# Patient Record
Sex: FEMALE | Race: WHITE | Employment: OTHER | URBAN - METROPOLITAN AREA
[De-identification: names, ages, dates, MRNs, and addresses within clinical notes are randomized per-mention and may not be internally consistent; named-entity substitution may affect disease eponyms.]

---

## 2017-02-12 ENCOUNTER — ED HISTORICAL/CONVERTED ENCOUNTER (OUTPATIENT)
Dept: OTHER | Age: 52
End: 2017-02-12

## 2017-06-05 ENCOUNTER — ED HISTORICAL/CONVERTED ENCOUNTER (OUTPATIENT)
Dept: OTHER | Age: 52
End: 2017-06-05

## 2017-12-20 ENCOUNTER — ED HISTORICAL/CONVERTED ENCOUNTER (OUTPATIENT)
Dept: OTHER | Age: 52
End: 2017-12-20

## 2018-03-23 ENCOUNTER — ED HISTORICAL/CONVERTED ENCOUNTER (OUTPATIENT)
Dept: OTHER | Age: 53
End: 2018-03-23

## 2018-05-11 ENCOUNTER — IP HISTORICAL/CONVERTED ENCOUNTER (OUTPATIENT)
Dept: OTHER | Age: 53
End: 2018-05-11

## 2018-09-28 ENCOUNTER — ED HISTORICAL/CONVERTED ENCOUNTER (OUTPATIENT)
Dept: OTHER | Age: 53
End: 2018-09-28

## 2019-05-31 ENCOUNTER — OP HISTORICAL/CONVERTED ENCOUNTER (OUTPATIENT)
Dept: OTHER | Age: 54
End: 2019-05-31

## 2019-06-20 ENCOUNTER — OP HISTORICAL/CONVERTED ENCOUNTER (OUTPATIENT)
Dept: OTHER | Age: 54
End: 2019-06-20

## 2019-12-09 ENCOUNTER — ED HISTORICAL/CONVERTED ENCOUNTER (OUTPATIENT)
Dept: OTHER | Age: 54
End: 2019-12-09

## 2020-04-08 ENCOUNTER — ED HISTORICAL/CONVERTED ENCOUNTER (OUTPATIENT)
Dept: OTHER | Age: 55
End: 2020-04-08

## 2020-07-24 ENCOUNTER — ED HISTORICAL/CONVERTED ENCOUNTER (OUTPATIENT)
Dept: OTHER | Age: 55
End: 2020-07-24

## 2020-09-09 ENCOUNTER — ED HISTORICAL/CONVERTED ENCOUNTER (OUTPATIENT)
Dept: OTHER | Age: 55
End: 2020-09-09

## 2020-10-20 ENCOUNTER — HOSPITAL ENCOUNTER (EMERGENCY)
Age: 55
Discharge: HOME | End: 2020-10-20
Attending: EMERGENCY MEDICINE
Payer: MEDICARE

## 2020-10-20 VITALS
OXYGEN SATURATION: 97 % | DIASTOLIC BLOOD PRESSURE: 80 MMHG | HEIGHT: 68 IN | TEMPERATURE: 98.4 F | WEIGHT: 190 LBS | HEART RATE: 103 BPM | BODY MASS INDEX: 28.79 KG/M2 | RESPIRATION RATE: 18 BRPM | SYSTOLIC BLOOD PRESSURE: 120 MMHG

## 2020-10-20 DIAGNOSIS — R10.13 EPIGASTRIC PAIN: Primary | ICD-10-CM

## 2020-10-20 DIAGNOSIS — R19.5 DARK STOOLS: ICD-10-CM

## 2020-10-20 LAB
ABO + RH BLD: NORMAL
ALBUMIN SERPL-MCNC: 3.6 G/DL (ref 3.5–5)
ALBUMIN/GLOB SERPL: 0.9 {RATIO} (ref 1.1–2.2)
ALP SERPL-CCNC: 137 U/L (ref 45–117)
ALT SERPL-CCNC: 33 U/L (ref 12–78)
ANION GAP SERPL CALC-SCNC: 5 MMOL/L (ref 5–15)
APPEARANCE UR: ABNORMAL
AST SERPL W P-5'-P-CCNC: 23 U/L (ref 15–37)
BACTERIA URNS QL MICRO: ABNORMAL /HPF
BASOPHILS # BLD: 0.1 K/UL (ref 0–0.1)
BASOPHILS NFR BLD: 1 % (ref 0–1)
BILIRUB SERPL-MCNC: 0.2 MG/DL (ref 0.2–1)
BILIRUB UR QL: NEGATIVE
BLOOD BANK CMNT PATIENT-IMP: NORMAL
BLOOD GROUP ANTIBODIES SERPL: NEGATIVE
BUN SERPL-MCNC: 11 MG/DL (ref 6–20)
BUN/CREAT SERPL: 12 (ref 12–20)
CA-I BLD-MCNC: 9.3 MG/DL (ref 8.5–10.1)
CHLORIDE SERPL-SCNC: 100 MMOL/L (ref 97–108)
CO2 SERPL-SCNC: 31 MMOL/L (ref 21–32)
COLOR UR: ABNORMAL
CREAT SERPL-MCNC: 0.91 MG/DL (ref 0.55–1.02)
DIFFERENTIAL METHOD BLD: NORMAL
EOSINOPHIL # BLD: 0.1 K/UL (ref 0–0.4)
EOSINOPHIL NFR BLD: 1 % (ref 0–7)
ERYTHROCYTE [DISTWIDTH] IN BLOOD BY AUTOMATED COUNT: 14 % (ref 11.5–14.5)
GLOBULIN SER CALC-MCNC: 4 G/DL (ref 2–4)
GLUCOSE SERPL-MCNC: 98 MG/DL (ref 65–100)
GLUCOSE UR STRIP.AUTO-MCNC: NEGATIVE MG/DL
HCT VFR BLD AUTO: 44.1 % (ref 35–47)
HGB BLD-MCNC: 14.9 G/DL (ref 11.5–16)
HGB UR QL STRIP: NEGATIVE
IMM GRANULOCYTES # BLD AUTO: 0 K/UL (ref 0–0.04)
IMM GRANULOCYTES NFR BLD AUTO: 0 % (ref 0–0.5)
KETONES UR QL STRIP.AUTO: NEGATIVE MG/DL
LEUKOCYTE ESTERASE UR QL STRIP.AUTO: NEGATIVE
LIPASE SERPL-CCNC: 66 U/L (ref 73–393)
LYMPHOCYTES # BLD: 3.2 K/UL (ref 0.8–3.5)
LYMPHOCYTES NFR BLD: 40 % (ref 12–49)
MCH RBC QN AUTO: 30.8 PG (ref 26–34)
MCHC RBC AUTO-ENTMCNC: 33.8 G/DL (ref 30–36.5)
MCV RBC AUTO: 91.3 FL (ref 80–99)
MONOCYTES # BLD: 0.4 K/UL (ref 0–1)
MONOCYTES NFR BLD: 5 % (ref 5–13)
MUCOUS THREADS URNS QL MICRO: ABNORMAL /LPF
NEUTS SEG # BLD: 4.1 K/UL (ref 1.8–8)
NEUTS SEG NFR BLD: 53 % (ref 32–75)
NITRITE UR QL STRIP.AUTO: NEGATIVE
PH UR STRIP: 6 [PH] (ref 5–8)
PLATELET # BLD AUTO: 379 K/UL (ref 150–400)
PMV BLD AUTO: 9.5 FL (ref 8.9–12.9)
POTASSIUM SERPL-SCNC: 4.3 MMOL/L (ref 3.5–5.1)
PROT SERPL-MCNC: 7.6 G/DL (ref 6.4–8.2)
PROT UR STRIP-MCNC: NEGATIVE MG/DL
RBC # BLD AUTO: 4.83 M/UL (ref 3.8–5.2)
RBC #/AREA URNS HPF: ABNORMAL /HPF (ref 0–5)
SODIUM SERPL-SCNC: 136 MMOL/L (ref 136–145)
SP GR UR REFRACTOMETRY: 1.01 (ref 1–1.03)
SPECIMEN EXP DATE BLD: NORMAL
UA: UC IF INDICATED,UAUC: ABNORMAL
UROBILINOGEN UR QL STRIP.AUTO: 0.1 EU/DL (ref 0.1–1)
WBC # BLD AUTO: 7.9 K/UL (ref 3.6–11)
WBC URNS QL MICRO: ABNORMAL /HPF (ref 0–4)

## 2020-10-20 PROCEDURE — 83690 ASSAY OF LIPASE: CPT

## 2020-10-20 PROCEDURE — 99284 EMERGENCY DEPT VISIT MOD MDM: CPT

## 2020-10-20 PROCEDURE — 86900 BLOOD TYPING SEROLOGIC ABO: CPT

## 2020-10-20 PROCEDURE — 74011250636 HC RX REV CODE- 250/636: Performed by: EMERGENCY MEDICINE

## 2020-10-20 PROCEDURE — C9113 INJ PANTOPRAZOLE SODIUM, VIA: HCPCS | Performed by: EMERGENCY MEDICINE

## 2020-10-20 PROCEDURE — 80053 COMPREHEN METABOLIC PANEL: CPT

## 2020-10-20 PROCEDURE — 36415 COLL VENOUS BLD VENIPUNCTURE: CPT

## 2020-10-20 PROCEDURE — 81001 URINALYSIS AUTO W/SCOPE: CPT

## 2020-10-20 PROCEDURE — 85025 COMPLETE CBC W/AUTO DIFF WBC: CPT

## 2020-10-20 PROCEDURE — 74011000250 HC RX REV CODE- 250: Performed by: EMERGENCY MEDICINE

## 2020-10-20 RX ORDER — SUCRALFATE 1 G/10ML
1 SUSPENSION ORAL 4 TIMES DAILY
Qty: 414 ML | Refills: 0 | Status: SHIPPED | OUTPATIENT
Start: 2020-10-20 | End: 2021-08-09

## 2020-10-20 RX ORDER — SUCRALFATE 1 G/10ML
1 SUSPENSION ORAL 4 TIMES DAILY
Qty: 414 ML | Refills: 0 | Status: SHIPPED | OUTPATIENT
Start: 2020-10-20 | End: 2020-10-20 | Stop reason: SDUPTHER

## 2020-10-20 RX ORDER — OMEPRAZOLE 40 MG/1
40 CAPSULE, DELAYED RELEASE ORAL
Qty: 30 CAP | Refills: 0 | Status: SHIPPED | OUTPATIENT
Start: 2020-10-20 | End: 2020-11-19

## 2020-10-20 RX ORDER — OMEPRAZOLE 40 MG/1
40 CAPSULE, DELAYED RELEASE ORAL
Qty: 30 CAP | Refills: 0 | Status: SHIPPED | OUTPATIENT
Start: 2020-10-20 | End: 2020-10-20 | Stop reason: SDUPTHER

## 2020-10-20 RX ORDER — MORPHINE SULFATE 2 MG/ML
2 INJECTION, SOLUTION INTRAMUSCULAR; INTRAVENOUS ONCE
Status: COMPLETED | OUTPATIENT
Start: 2020-10-20 | End: 2020-10-20

## 2020-10-20 RX ADMIN — SODIUM CHLORIDE 1000 ML: 9 INJECTION, SOLUTION INTRAVENOUS at 14:12

## 2020-10-20 RX ADMIN — MORPHINE SULFATE 2 MG: 2 INJECTION, SOLUTION INTRAMUSCULAR; INTRAVENOUS at 14:17

## 2020-10-20 RX ADMIN — SODIUM CHLORIDE 80 MG: 9 INJECTION, SOLUTION INTRAMUSCULAR; INTRAVENOUS; SUBCUTANEOUS at 14:18

## 2020-10-20 NOTE — ED NOTES
IV removed. Pt a&ox4. gcs 15. Pt self ambulated out of ED with discharge paperwork and personal belongings.

## 2020-10-20 NOTE — DISCHARGE INSTRUCTIONS
Continue nexium or you may want to try omeprazole, be sure to take it in the morning before eating anything. You may also try Carafate which provides a soothing coating to your upper GI tract to protect it from acid. See your GI within 3 days for follow up.   Return to ED for worsening symptoms or new concerning symptoms

## 2020-10-21 NOTE — ED PROVIDER NOTES
HPI   Chief Complaint   Patient presents with    Abdominal Pain    Epigastric Pain    Melena   47yoF with hx GERD, appendectomy, cholecystectomy, hysterectomy presents with abdominal pain and dark stools. Pt reports 6 days of dark tarry stools, worsening acid reflux, RUE and epigastric constant sharp pain 9/10. Pt also reports nausea, poor appetite, fatigue, but denies any vomiting, fevers. Pt called her GI today for appointment but was told to come to ED. Pt denies significant NSAID use and denies alcohol, denies hx of GI bleed.      Past Medical History:   Diagnosis Date    Fatigue     GERD (gastroesophageal reflux disease)     Hypothyroidism     Psychiatric disorder        Past Surgical History:   Procedure Laterality Date    HX APPENDECTOMY      HX  SECTION      HX CHOLECYSTECTOMY      HX HYSTERECTOMY           Family History:   Family history unknown: Yes       Social History     Socioeconomic History    Marital status:      Spouse name: Not on file    Number of children: Not on file    Years of education: Not on file    Highest education level: Not on file   Occupational History    Not on file   Social Needs    Financial resource strain: Not on file    Food insecurity     Worry: Not on file     Inability: Not on file    Transportation needs     Medical: Not on file     Non-medical: Not on file   Tobacco Use    Smoking status: Current Every Day Smoker     Packs/day: 0.50    Smokeless tobacco: Never Used   Substance and Sexual Activity    Alcohol use: No     Alcohol/week: 0.0 standard drinks    Drug use: Not on file    Sexual activity: Not on file   Lifestyle    Physical activity     Days per week: Not on file     Minutes per session: Not on file    Stress: Not on file   Relationships    Social connections     Talks on phone: Not on file     Gets together: Not on file     Attends L[employer]-day service: Not on file     Active member of club or organization: Not on file Attends meetings of clubs or organizations: Not on file     Relationship status: Not on file    Intimate partner violence     Fear of current or ex partner: Not on file     Emotionally abused: Not on file     Physically abused: Not on file     Forced sexual activity: Not on file   Other Topics Concern    Not on file   Social History Narrative    Not on file         ALLERGIES: Patient has no known allergies. Review of Systems   Constitutional: Positive for appetite change and fatigue. Gastrointestinal: Positive for abdominal pain and nausea. Melena   All other systems reviewed and are negative. Vitals:    10/20/20 1308   BP: 120/80   Pulse: (!) 103   Resp: 18   Temp: 98.4 °F (36.9 °C)   SpO2: 97%   Weight: 86.2 kg (190 lb)   Height: 5' 8\" (1.727 m)            Physical Exam   Nursing note and vitals reviewed. Constitutional: NAD. Head: Normocephalic and atraumatic. Mouth/Throat: Airway patent. Moist mucous membranes. Eyes: EOMI. No scleral icterus. Neck: Neck supple. Cardiovascular: Mildly tachy rate, regular rhythm. Normal heart sounds. No murmur, rub, or gallop. Good pulses throughout. Pulmonary/Chest: No respiratory distress. Clear to auscultation bilaterally. No wheezes, rales, or rhonchi. Abdominal/GI: BS normal, Soft, mildly tender RUQ and epigastrium, non-distended. No rebound or guarding. Musculoskeletal: No gross injuries or deformities. Neurological: Alert and oriented to person, place, and time. Cranial Nerves 2-12 intact. Moving all extremities. No gross deficits. Psych: Pleasant, cooperative. Skin: Skin is warm and dry. No rash noted. MDM  Number of Diagnoses or Management Options  Dark stools: new and requires workup  Epigastric pain: established and worsening     Ddx = PUD, gastritis, upper GI bleed, acute anemia, pancreatitis, hepatitis, biliary obstruction, gastroenteritis.  Pt told me she has hx of gallbladder removal so that is not in the differential.   No acute anemia, hgb normal.   LFTs not consistent with hepatitis and biliary obstruction. No lipase elevation to suggest pancreatitis. Pt given protonix, morphine and IVF bolus. Pt says she has a GI to f/u with. Given Rx carafate and pantoprazole, instructed to f/u GI. Given return precautions. Labs Reviewed   METABOLIC PANEL, COMPREHENSIVE - Abnormal; Notable for the following components:       Result Value    Alk. phosphatase 137 (*)     A-G Ratio 0.9 (*)     All other components within normal limits   LIPASE - Abnormal; Notable for the following components:    Lipase 66 (*)     All other components within normal limits   URINALYSIS W/ REFLEX CULTURE - Abnormal; Notable for the following components:    Appearance Turbid (*)     Bacteria 2+ (*)     All other components within normal limits   CBC WITH AUTOMATED DIFF   TYPE & SCREEN     No orders to display     Medications   sodium chloride 0.9 % bolus infusion 1,000 mL (1,000 mL IntraVENous New Bag 10/20/20 1412)   pantoprazole (PROTONIX) 80 mg in 0.9% sodium chloride 20 mL injection (80 mg IntraVENous Given 10/20/20 1418)   morphine injection 2 mg (2 mg IntraVENous Given 10/20/20 1417)     I, Chestine Cranker, MD, am  the first and primary ED provider for this patient.           Procedures

## 2020-10-30 ENCOUNTER — APPOINTMENT (OUTPATIENT)
Dept: GENERAL RADIOLOGY | Age: 55
End: 2020-10-30
Attending: PHYSICIAN ASSISTANT
Payer: MEDICARE

## 2020-10-30 ENCOUNTER — HOSPITAL ENCOUNTER (EMERGENCY)
Age: 55
Discharge: HOME | End: 2020-10-30
Payer: MEDICARE

## 2020-10-30 VITALS
HEART RATE: 131 BPM | TEMPERATURE: 98.2 F | DIASTOLIC BLOOD PRESSURE: 86 MMHG | SYSTOLIC BLOOD PRESSURE: 145 MMHG | HEIGHT: 68 IN | OXYGEN SATURATION: 98 % | RESPIRATION RATE: 16 BRPM | WEIGHT: 200 LBS | BODY MASS INDEX: 30.31 KG/M2

## 2020-10-30 DIAGNOSIS — Z72.0 TOBACCO USE: ICD-10-CM

## 2020-10-30 DIAGNOSIS — J20.9 ACUTE BRONCHITIS, UNSPECIFIED ORGANISM: Primary | ICD-10-CM

## 2020-10-30 LAB
ALBUMIN SERPL-MCNC: 3.7 G/DL (ref 3.5–5)
ALBUMIN/GLOB SERPL: 0.9 {RATIO} (ref 1.1–2.2)
ALP SERPL-CCNC: 127 U/L (ref 45–117)
ALT SERPL-CCNC: 33 U/L (ref 12–78)
ANION GAP SERPL CALC-SCNC: 9 MMOL/L (ref 5–15)
AST SERPL W P-5'-P-CCNC: 19 U/L (ref 15–37)
ATRIAL RATE: 132 BPM
BASOPHILS # BLD: 0 K/UL (ref 0–0.1)
BASOPHILS NFR BLD: 1 % (ref 0–1)
BILIRUB SERPL-MCNC: 0.4 MG/DL (ref 0.2–1)
BNP SERPL-MCNC: 62 PG/ML
BUN SERPL-MCNC: 17 MG/DL (ref 6–20)
BUN/CREAT SERPL: 15 (ref 12–20)
CA-I BLD-MCNC: 8.9 MG/DL (ref 8.5–10.1)
CALCULATED P AXIS, ECG09: 40 DEGREES
CALCULATED R AXIS, ECG10: 43 DEGREES
CALCULATED T AXIS, ECG11: 31 DEGREES
CHLORIDE SERPL-SCNC: 106 MMOL/L (ref 97–108)
CO2 SERPL-SCNC: 27 MMOL/L (ref 21–32)
CREAT SERPL-MCNC: 1.11 MG/DL (ref 0.55–1.02)
DIAGNOSIS, 93000: NORMAL
DIFFERENTIAL METHOD BLD: ABNORMAL
EOSINOPHIL # BLD: 0 K/UL (ref 0–0.4)
EOSINOPHIL NFR BLD: 0 % (ref 0–7)
ERYTHROCYTE [DISTWIDTH] IN BLOOD BY AUTOMATED COUNT: 14.9 % (ref 11.5–14.5)
GLOBULIN SER CALC-MCNC: 3.9 G/DL (ref 2–4)
GLUCOSE SERPL-MCNC: 90 MG/DL (ref 65–100)
HCT VFR BLD AUTO: 44.3 % (ref 35–47)
HGB BLD-MCNC: 14.9 G/DL (ref 11.5–16)
IMM GRANULOCYTES # BLD AUTO: 0 K/UL (ref 0–0.04)
IMM GRANULOCYTES NFR BLD AUTO: 0 % (ref 0–0.5)
LYMPHOCYTES # BLD: 2.4 K/UL (ref 0.8–3.5)
LYMPHOCYTES NFR BLD: 37 % (ref 12–49)
MCH RBC QN AUTO: 31.2 PG (ref 26–34)
MCHC RBC AUTO-ENTMCNC: 33.6 G/DL (ref 30–36.5)
MCV RBC AUTO: 92.7 FL (ref 80–99)
MONOCYTES # BLD: 0.6 K/UL (ref 0–1)
MONOCYTES NFR BLD: 9 % (ref 5–13)
NEUTS SEG # BLD: 3.5 K/UL (ref 1.8–8)
NEUTS SEG NFR BLD: 53 % (ref 32–75)
NRBC # BLD: 0 K/UL (ref 0–0.01)
NRBC BLD-RTO: 0 PER 100 WBC
P-R INTERVAL, ECG05: 128 MS
PLATELET # BLD AUTO: 377 K/UL (ref 150–400)
PMV BLD AUTO: 9.7 FL (ref 8.9–12.9)
POTASSIUM SERPL-SCNC: 3.7 MMOL/L (ref 3.5–5.1)
PROT SERPL-MCNC: 7.6 G/DL (ref 6.4–8.2)
Q-T INTERVAL, ECG07: 310 MS
QRS DURATION, ECG06: 76 MS
QTC CALCULATION (BEZET), ECG08: 459 MS
RBC # BLD AUTO: 4.78 M/UL (ref 3.8–5.2)
SODIUM SERPL-SCNC: 142 MMOL/L (ref 136–145)
TROPONIN I SERPL-MCNC: <0.05 NG/ML
TROPONIN I SERPL-MCNC: <0.05 NG/ML
VENTRICULAR RATE, ECG03: 132 BPM
WBC # BLD AUTO: 6.6 K/UL (ref 3.6–11)

## 2020-10-30 PROCEDURE — 74011250637 HC RX REV CODE- 250/637: Performed by: PHYSICIAN ASSISTANT

## 2020-10-30 PROCEDURE — 99282 EMERGENCY DEPT VISIT SF MDM: CPT

## 2020-10-30 PROCEDURE — 74011000250 HC RX REV CODE- 250: Performed by: PHYSICIAN ASSISTANT

## 2020-10-30 PROCEDURE — 85025 COMPLETE CBC W/AUTO DIFF WBC: CPT

## 2020-10-30 PROCEDURE — 93005 ELECTROCARDIOGRAM TRACING: CPT

## 2020-10-30 PROCEDURE — 71046 X-RAY EXAM CHEST 2 VIEWS: CPT

## 2020-10-30 PROCEDURE — 84484 ASSAY OF TROPONIN QUANT: CPT

## 2020-10-30 PROCEDURE — 83880 ASSAY OF NATRIURETIC PEPTIDE: CPT

## 2020-10-30 PROCEDURE — 71045 X-RAY EXAM CHEST 1 VIEW: CPT

## 2020-10-30 PROCEDURE — 80053 COMPREHEN METABOLIC PANEL: CPT

## 2020-10-30 PROCEDURE — 36415 COLL VENOUS BLD VENIPUNCTURE: CPT

## 2020-10-30 RX ORDER — IPRATROPIUM BROMIDE AND ALBUTEROL SULFATE 2.5; .5 MG/3ML; MG/3ML
3 SOLUTION RESPIRATORY (INHALATION)
Status: COMPLETED | OUTPATIENT
Start: 2020-10-30 | End: 2020-10-30

## 2020-10-30 RX ORDER — CLONAZEPAM 1 MG/1
1 TABLET ORAL
Status: COMPLETED | OUTPATIENT
Start: 2020-10-30 | End: 2020-10-30

## 2020-10-30 RX ORDER — AZITHROMYCIN 250 MG/1
TABLET, FILM COATED ORAL
Qty: 6 TAB | Refills: 0 | Status: SHIPPED | OUTPATIENT
Start: 2020-10-30 | End: 2020-11-04

## 2020-10-30 RX ORDER — BENZONATATE 100 MG/1
100 CAPSULE ORAL
Qty: 21 CAP | Refills: 0 | Status: SHIPPED | OUTPATIENT
Start: 2020-10-30 | End: 2020-11-06

## 2020-10-30 RX ORDER — ALBUTEROL SULFATE 90 UG/1
1 AEROSOL, METERED RESPIRATORY (INHALATION)
Qty: 1 INHALER | Refills: 0 | Status: SHIPPED | OUTPATIENT
Start: 2020-10-30 | End: 2021-08-09

## 2020-10-30 RX ADMIN — IPRATROPIUM BROMIDE AND ALBUTEROL SULFATE 3 ML: .5; 3 SOLUTION RESPIRATORY (INHALATION) at 15:54

## 2020-10-30 RX ADMIN — CLONAZEPAM 1 MG: 1 TABLET ORAL at 15:54

## 2020-10-30 NOTE — ED PROVIDER NOTES
EMERGENCY DEPARTMENT HISTORY AND PHYSICAL EXAM      Date: 10/30/2020  Patient Name: Kya Lopez    History of Presenting Illness     Chief Complaint   Patient presents with    Shortness of Breath    Chest Pain       History Provided By: Patient    HPI: Kya Lopez, 54 y.o. female with a past medical history significant for COPD, GERD, hypothyroidism, anxiety presents to the ED with cc of gradual onset/worsening, intermittent, nonradiating diffuse chest tightness and pain and shortness of breath which started at 7 PM last night. Patient reports her symptoms began a few weeks ago with nasal congestion and generalized body aches and progressed to current symptoms. No particular alleviating or exacerbating factors. Has not taken medications for symptoms. Was tested for Covid in August and was negative. Denies sick contact. Smokes 1 pack of cigarettes a day. Patient denies fever, chills, leg swelling, nausea, vomiting, diarrhea, hemoptysis. There are no other complaints, changes, or physical findings at this time. PCP: None    No current facility-administered medications on file prior to encounter. Current Outpatient Medications on File Prior to Encounter   Medication Sig Dispense Refill    omeprazole (PRILOSEC) 40 mg capsule Take 1 Cap by mouth Daily (before breakfast) for 30 days. 30 Cap 0    sucralfate (Carafate) 100 mg/mL suspension Take 5 mL by mouth four (4) times daily. 414 mL 0    clonazePAM (KLONOPIN) 1 mg tablet Take  by mouth three (3) times daily.  gabapentin (NEURONTIN) 600 mg tablet Take  by mouth four (4) times daily.  QUEtiapine (SEROQUEL) 50 mg tablet Take 50 mg by mouth daily.  dextroamphetamine-amphetamine (ADDERALL) 30 mg tablet Take 30 mg by mouth two (2) times a day.  QUEtiapine (SEROQUEL) 400 mg tablet Take 400 mg by mouth nightly.  estradiol (CLIMARA) 0.05 mg/24 hr 1 Patch by TransDERmal route two (2) days a week.       levothyroxine (SYNTHROID) 50 mcg tablet Take 50 mcg by mouth Daily (before breakfast).  buprenorphine-naloxone (SUBOXONE) 8-2 mg subl 1 Tab by SubLINGual route daily.  lurasidone (LATUDA) 80 mg tab tablet Take 80 mg by mouth nightly.  cholecalciferol, VITAMIN D3, (VITAMIN D3) 5,000 unit tab tablet Take 5,000 Units by mouth daily.  ferrous sulfate (IRON) 325 mg (65 mg iron) tablet Take 65 mg by mouth Daily (before breakfast). Past History     Past Medical History:  Past Medical History:   Diagnosis Date    Fatigue     GERD (gastroesophageal reflux disease)     Hypothyroidism     Psychiatric disorder        Past Surgical History:  Past Surgical History:   Procedure Laterality Date    HX APPENDECTOMY      HX  SECTION      HX CHOLECYSTECTOMY      HX HYSTERECTOMY         Family History:  Family History   Family history unknown: Yes       Social History:  Social History     Tobacco Use    Smoking status: Current Every Day Smoker     Packs/day: 0.50    Smokeless tobacco: Never Used   Substance Use Topics    Alcohol use: No     Alcohol/week: 0.0 standard drinks    Drug use: Not on file       Allergies: Allergies   Allergen Reactions    Augmentin [Amoxicillin-Pot Clavulanate] Nausea and Vomiting         Review of Systems     Review of Systems   Constitutional: Negative for chills, fatigue and fever. HENT: Positive for congestion. Respiratory: Positive for cough and shortness of breath. Negative for chest tightness and wheezing. Cardiovascular: Positive for chest pain. Negative for palpitations. Gastrointestinal: Negative for abdominal pain, diarrhea, nausea and vomiting. Genitourinary: Negative for frequency and urgency. Musculoskeletal: Negative for back pain, neck pain and neck stiffness. Skin: Negative for rash. Neurological: Negative for dizziness, weakness, light-headedness and headaches. Psychiatric/Behavioral: Negative.     All other systems reviewed and are negative. Physical Exam     Physical Exam  Vitals signs and nursing note reviewed. Constitutional:       General: She is not in acute distress. Appearance: Normal appearance. She is well-developed. She is not ill-appearing, toxic-appearing or diaphoretic. Comments: Overweight  female who appears older than stated age   HENT:      Head: Normocephalic and atraumatic. Nose: Congestion present. No rhinorrhea. Mouth/Throat:      Mouth: Mucous membranes are moist.      Pharynx: Oropharynx is clear. No oropharyngeal exudate or posterior oropharyngeal erythema. Eyes:      General: No scleral icterus. Conjunctiva/sclera: Conjunctivae normal.      Pupils: Pupils are equal, round, and reactive to light. Neck:      Musculoskeletal: Normal range of motion and neck supple. No neck rigidity or muscular tenderness. Cardiovascular:      Rate and Rhythm: Regular rhythm. Tachycardia present. Pulses:           Radial pulses are 2+ on the right side and 2+ on the left side. Dorsalis pedis pulses are 2+ on the right side and 2+ on the left side. Heart sounds: No murmur. No friction rub. No gallop. Pulmonary:      Effort: Pulmonary effort is normal. No tachypnea, accessory muscle usage, respiratory distress or retractions. Breath sounds: No stridor. Rhonchi (fine, diffuse) present. No decreased breath sounds, wheezing or rales. Comments: No conversational dyspnea  Chest:      Chest wall: No tenderness. Abdominal:      General: Bowel sounds are normal. There is no distension. Palpations: Abdomen is soft. There is no mass. Tenderness: There is no abdominal tenderness. There is no right CVA tenderness, left CVA tenderness, guarding or rebound. Musculoskeletal: Normal range of motion. General: No deformity. Right lower leg: No edema. Left lower leg: No edema. Skin:     General: Skin is warm and dry.       Capillary Refill: Capillary refill takes less than 2 seconds. Coloration: Skin is not jaundiced or pale. Findings: No bruising, erythema or rash. Neurological:      General: No focal deficit present. Mental Status: She is alert and oriented to person, place, and time. Mental status is at baseline. Sensory: Sensation is intact. Motor: Motor function is intact. Psychiatric:         Mood and Affect: Mood is anxious. Behavior: Behavior normal. Behavior is cooperative. Thought Content: Thought content normal.         Judgment: Judgment normal.         Lab and Diagnostic Study Results     Labs -     Recent Results (from the past 12 hour(s))   EKG, 12 LEAD, INITIAL    Collection Time: 10/30/20  1:56 PM   Result Value Ref Range    Ventricular Rate 132 BPM    Atrial Rate 132 BPM    P-R Interval 128 ms    QRS Duration 76 ms    Q-T Interval 310 ms    QTC Calculation (Bezet) 459 ms    Calculated P Axis 40 degrees    Calculated R Axis 43 degrees    Calculated T Axis 31 degrees    Diagnosis       Sinus tachycardia  Otherwise normal ECG  When compared with ECG of 24-JUL-2020 13:30,  No significant change was found  Confirmed by Connie Angelucci ([id number]) on 10/30/2020 5:15:53 PM     CBC WITH AUTOMATED DIFF    Collection Time: 10/30/20  2:15 PM   Result Value Ref Range    WBC 6.6 3.6 - 11.0 K/uL    RBC 4.78 3.80 - 5.20 M/uL    HGB 14.9 11.5 - 16.0 g/dL    HCT 44.3 35.0 - 47.0 %    MCV 92.7 80.0 - 99.0 FL    MCH 31.2 26.0 - 34.0 PG    MCHC 33.6 30.0 - 36.5 g/dL    RDW 14.9 (H) 11.5 - 14.5 %    PLATELET 669 812 - 673 K/uL    MPV 9.7 8.9 - 12.9 FL    NRBC 0.0 0  WBC    ABSOLUTE NRBC 0.00 0.00 - 0.01 K/uL    NEUTROPHILS 53 32 - 75 %    LYMPHOCYTES 37 12 - 49 %    MONOCYTES 9 5 - 13 %    EOSINOPHILS 0 0 - 7 %    BASOPHILS 1 0 - 1 %    IMMATURE GRANULOCYTES 0 0.0 - 0.5 %    ABS. NEUTROPHILS 3.5 1.8 - 8.0 K/UL    ABS. LYMPHOCYTES 2.4 0.8 - 3.5 K/UL    ABS. MONOCYTES 0.6 0.0 - 1.0 K/UL    ABS.  EOSINOPHILS 0.0 0.0 - 0.4 K/UL    ABS. BASOPHILS 0.0 0.0 - 0.1 K/UL    ABS. IMM. GRANS. 0.0 0.00 - 0.04 K/UL    DF AUTOMATED     METABOLIC PANEL, COMPREHENSIVE    Collection Time: 10/30/20  2:15 PM   Result Value Ref Range    Sodium 142 136 - 145 mmol/L    Potassium 3.7 3.5 - 5.1 mmol/L    Chloride 106 97 - 108 mmol/L    CO2 27 21 - 32 mmol/L    Anion gap 9 5 - 15 mmol/L    Glucose 90 65 - 100 mg/dL    BUN 17 6 - 20 mg/dL    Creatinine 1.11 (H) 0.55 - 1.02 mg/dL    BUN/Creatinine ratio 15 12 - 20      GFR est AA >60 >60 ml/min/1.73m2    GFR est non-AA 51 (L) >60 ml/min/1.73m2    Calcium 8.9 8.5 - 10.1 mg/dL    Bilirubin, total 0.4 0.2 - 1.0 mg/dL    AST (SGOT) 19 15 - 37 U/L    ALT (SGPT) 33 12 - 78 U/L    Alk. phosphatase 127 (H) 45 - 117 U/L    Protein, total 7.6 6.4 - 8.2 g/dL    Albumin 3.7 3.5 - 5.0 g/dL    Globulin 3.9 2.0 - 4.0 g/dL    A-G Ratio 0.9 (L) 1.1 - 2.2     TROPONIN I    Collection Time: 10/30/20  2:15 PM   Result Value Ref Range    Troponin-I, Qt. <0.05 <0.05 ng/mL   BNP    Collection Time: 10/30/20  2:15 PM   Result Value Ref Range    NT pro-BNP 62 <125 pg/mL   TROPONIN I    Collection Time: 10/30/20  5:15 PM   Result Value Ref Range    Troponin-I, Qt. <0.05 <0.05 ng/mL       Radiologic Studies -     CXR Results  (Last 48 hours)               10/30/20 1513  XR CHEST SNGL V Final result    Narrative:  1 view comparison July 24       Mild atelectasis/scar left base with lungs otherwise clear. No effusion or   pneumothorax. Normal heart and mediastinum               Medical Decision Making   I am the first provider for this patient. I reviewed the vital signs, available nursing notes, past medical history, past surgical history, family history and social history. Vital Signs-Reviewed the patient's vital signs. Patient Vitals for the past 12 hrs:   Temp Pulse Resp BP SpO2   10/30/20 1355 98.2 °F (36.8 °C) (!) 131 16 (!) 145/86 98 %       EKG interpretation: (Preliminary) 1356  Rhythm: sinus tachycardia; and regular . Rate (approx.): 132; Axis: normal; P wave: normal; QRS interval: normal ; ST/T wave: normal;       Records Reviewed: Nursing Notes and Old Medical Records    The patient presents with shortness of breath with a differential diagnosis of URI, bronchitis, pneumonia, COPD exacerbation      ED Course:   Initial assessment performed. The patients presenting problems have been discussed, and they are in agreement with the care plan formulated and outlined with them. I have encouraged them to ask questions as they arise throughout their visit. ED Course as of Oct 30 2230   Fri Oct 30, 2020   1546 Patient reports she takes Ativan 2 mg at home and would like a dose. Medication list in the emergency department contradicts what patient says.  reviewed. Patient filled a prescription for clonazepam 1 mg, 120 tablets for 30 days on 10/6/2020. Will give patient dose of Clonazepam 1mg in the ED given she has missed her dose at home.    [NO]   1636 Patient reevaluated after DuoNeb. No hypoxia. No increased work of breathing. Appears to be nasally congested and breathing through her mouth as a result. Discussed with patient current test results. Discussed with her need for 3-hour troponin which she is agreeable to    [NO]   1809 SIGN OUT NOTE:  6:09 PM  Patient's presentation, labs/imaging and plan of care was reviewed with Sha Pendleton NP as part of sign out. They will dispo patient after second troponin as part of the plan discussed with the patient. GILDARDO Kelley    [NO]   8889 Patient updated on results and plan for discharge    [LP]      ED Course User Index  [LP] Swati Martinez NP  [NO] GILDARDO De Paz       Provider Notes (Medical Decision Making):     MDM  Number of Diagnoses or Management Options  Acute bronchitis, unspecified organism:   Tobacco use:   Diagnosis management comments:     54year old with shortness of breath and chest pain.  On exam appears to be nasally congested vs evidence of pulmonary process. Will workup with labs including troponin/BNP, ekg, and cxr. Will dispo pending results. No hypoxia. Afebrile and nontoxic appearing. Amount and/or Complexity of Data Reviewed  Clinical lab tests: ordered and reviewed  Tests in the radiology section of CPT®: ordered and reviewed  Review and summarize past medical records: yes    Patient Progress  Patient progress: stable           Disposition   Disposition: Discharge Home        DISCHARGE PLAN:  1. Current Discharge Medication List      CONTINUE these medications which have NOT CHANGED    Details   omeprazole (PRILOSEC) 40 mg capsule Take 1 Cap by mouth Daily (before breakfast) for 30 days. Qty: 30 Cap, Refills: 0      sucralfate (Carafate) 100 mg/mL suspension Take 5 mL by mouth four (4) times daily. Qty: 414 mL, Refills: 0      clonazePAM (KLONOPIN) 1 mg tablet Take  by mouth three (3) times daily. Associated Diagnoses: Idiopathic hypotension; Hypothyroidism due to acquired atrophy of thyroid; Anemia due to vitamin B12 deficiency; Dizziness; Arthralgia      gabapentin (NEURONTIN) 600 mg tablet Take  by mouth four (4) times daily. Associated Diagnoses: Idiopathic hypotension; Hypothyroidism due to acquired atrophy of thyroid; Anemia due to vitamin B12 deficiency; Dizziness; Arthralgia      !! QUEtiapine (SEROQUEL) 50 mg tablet Take 50 mg by mouth daily. Associated Diagnoses: Idiopathic hypotension; Hypothyroidism due to acquired atrophy of thyroid; Anemia due to vitamin B12 deficiency; Dizziness; Arthralgia      dextroamphetamine-amphetamine (ADDERALL) 30 mg tablet Take 30 mg by mouth two (2) times a day. Associated Diagnoses: Idiopathic hypotension; Hypothyroidism due to acquired atrophy of thyroid; Anemia due to vitamin B12 deficiency; Dizziness; Arthralgia      !! QUEtiapine (SEROQUEL) 400 mg tablet Take 400 mg by mouth nightly. Associated Diagnoses: Idiopathic hypotension;  Hypothyroidism due to acquired atrophy of thyroid; Anemia due to vitamin B12 deficiency; Dizziness; Arthralgia      estradiol (CLIMARA) 0.05 mg/24 hr 1 Patch by TransDERmal route two (2) days a week. Associated Diagnoses: Idiopathic hypotension; Hypothyroidism due to acquired atrophy of thyroid; Anemia due to vitamin B12 deficiency; Dizziness; Arthralgia      levothyroxine (SYNTHROID) 50 mcg tablet Take 50 mcg by mouth Daily (before breakfast). Associated Diagnoses: Idiopathic hypotension; Hypothyroidism due to acquired atrophy of thyroid; Anemia due to vitamin B12 deficiency; Dizziness; Arthralgia      buprenorphine-naloxone (SUBOXONE) 8-2 mg subl 1 Tab by SubLINGual route daily. Associated Diagnoses: Idiopathic hypotension; Hypothyroidism due to acquired atrophy of thyroid; Anemia due to vitamin B12 deficiency; Dizziness; Arthralgia      lurasidone (LATUDA) 80 mg tab tablet Take 80 mg by mouth nightly. Associated Diagnoses: Idiopathic hypotension; Hypothyroidism due to acquired atrophy of thyroid; Anemia due to vitamin B12 deficiency; Dizziness; Arthralgia      cholecalciferol, VITAMIN D3, (VITAMIN D3) 5,000 unit tab tablet Take 5,000 Units by mouth daily. Associated Diagnoses: Idiopathic hypotension; Hypothyroidism due to acquired atrophy of thyroid; Anemia due to vitamin B12 deficiency; Dizziness; Arthralgia      ferrous sulfate (IRON) 325 mg (65 mg iron) tablet Take 65 mg by mouth Daily (before breakfast). Associated Diagnoses: Idiopathic hypotension; Hypothyroidism due to acquired atrophy of thyroid; Anemia due to vitamin B12 deficiency; Dizziness; Arthralgia       !! - Potential duplicate medications found. Please discuss with provider. 2.   Follow-up Information     Follow up With Specialties Details Why Contact Info    Your Primary Care Provider  In 3 days      [address] [facility] EMERGENCY DEPT Emergency Medicine  As needed, If symptoms worsen [address] [id number] [phone]        3.   Return to ED if worse 4.   Discharge Medication List as of 10/30/2020  7:19 PM      START taking these medications    Details   azithromycin (Zithromax Z-Terrence) 250 mg tablet Take 2 tablets x 1 day, then take 1 tablet once daily x 4 days, Normal, Disp-6 Tab,R-0      albuterol (ProAir HFA) 90 mcg/actuation inhaler Take 1 Puff by inhalation every four (4) hours as needed for Wheezing., Normal, Disp-1 Inhaler,R-0      benzonatate (TESSALON) 100 mg capsule Take 1 Cap by mouth three (3) times daily as needed for Cough for up to 7 days. , Normal, Disp-21 Cap,R-0         CONTINUE these medications which have NOT CHANGED    Details   omeprazole (PRILOSEC) 40 mg capsule Take 1 Cap by mouth Daily (before breakfast) for 30 days. , Normal, Disp-30 Cap,R-0      sucralfate (Carafate) 100 mg/mL suspension Take 5 mL by mouth four (4) times daily. , Normal, Disp-414 mL,R-0      clonazePAM (KLONOPIN) 1 mg tablet Take  by mouth three (3) times daily. , Historical Med      gabapentin (NEURONTIN) 600 mg tablet Take  by mouth four (4) times daily. , Historical Med      !! QUEtiapine (SEROQUEL) 50 mg tablet Take 50 mg by mouth daily. , Historical Med      dextroamphetamine-amphetamine (ADDERALL) 30 mg tablet Take 30 mg by mouth two (2) times a day., Historical Med      !! QUEtiapine (SEROQUEL) 400 mg tablet Take 400 mg by mouth nightly., Historical Med      estradiol (CLIMARA) 0.05 mg/24 hr 1 Patch by TransDERmal route two (2) days a week., Historical Med      levothyroxine (SYNTHROID) 50 mcg tablet Take 50 mcg by mouth Daily (before breakfast). , Historical Med      buprenorphine-naloxone (SUBOXONE) 8-2 mg subl 1 Tab by SubLINGual route daily. , Historical Med      lurasidone (LATUDA) 80 mg tab tablet Take 80 mg by mouth nightly., Historical Med      cholecalciferol, VITAMIN D3, (VITAMIN D3) 5,000 unit tab tablet Take 5,000 Units by mouth daily. , Historical Med      ferrous sulfate (IRON) 325 mg (65 mg iron) tablet Take 65 mg by mouth Daily (before breakfast). , Historical Med       !! - Potential duplicate medications found. Please discuss with provider. Diagnosis     Clinical Impression:   1. Acute bronchitis, unspecified organism    2. Tobacco use        Attestations:    GILDARDO Salas    Please note that this dictation was completed with [employer], the computer voice recognition software. Quite often unanticipated grammatical, syntax, homophones, and other interpretive errors are inadvertently transcribed by the computer software. Please disregard these errors. Please excuse any errors that have escaped final proofreading. Thank you.

## 2020-10-30 NOTE — ED TRIAGE NOTES
Pt stated she was tested for COVID in august was negative. Pt stated she started having chest pain and difficulty breathing last night ~ 1900. Pt stated both got really worse this morning about 0300.

## 2021-02-07 ENCOUNTER — APPOINTMENT (OUTPATIENT)
Dept: GENERAL RADIOLOGY | Age: 56
End: 2021-02-07
Attending: EMERGENCY MEDICINE
Payer: MEDICARE

## 2021-02-07 ENCOUNTER — HOSPITAL ENCOUNTER (EMERGENCY)
Age: 56
Discharge: HOME | End: 2021-02-07
Admitting: EMERGENCY MEDICINE
Payer: MEDICARE

## 2021-02-07 VITALS
TEMPERATURE: 98.7 F | SYSTOLIC BLOOD PRESSURE: 103 MMHG | WEIGHT: 180 LBS | RESPIRATION RATE: 21 BRPM | HEIGHT: 68 IN | BODY MASS INDEX: 27.28 KG/M2 | DIASTOLIC BLOOD PRESSURE: 69 MMHG | OXYGEN SATURATION: 95 % | HEART RATE: 96 BPM

## 2021-02-07 DIAGNOSIS — J18.9 COMMUNITY ACQUIRED PNEUMONIA, UNSPECIFIED LATERALITY: Primary | ICD-10-CM

## 2021-02-07 DIAGNOSIS — Z20.822 COVID-19 RULED OUT: ICD-10-CM

## 2021-02-07 LAB
ALBUMIN SERPL-MCNC: 3 G/DL (ref 3.5–5)
ALBUMIN/GLOB SERPL: 0.9 {RATIO} (ref 1.1–2.2)
ALP SERPL-CCNC: 108 U/L (ref 45–117)
ALT SERPL-CCNC: 28 U/L (ref 12–78)
ANION GAP SERPL CALC-SCNC: 7 MMOL/L (ref 5–15)
APPEARANCE UR: CLEAR
AST SERPL W P-5'-P-CCNC: 24 U/L (ref 15–37)
ATRIAL RATE: 120 BPM
BACTERIA URNS QL MICRO: NEGATIVE /HPF
BACTERIA URNS QL MICRO: NEGATIVE /HPF
BASOPHILS # BLD: 0 K/UL (ref 0–0.1)
BASOPHILS NFR BLD: 0 % (ref 0–1)
BILIRUB SERPL-MCNC: 0.3 MG/DL (ref 0.2–1)
BILIRUB UR QL: NEGATIVE
BNP SERPL-MCNC: 72 PG/ML
BUN SERPL-MCNC: 7 MG/DL (ref 6–20)
BUN/CREAT SERPL: 7 (ref 12–20)
CA-I BLD-MCNC: 8.3 MG/DL (ref 8.5–10.1)
CALCULATED P AXIS, ECG09: 56 DEGREES
CALCULATED R AXIS, ECG10: 65 DEGREES
CALCULATED T AXIS, ECG11: 33 DEGREES
CHLORIDE SERPL-SCNC: 105 MMOL/L (ref 97–108)
CO2 SERPL-SCNC: 27 MMOL/L (ref 21–32)
COLOR UR: ABNORMAL
CREAT SERPL-MCNC: 1 MG/DL (ref 0.55–1.02)
DIAGNOSIS, 93000: NORMAL
DIFFERENTIAL METHOD BLD: ABNORMAL
EOSINOPHIL # BLD: 0.1 K/UL (ref 0–0.4)
EOSINOPHIL NFR BLD: 1 % (ref 0–7)
ERYTHROCYTE [DISTWIDTH] IN BLOOD BY AUTOMATED COUNT: 14.1 % (ref 11.5–14.5)
FLUAV AG NPH QL IA: NEGATIVE
FLUBV AG NOSE QL IA: NEGATIVE
GLOBULIN SER CALC-MCNC: 3.4 G/DL (ref 2–4)
GLUCOSE SERPL-MCNC: 156 MG/DL (ref 65–100)
GLUCOSE UR STRIP.AUTO-MCNC: NEGATIVE MG/DL
HCT VFR BLD AUTO: 41.3 % (ref 35–47)
HGB BLD-MCNC: 13.8 G/DL (ref 11.5–16)
HGB UR QL STRIP: ABNORMAL
IMM GRANULOCYTES # BLD AUTO: 0 K/UL (ref 0–0.04)
IMM GRANULOCYTES NFR BLD AUTO: 0 % (ref 0–0.5)
KETONES UR QL STRIP.AUTO: NEGATIVE MG/DL
LACTATE SERPL-SCNC: 1.9 MMOL/L (ref 0.4–2)
LEUKOCYTE ESTERASE UR QL STRIP.AUTO: NEGATIVE
LYMPHOCYTES # BLD: 1.3 K/UL (ref 0.8–3.5)
LYMPHOCYTES NFR BLD: 15 % (ref 12–49)
MCH RBC QN AUTO: 30.9 PG (ref 26–34)
MCHC RBC AUTO-ENTMCNC: 33.4 G/DL (ref 30–36.5)
MCV RBC AUTO: 92.6 FL (ref 80–99)
MONOCYTES # BLD: 0.5 K/UL (ref 0–1)
MONOCYTES NFR BLD: 6 % (ref 5–13)
MUCOUS THREADS URNS QL MICRO: ABNORMAL /LPF
MUCOUS THREADS URNS QL MICRO: ABNORMAL /LPF
NEUTS SEG # BLD: 6.9 K/UL (ref 1.8–8)
NEUTS SEG NFR BLD: 78 % (ref 32–75)
NITRITE UR QL STRIP.AUTO: NEGATIVE
P-R INTERVAL, ECG05: 126 MS
PH UR STRIP: 5 [PH] (ref 5–8)
PLATELET # BLD AUTO: 255 K/UL (ref 150–400)
PMV BLD AUTO: 11.1 FL (ref 8.9–12.9)
POTASSIUM SERPL-SCNC: 3.4 MMOL/L (ref 3.5–5.1)
PROT SERPL-MCNC: 6.4 G/DL (ref 6.4–8.2)
PROT UR STRIP-MCNC: NEGATIVE MG/DL
Q-T INTERVAL, ECG07: 312 MS
QRS DURATION, ECG06: 80 MS
QTC CALCULATION (BEZET), ECG08: 440 MS
RBC # BLD AUTO: 4.46 M/UL (ref 3.8–5.2)
RBC #/AREA URNS HPF: ABNORMAL /HPF (ref 0–5)
RBC #/AREA URNS HPF: ABNORMAL /HPF (ref 0–5)
SARS-COV-2, COV2: NORMAL
SODIUM SERPL-SCNC: 139 MMOL/L (ref 136–145)
SP GR UR REFRACTOMETRY: 1.01 (ref 1–1.03)
TROPONIN I SERPL-MCNC: <0.05 NG/ML
UROBILINOGEN UR QL STRIP.AUTO: 0.1 EU/DL (ref 0.1–1)
VENTRICULAR RATE, ECG03: 120 BPM
WBC # BLD AUTO: 8.8 K/UL (ref 3.6–11)
WBC URNS QL MICRO: ABNORMAL /HPF (ref 0–4)
WBC URNS QL MICRO: ABNORMAL /HPF (ref 0–4)

## 2021-02-07 PROCEDURE — 93005 ELECTROCARDIOGRAM TRACING: CPT

## 2021-02-07 PROCEDURE — 80053 COMPREHEN METABOLIC PANEL: CPT

## 2021-02-07 PROCEDURE — 87804 INFLUENZA ASSAY W/OPTIC: CPT

## 2021-02-07 PROCEDURE — 83605 ASSAY OF LACTIC ACID: CPT

## 2021-02-07 PROCEDURE — 81001 URINALYSIS AUTO W/SCOPE: CPT

## 2021-02-07 PROCEDURE — 96365 THER/PROPH/DIAG IV INF INIT: CPT

## 2021-02-07 PROCEDURE — 87040 BLOOD CULTURE FOR BACTERIA: CPT

## 2021-02-07 PROCEDURE — U0003 INFECTIOUS AGENT DETECTION BY NUCLEIC ACID (DNA OR RNA); SEVERE ACUTE RESPIRATORY SYNDROME CORONAVIRUS 2 (SARS-COV-2) (CORONAVIRUS DISEASE [COVID-19]), AMPLIFIED PROBE TECHNIQUE, MAKING USE OF HIGH THROUGHPUT TECHNOLOGIES AS DESCRIBED BY CMS-2020-01-R: HCPCS

## 2021-02-07 PROCEDURE — 85025 COMPLETE CBC W/AUTO DIFF WBC: CPT

## 2021-02-07 PROCEDURE — 84484 ASSAY OF TROPONIN QUANT: CPT

## 2021-02-07 PROCEDURE — 71045 X-RAY EXAM CHEST 1 VIEW: CPT

## 2021-02-07 PROCEDURE — 74011000258 HC RX REV CODE- 258: Performed by: EMERGENCY MEDICINE

## 2021-02-07 PROCEDURE — 74011250636 HC RX REV CODE- 250/636: Performed by: EMERGENCY MEDICINE

## 2021-02-07 PROCEDURE — 99285 EMERGENCY DEPT VISIT HI MDM: CPT

## 2021-02-07 PROCEDURE — 83880 ASSAY OF NATRIURETIC PEPTIDE: CPT

## 2021-02-07 PROCEDURE — 36415 COLL VENOUS BLD VENIPUNCTURE: CPT

## 2021-02-07 RX ORDER — SODIUM CHLORIDE 0.9 % (FLUSH) 0.9 %
5-10 SYRINGE (ML) INJECTION AS NEEDED
Status: DISCONTINUED | OUTPATIENT
Start: 2021-02-07 | End: 2021-02-07 | Stop reason: HOSPADM

## 2021-02-07 RX ORDER — AMOXICILLIN 500 MG/1
1000 TABLET, FILM COATED ORAL 3 TIMES DAILY
Qty: 30 TAB | Refills: 0 | Status: SHIPPED | OUTPATIENT
Start: 2021-02-07 | End: 2021-08-09

## 2021-02-07 RX ORDER — BENZONATATE 100 MG/1
100 CAPSULE ORAL
Qty: 30 CAP | Refills: 0 | Status: SHIPPED | OUTPATIENT
Start: 2021-02-07 | End: 2021-02-14

## 2021-02-07 RX ADMIN — PIPERACILLIN AND TAZOBACTAM 3.38 G: 3; .375 INJECTION, POWDER, LYOPHILIZED, FOR SOLUTION INTRAVENOUS at 08:03

## 2021-02-07 RX ADMIN — SODIUM CHLORIDE 1000 ML: 9 INJECTION, SOLUTION INTRAVENOUS at 08:03

## 2021-02-07 NOTE — ED TRIAGE NOTES
Pt reports she woke up with fever 102.8, chills, nausea, headache. denies hx of +covid or exposure.  Took tylenol at 0500

## 2021-02-07 NOTE — ED PROVIDER NOTES
EMERGENCY DEPARTMENT HISTORY AND PHYSICAL EXAM      Date: 2/7/2021  Patient Name: Damari Reddy    History of Presenting Illness     Chief Complaint   Patient presents with    Cough    Chills       History Provided By: Patient    HPI: Damari Reddy, 54 y.o. female with a past medical history significant  for anxiety,seizures who presents to the ED with cc of cough,mild sob,fever and weakness for 1 day. There are no other complaints, changes, or physical findings at this time. PCP: Milagro Hernandez MD    No current facility-administered medications on file prior to encounter. Current Outpatient Medications on File Prior to Encounter   Medication Sig Dispense Refill    albuterol (ProAir HFA) 90 mcg/actuation inhaler Take 1 Puff by inhalation every four (4) hours as needed for Wheezing. 1 Inhaler 0    sucralfate (Carafate) 100 mg/mL suspension Take 5 mL by mouth four (4) times daily. 414 mL 0    clonazePAM (KLONOPIN) 1 mg tablet Take  by mouth three (3) times daily.  gabapentin (NEURONTIN) 600 mg tablet Take  by mouth four (4) times daily.  QUEtiapine (SEROQUEL) 50 mg tablet Take 50 mg by mouth daily.  dextroamphetamine-amphetamine (ADDERALL) 30 mg tablet Take 30 mg by mouth two (2) times a day.  QUEtiapine (SEROQUEL) 400 mg tablet Take 400 mg by mouth nightly.  estradiol (CLIMARA) 0.05 mg/24 hr 1 Patch by TransDERmal route two (2) days a week.  levothyroxine (SYNTHROID) 50 mcg tablet Take 50 mcg by mouth Daily (before breakfast).  buprenorphine-naloxone (SUBOXONE) 8-2 mg subl 1 Tab by SubLINGual route daily.  lurasidone (LATUDA) 80 mg tab tablet Take 80 mg by mouth nightly.  cholecalciferol, VITAMIN D3, (VITAMIN D3) 5,000 unit tab tablet Take 5,000 Units by mouth daily.  ferrous sulfate (IRON) 325 mg (65 mg iron) tablet Take 65 mg by mouth Daily (before breakfast).          Past History     Past Medical History:  Past Medical History: Diagnosis Date    Fatigue     GERD (gastroesophageal reflux disease)     Hypothyroidism     Psychiatric disorder        Past Surgical History:  Past Surgical History:   Procedure Laterality Date    HX APPENDECTOMY      HX  SECTION      HX CHOLECYSTECTOMY      HX HYSTERECTOMY         Family History:  Family History   Family history unknown: Yes       Social History:  Social History     Tobacco Use    Smoking status: Current Every Day Smoker     Packs/day: 0.50    Smokeless tobacco: Never Used   Substance Use Topics    Alcohol use: No     Alcohol/week: 0.0 standard drinks    Drug use: Not on file       Allergies: Allergies   Allergen Reactions    Augmentin [Amoxicillin-Pot Clavulanate] Nausea and Vomiting         Review of Systems     Review of Systems   Constitutional: Negative. HENT: Negative. Eyes: Negative. Respiratory: Positive for cough and shortness of breath. Cardiovascular: Negative. Gastrointestinal: Negative. Endocrine: Negative. Genitourinary: Negative. Musculoskeletal: Negative. Skin: Negative. Allergic/Immunologic: Negative. Neurological: Negative. Hematological: Negative. Psychiatric/Behavioral: Negative. Physical Exam     Physical Exam  Constitutional:       Appearance: Normal appearance. She is normal weight. Neck:      Musculoskeletal: Normal range of motion and neck supple. Cardiovascular:      Rate and Rhythm: Normal rate and regular rhythm. Pulmonary:      Effort: Pulmonary effort is normal.      Comments: Crackles bilat bases  Abdominal:      General: Bowel sounds are normal.      Palpations: Abdomen is soft. Skin:     General: Skin is dry. Neurological:      Mental Status: She is alert and oriented to person, place, and time. Mental status is at baseline.    Psychiatric:         Mood and Affect: Mood normal.         Behavior: Behavior normal.         Lab and Diagnostic Study Results     Labs -     Recent Results (from the past 12 hour(s))   EKG, 12 LEAD, INITIAL    Collection Time: 02/07/21  7:19 AM   Result Value Ref Range    Ventricular Rate 120 BPM    Atrial Rate 120 BPM    P-R Interval 126 ms    QRS Duration 80 ms    Q-T Interval 312 ms    QTC Calculation (Bezet) 440 ms    Calculated P Axis 56 degrees    Calculated R Axis 65 degrees    Calculated T Axis 33 degrees    Diagnosis       Sinus tachycardia  Low voltage QRS  Cannot rule out Anterior infarct , age undetermined  Abnormal ECG  When compared with ECG of 30-OCT-2020 13:56,  No significant change was found  Confirmed by Deanna Marquez MD, [facility] ([id number]) on 2/7/2021 9:08:44 AM     LACTIC ACID    Collection Time: 02/07/21  7:41 AM   Result Value Ref Range    Lactic acid 1.9 0.4 - 2.0 mmol/L   URINALYSIS W/ RFLX MICROSCOPIC    Collection Time: 02/07/21  7:41 AM   Result Value Ref Range    Color Yellow/Straw      Appearance Clear Clear      Specific gravity 1.011 1.003 - 1.030      pH (UA) 5.0 5.0 - 8.0      Protein Negative Negative mg/dL    Glucose Negative Negative mg/dL    Ketone Negative Negative mg/dL    Bilirubin Negative Negative      Blood Small (A) Negative      Urobilinogen 0.1 0.1 - 1.0 EU/dL    Nitrites Negative Negative      Leukocyte Esterase Negative Negative      WBC 0-4 0 - 4 /hpf    RBC 0-5 0 - 5 /hpf    Bacteria Negative Negative /hpf    Mucus Trace /lpf   METABOLIC PANEL, COMPREHENSIVE    Collection Time: 02/07/21  7:41 AM   Result Value Ref Range    Sodium 139 136 - 145 mmol/L    Potassium 3.4 (L) 3.5 - 5.1 mmol/L    Chloride 105 97 - 108 mmol/L    CO2 27 21 - 32 mmol/L    Anion gap 7 5 - 15 mmol/L    Glucose 156 (H) 65 - 100 mg/dL    BUN 7 6 - 20 mg/dL    Creatinine 1.00 0.55 - 1.02 mg/dL    BUN/Creatinine ratio 7 (L) 12 - 20      GFR est AA >60 >60 ml/min/1.73m2    GFR est non-AA 58 (L) >60 ml/min/1.73m2    Calcium 8.3 (L) 8.5 - 10.1 mg/dL    Bilirubin, total 0.3 0.2 - 1.0 mg/dL    AST (SGOT) 24 15 - 37 U/L    ALT (SGPT) 28 12 - 78 U/L    Alk.  phosphatase 108 45 - 117 U/L    Protein, total 6.4 6.4 - 8.2 g/dL    Albumin 3.0 (L) 3.5 - 5.0 g/dL    Globulin 3.4 2.0 - 4.0 g/dL    A-G Ratio 0.9 (L) 1.1 - 2.2     CBC WITH AUTOMATED DIFF    Collection Time: 02/07/21  7:41 AM   Result Value Ref Range    WBC 8.8 3.6 - 11.0 K/uL    RBC 4.46 3.80 - 5.20 M/uL    HGB 13.8 11.5 - 16.0 g/dL    HCT 41.3 35.0 - 47.0 %    MCV 92.6 80.0 - 99.0 FL    MCH 30.9 26.0 - 34.0 PG    MCHC 33.4 30.0 - 36.5 g/dL    RDW 14.1 11.5 - 14.5 %    PLATELET 453 782 - 867 K/uL    MPV 11.1 8.9 - 12.9 FL    NEUTROPHILS 78 (H) 32 - 75 %    LYMPHOCYTES 15 12 - 49 %    MONOCYTES 6 5 - 13 %    EOSINOPHILS 1 0 - 7 %    BASOPHILS 0 0 - 1 %    IMMATURE GRANULOCYTES 0 0.0 - 0.5 %    ABS. NEUTROPHILS 6.9 1.8 - 8.0 K/UL    ABS. LYMPHOCYTES 1.3 0.8 - 3.5 K/UL    ABS. MONOCYTES 0.5 0.0 - 1.0 K/UL    ABS. EOSINOPHILS 0.1 0.0 - 0.4 K/UL    ABS. BASOPHILS 0.0 0.0 - 0.1 K/UL    ABS. IMM. GRANS. 0.0 0.00 - 0.04 K/UL    DF AUTOMATED     TROPONIN I    Collection Time: 02/07/21  7:41 AM   Result Value Ref Range    Troponin-I, Qt. <0.05 <0.05 ng/mL   BNP    Collection Time: 02/07/21  7:41 AM   Result Value Ref Range    NT pro-BNP 72 <125 pg/mL   URINE MICROSCOPIC    Collection Time: 02/07/21  7:41 AM   Result Value Ref Range    WBC 0-4 0 - 4 /hpf    RBC 0-5 0 - 5 /hpf    Bacteria Negative Negative /hpf    Mucus Trace (A) Negative /lpf   INFLUENZA A & B AG (RAPID TEST)    Collection Time: 02/07/21  8:00 AM   Result Value Ref Range    Influenza A Antigen Negative Negative      Influenza B Antigen Negative Negative         Radiologic Studies -   @lastxrresult@  CT Results  (Last 48 hours)    None        CXR Results  (Last 48 hours)               02/07/21 0813  XR CHEST PORT Final result    Impression:  Ill-defined airspace opacity in the right upper lung zone and both   lung bases. Concern for early infiltrate. No pneumothorax or effusion. Narrative:  HISTORY:  Sepsis       TECHNIQUE: Frontal view.    COMPARISON: October 30, 2020 LIMITATIONS: None       TUBES/LINES: None       HEART AND PULMONARY VESSELS: Heart size and pulmonary blood flow are normal.       LUNG PARENCHYMA: Underexpanded lungs. Ill-defined airspace opacity in the right   mid and lower lung zones as well as at the left base. No lobar consolidation. Left upper lung is clear. PLEURA: No effusion or pneumothorax. MEDIASTINUM: Normal       BONE/SOFT TISSUES: Normal       OTHER: None                   Medical Decision Making   - I am the first provider for this patient. - I reviewed the vital signs, available nursing notes, past medical history, past surgical history, family history and social history. - Initial assessment performed. The patients presenting problems have been discussed, and they are in agreement with the care plan formulated and outlined with them. I have encouraged them to ask questions as they arise throughout their visit. Vital Signs-Reviewed the patient's vital signs. Patient Vitals for the past 12 hrs:   Temp Pulse Resp BP SpO2   02/07/21 0653 100.3 °F (37.9 °C) (!) 135 16 113/75 98 %       Records Reviewed: Nursing Notes    The patient presents  with a differential diagnosis of pna vs bronchitis vs covid      ED Course:          Provider Notes (Medical Decision Making):   Cmp,cbc,blood cx,ua,lactate,covid 19,influenza  MDM       Procedures   Medical Decision Makingedical Decision Making  Performed by: GILDARDO Rubio  PROCEDURES:  Procedures       Disposition   Disposition: Condition improved        DISCHARGE PLAN:  1. Current Discharge Medication List      CONTINUE these medications which have NOT CHANGED    Details   albuterol (ProAir HFA) 90 mcg/actuation inhaler Take 1 Puff by inhalation every four (4) hours as needed for Wheezing. Qty: 1 Inhaler, Refills: 0      sucralfate (Carafate) 100 mg/mL suspension Take 5 mL by mouth four (4) times daily.   Qty: 414 mL, Refills: 0      clonazePAM (KLONOPIN) 1 mg tablet Take  by mouth three (3) times daily. Associated Diagnoses: Idiopathic hypotension; Hypothyroidism due to acquired atrophy of thyroid; Anemia due to vitamin B12 deficiency; Dizziness; Arthralgia      gabapentin (NEURONTIN) 600 mg tablet Take  by mouth four (4) times daily. Associated Diagnoses: Idiopathic hypotension; Hypothyroidism due to acquired atrophy of thyroid; Anemia due to vitamin B12 deficiency; Dizziness; Arthralgia      !! QUEtiapine (SEROQUEL) 50 mg tablet Take 50 mg by mouth daily. Associated Diagnoses: Idiopathic hypotension; Hypothyroidism due to acquired atrophy of thyroid; Anemia due to vitamin B12 deficiency; Dizziness; Arthralgia      dextroamphetamine-amphetamine (ADDERALL) 30 mg tablet Take 30 mg by mouth two (2) times a day. Associated Diagnoses: Idiopathic hypotension; Hypothyroidism due to acquired atrophy of thyroid; Anemia due to vitamin B12 deficiency; Dizziness; Arthralgia      !! QUEtiapine (SEROQUEL) 400 mg tablet Take 400 mg by mouth nightly. Associated Diagnoses: Idiopathic hypotension; Hypothyroidism due to acquired atrophy of thyroid; Anemia due to vitamin B12 deficiency; Dizziness; Arthralgia      estradiol (CLIMARA) 0.05 mg/24 hr 1 Patch by TransDERmal route two (2) days a week. Associated Diagnoses: Idiopathic hypotension; Hypothyroidism due to acquired atrophy of thyroid; Anemia due to vitamin B12 deficiency; Dizziness; Arthralgia      levothyroxine (SYNTHROID) 50 mcg tablet Take 50 mcg by mouth Daily (before breakfast). Associated Diagnoses: Idiopathic hypotension; Hypothyroidism due to acquired atrophy of thyroid; Anemia due to vitamin B12 deficiency; Dizziness; Arthralgia      buprenorphine-naloxone (SUBOXONE) 8-2 mg subl 1 Tab by SubLINGual route daily. Associated Diagnoses: Idiopathic hypotension; Hypothyroidism due to acquired atrophy of thyroid; Anemia due to vitamin B12 deficiency; Dizziness;  Arthralgia      lurasidone (LATUDA) 80 mg tab tablet Take 80 mg by mouth nightly. Associated Diagnoses: Idiopathic hypotension; Hypothyroidism due to acquired atrophy of thyroid; Anemia due to vitamin B12 deficiency; Dizziness; Arthralgia      cholecalciferol, VITAMIN D3, (VITAMIN D3) 5,000 unit tab tablet Take 5,000 Units by mouth daily. Associated Diagnoses: Idiopathic hypotension; Hypothyroidism due to acquired atrophy of thyroid; Anemia due to vitamin B12 deficiency; Dizziness; Arthralgia      ferrous sulfate (IRON) 325 mg (65 mg iron) tablet Take 65 mg by mouth Daily (before breakfast). Associated Diagnoses: Idiopathic hypotension; Hypothyroidism due to acquired atrophy of thyroid; Anemia due to vitamin B12 deficiency; Dizziness; Arthralgia       !! - Potential duplicate medications found. Please discuss with provider. 2.   Follow-up Information    None       3. Return to ED if worse   4. Current Discharge Medication List      START taking these medications    Details   amoxicillin 500 mg tab Take 1,000 mg by mouth three (3) times daily. Qty: 30 Tab, Refills: 0      benzonatate (Tessalon Perles) 100 mg capsule Take 1 Cap by mouth three (3) times daily as needed for Cough for up to 7 days. Qty: 30 Cap, Refills: 0               Diagnosis     Clinical Impression:   1. Community acquired pneumonia, unspecified laterality    2. COVID-19 ruled out        Attestations:    GILDARDO Menjivar    Please note that this dictation was completed with [employer], the computer voice recognition software. Quite often unanticipated grammatical, syntax, homophones, and other interpretive errors are inadvertently transcribed by the computer software. Please disregard these errors. Please excuse any errors that have escaped final proofreading. Thank you.

## 2021-02-08 LAB — SARS-COV-2, COV2NT: NOT DETECTED

## 2021-02-14 LAB
BACTERIA SPEC CULT: NORMAL
BACTERIA SPEC CULT: NORMAL
SPECIAL REQUESTS,SREQ: NORMAL
SPECIAL REQUESTS,SREQ: NORMAL

## 2021-02-16 ENCOUNTER — APPOINTMENT (OUTPATIENT)
Dept: GENERAL RADIOLOGY | Age: 56
End: 2021-02-16
Attending: EMERGENCY MEDICINE
Payer: MEDICARE

## 2021-02-16 ENCOUNTER — HOSPITAL ENCOUNTER (EMERGENCY)
Age: 56
Discharge: HOME | End: 2021-02-16
Attending: STUDENT IN AN ORGANIZED HEALTH CARE EDUCATION/TRAINING PROGRAM
Payer: MEDICARE

## 2021-02-16 VITALS
HEIGHT: 68 IN | RESPIRATION RATE: 20 BRPM | DIASTOLIC BLOOD PRESSURE: 88 MMHG | SYSTOLIC BLOOD PRESSURE: 132 MMHG | HEART RATE: 82 BPM | WEIGHT: 185 LBS | TEMPERATURE: 98 F | OXYGEN SATURATION: 97 % | BODY MASS INDEX: 28.04 KG/M2

## 2021-02-16 DIAGNOSIS — R09.1 PLEURISY: Primary | ICD-10-CM

## 2021-02-16 LAB
ALBUMIN SERPL-MCNC: 3.4 G/DL (ref 3.5–5)
ALBUMIN/GLOB SERPL: 0.8 {RATIO} (ref 1.1–2.2)
ALP SERPL-CCNC: 111 U/L (ref 45–117)
ALT SERPL-CCNC: 31 U/L (ref 12–78)
ANION GAP SERPL CALC-SCNC: 6 MMOL/L (ref 5–15)
AST SERPL W P-5'-P-CCNC: 47 U/L (ref 15–37)
BASOPHILS # BLD: 0.1 K/UL (ref 0–0.1)
BASOPHILS NFR BLD: 1 % (ref 0–1)
BILIRUB SERPL-MCNC: 0.3 MG/DL (ref 0.2–1)
BUN SERPL-MCNC: 8 MG/DL (ref 6–20)
BUN/CREAT SERPL: 8 (ref 12–20)
CA-I BLD-MCNC: 9 MG/DL (ref 8.5–10.1)
CHLORIDE SERPL-SCNC: 104 MMOL/L (ref 97–108)
CK SERPL-CCNC: 150 NG/ML (ref 26–192)
CO2 SERPL-SCNC: 30 MMOL/L (ref 21–32)
CREAT SERPL-MCNC: 0.96 MG/DL (ref 0.55–1.02)
DIFFERENTIAL METHOD BLD: ABNORMAL
EOSINOPHIL # BLD: 0 K/UL (ref 0–0.4)
EOSINOPHIL NFR BLD: 1 % (ref 0–7)
ERYTHROCYTE [DISTWIDTH] IN BLOOD BY AUTOMATED COUNT: 14.4 % (ref 11.5–14.5)
GLOBULIN SER CALC-MCNC: 4.3 G/DL (ref 2–4)
GLUCOSE SERPL-MCNC: 114 MG/DL (ref 65–100)
HCT VFR BLD AUTO: 45.7 % (ref 35–47)
HGB BLD-MCNC: 15.3 G/DL (ref 11.5–16)
IMM GRANULOCYTES # BLD AUTO: 0 K/UL (ref 0–0.04)
IMM GRANULOCYTES NFR BLD AUTO: 1 % (ref 0–0.5)
LYMPHOCYTES # BLD: 2.2 K/UL (ref 0.8–3.5)
LYMPHOCYTES NFR BLD: 34 % (ref 12–49)
MCH RBC QN AUTO: 31 PG (ref 26–34)
MCHC RBC AUTO-ENTMCNC: 33.5 G/DL (ref 30–36.5)
MCV RBC AUTO: 92.7 FL (ref 80–99)
MONOCYTES # BLD: 0.4 K/UL (ref 0–1)
MONOCYTES NFR BLD: 6 % (ref 5–13)
NEUTS SEG # BLD: 3.9 K/UL (ref 1.8–8)
NEUTS SEG NFR BLD: 60 % (ref 32–75)
NRBC # BLD: 0 K/UL (ref 0–0.01)
NRBC BLD-RTO: 0 PER 100 WBC
PLATELET # BLD AUTO: 315 K/UL (ref 150–400)
PMV BLD AUTO: 10.6 FL (ref 8.9–12.9)
POTASSIUM SERPL-SCNC: 4.5 MMOL/L (ref 3.5–5.1)
PROT SERPL-MCNC: 7.7 G/DL (ref 6.4–8.2)
RBC # BLD AUTO: 4.93 M/UL (ref 3.8–5.2)
SODIUM SERPL-SCNC: 140 MMOL/L (ref 136–145)
TROPONIN I SERPL-MCNC: <0.05 NG/ML
WBC # BLD AUTO: 6.5 K/UL (ref 3.6–11)

## 2021-02-16 PROCEDURE — 85025 COMPLETE CBC W/AUTO DIFF WBC: CPT

## 2021-02-16 PROCEDURE — 74011250637 HC RX REV CODE- 250/637: Performed by: STUDENT IN AN ORGANIZED HEALTH CARE EDUCATION/TRAINING PROGRAM

## 2021-02-16 PROCEDURE — 99283 EMERGENCY DEPT VISIT LOW MDM: CPT

## 2021-02-16 PROCEDURE — 82550 ASSAY OF CK (CPK): CPT

## 2021-02-16 PROCEDURE — 96365 THER/PROPH/DIAG IV INF INIT: CPT

## 2021-02-16 PROCEDURE — 96375 TX/PRO/DX INJ NEW DRUG ADDON: CPT

## 2021-02-16 PROCEDURE — 74011000258 HC RX REV CODE- 258: Performed by: STUDENT IN AN ORGANIZED HEALTH CARE EDUCATION/TRAINING PROGRAM

## 2021-02-16 PROCEDURE — 93005 ELECTROCARDIOGRAM TRACING: CPT

## 2021-02-16 PROCEDURE — 36415 COLL VENOUS BLD VENIPUNCTURE: CPT

## 2021-02-16 PROCEDURE — 71045 X-RAY EXAM CHEST 1 VIEW: CPT

## 2021-02-16 PROCEDURE — 74011250636 HC RX REV CODE- 250/636: Performed by: STUDENT IN AN ORGANIZED HEALTH CARE EDUCATION/TRAINING PROGRAM

## 2021-02-16 PROCEDURE — 80053 COMPREHEN METABOLIC PANEL: CPT

## 2021-02-16 PROCEDURE — 84484 ASSAY OF TROPONIN QUANT: CPT

## 2021-02-16 RX ORDER — PROMETHAZINE HYDROCHLORIDE 25 MG/1
25 TABLET ORAL
Qty: 12 TAB | Refills: 0 | Status: SHIPPED | OUTPATIENT
Start: 2021-02-16 | End: 2021-02-24 | Stop reason: SDUPTHER

## 2021-02-16 RX ORDER — CLONAZEPAM 1 MG/1
1 TABLET ORAL ONCE
Status: COMPLETED | OUTPATIENT
Start: 2021-02-16 | End: 2021-02-16

## 2021-02-16 RX ORDER — QUETIAPINE FUMARATE 300 MG/1
300 TABLET, FILM COATED ORAL ONCE
Status: COMPLETED | OUTPATIENT
Start: 2021-02-16 | End: 2021-02-16

## 2021-02-16 RX ORDER — KETOROLAC TROMETHAMINE 15 MG/ML
15 INJECTION, SOLUTION INTRAMUSCULAR; INTRAVENOUS ONCE
Status: COMPLETED | OUTPATIENT
Start: 2021-02-16 | End: 2021-02-16

## 2021-02-16 RX ORDER — ACETAMINOPHEN AND CODEINE PHOSPHATE 300; 30 MG/1; MG/1
1 TABLET ORAL
Qty: 12 TAB | Refills: 0 | Status: SHIPPED | OUTPATIENT
Start: 2021-02-16 | End: 2021-02-19

## 2021-02-16 RX ADMIN — KETOROLAC TROMETHAMINE 15 MG: 15 INJECTION, SOLUTION INTRAMUSCULAR; INTRAVENOUS at 21:12

## 2021-02-16 RX ADMIN — QUETIAPINE FUMARATE 300 MG: 300 TABLET ORAL at 23:46

## 2021-02-16 RX ADMIN — SODIUM CHLORIDE 500 ML: 9 INJECTION, SOLUTION INTRAVENOUS at 21:13

## 2021-02-16 RX ADMIN — CLONAZEPAM 1 MG: 1 TABLET ORAL at 22:25

## 2021-02-16 RX ADMIN — PROMETHAZINE HYDROCHLORIDE 25 MG: 25 INJECTION INTRAMUSCULAR; INTRAVENOUS at 21:12

## 2021-02-17 LAB
ATRIAL RATE: 84 BPM
CALCULATED P AXIS, ECG09: 62 DEGREES
CALCULATED R AXIS, ECG10: 35 DEGREES
CALCULATED T AXIS, ECG11: 53 DEGREES
DIAGNOSIS, 93000: NORMAL
P-R INTERVAL, ECG05: 144 MS
Q-T INTERVAL, ECG07: 370 MS
QRS DURATION, ECG06: 76 MS
QTC CALCULATION (BEZET), ECG08: 437 MS
VENTRICULAR RATE, ECG03: 84 BPM

## 2021-02-17 NOTE — ED PROVIDER NOTES
EMERGENCY DEPARTMENT HISTORY AND PHYSICAL EXAM      Date: 2/16/2021  Patient Name: Benita Michelle    History of Presenting Illness     Chief Complaint   Patient presents with    Cough    Breathing Problem    Vomiting       History Provided By: Patient    HPI: Benita Michelle, 54 y.o. female with a past medical history significant hypertension and COPD presents to the ED with cc of cough, chest pain with deep inspiration, shortness of breath. Patient was seen approximately 9 days ago for cough, Covid virus ruled out with rapid test, chest x-ray showed infiltrates, patient was discharged home with amoxicillin, patient states she completed antibiotics, is still complaining of feeling very weak, with anterior chest pain upon deep respirations. Patient still complaining of mild productive cough, no fevers or chills, no abdominal pain, mild nausea no vomiting. There are no other complaints, changes, or physical findings at this time. PCP: Ariana Altamirano MD    Current Outpatient Medications   Medication Sig Dispense Refill    acetaminophen-codeine (Tylenol-Codeine #3) 300-30 mg per tablet Take 1 Tab by mouth every four (4) hours as needed for Pain for up to 3 days. Max Daily Amount: 6 Tabs. 12 Tab 0    promethazine (PHENERGAN) 25 mg tablet Take 1 Tab by mouth every six (6) hours as needed (nausea). 12 Tab 0    amoxicillin 500 mg tab Take 1,000 mg by mouth three (3) times daily. 30 Tab 0    albuterol (ProAir HFA) 90 mcg/actuation inhaler Take 1 Puff by inhalation every four (4) hours as needed for Wheezing. 1 Inhaler 0    sucralfate (Carafate) 100 mg/mL suspension Take 5 mL by mouth four (4) times daily. 414 mL 0    clonazePAM (KLONOPIN) 1 mg tablet Take  by mouth three (3) times daily.  gabapentin (NEURONTIN) 600 mg tablet Take  by mouth four (4) times daily.  QUEtiapine (SEROQUEL) 50 mg tablet Take 50 mg by mouth daily.       dextroamphetamine-amphetamine (ADDERALL) 30 mg tablet Take 30 mg by mouth two (2) times a day.  QUEtiapine (SEROQUEL) 400 mg tablet Take 400 mg by mouth nightly.  estradiol (CLIMARA) 0.05 mg/24 hr 1 Patch by TransDERmal route two (2) days a week.  levothyroxine (SYNTHROID) 50 mcg tablet Take 50 mcg by mouth Daily (before breakfast).  buprenorphine-naloxone (SUBOXONE) 8-2 mg subl 1 Tab by SubLINGual route daily.  lurasidone (LATUDA) 80 mg tab tablet Take 80 mg by mouth nightly.  cholecalciferol, VITAMIN D3, (VITAMIN D3) 5,000 unit tab tablet Take 5,000 Units by mouth daily.  ferrous sulfate (IRON) 325 mg (65 mg iron) tablet Take 65 mg by mouth Daily (before breakfast). Past History     Past Medical History:  Past Medical History:   Diagnosis Date    Fatigue     GERD (gastroesophageal reflux disease)     Hypothyroidism     Psychiatric disorder        Past Surgical History:  Past Surgical History:   Procedure Laterality Date    HX APPENDECTOMY      HX  SECTION      HX CHOLECYSTECTOMY      HX HYSTERECTOMY         Family History:  Family History   Family history unknown: Yes       Social History:  Social History     Tobacco Use    Smoking status: Current Every Day Smoker     Packs/day: 0.50    Smokeless tobacco: Never Used   Substance Use Topics    Alcohol use: No     Alcohol/week: 0.0 standard drinks    Drug use: Never       Allergies: Allergies   Allergen Reactions    Augmentin [Amoxicillin-Pot Clavulanate] Nausea and Vomiting         Review of Systems     Review of Systems   Constitutional: Positive for activity change and fatigue. Negative for appetite change, chills and fever. HENT: Negative for congestion and sore throat. Eyes: Negative for photophobia. Respiratory: Positive for cough, chest tightness and shortness of breath. Cardiovascular: Positive for chest pain. Negative for palpitations. Gastrointestinal: Positive for nausea. Negative for abdominal pain and diarrhea.    Genitourinary: Negative for dysuria. Musculoskeletal: Negative for arthralgias, back pain and myalgias. Neurological: Negative for dizziness, weakness, numbness and headaches. Physical Exam     Physical Exam  Vitals signs and nursing note reviewed. Constitutional:       General: She is not in acute distress. Appearance: Normal appearance. She is normal weight. She is not ill-appearing. HENT:      Head: Normocephalic and atraumatic. Nose: Nose normal.      Mouth/Throat:      Mouth: Mucous membranes are moist.   Eyes:      Extraocular Movements: Extraocular movements intact. Pupils: Pupils are equal, round, and reactive to light. Neck:      Musculoskeletal: Normal range of motion and neck supple. No muscular tenderness. Cardiovascular:      Rate and Rhythm: Normal rate and regular rhythm. Pulses: Normal pulses. Pulmonary:      Effort: Pulmonary effort is normal.   Abdominal:      General: Abdomen is flat. Bowel sounds are normal.      Palpations: Abdomen is soft. Tenderness: There is no abdominal tenderness. There is no guarding. Musculoskeletal: Normal range of motion. General: No tenderness. Skin:     General: Skin is warm and dry. Capillary Refill: Capillary refill takes less than 2 seconds. Neurological:      General: No focal deficit present. Mental Status: She is alert and oriented to person, place, and time. Sensory: No sensory deficit. Motor: No weakness.          Diagnostic Study Results     Labs -     Recent Results (from the past 12 hour(s))   CBC WITH AUTOMATED DIFF    Collection Time: 02/16/21  7:30 PM   Result Value Ref Range    WBC 6.5 3.6 - 11.0 K/uL    RBC 4.93 3.80 - 5.20 M/uL    HGB 15.3 11.5 - 16.0 g/dL    HCT 45.7 35.0 - 47.0 %    MCV 92.7 80.0 - 99.0 FL    MCH 31.0 26.0 - 34.0 PG    MCHC 33.5 30.0 - 36.5 g/dL    RDW 14.4 11.5 - 14.5 %    PLATELET 870 716 - 389 K/uL    MPV 10.6 8.9 - 12.9 FL    NRBC 0.0 0  WBC    ABSOLUTE NRBC 0.00 0.00 - 0.01 K/uL    NEUTROPHILS 60 32 - 75 %    LYMPHOCYTES 34 12 - 49 %    MONOCYTES 6 5 - 13 %    EOSINOPHILS 1 0 - 7 %    BASOPHILS 1 0 - 1 %    IMMATURE GRANULOCYTES 1 (H) 0.0 - 0.5 %    ABS. NEUTROPHILS 3.9 1.8 - 8.0 K/UL    ABS. LYMPHOCYTES 2.2 0.8 - 3.5 K/UL    ABS. MONOCYTES 0.4 0.0 - 1.0 K/UL    ABS. EOSINOPHILS 0.0 0.0 - 0.4 K/UL    ABS. BASOPHILS 0.1 0.0 - 0.1 K/UL    ABS. IMM. GRANS. 0.0 0.00 - 0.04 K/UL    DF AUTOMATED     CK W/ REFLX CKMB    Collection Time: 02/16/21  7:30 PM   Result Value Ref Range    .0 26 - 192 ng/mL   TROPONIN I    Collection Time: 02/16/21  7:30 PM   Result Value Ref Range    Troponin-I, Qt. <0.05 <9.19 ng/mL   METABOLIC PANEL, COMPREHENSIVE    Collection Time: 02/16/21  7:30 PM   Result Value Ref Range    Sodium 140 136 - 145 mmol/L    Potassium 4.5 3.5 - 5.1 mmol/L    Chloride 104 97 - 108 mmol/L    CO2 30 21 - 32 mmol/L    Anion gap 6 5 - 15 mmol/L    Glucose 114 (H) 65 - 100 mg/dL    BUN 8 6 - 20 mg/dL    Creatinine 0.96 0.55 - 1.02 mg/dL    BUN/Creatinine ratio 8 (L) 12 - 20      GFR est AA >60 >60 ml/min/1.73m2    GFR est non-AA >60 >60 ml/min/1.73m2    Calcium 9.0 8.5 - 10.1 mg/dL    Bilirubin, total 0.3 0.2 - 1.0 mg/dL    AST (SGOT) 47 (H) 15 - 37 U/L    ALT (SGPT) 31 12 - 78 U/L    Alk. phosphatase 111 45 - 117 U/L    Protein, total 7.7 6.4 - 8.2 g/dL    Albumin 3.4 (L) 3.5 - 5.0 g/dL    Globulin 4.3 (H) 2.0 - 4.0 g/dL    A-G Ratio 0.8 (L) 1.1 - 2.2         Radiologic Studies -   [unfilled]  CT Results  (Last 48 hours)    None        CXR Results  (Last 48 hours)               02/16/21 1939  XR CHEST PORT Final result    Impression:  Patchy bilateral airspace opacities have nearly resolved, noting   minimal persistent platelike opacity in the left lung base, fibrosis versus   subsegmental atelectasis. The heart, mediastinum, and pulmonary vasculature   appear within normal limits. No evidence of pneumothorax. Narrative:  Chest, PA view. Comparison with 2/7/2021. Medical Decision Making and ED Course   I am the first provider for this patient. I reviewed the vital signs, available nursing notes, past medical history, past surgical history, family history and social history. Vital Signs-Reviewed the patient's vital signs. Patient Vitals for the past 12 hrs:   Temp Pulse Resp BP SpO2   02/16/21 1917 98 °F (36.7 °C) 82 20 132/88 97 %       EKG interpretation: (Preliminary)  Normal sinus rhythm at 84, no ST elevation, normal axis, normal intervals    Records Reviewed: Nursing Notes and Old Medical Records    The patient presents with cough, shortness of breath with a differential diagnosis of pneumonia, bronchitis, COVID-19 infection, pleural effusion, pericarditis      Provider Notes (Medical Decision Making):     MDM   41-year-old female, past medical history of COPD, asthma, hypertension, presents to emergency department for cough, chest pain with deep inspiration. Patient was seen approximately 9 days ago for same, treated for pneumonia with amoxicillin p.o. Patient states still feels very weak, still complaining of chest pain with deep inspiration, denies any fevers chills. Physical exam shows afebrile female, saturating 97%, mild discomfort to mid chest with deep inspiration, breath sounds clear bilateral  EKG not suggestive of acute ischemia or pericarditis changes    Will draw basic lab work, repeat chest x-ray, administer IV fluids, Toradol, patient requesting Phenergan for nausea. ED Course:   Initial assessment performed. The patients presenting problems have been discussed, and they are in agreement with the care plan formulated and outlined with them. I have encouraged them to ask questions as they arise throughout their visit. ED Course as of Feb 16 2349   Tue Feb 16, 2021 2215 Patient's lab resulted, no leukocytosis, metabolic panel grossly normal, troponins negative, chest x-ray shows resolving infiltrates.   Patient afebrile, normal vitals, states chest pain mildly improved after Toradol however still having some pain. Most likely chest pain due to pleurisy, status post pneumonia, will discharge patient with prescription for Tylenol with codeine, patient requesting Phenergan for nausea. Instructed to follow-up with primary care physician within the next week, return precautions discussed with patient. [PZ]      ED Course User Index  [PZ] Mary Clements MD         Procedures       Reinaldo Brumfield MD  Procedures             Disposition       Discharged    Remove if not discharged  DISCHARGE PLAN:  1. Current Discharge Medication List      CONTINUE these medications which have NOT CHANGED    Details   amoxicillin 500 mg tab Take 1,000 mg by mouth three (3) times daily. Qty: 30 Tab, Refills: 0      albuterol (ProAir HFA) 90 mcg/actuation inhaler Take 1 Puff by inhalation every four (4) hours as needed for Wheezing. Qty: 1 Inhaler, Refills: 0      sucralfate (Carafate) 100 mg/mL suspension Take 5 mL by mouth four (4) times daily. Qty: 414 mL, Refills: 0      clonazePAM (KLONOPIN) 1 mg tablet Take  by mouth three (3) times daily. Associated Diagnoses: Idiopathic hypotension; Hypothyroidism due to acquired atrophy of thyroid; Anemia due to vitamin B12 deficiency; Dizziness; Arthralgia      gabapentin (NEURONTIN) 600 mg tablet Take  by mouth four (4) times daily. Associated Diagnoses: Idiopathic hypotension; Hypothyroidism due to acquired atrophy of thyroid; Anemia due to vitamin B12 deficiency; Dizziness; Arthralgia      !! QUEtiapine (SEROQUEL) 50 mg tablet Take 50 mg by mouth daily. Associated Diagnoses: Idiopathic hypotension; Hypothyroidism due to acquired atrophy of thyroid; Anemia due to vitamin B12 deficiency; Dizziness; Arthralgia      dextroamphetamine-amphetamine (ADDERALL) 30 mg tablet Take 30 mg by mouth two (2) times a day. Associated Diagnoses: Idiopathic hypotension;  Hypothyroidism due to acquired atrophy of thyroid; Anemia due to vitamin B12 deficiency; Dizziness; Arthralgia      !! QUEtiapine (SEROQUEL) 400 mg tablet Take 400 mg by mouth nightly. Associated Diagnoses: Idiopathic hypotension; Hypothyroidism due to acquired atrophy of thyroid; Anemia due to vitamin B12 deficiency; Dizziness; Arthralgia      estradiol (CLIMARA) 0.05 mg/24 hr 1 Patch by TransDERmal route two (2) days a week. Associated Diagnoses: Idiopathic hypotension; Hypothyroidism due to acquired atrophy of thyroid; Anemia due to vitamin B12 deficiency; Dizziness; Arthralgia      levothyroxine (SYNTHROID) 50 mcg tablet Take 50 mcg by mouth Daily (before breakfast). Associated Diagnoses: Idiopathic hypotension; Hypothyroidism due to acquired atrophy of thyroid; Anemia due to vitamin B12 deficiency; Dizziness; Arthralgia      buprenorphine-naloxone (SUBOXONE) 8-2 mg subl 1 Tab by SubLINGual route daily. Associated Diagnoses: Idiopathic hypotension; Hypothyroidism due to acquired atrophy of thyroid; Anemia due to vitamin B12 deficiency; Dizziness; Arthralgia      lurasidone (LATUDA) 80 mg tab tablet Take 80 mg by mouth nightly. Associated Diagnoses: Idiopathic hypotension; Hypothyroidism due to acquired atrophy of thyroid; Anemia due to vitamin B12 deficiency; Dizziness; Arthralgia      cholecalciferol, VITAMIN D3, (VITAMIN D3) 5,000 unit tab tablet Take 5,000 Units by mouth daily. Associated Diagnoses: Idiopathic hypotension; Hypothyroidism due to acquired atrophy of thyroid; Anemia due to vitamin B12 deficiency; Dizziness; Arthralgia      ferrous sulfate (IRON) 325 mg (65 mg iron) tablet Take 65 mg by mouth Daily (before breakfast). Associated Diagnoses: Idiopathic hypotension; Hypothyroidism due to acquired atrophy of thyroid; Anemia due to vitamin B12 deficiency; Dizziness; Arthralgia       !! - Potential duplicate medications found. Please discuss with provider.         2.   Follow-up Information     Follow up With Specialties Details Why Roberta Ley MD Psychiatry In 3 days  [address] [address] [phone]      [facility] EMERGENCY DEPT Emergency Medicine  If symptoms worsen [address] [facility] [address] [phone]        3. Return to ED if worse     Diagnosis     Clinical Impression:   1. Pleurisy        Attestations:    Remberto Kathleen MD    Please note that this dictation was completed with [email or web address], the computer voice recognition software. Quite often unanticipated grammatical, syntax, homophones, and other interpretive errors are inadvertently transcribed by the computer software. Please disregard these errors. Please excuse any errors that have escaped final proofreading. Thank you.

## 2021-02-23 ENCOUNTER — HOSPITAL ENCOUNTER (EMERGENCY)
Age: 56
Discharge: HOME | End: 2021-02-24
Attending: EMERGENCY MEDICINE
Payer: MEDICARE

## 2021-02-23 DIAGNOSIS — R07.9 CHEST PAIN, UNSPECIFIED TYPE: ICD-10-CM

## 2021-02-23 DIAGNOSIS — R10.84 ABDOMINAL PAIN, GENERALIZED: Primary | ICD-10-CM

## 2021-02-23 DIAGNOSIS — R11.0 NAUSEA WITHOUT VOMITING: ICD-10-CM

## 2021-02-23 PROCEDURE — 74011250636 HC RX REV CODE- 250/636: Performed by: EMERGENCY MEDICINE

## 2021-02-23 PROCEDURE — 80053 COMPREHEN METABOLIC PANEL: CPT

## 2021-02-23 PROCEDURE — 84484 ASSAY OF TROPONIN QUANT: CPT

## 2021-02-23 PROCEDURE — 99285 EMERGENCY DEPT VISIT HI MDM: CPT

## 2021-02-23 PROCEDURE — 85025 COMPLETE CBC W/AUTO DIFF WBC: CPT

## 2021-02-23 PROCEDURE — 85379 FIBRIN DEGRADATION QUANT: CPT

## 2021-02-23 PROCEDURE — 93005 ELECTROCARDIOGRAM TRACING: CPT

## 2021-02-23 PROCEDURE — 96374 THER/PROPH/DIAG INJ IV PUSH: CPT

## 2021-02-23 PROCEDURE — 83690 ASSAY OF LIPASE: CPT

## 2021-02-23 PROCEDURE — 96375 TX/PRO/DX INJ NEW DRUG ADDON: CPT

## 2021-02-23 RX ORDER — LORAZEPAM 2 MG/ML
1 INJECTION INTRAMUSCULAR
Status: COMPLETED | OUTPATIENT
Start: 2021-02-23 | End: 2021-02-23

## 2021-02-23 RX ORDER — ONDANSETRON 4 MG/1
4 TABLET, ORALLY DISINTEGRATING ORAL
Status: DISCONTINUED | OUTPATIENT
Start: 2021-02-23 | End: 2021-02-23

## 2021-02-23 RX ORDER — LORAZEPAM 1 MG/1
1 TABLET ORAL ONCE
Status: DISCONTINUED | OUTPATIENT
Start: 2021-02-23 | End: 2021-02-23

## 2021-02-23 RX ORDER — ONDANSETRON 2 MG/ML
4 INJECTION INTRAMUSCULAR; INTRAVENOUS
Status: COMPLETED | OUTPATIENT
Start: 2021-02-23 | End: 2021-02-23

## 2021-02-23 RX ADMIN — ONDANSETRON 4 MG: 2 INJECTION INTRAMUSCULAR; INTRAVENOUS at 23:25

## 2021-02-23 RX ADMIN — LORAZEPAM 1 MG: 2 INJECTION INTRAMUSCULAR; INTRAVENOUS at 23:25

## 2021-02-24 ENCOUNTER — APPOINTMENT (OUTPATIENT)
Dept: CT IMAGING | Age: 56
End: 2021-02-24
Attending: EMERGENCY MEDICINE
Payer: MEDICARE

## 2021-02-24 ENCOUNTER — APPOINTMENT (OUTPATIENT)
Dept: GENERAL RADIOLOGY | Age: 56
End: 2021-02-24
Attending: EMERGENCY MEDICINE
Payer: MEDICARE

## 2021-02-24 VITALS
SYSTOLIC BLOOD PRESSURE: 119 MMHG | RESPIRATION RATE: 18 BRPM | BODY MASS INDEX: 28.04 KG/M2 | HEIGHT: 68 IN | HEART RATE: 79 BPM | DIASTOLIC BLOOD PRESSURE: 81 MMHG | WEIGHT: 185 LBS | TEMPERATURE: 98.8 F | OXYGEN SATURATION: 100 %

## 2021-02-24 LAB
ALBUMIN SERPL-MCNC: 4 G/DL (ref 3.5–5)
ALBUMIN/GLOB SERPL: 1.1 {RATIO} (ref 1.1–2.2)
ALP SERPL-CCNC: 101 U/L (ref 45–117)
ALT SERPL-CCNC: 20 U/L (ref 12–78)
ANION GAP SERPL CALC-SCNC: 5 MMOL/L (ref 5–15)
APPEARANCE UR: CLEAR
AST SERPL W P-5'-P-CCNC: 17 U/L (ref 15–37)
ATRIAL RATE: 89 BPM
BACTERIA URNS QL MICRO: NEGATIVE /HPF
BASOPHILS # BLD: 0.1 K/UL (ref 0–0.1)
BASOPHILS NFR BLD: 1 % (ref 0–1)
BILIRUB SERPL-MCNC: 0.6 MG/DL (ref 0.2–1)
BILIRUB UR QL: NEGATIVE
BUN SERPL-MCNC: 19 MG/DL (ref 6–20)
BUN/CREAT SERPL: 16 (ref 12–20)
CA-I BLD-MCNC: 8.7 MG/DL (ref 8.5–10.1)
CALCULATED P AXIS, ECG09: 43 DEGREES
CALCULATED R AXIS, ECG10: 54 DEGREES
CALCULATED T AXIS, ECG11: 42 DEGREES
CHLORIDE SERPL-SCNC: 106 MMOL/L (ref 97–108)
CO2 SERPL-SCNC: 28 MMOL/L (ref 21–32)
COLOR UR: ABNORMAL
CREAT SERPL-MCNC: 1.22 MG/DL (ref 0.55–1.02)
D DIMER PPP FEU-MCNC: 1.07 UG/ML(FEU)
DIAGNOSIS, 93000: NORMAL
DIFFERENTIAL METHOD BLD: ABNORMAL
EOSINOPHIL # BLD: 0.1 K/UL (ref 0–0.4)
EOSINOPHIL NFR BLD: 1 % (ref 0–7)
ERYTHROCYTE [DISTWIDTH] IN BLOOD BY AUTOMATED COUNT: 14.8 % (ref 11.5–14.5)
GLOBULIN SER CALC-MCNC: 3.6 G/DL (ref 2–4)
GLUCOSE SERPL-MCNC: 95 MG/DL (ref 65–100)
GLUCOSE UR STRIP.AUTO-MCNC: NEGATIVE MG/DL
HCT VFR BLD AUTO: 43.6 % (ref 35–47)
HGB BLD-MCNC: 14.4 G/DL (ref 11.5–16)
HGB UR QL STRIP: ABNORMAL
IMM GRANULOCYTES # BLD AUTO: 0 K/UL (ref 0–0.04)
IMM GRANULOCYTES NFR BLD AUTO: 0 % (ref 0–0.5)
KETONES UR QL STRIP.AUTO: 20 MG/DL
LEUKOCYTE ESTERASE UR QL STRIP.AUTO: NEGATIVE
LIPASE SERPL-CCNC: 46 U/L (ref 73–393)
LYMPHOCYTES # BLD: 3.5 K/UL (ref 0.8–3.5)
LYMPHOCYTES NFR BLD: 47 % (ref 12–49)
MCH RBC QN AUTO: 30.6 PG (ref 26–34)
MCHC RBC AUTO-ENTMCNC: 33 G/DL (ref 30–36.5)
MCV RBC AUTO: 92.6 FL (ref 80–99)
MONOCYTES # BLD: 0.6 K/UL (ref 0–1)
MONOCYTES NFR BLD: 8 % (ref 5–13)
MUCOUS THREADS URNS QL MICRO: ABNORMAL /LPF
NEUTS SEG # BLD: 3.2 K/UL (ref 1.8–8)
NEUTS SEG NFR BLD: 43 % (ref 32–75)
NITRITE UR QL STRIP.AUTO: NEGATIVE
NRBC # BLD: 0 K/UL (ref 0–0.01)
NRBC BLD-RTO: 0 PER 100 WBC
P-R INTERVAL, ECG05: 124 MS
PH UR STRIP: 5 [PH] (ref 5–8)
PLATELET # BLD AUTO: 429 K/UL (ref 150–400)
PMV BLD AUTO: 10.5 FL (ref 8.9–12.9)
POTASSIUM SERPL-SCNC: 3.2 MMOL/L (ref 3.5–5.1)
PROT SERPL-MCNC: 7.6 G/DL (ref 6.4–8.2)
PROT UR STRIP-MCNC: 30 MG/DL
Q-T INTERVAL, ECG07: 360 MS
QRS DURATION, ECG06: 72 MS
QTC CALCULATION (BEZET), ECG08: 438 MS
RBC # BLD AUTO: 4.71 M/UL (ref 3.8–5.2)
RBC #/AREA URNS HPF: ABNORMAL /HPF (ref 0–5)
SODIUM SERPL-SCNC: 139 MMOL/L (ref 136–145)
SP GR UR REFRACTOMETRY: >1.03 (ref 1–1.03)
TROPONIN I SERPL-MCNC: <0.05 NG/ML
UA: UC IF INDICATED,UAUC: ABNORMAL
UROBILINOGEN UR QL STRIP.AUTO: 0.1 EU/DL (ref 0.1–1)
VENTRICULAR RATE, ECG03: 89 BPM
WBC # BLD AUTO: 7.5 K/UL (ref 3.6–11)
WBC URNS QL MICRO: ABNORMAL /HPF (ref 0–4)

## 2021-02-24 PROCEDURE — 96361 HYDRATE IV INFUSION ADD-ON: CPT

## 2021-02-24 PROCEDURE — 71275 CT ANGIOGRAPHY CHEST: CPT

## 2021-02-24 PROCEDURE — 74011000636 HC RX REV CODE- 636: Performed by: EMERGENCY MEDICINE

## 2021-02-24 PROCEDURE — 74011250636 HC RX REV CODE- 250/636: Performed by: EMERGENCY MEDICINE

## 2021-02-24 PROCEDURE — 74011250637 HC RX REV CODE- 250/637: Performed by: EMERGENCY MEDICINE

## 2021-02-24 PROCEDURE — 96375 TX/PRO/DX INJ NEW DRUG ADDON: CPT

## 2021-02-24 PROCEDURE — 74177 CT ABD & PELVIS W/CONTRAST: CPT

## 2021-02-24 PROCEDURE — 74022 RADEX COMPL AQT ABD SERIES: CPT

## 2021-02-24 PROCEDURE — 81001 URINALYSIS AUTO W/SCOPE: CPT

## 2021-02-24 RX ORDER — PROMETHAZINE HYDROCHLORIDE 25 MG/1
25 TABLET ORAL
Qty: 12 TAB | Refills: 0 | Status: SHIPPED | OUTPATIENT
Start: 2021-02-24 | End: 2021-08-09

## 2021-02-24 RX ORDER — HALOPERIDOL 5 MG/ML
5 INJECTION INTRAMUSCULAR ONCE
Status: DISCONTINUED | OUTPATIENT
Start: 2021-02-24 | End: 2021-02-24

## 2021-02-24 RX ORDER — DICYCLOMINE HYDROCHLORIDE 10 MG/1
10 CAPSULE ORAL 3 TIMES DAILY
Qty: 20 CAP | Refills: 0 | Status: SHIPPED | OUTPATIENT
Start: 2021-02-24 | End: 2021-08-09

## 2021-02-24 RX ORDER — PROMETHAZINE HYDROCHLORIDE 25 MG/1
25 SUPPOSITORY RECTAL
Qty: 12 SUPPOSITORY | Refills: 0 | Status: SHIPPED | OUTPATIENT
Start: 2021-02-24 | End: 2021-03-03

## 2021-02-24 RX ORDER — METOCLOPRAMIDE 10 MG/1
10 TABLET ORAL
Status: COMPLETED | OUTPATIENT
Start: 2021-02-24 | End: 2021-02-24

## 2021-02-24 RX ORDER — DIPHENHYDRAMINE HYDROCHLORIDE 50 MG/ML
25 INJECTION, SOLUTION INTRAMUSCULAR; INTRAVENOUS
Status: DISCONTINUED | OUTPATIENT
Start: 2021-02-24 | End: 2021-02-24

## 2021-02-24 RX ADMIN — SODIUM CHLORIDE 1000 ML: 9 INJECTION, SOLUTION INTRAVENOUS at 01:00

## 2021-02-24 RX ADMIN — IOPAMIDOL 100 ML: 755 INJECTION, SOLUTION INTRAVENOUS at 02:58

## 2021-02-24 RX ADMIN — METOCLOPRAMIDE 10 MG: 10 TABLET ORAL at 04:57

## 2021-02-24 RX ADMIN — PROCHLORPERAZINE EDISYLATE 10 MG: 5 INJECTION INTRAMUSCULAR; INTRAVENOUS at 00:56

## 2021-02-24 NOTE — ED NOTES
Assumed care of patient. A&Ox4; no acute distress; no respiratory distress. Patient anxious, otherwise cooperative.

## 2021-02-25 NOTE — ED PROVIDER NOTES
EMERGENCY DEPARTMENT HISTORY AND PHYSICAL EXAM      Date: 2/23/2021  Patient Name: Damari Reddy      History of Presenting Illness     Chief Complaint   Patient presents with    Abdominal Pain       History Provided By: Patient    HPI: Damari Reddy, 54 y.o. female with a past medical history significant for gerdm, hypothyroidim=smm  presents to the ED with cc of inability to urinate over the past 11 hours. State she has not had any output since yesterday. complainging of severe abdominal pain . No fevers. No chills. Recently seen here for other complaints. Recently seen for other complaints. There are no other complaints, changes, or physical findings at this time. PCP: Milagro Hernandez MD    Current Outpatient Medications   Medication Sig Dispense Refill    promethazine (PHENERGAN) 25 mg suppository Insert 1 Suppository into rectum every six (6) hours as needed for Nausea for up to 7 days. 12 Suppository 0    dicyclomine (BENTYL) 10 mg capsule Take 1 Cap by mouth three (3) times daily. 20 Cap 0    promethazine (PHENERGAN) 25 mg tablet Take 1 Tab by mouth every six (6) hours as needed (nausea). 12 Tab 0    amoxicillin 500 mg tab Take 1,000 mg by mouth three (3) times daily. 30 Tab 0    albuterol (ProAir HFA) 90 mcg/actuation inhaler Take 1 Puff by inhalation every four (4) hours as needed for Wheezing. 1 Inhaler 0    sucralfate (Carafate) 100 mg/mL suspension Take 5 mL by mouth four (4) times daily. 414 mL 0    clonazePAM (KLONOPIN) 1 mg tablet Take  by mouth three (3) times daily.  gabapentin (NEURONTIN) 600 mg tablet Take  by mouth four (4) times daily.  QUEtiapine (SEROQUEL) 50 mg tablet Take 50 mg by mouth daily.  dextroamphetamine-amphetamine (ADDERALL) 30 mg tablet Take 30 mg by mouth two (2) times a day.  QUEtiapine (SEROQUEL) 400 mg tablet Take 400 mg by mouth nightly.  estradiol (CLIMARA) 0.05 mg/24 hr 1 Patch by TransDERmal route two (2) days a week.  levothyroxine (SYNTHROID) 50 mcg tablet Take 50 mcg by mouth Daily (before breakfast).  buprenorphine-naloxone (SUBOXONE) 8-2 mg subl 1 Tab by SubLINGual route daily.  lurasidone (LATUDA) 80 mg tab tablet Take 80 mg by mouth nightly.  cholecalciferol, VITAMIN D3, (VITAMIN D3) 5,000 unit tab tablet Take 5,000 Units by mouth daily.  ferrous sulfate (IRON) 325 mg (65 mg iron) tablet Take 65 mg by mouth Daily (before breakfast). Past History     Past Medical History:  Past Medical History:   Diagnosis Date    Fatigue     GERD (gastroesophageal reflux disease)     Hypothyroidism     Psychiatric disorder        Past Surgical History:  Past Surgical History:   Procedure Laterality Date    HX APPENDECTOMY      HX  SECTION      HX CHOLECYSTECTOMY      HX HYSTERECTOMY         Family History:  Family History   Family history unknown: Yes       Social History:  Social History     Tobacco Use    Smoking status: Current Every Day Smoker     Packs/day: 0.50    Smokeless tobacco: Never Used   Substance Use Topics    Alcohol use: No     Alcohol/week: 0.0 standard drinks    Drug use: Never       Allergies: Allergies   Allergen Reactions    Augmentin [Amoxicillin-Pot Clavulanate] Nausea and Vomiting         Review of Systems       Review of Systems   Constitutional: Positive for activity change and fatigue. Negative for appetite change, chills and fever. HENT: Negative for congestion and sore throat. Eyes: Negative for photophobia. Respiratory: Negative for cough, chest tightness and shortness of breath. Cardiovascular: Negative for palpitations. Gastrointestinal: Positive for abdominal pain and nausea. Negative for diarrhea. Genitourinary: Positive for decreased urine volume, difficulty urinating and urgency. Negative for dysuria, vaginal bleeding, vaginal discharge and vaginal pain. Musculoskeletal: Negative for arthralgias, back pain and myalgias.    Neurological: Negative for dizziness, weakness, numbness and headaches. Physical Exam     Physical Exam  Vitals signs and nursing note reviewed. Constitutional:       General: She is in acute distress. Appearance: Normal appearance. She is well-developed and normal weight. She is not ill-appearing. Comments: anxious   HENT:      Head: Normocephalic and atraumatic. Nose: Nose normal.      Mouth/Throat:      Mouth: Mucous membranes are moist.   Eyes:      Extraocular Movements: Extraocular movements intact. Pupils: Pupils are equal, round, and reactive to light. Neck:      Musculoskeletal: Normal range of motion and neck supple. No muscular tenderness. Cardiovascular:      Rate and Rhythm: Normal rate and regular rhythm. Pulses: Normal pulses. Pulmonary:      Effort: Pulmonary effort is normal.   Abdominal:      General: Abdomen is flat. Bowel sounds are normal.      Palpations: Abdomen is soft. Tenderness: There is generalized abdominal tenderness and tenderness in the right lower quadrant, suprapubic area and left lower quadrant. There is guarding. Musculoskeletal: Normal range of motion. General: No tenderness. Skin:     General: Skin is warm and dry. Capillary Refill: Capillary refill takes less than 2 seconds. Neurological:      General: No focal deficit present. Mental Status: She is alert and oriented to person, place, and time. Sensory: No sensory deficit. Motor: No weakness. Lab and Diagnostic Study Results     Labs -   No results found for this or any previous visit (from the past 12 hour(s)). Radiologic Studies -   [unfilled]  CT Results  (Last 48 hours)               02/24/21 0257  CTA CHEST W OR W WO CONT Final result    Impression:  1. No evidence of pulmonary embolus. 2. Emphysema. 3. Left platelike atelectasis or scarring. Dependent atelectasis. Narrative:  Comparison: Chest x-ray 2/24/2021.        History: Pleuritic chest pain. Elevated d-dimer. Technique: Axial imaging chest with IV contrast,  with multiplanar formatting   and MIPs. 100cc Isovue 370 administered IV. Dose reduction: All CT scans at this facility are performed using dose reduction   optimization techniques as appropriate to a performed exam including the   following: Automated exposure control, adjustments of the mA and/or kV according   to patient's size, or use of iterative reconstruction technique. FINDINGS: Limited by breathing motion. HEART: Normal heart size. No pericardial effusion. THORACIC AORTA: No aneurysm or dissection. PULMONARY ARTERIES: No evident pulmonary arterial filling defects. ADENOPATHY: No mediastinal or hilar adenopathy. THORACIC ESOPHAGUS:Collapsed. LUNG PARENCHYMA: Platelike atelectasis or scarring left lower lung. Dependent   atelectasis. Emphysema. CENTRAL AIRWAYS: Patent. PLEURA: No pleural effusion. CHEST WALL: No axillary adenopathy. Included thyroid unremarkable. UPPER ABDOMEN:  Unremarkable. BONES: Unremarkable for age. 02/24/21 0257  CT ABD PELV W CONT Final result    Impression:      No inflammatory changes or bowel obstruction. Fatty liver. Mild atherosclerosis including coronary arteries. Mild emphysema. Follow-up as clinically indicated. Narrative:  CT abdomen and pelvis with intravenous contrast, 100 cc Isovue-370       Dose reduction: All CT scans at this facility are performed using dose reduction   optimization techniques as appropriate to a performed exam including the   following: Automated exposure control, adjustments of the mA and/or kV according   to patient size, or use of iterative reconstruction technique. INDICATION: Abdominal pain       COMPARISON: Report of prior study on 5/31/2019. Images could not be retrieved at   this time for direct comparison.        FINDINGS:       Mild atelectasis and hypoventilatory changes in the lung bases. Mild emphysema. There appears to be mild coronary calcification. Trace pericardial effusion. Fatty infiltration of the liver. Cholecystectomy. Spleen is slightly prominent   in size about 13 cm in craniocaudal dimension. . Pancreas is somewhat atrophic. No aneurysm of abdominal aorta. Mild atherosclerosis. No hydronephrosis on   either side. Excreted contrast in the kidneys. No bowel obstruction. No evidence of diverticulitis or appendicitis. No abscess. No free intraperitoneal air. Hysterectomy. No free fluid in the pelvis. No acute   fracture in the visualized bony structures. Tiny umbilical defect containing   fat. CXR Results  (Last 48 hours)               02/24/21 0203  XR ABD ACUTE W 1 V CHEST Final result    Impression:  1. Few gas distended bowel loops in the left abdomen possibly a focal ileus. Nonobstructive pattern. 2. Left lower lung atelectasis or scarring. Narrative:  Comparison chest x-ray 2/16/2021. FINDINGS: Frontal view of the abdomen. Normal heart size. No vascular congestion   or pulmonary edema. The lungs are well inflated. Left lower lung atelectasis or   scarring. No consolidation, effusion, pneumothorax. Bony thorax intact. Upright and supine views of the abdomen. No free air under the diaphragm, or   differential air-fluid levels. Few gas distended bowel loops in the left   abdomen. Surgical staples right upper abdomen postcholecystectomy. Bony   structures intact. Medical Decision Making and ED Course   - I am the first and primary provider for this patient AND AM THE PRIMARY PROVIDER OF RECORD. - I reviewed the vital signs, available nursing notes, past medical history, past surgical history, family history and social history. - Initial assessment performed. The patients presenting problems have been discussed, and the staff are in agreement with the care plan formulated and outlined with them.   I have encouraged them to ask questions as they arise throughout their visit. Vital Signs-Reviewed the patient's vital signs. No data found. EKG interpretation:   2/24/2021  7:09 AM - [place], Card Result In    Component Value Ref Range & Units Status   Ventricular Rate 89  BPM Final   Atrial Rate 89  BPM Final   P-R Interval 124  ms Final   QRS Duration 72  ms Final   Q-T Interval 360  ms Final   QTC Calculation (Bezet) 438  ms Final   Calculated P Axis 43  degrees Final   Calculated R Axis 54  degrees Final   Calculated T Axis 42  degrees Final   Diagnosis   Final   Normal sinus rhythm   Normal ECG   When compared with ECG of 16-FEB-2021 19:23,   No significant change was found   Confirmed by Mik Miller ([id number]) on 2/24/2021 7:09:37 AM            Records Reviewed: Nursing Notes       ED Course:              Provider Notes (Medical Decision Making):   51yo female presenting w abdominal pain, stating she cannot urinate. Pt was able to urinate here and PVR >50cc. She still complained of severe abdominal pain, labs ordered and CT performed due to diffuse tendenress. CT without clear etiology of patient's pain. Results discussed. Pt was resting calmly in the room and when we reassessed for discharge she began complaining of continued nausea but was also asking for PO. She tolerated PO in the ED. Will DC to home w OP GI followup. MDM           Disposition     Disposition: DC- Adult Discharges: All of the diagnostic tests were reviewed and questions answered. Diagnosis, care plan and treatment options were discussed. The patient understands the instructions and will follow up as directed. The patients results have been reviewed with them. They have been counseled regarding their diagnosis. The patient verbally convey understanding and agreement of the signs, symptoms, diagnosis, treatment and prognosis and additionally agrees to follow up as recommended with their PCP in 24 - 48 hours.   They also agree with the care-plan and convey that all of their questions have been answered. I have also put together some discharge instructions for them that include: 1) educational information regarding their diagnosis, 2) how to care for their diagnosis at home, as well a 3) list of reasons why they would want to return to the ED prior to their follow-up appointment, should their condition change. Discharged        DISCHARGE PLAN:  1. Cannot display discharge medications since this patient is not currently admitted. 2.   Follow-up Information     Follow up With Specialties Details Why Contact Info    Your primary doctor  Schedule an appointment as soon as possible for a visit in 3 days As needed, For followup and recheck of todays symptoms     [address] [facility] EMERGENCY DEPT Emergency Medicine Go to  As needed, or for any concerns or deteriorations. , if symptoms persist or worsen. [address] [place] [address]  [phone]        3. Return to ED if worse   4. Discharge Medication List as of 2/24/2021  4:54 AM      START taking these medications    Details   promethazine (PHENERGAN) 25 mg suppository Insert 1 Suppository into rectum every six (6) hours as needed for Nausea for up to 7 days. , Normal, Disp-12 Suppository, R-0      dicyclomine (BENTYL) 10 mg capsule Take 1 Cap by mouth three (3) times daily. , Normal, Disp-20 Cap, R-0         CONTINUE these medications which have CHANGED    Details   promethazine (PHENERGAN) 25 mg tablet Take 1 Tab by mouth every six (6) hours as needed (nausea). , Normal, Disp-12 Tab, R-0         CONTINUE these medications which have NOT CHANGED    Details   amoxicillin 500 mg tab Take 1,000 mg by mouth three (3) times daily. , Normal, Disp-30 Tab, R-0      albuterol (ProAir HFA) 90 mcg/actuation inhaler Take 1 Puff by inhalation every four (4) hours as needed for Wheezing., Normal, Disp-1 Inhaler,R-0      sucralfate (Carafate) 100 mg/mL suspension Take 5 mL by mouth four (4) times daily. , Normal, Disp-414 mL,R-0      clonazePAM (KLONOPIN) 1 mg tablet Take  by mouth three (3) times daily. , Historical Med      gabapentin (NEURONTIN) 600 mg tablet Take  by mouth four (4) times daily. , Historical Med      !! QUEtiapine (SEROQUEL) 50 mg tablet Take 50 mg by mouth daily. , Historical Med      dextroamphetamine-amphetamine (ADDERALL) 30 mg tablet Take 30 mg by mouth two (2) times a day., Historical Med      !! QUEtiapine (SEROQUEL) 400 mg tablet Take 400 mg by mouth nightly., Historical Med      estradiol (CLIMARA) 0.05 mg/24 hr 1 Patch by TransDERmal route two (2) days a week., Historical Med      levothyroxine (SYNTHROID) 50 mcg tablet Take 50 mcg by mouth Daily (before breakfast). , Historical Med      buprenorphine-naloxone (SUBOXONE) 8-2 mg subl 1 Tab by SubLINGual route daily. , Historical Med      lurasidone (LATUDA) 80 mg tab tablet Take 80 mg by mouth nightly., Historical Med      cholecalciferol, VITAMIN D3, (VITAMIN D3) 5,000 unit tab tablet Take 5,000 Units by mouth daily. , Historical Med      ferrous sulfate (IRON) 325 mg (65 mg iron) tablet Take 65 mg by mouth Daily (before breakfast). , Historical Med       !! - Potential duplicate medications found. Please discuss with provider. Diagnosis     Clinical Impression:   1. Abdominal pain, generalized    2. Chest pain, unspecified type    3. Nausea without vomiting        Attestations:    Edenilson Whittaker MD    Please note that this dictation was completed with [employer], the computer voice recognition software. Quite often unanticipated grammatical, syntax, homophones, and other interpretive errors are inadvertently transcribed by the computer software. Please disregard these errors. Please excuse any errors that have escaped final proofreading. Thank you.

## 2021-05-04 ENCOUNTER — HOSPITAL ENCOUNTER (EMERGENCY)
Age: 56
Discharge: HOME | End: 2021-05-04
Attending: EMERGENCY MEDICINE
Payer: MEDICARE

## 2021-05-04 VITALS
RESPIRATION RATE: 16 BRPM | TEMPERATURE: 100 F | HEART RATE: 96 BPM | OXYGEN SATURATION: 98 % | DIASTOLIC BLOOD PRESSURE: 76 MMHG | SYSTOLIC BLOOD PRESSURE: 110 MMHG | HEIGHT: 68 IN | WEIGHT: 190 LBS | BODY MASS INDEX: 28.79 KG/M2

## 2021-05-04 DIAGNOSIS — N12 PYELONEPHRITIS: Primary | ICD-10-CM

## 2021-05-04 LAB
ANION GAP SERPL CALC-SCNC: 3 MMOL/L (ref 5–15)
APPEARANCE UR: ABNORMAL
BACTERIA URNS QL MICRO: ABNORMAL /HPF
BASOPHILS # BLD: 0.1 K/UL (ref 0–0.1)
BASOPHILS NFR BLD: 0 % (ref 0–1)
BILIRUB UR QL: NEGATIVE
BUN SERPL-MCNC: 9 MG/DL (ref 6–20)
BUN/CREAT SERPL: 10 (ref 12–20)
CA-I BLD-MCNC: 8.7 MG/DL (ref 8.5–10.1)
CHLORIDE SERPL-SCNC: 103 MMOL/L (ref 97–108)
CO2 SERPL-SCNC: 28 MMOL/L (ref 21–32)
COLOR UR: ABNORMAL
CREAT SERPL-MCNC: 0.92 MG/DL (ref 0.55–1.02)
DIFFERENTIAL METHOD BLD: ABNORMAL
EOSINOPHIL # BLD: 0.1 K/UL (ref 0–0.4)
EOSINOPHIL NFR BLD: 1 % (ref 0–7)
ERYTHROCYTE [DISTWIDTH] IN BLOOD BY AUTOMATED COUNT: 13.4 % (ref 11.5–14.5)
GLUCOSE SERPL-MCNC: 116 MG/DL (ref 65–100)
GLUCOSE UR STRIP.AUTO-MCNC: NEGATIVE MG/DL
HCT VFR BLD AUTO: 46.6 % (ref 35–47)
HGB BLD-MCNC: 15.4 G/DL (ref 11.5–16)
HGB UR QL STRIP: ABNORMAL
IMM GRANULOCYTES # BLD AUTO: 0.1 K/UL (ref 0–0.04)
IMM GRANULOCYTES NFR BLD AUTO: 0 % (ref 0–0.5)
KETONES UR QL STRIP.AUTO: NEGATIVE MG/DL
LEUKOCYTE ESTERASE UR QL STRIP.AUTO: ABNORMAL
LYMPHOCYTES # BLD: 2.8 K/UL (ref 0.8–3.5)
LYMPHOCYTES NFR BLD: 22 % (ref 12–49)
MCH RBC QN AUTO: 30.5 PG (ref 26–34)
MCHC RBC AUTO-ENTMCNC: 33 G/DL (ref 30–36.5)
MCV RBC AUTO: 92.3 FL (ref 80–99)
MONOCYTES # BLD: 0.8 K/UL (ref 0–1)
MONOCYTES NFR BLD: 6 % (ref 5–13)
MUCOUS THREADS URNS QL MICRO: ABNORMAL /LPF
NEUTS SEG # BLD: 9.2 K/UL (ref 1.8–8)
NEUTS SEG NFR BLD: 71 % (ref 32–75)
NITRITE UR QL STRIP.AUTO: NEGATIVE
NRBC # BLD: 0 K/UL (ref 0–0.01)
NRBC BLD-RTO: 0 PER 100 WBC
PH UR STRIP: 5 [PH] (ref 5–8)
PLATELET # BLD AUTO: 278 K/UL (ref 150–400)
PMV BLD AUTO: 10.5 FL (ref 8.9–12.9)
POTASSIUM SERPL-SCNC: 4.2 MMOL/L (ref 3.5–5.1)
PROT UR STRIP-MCNC: NEGATIVE MG/DL
RBC # BLD AUTO: 5.05 M/UL (ref 3.8–5.2)
RBC #/AREA URNS HPF: ABNORMAL /HPF (ref 0–5)
SODIUM SERPL-SCNC: 134 MMOL/L (ref 136–145)
SP GR UR REFRACTOMETRY: 1.01 (ref 1–1.03)
UA: UC IF INDICATED,UAUC: ABNORMAL
UROBILINOGEN UR QL STRIP.AUTO: 0.1 EU/DL (ref 0.1–1)
WBC # BLD AUTO: 13 K/UL (ref 3.6–11)
WBC URNS QL MICRO: ABNORMAL /HPF (ref 0–4)

## 2021-05-04 PROCEDURE — 96374 THER/PROPH/DIAG INJ IV PUSH: CPT

## 2021-05-04 PROCEDURE — 96375 TX/PRO/DX INJ NEW DRUG ADDON: CPT

## 2021-05-04 PROCEDURE — 85025 COMPLETE CBC W/AUTO DIFF WBC: CPT

## 2021-05-04 PROCEDURE — 99284 EMERGENCY DEPT VISIT MOD MDM: CPT

## 2021-05-04 PROCEDURE — 80048 BASIC METABOLIC PNL TOTAL CA: CPT

## 2021-05-04 PROCEDURE — 81001 URINALYSIS AUTO W/SCOPE: CPT

## 2021-05-04 PROCEDURE — 74011250637 HC RX REV CODE- 250/637: Performed by: EMERGENCY MEDICINE

## 2021-05-04 PROCEDURE — 99281 EMR DPT VST MAYX REQ PHY/QHP: CPT

## 2021-05-04 PROCEDURE — 36415 COLL VENOUS BLD VENIPUNCTURE: CPT

## 2021-05-04 PROCEDURE — 74011250636 HC RX REV CODE- 250/636: Performed by: EMERGENCY MEDICINE

## 2021-05-04 PROCEDURE — 74011000250 HC RX REV CODE- 250: Performed by: EMERGENCY MEDICINE

## 2021-05-04 RX ORDER — CIPROFLOXACIN 500 MG/1
500 TABLET ORAL 2 TIMES DAILY
Qty: 20 TAB | Refills: 0 | Status: SHIPPED | OUTPATIENT
Start: 2021-05-04 | End: 2021-05-14

## 2021-05-04 RX ORDER — IBUPROFEN 800 MG/1
800 TABLET ORAL
Status: COMPLETED | OUTPATIENT
Start: 2021-05-04 | End: 2021-05-04

## 2021-05-04 RX ORDER — ONDANSETRON 2 MG/ML
4 INJECTION INTRAMUSCULAR; INTRAVENOUS
Status: COMPLETED | OUTPATIENT
Start: 2021-05-04 | End: 2021-05-04

## 2021-05-04 RX ORDER — PROMETHAZINE HYDROCHLORIDE 25 MG/1
25 TABLET ORAL
Qty: 12 TAB | Refills: 0 | Status: SHIPPED | OUTPATIENT
Start: 2021-05-04 | End: 2021-08-09

## 2021-05-04 RX ORDER — CLONAZEPAM 1 MG/1
1 TABLET ORAL
Status: COMPLETED | OUTPATIENT
Start: 2021-05-04 | End: 2021-05-04

## 2021-05-04 RX ORDER — TRAMADOL HYDROCHLORIDE 50 MG/1
50 TABLET ORAL
Qty: 12 TAB | Refills: 0 | Status: SHIPPED | OUTPATIENT
Start: 2021-05-04 | End: 2021-05-07

## 2021-05-04 RX ADMIN — CEFTRIAXONE SODIUM 1 G: 1 INJECTION, POWDER, FOR SOLUTION INTRAMUSCULAR; INTRAVENOUS at 12:54

## 2021-05-04 RX ADMIN — ONDANSETRON 4 MG: 2 INJECTION INTRAMUSCULAR; INTRAVENOUS at 12:54

## 2021-05-04 RX ADMIN — IBUPROFEN 800 MG: 800 TABLET, FILM COATED ORAL at 12:54

## 2021-05-04 RX ADMIN — CLONAZEPAM 1 MG: 1 TABLET ORAL at 12:54

## 2021-05-04 NOTE — ED PROVIDER NOTES
EMERGENCY DEPARTMENT HISTORY AND PHYSICAL EXAM      Date: 5/4/2021  Patient Name: Nicolle Cervantes    History of Presenting Illness     Chief Complaint   Patient presents with    Urinary Pain       History Provided By: Patient    HPI: Nicolle Cervantes, 54 y.o. female with a past medical history significant No significant past medical history presents to the ED with chief complaint of Urinary Pain  . 35-year-old female is having some lower back pain. Foul-smelling urine. Low-grade temperature. Concerned she may have a UTI. Some burning when she pees. Nausea but no vomiting yet. No diarrhea. No recent antibiotics. Feels fatigued. No cough congestion or shortness of breath. Symptoms present over the last day or 2          There are no other complaints, changes, or physical findings at this time. PCP: None    Current Outpatient Medications   Medication Sig Dispense Refill    dicyclomine (BENTYL) 10 mg capsule Take 1 Cap by mouth three (3) times daily. 20 Cap 0    promethazine (PHENERGAN) 25 mg tablet Take 1 Tab by mouth every six (6) hours as needed (nausea). 12 Tab 0    amoxicillin 500 mg tab Take 1,000 mg by mouth three (3) times daily. 30 Tab 0    albuterol (ProAir HFA) 90 mcg/actuation inhaler Take 1 Puff by inhalation every four (4) hours as needed for Wheezing. 1 Inhaler 0    sucralfate (Carafate) 100 mg/mL suspension Take 5 mL by mouth four (4) times daily. 414 mL 0    clonazePAM (KLONOPIN) 1 mg tablet Take  by mouth three (3) times daily.  gabapentin (NEURONTIN) 600 mg tablet Take  by mouth four (4) times daily.  QUEtiapine (SEROQUEL) 50 mg tablet Take 50 mg by mouth daily.  dextroamphetamine-amphetamine (ADDERALL) 30 mg tablet Take 30 mg by mouth two (2) times a day.  QUEtiapine (SEROQUEL) 400 mg tablet Take 400 mg by mouth nightly.  estradiol (CLIMARA) 0.05 mg/24 hr 1 Patch by TransDERmal route two (2) days a week.       levothyroxine (SYNTHROID) 50 mcg tablet Take 50 mcg by mouth Daily (before breakfast).  buprenorphine-naloxone (SUBOXONE) 8-2 mg subl 1 Tab by SubLINGual route daily.  lurasidone (LATUDA) 80 mg tab tablet Take 80 mg by mouth nightly.  cholecalciferol, VITAMIN D3, (VITAMIN D3) 5,000 unit tab tablet Take 5,000 Units by mouth daily.  ferrous sulfate (IRON) 325 mg (65 mg iron) tablet Take 65 mg by mouth Daily (before breakfast). Past History     Past Medical History:  Past Medical History:   Diagnosis Date    Fatigue     GERD (gastroesophageal reflux disease)     Hypothyroidism     Psychiatric disorder        Past Surgical History:  Past Surgical History:   Procedure Laterality Date    HX APPENDECTOMY      HX  SECTION      HX CHOLECYSTECTOMY      HX HYSTERECTOMY         Family History:  Family History   Family history unknown: Yes       Social History:  Social History     Tobacco Use    Smoking status: Current Every Day Smoker     Packs/day: 0.50    Smokeless tobacco: Never Used   Substance Use Topics    Alcohol use: No     Alcohol/week: 0.0 standard drinks    Drug use: Never       Allergies: Allergies   Allergen Reactions    Augmentin [Amoxicillin-Pot Clavulanate] Nausea and Vomiting         Review of Systems   Review of Systems   Constitutional: Negative. Negative for chills, fatigue and fever. HENT: Negative. Negative for congestion, nosebleeds and sore throat. Eyes: Negative. Negative for pain, discharge and visual disturbance. Respiratory: Negative. Negative for cough, chest tightness and shortness of breath. Cardiovascular: Negative for chest pain, palpitations and leg swelling. Gastrointestinal: Positive for abdominal pain and nausea. Negative for blood in stool, constipation, diarrhea and vomiting. Endocrine: Negative. Genitourinary: Positive for dysuria. Negative for difficulty urinating, pelvic pain and vaginal bleeding. Musculoskeletal: Positive for back pain.  Negative for arthralgias and myalgias. Skin: Negative. Negative for rash and wound. Allergic/Immunologic: Negative. Neurological: Negative. Negative for dizziness, syncope, weakness, numbness and headaches. Hematological: Negative. Psychiatric/Behavioral: Negative. Negative for agitation, confusion and suicidal ideas. All other systems reviewed and are negative. Physical Exam   Physical Exam  Vitals signs and nursing note reviewed. Exam conducted with a chaperone present. Constitutional:       Appearance: Normal appearance. She is normal weight. HENT:      Head: Normocephalic and atraumatic. Nose: Nose normal.      Mouth/Throat:      Mouth: Mucous membranes are moist.      Pharynx: Oropharynx is clear. Eyes:      Extraocular Movements: Extraocular movements intact. Conjunctiva/sclera: Conjunctivae normal.      Pupils: Pupils are equal, round, and reactive to light. Neck:      Musculoskeletal: Normal range of motion and neck supple. Cardiovascular:      Rate and Rhythm: Regular rhythm. Tachycardia present. Pulses: Normal pulses. Heart sounds: Normal heart sounds. Pulmonary:      Effort: Pulmonary effort is normal. No respiratory distress. Breath sounds: Normal breath sounds. Abdominal:      General: Abdomen is flat. Bowel sounds are normal. There is no distension. Palpations: Abdomen is soft. Tenderness: There is no abdominal tenderness. There is right CVA tenderness. There is no guarding. Musculoskeletal: Normal range of motion. General: No swelling, tenderness, deformity or signs of injury. Right lower leg: No edema. Left lower leg: No edema. Skin:     General: Skin is warm and dry. Capillary Refill: Capillary refill takes less than 2 seconds. Findings: No lesion or rash. Neurological:      General: No focal deficit present. Mental Status: She is alert and oriented to person, place, and time.  Mental status is at baseline. Cranial Nerves: No cranial nerve deficit. Psychiatric:         Mood and Affect: Mood normal.         Behavior: Behavior normal.         Thought Content:  Thought content normal.         Judgment: Judgment normal.         Diagnostic Study Results     Labs -     Recent Results (from the past 12 hour(s))   URINALYSIS W/ REFLEX CULTURE    Collection Time: 05/04/21 10:45 AM    Specimen: Urine   Result Value Ref Range    Color Yellow/Straw      Appearance Turbid (A) Clear      Specific gravity 1.011 1.003 - 1.030      pH (UA) 5.0 5.0 - 8.0      Protein Negative Negative mg/dL    Glucose Negative Negative mg/dL    Ketone Negative Negative mg/dL    Bilirubin Negative Negative      Blood Small (A) Negative      Urobilinogen 0.1 0.1 - 1.0 EU/dL    Nitrites Negative Negative      Leukocyte Esterase Small (A) Negative      UA:UC IF INDICATED Urine Culture Ordered (A) Culture not indicated by UA result      WBC 20-50 0 - 4 /hpf    RBC 0-5 0 - 5 /hpf    Bacteria 1+ (A) Negative /hpf    Mucus Trace /lpf   METABOLIC PANEL, BASIC    Collection Time: 05/04/21 11:15 AM   Result Value Ref Range    Sodium 134 (L) 136 - 145 mmol/L    Potassium 4.2 3.5 - 5.1 mmol/L    Chloride 103 97 - 108 mmol/L    CO2 28 21 - 32 mmol/L    Anion gap 3 (L) 5 - 15 mmol/L    Glucose 116 (H) 65 - 100 mg/dL    BUN 9 6 - 20 mg/dL    Creatinine 0.92 0.55 - 1.02 mg/dL    BUN/Creatinine ratio 10 (L) 12 - 20      GFR est AA >60 >60 ml/min/1.73m2    GFR est non-AA >60 >60 ml/min/1.73m2    Calcium 8.7 8.5 - 10.1 mg/dL   CBC WITH AUTOMATED DIFF    Collection Time: 05/04/21 11:15 AM   Result Value Ref Range    WBC 13.0 (H) 3.6 - 11.0 K/uL    RBC 5.05 3.80 - 5.20 M/uL    HGB 15.4 11.5 - 16.0 g/dL    HCT 46.6 35.0 - 47.0 %    MCV 92.3 80.0 - 99.0 FL    MCH 30.5 26.0 - 34.0 PG    MCHC 33.0 30.0 - 36.5 g/dL    RDW 13.4 11.5 - 14.5 %    PLATELET 918 045 - 657 K/uL    MPV 10.5 8.9 - 12.9 FL    NRBC 0.0 0.0  WBC    ABSOLUTE NRBC 0.00 0.00 - 0.01 K/uL NEUTROPHILS 71 32 - 75 %    LYMPHOCYTES 22 12 - 49 %    MONOCYTES 6 5 - 13 %    EOSINOPHILS 1 0 - 7 %    BASOPHILS 0 0 - 1 %    IMMATURE GRANULOCYTES 0 0 - 0.5 %    ABS. NEUTROPHILS 9.2 (H) 1.8 - 8.0 K/UL    ABS. LYMPHOCYTES 2.8 0.8 - 3.5 K/UL    ABS. MONOCYTES 0.8 0.0 - 1.0 K/UL    ABS. EOSINOPHILS 0.1 0.0 - 0.4 K/UL    ABS. BASOPHILS 0.1 0.0 - 0.1 K/UL    ABS. IMM. GRANS. 0.1 (H) 0.00 - 0.04 K/UL    DF AUTOMATED         Radiologic Studies -   No orders to display     CT Results  (Last 48 hours)    None        CXR Results  (Last 48 hours)    None          Medical Decision Making and ED Course   I am the first provider for this patient. I reviewed the vital signs, available nursing notes, past medical history, past surgical history, family history and social history. Vital Signs-Reviewed the patient's vital signs. Patient Vitals for the past 12 hrs:   Temp Pulse Resp BP SpO2   05/04/21 1032 100 °F (37.8 °C) (!) 115 17 116/80 95 %       EKG interpretation:         Records Reviewed: Previous Hospital chart. EMS run report      ED Course:   Initial assessment performed. The patients presenting problems have been discussed, and they are in agreement with the care plan formulated and outlined with them. I have encouraged them to ask questions as they arise throughout their visit.     Orders Placed This Encounter    METABOLIC PANEL, BASIC     Standing Status:   Standing     Number of Occurrences:   1    URINALYSIS W/ REFLEX CULTURE     Standing Status:   Standing     Number of Occurrences:   1    CBC WITH AUTOMATED DIFF     Standing Status:   Standing     Number of Occurrences:   1    ibuprofen (MOTRIN) tablet 800 mg    clonazePAM (KlonoPIN) tablet 1 mg    ondansetron (ZOFRAN) injection 4 mg    cefTRIAXone (ROCEPHIN) 1 g in sterile water (preservative free) 10 mL IV syringe     Order Specific Question:   Antibiotic Indications     Answer:   Urinary Tract Infection CONSULTANTS:  Consults      Provider Notes (Medical Decision Making):   51-year-old with acute pyelonephritis clinically. Stable vitals. Tolerating p.o. Patient does not warrant admission based on her clinical symptoms. Plan for outpatient treatment with antibiotics symptomatic relief. Close PCP follow-up. Procedures                       Disposition       Emergency Department Disposition:  dc      Diagnosis     Clinical Impression: pyelonephritis    Attestations:    Katherine Sheehan MD    Please note that this dictation was completed with [employer], the computer voice recognition software. Quite often unanticipated grammatical, syntax, homophones, and other interpretive errors are inadvertently transcribed by the computer software. Please disregard these errors. Please excuse any errors that have escaped final proofreading. Thank you.

## 2021-05-04 NOTE — DISCHARGE INSTRUCTIONS
Thank you! Thank you for allowing me to care for you in the emergency department. I sincerely hope that you are satisfied with your visit today. It is my goal to provide you with excellent care. Below you will find a list of your labs and imaging from your visit today. Should you have any questions regarding these results please do not hesitate to call the emergency department.     Labs -     Recent Results (from the past 12 hour(s))   URINALYSIS W/ REFLEX CULTURE    Collection Time: 05/04/21 10:45 AM    Specimen: Urine   Result Value Ref Range    Color Yellow/Straw      Appearance Turbid (A) Clear      Specific gravity 1.011 1.003 - 1.030      pH (UA) 5.0 5.0 - 8.0      Protein Negative Negative mg/dL    Glucose Negative Negative mg/dL    Ketone Negative Negative mg/dL    Bilirubin Negative Negative      Blood Small (A) Negative      Urobilinogen 0.1 0.1 - 1.0 EU/dL    Nitrites Negative Negative      Leukocyte Esterase Small (A) Negative      UA:UC IF INDICATED Urine Culture Ordered (A) Culture not indicated by UA result      WBC 20-50 0 - 4 /hpf    RBC 0-5 0 - 5 /hpf    Bacteria 1+ (A) Negative /hpf    Mucus Trace /lpf   METABOLIC PANEL, BASIC    Collection Time: 05/04/21 11:15 AM   Result Value Ref Range    Sodium 134 (L) 136 - 145 mmol/L    Potassium 4.2 3.5 - 5.1 mmol/L    Chloride 103 97 - 108 mmol/L    CO2 28 21 - 32 mmol/L    Anion gap 3 (L) 5 - 15 mmol/L    Glucose 116 (H) 65 - 100 mg/dL    BUN 9 6 - 20 mg/dL    Creatinine 0.92 0.55 - 1.02 mg/dL    BUN/Creatinine ratio 10 (L) 12 - 20      GFR est AA >60 >60 ml/min/1.73m2    GFR est non-AA >60 >60 ml/min/1.73m2    Calcium 8.7 8.5 - 10.1 mg/dL   CBC WITH AUTOMATED DIFF    Collection Time: 05/04/21 11:15 AM   Result Value Ref Range    WBC 13.0 (H) 3.6 - 11.0 K/uL    RBC 5.05 3.80 - 5.20 M/uL    HGB 15.4 11.5 - 16.0 g/dL    HCT 46.6 35.0 - 47.0 %    MCV 92.3 80.0 - 99.0 FL    MCH 30.5 26.0 - 34.0 PG    MCHC 33.0 30.0 - 36.5 g/dL    RDW 13.4 11.5 - 14.5 % PLATELET 810 212 - 951 K/uL    MPV 10.5 8.9 - 12.9 FL    NRBC 0.0 0.0  WBC    ABSOLUTE NRBC 0.00 0.00 - 0.01 K/uL    NEUTROPHILS 71 32 - 75 %    LYMPHOCYTES 22 12 - 49 %    MONOCYTES 6 5 - 13 %    EOSINOPHILS 1 0 - 7 %    BASOPHILS 0 0 - 1 %    IMMATURE GRANULOCYTES 0 0 - 0.5 %    ABS. NEUTROPHILS 9.2 (H) 1.8 - 8.0 K/UL    ABS. LYMPHOCYTES 2.8 0.8 - 3.5 K/UL    ABS. MONOCYTES 0.8 0.0 - 1.0 K/UL    ABS. EOSINOPHILS 0.1 0.0 - 0.4 K/UL    ABS. BASOPHILS 0.1 0.0 - 0.1 K/UL    ABS. IMM. GRANS. 0.1 (H) 0.00 - 0.04 K/UL    DF AUTOMATED         Radiologic Studies -   No orders to display     CT Results  (Last 48 hours)      None          CXR Results  (Last 48 hours)      None               If you feel that you have not received excellent quality care or timely care, please ask to speak to the nurse manager. Please choose us in the future for your continued health care needs. ------------------------------------------------------------------------------------------------------------  The exam and treatment you received in the Emergency Department were for an urgent problem and are not intended as complete care. It is important that you follow-up with a doctor, nurse practitioner, or physician assistant to:  (1) confirm your diagnosis,  (2) re-evaluation of changes in your illness and treatment, and  (3) for ongoing care. If your symptoms become worse or you do not improve as expected and you are unable to reach your usual health care provider, you should return to the Emergency Department. We are available 24 hours a day. Please take your discharge instructions with you when you go to your follow-up appointment. If you have any problem arranging a follow-up appointment, contact the Emergency Department immediately. If a prescription has been provided, please have it filled as soon as possible to prevent a delay in treatment. Read the entire medication instruction sheet provided to you by the pharmacy. If you have any questions or reservations about taking the medication due to side effects or interactions with other medications, please call your primary care physician or contact the ER to speak with the charge nurse. Make an appointment with your family doctor or the physician you were referred to for follow-up of this visit as instructed on your discharge paperwork, as this is a mandatory follow-up. Return to the ER if you are unable to be seen or if you are unable to be seen in a timely manner. If you have any problem arranging the follow-up visit, contact the Emergency Department immediately.

## 2021-07-12 ENCOUNTER — HOSPITAL ENCOUNTER (EMERGENCY)
Age: 56
Discharge: HOME | End: 2021-07-13
Attending: EMERGENCY MEDICINE
Payer: MEDICARE

## 2021-07-12 DIAGNOSIS — G40.909 SEIZURE DISORDER (HCC): Primary | ICD-10-CM

## 2021-07-12 DIAGNOSIS — G24.01 TARDIVE DYSKINESIA: ICD-10-CM

## 2021-07-12 LAB
ALBUMIN SERPL-MCNC: 3.9 G/DL (ref 3.5–5)
ALBUMIN/GLOB SERPL: 1.1 {RATIO} (ref 1.1–2.2)
ALP SERPL-CCNC: 97 U/L (ref 45–117)
ALT SERPL-CCNC: 32 U/L (ref 12–78)
ANION GAP SERPL CALC-SCNC: 6 MMOL/L (ref 5–15)
AST SERPL W P-5'-P-CCNC: 14 U/L (ref 15–37)
BASOPHILS # BLD: 0.1 K/UL (ref 0–0.1)
BASOPHILS NFR BLD: 1 % (ref 0–1)
BILIRUB SERPL-MCNC: 0.2 MG/DL (ref 0.2–1)
BUN SERPL-MCNC: 19 MG/DL (ref 6–20)
BUN/CREAT SERPL: 18 (ref 12–20)
CA-I BLD-MCNC: 8.9 MG/DL (ref 8.5–10.1)
CHLORIDE SERPL-SCNC: 108 MMOL/L (ref 97–108)
CO2 SERPL-SCNC: 26 MMOL/L (ref 21–32)
CREAT SERPL-MCNC: 1.08 MG/DL (ref 0.55–1.02)
DIFFERENTIAL METHOD BLD: ABNORMAL
EOSINOPHIL # BLD: 0.2 K/UL (ref 0–0.4)
EOSINOPHIL NFR BLD: 1 % (ref 0–7)
ERYTHROCYTE [DISTWIDTH] IN BLOOD BY AUTOMATED COUNT: 12.9 % (ref 11.5–14.5)
GLOBULIN SER CALC-MCNC: 3.6 G/DL (ref 2–4)
GLUCOSE SERPL-MCNC: 134 MG/DL (ref 65–100)
HCT VFR BLD AUTO: 48.8 % (ref 35–47)
HGB BLD-MCNC: 16.5 G/DL (ref 11.5–16)
IMM GRANULOCYTES # BLD AUTO: 0 K/UL (ref 0–0.04)
IMM GRANULOCYTES NFR BLD AUTO: 0 % (ref 0–0.5)
LACTATE SERPL-SCNC: 1.8 MMOL/L (ref 0.4–2)
LYMPHOCYTES # BLD: 6.9 K/UL (ref 0.8–3.5)
LYMPHOCYTES NFR BLD: 54 % (ref 12–49)
MCH RBC QN AUTO: 30.6 PG (ref 26–34)
MCHC RBC AUTO-ENTMCNC: 33.8 G/DL (ref 30–36.5)
MCV RBC AUTO: 90.4 FL (ref 80–99)
MONOCYTES # BLD: 0.5 K/UL (ref 0–1)
MONOCYTES NFR BLD: 4 % (ref 5–13)
NEUTS SEG # BLD: 5.2 K/UL (ref 1.8–8)
NEUTS SEG NFR BLD: 40 % (ref 32–75)
NRBC # BLD: 0 K/UL (ref 0–0.01)
NRBC BLD-RTO: 0 PER 100 WBC
PLATELET # BLD AUTO: 396 K/UL (ref 150–400)
PMV BLD AUTO: 9.5 FL (ref 8.9–12.9)
POTASSIUM SERPL-SCNC: 3.8 MMOL/L (ref 3.5–5.1)
PROT SERPL-MCNC: 7.5 G/DL (ref 6.4–8.2)
RBC # BLD AUTO: 5.4 M/UL (ref 3.8–5.2)
SODIUM SERPL-SCNC: 140 MMOL/L (ref 136–145)
VALPROATE SERPL-MCNC: <50 UG/ML (ref 50–100)
WBC # BLD AUTO: 12.8 K/UL (ref 3.6–11)

## 2021-07-12 PROCEDURE — 96375 TX/PRO/DX INJ NEW DRUG ADDON: CPT

## 2021-07-12 PROCEDURE — 36415 COLL VENOUS BLD VENIPUNCTURE: CPT

## 2021-07-12 PROCEDURE — 96374 THER/PROPH/DIAG INJ IV PUSH: CPT

## 2021-07-12 PROCEDURE — 93005 ELECTROCARDIOGRAM TRACING: CPT

## 2021-07-12 PROCEDURE — 83605 ASSAY OF LACTIC ACID: CPT

## 2021-07-12 PROCEDURE — 99285 EMERGENCY DEPT VISIT HI MDM: CPT

## 2021-07-12 PROCEDURE — 80053 COMPREHEN METABOLIC PANEL: CPT

## 2021-07-12 PROCEDURE — 80164 ASSAY DIPROPYLACETIC ACD TOT: CPT

## 2021-07-12 PROCEDURE — 74011250636 HC RX REV CODE- 250/636: Performed by: EMERGENCY MEDICINE

## 2021-07-12 PROCEDURE — 85025 COMPLETE CBC W/AUTO DIFF WBC: CPT

## 2021-07-12 RX ORDER — LORAZEPAM 2 MG/ML
1 INJECTION INTRAMUSCULAR
Status: COMPLETED | OUTPATIENT
Start: 2021-07-12 | End: 2021-07-12

## 2021-07-12 RX ORDER — LEVETIRACETAM 10 MG/ML
1000 INJECTION INTRAVASCULAR EVERY 12 HOURS
Status: DISCONTINUED | OUTPATIENT
Start: 2021-07-12 | End: 2021-07-13 | Stop reason: HOSPADM

## 2021-07-12 RX ORDER — LORAZEPAM 2 MG/ML
1 INJECTION INTRAMUSCULAR
Status: DISCONTINUED | OUTPATIENT
Start: 2021-07-12 | End: 2021-07-12

## 2021-07-12 RX ORDER — DIPHENHYDRAMINE HYDROCHLORIDE 50 MG/ML
25 INJECTION, SOLUTION INTRAMUSCULAR; INTRAVENOUS
Status: COMPLETED | OUTPATIENT
Start: 2021-07-12 | End: 2021-07-12

## 2021-07-12 RX ORDER — LEVETIRACETAM 10 MG/ML
1000 INJECTION INTRAVASCULAR EVERY 12 HOURS
Status: DISCONTINUED | OUTPATIENT
Start: 2021-07-12 | End: 2021-07-12

## 2021-07-12 RX ADMIN — DIPHENHYDRAMINE HYDROCHLORIDE 25 MG: 50 INJECTION INTRAMUSCULAR; INTRAVENOUS at 22:27

## 2021-07-12 RX ADMIN — LORAZEPAM 1 MG: 2 INJECTION INTRAMUSCULAR; INTRAVENOUS at 23:22

## 2021-07-13 VITALS
OXYGEN SATURATION: 97 % | DIASTOLIC BLOOD PRESSURE: 76 MMHG | HEART RATE: 82 BPM | RESPIRATION RATE: 18 BRPM | TEMPERATURE: 98.2 F | SYSTOLIC BLOOD PRESSURE: 111 MMHG

## 2021-07-13 LAB
ATRIAL RATE: 99 BPM
CALCULATED P AXIS, ECG09: 62 DEGREES
CALCULATED R AXIS, ECG10: 51 DEGREES
CALCULATED T AXIS, ECG11: 60 DEGREES
DIAGNOSIS, 93000: NORMAL
P-R INTERVAL, ECG05: 132 MS
Q-T INTERVAL, ECG07: 340 MS
QRS DURATION, ECG06: 70 MS
QTC CALCULATION (BEZET), ECG08: 436 MS
VENTRICULAR RATE, ECG03: 99 BPM

## 2021-07-13 PROCEDURE — 74011250636 HC RX REV CODE- 250/636: Performed by: EMERGENCY MEDICINE

## 2021-07-13 PROCEDURE — 74011250637 HC RX REV CODE- 250/637: Performed by: EMERGENCY MEDICINE

## 2021-07-13 RX ORDER — DIVALPROEX SODIUM 125 MG/1
500 CAPSULE, COATED PELLETS ORAL
Status: COMPLETED | OUTPATIENT
Start: 2021-07-13 | End: 2021-07-13

## 2021-07-13 RX ADMIN — DIVALPROEX SODIUM 500 MG: 125 CAPSULE, COATED PELLETS ORAL at 02:30

## 2021-07-13 RX ADMIN — LEVETIRACETAM 1000 MG: 10 INJECTION INTRAVENOUS at 00:06

## 2021-07-13 NOTE — ED NOTES
Pt reporting major relief after administration of 1mg IV ativan. Pt no longer showing any signs of seizure like activity. Pt resting comfortably in bed reporting no complaints or concerns at this time. Pt stating that ativan was a part of her seizure protocol in the past and is requesting a prescription for it at home until she is able to follow up with her neurologist. MD notified at this time.

## 2021-07-13 NOTE — ED NOTES
Bedside shift change report given to [address] [facility]  (oncoming nurse) by Cristóbal Redmond RN  (offgoing nurse). Report included the following information SBAR and ED Summary.

## 2021-07-13 NOTE — ED PROVIDER NOTES
EMERGENCY DEPARTMENT HISTORY AND PHYSICAL EXAM      Date: 7/12/2021  Patient Name: Madeline Turk      History of Presenting Illness     Chief Complaint   Patient presents with    Seizure       History Provided By: Patient    HPI: Madeline Turk, 64 y.o. female with a past medical history significant seizure and Fatigue, psych disorder. presents to the ED with cc of abnormal movement and twitching of her body or possible hours. Patient is on psych medicine. Patient also has history of seizure. Patient denies any headache, weakness, blurred vision, slurred speech. No chest pain or shortness of breath. No other complaints. There are no other complaints, changes, or physical findings at this time. PCP: None    Current Facility-Administered Medications   Medication Dose Route Frequency Provider Last Rate Last Admin    divalproex (DEPAKOTE SPRINKLE) capsule 500 mg  500 mg Oral NOW Dina Lang, DO        levETIRAcetam in saline (iso-os) (KEPPRA) infusion 1,000 mg  1,000 mg IntraVENous Q12H Dina Lang H, DO   1,000 mg at 07/13/21 0006     Current Outpatient Medications   Medication Sig Dispense Refill    promethazine (PHENERGAN) 25 mg tablet Take 1 Tab by mouth every six (6) hours as needed for Nausea. 12 Tab 0    dicyclomine (BENTYL) 10 mg capsule Take 1 Cap by mouth three (3) times daily. 20 Cap 0    promethazine (PHENERGAN) 25 mg tablet Take 1 Tab by mouth every six (6) hours as needed (nausea). 12 Tab 0    amoxicillin 500 mg tab Take 1,000 mg by mouth three (3) times daily. 30 Tab 0    albuterol (ProAir HFA) 90 mcg/actuation inhaler Take 1 Puff by inhalation every four (4) hours as needed for Wheezing. 1 Inhaler 0    sucralfate (Carafate) 100 mg/mL suspension Take 5 mL by mouth four (4) times daily. 414 mL 0    clonazePAM (KLONOPIN) 1 mg tablet Take  by mouth three (3) times daily.  gabapentin (NEURONTIN) 600 mg tablet Take  by mouth four (4) times daily.       QUEtiapine (SEROQUEL) 50 mg tablet Take 50 mg by mouth daily.  dextroamphetamine-amphetamine (ADDERALL) 30 mg tablet Take 30 mg by mouth two (2) times a day.  QUEtiapine (SEROQUEL) 400 mg tablet Take 400 mg by mouth nightly.  estradiol (CLIMARA) 0.05 mg/24 hr 1 Patch by TransDERmal route two (2) days a week.  levothyroxine (SYNTHROID) 50 mcg tablet Take 50 mcg by mouth Daily (before breakfast).  buprenorphine-naloxone (SUBOXONE) 8-2 mg subl 1 Tab by SubLINGual route daily.  lurasidone (LATUDA) 80 mg tab tablet Take 80 mg by mouth nightly.  cholecalciferol, VITAMIN D3, (VITAMIN D3) 5,000 unit tab tablet Take 5,000 Units by mouth daily.  ferrous sulfate (IRON) 325 mg (65 mg iron) tablet Take 65 mg by mouth Daily (before breakfast). Past History     Past Medical History:  Past Medical History:   Diagnosis Date    Fatigue     GERD (gastroesophageal reflux disease)     Hypothyroidism     Psychiatric disorder     PTSD per patient    Seizures ([facility] [address]        Past Surgical History:  Past Surgical History:   Procedure Laterality Date    HX APPENDECTOMY      HX  SECTION      HX CHOLECYSTECTOMY      HX HYSTERECTOMY         Family History:  Family History   Family history unknown: Yes       Social History:  Social History     Tobacco Use    Smoking status: Current Every Day Smoker     Packs/day: 0.50    Smokeless tobacco: Never Used   Substance Use Topics    Alcohol use: No     Alcohol/week: 0.0 standard drinks    Drug use: Never       Allergies: Allergies   Allergen Reactions    Augmentin [Amoxicillin-Pot Clavulanate] Nausea and Vomiting         Review of Systems     Review of Systems   Constitutional: Negative. HENT: Negative. Eyes: Negative. Respiratory: Negative. Cardiovascular: Negative. Gastrointestinal: Negative. Endocrine: Negative. Genitourinary: Negative. Musculoskeletal: Negative. Skin: Negative.     Allergic/Immunologic: Negative. Neurological: Positive for seizures. Hematological: Negative. Psychiatric/Behavioral: Negative. All other systems reviewed and are negative. Physical Exam     Physical Exam  Vitals and nursing note reviewed. Constitutional:       General: She is not in acute distress. Appearance: Normal appearance. She is normal weight. She is not ill-appearing, toxic-appearing or diaphoretic. HENT:      Head: Normocephalic and atraumatic. Right Ear: Tympanic membrane and ear canal normal.      Left Ear: Tympanic membrane and ear canal normal.      Nose: Nose normal. No congestion or rhinorrhea. Mouth/Throat:      Pharynx: Oropharynx is clear. No oropharyngeal exudate or posterior oropharyngeal erythema. Eyes:      General: No scleral icterus. Right eye: No discharge. Left eye: No discharge. Extraocular Movements: Extraocular movements intact. Conjunctiva/sclera: Conjunctivae normal.      Pupils: Pupils are equal, round, and reactive to light. Cardiovascular:      Rate and Rhythm: Normal rate and regular rhythm. Pulses: Normal pulses. Heart sounds: Normal heart sounds. Pulmonary:      Effort: Pulmonary effort is normal. No respiratory distress. Breath sounds: Normal breath sounds. No stridor. No wheezing, rhonchi or rales. Chest:      Chest wall: No tenderness. Abdominal:      General: Abdomen is flat. Bowel sounds are normal. There is no distension. Palpations: Abdomen is soft. Tenderness: There is no abdominal tenderness. There is no right CVA tenderness, left CVA tenderness, guarding or rebound. Musculoskeletal:         General: No swelling, tenderness, deformity or signs of injury. Normal range of motion. Cervical back: Normal range of motion and neck supple. No rigidity or tenderness. Right lower leg: No edema. Left lower leg: No edema. Lymphadenopathy:      Cervical: No cervical adenopathy.    Skin: General: Skin is warm and dry. Coloration: Skin is not jaundiced or pale. Findings: No bruising, erythema, lesion or rash. Neurological:      General: No focal deficit present. Mental Status: She is alert and oriented to person, place, and time. Mental status is at baseline. Cranial Nerves: No cranial nerve deficit. Sensory: No sensory deficit. Motor: No weakness. Coordination: Coordination normal.      Gait: Gait normal.      Comments: Patient has extrapyramidal twitching syndrome. She is talking to me while she is having this abnormal movement. I believe this could be tardive dyskinesia due to the psych medicine she is taking. Psychiatric:         Thought Content: Thought content normal.         Judgment: Judgment normal.      Comments: Patient is slightly anxious and depressed. Lab and Diagnostic Study Results     Labs -     Recent Results (from the past 12 hour(s))   CBC WITH AUTOMATED DIFF    Collection Time: 07/12/21  8:30 PM   Result Value Ref Range    WBC 12.8 (H) 3.6 - 11.0 K/uL    RBC 5.40 (H) 3.80 - 5.20 M/uL    HGB 16.5 (H) 11.5 - 16.0 g/dL    HCT 48.8 (H) 35.0 - 47.0 %    MCV 90.4 80.0 - 99.0 FL    MCH 30.6 26.0 - 34.0 PG    MCHC 33.8 30.0 - 36.5 g/dL    RDW 12.9 11.5 - 14.5 %    PLATELET 170 388 - 274 K/uL    MPV 9.5 8.9 - 12.9 FL    NRBC 0.0 0.0  WBC    ABSOLUTE NRBC 0.00 0.00 - 0.01 K/uL    NEUTROPHILS 40 32 - 75 %    LYMPHOCYTES 54 (H) 12 - 49 %    MONOCYTES 4 (L) 5 - 13 %    EOSINOPHILS 1 0 - 7 %    BASOPHILS 1 0 - 1 %    IMMATURE GRANULOCYTES 0 0 - 0.5 %    ABS. NEUTROPHILS 5.2 1.8 - 8.0 K/UL    ABS. LYMPHOCYTES 6.9 (H) 0.8 - 3.5 K/UL    ABS. MONOCYTES 0.5 0.0 - 1.0 K/UL    ABS. EOSINOPHILS 0.2 0.0 - 0.4 K/UL    ABS. BASOPHILS 0.1 0.0 - 0.1 K/UL    ABS. IMM.  GRANS. 0.0 0.00 - 0.04 K/UL    DF AUTOMATED     METABOLIC PANEL, COMPREHENSIVE    Collection Time: 07/12/21  8:30 PM   Result Value Ref Range    Sodium 140 136 - 145 mmol/L    Potassium 3.8 3.5 - 5.1 mmol/L    Chloride 108 97 - 108 mmol/L    CO2 26 21 - 32 mmol/L    Anion gap 6 5 - 15 mmol/L    Glucose 134 (H) 65 - 100 mg/dL    BUN 19 6 - 20 mg/dL    Creatinine 1.08 (H) 0.55 - 1.02 mg/dL    BUN/Creatinine ratio 18 12 - 20      GFR est AA >60 >60 ml/min/1.73m2    GFR est non-AA 52 (L) >60 ml/min/1.73m2    Calcium 8.9 8.5 - 10.1 mg/dL    Bilirubin, total 0.2 0.2 - 1.0 mg/dL    AST (SGOT) 14 (L) 15 - 37 U/L    ALT (SGPT) 32 12 - 78 U/L    Alk. phosphatase 97 45 - 117 U/L    Protein, total 7.5 6.4 - 8.2 g/dL    Albumin 3.9 3.5 - 5.0 g/dL    Globulin 3.6 2.0 - 4.0 g/dL    A-G Ratio 1.1 1.1 - 2.2     LACTIC ACID    Collection Time: 07/12/21  8:30 PM   Result Value Ref Range    Lactic acid 1.8 0.4 - 2.0 mmol/L   VALPROIC ACID    Collection Time: 07/12/21  9:40 PM   Result Value Ref Range    Valproic acid <50 (L) 50 - 100 ug/mL       Radiologic Studies -   [unfilled]  CT Results  (Last 48 hours)    None        CXR Results  (Last 48 hours)    None          Medical Decision Making and ED Course   - I am the first and primary provider for this patient AND AM THE PRIMARY PROVIDER OF RECORD. - I reviewed the vital signs, available nursing notes, past medical history, past surgical history, family history and social history. - Initial assessment performed. The patients presenting problems have been discussed, and the staff are in agreement with the care plan formulated and outlined with them. I have encouraged them to ask questions as they arise throughout their visit. Vital Signs-Reviewed the patient's vital signs. Patient Vitals for the past 12 hrs:   Temp Pulse Resp BP SpO2   07/13/21 0013 -- 82 19 113/74 98 %   07/12/21 2326 -- 76 18 (!) 125/92 97 %   07/12/21 2230 -- 78 22 113/87 98 %   07/12/21 2133 98.2 °F (36.8 °C) -- -- -- --   07/12/21 2012 -- 94 20 132/80 98 %       EKG interpretation: (Preliminary): EKG was done at 8:16 PM.  Normal sinus rhythm. Rate of 99. Normal axis.   No acute ST-T elevation. No STEMI. No old EKG available for comparison at this time. Records Reviewed: Nursing Notes and Ambulance Run Sheet      ED Course:              Provider Notes (Medical Decision Making):     MDM  Number of Diagnoses or Management Options  Seizure disorder [facility]): new, needed workup  Tardive dyskinesia: new, needed workup  Diagnosis management comments: Patient diagnoses include tardive dyskinesia, psych medicine disorder, seizure, seizure disorder, hyponatremia. Amount and/or Complexity of Data Reviewed  Clinical lab tests: ordered and reviewed  Tests in the medicine section of CPT®: reviewed and ordered  Independent visualization of images, tracings, or specimens: yes (EKG was done at 8:16 PM.  Normal sinus rhythm. Rate of 99. Normal axis. No acute ST-T elevation. No STEMI. No old EKG available for comparison at this time.)    Risk of Complications, Morbidity, and/or Mortality  Presenting problems: high  Diagnostic procedures: high  Management options: high  General comments: Patient remained stable throughout the course of treatment. Labs were essentially unremarkable. Patient initially was given Benadryl with minimal relief. The decision was made to give patient Ativan and Keppra. Patient takes Depakote for seizure. Depakote level was low. Depakote p.o. was given. Will discharge patient home to follow with the PCP. Case discussed with patient. She understood and agreed with her management. Consultations:       Consultations:         Procedures and Critical Care         NOTES   :  I have spent critical care time involved in lab review, consultations with specialist, family decision- making, bedside attention and documentation. This time excludes time spent in any separate billed procedures. During this entire length of time I was immediately available to the patient .     Madeleine Lozano DO        Disposition     Disposition: Condition stable and improved    Discharged    Remove if not discharged  DISCHARGE PLAN:  1. Current Discharge Medication List      CONTINUE these medications which have NOT CHANGED    Details   ! ! promethazine (PHENERGAN) 25 mg tablet Take 1 Tab by mouth every six (6) hours as needed for Nausea. Qty: 12 Tab, Refills: 0      dicyclomine (BENTYL) 10 mg capsule Take 1 Cap by mouth three (3) times daily. Qty: 20 Cap, Refills: 0      !! promethazine (PHENERGAN) 25 mg tablet Take 1 Tab by mouth every six (6) hours as needed (nausea). Qty: 12 Tab, Refills: 0      amoxicillin 500 mg tab Take 1,000 mg by mouth three (3) times daily. Qty: 30 Tab, Refills: 0      albuterol (ProAir HFA) 90 mcg/actuation inhaler Take 1 Puff by inhalation every four (4) hours as needed for Wheezing. Qty: 1 Inhaler, Refills: 0      sucralfate (Carafate) 100 mg/mL suspension Take 5 mL by mouth four (4) times daily. Qty: 414 mL, Refills: 0      clonazePAM (KLONOPIN) 1 mg tablet Take  by mouth three (3) times daily. Associated Diagnoses: Idiopathic hypotension; Hypothyroidism due to acquired atrophy of thyroid; Anemia due to vitamin B12 deficiency; Dizziness; Arthralgia      gabapentin (NEURONTIN) 600 mg tablet Take  by mouth four (4) times daily. Associated Diagnoses: Idiopathic hypotension; Hypothyroidism due to acquired atrophy of thyroid; Anemia due to vitamin B12 deficiency; Dizziness; Arthralgia      !! QUEtiapine (SEROQUEL) 50 mg tablet Take 50 mg by mouth daily. Associated Diagnoses: Idiopathic hypotension; Hypothyroidism due to acquired atrophy of thyroid; Anemia due to vitamin B12 deficiency; Dizziness; Arthralgia      dextroamphetamine-amphetamine (ADDERALL) 30 mg tablet Take 30 mg by mouth two (2) times a day. Associated Diagnoses: Idiopathic hypotension; Hypothyroidism due to acquired atrophy of thyroid; Anemia due to vitamin B12 deficiency; Dizziness; Arthralgia      !! QUEtiapine (SEROQUEL) 400 mg tablet Take 400 mg by mouth nightly. Associated Diagnoses: Idiopathic hypotension; Hypothyroidism due to acquired atrophy of thyroid; Anemia due to vitamin B12 deficiency; Dizziness; Arthralgia      estradiol (CLIMARA) 0.05 mg/24 hr 1 Patch by TransDERmal route two (2) days a week. Associated Diagnoses: Idiopathic hypotension; Hypothyroidism due to acquired atrophy of thyroid; Anemia due to vitamin B12 deficiency; Dizziness; Arthralgia      levothyroxine (SYNTHROID) 50 mcg tablet Take 50 mcg by mouth Daily (before breakfast). Associated Diagnoses: Idiopathic hypotension; Hypothyroidism due to acquired atrophy of thyroid; Anemia due to vitamin B12 deficiency; Dizziness; Arthralgia      buprenorphine-naloxone (SUBOXONE) 8-2 mg subl 1 Tab by SubLINGual route daily. Associated Diagnoses: Idiopathic hypotension; Hypothyroidism due to acquired atrophy of thyroid; Anemia due to vitamin B12 deficiency; Dizziness; Arthralgia      lurasidone (LATUDA) 80 mg tab tablet Take 80 mg by mouth nightly. Associated Diagnoses: Idiopathic hypotension; Hypothyroidism due to acquired atrophy of thyroid; Anemia due to vitamin B12 deficiency; Dizziness; Arthralgia      cholecalciferol, VITAMIN D3, (VITAMIN D3) 5,000 unit tab tablet Take 5,000 Units by mouth daily. Associated Diagnoses: Idiopathic hypotension; Hypothyroidism due to acquired atrophy of thyroid; Anemia due to vitamin B12 deficiency; Dizziness; Arthralgia      ferrous sulfate (IRON) 325 mg (65 mg iron) tablet Take 65 mg by mouth Daily (before breakfast). Associated Diagnoses: Idiopathic hypotension; Hypothyroidism due to acquired atrophy of thyroid; Anemia due to vitamin B12 deficiency; Dizziness; Arthralgia       !! - Potential duplicate medications found. Please discuss with provider.         2.   Follow-up Information     Follow up With Specialties Details Why [address]  Schedule an appointment as soon as possible for a visit in 1 day  [facility] [address] [facility]  [phone]        3. Return to ED if worse   4. Current Discharge Medication List          Diagnosis     Clinical Impression:   1. Seizure disorder ([address]    2. Tardive dyskinesia        Attestations:    Madeline Juares, DO    Please note that this dictation was completed with [employer], the computer voice recognition software. Quite often unanticipated grammatical, syntax, homophones, and other interpretive errors are inadvertently transcribed by the computer software. Please disregard these errors. Please excuse any errors that have escaped final proofreading. Thank you.

## 2021-07-13 NOTE — ED TRIAGE NOTES
Patient states she had a seizure earlier today, and she also had a syncopal episode; hx of seizures; pt states currently she can't stop shaking and she feels like her heart is racing; denies chest pain or SOB; pt actively twitching, able to talk in full sentences; pt states she is also having a headache

## 2021-08-06 ENCOUNTER — APPOINTMENT (OUTPATIENT)
Dept: GENERAL RADIOLOGY | Age: 56
DRG: 641 | End: 2021-08-06
Attending: STUDENT IN AN ORGANIZED HEALTH CARE EDUCATION/TRAINING PROGRAM
Payer: MEDICARE

## 2021-08-06 ENCOUNTER — HOSPITAL ENCOUNTER (EMERGENCY)
Age: 56
Discharge: HOME | DRG: 641 | End: 2021-08-06
Attending: EMERGENCY MEDICINE
Payer: MEDICARE

## 2021-08-06 ENCOUNTER — APPOINTMENT (OUTPATIENT)
Dept: CT IMAGING | Age: 56
DRG: 641 | End: 2021-08-06
Attending: EMERGENCY MEDICINE
Payer: MEDICARE

## 2021-08-06 VITALS
SYSTOLIC BLOOD PRESSURE: 124 MMHG | HEIGHT: 68 IN | RESPIRATION RATE: 22 BRPM | BODY MASS INDEX: 25.76 KG/M2 | OXYGEN SATURATION: 98 % | HEART RATE: 90 BPM | TEMPERATURE: 98.2 F | DIASTOLIC BLOOD PRESSURE: 71 MMHG | WEIGHT: 170 LBS

## 2021-08-06 DIAGNOSIS — T40.2X1A OPIOID OVERDOSE, ACCIDENTAL OR UNINTENTIONAL, INITIAL ENCOUNTER (HCC): Primary | ICD-10-CM

## 2021-08-06 LAB
ALBUMIN SERPL-MCNC: 3.3 G/DL (ref 3.5–5)
ALBUMIN/GLOB SERPL: 0.8 {RATIO} (ref 1.1–2.2)
ALP SERPL-CCNC: 87 U/L (ref 45–117)
ALT SERPL-CCNC: 29 U/L (ref 12–78)
AMPHET UR QL SCN: POSITIVE
ANION GAP SERPL CALC-SCNC: 9 MMOL/L (ref 5–15)
APAP SERPL-MCNC: <10 UG/ML (ref 10–30)
APPEARANCE UR: ABNORMAL
AST SERPL W P-5'-P-CCNC: 11 U/L (ref 15–37)
AST SERPL W P-5'-P-CCNC: ABNORMAL U/L (ref 15–37)
ATRIAL RATE: 82 BPM
BACTERIA URNS QL MICRO: ABNORMAL /HPF
BARBITURATES UR QL SCN: NEGATIVE
BASOPHILS # BLD: 0.1 K/UL (ref 0–0.1)
BASOPHILS NFR BLD: 1 % (ref 0–1)
BENZODIAZ UR QL: NEGATIVE
BILIRUB SERPL-MCNC: 0.5 MG/DL (ref 0.2–1)
BILIRUB UR QL: NEGATIVE
BNP SERPL-MCNC: 58 PG/ML
BUN SERPL-MCNC: 14 MG/DL (ref 6–20)
BUN/CREAT SERPL: 11 (ref 12–20)
CA-I BLD-MCNC: 8.8 MG/DL (ref 8.5–10.1)
CALCULATED P AXIS, ECG09: 40 DEGREES
CALCULATED R AXIS, ECG10: 36 DEGREES
CALCULATED T AXIS, ECG11: 64 DEGREES
CANNABINOIDS UR QL SCN: POSITIVE
CHLORIDE SERPL-SCNC: 104 MMOL/L (ref 97–108)
CO2 SERPL-SCNC: 25 MMOL/L (ref 21–32)
COCAINE UR QL SCN: NEGATIVE
COLOR UR: ABNORMAL
CREAT SERPL-MCNC: 1.24 MG/DL (ref 0.55–1.02)
DIAGNOSIS, 93000: NORMAL
DIFFERENTIAL METHOD BLD: ABNORMAL
DRUG SCRN COMMENT,DRGCM: ABNORMAL
EOSINOPHIL # BLD: 0.1 K/UL (ref 0–0.4)
EOSINOPHIL NFR BLD: 1 % (ref 0–7)
ERYTHROCYTE [DISTWIDTH] IN BLOOD BY AUTOMATED COUNT: 12.7 % (ref 11.5–14.5)
ETHANOL SERPL-MCNC: <4 MG/DL
GLOBULIN SER CALC-MCNC: 3.9 G/DL (ref 2–4)
GLUCOSE SERPL-MCNC: 146 MG/DL (ref 65–100)
GLUCOSE UR STRIP.AUTO-MCNC: NEGATIVE MG/DL
HCT VFR BLD AUTO: 45.6 % (ref 35–47)
HGB BLD-MCNC: 15.6 G/DL (ref 11.5–16)
HGB UR QL STRIP: NEGATIVE
IMM GRANULOCYTES # BLD AUTO: 0 K/UL (ref 0–0.04)
IMM GRANULOCYTES NFR BLD AUTO: 0 % (ref 0–0.5)
KETONES UR QL STRIP.AUTO: NEGATIVE MG/DL
LEUKOCYTE ESTERASE UR QL STRIP.AUTO: NEGATIVE
LYMPHOCYTES # BLD: 3.6 K/UL (ref 0.8–3.5)
LYMPHOCYTES NFR BLD: 24 % (ref 12–49)
MCH RBC QN AUTO: 30.5 PG (ref 26–34)
MCHC RBC AUTO-ENTMCNC: 34.2 G/DL (ref 30–36.5)
MCV RBC AUTO: 89.1 FL (ref 80–99)
METHADONE UR QL: NEGATIVE
MONOCYTES # BLD: 0.8 K/UL (ref 0–1)
MONOCYTES NFR BLD: 5 % (ref 5–13)
MUCOUS THREADS URNS QL MICRO: ABNORMAL /LPF
NEUTS SEG # BLD: 10.4 K/UL (ref 1.8–8)
NEUTS SEG NFR BLD: 69 % (ref 32–75)
NITRITE UR QL STRIP.AUTO: NEGATIVE
NRBC # BLD: 0 K/UL (ref 0–0.01)
NRBC BLD-RTO: 0 PER 100 WBC
OPIATES UR QL: NEGATIVE
P-R INTERVAL, ECG05: 140 MS
PCP UR QL: NEGATIVE
PH UR STRIP: 5 [PH] (ref 5–8)
PLATELET # BLD AUTO: 393 K/UL (ref 150–400)
PMV BLD AUTO: 10.1 FL (ref 8.9–12.9)
POTASSIUM SERPL-SCNC: 3.8 MMOL/L (ref 3.5–5.1)
POTASSIUM SERPL-SCNC: ABNORMAL MMOL/L (ref 3.5–5.1)
PROT SERPL-MCNC: 7.2 G/DL (ref 6.4–8.2)
PROT UR STRIP-MCNC: 100 MG/DL
Q-T INTERVAL, ECG07: 390 MS
QRS DURATION, ECG06: 82 MS
QTC CALCULATION (BEZET), ECG08: 455 MS
RBC # BLD AUTO: 5.12 M/UL (ref 3.8–5.2)
RBC #/AREA URNS HPF: ABNORMAL /HPF (ref 0–5)
SALICYLATES SERPL-MCNC: 3.1 MG/DL (ref 2.8–20)
SODIUM SERPL-SCNC: 138 MMOL/L (ref 136–145)
SP GR UR REFRACTOMETRY: 1.02 (ref 1–1.03)
TROPONIN I SERPL-MCNC: <0.05 NG/ML
UROBILINOGEN UR QL STRIP.AUTO: 0.1 EU/DL (ref 0.1–1)
VENTRICULAR RATE, ECG03: 82 BPM
WBC # BLD AUTO: 14.9 K/UL (ref 3.6–11)
WBC URNS QL MICRO: ABNORMAL /HPF (ref 0–4)

## 2021-08-06 PROCEDURE — 85025 COMPLETE CBC W/AUTO DIFF WBC: CPT

## 2021-08-06 PROCEDURE — 99285 EMERGENCY DEPT VISIT HI MDM: CPT

## 2021-08-06 PROCEDURE — 84484 ASSAY OF TROPONIN QUANT: CPT

## 2021-08-06 PROCEDURE — 80307 DRUG TEST PRSMV CHEM ANLYZR: CPT

## 2021-08-06 PROCEDURE — 96376 TX/PRO/DX INJ SAME DRUG ADON: CPT

## 2021-08-06 PROCEDURE — 83880 ASSAY OF NATRIURETIC PEPTIDE: CPT

## 2021-08-06 PROCEDURE — 82077 ASSAY SPEC XCP UR&BREATH IA: CPT

## 2021-08-06 PROCEDURE — 81001 URINALYSIS AUTO W/SCOPE: CPT

## 2021-08-06 PROCEDURE — 80143 DRUG ASSAY ACETAMINOPHEN: CPT

## 2021-08-06 PROCEDURE — 71045 X-RAY EXAM CHEST 1 VIEW: CPT

## 2021-08-06 PROCEDURE — 70450 CT HEAD/BRAIN W/O DYE: CPT

## 2021-08-06 PROCEDURE — 84450 TRANSFERASE (AST) (SGOT): CPT

## 2021-08-06 PROCEDURE — 96374 THER/PROPH/DIAG INJ IV PUSH: CPT

## 2021-08-06 PROCEDURE — 36415 COLL VENOUS BLD VENIPUNCTURE: CPT

## 2021-08-06 PROCEDURE — 80179 DRUG ASSAY SALICYLATE: CPT

## 2021-08-06 PROCEDURE — 74011250636 HC RX REV CODE- 250/636: Performed by: EMERGENCY MEDICINE

## 2021-08-06 PROCEDURE — 96375 TX/PRO/DX INJ NEW DRUG ADDON: CPT

## 2021-08-06 PROCEDURE — 93005 ELECTROCARDIOGRAM TRACING: CPT

## 2021-08-06 PROCEDURE — 84132 ASSAY OF SERUM POTASSIUM: CPT

## 2021-08-06 RX ORDER — NALOXONE HYDROCHLORIDE 4 MG/.1ML
SPRAY NASAL
Qty: 2 EACH | Refills: 0 | Status: SHIPPED | OUTPATIENT
Start: 2021-08-06 | End: 2022-03-28

## 2021-08-06 RX ORDER — NALOXONE HYDROCHLORIDE 1 MG/ML
1 INJECTION INTRAMUSCULAR; INTRAVENOUS; SUBCUTANEOUS
Status: COMPLETED | OUTPATIENT
Start: 2021-08-06 | End: 2021-08-06

## 2021-08-06 RX ORDER — LORAZEPAM 2 MG/ML
1 INJECTION INTRAMUSCULAR ONCE
Status: COMPLETED | OUTPATIENT
Start: 2021-08-06 | End: 2021-08-06

## 2021-08-06 RX ORDER — ONDANSETRON 2 MG/ML
4 INJECTION INTRAMUSCULAR; INTRAVENOUS
Status: COMPLETED | OUTPATIENT
Start: 2021-08-06 | End: 2021-08-06

## 2021-08-06 RX ADMIN — ONDANSETRON 4 MG: 2 INJECTION INTRAMUSCULAR; INTRAVENOUS at 15:53

## 2021-08-06 RX ADMIN — SODIUM CHLORIDE 1000 ML: 9 INJECTION, SOLUTION INTRAVENOUS at 13:51

## 2021-08-06 RX ADMIN — NALOXONE HYDROCHLORIDE 1 MG: 1 INJECTION PARENTERAL at 11:49

## 2021-08-06 RX ADMIN — LORAZEPAM 1 MG: 2 INJECTION INTRAMUSCULAR; INTRAVENOUS at 13:03

## 2021-08-06 RX ADMIN — ONDANSETRON 4 MG: 2 INJECTION INTRAMUSCULAR; INTRAVENOUS at 11:52

## 2021-08-06 NOTE — ED TRIAGE NOTES
EMS stated pt's  called and said that pt was lethargic and not acting her self. Pt has history of Opioid and Gabapentin abuse. EMS started IV and gave 1 mg of narcan.

## 2021-08-06 NOTE — ED NOTES
Pt unresponsive snoring resp and drooling . narca given at [phone].  Pt more awake and responsive denies ingesting medications

## 2021-08-06 NOTE — ED PROVIDER NOTES
EMERGENCY DEPARTMENT HISTORY AND PHYSICAL EXAM        Date: 8/6/2021  Patient Name: Sera Vitale    History of Presenting Illness     Chief Complaint   Patient presents with    Drug Overdose        History Provided By: Patient, ems    HPI: Sera Vitale, 64 y.o. female with history of GERD, hypothyroidism, seizures, and PTSD who presents with altered mental status. Patient states that this morning she felt tired and fatigued. She felt well before going to bed. Her  noticed that she was lethargic and slurring her speech. She was acting abnormally. He called EMS. EMS gave patient 1 mg of Narcan which did seem to help with her drowsiness and she was able to talk clearly. Patient denies any change in her medications however does take opioids and gabapentin. Denies any suicidal ideation. She states that she did not overdose on purpose. Denies any homicidal ideation. Now she is feeling well except that she is anxious. PCP: None    Current Outpatient Medications   Medication Sig Dispense Refill    promethazine (PHENERGAN) 25 mg tablet Take 1 Tab by mouth every six (6) hours as needed for Nausea. 12 Tab 0    dicyclomine (BENTYL) 10 mg capsule Take 1 Cap by mouth three (3) times daily. 20 Cap 0    promethazine (PHENERGAN) 25 mg tablet Take 1 Tab by mouth every six (6) hours as needed (nausea). 12 Tab 0    amoxicillin 500 mg tab Take 1,000 mg by mouth three (3) times daily. 30 Tab 0    albuterol (ProAir HFA) 90 mcg/actuation inhaler Take 1 Puff by inhalation every four (4) hours as needed for Wheezing. 1 Inhaler 0    sucralfate (Carafate) 100 mg/mL suspension Take 5 mL by mouth four (4) times daily. 414 mL 0    clonazePAM (KLONOPIN) 1 mg tablet Take  by mouth three (3) times daily.  gabapentin (NEURONTIN) 600 mg tablet Take  by mouth four (4) times daily.  QUEtiapine (SEROQUEL) 50 mg tablet Take 50 mg by mouth daily.       dextroamphetamine-amphetamine (ADDERALL) 30 mg tablet Take 30 mg by mouth two (2) times a day.  QUEtiapine (SEROQUEL) 400 mg tablet Take 400 mg by mouth nightly.  estradiol (CLIMARA) 0.05 mg/24 hr 1 Patch by TransDERmal route two (2) days a week.  levothyroxine (SYNTHROID) 50 mcg tablet Take 50 mcg by mouth Daily (before breakfast).  buprenorphine-naloxone (SUBOXONE) 8-2 mg subl 1 Tab by SubLINGual route daily.  lurasidone (LATUDA) 80 mg tab tablet Take 80 mg by mouth nightly.  cholecalciferol, VITAMIN D3, (VITAMIN D3) 5,000 unit tab tablet Take 5,000 Units by mouth daily.  ferrous sulfate (IRON) 325 mg (65 mg iron) tablet Take 65 mg by mouth Daily (before breakfast). Past History     Past Medical History:  Past Medical History:   Diagnosis Date    Fatigue     GERD (gastroesophageal reflux disease)     Hypothyroidism     Psychiatric disorder     PTSD per patient    Seizures ([facility] [address]        Past Surgical History:  Past Surgical History:   Procedure Laterality Date    HX APPENDECTOMY      HX  SECTION      HX CHOLECYSTECTOMY      HX HYSTERECTOMY         Family History:  Family History   Family history unknown: Yes       Social History:  Social History     Tobacco Use    Smoking status: Current Every Day Smoker     Packs/day: 0.50    Smokeless tobacco: Never Used   Substance Use Topics    Alcohol use: No     Alcohol/week: 0.0 standard drinks    Drug use: Never       Allergies: Allergies   Allergen Reactions    Augmentin [Amoxicillin-Pot Clavulanate] Nausea and Vomiting         Review of Systems   Review of Systems   Constitutional: Negative for fever. HENT: Negative for congestion. Eyes: Negative for visual disturbance. Respiratory: Negative for shortness of breath. Cardiovascular: Negative for chest pain. Gastrointestinal: Negative for abdominal pain. Genitourinary: Negative for dysuria. Musculoskeletal: Negative for arthralgias. Skin: Negative for rash.    Neurological: Negative for headaches. Psychiatric/Behavioral: Negative for self-injury. The patient is nervous/anxious. Physical Exam   Constitutional: No acute distress. Well-nourished. Skin: No rash. ENT: No rhinorrhea. No cough. Head is normocephalic and atraumatic. Eye: No proptosis or conjunctival injections. Respiratory: No apparent respiratory distress. Gastrointestinal: Nondistended. Musculoskeletal: No obvious bony deformities. Psychiatric: Nervous and anxious appearing. Cooperative. Neuro: No focal deficits. Cranial nerves II through XII grossly intact. Diagnostic Study Results     Labs -     Recent Results (from the past 24 hour(s))   EKG, 12 LEAD, INITIAL    Collection Time: 08/06/21 11:35 AM   Result Value Ref Range    Ventricular Rate 82 BPM    Atrial Rate 82 BPM    P-R Interval 140 ms    QRS Duration 82 ms    Q-T Interval 390 ms    QTC Calculation (Bezet) 455 ms    Calculated P Axis 40 degrees    Calculated R Axis 36 degrees    Calculated T Axis 64 degrees    Diagnosis       Normal sinus rhythm  Nonspecific T wave abnormality  Abnormal ECG  When compared with ECG of 12-JUL-2021 20:16,  Nonspecific T wave abnormality now evident in Anterolateral leads  Confirmed by Rihsabh Ferro MD, Brandon Franco ([id number]) on 8/6/2021 13:96:58 PM     METABOLIC PANEL, COMPREHENSIVE    Collection Time: 08/06/21 11:40 AM   Result Value Ref Range    Sodium 138 136 - 145 mmol/L    Potassium Hemolyzed, recollect requested 3.5 - 5.1 mmol/L    Chloride 104 97 - 108 mmol/L    CO2 25 21 - 32 mmol/L    Anion gap 9 5 - 15 mmol/L    Glucose 146 (H) 65 - 100 mg/dL    BUN 14 6 - 20 mg/dL    Creatinine 1.24 (H) 0.55 - 1.02 mg/dL    BUN/Creatinine ratio 11 (L) 12 - 20      GFR est AA 54 (L) >60 ml/min/1.73m2    GFR est non-AA 45 (L) >60 ml/min/1.73m2    Calcium 8.8 8.5 - 10.1 mg/dL    Bilirubin, total 0.5 0.2 - 1.0 mg/dL    AST (SGOT) Hemolyzed, recollect requested 15 - 37 U/L    ALT (SGPT) 29 12 - 78 U/L    Alk.  phosphatase 87 45 - 117 U/L    Protein, total 7.2 6.4 - 8.2 g/dL    Albumin 3.3 (L) 3.5 - 5.0 g/dL    Globulin 3.9 2.0 - 4.0 g/dL    A-G Ratio 0.8 (L) 1.1 - 2.2     CBC WITH AUTOMATED DIFF    Collection Time: 08/06/21 11:40 AM   Result Value Ref Range    WBC 14.9 (H) 3.6 - 11.0 K/uL    RBC 5.12 3.80 - 5.20 M/uL    HGB 15.6 11.5 - 16.0 g/dL    HCT 45.6 35.0 - 47.0 %    MCV 89.1 80.0 - 99.0 FL    MCH 30.5 26.0 - 34.0 PG    MCHC 34.2 30.0 - 36.5 g/dL    RDW 12.7 11.5 - 14.5 %    PLATELET 299 803 - 053 K/uL    MPV 10.1 8.9 - 12.9 FL    NRBC 0.0 0.0  WBC    ABSOLUTE NRBC 0.00 0.00 - 0.01 K/uL    NEUTROPHILS 69 32 - 75 %    LYMPHOCYTES 24 12 - 49 %    MONOCYTES 5 5 - 13 %    EOSINOPHILS 1 0 - 7 %    BASOPHILS 1 0 - 1 %    IMMATURE GRANULOCYTES 0 0 - 0.5 %    ABS. NEUTROPHILS 10.4 (H) 1.8 - 8.0 K/UL    ABS. LYMPHOCYTES 3.6 (H) 0.8 - 3.5 K/UL    ABS. MONOCYTES 0.8 0.0 - 1.0 K/UL    ABS. EOSINOPHILS 0.1 0.0 - 0.4 K/UL    ABS. BASOPHILS 0.1 0.0 - 0.1 K/UL    ABS. IMM.  GRANS. 0.0 0.00 - 0.04 K/UL    DF AUTOMATED     TROPONIN I    Collection Time: 08/06/21 11:40 AM   Result Value Ref Range    Troponin-I, Qt. <0.05 <0.05 ng/mL   BNP    Collection Time: 08/06/21 11:40 AM   Result Value Ref Range    NT pro-BNP 58 <216 pg/mL   SALICYLATE    Collection Time: 08/06/21 11:40 AM   Result Value Ref Range    Salicylate level 3.1 2.8 - 20.0 mg/dL   ACETAMINOPHEN    Collection Time: 08/06/21 11:40 AM   Result Value Ref Range    Acetaminophen level <10 (L) 10 - 30 ug/mL   URINALYSIS W/MICROSCOPIC    Collection Time: 08/06/21 12:15 PM   Result Value Ref Range    Color Yellow/Straw      Appearance Turbid (A) Clear      Specific gravity 1.025 1.003 - 1.030      pH (UA) 5.0 5.0 - 8.0      Protein 100 (A) Negative mg/dL    Glucose Negative Negative mg/dL    Ketone Negative Negative mg/dL    Bilirubin Negative Negative      Blood Negative Negative      Urobilinogen 0.1 0.1 - 1.0 EU/dL    Nitrites Negative Negative      Leukocyte Esterase Negative Negative      WBC 0-4 0 - 4 /hpf RBC 0-5 0 - 5 /hpf    Bacteria 1+ (A) Negative /hpf    Mucus Trace /lpf   DRUG SCREEN, URINE    Collection Time: 08/06/21 12:15 PM   Result Value Ref Range    AMPHETAMINES Positive (A) Negative      BARBITURATES Negative Negative      BENZODIAZEPINES Negative Negative      COCAINE Negative Negative      METHADONE Negative Negative      OPIATES Negative Negative      PCP(PHENCYCLIDINE) Negative Negative      THC (TH-CANNABINOL) Positive (A) Negative      Drug screen comment        This test is a screen for drugs of abuse in a medical setting only (i.e., they are unconfirmed results and as such must not be used for non-medical purposes, e.g.,employment testing, legal testing). Due to its inherent nature, false positive (FP) and false negative (FN) results may be obtained. Therefore, if necessary for medical care, recommend confirmation of positive findings by GC/MS. AST    Collection Time: 08/06/21 12:39 PM   Result Value Ref Range    AST (SGOT) 11 (L) 15 - 37 U/L   POTASSIUM    Collection Time: 08/06/21 12:39 PM   Result Value Ref Range    Potassium 3.8 3.5 - 5.1 mmol/L   ETHYL ALCOHOL    Collection Time: 08/06/21  1:00 PM   Result Value Ref Range    ALCOHOL(ETHYL),SERUM <4 <10 mg/dL       Radiologic Studies -   CT HEAD WO CONT   Final Result   Unremarkable head CT without contrast.  No infarct, mass, or    hemorrhage. XR CHEST PORT   Final Result        CT Results  (Last 48 hours)               08/06/21 1527  CT HEAD WO CONT Final result    Impression:  Unremarkable head CT without contrast.  No infarct, mass, or    hemorrhage. Narrative:  History is acute mental status change. No comparison head CTs available. Serial axial images were obtained through the brain at 5 mm intervals without   contrast administration. Bone and brain windows are evaluated as well as   sagittal and coronal reformatted images.        Dose reduction:  All CT scans at this facility are performed using dose reduction optimization techniques as appropriate to a performed exam including   the following: automated exposure control, adjustments of the mA and/or kV   according to patient size, or use of iterative reconstruction technique. The ventricles are midline without extrinsic compression or dilatation. There is   no intra or extra-axial hemorrhage, mass, infarct or edema. No midline shift is   identified. The orbits and their contents appear unremarkable. On bone windows there is no skull fracture or soft tissue swelling. No sinusitis   or mastoiditis is identified. CXR Results  (Last 48 hours)               08/06/21 1229  XR CHEST PORT Final result    Narrative:  1 view       Atelectasis at left greater than right lung bases with lungs otherwise clear. No   effusion or pneumothorax. Normal heart and mediastinum             Medical Decision Making and ED Course     I reviewed the available vital signs, nursing notes, past medical history, past surgical history, family history, and social history. Vital Signs - Reviewed the patient's vital signs. Patient Vitals for the past 12 hrs:   Temp Pulse Resp BP SpO2   08/06/21 1224 -- (!) 102 14 137/83 95 %   08/06/21 1145 -- (!) 111 16 137/83 98 %   08/06/21 1126 98.3 °F (36.8 °C) 83 12 109/70 92 %     EKG interpretation: Obtained on 8/6/2021 1135. Read at 66 65 76 by myself. Normal sinus rhythm at rate of 82 bpm.  Normal NH interval, QRS duration, QTc interval.  No ST segment abnormalities. Normal axis. Medical Decision Making:   Presented with altered mental status. The differential diagnosis is opioid overdose, gabapentin overdose, TIA, CVA, polysubstance abuse, orthostatic hypotension, UTI. Work-up is normal except for leukocytosis. I suspect this may be reactive. Patient likely had opioid overdose. She did have response to Narcan. After full work-up feel it is safe to discharge home after observation here has been completed. Patient is at her baseline now. She did get very anxious and was given some Ativan here. I feel it is safe to discharge. Procedures     Critical Care Note:   11:33 AM  Amount of critical care time: 30 minutes  Impending deterioration: CNS  Associated risk factors: CNS Decompensation  Management: Bedside Assessment and Supervision of Care  Interpretation: CT Scan and Blood Pressure  Interventions: Observation  Case review: EMS  Treatment response: Improved  Performed by: Self  Notes: I have spent critical care time involved in lab review, decision making, bedside attention, and documentation. This time excludes time spent in any separate billed procedures. During this entire length of time I was immediately available to the patient. Radha Avery,     Disposition     Discharged home    DISCHARGE PLAN:  1. Current Discharge Medication List      CONTINUE these medications which have NOT CHANGED    Details   ! ! promethazine (PHENERGAN) 25 mg tablet Take 1 Tab by mouth every six (6) hours as needed for Nausea. Qty: 12 Tab, Refills: 0      dicyclomine (BENTYL) 10 mg capsule Take 1 Cap by mouth three (3) times daily. Qty: 20 Cap, Refills: 0      !! promethazine (PHENERGAN) 25 mg tablet Take 1 Tab by mouth every six (6) hours as needed (nausea). Qty: 12 Tab, Refills: 0      amoxicillin 500 mg tab Take 1,000 mg by mouth three (3) times daily. Qty: 30 Tab, Refills: 0      albuterol (ProAir HFA) 90 mcg/actuation inhaler Take 1 Puff by inhalation every four (4) hours as needed for Wheezing. Qty: 1 Inhaler, Refills: 0      sucralfate (Carafate) 100 mg/mL suspension Take 5 mL by mouth four (4) times daily. Qty: 414 mL, Refills: 0      clonazePAM (KLONOPIN) 1 mg tablet Take  by mouth three (3) times daily. Associated Diagnoses: Idiopathic hypotension; Hypothyroidism due to acquired atrophy of thyroid; Anemia due to vitamin B12 deficiency; Dizziness;  Arthralgia      gabapentin (NEURONTIN) 600 mg tablet Take by mouth four (4) times daily. Associated Diagnoses: Idiopathic hypotension; Hypothyroidism due to acquired atrophy of thyroid; Anemia due to vitamin B12 deficiency; Dizziness; Arthralgia      !! QUEtiapine (SEROQUEL) 50 mg tablet Take 50 mg by mouth daily. Associated Diagnoses: Idiopathic hypotension; Hypothyroidism due to acquired atrophy of thyroid; Anemia due to vitamin B12 deficiency; Dizziness; Arthralgia      dextroamphetamine-amphetamine (ADDERALL) 30 mg tablet Take 30 mg by mouth two (2) times a day. Associated Diagnoses: Idiopathic hypotension; Hypothyroidism due to acquired atrophy of thyroid; Anemia due to vitamin B12 deficiency; Dizziness; Arthralgia      !! QUEtiapine (SEROQUEL) 400 mg tablet Take 400 mg by mouth nightly. Associated Diagnoses: Idiopathic hypotension; Hypothyroidism due to acquired atrophy of thyroid; Anemia due to vitamin B12 deficiency; Dizziness; Arthralgia      estradiol (CLIMARA) 0.05 mg/24 hr 1 Patch by TransDERmal route two (2) days a week. Associated Diagnoses: Idiopathic hypotension; Hypothyroidism due to acquired atrophy of thyroid; Anemia due to vitamin B12 deficiency; Dizziness; Arthralgia      levothyroxine (SYNTHROID) 50 mcg tablet Take 50 mcg by mouth Daily (before breakfast). Associated Diagnoses: Idiopathic hypotension; Hypothyroidism due to acquired atrophy of thyroid; Anemia due to vitamin B12 deficiency; Dizziness; Arthralgia      buprenorphine-naloxone (SUBOXONE) 8-2 mg subl 1 Tab by SubLINGual route daily. Associated Diagnoses: Idiopathic hypotension; Hypothyroidism due to acquired atrophy of thyroid; Anemia due to vitamin B12 deficiency; Dizziness; Arthralgia      lurasidone (LATUDA) 80 mg tab tablet Take 80 mg by mouth nightly. Associated Diagnoses: Idiopathic hypotension; Hypothyroidism due to acquired atrophy of thyroid; Anemia due to vitamin B12 deficiency; Dizziness;  Arthralgia      cholecalciferol, VITAMIN D3, (VITAMIN D3) 5,000 unit tab tablet Take 5,000 Units by mouth daily. Associated Diagnoses: Idiopathic hypotension; Hypothyroidism due to acquired atrophy of thyroid; Anemia due to vitamin B12 deficiency; Dizziness; Arthralgia      ferrous sulfate (IRON) 325 mg (65 mg iron) tablet Take 65 mg by mouth Daily (before breakfast). Associated Diagnoses: Idiopathic hypotension; Hypothyroidism due to acquired atrophy of thyroid; Anemia due to vitamin B12 deficiency; Dizziness; Arthralgia       !! - Potential duplicate medications found. Please discuss with provider. 2.   Follow-up Information     Follow up With Specialties Details Why [address] EMERGENCY DEPT Emergency Medicine Go today As soon as possible if symptoms worsen [address] [facility] [address]  [phone]    Primary care doctor  Schedule an appointment as soon as possible for a visit in 3 days          3. Return to ED if worse     Diagnosis     Clinical impression:   1. Opioid overdose, accidental or unintentional, initial encounter [facility])           Attestation:  Please note that this dictation was completed with [employer], the computer voice recognition software. Quite often unanticipated grammatical, syntax, homophones, and other interpretive errors are inadvertently transcribed by the computer software. Please disregard these errors. Please excuse any errors that have escaped final proofreading. Thank you.   Rachael Hurtado, DO

## 2021-08-09 ENCOUNTER — HOSPITAL ENCOUNTER (INPATIENT)
Age: 56
LOS: 2 days | Discharge: HOME | DRG: 641 | End: 2021-08-11
Attending: EMERGENCY MEDICINE | Admitting: FAMILY MEDICINE
Payer: MEDICARE

## 2021-08-09 DIAGNOSIS — E87.6 HYPOKALEMIA: ICD-10-CM

## 2021-08-09 DIAGNOSIS — R42 VERTIGO: ICD-10-CM

## 2021-08-09 DIAGNOSIS — R42 DIZZINESS: Primary | ICD-10-CM

## 2021-08-09 DIAGNOSIS — E03.4 HYPOTHYROIDISM DUE TO ACQUIRED ATROPHY OF THYROID: ICD-10-CM

## 2021-08-09 LAB
ALBUMIN SERPL-MCNC: 2.3 G/DL (ref 3.5–5)
ALBUMIN/GLOB SERPL: 0.9 {RATIO} (ref 1.1–2.2)
ALP SERPL-CCNC: 65 U/L (ref 45–117)
ALT SERPL-CCNC: 12 U/L (ref 12–78)
ANION GAP SERPL CALC-SCNC: 4 MMOL/L (ref 5–15)
AST SERPL W P-5'-P-CCNC: <3 U/L (ref 15–37)
BASOPHILS # BLD: 0.1 K/UL (ref 0–0.1)
BASOPHILS NFR BLD: 1 % (ref 0–1)
BILIRUB SERPL-MCNC: 0.2 MG/DL (ref 0.2–1)
BUN SERPL-MCNC: 8 MG/DL (ref 6–20)
BUN/CREAT SERPL: 12 (ref 12–20)
CA-I BLD-MCNC: 6.6 MG/DL (ref 8.5–10.1)
CHLORIDE SERPL-SCNC: 115 MMOL/L (ref 97–108)
CO2 SERPL-SCNC: 25 MMOL/L (ref 21–32)
CREAT SERPL-MCNC: 0.67 MG/DL (ref 0.55–1.02)
DIFFERENTIAL METHOD BLD: ABNORMAL
EOSINOPHIL # BLD: 0.1 K/UL (ref 0–0.4)
EOSINOPHIL NFR BLD: 1 % (ref 0–7)
ERYTHROCYTE [DISTWIDTH] IN BLOOD BY AUTOMATED COUNT: 12.7 % (ref 11.5–14.5)
GLOBULIN SER CALC-MCNC: 2.6 G/DL (ref 2–4)
GLUCOSE SERPL-MCNC: 121 MG/DL (ref 65–100)
HCT VFR BLD AUTO: 45.2 % (ref 35–47)
HGB BLD-MCNC: 15.1 G/DL (ref 11.5–16)
IMM GRANULOCYTES # BLD AUTO: 0 K/UL (ref 0–0.04)
IMM GRANULOCYTES NFR BLD AUTO: 0 % (ref 0–0.5)
LYMPHOCYTES # BLD: 2.9 K/UL (ref 0.8–3.5)
LYMPHOCYTES NFR BLD: 23 % (ref 12–49)
MCH RBC QN AUTO: 30.2 PG (ref 26–34)
MCHC RBC AUTO-ENTMCNC: 33.4 G/DL (ref 30–36.5)
MCV RBC AUTO: 90.4 FL (ref 80–99)
MONOCYTES # BLD: 0.5 K/UL (ref 0–1)
MONOCYTES NFR BLD: 4 % (ref 5–13)
NEUTS SEG # BLD: 9 K/UL (ref 1.8–8)
NEUTS SEG NFR BLD: 71 % (ref 32–75)
NRBC # BLD: 0 K/UL (ref 0–0.01)
NRBC BLD-RTO: 0 PER 100 WBC
PLATELET # BLD AUTO: 343 K/UL (ref 150–400)
PMV BLD AUTO: 9.5 FL (ref 8.9–12.9)
POTASSIUM SERPL-SCNC: 2.6 MMOL/L (ref 3.5–5.1)
PROT SERPL-MCNC: 4.9 G/DL (ref 6.4–8.2)
RBC # BLD AUTO: 5 M/UL (ref 3.8–5.2)
SODIUM SERPL-SCNC: 144 MMOL/L (ref 136–145)
TROPONIN I SERPL-MCNC: <0.05 NG/ML
WBC # BLD AUTO: 12.6 K/UL (ref 3.6–11)

## 2021-08-09 PROCEDURE — 85025 COMPLETE CBC W/AUTO DIFF WBC: CPT

## 2021-08-09 PROCEDURE — 96375 TX/PRO/DX INJ NEW DRUG ADDON: CPT

## 2021-08-09 PROCEDURE — 74011250637 HC RX REV CODE- 250/637: Performed by: FAMILY MEDICINE

## 2021-08-09 PROCEDURE — 96374 THER/PROPH/DIAG INJ IV PUSH: CPT

## 2021-08-09 PROCEDURE — 96361 HYDRATE IV INFUSION ADD-ON: CPT

## 2021-08-09 PROCEDURE — 74011250636 HC RX REV CODE- 250/636: Performed by: EMERGENCY MEDICINE

## 2021-08-09 PROCEDURE — 36415 COLL VENOUS BLD VENIPUNCTURE: CPT

## 2021-08-09 PROCEDURE — 80053 COMPREHEN METABOLIC PANEL: CPT

## 2021-08-09 PROCEDURE — 74011250637 HC RX REV CODE- 250/637: Performed by: EMERGENCY MEDICINE

## 2021-08-09 PROCEDURE — 84484 ASSAY OF TROPONIN QUANT: CPT

## 2021-08-09 PROCEDURE — 99285 EMERGENCY DEPT VISIT HI MDM: CPT

## 2021-08-09 PROCEDURE — 65270000029 HC RM PRIVATE

## 2021-08-09 PROCEDURE — 74011250636 HC RX REV CODE- 250/636: Performed by: FAMILY MEDICINE

## 2021-08-09 RX ORDER — HYDROXYZINE 50 MG/1
50 TABLET, FILM COATED ORAL
COMMUNITY
End: 2022-05-27

## 2021-08-09 RX ORDER — HYDROXYZINE 25 MG/1
50 TABLET, FILM COATED ORAL
Status: DISCONTINUED | OUTPATIENT
Start: 2021-08-09 | End: 2021-08-10

## 2021-08-09 RX ORDER — QUETIAPINE FUMARATE 300 MG/1
300 TABLET, FILM COATED ORAL
Status: DISCONTINUED | OUTPATIENT
Start: 2021-08-09 | End: 2021-08-12 | Stop reason: HOSPADM

## 2021-08-09 RX ORDER — ACETAMINOPHEN 650 MG/1
650 SUPPOSITORY RECTAL
Status: DISCONTINUED | OUTPATIENT
Start: 2021-08-09 | End: 2021-08-12 | Stop reason: HOSPADM

## 2021-08-09 RX ORDER — CLONAZEPAM 0.5 MG/1
0.25 TABLET ORAL 3 TIMES DAILY
Status: DISCONTINUED | OUTPATIENT
Start: 2021-08-09 | End: 2021-08-11

## 2021-08-09 RX ORDER — ONDANSETRON 4 MG/1
4 TABLET, ORALLY DISINTEGRATING ORAL
Status: DISCONTINUED | OUTPATIENT
Start: 2021-08-09 | End: 2021-08-12 | Stop reason: HOSPADM

## 2021-08-09 RX ORDER — POLYETHYLENE GLYCOL 3350 17 G/17G
17 POWDER, FOR SOLUTION ORAL DAILY PRN
Status: DISCONTINUED | OUTPATIENT
Start: 2021-08-09 | End: 2021-08-12 | Stop reason: HOSPADM

## 2021-08-09 RX ORDER — ONDANSETRON 2 MG/ML
4 INJECTION INTRAMUSCULAR; INTRAVENOUS
Status: COMPLETED | OUTPATIENT
Start: 2021-08-09 | End: 2021-08-09

## 2021-08-09 RX ORDER — ACETAMINOPHEN 325 MG/1
650 TABLET ORAL
Status: DISCONTINUED | OUTPATIENT
Start: 2021-08-09 | End: 2021-08-12 | Stop reason: HOSPADM

## 2021-08-09 RX ORDER — POTASSIUM CHLORIDE 750 MG/1
40 TABLET, FILM COATED, EXTENDED RELEASE ORAL
Status: ACTIVE | OUTPATIENT
Start: 2021-08-09 | End: 2021-08-10

## 2021-08-09 RX ORDER — SODIUM CHLORIDE 9 MG/ML
75 INJECTION, SOLUTION INTRAVENOUS CONTINUOUS
Status: DISCONTINUED | OUTPATIENT
Start: 2021-08-09 | End: 2021-08-12 | Stop reason: HOSPADM

## 2021-08-09 RX ORDER — MECLIZINE HYDROCHLORIDE 25 MG/1
25 TABLET ORAL
Status: COMPLETED | OUTPATIENT
Start: 2021-08-09 | End: 2021-08-09

## 2021-08-09 RX ORDER — POTASSIUM CHLORIDE 7.45 MG/ML
10 INJECTION INTRAVENOUS ONCE
Status: COMPLETED | OUTPATIENT
Start: 2021-08-09 | End: 2021-08-09

## 2021-08-09 RX ORDER — POTASSIUM CHLORIDE 750 MG/1
40 TABLET, FILM COATED, EXTENDED RELEASE ORAL
Status: COMPLETED | OUTPATIENT
Start: 2021-08-09 | End: 2021-08-09

## 2021-08-09 RX ORDER — ENOXAPARIN SODIUM 100 MG/ML
40 INJECTION SUBCUTANEOUS DAILY
Status: DISCONTINUED | OUTPATIENT
Start: 2021-08-10 | End: 2021-08-12 | Stop reason: HOSPADM

## 2021-08-09 RX ORDER — ONDANSETRON 2 MG/ML
4 INJECTION INTRAMUSCULAR; INTRAVENOUS
Status: DISCONTINUED | OUTPATIENT
Start: 2021-08-09 | End: 2021-08-12 | Stop reason: HOSPADM

## 2021-08-09 RX ADMIN — CLONAZEPAM 0.25 MG: 0.5 TABLET ORAL at 23:03

## 2021-08-09 RX ADMIN — MECLIZINE HYDROCHLORIDE 25 MG: 25 TABLET ORAL at 20:31

## 2021-08-09 RX ADMIN — POTASSIUM CHLORIDE 40 MEQ: 750 TABLET, FILM COATED, EXTENDED RELEASE ORAL at 21:56

## 2021-08-09 RX ADMIN — POTASSIUM CHLORIDE 10 MEQ: 7.46 INJECTION, SOLUTION INTRAVENOUS at 22:53

## 2021-08-09 RX ADMIN — SODIUM CHLORIDE 75 ML/HR: 9 INJECTION, SOLUTION INTRAVENOUS at 23:30

## 2021-08-09 RX ADMIN — POTASSIUM CHLORIDE 40 MEQ: 750 TABLET, FILM COATED, EXTENDED RELEASE ORAL at 22:51

## 2021-08-09 RX ADMIN — ONDANSETRON 4 MG: 2 INJECTION INTRAMUSCULAR; INTRAVENOUS at 22:50

## 2021-08-09 RX ADMIN — SODIUM CHLORIDE 1000 ML: 9 INJECTION, SOLUTION INTRAVENOUS at 20:31

## 2021-08-10 LAB
ALBUMIN SERPL-MCNC: 2 G/DL (ref 3.5–5)
ALBUMIN/GLOB SERPL: 0.6 {RATIO} (ref 1.1–2.2)
ALP SERPL-CCNC: 75 U/L (ref 45–117)
ALT SERPL-CCNC: 14 U/L (ref 12–78)
ALT SERPL-CCNC: ABNORMAL U/L (ref 12–78)
ANION GAP SERPL CALC-SCNC: 0 MMOL/L (ref 5–15)
AST SERPL W P-5'-P-CCNC: 8 U/L (ref 15–37)
AST SERPL W P-5'-P-CCNC: ABNORMAL U/L (ref 15–37)
BILIRUB SERPL-MCNC: 0.3 MG/DL (ref 0.2–1)
BUN SERPL-MCNC: 8 MG/DL (ref 6–20)
BUN/CREAT SERPL: 13 (ref 12–20)
CA-I BLD-MCNC: 7.4 MG/DL (ref 8.5–10.1)
CHLORIDE SERPL-SCNC: 115 MMOL/L (ref 97–108)
CO2 SERPL-SCNC: 22 MMOL/L (ref 21–32)
CREAT SERPL-MCNC: 0.62 MG/DL (ref 0.55–1.02)
ERYTHROCYTE [DISTWIDTH] IN BLOOD BY AUTOMATED COUNT: 12.8 % (ref 11.5–14.5)
GLOBULIN SER CALC-MCNC: 3.5 G/DL (ref 2–4)
GLUCOSE SERPL-MCNC: 80 MG/DL (ref 65–100)
HCT VFR BLD AUTO: 41.5 % (ref 35–47)
HGB BLD-MCNC: 13.7 G/DL (ref 11.5–16)
MAGNESIUM SERPL-MCNC: 1.8 MG/DL (ref 1.6–2.4)
MCH RBC QN AUTO: 29.6 PG (ref 26–34)
MCHC RBC AUTO-ENTMCNC: 33 G/DL (ref 30–36.5)
MCV RBC AUTO: 89.6 FL (ref 80–99)
NRBC # BLD: 0 K/UL (ref 0–0.01)
NRBC BLD-RTO: 0 PER 100 WBC
PLATELET # BLD AUTO: 334 K/UL (ref 150–400)
PMV BLD AUTO: 9.4 FL (ref 8.9–12.9)
POTASSIUM SERPL-SCNC: 4.4 MMOL/L (ref 3.5–5.1)
POTASSIUM SERPL-SCNC: 4.4 MMOL/L (ref 3.5–5.1)
POTASSIUM SERPL-SCNC: ABNORMAL MMOL/L (ref 3.5–5.1)
PROT SERPL-MCNC: 5.5 G/DL (ref 6.4–8.2)
RBC # BLD AUTO: 4.63 M/UL (ref 3.8–5.2)
SODIUM SERPL-SCNC: 137 MMOL/L (ref 136–145)
WBC # BLD AUTO: 9.7 K/UL (ref 3.6–11)

## 2021-08-10 PROCEDURE — 74011250636 HC RX REV CODE- 250/636: Performed by: FAMILY MEDICINE

## 2021-08-10 PROCEDURE — 65270000032 HC RM SEMIPRIVATE

## 2021-08-10 PROCEDURE — 85027 COMPLETE CBC AUTOMATED: CPT

## 2021-08-10 PROCEDURE — 93005 ELECTROCARDIOGRAM TRACING: CPT

## 2021-08-10 PROCEDURE — 74011250637 HC RX REV CODE- 250/637: Performed by: FAMILY MEDICINE

## 2021-08-10 PROCEDURE — 97161 PT EVAL LOW COMPLEX 20 MIN: CPT

## 2021-08-10 PROCEDURE — 84450 TRANSFERASE (AST) (SGOT): CPT

## 2021-08-10 PROCEDURE — 84132 ASSAY OF SERUM POTASSIUM: CPT

## 2021-08-10 PROCEDURE — 97530 THERAPEUTIC ACTIVITIES: CPT

## 2021-08-10 PROCEDURE — 97165 OT EVAL LOW COMPLEX 30 MIN: CPT

## 2021-08-10 PROCEDURE — 83735 ASSAY OF MAGNESIUM: CPT

## 2021-08-10 PROCEDURE — 84460 ALANINE AMINO (ALT) (SGPT): CPT

## 2021-08-10 RX ORDER — HYDROXYZINE 25 MG/1
50 TABLET, FILM COATED ORAL
Status: DISCONTINUED | OUTPATIENT
Start: 2021-08-10 | End: 2021-08-12 | Stop reason: HOSPADM

## 2021-08-10 RX ORDER — HYDROXYZINE 25 MG/1
50 TABLET, FILM COATED ORAL
Status: DISCONTINUED | OUTPATIENT
Start: 2021-08-10 | End: 2021-08-10

## 2021-08-10 RX ORDER — GABAPENTIN 300 MG/1
600 CAPSULE ORAL 4 TIMES DAILY
Status: DISCONTINUED | OUTPATIENT
Start: 2021-08-10 | End: 2021-08-12 | Stop reason: HOSPADM

## 2021-08-10 RX ADMIN — QUETIAPINE FUMARATE 300 MG: 300 TABLET ORAL at 00:12

## 2021-08-10 RX ADMIN — QUETIAPINE FUMARATE 300 MG: 300 TABLET ORAL at 21:21

## 2021-08-10 RX ADMIN — HYDROXYZINE HYDROCHLORIDE 50 MG: 25 TABLET, FILM COATED ORAL at 14:34

## 2021-08-10 RX ADMIN — CLONAZEPAM 0.25 MG: 0.5 TABLET ORAL at 08:38

## 2021-08-10 RX ADMIN — CLONAZEPAM 0.25 MG: 0.5 TABLET ORAL at 16:43

## 2021-08-10 RX ADMIN — CLONAZEPAM 0.25 MG: 0.5 TABLET ORAL at 21:21

## 2021-08-10 RX ADMIN — GABAPENTIN 600 MG: 300 CAPSULE ORAL at 17:11

## 2021-08-10 RX ADMIN — SODIUM CHLORIDE 75 ML/HR: 9 INJECTION, SOLUTION INTRAVENOUS at 14:35

## 2021-08-10 RX ADMIN — ENOXAPARIN SODIUM 40 MG: 40 INJECTION SUBCUTANEOUS at 08:38

## 2021-08-10 RX ADMIN — GABAPENTIN 600 MG: 300 CAPSULE ORAL at 21:21

## 2021-08-10 RX ADMIN — GABAPENTIN 600 MG: 300 CAPSULE ORAL at 12:44

## 2021-08-10 NOTE — ROUTINE PROCESS
TRANSFER - OUT REPORT:    Verbal report given to Christopher Wylie RN(name) on [employer]  being transferred to [facility]) for routine progression of care       Report consisted of patients Situation, Background, Assessment and   Recommendations(SBAR). Information from the following report(s) ED Summary was reviewed with the receiving nurse. Lines:   Peripheral IV 08/09/21 Posterior;Right Hand (Active)        Opportunity for questions and clarification was provided.       Patient transported with:   [employer]

## 2021-08-10 NOTE — PROGRESS NOTES
Primary Nurse Amy Camacho RN and Magdiel Lamar RN performed a dual skin assessment on this patient   Patients skin is dry and intact with no open/visible wounds and/or rash.

## 2021-08-10 NOTE — PROGRESS NOTES
General Daily Progress Note          Patient Name:   Rosalee Thomas       YOB: 1965       Age:  64 y.o. Admit Date: 8/9/2021      Subjective:     Patient seen in room. She denies any dizziness this morning while lying in bed. She also denies any chest pain. Reviewed labs, progress notes, EKG from admission. Patient with new finding of T wave abnormalities. Discussed with attending physician and cardiology will be consulted given patient's history, medications and chief complaint. Check echocardiogram as well pending cardiology consultation evaluation    Patient aware of treatment plan and agreeable. Discharge planning: Plan for discharge in the next 24 hours if cleared by cardiology and no further instances of dizziness and patient remained stable. Objective:     Visit Vitals  BP (!) 114/58   Pulse 75   Temp 97.9 °F (36.6 °C)   Resp 17   Ht 5' 8\" (1.727 m)   Wt 81.6 kg (180 lb)   SpO2 93%   BMI 27.37 kg/m²        Recent Results (from the past 24 hour(s))   CBC WITH AUTOMATED DIFF    Collection Time: 08/09/21  7:43 PM   Result Value Ref Range    WBC 12.6 (H) 3.6 - 11.0 K/uL    RBC 5.00 3.80 - 5.20 M/uL    HGB 15.1 11.5 - 16.0 g/dL    HCT 45.2 35.0 - 47.0 %    MCV 90.4 80.0 - 99.0 FL    MCH 30.2 26.0 - 34.0 PG    MCHC 33.4 30.0 - 36.5 g/dL    RDW 12.7 11.5 - 14.5 %    PLATELET 900 977 - 536 K/uL    MPV 9.5 8.9 - 12.9 FL    NRBC 0.0 0.0  WBC    ABSOLUTE NRBC 0.00 0.00 - 0.01 K/uL    NEUTROPHILS 71 32 - 75 %    LYMPHOCYTES 23 12 - 49 %    MONOCYTES 4 (L) 5 - 13 %    EOSINOPHILS 1 0 - 7 %    BASOPHILS 1 0 - 1 %    IMMATURE GRANULOCYTES 0 0 - 0.5 %    ABS. NEUTROPHILS 9.0 (H) 1.8 - 8.0 K/UL    ABS. LYMPHOCYTES 2.9 0.8 - 3.5 K/UL    ABS. MONOCYTES 0.5 0.0 - 1.0 K/UL    ABS. EOSINOPHILS 0.1 0.0 - 0.4 K/UL    ABS. BASOPHILS 0.1 0.0 - 0.1 K/UL    ABS. IMM.  GRANS. 0.0 0.00 - 0.04 K/UL    DF AUTOMATED     METABOLIC PANEL, COMPREHENSIVE    Collection Time: 08/09/21  7:43 PM   Result Value Ref Range    Sodium 144 136 - 145 mmol/L    Potassium 2.6 (LL) 3.5 - 5.1 mmol/L    Chloride 115 (H) 97 - 108 mmol/L    CO2 25 21 - 32 mmol/L    Anion gap 4 (L) 5 - 15 mmol/L    Glucose 121 (H) 65 - 100 mg/dL    BUN 8 6 - 20 mg/dL    Creatinine 0.67 0.55 - 1.02 mg/dL    BUN/Creatinine ratio 12 12 - 20      GFR est AA >60 >60 ml/min/1.73m2    GFR est non-AA >60 >60 ml/min/1.73m2    Calcium 6.6 (L) 8.5 - 10.1 mg/dL    Bilirubin, total 0.2 0.2 - 1.0 mg/dL    AST (SGOT) <3 (L) 15 - 37 U/L    ALT (SGPT) 12 12 - 78 U/L    Alk. phosphatase 65 45 - 117 U/L    Protein, total 4.9 (L) 6.4 - 8.2 g/dL    Albumin 2.3 (L) 3.5 - 5.0 g/dL    Globulin 2.6 2.0 - 4.0 g/dL    A-G Ratio 0.9 (L) 1.1 - 2.2     TROPONIN I    Collection Time: 08/09/21  7:43 PM   Result Value Ref Range    Troponin-I, Qt. <0.05 <0.05 ng/mL   POTASSIUM    Collection Time: 08/10/21 12:55 AM   Result Value Ref Range    Potassium 4.4 3.5 - 5.1 mmol/L   METABOLIC PANEL, COMPREHENSIVE    Collection Time: 08/10/21  4:25 AM   Result Value Ref Range    Sodium 137 136 - 145 mmol/L    Potassium Hemolyzed, recollect requested 3.5 - 5.1 mmol/L    Chloride 115 (H) 97 - 108 mmol/L    CO2 22 21 - 32 mmol/L    Anion gap 0 (L) 5 - 15 mmol/L    Glucose 80 65 - 100 mg/dL    BUN 8 6 - 20 mg/dL    Creatinine 0.62 0.55 - 1.02 mg/dL    BUN/Creatinine ratio 13 12 - 20      GFR est AA >60 >60 ml/min/1.73m2    GFR est non-AA >60 >60 ml/min/1.73m2    Calcium 7.4 (L) 8.5 - 10.1 mg/dL    Bilirubin, total 0.3 0.2 - 1.0 mg/dL    AST (SGOT) Hemolyzed, recollect requested 15 - 37 U/L    ALT (SGPT) Hemolyzed, recollect requested 12 - 78 U/L    Alk.  phosphatase 75 45 - 117 U/L    Protein, total 5.5 (L) 6.4 - 8.2 g/dL    Albumin 2.0 (L) 3.5 - 5.0 g/dL    Globulin 3.5 2.0 - 4.0 g/dL    A-G Ratio 0.6 (L) 1.1 - 2.2     CBC W/O DIFF    Collection Time: 08/10/21  6:18 AM   Result Value Ref Range    WBC 9.7 3.6 - 11.0 K/uL    RBC 4.63 3.80 - 5.20 M/uL    HGB 13.7 11.5 - 16.0 g/dL    HCT 41.5 35.0 - 47.0 %    MCV 89.6 80.0 - 99.0 FL    MCH 29.6 26.0 - 34.0 PG    MCHC 33.0 30.0 - 36.5 g/dL    RDW 12.8 11.5 - 14.5 %    PLATELET 278 188 - 263 K/uL    MPV 9.4 8.9 - 12.9 FL    NRBC 0.0 0.0  WBC    ABSOLUTE NRBC 0.00 0.00 - 0.01 K/uL   POTASSIUM    Collection Time: 08/10/21  6:18 AM   Result Value Ref Range    Potassium 4.4 3.5 - 5.1 mmol/L   AST    Collection Time: 08/10/21  6:18 AM   Result Value Ref Range    AST (SGOT) 8 (L) 15 - 37 U/L   ALT    Collection Time: 08/10/21  6:18 AM   Result Value Ref Range    ALT (SGPT) 14 12 - 78 U/L     [unfilled]      Review of Systems    Constitutional: Negative for chills and fever. HENT: Negative. Eyes: Negative. Respiratory: Negative. Cardiovascular: Negative. Gastrointestinal: Negative for abdominal pain and nausea. Skin: Negative. Neurological: Negative. Physical Exam:      Constitutional: pt is oriented to person, place, and time. HENT:   Head: Normocephalic and atraumatic. Eyes: Pupils are equal, round, and reactive to light. EOM are normal.   Cardiovascular: Normal rate, regular rhythm and normal heart sounds. Pulmonary/Chest: Breath sounds normal. No wheezes. No rales. Exhibits no tenderness. Abdominal: Soft. Bowel sounds are normal. There is no abdominal tenderness. There is no rebound and no guarding. Musculoskeletal: Normal range of motion. Neurological: pt is alert and oriented to person, place, and time.      No orders to display        Recent Results (from the past 24 hour(s))   CBC WITH AUTOMATED DIFF    Collection Time: 08/09/21  7:43 PM   Result Value Ref Range    WBC 12.6 (H) 3.6 - 11.0 K/uL    RBC 5.00 3.80 - 5.20 M/uL    HGB 15.1 11.5 - 16.0 g/dL    HCT 45.2 35.0 - 47.0 %    MCV 90.4 80.0 - 99.0 FL    MCH 30.2 26.0 - 34.0 PG    MCHC 33.4 30.0 - 36.5 g/dL    RDW 12.7 11.5 - 14.5 %    PLATELET 862 608 - 662 K/uL    MPV 9.5 8.9 - 12.9 FL    NRBC 0.0 0.0  WBC    ABSOLUTE NRBC 0.00 0.00 - 0.01 K/uL NEUTROPHILS 71 32 - 75 %    LYMPHOCYTES 23 12 - 49 %    MONOCYTES 4 (L) 5 - 13 %    EOSINOPHILS 1 0 - 7 %    BASOPHILS 1 0 - 1 %    IMMATURE GRANULOCYTES 0 0 - 0.5 %    ABS. NEUTROPHILS 9.0 (H) 1.8 - 8.0 K/UL    ABS. LYMPHOCYTES 2.9 0.8 - 3.5 K/UL    ABS. MONOCYTES 0.5 0.0 - 1.0 K/UL    ABS. EOSINOPHILS 0.1 0.0 - 0.4 K/UL    ABS. BASOPHILS 0.1 0.0 - 0.1 K/UL    ABS. IMM. GRANS. 0.0 0.00 - 0.04 K/UL    DF AUTOMATED     METABOLIC PANEL, COMPREHENSIVE    Collection Time: 08/09/21  7:43 PM   Result Value Ref Range    Sodium 144 136 - 145 mmol/L    Potassium 2.6 (LL) 3.5 - 5.1 mmol/L    Chloride 115 (H) 97 - 108 mmol/L    CO2 25 21 - 32 mmol/L    Anion gap 4 (L) 5 - 15 mmol/L    Glucose 121 (H) 65 - 100 mg/dL    BUN 8 6 - 20 mg/dL    Creatinine 0.67 0.55 - 1.02 mg/dL    BUN/Creatinine ratio 12 12 - 20      GFR est AA >60 >60 ml/min/1.73m2    GFR est non-AA >60 >60 ml/min/1.73m2    Calcium 6.6 (L) 8.5 - 10.1 mg/dL    Bilirubin, total 0.2 0.2 - 1.0 mg/dL    AST (SGOT) <3 (L) 15 - 37 U/L    ALT (SGPT) 12 12 - 78 U/L    Alk.  phosphatase 65 45 - 117 U/L    Protein, total 4.9 (L) 6.4 - 8.2 g/dL    Albumin 2.3 (L) 3.5 - 5.0 g/dL    Globulin 2.6 2.0 - 4.0 g/dL    A-G Ratio 0.9 (L) 1.1 - 2.2     TROPONIN I    Collection Time: 08/09/21  7:43 PM   Result Value Ref Range    Troponin-I, Qt. <0.05 <0.05 ng/mL   POTASSIUM    Collection Time: 08/10/21 12:55 AM   Result Value Ref Range    Potassium 4.4 3.5 - 5.1 mmol/L   METABOLIC PANEL, COMPREHENSIVE    Collection Time: 08/10/21  4:25 AM   Result Value Ref Range    Sodium 137 136 - 145 mmol/L    Potassium Hemolyzed, recollect requested 3.5 - 5.1 mmol/L    Chloride 115 (H) 97 - 108 mmol/L    CO2 22 21 - 32 mmol/L    Anion gap 0 (L) 5 - 15 mmol/L    Glucose 80 65 - 100 mg/dL    BUN 8 6 - 20 mg/dL    Creatinine 0.62 0.55 - 1.02 mg/dL    BUN/Creatinine ratio 13 12 - 20      GFR est AA >60 >60 ml/min/1.73m2    GFR est non-AA >60 >60 ml/min/1.73m2    Calcium 7.4 (L) 8.5 - 10.1 mg/dL Bilirubin, total 0.3 0.2 - 1.0 mg/dL    AST (SGOT) Hemolyzed, recollect requested 15 - 37 U/L    ALT (SGPT) Hemolyzed, recollect requested 12 - 78 U/L    Alk. phosphatase 75 45 - 117 U/L    Protein, total 5.5 (L) 6.4 - 8.2 g/dL    Albumin 2.0 (L) 3.5 - 5.0 g/dL    Globulin 3.5 2.0 - 4.0 g/dL    A-G Ratio 0.6 (L) 1.1 - 2.2     CBC W/O DIFF    Collection Time: 08/10/21  6:18 AM   Result Value Ref Range    WBC 9.7 3.6 - 11.0 K/uL    RBC 4.63 3.80 - 5.20 M/uL    HGB 13.7 11.5 - 16.0 g/dL    HCT 41.5 35.0 - 47.0 %    MCV 89.6 80.0 - 99.0 FL    MCH 29.6 26.0 - 34.0 PG    MCHC 33.0 30.0 - 36.5 g/dL    RDW 12.8 11.5 - 14.5 %    PLATELET 830 984 - 950 K/uL    MPV 9.4 8.9 - 12.9 FL    NRBC 0.0 0.0  WBC    ABSOLUTE NRBC 0.00 0.00 - 0.01 K/uL   POTASSIUM    Collection Time: 08/10/21  6:18 AM   Result Value Ref Range    Potassium 4.4 3.5 - 5.1 mmol/L   AST    Collection Time: 08/10/21  6:18 AM   Result Value Ref Range    AST (SGOT) 8 (L) 15 - 37 U/L   ALT    Collection Time: 08/10/21  6:18 AM   Result Value Ref Range    ALT (SGPT) 14 12 - 78 U/L       Results     ** No results found for the last 336 hours. **           Labs:     Recent Labs     08/10/21  0618 08/09/21  1943   WBC 9.7 12.6*   HGB 13.7 15.1   HCT 41.5 45.2    343     Recent Labs     08/10/21  0618 08/10/21  0425 08/10/21  0055 08/09/21 1943 08/09/21 1943   NA  --  137  --   --  144   K 4.4 Hemolyzed, recollect requested 4.4   < > 2.6*   CL  --  115*  --   --  115*   CO2  --  22  --   --  25   BUN  --  8  --   --  8   CREA  --  0.62  --   --  0.67   GLU  --  80  --   --  121*   CA  --  7.4*  --   --  6.6*    < > = values in this interval not displayed. Recent Labs     08/10/21  0618 08/10/21  0425 08/09/21  1943   ALT 14 Hemolyzed, recollect requested 12   AP  --  75 65   TBILI  --  0.3 0.2   TP  --  5.5* 4.9*   ALB  --  2.0* 2.3*   GLOB  --  3.5 2.6     No results for input(s): INR, PTP, APTT, INREXT in the last 72 hours.    No results for input(s): FE, TIBC, PSAT, FERR in the last 72 hours. No results found for: FOL, RBCF   No results for input(s): PH, PCO2, PO2 in the last 72 hours.   Recent Labs     08/09/21 1943   Frantz Pan <0.05     No results found for: CHOL, CHOLX, CHLST, CHOLV, HDL, HDLP, LDL, LDLC, DLDLP, TGLX, TRIGL, TRIGP, CHHD, CHHDX  No results found for: [facility]  Lab Results   Component Value Date/Time    Color Yellow/Straw 08/06/2021 12:15 PM    Appearance Turbid (A) 08/06/2021 12:15 PM    Specific gravity 1.025 08/06/2021 12:15 PM    pH (UA) 5.0 08/06/2021 12:15 PM    Protein 100 (A) 08/06/2021 12:15 PM    Glucose Negative 08/06/2021 12:15 PM    Ketone Negative 08/06/2021 12:15 PM    Bilirubin Negative 08/06/2021 12:15 PM    Urobilinogen 0.1 08/06/2021 12:15 PM    Nitrites Negative 08/06/2021 12:15 PM    Leukocyte Esterase Negative 08/06/2021 12:15 PM    Bacteria 1+ (A) 08/06/2021 12:15 PM    WBC 0-4 08/06/2021 12:15 PM    RBC 0-5 08/06/2021 12:15 PM         Assessment:   Hypokalemia -normalized  Dizziness -resolved  Posttraumatic stress disorder -by history  Polysubstance abuse with amphetamines and marijuana -patient on Adderall  Generalized anxiety disorder    Plan:   Cardiology consultation ordered  Check magnesium level given patient's hypokalemia on admission  Echocardiogram ordered  Continue medications  Lovenox for DVT prophylaxis  Continue IV fluids    Patient is full code    Discussed with Dr. David Maki      Current Facility-Administered Medications:     gabapentin (NEURONTIN) capsule 600 mg, 600 mg, Oral, QID, Ha Recinos MD    hydrOXYzine HCL (ATARAX) tablet 50 mg, 50 mg, Oral, Q6H PRN, Ha Recinos MD    clonazePAM (KlonoPIN) tablet 0.25 mg, 0.25 mg, Oral, TID, Ha Recinos MD, 0.25 mg at 08/10/21 7553    QUEtiapine (SEROquel) tablet 300 mg, 300 mg, Oral, QHS, Ha Recinos MD, 300 mg at 08/10/21 0012    0.9% sodium chloride infusion, 75 mL/hr, IntraVENous, CONTINUOUS, Jaime Recinos MD, Last Rate: 75 mL/hr at 08/09/21 2330, 75 mL/hr at 08/09/21 2330    acetaminophen (TYLENOL) tablet 650 mg, 650 mg, Oral, Q6H PRN **OR** acetaminophen (TYLENOL) suppository 650 mg, 650 mg, Rectal, Q6H PRN, Virginia Recinos MD    polyethylene glycol (MIRALAX) packet 17 g, 17 g, Oral, DAILY PRN, Virginia Recinos MD    ondansetron (ZOFRAN ODT) tablet 4 mg, 4 mg, Oral, Q8H PRN **OR** ondansetron (ZOFRAN) injection 4 mg, 4 mg, IntraVENous, Q6H PRN, Ha Recinos MD    enoxaparin (LOVENOX) injection 40 mg, 40 mg, SubCUTAneous, DAILY, Ha Recinos MD, 40 mg at 08/10/21 4948

## 2021-08-10 NOTE — PROGRESS NOTES
OCCUPATIONAL THERAPY EVALUATION/DISCHARGE  Patient: Madeline Turk (45 y.o. female)  Date: 8/10/2021  Primary Diagnosis: Hypokalemia [E87.6]        Precautions: Standard    ASSESSMENT  Patient is a 64year old female, who came to the ED with c/o dizziness for the last few weeks and admitted for hypokalemia on 8/9/2021. ER toxicology screen found pt positive for amphetamines and marijuana, pt reports she takes suboxone, klonopin and gabapentin for seizures. PMH includes: fatigue, GERD, hypothyroidism, PTSD per pt report, seizures. Patient A&O x4, and per pt report, pt lives with a friend in a mobile home with ramped entrance. Patient reports IND for all ADLs/IADLs PTA and does not use DME to ambulate. Patient reports that she does not own any DME. Based on the objective data described below, the patient presents with good activity tolerance, strength and balance at baseline. Patient IND rolling, supine > sit, scooting for bed mobility, IND LB dressing donning socks seated EOB, IND sit <> stand. Patient IND ambulating to/from bathroom with gait belt, IND toilet transfer. At this time, patient is at functional baseline independence and will be discharged from OT services following eval as patient states she has no acute OT needs. If functional status changes, will be happy to reassess. Current Level of Function (ADLs/self-care): IND toilet transfer, IND LB dressing    Other factors to consider for discharge: IND at baseline, family support, DME     PLAN :  Recommend with staff: up to commode for toileting, up to chair for meals    Recommendation for discharge: (in order for the patient to meet his/her long term goals)  Home with family support    This discharge recommendation:  Has been made in collaboration with the attending provider and/or case management    IF patient discharges home will need the following DME: none       SUBJECTIVE:   Patient stated I do everything by myself.     OBJECTIVE DATA SUMMARY:   HISTORY:   Past Medical History:   Diagnosis Date    Fatigue     GERD (gastroesophageal reflux disease)     Hypothyroidism     Psychiatric disorder     PTSD per patient    Seizures ([facility] [address]      Past Surgical History:   Procedure Laterality Date    HX APPENDECTOMY      HX  SECTION      HX CHOLECYSTECTOMY      HX HYSTERECTOMY         Prior Level of Function/Environment/Context: IND ADLs/IADLs, mobility  Expanded or extensive additional review of patient history:   Home Situation  Home Environment: Trailer/mobile home  One/Two Story Residence: One story  Living Alone: No  Support Systems: Spouse/Significant Other/Partner  Patient Expects to be Discharged to[de-identified] Trailer/mobile home  Current DME Used/Available at Home: None    EXAMINATION OF PERFORMANCE DEFICITS:  Cognitive/Behavioral Status:  Neurologic State: Alert;Eyes open spontaneously  Orientation Level: Oriented X4  Cognition: Appropriate decision making; Appropriate for age attention/concentration; Appropriate safety awareness; Follows commands    Skin: intact where visible    Hearing: Auditory  Auditory Impairment: None    Vision/Perceptual:    Not formally assessed, appears WFL. Range of Motion:  AROM: Generally decreased, functional    Strength:  Strength: Generally decreased, functional     RUE Strength  Observation: Observed 4/5     LUE Strength  Observation: Observed 4/5    Tone & Sensation:  Tone: Normal   Patient reports no numbness or tingling in BUEs.     Balance:  Sitting: Intact  Standing: Intact    Functional Mobility and Transfers for ADLs:  Bed Mobility:  Rolling: Independent  Supine to Sit: Independent  Scooting: Independent    Transfers:  Sit to Stand: Independent  Stand to Sit: Independent  Bathroom Mobility: Independent  Toilet Transfer : Independent  Assistive Device : Gait Belt    ADL Assessment:  Lower Body Dressing: Independent    ADL Intervention and task modifications:  Lower Body Dressing Assistance  Socks: Independent  Leg Crossed Method Used: Yes  Position Performed: Seated edge of bed    Therapeutic Exercise:  No UE HEP needed at this time. Functional Measure:    [address] [place] [address] AM-PACTM \"6 Clicks\"                                                       Daily Activity Inpatient Short Form  How much help from another person does the patient currently need. .. Total; A Lot A Little None   1. Putting on and taking off regular lower body clothing? []  1 []  2 []  3 [x]  4   2. Bathing (including washing, rinsing, drying)? []  1 []  2 []  3 [x]  4   3. Toileting, which includes using toilet, bedpan or urinal? [] 1 []  2 []  3 [x]  4   4. Putting on and taking off regular upper body clothing? []  1 []  2 []  3 [x]  4   5. Taking care of personal grooming such as brushing teeth? []  1 []  2 []  3 [x]  4   6. Eating meals? []  1 []  2 []  3 [x]  4   © , Trustees of [address] [place] [address], under license to [employer]. All rights reserved     Score:      Interpretation of Tool:  Represents clinically-significant functional categories (i.e. Activities of daily living).   Percentage of Impairment CH    0%   CI    1-19% CJ    20-39% CK    40-59% CL    60-79% CM    80-99% CN     100%   [facility]  Score 6-24 24 23 20-22 15-19 10-14 7-9 6        Occupational Therapy Evaluation Charge Determination   History Examination Decision-Making   LOW Complexity : Brief history review  LOW Complexity : 1-3 performance deficits relating to physical, cognitive , or psychosocial skils that result in activity limitations and / or participation restrictions  LOW Complexity : No comorbidities that affect functional and no verbal or physical assistance needed to complete eval tasks       Based on the above components, the patient evaluation is determined to be of the following complexity level: LOW      Pain Ratin/10    Activity Tolerance:   Good    After treatment patient left in no apparent distress:    working with PT    COMMUNICATION/EDUCATION:   The patients plan of care was discussed with: Physical therapist and Registered nurse. Patient was seen by OT student, Mann Reeves, under direct supervision of supervising OT, sAcencion Mansfield. Supervising OT has reviewed documentation for accuracy and co-signed report.     Thank you for this referral.  SAMEERA Chapman  Time Calculation: 11 mins

## 2021-08-10 NOTE — ED PROVIDER NOTES
EMERGENCY DEPARTMENT HISTORY AND PHYSICAL EXAM      Date: 8/9/2021  Patient Name: Dago Foster    History of Presenting Illness     Chief Complaint   Patient presents with    Dizziness       History Provided By: Patient    HPI: Dago Foster, 64 y.o. female presents to the ED with cc of   Chief Complaint   Patient presents with    Dizziness         There are no other complaints, changes, or physical findings at this time. PCP: None    No current facility-administered medications on file prior to encounter. Current Outpatient Medications on File Prior to Encounter   Medication Sig Dispense Refill    hydrOXYzine HCL (ATARAX) 50 mg tablet Take 50 mg by mouth every six (6) hours as needed (max 4 tablets a day).  naloxone (Narcan) 4 mg/actuation nasal spray Use 1 spray intranasally, then discard. Repeat with new spray every 2 min as needed for opioid overdose symptoms, alternating nostrils. 2 Each 0    [DISCONTINUED] promethazine (PHENERGAN) 25 mg tablet Take 1 Tab by mouth every six (6) hours as needed for Nausea. 12 Tab 0    [DISCONTINUED] dicyclomine (BENTYL) 10 mg capsule Take 1 Cap by mouth three (3) times daily. 20 Cap 0    [DISCONTINUED] promethazine (PHENERGAN) 25 mg tablet Take 1 Tab by mouth every six (6) hours as needed (nausea). 12 Tab 0    [DISCONTINUED] amoxicillin 500 mg tab Take 1,000 mg by mouth three (3) times daily. 30 Tab 0    [DISCONTINUED] albuterol (ProAir HFA) 90 mcg/actuation inhaler Take 1 Puff by inhalation every four (4) hours as needed for Wheezing. 1 Inhaler 0    [DISCONTINUED] sucralfate (Carafate) 100 mg/mL suspension Take 5 mL by mouth four (4) times daily. 414 mL 0    clonazePAM (KLONOPIN) 1 mg tablet Take  by mouth three (3) times daily.  gabapentin (NEURONTIN) 600 mg tablet Take  by mouth four (4) times daily.  dextroamphetamine-amphetamine (ADDERALL) 30 mg tablet Take 30 mg by mouth two (2) times a day.       QUEtiapine (SEROQUEL) 400 mg tablet Take 300 mg by mouth nightly.  buprenorphine-naloxone (SUBOXONE) 8-2 mg subl 1 Tab by SubLINGual route daily.  [DISCONTINUED] QUEtiapine (SEROQUEL) 50 mg tablet Take 50 mg by mouth daily.  [DISCONTINUED] estradiol (CLIMARA) 0.05 mg/24 hr 1 Patch by TransDERmal route two (2) days a week.  [DISCONTINUED] levothyroxine (SYNTHROID) 50 mcg tablet Take 50 mcg by mouth Daily (before breakfast).  [DISCONTINUED] lurasidone (LATUDA) 80 mg tab tablet Take 80 mg by mouth nightly.  [DISCONTINUED] cholecalciferol, VITAMIN D3, (VITAMIN D3) 5,000 unit tab tablet Take 5,000 Units by mouth daily.  [DISCONTINUED] ferrous sulfate (IRON) 325 mg (65 mg iron) tablet Take 65 mg by mouth Daily (before breakfast). Past History     Past Medical History:  Past Medical History:   Diagnosis Date    Fatigue     GERD (gastroesophageal reflux disease)     Hypothyroidism     Psychiatric disorder     PTSD per patient    Seizures ([facility] [address]        Past Surgical History:  Past Surgical History:   Procedure Laterality Date    HX APPENDECTOMY      HX  SECTION      HX CHOLECYSTECTOMY      HX HYSTERECTOMY         Family History:  Family History   Family history unknown: Yes       Social History:  Social History     Tobacco Use    Smoking status: Current Every Day Smoker     Packs/day: 0.50    Smokeless tobacco: Never Used   Substance Use Topics    Alcohol use: No     Alcohol/week: 0.0 standard drinks    Drug use: Never       Allergies:   Allergies   Allergen Reactions    Augmentin [Amoxicillin-Pot Clavulanate] Nausea and Vomiting         Review of Systems   Review of Systems    Physical Exam   Physical Exam    Diagnostic Study Results     Labs -     Recent Results (from the past 12 hour(s))   CBC WITH AUTOMATED DIFF    Collection Time: 21  7:43 PM   Result Value Ref Range    WBC 12.6 (H) 3.6 - 11.0 K/uL    RBC 5.00 3.80 - 5.20 M/uL    HGB 15.1 11.5 - 16.0 g/dL    HCT 45.2 35.0 - 47.0 %    MCV 90.4 80.0 - 99.0 FL    MCH 30.2 26.0 - 34.0 PG    MCHC 33.4 30.0 - 36.5 g/dL    RDW 12.7 11.5 - 14.5 %    PLATELET 267 644 - 804 K/uL    MPV 9.5 8.9 - 12.9 FL    NRBC 0.0 0.0  WBC    ABSOLUTE NRBC 0.00 0.00 - 0.01 K/uL    NEUTROPHILS 71 32 - 75 %    LYMPHOCYTES 23 12 - 49 %    MONOCYTES 4 (L) 5 - 13 %    EOSINOPHILS 1 0 - 7 %    BASOPHILS 1 0 - 1 %    IMMATURE GRANULOCYTES 0 0 - 0.5 %    ABS. NEUTROPHILS 9.0 (H) 1.8 - 8.0 K/UL    ABS. LYMPHOCYTES 2.9 0.8 - 3.5 K/UL    ABS. MONOCYTES 0.5 0.0 - 1.0 K/UL    ABS. EOSINOPHILS 0.1 0.0 - 0.4 K/UL    ABS. BASOPHILS 0.1 0.0 - 0.1 K/UL    ABS. IMM. GRANS. 0.0 0.00 - 0.04 K/UL    DF AUTOMATED     METABOLIC PANEL, COMPREHENSIVE    Collection Time: 08/09/21  7:43 PM   Result Value Ref Range    Sodium 144 136 - 145 mmol/L    Potassium 2.6 (LL) 3.5 - 5.1 mmol/L    Chloride 115 (H) 97 - 108 mmol/L    CO2 25 21 - 32 mmol/L    Anion gap 4 (L) 5 - 15 mmol/L    Glucose 121 (H) 65 - 100 mg/dL    BUN 8 6 - 20 mg/dL    Creatinine 0.67 0.55 - 1.02 mg/dL    BUN/Creatinine ratio 12 12 - 20      GFR est AA >60 >60 ml/min/1.73m2    GFR est non-AA >60 >60 ml/min/1.73m2    Calcium 6.6 (L) 8.5 - 10.1 mg/dL    Bilirubin, total 0.2 0.2 - 1.0 mg/dL    AST (SGOT) <3 (L) 15 - 37 U/L    ALT (SGPT) 12 12 - 78 U/L    Alk. phosphatase 65 45 - 117 U/L    Protein, total 4.9 (L) 6.4 - 8.2 g/dL    Albumin 2.3 (L) 3.5 - 5.0 g/dL    Globulin 2.6 2.0 - 4.0 g/dL    A-G Ratio 0.9 (L) 1.1 - 2.2     TROPONIN I    Collection Time: 08/09/21  7:43 PM   Result Value Ref Range    Troponin-I, Qt. <0.05 <0.05 ng/mL       Labs reviewed by me    Radiologic Studies -   No orders to display     CT Results  (Last 48 hours)    None        CXR Results  (Last 48 hours)    None            Medical Decision Making     I am the first provider for this patient. I reviewed the vital signs, available nursing notes, past medical history, past surgical history, family history and social history.     RADIOLOGY report and LABS reviewed by me    Vital Signs-Reviewed the patient's vital signs. Patient Vitals for the past 12 hrs:   Temp Pulse Resp BP SpO2   08/09/21 2156 -- 71 15 123/85 98 %   08/09/21 1931 98.4 °F (36.9 °C) 89 21 103/72 94 %       EKG interpretation: (Preliminary)      Records Reviewed: Nurse's note. Provider Notes (Medical Decision Making):    Patient presents with DIFF DX :        ED Course:   Initial assessment performed. The patients presenting problems have been discussed, and they are in agreement with the care plan formulated and outlined with them. I have encouraged them to ask questions as they arise throughout their visit. TREATMENT RESPONSE -Stable          Jay Lazo MD      Disposition:  Admitted   Diagnostic tests were reviewed and questions answered. Diagnosis, care plan and treatment options were discussed. The patient understand instructions and will follow up as directed. Condition stable    Admitting Provider:  No admitting provider for patient encounter. Consulting Provider:  No ref. provider found       DISCHARGE PLAN:  1. Current Discharge Medication List        2. Follow-up Information    None       3. Return to ED if worse     Diagnosis     Clinical Impression:     ICD-10-CM ICD-9-CM    1. Dizziness  R42 780.4    2. Vertigo  R42 780.4    3. Hypokalemia  E87.6 276.8         Attestations:    Jay Lazo MD    Please note that this dictation was completed with [employer], the computer voice recognition software. Quite often unanticipated grammatical, syntax, homophones, and other interpretive errors are inadvertently transcribed by the computer software. Please disregard these errors. Please excuse any errors that have escaped final proofreading. Thank you.

## 2021-08-10 NOTE — H&P
History and Physical    NAME: Zach Merida   :  1965   MRN:  [id number]     Date/Time:  2021 10:59 PM    Patient PCP: None  ______________________________________________________________________             Subjective:     CHIEF COMPLAINT:     Dizziness    HISTORY OF PRESENT ILLNESS:       Patient is a 64y.o. year old female history of fatigue GERD hypothyroidism depression posttraumatic stress disorder seizure disorder came to emergency room because of dizziness seen by the ER physician work-up done in the ER fluctuating positive for amphetamines and marijuana e patient at home on Suboxone Klonopin and gabapentin for seizures    Patient denies any chest pain shortness with nausea no vomiting potassium was 2.6 patient was recommend to be admitted for further evaluation and treatment    Past Medical History:   Diagnosis Date    Fatigue     GERD (gastroesophageal reflux disease)     Hypothyroidism     Psychiatric disorder     PTSD per patient    Seizures ([address]         Past Surgical History:   Procedure Laterality Date    HX APPENDECTOMY      HX  SECTION      HX CHOLECYSTECTOMY      HX HYSTERECTOMY         Social History     Tobacco Use    Smoking status: Current Every Day Smoker     Packs/day: 0.50    Smokeless tobacco: Never Used   Substance Use Topics    Alcohol use: No     Alcohol/week: 0.0 standard drinks        Family History   Family history unknown: Yes       Allergies   Allergen Reactions    Augmentin [Amoxicillin-Pot Clavulanate] Nausea and Vomiting        Prior to Admission medications    Medication Sig Start Date End Date Taking? Authorizing Provider   hydrOXYzine HCL (ATARAX) 50 mg tablet Take 50 mg by mouth every six (6) hours as needed (max 4 tablets a day). Yes Other, MD Kendall   naloxone (Narcan) 4 mg/actuation nasal spray Use 1 spray intranasally, then discard. Repeat with new spray every 2 min as needed for opioid overdose symptoms, alternating nostrils.  21 Chava KLEIN, DO   clonazePAM (KLONOPIN) 1 mg tablet Take  by mouth three (3) times daily. Provider, Historical   gabapentin (NEURONTIN) 600 mg tablet Take  by mouth four (4) times daily. Provider, Historical   dextroamphetamine-amphetamine (ADDERALL) 30 mg tablet Take 30 mg by mouth two (2) times a day. Provider, Historical   QUEtiapine (SEROQUEL) 400 mg tablet Take 300 mg by mouth nightly. Provider, Historical   buprenorphine-naloxone (SUBOXONE) 8-2 mg subl 1 Tab by SubLINGual route daily. Provider, Historical         Current Facility-Administered Medications:     potassium chloride 10 mEq in 100 ml IVPB, 10 mEq, IntraVENous, ONCE, Daina Hampton MD, Last Rate: 100 mL/hr at 08/09/21 2253, 10 mEq at 08/09/21 2253    clonazePAM (KlonoPIN) tablet 0.25 mg, 0.25 mg, Oral, TID, Oscar Recinos MD    hydrOXYzine HCL (ATARAX) tablet 50 mg, 50 mg, Oral, Q6H PRN, Ha Recinos MD    QUEtiapine (SEROquel) tablet 300 mg, 300 mg, Oral, QHS, Mohiuddin, Sonny Hock, MD Armida Brunner  . PHARMACY TO SUBSTITUTE PER PROTOCOL (Reordered from: naloxone (Narcan) 4 mg/actuation nasal spray), , , Per Protocol, Oscar Recinos MD    potassium chloride SR (KLOR-CON 10) tablet 40 mEq, 40 mEq, Oral, NOW, Oscar Recinos MD    0.9% sodium chloride infusion, 75 mL/hr, IntraVENous, CONTINUOUS, Oscar Recinos MD    acetaminophen (TYLENOL) tablet 650 mg, 650 mg, Oral, Q6H PRN **OR** acetaminophen (TYLENOL) suppository 650 mg, 650 mg, Rectal, Q6H PRN, Oscar Recinos MD    polyethylene glycol (MIRALAX) packet 17 g, 17 g, Oral, DAILY PRN, Oscar Recinos MD    ondansetron (ZOFRAN ODT) tablet 4 mg, 4 mg, Oral, Q8H PRN **OR** ondansetron (ZOFRAN) injection 4 mg, 4 mg, IntraVENous, Q6H PRN, Ha Recinos MD    [START ON 8/10/2021] enoxaparin (LOVENOX) injection 40 mg, 40 mg, SubCUTAneous, DAILY, Ha Recinos MD    Current Outpatient Medications:     hydrOXYzine HCL (ATARAX) 50 mg tablet, Take 50 mg by mouth every six (6) hours as needed (max 4 tablets a day). , Disp: , Rfl:     naloxone (Narcan) 4 mg/actuation nasal spray, Use 1 spray intranasally, then discard. Repeat with new spray every 2 min as needed for opioid overdose symptoms, alternating nostrils. , Disp: 2 Each, Rfl: 0    clonazePAM (KLONOPIN) 1 mg tablet, Take  by mouth three (3) times daily. , Disp: , Rfl:     gabapentin (NEURONTIN) 600 mg tablet, Take  by mouth four (4) times daily. , Disp: , Rfl:     dextroamphetamine-amphetamine (ADDERALL) 30 mg tablet, Take 30 mg by mouth two (2) times a day., Disp: , Rfl:     QUEtiapine (SEROQUEL) 400 mg tablet, Take 300 mg by mouth nightly., Disp: , Rfl:     buprenorphine-naloxone (SUBOXONE) 8-2 mg subl, 1 Tab by SubLINGual route daily. , Disp: , Rfl:     LAB DATA REVIEWED:    Recent Results (from the past 24 hour(s))   CBC WITH AUTOMATED DIFF    Collection Time: 08/09/21  7:43 PM   Result Value Ref Range    WBC 12.6 (H) 3.6 - 11.0 K/uL    RBC 5.00 3.80 - 5.20 M/uL    HGB 15.1 11.5 - 16.0 g/dL    HCT 45.2 35.0 - 47.0 %    MCV 90.4 80.0 - 99.0 FL    MCH 30.2 26.0 - 34.0 PG    MCHC 33.4 30.0 - 36.5 g/dL    RDW 12.7 11.5 - 14.5 %    PLATELET 198 847 - 880 K/uL    MPV 9.5 8.9 - 12.9 FL    NRBC 0.0 0.0  WBC    ABSOLUTE NRBC 0.00 0.00 - 0.01 K/uL    NEUTROPHILS 71 32 - 75 %    LYMPHOCYTES 23 12 - 49 %    MONOCYTES 4 (L) 5 - 13 %    EOSINOPHILS 1 0 - 7 %    BASOPHILS 1 0 - 1 %    IMMATURE GRANULOCYTES 0 0 - 0.5 %    ABS. NEUTROPHILS 9.0 (H) 1.8 - 8.0 K/UL    ABS. LYMPHOCYTES 2.9 0.8 - 3.5 K/UL    ABS. MONOCYTES 0.5 0.0 - 1.0 K/UL    ABS. EOSINOPHILS 0.1 0.0 - 0.4 K/UL    ABS. BASOPHILS 0.1 0.0 - 0.1 K/UL    ABS. IMM.  GRANS. 0.0 0.00 - 0.04 K/UL    DF AUTOMATED     METABOLIC PANEL, COMPREHENSIVE    Collection Time: 08/09/21  7:43 PM   Result Value Ref Range    Sodium 144 136 - 145 mmol/L    Potassium 2.6 (LL) 3.5 - 5.1 mmol/L    Chloride 115 (H) 97 - 108 mmol/L    CO2 25 21 - 32 mmol/L    Anion gap 4 (L) 5 - 15 mmol/L Glucose 121 (H) 65 - 100 mg/dL    BUN 8 6 - 20 mg/dL    Creatinine 0.67 0.55 - 1.02 mg/dL    BUN/Creatinine ratio 12 12 - 20      GFR est AA >60 >60 ml/min/1.73m2    GFR est non-AA >60 >60 ml/min/1.73m2    Calcium 6.6 (L) 8.5 - 10.1 mg/dL    Bilirubin, total 0.2 0.2 - 1.0 mg/dL    AST (SGOT) <3 (L) 15 - 37 U/L    ALT (SGPT) 12 12 - 78 U/L    Alk. phosphatase 65 45 - 117 U/L    Protein, total 4.9 (L) 6.4 - 8.2 g/dL    Albumin 2.3 (L) 3.5 - 5.0 g/dL    Globulin 2.6 2.0 - 4.0 g/dL    A-G Ratio 0.9 (L) 1.1 - 2.2     TROPONIN I    Collection Time: 08/09/21  7:43 PM   Result Value Ref Range    Troponin-I, Qt. <0.05 <0.05 ng/mL       XR Results (most recent):  Results from Hospital Encounter encounter on 08/06/21    XR CHEST PORT    Narrative  1 view    Atelectasis at left greater than right lung bases with lungs otherwise clear. No  effusion or pneumothorax. Normal heart and mediastinum         No orders to display        Review of Systems:  Constitutional: Negative for chills and fever. HENT: Negative. Eyes: Negative. Respiratory: Negative. Cardiovascular: Negative. Gastrointestinal: Negative for abdominal pain and nausea. Skin: Negative. Neurological: Negative. Objective:   VITALS:    Visit Vitals  /85   Pulse 71   Temp 98.4 °F (36.9 °C)   Resp 15   Ht 5' 8\" (1.727 m)   Wt 81.6 kg (180 lb)   SpO2 98%   BMI 27.37 kg/m²       Physical Exam:   Constitutional: pt is oriented to person, place, and time. HENT:   Head: Normocephalic and atraumatic. Eyes: Pupils are equal, round, and reactive to light. EOM are normal.   Cardiovascular: Normal rate, regular rhythm and normal heart sounds. Pulmonary/Chest: Breath sounds normal. No wheezes. No rales. Exhibits no tenderness. Abdominal: Soft. Bowel sounds are normal. There is no abdominal tenderness. There is no rebound and no guarding. Musculoskeletal: Normal range of motion.    Neurological: pt is alert and oriented to person, place, and time. Alert. Normal strength. No cranial nerve deficit or sensory deficit. Displays a negative Romberg sign. ASSESSMENT & PLAN:    Hypokalemia  Dizziness  Posttraumatic stress disorder  Polysubstance abuse with amphetamines and marijuana  Generalized anxiety disorder        Current Facility-Administered Medications:     potassium chloride 10 mEq in 100 ml IVPB, 10 mEq, IntraVENous, ONCE, Manuel Palacios MD, Last Rate: 100 mL/hr at 08/09/21 2253, 10 mEq at 08/09/21 2253    clonazePAM (KlonoPIN) tablet 0.25 mg, 0.25 mg, Oral, TID, Chantal Recinos MD    hydrOXYzine HCL (ATARAX) tablet 50 mg, 50 mg, Oral, Q6H PRN, Ha Recinos MD    QUEtiapine (SEROquel) tablet 300 mg, 300 mg, Oral, QHS, Chantal Recinos MD Judeen Marc  . PHARMACY TO SUBSTITUTE PER PROTOCOL (Reordered from: naloxone (Narcan) 4 mg/actuation nasal spray), , , Per Protocol, Chantal Recinos MD    potassium chloride SR (KLOR-CON 10) tablet 40 mEq, 40 mEq, Oral, NOW, Chantal Recinos MD    0.9% sodium chloride infusion, 75 mL/hr, IntraVENous, CONTINUOUS, Chantal Recinos MD    acetaminophen (TYLENOL) tablet 650 mg, 650 mg, Oral, Q6H PRN **OR** acetaminophen (TYLENOL) suppository 650 mg, 650 mg, Rectal, Q6H PRN, Chantal Recinos MD    polyethylene glycol (MIRALAX) packet 17 g, 17 g, Oral, DAILY PRN, Chantal Recinos MD    ondansetron (ZOFRAN ODT) tablet 4 mg, 4 mg, Oral, Q8H PRN **OR** ondansetron (ZOFRAN) injection 4 mg, 4 mg, IntraVENous, Q6H PRN, Ha Recinos MD    [START ON 8/10/2021] enoxaparin (LOVENOX) injection 40 mg, 40 mg, SubCUTAneous, DAILY, Ha Recinos MD    Current Outpatient Medications:     hydrOXYzine HCL (ATARAX) 50 mg tablet, Take 50 mg by mouth every six (6) hours as needed (max 4 tablets a day). , Disp: , Rfl:     naloxone (Narcan) 4 mg/actuation nasal spray, Use 1 spray intranasally, then discard. Repeat with new spray every 2 min as needed for opioid overdose symptoms, alternating nostrils. , Disp: 2 Each, Rfl: 0    clonazePAM (KLONOPIN) 1 mg tablet, Take  by mouth three (3) times daily. , Disp: , Rfl:     gabapentin (NEURONTIN) 600 mg tablet, Take  by mouth four (4) times daily. , Disp: , Rfl:     dextroamphetamine-amphetamine (ADDERALL) 30 mg tablet, Take 30 mg by mouth two (2) times a day., Disp: , Rfl:     QUEtiapine (SEROQUEL) 400 mg tablet, Take 300 mg by mouth nightly., Disp: , Rfl:     buprenorphine-naloxone (SUBOXONE) 8-2 mg subl, 1 Tab by SubLINGual route daily. , Disp: , Rfl:     Replace potassium  Continue home medication  Possible discharge home tomorrow if stable  ________________________________________________________________________    Signed: Maria De Jesus Montoya MD

## 2021-08-10 NOTE — PROGRESS NOTES
PHYSICAL THERAPY EVALUATION  Patient: Min Bragg (51 y.o. female)  Date: 8/10/2021  Primary Diagnosis: Hypokalemia [E87.6]        Precautions: falls    ASSESSMENT  This is a 63 y/o female came to  [facility]  ED with c/o  dizziness , work-up done in the ER is fluctuating positive for amphetamines and marijuana , admitted on 8/9/21 for hypokalemia . Pt has h/o  fatigue ,GERD ,hypothyroidism ,depression, posttraumatic stress disorder and seizure disorder. Pt received semi-supine in bed , agreeable for PT eval. Pt is A& O x 4 , as per pt report , she lives with friend in a mobile home with w/c ramp entrance, was IND with ADL's and IND for functional transfers/mobility with no AD prior to admission . DME owned : none    Based on the objective data described below, the patient presents with  strength within functional limits , IND with bed mobility , IND with  all functional transfers , intact sitting/standing balance , is able to ambulate - 150' with no AD, SUP , little unsteady but no instances of LOB . Pt states \" I have not been out of bed from 1 day \" . Pt instructed and reviewed HEP to build up endurance , verbalizes understanding . Pt is at her functional baseline with no acute PT needs at this time . Recommend d/c to home with HHPT for home safety   when medically appropriate . Current Level of Function Impacting Discharge (mobility/balance): Pt IND with all transfers and SUP for ambulation     Functional Outcome Measure: The patient scored 22 on the AM PAC basic mobility  outcome measure which is indicative of low complexity.       Other factors to consider for discharge: time since onset          PLAN :  Recommendation for discharge: (in order for the patient to meet his/her long term goals)  HHPT for home safety    This discharge recommendation:  Has been made in collaboration with the attending provider and/or case management    IF patient discharges home will need the following DME: none SUBJECTIVE:   Patient stated I can walk    OBJECTIVE DATA SUMMARY:   HISTORY:    Past Medical History:   Diagnosis Date    Fatigue     GERD (gastroesophageal reflux disease)     Hypothyroidism     Psychiatric disorder     PTSD per patient    Seizures ([facility] [address]      Past Surgical History:   Procedure Laterality Date    HX APPENDECTOMY      HX  SECTION      HX CHOLECYSTECTOMY      HX HYSTERECTOMY         Personal factors and/or comorbidities impacting plan of care:     Home Situation  Home Environment: Trailer/mobile home  One/Two Story Residence: One story  Living Alone: No  Support Systems: Spouse/Significant Other/Partner  Patient Expects to be Discharged to[de-identified] Trailer/mobile home  Current DME Used/Available at Home: None    PLOF: Pt IND for ADLS/IADLS, IND with mobility prior to admission. EXAMINATION/PRESENTATION/DECISION MAKING:   Critical Behavior:  Neurologic State: Alert, Eyes open spontaneously  Orientation Level: Oriented X4  Cognition: Appropriate decision making, Appropriate for age attention/concentration, Appropriate safety awareness, Follows commands     Hearing:   Auditory  Auditory Impairment: None  Skin:  Intact where exposed    Range Of Motion:  AROM: Within functional limits  Strength:    Strength: Generally decreased, functional (3+/5 grossly for b/l Le)     Tone & Sensation:   Tone: Normal  Sensation: Intact  Functional Mobility:  Bed Mobility:  Rolling: Independent  Supine to Sit: Independent  Sit to Supine: Independent  Scooting: Independent  Transfers:  Sit to Stand: Independent  Stand to Sit: Independent  Stand Pivot Transfers: Independent     Balance:   Sitting: Intact  Standing: Intact  Ambulation/Gait Training:  Distance (ft): 150 Feet (ft)  Assistive Device: Gait belt  Ambulation - Level of Assistance: Supervision  Speed/Liliana: Slow    Functional Measure:    [address] [facility] [address] [address] AM-PAC 6 Clicks         Basic Mobility Inpatient Short Form  How much difficulty does the patient currently have. .. Unable A Lot A Little None   1. Turning over in bed (including adjusting bedclothes, sheets and blankets)? [] 1   [] 2   [] 3   [x] 4   2. Sitting down on and standing up from a chair with arms ( e.g., wheelchair, bedside commode, etc.)   [] 1   [] 2   [] 3   [x] 4   3. Moving from lying on back to sitting on the side of the bed? [] 1   [] 2   [] 3   [x] 4          How much help from another person does the patient currently need. .. Total A Lot A Little None   4. Moving to and from a bed to a chair (including a wheelchair)? [] 1   [] 2   [] 3   [x] 4   5. Need to walk in hospital room? [] 1   [] 2   [x] 3   [] 4   6. Climbing 3-5 steps with a railing? [] 1   [] 2   [x] 3   [] 4   © , Trustees of [address] [place] [address], under license to [employer]. All rights reserved     Score:  Initial: 22 Most Recent: X (Date: --8/10/21 )   Interpretation of Tool:  Represents activities that are increasingly more difficult (i.e. Bed mobility, Transfers, Gait). Score 24 23 22-20 19-15 14-10 9-7 6   Modifier CH CI CJ CK CL CM CN          Physical Therapy Evaluation Charge Determination   History Examination Presentation Decision-Making   LOW Complexity : Zero comorbidities / personal factors that will impact the outcome / POC LOW Complexity : 1-2 Standardized tests and measures addressing body structure, function, activity limitation and / or participation in recreation  LOW Complexity : Stable, uncomplicated  Other Functional Measure [facility] 6 low complexity      Based on the above components, the patient evaluation is determined to be of the following complexity level: LOW     Pain Ratin/10    Activity Tolerance:   Good  Please refer to the flowsheet for vital signs taken during this treatment.     After treatment patient left in no apparent distress:   Supine in bed and Call bell within reach    COMMUNICATION/EDUCATION:   The patients plan of care was discussed with: Occupational therapist.     Fall prevention education was provided and the patient/caregiver indicated understanding. Partial eval with OT for increased pt's safety and maximum functional outcome .     Thank you for this referral.  Juve Kaye   Time Calculation: 27 mins

## 2021-08-10 NOTE — CONSULTS
Consult    Patient: Teja Malone MRN: [id number]  SSN: [id number]    YOB: 1965  Age: 64 y.o. Sex: female      Subjective:      Teja Malone is a 64 y.o. female who is being seen for syncope and abnormal EKG. Patient with history of GERD, hypothyroidism, psychiatric disorder, PTSD and seizures was brought by EMS after she passed out she was sitting. When all of a sudden she passed out and her  caught her. She denied recent change in her weight or lower extremity swelling or palpitation dizziness beforehand. Denied any prodromal events that was thought to be secondary to hypotension. At the time of my examination to she denied any further complaints.       Past Medical History:   Diagnosis Date    Fatigue     GERD (gastroesophageal reflux disease)     Hypothyroidism     Psychiatric disorder     PTSD per patient    Seizures ([facility] [address]      Past Surgical History:   Procedure Laterality Date    HX APPENDECTOMY      HX  SECTION      HX CHOLECYSTECTOMY      HX HYSTERECTOMY        Family History   Family history unknown: Yes     Social History     Tobacco Use    Smoking status: Current Every Day Smoker     Packs/day: 0.50    Smokeless tobacco: Never Used   Substance Use Topics    Alcohol use: No     Alcohol/week: 0.0 standard drinks      Current Facility-Administered Medications   Medication Dose Route Frequency Provider Last Rate Last Admin    gabapentin (NEURONTIN) capsule 600 mg  600 mg Oral QID Ha Recinos MD   600 mg at 08/10/21 1244    hydrOXYzine HCL (ATARAX) tablet 50 mg  50 mg Oral Q6H PRN Ha Recinos MD   50 mg at 08/10/21 1434    clonazePAM (KlonoPIN) tablet 0.25 mg  0.25 mg Oral TID Ha Recinos MD   0.25 mg at 08/10/21 1440    QUEtiapine (SEROquel) tablet 300 mg  300 mg Oral QHS Ha Recinos MD   300 mg at 08/10/21 0012    0.9% sodium chloride infusion  75 mL/hr IntraVENous CONTINUOUS Ha Recinos MD 75 mL/hr at 08/10/21 1435 75 mL/hr at 08/10/21 1435    acetaminophen (TYLENOL) tablet 650 mg  650 mg Oral Q6H PRN Mode Recinos MD        Or   [employer] acetaminophen (TYLENOL) suppository 650 mg  650 mg Rectal Q6H PRN Mode Recinos MD        polyethylene glycol (MIRALAX) packet 17 g  17 g Oral DAILY PRN Mode Recinos MD        ondansetron (ZOFRAN ODT) tablet 4 mg  4 mg Oral Q8H PRN Mode Recinos MD        Or    ondansetron [facility]) injection 4 mg  4 mg IntraVENous Q6H PRN Mode Recinos MD        enoxaparin (LOVENOX) injection 40 mg  40 mg SubCUTAneous DAILY Ha Recinos MD   40 mg at 08/10/21 6642        Allergies   Allergen Reactions    Augmentin [Amoxicillin-Pot Clavulanate] Nausea and Vomiting       Review of Systems:  A comprehensive review of systems was negative except for that written in the History of Present Illness. Objective:     Vitals:    08/10/21 0620 08/10/21 0753 08/10/21 1000 08/10/21 1444   BP: 113/82 (!) 114/58 111/70 123/80   Pulse: 66 75 80 66   Resp: 18 17 18 18   Temp:  97.9 °F (36.6 °C) 97.7 °F (36.5 °C) 98 °F (36.7 °C)   SpO2: 93% 93% 93% 95%   Weight:       Height:            Physical Exam:  General:  Alert, cooperative, no distress, appears stated age. Eyes:  Conjunctivae/corneas clear. PERRL, EOMs intact. Fundi benign   Ears:  Normal TMs and external ear canals both ears. Nose: Nares normal. Septum midline. Mucosa normal. No drainage or sinus tenderness. Mouth/Throat: Lips, mucosa, and tongue normal. Teeth and gums normal.   Neck: Supple, symmetrical, trachea midline, no adenopathy, thyroid: no enlargment/tenderness/nodules, no carotid bruit and no JVD. Back:   Symmetric, no curvature. ROM normal. No CVA tenderness. Lungs:   Clear to auscultation bilaterally. Heart:  Regular rate and rhythm, S1, S2 normal, no murmur, click, rub or gallop. Abdomen:   Soft, non-tender. Bowel sounds normal. No masses,  No organomegaly.    Extremities: Extremities normal, atraumatic, no cyanosis or edema. Pulses: 2+ and symmetric all extremities. Skin: Skin color, texture, turgor normal. No rashes or lesions   Lymph nodes: Cervical, supraclavicular, and axillary nodes normal.   Neurologic: CNII-XII intact. Normal strength, sensation and reflexes throughout. Assessment:     Hospital Problems  Date Reviewed: 9/29/2015        Codes Class Noted POA    Hypokalemia ICD-10-CM: E87.6  ICD-9-CM: 276.8  8/9/2021 Unknown            Patient is a 43-year-old white female with:  1. Syncope  2. Hypokalemia  3. Urine drug screen is positive for amphetamine and marijuana. Recheck BMP  Plan:     CT head was nonacute. EKG showed nonspecific ST-T wave changes in the anterolateral leads. Chest x-ray showed atelectasis. Hemoglobin 13.7, platelet 398 sodium 355 potassium 4.4. Was 2.6 upon arrival.Check orthostatics and echocardiogram.  Check troponin. Recheck BMP    Thank you  For involving me in Mrs. Rojelio doty. I will follow.     Signed By: Kristan Chambers MD     August 10, 2021

## 2021-08-11 ENCOUNTER — APPOINTMENT (OUTPATIENT)
Dept: NON INVASIVE DIAGNOSTICS | Age: 56
DRG: 641 | End: 2021-08-11
Attending: NURSE PRACTITIONER
Payer: MEDICARE

## 2021-08-11 VITALS
HEIGHT: 68 IN | WEIGHT: 180 LBS | HEART RATE: 68 BPM | TEMPERATURE: 98.1 F | BODY MASS INDEX: 27.28 KG/M2 | OXYGEN SATURATION: 95 % | DIASTOLIC BLOOD PRESSURE: 87 MMHG | RESPIRATION RATE: 18 BRPM | SYSTOLIC BLOOD PRESSURE: 132 MMHG

## 2021-08-11 LAB
ANION GAP SERPL CALC-SCNC: 4 MMOL/L (ref 5–15)
ATRIAL RATE: 65 BPM
BUN SERPL-MCNC: 9 MG/DL (ref 6–20)
BUN/CREAT SERPL: 12 (ref 12–20)
CA-I BLD-MCNC: 8.9 MG/DL (ref 8.5–10.1)
CALCULATED P AXIS, ECG09: 62 DEGREES
CALCULATED R AXIS, ECG10: 68 DEGREES
CALCULATED T AXIS, ECG11: 77 DEGREES
CHLORIDE SERPL-SCNC: 108 MMOL/L (ref 97–108)
CO2 SERPL-SCNC: 27 MMOL/L (ref 21–32)
CREAT SERPL-MCNC: 0.77 MG/DL (ref 0.55–1.02)
DIAGNOSIS, 93000: NORMAL
ECHO AV MEAN GRADIENT: 2 MMHG
ECHO AV MEAN VELOCITY: 66.2 CM/S
ECHO AV PEAK GRADIENT: 4 MMHG
ECHO AV PEAK VELOCITY: 105 CM/S
ECHO AV VTI: 17.9 CM
ECHO EST RA PRESSURE: 5 MMHG
ECHO LV E' SEPTAL VELOCITY: 7.99 CM/S
ECHO LV EDV A2C: 51.5 CM3
ECHO LV EDV A4C: 57 CM3
ECHO LV ESV A2C: 18 CM3
ECHO LV ESV A4C: 32 CM3
ECHO LV INTERNAL DIMENSION DIASTOLIC: 3.72 CM (ref 3.9–5.3)
ECHO LV INTERNAL DIMENSION SYSTOLIC: 2.62 CM
ECHO LV IVSD: 0.89 CM (ref 0.6–0.9)
ECHO LV MASS 2D: 97.7 G (ref 67–162)
ECHO LV MASS INDEX 2D: 50.1 G/M2 (ref 43–95)
ECHO LV POSTERIOR WALL DIASTOLIC: 0.91 CM (ref 0.6–0.9)
ECHO LVOT PEAK GRADIENT: 3 MMHG
ECHO LVOT PEAK VELOCITY: 81.8 CM/S
ECHO LVOT VTI: 12.5 CM
ECHO MV "A" WAVE DURATION: 129 MS
ECHO MV A VELOCITY: 91.3 CM/S
ECHO MV AREA PHT: 3.24 CM2
ECHO MV E VELOCITY: 51.8 CM/S
ECHO MV E/A RATIO: 0.57
ECHO MV E/E' SEPTAL: 6.48
ECHO MV PRESSURE HALF TIME (PHT): 68 MS
ECHO PV MAX VELOCITY: 82.7 CM/S
ECHO PV MEAN GRADIENT: 1 MMHG
ECHO PV PEAK INSTANTANEOUS GRADIENT SYSTOLIC: 3 MMHG
ECHO PV VTI: 15 CM
ECHO RA AREA 4C: 14.19 CM2
ECHO RIGHT VENTRICULAR SYSTOLIC PRESSURE (RVSP): 15 MMHG
ECHO TV MAX VELOCITY: 159 CM/S
ECHO TV REGURGITANT PEAK GRADIENT: 10 MMHG
GLUCOSE SERPL-MCNC: 81 MG/DL (ref 65–100)
LVOT MG: 1 MMHG
MV DEC SLOPE: 2250 MM/S2
MV DEC SLOPE: 2250 MM/S2
P-R INTERVAL, ECG05: 128 MS
POTASSIUM SERPL-SCNC: 3.8 MMOL/L (ref 3.5–5.1)
Q-T INTERVAL, ECG07: 442 MS
QRS DURATION, ECG06: 78 MS
QTC CALCULATION (BEZET), ECG08: 459 MS
SODIUM SERPL-SCNC: 139 MMOL/L (ref 136–145)
TROPONIN I SERPL-MCNC: <0.05 NG/ML
VENTRICULAR RATE, ECG03: 65 BPM

## 2021-08-11 PROCEDURE — 74011250637 HC RX REV CODE- 250/637: Performed by: NURSE PRACTITIONER

## 2021-08-11 PROCEDURE — 93306 TTE W/DOPPLER COMPLETE: CPT

## 2021-08-11 PROCEDURE — 74011250637 HC RX REV CODE- 250/637: Performed by: FAMILY MEDICINE

## 2021-08-11 PROCEDURE — 80048 BASIC METABOLIC PNL TOTAL CA: CPT

## 2021-08-11 PROCEDURE — 36415 COLL VENOUS BLD VENIPUNCTURE: CPT

## 2021-08-11 PROCEDURE — 74011250636 HC RX REV CODE- 250/636: Performed by: FAMILY MEDICINE

## 2021-08-11 PROCEDURE — 84484 ASSAY OF TROPONIN QUANT: CPT

## 2021-08-11 RX ORDER — CLONAZEPAM 0.5 MG/1
0.25 TABLET ORAL 3 TIMES DAILY
Qty: 11 TABLET | Refills: 0 | Status: SHIPPED | OUTPATIENT
Start: 2021-08-11 | End: 2021-08-11

## 2021-08-11 RX ORDER — CLONAZEPAM 0.5 MG/1
0.5 TABLET ORAL 3 TIMES DAILY
Status: DISCONTINUED | OUTPATIENT
Start: 2021-08-11 | End: 2021-08-12 | Stop reason: HOSPADM

## 2021-08-11 RX ADMIN — CLONAZEPAM 0.25 MG: 0.5 TABLET ORAL at 08:29

## 2021-08-11 RX ADMIN — GABAPENTIN 600 MG: 300 CAPSULE ORAL at 08:29

## 2021-08-11 RX ADMIN — CLONAZEPAM 0.5 MG: 0.5 TABLET ORAL at 16:14

## 2021-08-11 RX ADMIN — GABAPENTIN 600 MG: 300 CAPSULE ORAL at 12:38

## 2021-08-11 RX ADMIN — SODIUM CHLORIDE 75 ML/HR: 9 INJECTION, SOLUTION INTRAVENOUS at 16:14

## 2021-08-11 RX ADMIN — ENOXAPARIN SODIUM 40 MG: 40 INJECTION SUBCUTANEOUS at 08:29

## 2021-08-11 NOTE — PROGRESS NOTES
Problem: Falls - Risk of  Goal: *Absence of Falls  Description: Document Star Arevalo Fall Risk and appropriate interventions in the flowsheet.   Outcome: Progressing Towards Goal  Note: Fall Risk Interventions:            Medication Interventions: Patient to call before getting OOB                   Problem: Patient Education: Go to Patient Education Activity  Goal: Patient/Family Education  Outcome: Progressing Towards Goal

## 2021-08-11 NOTE — PROGRESS NOTES
Progress Note    Patient: Lizeth Winn MRN: [id number]  SSN: [id number]    YOB: 1965  Age: 64 y.o. Sex: female      Admit Date: 8/9/2021    LOS: 2 days     Subjective:   No acute events overnight      Objective:     Vitals:    08/10/21 2326 08/11/21 0026 08/11/21 0739 08/11/21 0830   BP: (!) 140/85 (!) 140/85 (!) 143/88    Pulse: 68 68 69    Resp: 18 18 18    Temp: 98.5 °F (36.9 °C) 98.5 °F (36.9 °C) 97.4 °F (36.3 °C)    SpO2: 96%  97% 97%   Weight:       Height:            Intake and Output:  Current Shift: No intake/output data recorded. Last three shifts: No intake/output data recorded. Physical Exam:   General:  Alert, cooperative, no distress, appears stated age. Eyes:  Conjunctivae/corneas clear. PERRL, EOMs intact. Fundi benign   Ears:  Normal TMs and external ear canals both ears. Nose: Nares normal. Septum midline. Mucosa normal. No drainage or sinus tenderness. Mouth/Throat: Lips, mucosa, and tongue normal. Teeth and gums normal.   Neck: Supple, symmetrical, trachea midline, no adenopathy, thyroid: no enlargment/tenderness/nodules, no carotid bruit and no JVD. Back:   Symmetric, no curvature. ROM normal. No CVA tenderness. Lungs:   Clear to auscultation bilaterally. Heart:  Regular rate and rhythm, S1, S2 normal, no murmur, click, rub or gallop. Abdomen:   Soft, non-tender. Bowel sounds normal. No masses,  No organomegaly. Extremities: Extremities normal, atraumatic, no cyanosis or edema. Pulses: 2+ and symmetric all extremities. Skin: Skin color, texture, turgor normal. No rashes or lesions   Lymph nodes: Cervical, supraclavicular, and axillary nodes normal.   Neurologic: CNII-XII intact. Normal strength, sensation and reflexes throughout. Lab/Data Review: All lab results for the last 24 hours reviewed. No acute events overnight    Assessment:     Active Problems:    Hypokalemia (8/9/2021)       Patient is a 80-year-old white female with:  1. Syncope  2. Hypokalemia  3. Urine drug screen is positive for amphetamine and marijuana. Plan:     Rhythm was stable. Kidney function stable. Patient was counseled regarding the importance of cardiovascular marijuana. She will need a close monitor her potassium level with primary care physician.     Signed By: Robin Johnson MD     August 11, 2021

## 2021-08-11 NOTE — DISCHARGE SUMMARY
Discharge Summary       PATIENT ID: Sola Quezada  MRN: [id number]   YOB: 1965    DATE OF ADMISSION: 8/9/2021  7:22 PM    DATE OF DISCHARGE:   PRIMARY CARE PROVIDER: None     ATTENDING PHYSICIAN: Ha Recinos  DISCHARGING PROVIDER: Lolly Recinos      CONSULTATIONS: IP CONSULT TO CARDIOLOGY    PROCEDURES/SURGERIES: * No surgery found *    ADMITTING DIAGNOSES:    Patient Active Problem List    Diagnosis Date Noted    Hypokalemia 08/09/2021    Hypothyroidism due to acquired atrophy of thyroid 09/30/2015       DISCHARGE DIAGNOSES / PLAN:    Hypokalemia -normalized  Dizziness -resolved  Posttraumatic stress disorder -by history  Polysubstance abuse with amphetamines and marijuana -patient on Adderall  Generalized anxiety disorder        DISCHARGE MEDICATIONS:  Current Discharge Medication List      CONTINUE these medications which have NOT CHANGED    Details   hydrOXYzine HCL (ATARAX) 50 mg tablet Take 50 mg by mouth every six (6) hours as needed (max 4 tablets a day). naloxone (Narcan) 4 mg/actuation nasal spray Use 1 spray intranasally, then discard. Repeat with new spray every 2 min as needed for opioid overdose symptoms, alternating nostrils. Qty: 2 Each, Refills: 0      clonazePAM (KLONOPIN) 1 mg tablet Take  by mouth three (3) times daily. Associated Diagnoses: Idiopathic hypotension; Hypothyroidism due to acquired atrophy of thyroid; Anemia due to vitamin B12 deficiency; Dizziness; Arthralgia      gabapentin (NEURONTIN) 600 mg tablet Take  by mouth four (4) times daily. Associated Diagnoses: Idiopathic hypotension; Hypothyroidism due to acquired atrophy of thyroid; Anemia due to vitamin B12 deficiency; Dizziness; Arthralgia      QUEtiapine (SEROQUEL) 400 mg tablet Take 300 mg by mouth nightly. Associated Diagnoses: Idiopathic hypotension; Hypothyroidism due to acquired atrophy of thyroid; Anemia due to vitamin B12 deficiency; Dizziness;  Arthralgia         STOP taking these medications       dextroamphetamine-amphetamine (ADDERALL) 30 mg tablet Comments:   Reason for Stopping:         buprenorphine-naloxone (SUBOXONE) 8-2 mg subl Comments:   Reason for Stopping:                 NOTIFY YOUR PHYSICIAN FOR ANY OF THE FOLLOWING:   Fever over 101 degrees for 24 hours. Chest pain, shortness of breath, fever, chills, nausea, vomiting, diarrhea, change in mentation, falling, weakness, bleeding. Severe pain or pain not relieved by medications. Or, any other signs or symptoms that you may have questions about. DISPOSITION:  x  Home With:   OT  PT  HH  RN       Long term SNF/Inpatient Rehab   X Independent/assisted living    Hospice    Other:       PATIENT CONDITION AT DISCHARGE: Stable      PHYSICAL EXAMINATION AT DISCHARGE:  General:          Alert, cooperative, no distress, appears stated age. HEENT:           Atraumatic, anicteric sclerae, pink conjunctivae                          No oral ulcers, mucosa moist, throat clear, dentition fair  Neck:               Supple, symmetrical  Lungs:             Clear to auscultation bilaterally. No Wheezing or Rhonchi. No rales. Chest wall:      No tenderness  No Accessory muscle use. Heart:              Regular  rhythm,  No  murmur   No edema  Abdomen:        Soft, non-tender. Not distended. Bowel sounds normal  Extremities:     No cyanosis. No clubbing,                            Skin turgor normal, Capillary refill normal  Skin:                Not pale. Not Jaundiced  No rashes   Psych:             Not anxious or agitated.   Neurologic:      Alert, moves all extremities, answers questions appropriately and responds to commands     No orders to display        Recent Results (from the past 24 hour(s))   METABOLIC PANEL, BASIC    Collection Time: 08/11/21  7:40 AM   Result Value Ref Range    Sodium 139 136 - 145 mmol/L    Potassium 3.8 3.5 - 5.1 mmol/L    Chloride 108 97 - 108 mmol/L    CO2 27 21 - 32 mmol/L    Anion gap 4 (L) 5 - 15 mmol/L    Glucose 81 65 - 100 mg/dL    BUN 9 6 - 20 mg/dL    Creatinine 0.77 0.55 - 1.02 mg/dL    BUN/Creatinine ratio 12 12 - 20      GFR est AA >60 >60 ml/min/1.73m2    GFR est non-AA >60 >60 ml/min/1.73m2    Calcium 8.9 8.5 - 10.1 mg/dL          HOSPITAL COURSE:  64year old female with PMH GERD, hypothyroidism, depression, PTSD, and seizure disorder presented to the ED with dizziness. She was positive for amphetamines (prescribed adderall), and THC. She was admitted for dizziness and severe hypokalemia. Her dizziness resolved and potassium normalized after repletion. Cardiology was consulted and evaluated patient. At the time of discharge her echocardiogram was still pending as well as her repeat troponin. From medical standpoint patient is able to be discharged after these are completed and the patient is cleared by cardiology. Orthostatic vital signs were negative and were to be reviewed by cardiology as well. All labs, imaging, and progress notes were reviewed and discussed with Dr. Kate Robles and he agreed patient was stable for discharge when cleared by cardiology. 35 minutes were spent on patient's discharge with over 50% time spent on collaboration and coordination of care.     Signed:   Vinicius Manzo NP  8/11/2021  11:16 AM

## 2021-08-11 NOTE — PROGRESS NOTES
I have reviewed discharge instructions with Ms. Serrato. Nurse reviewed all medications, reviewed followup appointments and patient will make follow-up appointments with cardiology and her PCP, removed IV access lines and reviewed patient's personal medications. Patient was discharged home for home self care. Patient was transported home by patient's spouse in there personal private vehicle. Patient reviewed and signed and was given a copy of her AVS and verbalized all understanding of discharge instructions. Discharge plan of care/case management plan validated with provider discharge order.

## 2021-08-11 NOTE — PROGRESS NOTES
Primary Nurse Grecia Ventura RN and Celia Dockery RN performed a dual skin assessment on this patient No impairment noted  Eulogio score is 20.

## 2021-08-11 NOTE — DISCHARGE INSTRUCTIONS
Discharge Instructions       PATIENT ID: Alanna Malin  MRN: [id number]   YOB: 1965    DATE OF ADMISSION: 8/9/2021  7:22 PM    DATE OF DISCHARGE: 8/11/2021    PRIMARY CARE PROVIDER: None     ATTENDING PHYSICIAN: Willard Turner MD  DISCHARGING PROVIDER: MARTHA Malave Dr.   To contact this individual call [phone] and ask the  to page. If unavailable ask to be transferred the Adult Hospitalist Department. CONSULTATIONS: IP CONSULT TO CARDIOLOGY    PROCEDURES/SURGERIES: * No surgery found *    PENDING TEST RESULTS:   At the time of discharge the following test results are still pending: Echocardiogram    FOLLOW UP APPOINTMENTS:   Follow-up Information     Follow up With Specialties Details Why Azeem Gasca MD Family Medicine In 3 days  [address]  [phone]      Arina Castle MD Cardiology, Cardio Vascular Surgery In 1 week  [facility] [address]  [phone]             ADDITIONAL CARE RECOMMENDATIONS: Please do not use drugs, marijuana, or any medications you are not currently prescribed    DIET: Resume previous diet  Oral Nutritional Supplements: No Oral Supplement prescribed {RDSUPPFREQUENCY:20080}     ACTIVITY: Activity as tolerated    WOUND CARE: N/A    EQUIPMENT needed: N/A      DISCHARGE MEDICATIONS:   See Medication Reconciliation Form    · It is important that you take the medication exactly as they are prescribed. · Keep your medication in the bottles provided by the pharmacist and keep a list of the medication names, dosages, and times to be taken in your wallet. · Do not take other medications without consulting your doctor. NOTIFY YOUR PHYSICIAN FOR ANY OF THE FOLLOWING:   Fever over 101 degrees for 24 hours. Chest pain, shortness of breath, fever, chills, nausea, vomiting, diarrhea, change in mentation, falling, weakness, bleeding.  Severe pain or pain not relieved by medications. Or, any other signs or symptoms that you may have questions about.       DISPOSITION:    Home With:   OT  PT  HH  RN       SNF/Inpatient Rehab/LTAC   X Independent/assisted living    Hospice    Other:         PROBLEM LIST Updated:  Yes Y       Signed:   Lois Barrera NP  8/11/2021  11:14 AM

## 2021-08-11 NOTE — PROGRESS NOTES
Nurse picked up patients personal medication from the pharmacy and placed security form in patient's chart. Patient was given all her medications in hand.

## 2021-08-11 NOTE — PROGRESS NOTES
Reason for Admission:   Hypokalemia                     RUR Score:     19             PCP: First and Last name:   None     Name of Practice:    Are you a current patient: Yes/No:    Approximate date of last visit:    Can you participate in a virtual visit if needed:     Do you (patient/family) have any concerns for transition/discharge? None at this time. Plan for utilizing home health:   Politely Declines    Current Advanced Directive/Advance Care Plan:  Full Code      Healthcare Decision Maker:   Click here to complete [address] including selection of the Healthcare Decision Maker Relationship (ie \"Primary\")            Delvin Osborn [phone]    Transition of Care Plan:          Patient lives at home independently. No DME used. No anticiapted needs. Patient to discharge home/self.

## 2021-08-29 ENCOUNTER — APPOINTMENT (OUTPATIENT)
Dept: GENERAL RADIOLOGY | Age: 56
End: 2021-08-29
Attending: EMERGENCY MEDICINE
Payer: MEDICARE

## 2021-08-29 ENCOUNTER — HOSPITAL ENCOUNTER (EMERGENCY)
Age: 56
Discharge: HOME | End: 2021-08-30
Attending: STUDENT IN AN ORGANIZED HEALTH CARE EDUCATION/TRAINING PROGRAM
Payer: MEDICARE

## 2021-08-29 DIAGNOSIS — R55 SYNCOPE AND COLLAPSE: Primary | ICD-10-CM

## 2021-08-29 LAB
ALBUMIN SERPL-MCNC: 3.7 G/DL (ref 3.5–5)
ALBUMIN/GLOB SERPL: 1 {RATIO} (ref 1.1–2.2)
ALP SERPL-CCNC: 88 U/L (ref 45–117)
ALT SERPL-CCNC: 14 U/L (ref 12–78)
ANION GAP SERPL CALC-SCNC: 7 MMOL/L (ref 5–15)
AST SERPL W P-5'-P-CCNC: 9 U/L (ref 15–37)
ATRIAL RATE: 117 BPM
BASOPHILS # BLD: 0.1 K/UL (ref 0–0.1)
BASOPHILS NFR BLD: 1 % (ref 0–1)
BILIRUB SERPL-MCNC: 0.4 MG/DL (ref 0.2–1)
BUN SERPL-MCNC: 14 MG/DL (ref 6–20)
BUN/CREAT SERPL: 15 (ref 12–20)
CA-I BLD-MCNC: 8.9 MG/DL (ref 8.5–10.1)
CALCULATED P AXIS, ECG09: 21 DEGREES
CALCULATED R AXIS, ECG10: 40 DEGREES
CALCULATED T AXIS, ECG11: 41 DEGREES
CHLORIDE SERPL-SCNC: 107 MMOL/L (ref 97–108)
CO2 SERPL-SCNC: 26 MMOL/L (ref 21–32)
CREAT SERPL-MCNC: 0.96 MG/DL (ref 0.55–1.02)
DIAGNOSIS, 93000: NORMAL
DIFFERENTIAL METHOD BLD: ABNORMAL
EOSINOPHIL # BLD: 0 K/UL (ref 0–0.4)
EOSINOPHIL NFR BLD: 0 % (ref 0–7)
ERYTHROCYTE [DISTWIDTH] IN BLOOD BY AUTOMATED COUNT: 13.2 % (ref 11.5–14.5)
GLOBULIN SER CALC-MCNC: 3.6 G/DL (ref 2–4)
GLUCOSE SERPL-MCNC: 107 MG/DL (ref 65–100)
HCT VFR BLD AUTO: 46.5 % (ref 35–47)
HGB BLD-MCNC: 15.7 G/DL (ref 11.5–16)
IMM GRANULOCYTES # BLD AUTO: 0 K/UL (ref 0–0.04)
IMM GRANULOCYTES NFR BLD AUTO: 0 % (ref 0–0.5)
LYMPHOCYTES # BLD: 3.1 K/UL (ref 0.8–3.5)
LYMPHOCYTES NFR BLD: 33 % (ref 12–49)
MCH RBC QN AUTO: 29.9 PG (ref 26–34)
MCHC RBC AUTO-ENTMCNC: 33.8 G/DL (ref 30–36.5)
MCV RBC AUTO: 88.6 FL (ref 80–99)
MONOCYTES # BLD: 0.4 K/UL (ref 0–1)
MONOCYTES NFR BLD: 4 % (ref 5–13)
NEUTS SEG # BLD: 5.8 K/UL (ref 1.8–8)
NEUTS SEG NFR BLD: 62 % (ref 32–75)
NRBC # BLD: 0 K/UL (ref 0–0.01)
NRBC BLD-RTO: 0 PER 100 WBC
P-R INTERVAL, ECG05: 132 MS
PLATELET # BLD AUTO: 447 K/UL (ref 150–400)
PMV BLD AUTO: 9.6 FL (ref 8.9–12.9)
POTASSIUM SERPL-SCNC: 3.9 MMOL/L (ref 3.5–5.1)
PROT SERPL-MCNC: 7.3 G/DL (ref 6.4–8.2)
Q-T INTERVAL, ECG07: 326 MS
QRS DURATION, ECG06: 76 MS
QTC CALCULATION (BEZET), ECG08: 454 MS
RBC # BLD AUTO: 5.25 M/UL (ref 3.8–5.2)
SODIUM SERPL-SCNC: 140 MMOL/L (ref 136–145)
TROPONIN I SERPL-MCNC: <0.05 NG/ML
VENTRICULAR RATE, ECG03: 117 BPM
WBC # BLD AUTO: 9.4 K/UL (ref 3.6–11)

## 2021-08-29 PROCEDURE — 96376 TX/PRO/DX INJ SAME DRUG ADON: CPT

## 2021-08-29 PROCEDURE — 74011250637 HC RX REV CODE- 250/637: Performed by: HOSPITALIST

## 2021-08-29 PROCEDURE — 85025 COMPLETE CBC W/AUTO DIFF WBC: CPT

## 2021-08-29 PROCEDURE — 96374 THER/PROPH/DIAG INJ IV PUSH: CPT

## 2021-08-29 PROCEDURE — 93005 ELECTROCARDIOGRAM TRACING: CPT

## 2021-08-29 PROCEDURE — 99284 EMERGENCY DEPT VISIT MOD MDM: CPT

## 2021-08-29 PROCEDURE — 80053 COMPREHEN METABOLIC PANEL: CPT

## 2021-08-29 PROCEDURE — 71045 X-RAY EXAM CHEST 1 VIEW: CPT

## 2021-08-29 PROCEDURE — 84484 ASSAY OF TROPONIN QUANT: CPT

## 2021-08-29 PROCEDURE — 74011250636 HC RX REV CODE- 250/636: Performed by: STUDENT IN AN ORGANIZED HEALTH CARE EDUCATION/TRAINING PROGRAM

## 2021-08-29 PROCEDURE — 36415 COLL VENOUS BLD VENIPUNCTURE: CPT

## 2021-08-29 PROCEDURE — 74011250637 HC RX REV CODE- 250/637: Performed by: STUDENT IN AN ORGANIZED HEALTH CARE EDUCATION/TRAINING PROGRAM

## 2021-08-29 RX ORDER — ACETAMINOPHEN 500 MG
1000 TABLET ORAL
Status: COMPLETED | OUTPATIENT
Start: 2021-08-29 | End: 2021-08-29

## 2021-08-29 RX ORDER — ONDANSETRON 2 MG/ML
4 INJECTION INTRAMUSCULAR; INTRAVENOUS
Status: COMPLETED | OUTPATIENT
Start: 2021-08-29 | End: 2021-08-29

## 2021-08-29 RX ORDER — QUETIAPINE FUMARATE 300 MG/1
300 TABLET, FILM COATED ORAL
Status: DISCONTINUED | OUTPATIENT
Start: 2021-08-29 | End: 2021-08-30 | Stop reason: HOSPADM

## 2021-08-29 RX ORDER — DEXTROAMPHETAMINE SACCHARATE, AMPHETAMINE ASPARTATE, DEXTROAMPHETAMINE SULFATE AND AMPHETAMINE SULFATE 2.5; 2.5; 2.5; 2.5 MG/1; MG/1; MG/1; MG/1
10 TABLET ORAL 4 TIMES DAILY
Status: DISCONTINUED | OUTPATIENT
Start: 2021-08-30 | End: 2021-08-30 | Stop reason: HOSPADM

## 2021-08-29 RX ORDER — QUETIAPINE FUMARATE 50 MG/1
50 TABLET, FILM COATED ORAL DAILY
COMMUNITY
Start: 2021-08-14 | End: 2022-02-25

## 2021-08-29 RX ORDER — QUETIAPINE FUMARATE 300 MG/1
300 TABLET, FILM COATED ORAL
COMMUNITY
Start: 2021-08-23 | End: 2022-02-25

## 2021-08-29 RX ORDER — CLONAZEPAM 1 MG/1
1 TABLET ORAL 3 TIMES DAILY
Status: DISCONTINUED | OUTPATIENT
Start: 2021-08-29 | End: 2021-08-30 | Stop reason: HOSPADM

## 2021-08-29 RX ORDER — DEXTROAMPHETAMINE SACCHARATE, AMPHETAMINE ASPARTATE, DEXTROAMPHETAMINE SULFATE AND AMPHETAMINE SULFATE 5; 5; 5; 5 MG/1; MG/1; MG/1; MG/1
10 TABLET ORAL 4 TIMES DAILY
COMMUNITY
Start: 2021-08-21 | End: 2022-03-28

## 2021-08-29 RX ORDER — CLONAZEPAM 1 MG/1
1 TABLET ORAL ONCE
Status: COMPLETED | OUTPATIENT
Start: 2021-08-29 | End: 2021-08-29

## 2021-08-29 RX ADMIN — CLONAZEPAM 1 MG: 1 TABLET ORAL at 14:35

## 2021-08-29 RX ADMIN — ONDANSETRON 4 MG: 2 INJECTION INTRAMUSCULAR; INTRAVENOUS at 14:35

## 2021-08-29 RX ADMIN — ONDANSETRON 4 MG: 2 INJECTION INTRAMUSCULAR; INTRAVENOUS at 20:42

## 2021-08-29 RX ADMIN — ACETAMINOPHEN 1000 MG: 500 TABLET ORAL at 16:03

## 2021-08-29 RX ADMIN — CLONAZEPAM 1 MG: 1 TABLET ORAL at 23:46

## 2021-08-29 NOTE — ED PROVIDER NOTES
EMERGENCY DEPARTMENT HISTORY AND PHYSICAL EXAM      Date: 8/29/2021  Patient Name: Maren Mcintosh      History of Presenting Illness     Chief Complaint   Patient presents with    Dizziness       History Provided By: Patient    HPI: Maren Mcintosh, 64 y.o. female with PMH of PTSD, anxiety, GERD, substance use disorder, and seizure disorder who presents emerged department for dizziness. Patient reports that she was walking at home and she suddenly felt dizzy and collapsed. This happened twice today. There was no associated chest pain, shortness of breath, palpitation, nausea, vomiting, diaphoresis, or dimming of vision. There is similar to an episode that she had 2 weeks earlier which she needed to be admitted for work-up. At that time, it was noted that she has hypokalemia. Patient was evaluated at that time by cardiology and deemed to be safe to be discharged home. Patient did not arrange follow-up as an outpatient. Patient denied history of coronary disease, prior history of VTE, lower extremity swelling or pain, history of dysrhythmias. Additionally there is no family history of sudden cardiac death. There are no other complaints, changes, or physical findings at this time. PCP: None    Current Outpatient Medications   Medication Sig Dispense Refill    hydrOXYzine HCL (ATARAX) 50 mg tablet Take 50 mg by mouth every six (6) hours as needed (max 4 tablets a day).  naloxone (Narcan) 4 mg/actuation nasal spray Use 1 spray intranasally, then discard. Repeat with new spray every 2 min as needed for opioid overdose symptoms, alternating nostrils. 2 Each 0    clonazePAM (KLONOPIN) 1 mg tablet Take  by mouth three (3) times daily.  gabapentin (NEURONTIN) 600 mg tablet Take  by mouth four (4) times daily.  QUEtiapine (SEROQUEL) 400 mg tablet Take 300 mg by mouth nightly.          Past History     Past Medical History:  Past Medical History:   Diagnosis Date    Fatigue     GERD (gastroesophageal reflux disease)     Hypothyroidism     Psychiatric disorder     PTSD per patient    Seizures ([address]        Past Surgical History:  Past Surgical History:   Procedure Laterality Date    HX APPENDECTOMY      HX  SECTION      HX CHOLECYSTECTOMY      HX HYSTERECTOMY         Family History:  Family History   Family history unknown: Yes       Social History:  Social History     Tobacco Use    Smoking status: Current Every Day Smoker     Packs/day: 0.50    Smokeless tobacco: Never Used   Substance Use Topics    Alcohol use: No     Alcohol/week: 0.0 standard drinks    Drug use: Never       Allergies: Allergies   Allergen Reactions    Augmentin [Amoxicillin-Pot Clavulanate] Nausea and Vomiting         Review of Systems     Review of Systems   Constitutional: Negative for activity change, chills and fever. HENT: Negative for congestion and facial swelling. Eyes: Negative for discharge and visual disturbance. Respiratory: Negative for chest tightness and shortness of breath. Cardiovascular: Negative for chest pain and leg swelling. Gastrointestinal: Positive for nausea. Negative for abdominal pain, diarrhea and vomiting. Genitourinary: Negative for dysuria and flank pain. Musculoskeletal: Negative for back pain and gait problem. Skin: Negative for rash and wound. Neurological: Positive for dizziness. Negative for weakness and headaches. Physical Exam     Physical Exam  Constitutional:       Appearance: Normal appearance. HENT:      Head: Normocephalic and atraumatic. Mouth/Throat:      Mouth: Mucous membranes are moist.      Pharynx: Oropharynx is clear. Eyes:      Extraocular Movements: Extraocular movements intact. Conjunctiva/sclera: Conjunctivae normal.   Cardiovascular:      Rate and Rhythm: Normal rate and regular rhythm. Pulmonary:      Effort: Pulmonary effort is normal.      Breath sounds: Normal breath sounds.    Abdominal:      General: Abdomen is flat. Palpations: Abdomen is soft. Tenderness: There is no abdominal tenderness. Musculoskeletal:         General: No deformity. Cervical back: Normal, normal range of motion and neck supple. Thoracic back: No swelling, edema, deformity, signs of trauma, lacerations, spasms, tenderness or bony tenderness. Normal range of motion. No scoliosis. Lumbar back: No swelling, edema, deformity, signs of trauma, lacerations, spasms, tenderness or bony tenderness. Normal range of motion. Negative right straight leg raise test and negative left straight leg raise test. No scoliosis. Skin:     General: Skin is warm and dry. Neurological:      General: No focal deficit present. Mental Status: She is alert and oriented to person, place, and time. Sensory: No sensory deficit. Motor: No weakness. Coordination: Coordination normal.      Gait: Gait normal.         Lab and Diagnostic Study Results     Labs -     Recent Results (from the past 12 hour(s))   EKG, 12 LEAD, INITIAL    Collection Time: 08/29/21  1:28 PM   Result Value Ref Range    Ventricular Rate 117 BPM    Atrial Rate 117 BPM    P-R Interval 132 ms    QRS Duration 76 ms    Q-T Interval 326 ms    QTC Calculation (Bezet) 454 ms    Calculated P Axis 21 degrees    Calculated R Axis 40 degrees    Calculated T Axis 41 degrees    Diagnosis       Sinus tachycardia  Otherwise normal ECG  When compared with ECG of 10-AUG-2021 00:44,  Vent.  rate has increased BY  52 BPM  Nonspecific T wave abnormality now evident in Inferior leads  Confirmed by MONIQUE AU Pending sale to [facility] ([id number]) on 8/29/2021 4:07:19 PM     CBC WITH AUTOMATED DIFF    Collection Time: 08/29/21  1:30 PM   Result Value Ref Range    WBC 9.4 3.6 - 11.0 K/uL    RBC 5.25 (H) 3.80 - 5.20 M/uL    HGB 15.7 11.5 - 16.0 g/dL    HCT 46.5 35.0 - 47.0 %    MCV 88.6 80.0 - 99.0 FL    MCH 29.9 26.0 - 34.0 PG    MCHC 33.8 30.0 - 36.5 g/dL    RDW 13.2 11.5 - 14.5 %    PLATELET 553 (H) 684 - 400 K/uL    MPV 9.6 8.9 - 12.9 FL    NRBC 0.0 0.0  WBC    ABSOLUTE NRBC 0.00 0.00 - 0.01 K/uL    NEUTROPHILS 62 32 - 75 %    LYMPHOCYTES 33 12 - 49 %    MONOCYTES 4 (L) 5 - 13 %    EOSINOPHILS 0 0 - 7 %    BASOPHILS 1 0 - 1 %    IMMATURE GRANULOCYTES 0 0 - 0.5 %    ABS. NEUTROPHILS 5.8 1.8 - 8.0 K/UL    ABS. LYMPHOCYTES 3.1 0.8 - 3.5 K/UL    ABS. MONOCYTES 0.4 0.0 - 1.0 K/UL    ABS. EOSINOPHILS 0.0 0.0 - 0.4 K/UL    ABS. BASOPHILS 0.1 0.0 - 0.1 K/UL    ABS. IMM. GRANS. 0.0 0.00 - 0.04 K/UL    DF AUTOMATED     METABOLIC PANEL, COMPREHENSIVE    Collection Time: 08/29/21  5:58 PM   Result Value Ref Range    Sodium 140 136 - 145 mmol/L    Potassium 3.9 3.5 - 5.1 mmol/L    Chloride 107 97 - 108 mmol/L    CO2 26 21 - 32 mmol/L    Anion gap 7 5 - 15 mmol/L    Glucose 107 (H) 65 - 100 mg/dL    BUN 14 6 - 20 mg/dL    Creatinine 0.96 0.55 - 1.02 mg/dL    BUN/Creatinine ratio 15 12 - 20      GFR est AA >60 >60 ml/min/1.73m2    GFR est non-AA >60 >60 ml/min/1.73m2    Calcium 8.9 8.5 - 10.1 mg/dL    Bilirubin, total 0.4 0.2 - 1.0 mg/dL    AST (SGOT) 9 (L) 15 - 37 U/L    ALT (SGPT) 14 12 - 78 U/L    Alk. phosphatase 88 45 - 117 U/L    Protein, total 7.3 6.4 - 8.2 g/dL    Albumin 3.7 3.5 - 5.0 g/dL    Globulin 3.6 2.0 - 4.0 g/dL    A-G Ratio 1.0 (L) 1.1 - 2.2     TROPONIN I    Collection Time: 08/29/21  5:58 PM   Result Value Ref Range    Troponin-I, Qt. <0.05 <0.05 ng/mL       Radiologic Studies -   [unfilled]  CT Results  (Last 48 hours)    None        CXR Results  (Last 48 hours)               08/29/21 1415  XR CHEST SNGL V Final result    Impression:  Some atelectasis left lung base. Narrative:  History: Syncopal episode       Single view of the chest was obtained. Heart size is normal. There is some   patchy consolidation left lung base consistent with atelectasis. It is similar   to the prior exam. Otherwise lungs are clear. No effusions or pneumothorax. Bony   structures show no acute abnormality. Medical Decision Making and ED Course   - I am the first and primary provider for this patient AND AM THE PRIMARY PROVIDER OF RECORD. - I reviewed the vital signs, available nursing notes, past medical history, past surgical history, family history and social history. - Initial assessment performed. The patients presenting problems have been discussed, and the staff are in agreement with the care plan formulated and outlined with them. I have encouraged them to ask questions as they arise throughout their visit. Vital Signs-Reviewed the patient's vital signs. Patient Vitals for the past 12 hrs:   Temp Pulse Resp BP SpO2   08/29/21 2045 -- 65 16 125/86 98 %   08/29/21 1324 98.8 °F (37.1 °C) (!) 113 18 111/86 98 %     EKG read sinus tachycardia rate 117, normal ME, QRS mildly shortened, QTC within normal, normal axis, no ST elevations or depressions, no signs of dysrhythmia or block. Records Reviewed: Nursing Notes    Provider Notes (Medical Decision Making):   51-year-old female presents to emergency department with syncope. Patient syncope is concerning for a cardiac syncope. Her symptoms does not appear to be secondary to a vertiginous or intracerebral etiology. Concern at this point for ischemia versus dysrhythmia. Will get blood work and keep the patient on cardiac monitor and get an ECG. ED Course:       ED Course as of Aug 29 2152   Daina Loyolason Aug 29, 2021   1900 I did review the patient's work-up including CBC, chemistry, and troponin. Additionally, I did not show signs of dysrhythmia or ischemia. Given my concern for cardiac syncope, I will admit the patient to the hospital.    [AA]      ED Course User Index  [AA] Paramjit Villafana MD         Disposition     Disposition: Condition stable  Admitted to Observation Unit the case was discussed with the admitting physician Dr. Keri Dupree        Diagnosis     Clinical Impression:   1. Syncope and collapse        Attestations:     Cristina Yusuf Corutney Mckeon MD    Please note that this dictation was completed with [employer] coin, the computer voice recognition software. Quite often unanticipated grammatical, syntax, homophones, and other interpretive errors are inadvertently transcribed by the computer software. Please disregard these errors. Please excuse any errors that have escaped final proofreading. Thank you.

## 2021-08-30 VITALS
OXYGEN SATURATION: 100 % | WEIGHT: 180 LBS | HEART RATE: 68 BPM | TEMPERATURE: 98.4 F | DIASTOLIC BLOOD PRESSURE: 72 MMHG | HEIGHT: 68 IN | SYSTOLIC BLOOD PRESSURE: 128 MMHG | RESPIRATION RATE: 16 BRPM | BODY MASS INDEX: 27.28 KG/M2

## 2021-08-30 NOTE — CONSULTS
Subjective :   Chief Complaint : Dizziness    Source of information : Patient, ED provider, recent admission records    History of present illness:   80-year-old female with a history of PTSD, hypothyroidism and seizure history as per information presents to the emergency room complaining of feeling dizzy and lightheaded. While she was walking at home suddenly felt dizzy and collapsed, denies any chest pain, palpitations. No shortness of breath, nausea or vomiting. She presented recently for the similar complaints was admitted and had an echocardiogram and cardiology evaluation done. Recommended no further work-up, discharged home. At that time she was also noted to be using some amphetamines and marijuana, patient was counseled about it. She is on multiple medications taking at home for her psychiatric issues that can also be associated with the side effects that may need to be evaluated. She also has hypokalemia that was supplemented. Since she is in the emergency room doing well with no issues. Hemodynamically appears stable. Past Medical History:   Diagnosis Date    Fatigue     GERD (gastroesophageal reflux disease)     Hypothyroidism     Psychiatric disorder     PTSD per patient    Seizures ([facility] [address]      Past Surgical History:   Procedure Laterality Date    HX APPENDECTOMY      HX  SECTION      HX CHOLECYSTECTOMY      HX HYSTERECTOMY       Family History   Family history unknown: Yes      Social History     Tobacco Use    Smoking status: Current Every Day Smoker     Packs/day: 0.50    Smokeless tobacco: Never Used   Substance Use Topics    Alcohol use: No     Alcohol/week: 0.0 standard drinks       Prior to Admission medications    Medication Sig Start Date End Date Taking? Authorizing Provider   dextroamphetamine-amphetamine (ADDERALL) 20 mg tablet Take 10 mg by mouth four (4) times daily.  21   Provider, Historical   QUEtiapine (SEROquel) 300 mg tablet Take 300 mg by mouth nightly. 8/23/21   Provider, Historical   QUEtiapine (SEROquel) 50 mg tablet Take 50 mg by mouth daily. 8/14/21   Provider, Historical   hydrOXYzine HCL (ATARAX) 50 mg tablet Take 50 mg by mouth every six (6) hours as needed (max 4 tablets a day). Other, MD Kendall   naloxone (Narcan) 4 mg/actuation nasal spray Use 1 spray intranasally, then discard. Repeat with new spray every 2 min as needed for opioid overdose symptoms, alternating nostrils. 8/6/21   Khushbu Hope,    clonazePAM (KLONOPIN) 1 mg tablet Take  by mouth three (3) times daily. Provider, Historical   gabapentin (NEURONTIN) 600 mg tablet Take  by mouth four (4) times daily. Provider, Historical     Allergies   Allergen Reactions    Augmentin [Amoxicillin-Pot Clavulanate] Nausea and Vomiting             Review of Systems:    All the systems are reviewed, she denied any other complaints other than in history of present illness. Vitals:   No data found. Physical Exam:   General : Looks comfortable, no respiratory distress noted. HEENT : PERRLA, normal oral mucosa, atraumatic normocephalic, Normal ear and nose. Neck : Supple, no JVD, no masses noted, no carotid bruit. Lungs : Breath sounds with good air entry bilaterally, no wheezes or rales, no accessory muscle use. CVS : Rhythm rate regular, S1+, S2+, no murmur or gallop. Abdomen : Soft, nontender, no organomegaly, bowel sounds active. Extremities : No edema noted,  pedal pulses palpable. Musculoskeletal : Fair range of motion, no joint swelling or effusion, muscle tone and power appears normal.   Skin : Moist, warm, skin turgor, no pathological rash. Lymphatic : No cervical lymphadenopathy. Neurological : Awake, alert, oriented to time place person. [facility] [employer] [facility] : Mood and affect appears appropriate to the situation. Data Review: Laboratory data done in the emergency room is essentially normal, troponin negative with less than 0.05.   Chest x-ray and EKG did not show any abnormalities. Radiologic Studies :     CXR Results  (Last 48 hours)               08/29/21 1415  XR CHEST SNGL V Final result    Impression:  Some atelectasis left lung base. Narrative:  History: Syncopal episode       Single view of the chest was obtained. Heart size is normal. There is some   patchy consolidation left lung base consistent with atelectasis. It is similar   to the prior exam. Otherwise lungs are clear. No effusions or pneumothorax. Bony   structures show no acute abnormality. Assessment and Plan :     Dizziness with lightheadedness: Most likely related to the side effect from medication profile. Also she can be followed up as an outpatient with cardiology. Patient is explained that there is the number extra we can do in the hospital done outpatient, recommended to follow with cardiology outpatient. Also to have a primary care physician for the regular follow-up. She is recommended to continue her home medications the same. I did not feel that this patient needed to be admitted for this, she wanted the medications that she is taking at home a dose before she leaves, given her Klonopin, Seroquel. She is discharged in stable condition. CC : None  Signed By: Anabelle Ramires MD     August 31, 2021      This dictation was done by dragon, computer voice recognition software. Often unanticipated grammatical, syntax, Sledge phones and other interpretive errors are inadvertently transcribed. Please excuse errors that have escaped final proofreading.

## 2021-08-30 NOTE — ED NOTES
Received report from [place], [facility]. Assumed care.

## 2021-09-30 ENCOUNTER — HOSPITAL ENCOUNTER (EMERGENCY)
Age: 56
Discharge: HOME | End: 2021-09-30
Attending: EMERGENCY MEDICINE
Payer: MEDICARE

## 2021-09-30 ENCOUNTER — APPOINTMENT (OUTPATIENT)
Dept: GENERAL RADIOLOGY | Age: 56
End: 2021-09-30
Attending: EMERGENCY MEDICINE
Payer: MEDICARE

## 2021-09-30 VITALS
OXYGEN SATURATION: 98 % | HEIGHT: 68 IN | SYSTOLIC BLOOD PRESSURE: 127 MMHG | TEMPERATURE: 98.1 F | RESPIRATION RATE: 20 BRPM | DIASTOLIC BLOOD PRESSURE: 76 MMHG | HEART RATE: 87 BPM | WEIGHT: 180 LBS | BODY MASS INDEX: 27.28 KG/M2

## 2021-09-30 DIAGNOSIS — J06.9 UPPER RESPIRATORY TRACT INFECTION, UNSPECIFIED TYPE: ICD-10-CM

## 2021-09-30 DIAGNOSIS — J01.90 ACUTE SINUSITIS, RECURRENCE NOT SPECIFIED, UNSPECIFIED LOCATION: Primary | ICD-10-CM

## 2021-09-30 DIAGNOSIS — Z20.822 SUSPECTED COVID-19 VIRUS INFECTION: ICD-10-CM

## 2021-09-30 LAB — SARS-COV-2, COV2: NORMAL

## 2021-09-30 PROCEDURE — 71045 X-RAY EXAM CHEST 1 VIEW: CPT

## 2021-09-30 PROCEDURE — U0005 INFEC AGEN DETEC AMPLI PROBE: HCPCS

## 2021-09-30 PROCEDURE — 99281 EMR DPT VST MAYX REQ PHY/QHP: CPT

## 2021-09-30 RX ORDER — DEXTROMETHORPHAN HYDROBROMIDE, GUAIFENESIN 20; 400 MG/20ML; MG/20ML
5 SOLUTION ORAL EVERY 6 HOURS
Qty: 200 ML | Refills: 0 | Status: SHIPPED | OUTPATIENT
Start: 2021-09-30 | End: 2022-02-25

## 2021-09-30 RX ORDER — ALBUTEROL SULFATE 90 UG/1
2 AEROSOL, METERED RESPIRATORY (INHALATION)
Qty: 18 G | Refills: 0 | Status: SHIPPED | OUTPATIENT
Start: 2021-09-30 | End: 2022-03-28

## 2021-09-30 RX ORDER — AZITHROMYCIN 250 MG/1
TABLET, FILM COATED ORAL
Qty: 6 TABLET | Refills: 0 | Status: SHIPPED | OUTPATIENT
Start: 2021-09-30 | End: 2022-02-25

## 2021-09-30 RX ORDER — PREDNISONE 50 MG/1
50 TABLET ORAL DAILY
Qty: 5 TABLET | Refills: 0 | Status: SHIPPED | OUTPATIENT
Start: 2021-09-30 | End: 2021-10-03

## 2021-09-30 NOTE — ED PROVIDER NOTES
EMERGENCY DEPARTMENT HISTORY AND PHYSICAL EXAM      Date: (Not on file)  Patient Name: Nighat Young    History of Presenting Illness     Chief Complaint   Patient presents with    Sinus Infection    Cough       History Provided By: Patient    HPI: Nighat Young, 64 y.o. female with a past medical history significant No significant past medical history presents to the ED with chief complaint of Sinus Infection and Cough  . 60-year-old female with some cough congestion. Lots of sinus drainage. Mild sore throat. Unsure about fevers. No nausea vomiting diarrhea. She is very concerned that she may have a sinus infection or possibly COVID-19. She wants to be swabbed. Has had no exposures however. Symptoms present for the last few days. Has been taking over-the-counter Mucinex with some relief. There are no other complaints, changes, or physical findings at this time. PCP: None    Current Outpatient Medications   Medication Sig Dispense Refill    azithromycin (Zithromax Z-Terrence) 250 mg tablet Take 2 tablet p.o. day 1 then 1 tablet p.o. day 2 through 5 6 Tablet 0    albuterol (PROVENTIL HFA, VENTOLIN HFA, PROAIR HFA) 90 mcg/actuation inhaler Take 2 Puffs by inhalation every four (4) hours as needed for Wheezing. 18 g 0    dextromethorphan-guaiFENesin (Robitussin Cough-Chest Jorge DM) 5-100 mg/5 mL liqd Take 5 mL by mouth every six (6) hours. 200 mL 0    predniSONE (DELTASONE) 50 mg tablet Take 1 Tablet by mouth daily for 3 days. 5 Tablet 0    dextroamphetamine-amphetamine (ADDERALL) 20 mg tablet Take 10 mg by mouth four (4) times daily.  QUEtiapine (SEROquel) 300 mg tablet Take 300 mg by mouth nightly.  QUEtiapine (SEROquel) 50 mg tablet Take 50 mg by mouth daily.  hydrOXYzine HCL (ATARAX) 50 mg tablet Take 50 mg by mouth every six (6) hours as needed (max 4 tablets a day).  naloxone (Narcan) 4 mg/actuation nasal spray Use 1 spray intranasally, then discard.  Repeat with new spray every 2 min as needed for opioid overdose symptoms, alternating nostrils. 2 Each 0    clonazePAM (KLONOPIN) 1 mg tablet Take  by mouth three (3) times daily.  gabapentin (NEURONTIN) 600 mg tablet Take  by mouth four (4) times daily. Past History     Past Medical History:  Past Medical History:   Diagnosis Date    Fatigue     GERD (gastroesophageal reflux disease)     Hypothyroidism     Psychiatric disorder     PTSD per patient    Seizures ([facility] [address]        Past Surgical History:  Past Surgical History:   Procedure Laterality Date    HX APPENDECTOMY      HX  SECTION      HX CHOLECYSTECTOMY      HX HYSTERECTOMY         Family History:  Family History   Family history unknown: Yes       Social History:  Social History     Tobacco Use    Smoking status: Current Every Day Smoker     Packs/day: 0.50    Smokeless tobacco: Never Used   Substance Use Topics    Alcohol use: No     Alcohol/week: 0.0 standard drinks    Drug use: Never       Allergies: Allergies   Allergen Reactions    Augmentin [Amoxicillin-Pot Clavulanate] Nausea and Vomiting         Review of Systems   Review of Systems   Constitutional: Positive for chills and fatigue. Negative for diaphoresis. HENT: Positive for congestion and sore throat. Negative for ear pain and nosebleeds. Eyes: Negative. Negative for pain, discharge and redness. Respiratory: Positive for cough and shortness of breath. Negative for chest tightness and wheezing. Cardiovascular: Negative. Negative for chest pain, palpitations and leg swelling. Gastrointestinal: Negative. Negative for abdominal pain, constipation, diarrhea, nausea and vomiting. Endocrine: Negative. Negative for cold intolerance. Genitourinary: Negative. Negative for difficulty urinating, dysuria and hematuria. Musculoskeletal: Negative. Negative for arthralgias, joint swelling and neck pain. Skin: Negative.   Negative for color change, pallor, rash and wound. Allergic/Immunologic: Negative. Neurological: Negative. Negative for dizziness, syncope, weakness, light-headedness and headaches. Hematological: Negative. Does not bruise/bleed easily. Psychiatric/Behavioral: Negative. Negative for behavioral problems, confusion and suicidal ideas. All other systems reviewed and are negative. Physical Exam   Physical Exam  Vitals and nursing note reviewed. Exam conducted with a chaperone present. Constitutional:       Appearance: Normal appearance. She is normal weight. HENT:      Head: Normocephalic and atraumatic. Nose: Congestion and rhinorrhea present. Mouth/Throat:      Mouth: Mucous membranes are moist.      Pharynx: Oropharynx is clear. Eyes:      Extraocular Movements: Extraocular movements intact. Conjunctiva/sclera: Conjunctivae normal.      Pupils: Pupils are equal, round, and reactive to light. Cardiovascular:      Rate and Rhythm: Normal rate and regular rhythm. Pulses: Normal pulses. Heart sounds: Normal heart sounds. Pulmonary:      Effort: Pulmonary effort is normal. No respiratory distress. Breath sounds: Normal breath sounds. Abdominal:      General: Abdomen is flat. Bowel sounds are normal. There is no distension. Palpations: Abdomen is soft. Tenderness: There is no abdominal tenderness. There is no guarding. Musculoskeletal:         General: No swelling, tenderness, deformity or signs of injury. Normal range of motion. Cervical back: Normal range of motion and neck supple. Right lower leg: No edema. Left lower leg: No edema. Skin:     General: Skin is warm and dry. Capillary Refill: Capillary refill takes less than 2 seconds. Findings: No lesion or rash. Neurological:      General: No focal deficit present. Mental Status: She is alert and oriented to person, place, and time. Mental status is at baseline. Cranial Nerves: No cranial nerve deficit. Psychiatric:         Mood and Affect: Mood normal.         Behavior: Behavior normal.         Thought Content: Thought content normal.         Judgment: Judgment normal.         Diagnostic Study Results     Labs -   No results found for this or any previous visit (from the past 12 hour(s)). Radiologic Studies -   XR CHEST SNGL V   Final Result        CT Results  (Last 48 hours)    None        CXR Results  (Last 48 hours)               09/30/21 1434  XR CHEST SNGL V Final result    Narrative:  1 view comparison August 29       Mild linear atelectasis/scarring left lower lung with lungs otherwise clear. No   effusion or pneumothorax. Normal heart and mediastinum             Medical Decision Making and ED Course   I am the first provider for this patient. I reviewed the vital signs, available nursing notes, past medical history, past surgical history, family history and social history. Vital Signs-Reviewed the patient's vital signs. Patient Vitals for the past 12 hrs:   Temp Pulse Resp BP SpO2   09/30/21 1339 98.1 °F (36.7 °C) 87 20 127/76 98 %       EKG interpretation:         Records Reviewed: Previous Hospital chart. EMS run report      ED Course:   Initial assessment performed. The patients presenting problems have been discussed, and they are in agreement with the care plan formulated and outlined with them. I have encouraged them to ask questions as they arise throughout their visit. Orders Placed This Encounter    XR CHEST SNGL V     Standing Status:   Standing     Number of Occurrences:   1     Order Specific Question:   Transport     Answer:   BED [2]     Order Specific Question:   Reason for Exam     Answer:   cough    SARS-COV-2     Standing Status:   Standing     Number of Occurrences:   1     Order Specific Question:   Specimen source     Answer:   NASOPHARYNGEAL SWAB [650]     Order Specific Question:   Is this test for diagnosis or screening?      Answer:   Diagnosis of ill patient Order Specific Question:   Symptomatic for COVID-19 as defined by CDC? Answer:   Yes     Order Specific Question:   Date of Symptom Onset     Answer:   9/30/2021     Order Specific Question:   Hospitalized for COVID-19? Answer:   No     Order Specific Question:   Admitted to ICU for COVID-19? Answer:   No     Order Specific Question:   Employed in healthcare setting? Answer:   No     Order Specific Question:   Resident in a congregate (group) care setting? Answer:   No     Order Specific Question:   Pregnant? Answer:   No     Order Specific Question:   Previously tested for COVID-19? Answer: Yes    azithromycin (Zithromax Z-Terrence) 250 mg tablet     Sig: Take 2 tablet p.o. day 1 then 1 tablet p.o. day 2 through 5     Dispense:  6 Tablet     Refill:  0    albuterol (PROVENTIL HFA, VENTOLIN HFA, PROAIR HFA) 90 mcg/actuation inhaler     Sig: Take 2 Puffs by inhalation every four (4) hours as needed for Wheezing. Dispense:  18 g     Refill:  0    dextromethorphan-guaiFENesin (Robitussin Cough-Chest Jorge DM) 5-100 mg/5 mL liqd     Sig: Take 5 mL by mouth every six (6) hours. Dispense:  200 mL     Refill:  0    predniSONE (DELTASONE) 50 mg tablet     Sig: Take 1 Tablet by mouth daily for 3 days. Dispense:  5 Tablet     Refill:  0              CONSULTANTS:  Consults      Provider Notes (Medical Decision Making):   54-year-old female with stable vitals not tachypneic or hypoxic presents with sinus drainage cough congestion. Will evaluate for COVID-19 patient is stable to wait at home for the results of the PCR. Also no evidence of respiratory distress. We will treat for sinus infection. Procedures                       Disposition       Emergency Department Disposition:  Discharged         DISCHARGE PLAN:    Patient is discharged home. Discharge instructions provided. Patient is stable and improved at time of disposition. Vitals are stable.     1. Cannot display discharge medications since this patient is not currently admitted. 2.   Follow-up Information     Follow up With Specialties Details Why Raul Davis MD [facility]   [address] [place] [address]  [phone]          3. Return to ED if worse     Pt voiced they understand they plan and do not have questions at this time        Diagnosis     Clinical Impression:   1. Acute sinusitis, recurrence not specified, unspecified location    2. Upper respiratory tract infection, unspecified type    3. Suspected COVID-19 virus infection        Attestations:    Renee Turcios MD    Please note that this dictation was completed with [employer], the computer voice recognition software. Quite often unanticipated grammatical, syntax, homophones, and other interpretive errors are inadvertently transcribed by the computer software. Please disregard these errors. Please excuse any errors that have escaped final proofreading. Thank you.

## 2021-09-30 NOTE — Clinical Note
[facility] [facility] EMERGENCY DEPT  [place] [id number]  [phone]    Work/School Note    Date: 9/30/2021    To Whom It May concern:    Kamila Kamara was seen and treated today in the emergency room by the following provider(s):  Attending Provider: Bhavin Vega MD.      Kamila Kamara is excused from work/school on 09/30/21 and 10/01/21. She is medically clear to return to work/school on 10/2/2021.        Sincerely,          Rica Nelson MD

## 2021-09-30 NOTE — DISCHARGE INSTRUCTIONS
Thank you! Thank you for allowing me to care for you in the emergency department. I sincerely hope that you are satisfied with your visit today. It is my goal to provide you with excellent care. Below you will find a list of your labs and imaging from your visit today. Should you have any questions regarding these results please do not hesitate to call the emergency department. Labs -   No results found for this or any previous visit (from the past 12 hour(s)). Radiologic Studies -   XR CHEST SNGL V    (Results Pending)     CT Results  (Last 48 hours)      None          CXR Results  (Last 48 hours)      None               If you feel that you have not received excellent quality care or timely care, please ask to speak to the nurse manager. Please choose us in the future for your continued health care needs. ------------------------------------------------------------------------------------------------------------  The exam and treatment you received in the Emergency Department were for an urgent problem and are not intended as complete care. It is important that you follow-up with a doctor, nurse practitioner, or physician assistant to:  (1) confirm your diagnosis,  (2) re-evaluation of changes in your illness and treatment, and  (3) for ongoing care. If your symptoms become worse or you do not improve as expected and you are unable to reach your usual health care provider, you should return to the Emergency Department. We are available 24 hours a day. Please take your discharge instructions with you when you go to your follow-up appointment. If you have any problem arranging a follow-up appointment, contact the Emergency Department immediately. If a prescription has been provided, please have it filled as soon as possible to prevent a delay in treatment. Read the entire medication instruction sheet provided to you by the pharmacy.  If you have any questions or reservations about taking the medication due to side effects or interactions with other medications, please call your primary care physician or contact the ER to speak with the charge nurse. Make an appointment with your family doctor or the physician you were referred to for follow-up of this visit as instructed on your discharge paperwork, as this is a mandatory follow-up. Return to the ER if you are unable to be seen or if you are unable to be seen in a timely manner. If you have any problem arranging the follow-up visit, contact the Emergency Department immediately.

## 2021-10-01 ENCOUNTER — PATIENT OUTREACH (OUTPATIENT)
Dept: CASE MANAGEMENT | Age: 56
End: 2021-10-01

## 2021-10-01 LAB
SARS-COV-2, XPLCVT: NOT DETECTED
SOURCE, COVRS: NORMAL

## 2021-10-01 NOTE — PROGRESS NOTES
Patient resolved from Transition of Care episode on 10/01/21. ACM/CTN was unsuccessful at contacting this patient today. Patient has not had any additional ED or hospital visits. No further outreach scheduled with this CTN/ACM. Episode of Care resolved. Patient has this CTN/ACM contact information if future needs arise.

## 2021-10-01 NOTE — PROGRESS NOTES
Patient contacted regarding COVID-19 diagnosis. Discussed COVID-19 related testing which was available at this time. Test results were negative. Patient informed of results, if available? yes. Care Transition Nurse contacted the patient by telephone to perform post discharge assessment. Call within 2 business days of discharge: Yes Verified name and  with patient as identifiers. Provided introduction to self, and explanation of the CTN/ACM role, and reason for call due to risk factors for infection and/or exposure to COVID-19. Symptoms reviewed with patient who verbalized the following symptoms: no new symptoms and no worsening symptoms      Due to no new or worsening symptoms encounter was not routed to provider for escalation. Discussed follow-up appointments. If no appointment was previously scheduled, appointment scheduling offered:  No, will wait to see how pt responds to ABT and steroids, then reassess. [facility] follow up appointment(s): No future appointments. Non-[facility] follow up appointment(s): n/a    Interventions to address risk factors: n/a     Advance Care Planning:   Does patient have an Advance Directive: not on file. Educated patient about risk for severe COVID-19 due to risk factors according to CDC guidelines. ACM reviewed discharge instructions, medical action plan and red flag symptoms with the patient who verbalized understanding. Discussed COVID vaccination status: no. Education provided on COVID-19 vaccination as appropriate. Discussed exposure protocols and quarantine with CDC Guidelines. Patient was given an opportunity to verbalize any questions and concerns and agrees to contact ACM or health care provider for questions related to their healthcare. Reviewed and educated patient on any new and changed medications related to discharge diagnosis     Was patient discharged with a pulse oximeter?  no Discussed and confirmed pulse oximeter discharge instructions and when to notify provider or seek emergency care. ACM provided contact information. Plan for follow-up call in 3-5 days based on severity of symptoms and risk factors.

## 2021-10-05 ENCOUNTER — PATIENT OUTREACH (OUTPATIENT)
Dept: CASE MANAGEMENT | Age: 56
End: 2021-10-05

## 2021-10-05 NOTE — PROGRESS NOTES
Patient resolved from [address] Transitions episode on 10/05/21. Discussed COVID-19 related testing which was available at this time. Test results were negative. Patient informed of results, if available? n/a     Patient/family has been provided the following resources and education related to COVID-19:                         Signs, symptoms and red flags related to COVID-19            CDC exposure and quarantine guidelines            [employer] exposure contact - [phone]            Contact for their local Department of Health                 Patient currently reports that the following symptoms have improved:  fatigue   Pt reports she will call the Dr on her ED D/C papers to make an appt for a f/u visit. No further outreach scheduled with this CTN/ACM/LPN/HC/ MA. Episode of Care resolved. Patient has this CTN/ACM/LPN/HC/MA contact information if future needs arise.

## 2021-10-13 ENCOUNTER — TRANSCRIBE ORDER (OUTPATIENT)
Dept: SCHEDULING | Age: 56
End: 2021-10-13

## 2021-10-13 DIAGNOSIS — R09.89 CAROTID BRUIT: Primary | ICD-10-CM

## 2021-10-18 ENCOUNTER — HOSPITAL ENCOUNTER (EMERGENCY)
Age: 56
Discharge: HOME | End: 2021-10-18
Admitting: EMERGENCY MEDICINE
Payer: MEDICARE

## 2021-10-18 ENCOUNTER — APPOINTMENT (OUTPATIENT)
Dept: GENERAL RADIOLOGY | Age: 56
End: 2021-10-18
Attending: PHYSICIAN ASSISTANT
Payer: MEDICARE

## 2021-10-18 VITALS
DIASTOLIC BLOOD PRESSURE: 79 MMHG | OXYGEN SATURATION: 96 % | BODY MASS INDEX: 28.04 KG/M2 | HEART RATE: 103 BPM | HEIGHT: 68 IN | SYSTOLIC BLOOD PRESSURE: 119 MMHG | RESPIRATION RATE: 16 BRPM | TEMPERATURE: 98.4 F | WEIGHT: 185 LBS

## 2021-10-18 DIAGNOSIS — M62.838 MUSCLE SPASM: ICD-10-CM

## 2021-10-18 DIAGNOSIS — M51.36 DDD (DEGENERATIVE DISC DISEASE), LUMBAR: Primary | ICD-10-CM

## 2021-10-18 LAB
APPEARANCE UR: CLEAR
BACTERIA URNS QL MICRO: NEGATIVE /HPF
BILIRUB UR QL: NEGATIVE
COLOR UR: NORMAL
GLUCOSE UR STRIP.AUTO-MCNC: NEGATIVE MG/DL
HGB UR QL STRIP: NEGATIVE
KETONES UR QL STRIP.AUTO: NEGATIVE MG/DL
LEUKOCYTE ESTERASE UR QL STRIP.AUTO: NEGATIVE
NITRITE UR QL STRIP.AUTO: NEGATIVE
PH UR STRIP: 5 [PH] (ref 5–8)
PROT UR STRIP-MCNC: NEGATIVE MG/DL
RBC #/AREA URNS HPF: NORMAL /HPF (ref 0–5)
SP GR UR REFRACTOMETRY: 1.01 (ref 1–1.03)
UA: UC IF INDICATED,UAUC: NORMAL
UROBILINOGEN UR QL STRIP.AUTO: 0.1 EU/DL (ref 0.1–1)
WBC URNS QL MICRO: NORMAL /HPF (ref 0–4)

## 2021-10-18 PROCEDURE — 72100 X-RAY EXAM L-S SPINE 2/3 VWS: CPT

## 2021-10-18 PROCEDURE — 81001 URINALYSIS AUTO W/SCOPE: CPT

## 2021-10-18 PROCEDURE — 74011000250 HC RX REV CODE- 250: Performed by: PHYSICIAN ASSISTANT

## 2021-10-18 PROCEDURE — 74011250637 HC RX REV CODE- 250/637: Performed by: PHYSICIAN ASSISTANT

## 2021-10-18 PROCEDURE — 99283 EMERGENCY DEPT VISIT LOW MDM: CPT

## 2021-10-18 RX ORDER — ONDANSETRON 4 MG/1
4 TABLET, ORALLY DISINTEGRATING ORAL
Status: COMPLETED | OUTPATIENT
Start: 2021-10-18 | End: 2021-10-18

## 2021-10-18 RX ORDER — LIDOCAINE 4 G/100G
1 PATCH TOPICAL EVERY 24 HOURS
Status: DISCONTINUED | OUTPATIENT
Start: 2021-10-18 | End: 2021-10-18 | Stop reason: HOSPADM

## 2021-10-18 RX ORDER — HYDROCODONE BITARTRATE AND ACETAMINOPHEN 5; 325 MG/1; MG/1
1 TABLET ORAL
Status: COMPLETED | OUTPATIENT
Start: 2021-10-18 | End: 2021-10-18

## 2021-10-18 RX ORDER — CYCLOBENZAPRINE HCL 10 MG
10 TABLET ORAL
Qty: 21 TABLET | Refills: 0 | Status: SHIPPED | OUTPATIENT
Start: 2021-10-18 | End: 2021-10-25

## 2021-10-18 RX ORDER — DICLOFENAC POTASSIUM 50 MG/1
50 TABLET, FILM COATED ORAL 3 TIMES DAILY
Qty: 21 TABLET | Refills: 0 | OUTPATIENT
Start: 2021-10-18 | End: 2022-01-30

## 2021-10-18 RX ADMIN — ONDANSETRON 4 MG: 4 TABLET, ORALLY DISINTEGRATING ORAL at 12:16

## 2021-10-18 RX ADMIN — HYDROCODONE BITARTRATE AND ACETAMINOPHEN 1 TABLET: 5; 325 TABLET ORAL at 12:16

## 2021-10-18 NOTE — ED NOTES
Pt presented to the ER with complaint of Back pain. Pt stated she has been dealing with back pain for several days. Pt denies any trauma or strenuous activity. call bell within reach, side rails up x2, resting comfortable but easily arousable, no signs of acute distress. , bed in lowest position, will continue to monitor      Airway: Patent, Managing oral secretions and No obstruction    Respiratory: Normal Rate , Normal Depth, No acute Distress and Speaking in full sentences    Cardiac: Patient denies any chest pain/discomfort    Neuro: AVPU alert and A&O4/4    GI: Soft and Non-tender    : WNL    Muscle/Skeletal/Skin: Posterior Torso: Right sided lower back pain. non radating. 10/10 with movment 4/10 without.  (-)DCAP-BLS

## 2021-10-18 NOTE — ED PROVIDER NOTES
EMERGENCY DEPARTMENT HISTORY AND PHYSICAL EXAM      Date: 10/18/2021  Patient Name: Easton Rodas    History of Presenting Illness     Chief Complaint   Patient presents with    Back Pain       History Provided By: Patient    HPI: Easton Rodas, 64 y.o. female with a past medical history significant hypothyroidism, GERD, seizures, and PTSD presents to the ED with cc of \"sharp\" low back pain x 1 week. Denies any injury or trauma. States that her pain intermittently radiates into her right buttocks as a \"burning sensation\". She denies saddle anesthesia, urinary/fecal incontinence, difficulty walking, or associated sx's. States pain is exacerbated with certain movement and mildly relieved at rest. She has has treating pain with ibuprofen with no relief. Pt has no further concerns and specifically denies any chest pain, shortness of breath, fever, chills, cough, difficulty urinating, dysuria, hematuria, headaches, light headedness, diaphoresis, or rash. There are no other complaints, changes, or physical findings at this time. PCP: None    No current facility-administered medications on file prior to encounter. Current Outpatient Medications on File Prior to Encounter   Medication Sig Dispense Refill    azithromycin (Zithromax Z-Terrence) 250 mg tablet Take 2 tablet p.o. day 1 then 1 tablet p.o. day 2 through 5 6 Tablet 0    albuterol (PROVENTIL HFA, VENTOLIN HFA, PROAIR HFA) 90 mcg/actuation inhaler Take 2 Puffs by inhalation every four (4) hours as needed for Wheezing. 18 g 0    dextromethorphan-guaiFENesin (Robitussin Cough-Chest Jorge DM) 5-100 mg/5 mL liqd Take 5 mL by mouth every six (6) hours. 200 mL 0    dextroamphetamine-amphetamine (ADDERALL) 20 mg tablet Take 10 mg by mouth four (4) times daily.  QUEtiapine (SEROquel) 300 mg tablet Take 300 mg by mouth nightly.  QUEtiapine (SEROquel) 50 mg tablet Take 50 mg by mouth daily.       hydrOXYzine HCL (ATARAX) 50 mg tablet Take 50 mg by mouth every six (6) hours as needed (max 4 tablets a day).  naloxone (Narcan) 4 mg/actuation nasal spray Use 1 spray intranasally, then discard. Repeat with new spray every 2 min as needed for opioid overdose symptoms, alternating nostrils. 2 Each 0    clonazePAM (KLONOPIN) 1 mg tablet Take  by mouth three (3) times daily.  gabapentin (NEURONTIN) 600 mg tablet Take  by mouth four (4) times daily. Past History     Past Medical History:  Past Medical History:   Diagnosis Date    Fatigue     GERD (gastroesophageal reflux disease)     Hypothyroidism     Psychiatric disorder     PTSD per patient    Seizures ([facility] [address]        Past Surgical History:  Past Surgical History:   Procedure Laterality Date    HX APPENDECTOMY      HX  SECTION      HX CHOLECYSTECTOMY      HX HYSTERECTOMY         Family History:  Family History   Family history unknown: Yes       Social History:  Social History     Tobacco Use    Smoking status: Current Every Day Smoker     Packs/day: 0.50    Smokeless tobacco: Never Used   Vaping Use    Vaping Use: Never used   Substance Use Topics    Alcohol use: Not Currently     Alcohol/week: 0.0 standard drinks    Drug use: Never       Allergies: Allergies   Allergen Reactions    Augmentin [Amoxicillin-Pot Clavulanate] Nausea and Vomiting         Review of Systems     Review of Systems   Constitutional: Negative. Negative for chills, diaphoresis and fever. HENT: Negative. Negative for congestion, rhinorrhea and sore throat. Eyes: Negative. Respiratory: Negative. Negative for cough, chest tightness, shortness of breath and wheezing. Cardiovascular: Negative. Negative for chest pain, palpitations and leg swelling. Gastrointestinal: Positive for nausea. Negative for abdominal pain, diarrhea and vomiting. Genitourinary: Negative. Negative for difficulty urinating, dysuria and frequency. Musculoskeletal: Positive for back pain.  Negative for gait problem. Skin: Negative. Negative for rash. Neurological: Negative. Negative for dizziness, weakness, light-headedness, numbness and headaches. Psychiatric/Behavioral: Negative. All other systems reviewed and are negative. Physical Exam     Physical Exam  Vitals and nursing note reviewed. Constitutional:       General: She is not in acute distress. Appearance: Normal appearance. She is not ill-appearing or toxic-appearing. HENT:      Head: Normocephalic and atraumatic. Mouth/Throat:      Mouth: Mucous membranes are moist.      Pharynx: Oropharynx is clear. Eyes:      Extraocular Movements: Extraocular movements intact. Conjunctiva/sclera: Conjunctivae normal.      Pupils: Pupils are equal, round, and reactive to light. Cardiovascular:      Rate and Rhythm: Regular rhythm. Tachycardia present. Pulses: Normal pulses. Heart sounds: Normal heart sounds, S1 normal and S2 normal. No murmur heard. No friction rub. No gallop. Pulmonary:      Effort: Pulmonary effort is normal.      Breath sounds: Normal breath sounds. No wheezing, rhonchi or rales. Abdominal:      General: There is no distension. Palpations: Abdomen is soft. Tenderness: There is no abdominal tenderness. There is no guarding or rebound. Musculoskeletal:      Cervical back: Neck supple. No tenderness. Right lower leg: No edema. Left lower leg: No edema. Skin:     General: Skin is warm and dry. Capillary Refill: Capillary refill takes less than 2 seconds. Findings: No rash. Neurological:      General: No focal deficit present. Mental Status: She is alert and oriented to person, place, and time. GCS: GCS eye subscore is 4. GCS verbal subscore is 5. GCS motor subscore is 6. Sensory: Sensation is intact. Motor: Motor function is intact. Gait: Gait is intact.       Deep Tendon Reflexes:      Reflex Scores:       Patellar reflexes are 2+ on the right side and 2+ on the left side. Psychiatric:         Mood and Affect: Mood normal.         Behavior: Behavior normal.         Lab and Diagnostic Study Results     Labs -     No results found for this or any previous visit (from the past 12 hour(s)). Radiologic Studies -   @lastxrresult@  CT Results  (Last 48 hours)    None        CXR Results  (Last 48 hours)    None            Medical Decision Making   - I am the first provider for this patient. - I reviewed the vital signs, available nursing notes, past medical history, past surgical history, family history and social history. - Initial assessment performed. The patients presenting problems have been discussed, and they are in agreement with the care plan formulated and outlined with them. I have encouraged them to ask questions as they arise throughout their visit. Vital Signs-Reviewed the patient's vital signs. No data found. Records Reviewed: Nursing Notes and Old Medical Records      ED Course:          Provider Notes (Medical Decision Making):     MDM  Number of Diagnoses or Management Options  DDD (degenerative disc disease), lumbar  Muscle spasm  Diagnosis management comments: The patient presents with acute low back pain. Stable vitals and benign exam. No concerning red flags per history or exam. DDx: strain, sprain, sciatica, MSK pain. Clinical presentation not consistent with  pathology, aortic dissection or AAA. There is no urine/bowel incontinence or perianal numbness to suggest cauda equina. No fever/chills, IVDA to suggest epidural abscess or discitis. No focal weakness or sensory changes to suggest transverse myelitis. Therefore MRI not indicated. I have recommended rest, avoiding heavy lifting until better, use of intermittent heat (avoid sleeping on a heating pad), and use of OTC NSAID's (Advil, Aleve etc) or Tylenol prn for pain.  Call orthopedic specialist if back pain persists or she develops leg symptoms or other concerning red flags. Will provide pain control and refer to orthopedics. Amount and/or Complexity of Data Reviewed  Clinical lab tests: ordered and reviewed  Tests in the radiology section of CPT®: ordered and reviewed  Tests in the medicine section of CPT®: ordered and reviewed  Review and summarize past medical records: yes           Procedures   Medical Decision Makingedical Decision Making  Performed by: Edgar Barragan PA-C  PROCEDURES:  Procedures       Disposition   Disposition: DC- Adult Discharges: All of the diagnostic tests were reviewed and questions answered. Diagnosis, care plan and treatment options were discussed. The patient understands the instructions and will follow up as directed. The patients results have been reviewed with them. They have been counseled regarding their diagnosis. The patient verbally convey understanding and agreement of the signs, symptoms, diagnosis, treatment and prognosis and additionally agrees to follow up as recommended with their PCP in 24 - 48 hours. They also agree with the care-plan and convey that all of their questions have been answered. I have also put together some discharge instructions for them that include: 1) educational information regarding their diagnosis, 2) how to care for their diagnosis at home, as well a 3) list of reasons why they would want to return to the ED prior to their follow-up appointment, should their condition change. Discharged    DISCHARGE PLAN:  1. Current Discharge Medication List      CONTINUE these medications which have NOT CHANGED    Details   azithromycin (Zithromax Z-Terrence) 250 mg tablet Take 2 tablet p.o. day 1 then 1 tablet p.o. day 2 through 5  Qty: 6 Tablet, Refills: 0      albuterol (PROVENTIL HFA, VENTOLIN HFA, PROAIR HFA) 90 mcg/actuation inhaler Take 2 Puffs by inhalation every four (4) hours as needed for Wheezing.   Qty: 18 g, Refills: 0      dextromethorphan-guaiFENesin (Robitussin Cough-Chest Jorge DM) 5-100 mg/5 mL liqd Take 5 mL by mouth every six (6) hours. Qty: 200 mL, Refills: 0      dextroamphetamine-amphetamine (ADDERALL) 20 mg tablet Take 10 mg by mouth four (4) times daily. !! QUEtiapine (SEROquel) 300 mg tablet Take 300 mg by mouth nightly. !! QUEtiapine (SEROquel) 50 mg tablet Take 50 mg by mouth daily. hydrOXYzine HCL (ATARAX) 50 mg tablet Take 50 mg by mouth every six (6) hours as needed (max 4 tablets a day). naloxone (Narcan) 4 mg/actuation nasal spray Use 1 spray intranasally, then discard. Repeat with new spray every 2 min as needed for opioid overdose symptoms, alternating nostrils. Qty: 2 Each, Refills: 0      clonazePAM (KLONOPIN) 1 mg tablet Take  by mouth three (3) times daily. Associated Diagnoses: Idiopathic hypotension; Hypothyroidism due to acquired atrophy of thyroid; Anemia due to vitamin B12 deficiency; Dizziness; Arthralgia      gabapentin (NEURONTIN) 600 mg tablet Take  by mouth four (4) times daily. Associated Diagnoses: Idiopathic hypotension; Hypothyroidism due to acquired atrophy of thyroid; Anemia due to vitamin B12 deficiency; Dizziness; Arthralgia       !! - Potential duplicate medications found. Please discuss with provider. 2.   Follow-up Information     Follow up With Specialties Details Why Contact Info    Casey Zepeda MD Orthopedic Surgery  As needed, [facility] symptoms worsen [facility] [address]  [phone]          3. Return to ED if worse   4. Discharge Medication List as of 10/18/2021  1:08 PM      START taking these medications    Details   cyclobenzaprine (FLEXERIL) 10 mg tablet Take 1 Tablet by mouth three (3) times daily as needed for Muscle Spasm(s) for up to 7 days. , Normal, Disp-21 Tablet, R-0      diclofenac potassium (CATAFLAM) 50 mg tablet Take 1 Tablet by mouth three (3) times daily. , Normal, Disp-21 Tablet, R-0         CONTINUE these medications which have NOT CHANGED    Details azithromycin (Zithromax Z-Terrence) 250 mg tablet Take 2 tablet p.o. day 1 then 1 tablet p.o. day 2 through 5, Normal, Disp-6 Tablet, R-0      albuterol (PROVENTIL HFA, VENTOLIN HFA, PROAIR HFA) 90 mcg/actuation inhaler Take 2 Puffs by inhalation every four (4) hours as needed for Wheezing., Normal, Disp-18 g, R-0      dextromethorphan-guaiFENesin (Robitussin Cough-Chest Jorge DM) 5-100 mg/5 mL liqd Take 5 mL by mouth every six (6) hours. , Normal, Disp-200 mL, R-0      dextroamphetamine-amphetamine (ADDERALL) 20 mg tablet Take 10 mg by mouth four (4) times daily. , Historical Med      !! QUEtiapine (SEROquel) 300 mg tablet Take 300 mg by mouth nightly.  , Historical Med      !! QUEtiapine (SEROquel) 50 mg tablet Take 50 mg by mouth daily. , Historical Med      hydrOXYzine HCL (ATARAX) 50 mg tablet Take 50 mg by mouth every six (6) hours as needed (max 4 tablets a day). , Historical Med      naloxone (Narcan) 4 mg/actuation nasal spray Use 1 spray intranasally, then discard. Repeat with new spray every 2 min as needed for opioid overdose symptoms, alternating nostrils. , Normal, Disp-2 Each, R-0      clonazePAM (KLONOPIN) 1 mg tablet Take  by mouth three (3) times daily. , Historical Med      gabapentin (NEURONTIN) 600 mg tablet Take  by mouth four (4) times daily. , Historical Med       !! - Potential duplicate medications found. Please discuss with provider. Diagnosis     Clinical Impression:   1. DDD (degenerative disc disease), lumbar    2. Muscle spasm        Attestations:    Salome Lombarid PA-C    Please note that this dictation was completed with [employer], the [employer] voice recognition software. Quite often unanticipated grammatical, syntax, homophones, and other interpretive errors are inadvertently transcribed by the computer software. Please disregard these errors. Please excuse any errors that have escaped final proofreading. Thank you.

## 2021-10-18 NOTE — DISCHARGE INSTRUCTIONS
Thank you! Thank you for allowing me to care for you in the emergency department. I sincerely hope that you are satisfied with your visit today. It is my goal to provide you with excellent care. Below you will find a list of your labs and imaging from your visit today. Should you have any questions regarding these results please do not hesitate to call the emergency department. Labs -     Recent Results (from the past 12 hour(s))   URINALYSIS W/ REFLEX CULTURE    Collection Time: 10/18/21 11:05 AM    Specimen: Urine   Result Value Ref Range    Color Yellow/Straw      Appearance Clear Clear      Specific gravity 1.009 1.003 - 1.030      pH (UA) 5.0 5.0 - 8.0      Protein Negative Negative mg/dL    Glucose Negative Negative mg/dL    Ketone Negative Negative mg/dL    Bilirubin Negative Negative      Blood Negative Negative      Urobilinogen 0.1 0.1 - 1.0 EU/dL    Nitrites Negative Negative      Leukocyte Esterase Negative Negative      UA:UC IF INDICATED Culture not indicated by UA result Culture not indicated by UA result      WBC 0-4 0 - 4 /hpf    RBC 0-5 0 - 5 /hpf    Bacteria Negative Negative /hpf       Radiologic Studies -   XR SPINE LUMB 2 OR 3 V   Final Result   Lumbar spondylosis with multilevel degenerative disc narrowing. CT Results  (Last 48 hours)      None          CXR Results  (Last 48 hours)      None               If you feel that you have not received excellent quality care or timely care, please ask to speak to the nurse manager. Please choose us in the future for your continued health care needs. ------------------------------------------------------------------------------------------------------------  The exam and treatment you received in the Emergency Department were for an urgent problem and are not intended as complete care.  It is important that you follow-up with a doctor, nurse practitioner, or physician assistant to:  (1) confirm your diagnosis,  (2) re-evaluation of changes in your illness and treatment, and  (3) for ongoing care. If your symptoms become worse or you do not improve as expected and you are unable to reach your usual health care provider, you should return to the Emergency Department. We are available 24 hours a day. Please take your discharge instructions with you when you go to your follow-up appointment. If you have any problem arranging a follow-up appointment, contact the Emergency Department immediately. If a prescription has been provided, please have it filled as soon as possible to prevent a delay in treatment. Read the entire medication instruction sheet provided to you by the pharmacy. If you have any questions or reservations about taking the medication due to side effects or interactions with other medications, please call your primary care physician or contact the ER to speak with the charge nurse. Make an appointment with your family doctor or the physician you were referred to for follow-up of this visit as instructed on your discharge paperwork, as this is a mandatory follow-up. Return to the ER if you are unable to be seen or if you are unable to be seen in a timely manner. If you have any problem arranging the follow-up visit, contact the Emergency Department immediately.

## 2021-11-07 ENCOUNTER — APPOINTMENT (OUTPATIENT)
Dept: CT IMAGING | Age: 56
End: 2021-11-07
Attending: NURSE PRACTITIONER
Payer: MEDICARE

## 2021-11-07 ENCOUNTER — HOSPITAL ENCOUNTER (EMERGENCY)
Age: 56
Discharge: HOME | End: 2021-11-07
Admitting: EMERGENCY MEDICINE
Payer: MEDICARE

## 2021-11-07 ENCOUNTER — APPOINTMENT (OUTPATIENT)
Dept: GENERAL RADIOLOGY | Age: 56
End: 2021-11-07
Attending: NURSE PRACTITIONER
Payer: MEDICARE

## 2021-11-07 VITALS
SYSTOLIC BLOOD PRESSURE: 147 MMHG | OXYGEN SATURATION: 98 % | TEMPERATURE: 98 F | WEIGHT: 185 LBS | HEART RATE: 98 BPM | HEIGHT: 67 IN | RESPIRATION RATE: 20 BRPM | DIASTOLIC BLOOD PRESSURE: 90 MMHG | BODY MASS INDEX: 29.03 KG/M2

## 2021-11-07 DIAGNOSIS — M51.36 DDD (DEGENERATIVE DISC DISEASE), LUMBAR: ICD-10-CM

## 2021-11-07 DIAGNOSIS — M54.50 LOW BACK PAIN WITHOUT SCIATICA, UNSPECIFIED BACK PAIN LATERALITY, UNSPECIFIED CHRONICITY: Primary | ICD-10-CM

## 2021-11-07 DIAGNOSIS — S22.080A CLOSED WEDGE COMPRESSION FRACTURE OF T12 VERTEBRA, INITIAL ENCOUNTER (HCC): ICD-10-CM

## 2021-11-07 PROCEDURE — 96375 TX/PRO/DX INJ NEW DRUG ADDON: CPT

## 2021-11-07 PROCEDURE — 72131 CT LUMBAR SPINE W/O DYE: CPT

## 2021-11-07 PROCEDURE — 74011250637 HC RX REV CODE- 250/637: Performed by: NURSE PRACTITIONER

## 2021-11-07 PROCEDURE — 72110 X-RAY EXAM L-2 SPINE 4/>VWS: CPT

## 2021-11-07 PROCEDURE — 96374 THER/PROPH/DIAG INJ IV PUSH: CPT

## 2021-11-07 PROCEDURE — 99283 EMERGENCY DEPT VISIT LOW MDM: CPT

## 2021-11-07 PROCEDURE — 74011250636 HC RX REV CODE- 250/636: Performed by: NURSE PRACTITIONER

## 2021-11-07 RX ORDER — OXYCODONE AND ACETAMINOPHEN 5; 325 MG/1; MG/1
1 TABLET ORAL
Status: COMPLETED | OUTPATIENT
Start: 2021-11-07 | End: 2021-11-07

## 2021-11-07 RX ORDER — DEXAMETHASONE SODIUM PHOSPHATE 10 MG/ML
10 INJECTION INTRAMUSCULAR; INTRAVENOUS ONCE
Status: COMPLETED | OUTPATIENT
Start: 2021-11-07 | End: 2021-11-07

## 2021-11-07 RX ORDER — KETOROLAC TROMETHAMINE 30 MG/ML
30 INJECTION, SOLUTION INTRAMUSCULAR; INTRAVENOUS
Status: COMPLETED | OUTPATIENT
Start: 2021-11-07 | End: 2021-11-07

## 2021-11-07 RX ORDER — ORPHENADRINE CITRATE 30 MG/ML
60 INJECTION INTRAMUSCULAR; INTRAVENOUS ONCE
Status: COMPLETED | OUTPATIENT
Start: 2021-11-07 | End: 2021-11-07

## 2021-11-07 RX ORDER — OXYCODONE AND ACETAMINOPHEN 5; 325 MG/1; MG/1
1 TABLET ORAL
Qty: 12 TABLET | Refills: 0 | Status: SHIPPED | OUTPATIENT
Start: 2021-11-07 | End: 2021-11-10

## 2021-11-07 RX ORDER — DICLOFENAC SODIUM 75 MG/1
75 TABLET, DELAYED RELEASE ORAL 2 TIMES DAILY
Qty: 28 TABLET | Refills: 0 | OUTPATIENT
Start: 2021-11-07 | End: 2022-01-30

## 2021-11-07 RX ADMIN — KETOROLAC TROMETHAMINE 30 MG: 30 INJECTION, SOLUTION INTRAMUSCULAR at 11:21

## 2021-11-07 RX ADMIN — DEXAMETHASONE SODIUM PHOSPHATE 10 MG: 10 INJECTION INTRAMUSCULAR; INTRAVENOUS at 11:29

## 2021-11-07 RX ADMIN — ORPHENADRINE CITRATE 60 MG: 30 INJECTION INTRAMUSCULAR; INTRAVENOUS at 11:29

## 2021-11-07 RX ADMIN — OXYCODONE AND ACETAMINOPHEN 1 TABLET: 5; 325 TABLET ORAL at 15:00

## 2021-11-07 NOTE — Clinical Note
[facility] EMERGENCY DEPT 
[facility] [address] [place] [address] 
[phone] Work/School Note Date: 11/7/2021 To Whom It May concern: 
 
Willy Dugan was seen and treated today in the emergency room by the following provider(s): 
Nurse Practitioner: Lindsay Andrews NP. Willy Dugan is excused from work/school on 11/7/2021 through 11/10/2021. She is medically clear to return to work/school on 11/11/2021. Sincerely, Kamryn Nichols RN

## 2021-11-07 NOTE — ED TRIAGE NOTES
SEVERE LOWER BACK PAIN TO RIGHT SIDE, A LITTLE BIT DOWN THE LEG, PTS DOC PRESCRIBED MUSCLE RELAXERS, PT REPORTS ZERO RELIEF. GCS 15, BREATHING FAST

## 2021-11-07 NOTE — ED PROVIDER NOTES
EMERGENCY DEPARTMENT HISTORY AND PHYSICAL EXAM      Date: 11/7/2021  Patient Name: Monico Cogan    History of Presenting Illness     Chief Complaint   Patient presents with    Back Pain       History Provided By: Patient    HPI: Monico Cogan, 64 y.o. female with a past medical history significant seizure, pyschiatric d/o, hypothyroidism, GERD, Fatigue presents to the ED with cc of lumbar back pain, Right lower back pain, no urinary sx. Pt has been seen in ER 2 weeks ago and 3-4 days ago by pcp. Denies radiation down the right leg. There are no other complaints, changes, or physical findings at this time. PCP: None    No current facility-administered medications on file prior to encounter. Current Outpatient Medications on File Prior to Encounter   Medication Sig Dispense Refill    diclofenac potassium (CATAFLAM) 50 mg tablet Take 1 Tablet by mouth three (3) times daily. 21 Tablet 0    azithromycin (Zithromax Z-Terrence) 250 mg tablet Take 2 tablet p.o. day 1 then 1 tablet p.o. day 2 through 5 6 Tablet 0    albuterol (PROVENTIL HFA, VENTOLIN HFA, PROAIR HFA) 90 mcg/actuation inhaler Take 2 Puffs by inhalation every four (4) hours as needed for Wheezing. 18 g 0    dextromethorphan-guaiFENesin (Robitussin Cough-Chest Jorge DM) 5-100 mg/5 mL liqd Take 5 mL by mouth every six (6) hours. 200 mL 0    dextroamphetamine-amphetamine (ADDERALL) 20 mg tablet Take 10 mg by mouth four (4) times daily.  QUEtiapine (SEROquel) 300 mg tablet Take 300 mg by mouth nightly.  QUEtiapine (SEROquel) 50 mg tablet Take 50 mg by mouth daily.  hydrOXYzine HCL (ATARAX) 50 mg tablet Take 50 mg by mouth every six (6) hours as needed (max 4 tablets a day).  naloxone (Narcan) 4 mg/actuation nasal spray Use 1 spray intranasally, then discard. Repeat with new spray every 2 min as needed for opioid overdose symptoms, alternating nostrils.  2 Each 0    clonazePAM (KLONOPIN) 1 mg tablet Take  by mouth three (3) times daily.  gabapentin (NEURONTIN) 600 mg tablet Take  by mouth four (4) times daily. Past History     Past Medical History:  Past Medical History:   Diagnosis Date    DDD (degenerative disc disease), lumbar     Fatigue     GERD (gastroesophageal reflux disease)     Hypothyroidism     Psychiatric disorder     PTSD per patient    Seizures ([facility] [address]        Past Surgical History:  Past Surgical History:   Procedure Laterality Date    HX APPENDECTOMY      HX  SECTION      HX CHOLECYSTECTOMY      HX HYSTERECTOMY         Family History:  Family History   Family history unknown: Yes       Social History:  Social History     Tobacco Use    Smoking status: Current Every Day Smoker     Packs/day: 0.50    Smokeless tobacco: Never Used   Vaping Use    Vaping Use: Never used   Substance Use Topics    Alcohol use: Not Currently     Alcohol/week: 0.0 standard drinks    Drug use: Never       Allergies: Allergies   Allergen Reactions    Augmentin [Amoxicillin-Pot Clavulanate] Nausea and Vomiting         Review of Systems     Review of Systems   Constitutional: Negative. Negative for activity change. HENT: Negative. Respiratory: Negative. Cardiovascular: Negative. Gastrointestinal: Negative. Negative for abdominal pain, nausea and vomiting. Genitourinary: Negative. Negative for difficulty urinating, flank pain and pelvic pain. Musculoskeletal: Positive for back pain and myalgias. Skin: Negative. Negative for color change and wound. Neurological: Negative. Psychiatric/Behavioral: Negative. All other systems reviewed and are negative. Physical Exam     Physical Exam  Vitals and nursing note reviewed. Constitutional:       Appearance: Normal appearance. She is normal weight. HENT:      Head: Normocephalic and atraumatic. Eyes:      Extraocular Movements: Extraocular movements intact. Pupils: Pupils are equal, round, and reactive to light.    Cardiovascular: Rate and Rhythm: Normal rate and regular rhythm. Pulses: Normal pulses. Heart sounds: Normal heart sounds. Pulmonary:      Effort: Pulmonary effort is normal.      Breath sounds: Normal breath sounds. Abdominal:      General: Abdomen is flat. Tenderness: There is no abdominal tenderness. There is no guarding. Musculoskeletal:         General: Tenderness (lumbar region of back. ) present. No swelling or deformity. Lumbar back: Tenderness present. No swelling, deformity or signs of trauma. Decreased range of motion. Skin:     General: Skin is warm and dry. Neurological:      General: No focal deficit present. Mental Status: She is alert and oriented to person, place, and time. Psychiatric:         Mood and Affect: Mood normal.         Behavior: Behavior normal.         Lab and Diagnostic Study Results     Labs -   No results found for this or any previous visit (from the past 12 hour(s)). Radiologic Studies -   @lastxrresult@  CT Results  (Last 48 hours)               11/07/21 1245  CT SPINE LUMB WO CONT Final result    Impression:  Degenerative changes of the lumbar spine as described. No acute bony   findings. An MRI would be more sensitive for detecting disc and nerve root   pathology. 2. Distended urinary bladder. Narrative: Thin section axial images through the lumbar spine were obtained without   contrast. Sagittal and coronal reformatted images were reviewed. All CT scans at   this facility are performed using dose reduction optimization techniques as   appropriate to a performed exam including the following:   automated exposure   control, adjustments of the mA and/or kV according to patient size, or use of   iterative reconstruction technique. Next       Indication: Persistent back pain not responsive to medications. Mild wedging of T12 appears old. No acute appearing fractures.  There is mild   degenerative disc disease throughout the lumbar spine, greatest at L4-L5. L1-L2: There is a small broad-based disc bulge without significant mass effect. L2-L3: There is a tiny broad-based disc bulge without significant mass effect. Mild right neural foraminal narrowing. L3-L4: Small broad-based disc bulge. There is ligamentum flavum hypertrophy   posteriorly. There is mild right neural foraminal narrowing. 5: There is vacuum disc phenomenon at this level. Small broad-based disc bulge. Mild neural foraminal narrowing on the right. L5-S1: No significant disc bulge. Aorta is atherosclerotic. No retroperitoneal   adenopathy. No gross paraspinal masses. Urinary bladder is distended. CXR Results  (Last 48 hours)    None            Medical Decision Making   - I am the first provider for this patient. - I reviewed the vital signs, available nursing notes, past medical history, past surgical history, family history and social history. - Initial assessment performed. The patients presenting problems have been discussed, and they are in agreement with the care plan formulated and outlined with them. I have encouraged them to ask questions as they arise throughout their visit. Vital Signs-Reviewed the patient's vital signs.   Patient Vitals for the past 12 hrs:   Temp Pulse Resp BP SpO2   11/07/21 1459 -- 98 -- (!) 147/90 98 %   11/07/21 1034 98 °F (36.7 °C) (!) 101 20 (!) 158/97 97 %       Records Reviewed: Nursing Notes        ED Course:          Provider Notes (Medical Decision Making):   *MDM  Number of Diagnoses or Management Options  Closed wedge compression fracture of T12 vertebra, initial encounter ([facility] [address]): new, needed workup  DDD (degenerative disc disease), lumbar: new, needed workup  Low back pain without sciatica, unspecified back pain laterality, unspecified chronicity: new, needed workup     Amount and/or Complexity of Data Reviewed  Tests in the radiology section of CPT®: ordered and reviewed    Risk of Complications, Morbidity, and/or Mortality  Presenting problems: low  Diagnostic procedures: low  Management options: low  General comments: Pt educated to findings of lumbar CT. Pt encouraged to fu with back specialist,     Patient Progress  Patient progress: stable         Procedures   Medical Decision Makingedical Decision Making  Performed by: Asim Borja NP  PROCEDURES: none  Procedures       Disposition   Disposition: Condition improved  DC- Adult Discharges: All of the diagnostic tests were reviewed and questions answered. Diagnosis, care plan and treatment options were discussed. The patient understands the instructions and will follow up as directed. The patients results have been reviewed with them. They have been counseled regarding their diagnosis. The patient verbally convey understanding and agreement of the signs, symptoms, diagnosis, treatment and prognosis and additionally agrees to follow up as recommended with their PCP in 24 - 48 hours. They also agree with the care-plan and convey that all of their questions have been answered. I have also put together some discharge instructions for them that include: 1) educational information regarding their diagnosis, 2) how to care for their diagnosis at home, as well a 3) list of reasons why they would want to return to the ED prior to their follow-up appointment, should their condition change. DC-The patient was given verbal follow-up instructions    Discharged    DISCHARGE PLAN:  1. Current Discharge Medication List      CONTINUE these medications which have NOT CHANGED    Details   diclofenac potassium (CATAFLAM) 50 mg tablet Take 1 Tablet by mouth three (3) times daily.   Qty: 21 Tablet, Refills: 0      azithromycin (Zithromax Z-Terrence) 250 mg tablet Take 2 tablet p.o. day 1 then 1 tablet p.o. day 2 through 5  Qty: 6 Tablet, Refills: 0      albuterol (PROVENTIL HFA, VENTOLIN HFA, PROAIR HFA) 90 mcg/actuation inhaler Take 2 Puffs by inhalation every four (4) hours as needed for Wheezing. Qty: 18 g, Refills: 0      dextromethorphan-guaiFENesin (Robitussin Cough-Chest Jorge DM) 5-100 mg/5 mL liqd Take 5 mL by mouth every six (6) hours. Qty: 200 mL, Refills: 0      dextroamphetamine-amphetamine (ADDERALL) 20 mg tablet Take 10 mg by mouth four (4) times daily. !! QUEtiapine (SEROquel) 300 mg tablet Take 300 mg by mouth nightly. !! QUEtiapine (SEROquel) 50 mg tablet Take 50 mg by mouth daily. hydrOXYzine HCL (ATARAX) 50 mg tablet Take 50 mg by mouth every six (6) hours as needed (max 4 tablets a day). naloxone (Narcan) 4 mg/actuation nasal spray Use 1 spray intranasally, then discard. Repeat with new spray every 2 min as needed for opioid overdose symptoms, alternating nostrils. Qty: 2 Each, Refills: 0      clonazePAM (KLONOPIN) 1 mg tablet Take  by mouth three (3) times daily. Associated Diagnoses: Idiopathic hypotension; Hypothyroidism due to acquired atrophy of thyroid; Anemia due to vitamin B12 deficiency; Dizziness; Arthralgia      gabapentin (NEURONTIN) 600 mg tablet Take  by mouth four (4) times daily. Associated Diagnoses: Idiopathic hypotension; Hypothyroidism due to acquired atrophy of thyroid; Anemia due to vitamin B12 deficiency; Dizziness; Arthralgia       !! - Potential duplicate medications found. Please discuss with provider. 2.   Follow-up Information     Follow up With Specialties Details Why Contact Info    Claudia Merchant MD Orthopedic Surgery In 2 days  [facility] [place] [facility] [place]  [phone]          3. Return to ED if worse   4. Discharge Medication List as of 11/7/2021  2:53 PM            Diagnosis     Clinical Impression:   1. Low back pain without sciatica, unspecified back pain laterality, unspecified chronicity    2. Closed wedge compression fracture of T12 vertebra, initial encounter ([facility] [address]    3.  DDD (degenerative disc disease), lumbar        Attestations:    Sujatha Metz, NP    Please note that this dictation was completed with Zeer, the computer voice recognition software. Quite often unanticipated grammatical, syntax, homophones, and other interpretive errors are inadvertently transcribed by the computer software. Please disregard these errors. Please excuse any errors that have escaped final proofreading. Thank you.

## 2021-11-07 NOTE — Clinical Note
[facility] EMERGENCY DEPT 
[facility] [address] [place] [address] 
[phone] Work/School Note Date: 11/7/2021 To Whom It May concern: 
 
Bere Aguirre was seen and treated today in the emergency room by the following provider(s): 
Nurse Practitioner: Tony Edmond NP. Bere Aguirre is excused from work/school on 11/7/2021 through 11/10/2021. She is medically clear to return to work/school on 11/11/2021.   
  
 
Sincerely, 
 
 
 
 
Sim Conti NP

## 2021-11-07 NOTE — Clinical Note
[address] [place] EMERGENCY DEPT 
[facility] [address] [place] [address] 
[phone] Work/School Note Date: 11/7/2021 To Whom It May concern: 
 
Dania Everett was seen and treated today in the emergency room by the following provider(s): 
Nurse Practitioner: Chayo Schroeder NP. Dania Everett is excused from work/school on 11/7/2021 through 11/10/2021. She is medically clear to return to work/school on 11/11/2021. Sincerely, Dheeraj Antoine RN

## 2021-11-10 ENCOUNTER — HOSPITAL ENCOUNTER (OUTPATIENT)
Dept: NON INVASIVE DIAGNOSTICS | Age: 56
Discharge: HOME | End: 2021-11-10
Attending: INTERNAL MEDICINE
Payer: MEDICARE

## 2021-11-10 DIAGNOSIS — R09.89 CAROTID BRUIT: ICD-10-CM

## 2021-11-10 LAB
LEFT ARM BP: 83 MMHG
LEFT CCA DIST DIAS: 29.8 CM/S
LEFT CCA DIST SYS: 87.1 CM/S
LEFT CCA PROX DIAS: 25.8 CM/S
LEFT CCA PROX SYS: 130 CM/S
LEFT ECA DIAS: 25.2 CM/S
LEFT ECA SYS: 94.7 CM/S
LEFT ICA DIST DIAS: 21.5 CM/S
LEFT ICA DIST SYS: 54.4 CM/S
LEFT ICA PROX DIAS: 20.6 CM/S
LEFT ICA PROX SYS: 73.3 CM/S
LEFT ICA/CCA SYS: 0.84
LEFT VERTEBRAL DIAS: 16.7 CM/S
LEFT VERTEBRAL SYS: 51.1 CM/S
RIGHT ARM BP: 95 MMHG
RIGHT CCA DIST DIAS: 27.9 CM/S
RIGHT CCA DIST SYS: 81.9 CM/S
RIGHT CCA PROX DIAS: 23.4 CM/S
RIGHT CCA PROX SYS: 73.4 CM/S
RIGHT ECA DIAS: 27.3 CM/S
RIGHT ECA SYS: 89 CM/S
RIGHT ICA DIST DIAS: 17.9 CM/S
RIGHT ICA DIST SYS: 49.8 CM/S
RIGHT ICA PROX DIAS: 16.4 CM/S
RIGHT ICA PROX SYS: 50.8 CM/S
RIGHT ICA/CCA SYS: 0.62
RIGHT VERTEBRAL DIAS: 15.7 CM/S
RIGHT VERTEBRAL SYS: 56.1 CM/S
VAS LEFT SUBCLAVIAN PROX EDV: 0 CM/S
VAS LEFT SUBCLAVIAN PROX PSV: 89.3 CM/S
VAS RIGHT SUBCLAVIAN PROX EDV: 0 CM/S
VAS RIGHT SUBCLAVIAN PROX PSV: 65.6 CM/S

## 2021-11-10 PROCEDURE — 93880 EXTRACRANIAL BILAT STUDY: CPT

## 2021-12-30 ENCOUNTER — APPOINTMENT (OUTPATIENT)
Dept: CT IMAGING | Age: 56
End: 2021-12-30
Attending: NURSE PRACTITIONER
Payer: MEDICARE

## 2021-12-30 ENCOUNTER — HOSPITAL ENCOUNTER (EMERGENCY)
Age: 56
Discharge: HOME | End: 2021-12-30
Attending: EMERGENCY MEDICINE
Payer: MEDICARE

## 2021-12-30 VITALS
BODY MASS INDEX: 27.28 KG/M2 | HEIGHT: 68 IN | DIASTOLIC BLOOD PRESSURE: 88 MMHG | SYSTOLIC BLOOD PRESSURE: 122 MMHG | RESPIRATION RATE: 20 BRPM | WEIGHT: 180 LBS | TEMPERATURE: 99.2 F | HEART RATE: 106 BPM | OXYGEN SATURATION: 100 %

## 2021-12-30 DIAGNOSIS — R42 DIZZINESS: Primary | ICD-10-CM

## 2021-12-30 LAB
ABO + RH BLD: NORMAL
ALBUMIN SERPL-MCNC: 3.8 G/DL (ref 3.5–5)
ALBUMIN/GLOB SERPL: 1.1 {RATIO} (ref 1.1–2.2)
ALP SERPL-CCNC: 87 U/L (ref 45–117)
ALT SERPL-CCNC: 14 U/L (ref 12–78)
ANION GAP SERPL CALC-SCNC: 4 MMOL/L (ref 5–15)
APAP SERPL-MCNC: <10 UG/ML (ref 10–30)
AST SERPL W P-5'-P-CCNC: 16 U/L (ref 15–37)
BASOPHILS # BLD: 0 K/UL (ref 0–0.1)
BASOPHILS NFR BLD: 0 % (ref 0–1)
BILIRUB SERPL-MCNC: 0.2 MG/DL (ref 0.2–1)
BLOOD GROUP ANTIBODIES SERPL: NEGATIVE
BUN SERPL-MCNC: 13 MG/DL (ref 6–20)
BUN/CREAT SERPL: 10 (ref 12–20)
CA-I BLD-MCNC: 9.1 MG/DL (ref 8.5–10.1)
CHLORIDE SERPL-SCNC: 105 MMOL/L (ref 97–108)
CO2 SERPL-SCNC: 27 MMOL/L (ref 21–32)
CREAT SERPL-MCNC: 1.28 MG/DL (ref 0.55–1.02)
DATE LAST DOSE: ABNORMAL
DATE LAST DOSE: NORMAL
DIFFERENTIAL METHOD BLD: ABNORMAL
EOSINOPHIL # BLD: 0 K/UL (ref 0–0.4)
EOSINOPHIL NFR BLD: 0 % (ref 0–7)
ERYTHROCYTE [DISTWIDTH] IN BLOOD BY AUTOMATED COUNT: 13.4 % (ref 11.5–14.5)
ETHANOL SERPL-MCNC: <4 MG/DL
GLOBULIN SER CALC-MCNC: 3.6 G/DL (ref 2–4)
GLUCOSE BLD STRIP.AUTO-MCNC: 114 MG/DL (ref 65–117)
GLUCOSE BLD STRIP.AUTO-MCNC: 120 MG/DL (ref 65–117)
GLUCOSE SERPL-MCNC: 106 MG/DL (ref 65–100)
HCT VFR BLD AUTO: 45.6 % (ref 35–47)
HGB BLD-MCNC: 15.1 G/DL (ref 11.5–16)
IMM GRANULOCYTES # BLD AUTO: 0 K/UL
IMM GRANULOCYTES NFR BLD AUTO: 0 %
INR PPP: 0.9 (ref 0.9–1.1)
LYMPHOCYTES # BLD: 4.4 K/UL (ref 0.8–3.5)
LYMPHOCYTES NFR BLD: 40 % (ref 12–49)
MCH RBC QN AUTO: 28.4 PG (ref 26–34)
MCHC RBC AUTO-ENTMCNC: 33.1 G/DL (ref 30–36.5)
MCV RBC AUTO: 85.9 FL (ref 80–99)
MONOCYTES # BLD: 0.8 K/UL (ref 0–1)
MONOCYTES NFR BLD: 7 % (ref 5–13)
NEUTS SEG # BLD: 5.7 K/UL (ref 1.8–8)
NEUTS SEG NFR BLD: 53 % (ref 32–75)
NRBC # BLD: 0 K/UL (ref 0–0.01)
NRBC BLD-RTO: 0 PER 100 WBC
PERFORMED BY, TECHID: ABNORMAL
PERFORMED BY, TECHID: NORMAL
PLATELET # BLD AUTO: 435 K/UL (ref 150–400)
PMV BLD AUTO: 8.7 FL (ref 8.9–12.9)
POTASSIUM SERPL-SCNC: 3.9 MMOL/L (ref 3.5–5.1)
PROT SERPL-MCNC: 7.4 G/DL (ref 6.4–8.2)
PROTHROMBIN TIME: 12.5 SEC (ref 11.9–14.7)
RBC # BLD AUTO: 5.31 M/UL (ref 3.8–5.2)
RBC MORPH BLD: ABNORMAL
REPORTED DOSE,DOSE: ABNORMAL UNITS
REPORTED DOSE,DOSE: NORMAL UNITS
SALICYLATES SERPL-MCNC: 4.9 MG/DL (ref 2.8–20)
SODIUM SERPL-SCNC: 136 MMOL/L (ref 136–145)
SPECIMEN EXP DATE BLD: NORMAL
TROPONIN-HIGH SENSITIVITY: 7 NG/L (ref 0–51)
VALPROATE SERPL-MCNC: <50 UG/ML (ref 50–100)
WBC # BLD AUTO: 10.9 K/UL (ref 3.6–11)

## 2021-12-30 PROCEDURE — 70450 CT HEAD/BRAIN W/O DYE: CPT

## 2021-12-30 PROCEDURE — 86900 BLOOD TYPING SEROLOGIC ABO: CPT

## 2021-12-30 PROCEDURE — 80143 DRUG ASSAY ACETAMINOPHEN: CPT

## 2021-12-30 PROCEDURE — 36415 COLL VENOUS BLD VENIPUNCTURE: CPT

## 2021-12-30 PROCEDURE — 80053 COMPREHEN METABOLIC PANEL: CPT

## 2021-12-30 PROCEDURE — 99284 EMERGENCY DEPT VISIT MOD MDM: CPT

## 2021-12-30 PROCEDURE — 93005 ELECTROCARDIOGRAM TRACING: CPT

## 2021-12-30 PROCEDURE — 80179 DRUG ASSAY SALICYLATE: CPT

## 2021-12-30 PROCEDURE — 82962 GLUCOSE BLOOD TEST: CPT

## 2021-12-30 PROCEDURE — 80164 ASSAY DIPROPYLACETIC ACD TOT: CPT

## 2021-12-30 PROCEDURE — 74011250637 HC RX REV CODE- 250/637: Performed by: EMERGENCY MEDICINE

## 2021-12-30 PROCEDURE — 84484 ASSAY OF TROPONIN QUANT: CPT

## 2021-12-30 PROCEDURE — 85025 COMPLETE CBC W/AUTO DIFF WBC: CPT

## 2021-12-30 PROCEDURE — 82077 ASSAY SPEC XCP UR&BREATH IA: CPT

## 2021-12-30 PROCEDURE — 85610 PROTHROMBIN TIME: CPT

## 2021-12-30 RX ORDER — ONDANSETRON 4 MG/1
4 TABLET, FILM COATED ORAL
Qty: 20 TABLET | Refills: 0 | Status: SHIPPED | OUTPATIENT
Start: 2021-12-30 | End: 2022-03-28

## 2021-12-30 RX ORDER — LORAZEPAM 1 MG/1
1 TABLET ORAL
Status: COMPLETED | OUTPATIENT
Start: 2021-12-30 | End: 2021-12-30

## 2021-12-30 RX ADMIN — LORAZEPAM 1 MG: 1 TABLET ORAL at 20:35

## 2021-12-30 NOTE — ED PROVIDER NOTES
EMERGENCY DEPARTMENT HISTORY AND PHYSICAL EXAM      Date: 12/30/2021  Patient Name: Saúl Douglas      History of Presenting Illness     Chief Complaint   Patient presents with    Dizziness       History Provided By: Patient    HPI: Saúl Douglas, 64 y.o. female with a past medical history significant Seizures and anxiety presents to the ED with cc of unsteady gait, slurred speech and dizziness which started approximately 2 hours prior to arrival.  Patient reports the dizziness as unsteadiness, denies nausea or vomiting. Patient denies any other complaints at this time. Patient denies history of similar complaints. There are no other complaints, changes, or physical findings at this time. PCP: None    Current Outpatient Medications   Medication Sig Dispense Refill    ondansetron hcl (Zofran) 4 mg tablet Take 1 Tablet by mouth every eight (8) hours as needed for PRN Reason (Other) (Dizziness or nausea). 20 Tablet 0    diclofenac EC (VOLTAREN) 75 mg EC tablet Take 1 Tablet by mouth two (2) times a day. 28 Tablet 0    diclofenac potassium (CATAFLAM) 50 mg tablet Take 1 Tablet by mouth three (3) times daily. 21 Tablet 0    azithromycin (Zithromax Z-Terrence) 250 mg tablet Take 2 tablet p.o. day 1 then 1 tablet p.o. day 2 through 5 6 Tablet 0    albuterol (PROVENTIL HFA, VENTOLIN HFA, PROAIR HFA) 90 mcg/actuation inhaler Take 2 Puffs by inhalation every four (4) hours as needed for Wheezing. 18 g 0    dextromethorphan-guaiFENesin (Robitussin Cough-Chest Jorge DM) 5-100 mg/5 mL liqd Take 5 mL by mouth every six (6) hours. 200 mL 0    dextroamphetamine-amphetamine (ADDERALL) 20 mg tablet Take 10 mg by mouth four (4) times daily.  QUEtiapine (SEROquel) 300 mg tablet Take 300 mg by mouth nightly.  QUEtiapine (SEROquel) 50 mg tablet Take 50 mg by mouth daily.  hydrOXYzine HCL (ATARAX) 50 mg tablet Take 50 mg by mouth every six (6) hours as needed (max 4 tablets a day).       naloxone (Narcan) 4 mg/actuation nasal spray Use 1 spray intranasally, then discard. Repeat with new spray every 2 min as needed for opioid overdose symptoms, alternating nostrils. 2 Each 0    clonazePAM (KLONOPIN) 1 mg tablet Take  by mouth three (3) times daily.  gabapentin (NEURONTIN) 600 mg tablet Take  by mouth four (4) times daily. Past History     Past Medical History:  Past Medical History:   Diagnosis Date    DDD (degenerative disc disease), lumbar     Fatigue     GERD (gastroesophageal reflux disease)     Hypothyroidism     Psychiatric disorder     PTSD per patient    Seizures ([facility] [address]        Past Surgical History:  Past Surgical History:   Procedure Laterality Date    HX APPENDECTOMY      HX  SECTION      HX CHOLECYSTECTOMY      HX HYSTERECTOMY         Family History:  Family History   Family history unknown: Yes       Social History:  Social History     Tobacco Use    Smoking status: Current Every Day Smoker     Packs/day: 0.50    Smokeless tobacco: Never Used   Vaping Use    Vaping Use: Never used   Substance Use Topics    Alcohol use: Not Currently     Alcohol/week: 0.0 standard drinks    Drug use: Never       Allergies: Allergies   Allergen Reactions    Augmentin [Amoxicillin-Pot Clavulanate] Nausea and Vomiting         Review of Systems   Review of Systems   Constitutional: Negative for chills and fever. HENT: Negative for sinus pressure and sinus pain. Eyes: Negative for photophobia and redness. Respiratory: Negative for shortness of breath and wheezing. Cardiovascular: Negative for chest pain and palpitations. Gastrointestinal: Negative for abdominal pain and nausea. Genitourinary: Negative for flank pain and hematuria. Musculoskeletal: Negative for arthralgias and gait problem. Skin: Negative for color change and pallor. Neurological: Positive for dizziness and weakness.      Review of Systems    Physical Exam   Physical Exam  Constitutional: General: No acute distress. Appearance: Normal appearance. Not toxic-appearing. HENT:      Head: Normocephalic and atraumatic. Nose: Nose normal.      Mouth/Throat:      Mouth: Mucous membranes are moist.   Eyes:      Extraocular Movements: Extraocular movements intact. Pupils: Pupils are equal, round, and reactive to light. Cardiovascular:      Rate and Rhythm: Normal rate. Pulses: Normal pulses. Pulmonary:      Effort: Pulmonary effort is normal.      Breath sounds: No stridor. Abdominal:      General: Abdomen is flat. There is no distension. Musculoskeletal:         General: Normal range of motion. Cervical back: Normal range of motion and neck supple. Skin:     General: Skin is warm and dry. Capillary Refill: Capillary refill takes less than 2 seconds. Neurological:      General: No focal deficit present. Mental Status: Aert and oriented to person, place, and time. Psychiatric:         Mood and Affect: Anxious appearing      Physical Exam    Lab and Diagnostic Study Results     Labs -     Recent Results (from the past 12 hour(s))   GLUCOSE, POC    Collection Time: 12/30/21  4:35 PM   Result Value Ref Range    Glucose (POC) 114 65 - 117 mg/dL    Performed by Lucas Villanueva    CBC WITH AUTOMATED DIFF    Collection Time: 12/30/21  4:45 PM   Result Value Ref Range    WBC 10.9 3.6 - 11.0 K/uL    RBC 5.31 (H) 3.80 - 5.20 M/uL    HGB 15.1 11.5 - 16.0 g/dL    HCT 45.6 35.0 - 47.0 %    MCV 85.9 80.0 - 99.0 FL    MCH 28.4 26.0 - 34.0 PG    MCHC 33.1 30.0 - 36.5 g/dL    RDW 13.4 11.5 - 14.5 %    PLATELET 685 (H) 310 - 400 K/uL    MPV 8.7 (L) 8.9 - 12.9 FL    NRBC 0.0 0.0  WBC    ABSOLUTE NRBC 0.00 0.00 - 0.01 K/uL    NEUTROPHILS 53 32 - 75 %    LYMPHOCYTES 40 12 - 49 %    MONOCYTES 7 5 - 13 %    EOSINOPHILS 0 0 - 7 %    BASOPHILS 0 0 - 1 %    IMMATURE GRANULOCYTES 0 %    ABS. NEUTROPHILS 5.7 1.8 - 8.0 K/UL    ABS. LYMPHOCYTES 4.4 (H) 0.8 - 3.5 K/UL    ABS.  MONOCYTES 0.8 0.0 - 1.0 K/UL    ABS. EOSINOPHILS 0.0 0.0 - 0.4 K/UL    ABS. BASOPHILS 0.0 0.0 - 0.1 K/UL    ABS. IMM. GRANS. 0.0 K/UL    DF Manual      RBC COMMENTS Normocytic, Normochromic     TYPE & SCREEN    Collection Time: 12/30/21  4:45 PM   Result Value Ref Range    Crossmatch Expiration 01/02/2022,2359     ABO/Rh(D) O Positive     Antibody screen Negative    PROTHROMBIN TIME + INR    Collection Time: 12/30/21  4:45 PM   Result Value Ref Range    Prothrombin time 12.5 11.9 - 14.7 sec    INR 0.9 0.9 - 1.1     METABOLIC PANEL, COMPREHENSIVE    Collection Time: 12/30/21  4:45 PM   Result Value Ref Range    Sodium 136 136 - 145 mmol/L    Potassium 3.9 3.5 - 5.1 mmol/L    Chloride 105 97 - 108 mmol/L    CO2 27 21 - 32 mmol/L    Anion gap 4 (L) 5 - 15 mmol/L    Glucose 106 (H) 65 - 100 mg/dL    BUN 13 6 - 20 mg/dL    Creatinine 1.28 (H) 0.55 - 1.02 mg/dL    BUN/Creatinine ratio 10 (L) 12 - 20      GFR est AA 52 (L) >60 ml/min/1.73m2    GFR est non-AA 43 (L) >60 ml/min/1.73m2    Calcium 9.1 8.5 - 10.1 mg/dL    Bilirubin, total 0.2 0.2 - 1.0 mg/dL    AST (SGOT) 16 15 - 37 U/L    ALT (SGPT) 14 12 - 78 U/L    Alk.  phosphatase 87 45 - 117 U/L    Protein, total 7.4 6.4 - 8.2 g/dL    Albumin 3.8 3.5 - 5.0 g/dL    Globulin 3.6 2.0 - 4.0 g/dL    A-G Ratio 1.1 1.1 - 2.2     TROPONIN-HIGH SENSITIVITY    Collection Time: 12/30/21  4:45 PM   Result Value Ref Range    Troponin-High Sensitivity 7 0 - 51 ng/L   ETHYL ALCOHOL    Collection Time: 12/30/21  4:45 PM   Result Value Ref Range    ALCOHOL(ETHYL),SERUM <4 <43 mg/dL   SALICYLATE    Collection Time: 12/30/21  4:45 PM   Result Value Ref Range    Salicylate level 4.9 2.8 - 20.0 mg/dL    Reported dose date Dose Dependent      Reported dose: Dose Dependent Units   ACETAMINOPHEN    Collection Time: 12/30/21  4:45 PM   Result Value Ref Range    Acetaminophen level <10 (L) 10 - 30 ug/mL    Reported dose date Dose Dependent      Reported dose: Dose Dependent Units   VALPROIC ACID Collection Time: 12/30/21  4:45 PM   Result Value Ref Range    Valproic acid <50 (L) 50 - 100 ug/mL   GLUCOSE, POC    Collection Time: 12/30/21  4:56 PM   Result Value Ref Range    Glucose (POC) 120 (H) 65 - 117 mg/dL    Performed by DEACON Lorenzo [facility]        Radiologic Studies -   [unfilled]  CT Results  (Last 48 hours)               12/30/21 1646  CT CODE NEURO HEAD WO CONTRAST Final result    Impression:      Unremarkable brain for age. No evidence of acute intracranial pathology by   noncontrast CT evaluation. Results of this exam were called to and discussed with  Dr. Alexa Mccain on 12/30/2021 5:17 PM.       Narrative:  CT CODE NEURO HEAD WO CONTRAST     12/30/2021 4:46 PM; [facility]         Indication: 64years old;  Female; Fall, LOC, weakness; Technique: Noncontrast CT of the head was obtained according to standard   protocol. Study quality is degraded by streak artifact. Comparison: 8/6/2021       Dose reduction: All CT scans at this facility are performed using dose reduction   optimization techniques as appropriate to the performed exam including the   following: Automatic exposure control; adjustment of the mA and/or kV according   to patient size; or the use of reconstruction techniques. Findings:       The ventricles, cisterns, and cortical sulcal pattern are within normal limits   of size and configuration for age. Gray-white differentiation is maintained. There is no hemorrhage, mass, edema, hydrocephalus, or midline shift. Midline   sagittal anatomic morphology is normal.        The imaged globes and orbits are unremarkable. The imaged paranasal sinuses are   well-aerated. The mastoid air cells are well-aerated. The calvarium is intact. No osseous abnormalities are evident.                CXR Results  (Last 48 hours)    None          Medical Decision Making and ED Course   - I am the first and primary provider for this patient AND AM THE PRIMARY PROVIDER OF RECORD. - I reviewed the vital signs, available nursing notes, past medical history, past surgical history, family history and social history. - Initial assessment performed. The patients presenting problems have been discussed, and the staff are in agreement with the care plan formulated and outlined with them. I have encouraged them to ask questions as they arise throughout their visit. Vital Signs-Reviewed the patient's vital signs. Patient Vitals for the past 12 hrs:   Temp Pulse Resp BP SpO2   12/30/21 1715 -- (!) 106 20 122/88 100 %   12/30/21 1711 -- -- -- -- 100 %   12/30/21 1708 -- (!) 106 -- -- --   12/30/21 1707 -- (!) 102 20 123/76 96 %   12/30/21 1637 99.2 °F (37.3 °C) (!) 120 20 117/80 96 %       ED Course:       ED Course as of 12/30/21 2106   Thu Dec 30, 2021   1730 Spoke with radiologist on call who reported a CT is unremarkable for any acute finding. Discussed with neurologist on-call who agrees a difficult evaluation, but not likely consistent with acute stroke. [CS]   2012 Discussed unremarkable work-up so far with the patient. Patient with no findings on her exam at this time, states she is anxious and would like some medicine for the same. [CS]   2104 Patient states she is ready for discharge home at this time, will follow up with her neurologist.  Understands return cautions. [CS]      ED Course User Index  [CS] Savage Connell MD         5:32 PM        Disposition     Disposition: DC- Adult Discharges: All of the diagnostic tests were reviewed and questions answered. Diagnosis, care plan and treatment options were discussed. The patient understands the instructions and will follow up as directed. The patients results have been reviewed with them. They have been counseled regarding their diagnosis.   The patient verbally convey understanding and agreement of the signs, symptoms, diagnosis, treatment and prognosis and additionally agrees to follow up as recommended with their PCP in 24 - 48 hours. They also agree with the care-plan and convey that all of their questions have been answered. I have also put together some discharge instructions for them that include: 1) educational information regarding their diagnosis, 2) how to care for their diagnosis at home, as well a 3) list of reasons why they would want to return to the ED prior to their follow-up appointment, should their condition change. Discharged    Remove if not discharged  DISCHARGE PLAN:  1. Current Discharge Medication List      CONTINUE these medications which have NOT CHANGED    Details   diclofenac EC (VOLTAREN) 75 mg EC tablet Take 1 Tablet by mouth two (2) times a day. Qty: 28 Tablet, Refills: 0      diclofenac potassium (CATAFLAM) 50 mg tablet Take 1 Tablet by mouth three (3) times daily. Qty: 21 Tablet, Refills: 0      azithromycin (Zithromax Z-Terrence) 250 mg tablet Take 2 tablet p.o. day 1 then 1 tablet p.o. day 2 through 5  Qty: 6 Tablet, Refills: 0      albuterol (PROVENTIL HFA, VENTOLIN HFA, PROAIR HFA) 90 mcg/actuation inhaler Take 2 Puffs by inhalation every four (4) hours as needed for Wheezing. Qty: 18 g, Refills: 0      dextromethorphan-guaiFENesin (Robitussin Cough-Chest Jorge DM) 5-100 mg/5 mL liqd Take 5 mL by mouth every six (6) hours. Qty: 200 mL, Refills: 0      dextroamphetamine-amphetamine (ADDERALL) 20 mg tablet Take 10 mg by mouth four (4) times daily. !! QUEtiapine (SEROquel) 300 mg tablet Take 300 mg by mouth nightly. !! QUEtiapine (SEROquel) 50 mg tablet Take 50 mg by mouth daily. hydrOXYzine HCL (ATARAX) 50 mg tablet Take 50 mg by mouth every six (6) hours as needed (max 4 tablets a day). naloxone (Narcan) 4 mg/actuation nasal spray Use 1 spray intranasally, then discard. Repeat with new spray every 2 min as needed for opioid overdose symptoms, alternating nostrils.   Qty: 2 Each, Refills: 0      clonazePAM (KLONOPIN) 1 mg tablet Take  by mouth three (3) times daily. Associated Diagnoses: Idiopathic hypotension; Hypothyroidism due to acquired atrophy of thyroid; Anemia due to vitamin B12 deficiency; Dizziness; Arthralgia      gabapentin (NEURONTIN) 600 mg tablet Take  by mouth four (4) times daily. Associated Diagnoses: Idiopathic hypotension; Hypothyroidism due to acquired atrophy of thyroid; Anemia due to vitamin B12 deficiency; Dizziness; Arthralgia       !! - Potential duplicate medications found. Please discuss with provider. 2.   Follow-up Information    None       3. Return to ED if worse   4. Current Discharge Medication List      START taking these medications    Details   ondansetron hcl (Zofran) 4 mg tablet Take 1 Tablet by mouth every eight (8) hours as needed for PRN Reason (Other) (Dizziness or nausea). Qty: 20 Tablet, Refills: 0  Start date: 12/30/2021             Diagnosis     Clinical Impression:   1. Dizziness        Attestations:    Lizzeth Guerra MD    Please note that this dictation was completed with [employer], the computer voice recognition software. Quite often unanticipated grammatical, syntax, homophones, and other interpretive errors are inadvertently transcribed by the computer software. Please disregard these errors. Please excuse any errors that have escaped final proofreading. Thank you.

## 2021-12-30 NOTE — Clinical Note
[facility] EMERGENCY DEPT  [address] [place] [address] [place] [id number]  [phone]    Work/School Note    Date: 12/30/2021    To Whom It May concern:    Ry Ruiz was seen and treated today in the emergency room by the following provider(s):  Attending Provider: Shweta Mcarthur MD.      Ry Ruiz is excused from work/school on 12/30/21 and 12/31/21. She is medically clear to return to work/school on 1/1/2022.        Sincerely,          Zaida Islas MD

## 2021-12-31 LAB
ATRIAL RATE: 90 BPM
CALCULATED P AXIS, ECG09: 33 DEGREES
CALCULATED R AXIS, ECG10: 30 DEGREES
CALCULATED T AXIS, ECG11: 44 DEGREES
DIAGNOSIS, 93000: NORMAL
P-R INTERVAL, ECG05: 144 MS
Q-T INTERVAL, ECG07: 372 MS
QRS DURATION, ECG06: 76 MS
QTC CALCULATION (BEZET), ECG08: 455 MS
VENTRICULAR RATE, ECG03: 90 BPM

## 2021-12-31 NOTE — DISCHARGE INSTRUCTIONS
Thank you! Thank you for allowing me to care for you in the emergency department. I sincerely hope that you are satisfied with your visit today. It is my goal to provide you with excellent care. Below you will find a list of your labs and imaging from your visit today. Should you have any questions regarding these results please do not hesitate to call the emergency department. Labs -     Recent Results (from the past 12 hour(s))   GLUCOSE, POC    Collection Time: 12/30/21  4:35 PM   Result Value Ref Range    Glucose (POC) 114 65 - 117 mg/dL    Performed by Valerie Hernandez    CBC WITH AUTOMATED DIFF    Collection Time: 12/30/21  4:45 PM   Result Value Ref Range    WBC 10.9 3.6 - 11.0 K/uL    RBC 5.31 (H) 3.80 - 5.20 M/uL    HGB 15.1 11.5 - 16.0 g/dL    HCT 45.6 35.0 - 47.0 %    MCV 85.9 80.0 - 99.0 FL    MCH 28.4 26.0 - 34.0 PG    MCHC 33.1 30.0 - 36.5 g/dL    RDW 13.4 11.5 - 14.5 %    PLATELET 282 (H) 903 - 400 K/uL    MPV 8.7 (L) 8.9 - 12.9 FL    NRBC 0.0 0.0  WBC    ABSOLUTE NRBC 0.00 0.00 - 0.01 K/uL    NEUTROPHILS 53 32 - 75 %    LYMPHOCYTES 40 12 - 49 %    MONOCYTES 7 5 - 13 %    EOSINOPHILS 0 0 - 7 %    BASOPHILS 0 0 - 1 %    IMMATURE GRANULOCYTES 0 %    ABS. NEUTROPHILS 5.7 1.8 - 8.0 K/UL    ABS. LYMPHOCYTES 4.4 (H) 0.8 - 3.5 K/UL    ABS. MONOCYTES 0.8 0.0 - 1.0 K/UL    ABS. EOSINOPHILS 0.0 0.0 - 0.4 K/UL    ABS. BASOPHILS 0.0 0.0 - 0.1 K/UL    ABS. IMM.  GRANS. 0.0 K/UL    DF Manual      RBC COMMENTS Normocytic, Normochromic     TYPE & SCREEN    Collection Time: 12/30/21  4:45 PM   Result Value Ref Range    Crossmatch Expiration 01/02/2022,2359     ABO/Rh(D) O Positive     Antibody screen Negative    PROTHROMBIN TIME + INR    Collection Time: 12/30/21  4:45 PM   Result Value Ref Range    Prothrombin time 12.5 11.9 - 14.7 sec    INR 0.9 0.9 - 1.1     METABOLIC PANEL, COMPREHENSIVE    Collection Time: 12/30/21  4:45 PM   Result Value Ref Range    Sodium 136 136 - 145 mmol/L    Potassium 3.9 3.5 - 5.1 mmol/L    Chloride 105 97 - 108 mmol/L    CO2 27 21 - 32 mmol/L    Anion gap 4 (L) 5 - 15 mmol/L    Glucose 106 (H) 65 - 100 mg/dL    BUN 13 6 - 20 mg/dL    Creatinine 1.28 (H) 0.55 - 1.02 mg/dL    BUN/Creatinine ratio 10 (L) 12 - 20      GFR est AA 52 (L) >60 ml/min/1.73m2    GFR est non-AA 43 (L) >60 ml/min/1.73m2    Calcium 9.1 8.5 - 10.1 mg/dL    Bilirubin, total 0.2 0.2 - 1.0 mg/dL    AST (SGOT) 16 15 - 37 U/L    ALT (SGPT) 14 12 - 78 U/L    Alk. phosphatase 87 45 - 117 U/L    Protein, total 7.4 6.4 - 8.2 g/dL    Albumin 3.8 3.5 - 5.0 g/dL    Globulin 3.6 2.0 - 4.0 g/dL    A-G Ratio 1.1 1.1 - 2.2     TROPONIN-HIGH SENSITIVITY    Collection Time: 12/30/21  4:45 PM   Result Value Ref Range    Troponin-High Sensitivity 7 0 - 51 ng/L   ETHYL ALCOHOL    Collection Time: 12/30/21  4:45 PM   Result Value Ref Range    ALCOHOL(ETHYL),SERUM <4 <02 mg/dL   SALICYLATE    Collection Time: 12/30/21  4:45 PM   Result Value Ref Range    Salicylate level 4.9 2.8 - 20.0 mg/dL    Reported dose date Dose Dependent      Reported dose: Dose Dependent Units   ACETAMINOPHEN    Collection Time: 12/30/21  4:45 PM   Result Value Ref Range    Acetaminophen level <10 (L) 10 - 30 ug/mL    Reported dose date Dose Dependent      Reported dose: Dose Dependent Units   VALPROIC ACID    Collection Time: 12/30/21  4:45 PM   Result Value Ref Range    Valproic acid <50 (L) 50 - 100 ug/mL   GLUCOSE, POC    Collection Time: 12/30/21  4:56 PM   Result Value Ref Range    Glucose (POC) 120 (H) 65 - 117 mg/dL    Performed by [facility]        Radiologic Studies -   CT CODE NEURO HEAD WO CONTRAST   Final Result      Unremarkable brain for age. No evidence of acute intracranial pathology by   noncontrast CT evaluation.             Results of this exam were called to and discussed with  Dr. Kip Crane on 12/30/2021 5:17 PM.        CT Results  (Last 48 hours)                 12/30/21 1646  CT CODE NEURO HEAD WO CONTRAST Final result Impression:      Unremarkable brain for age. No evidence of acute intracranial pathology by   noncontrast CT evaluation. Results of this exam were called to and discussed with  Dr. Tutu Sears on 12/30/2021 5:17 PM.       Narrative:  CT CODE NEURO HEAD WO CONTRAST     12/30/2021 4:46 PM; [facility]         Indication: 64years old;  Female; Fall, LOC, weakness; Technique: Noncontrast CT of the head was obtained according to standard   protocol. Study quality is degraded by streak artifact. Comparison: 8/6/2021       Dose reduction: All CT scans at this facility are performed using dose reduction   optimization techniques as appropriate to the performed exam including the   following: Automatic exposure control; adjustment of the mA and/or kV according   to patient size; or the use of reconstruction techniques. Findings:       The ventricles, cisterns, and cortical sulcal pattern are within normal limits   of size and configuration for age. Gray-white differentiation is maintained. There is no hemorrhage, mass, edema, hydrocephalus, or midline shift. Midline   sagittal anatomic morphology is normal.        The imaged globes and orbits are unremarkable. The imaged paranasal sinuses are   well-aerated. The mastoid air cells are well-aerated. The calvarium is intact. No osseous abnormalities are evident. CXR Results  (Last 48 hours)      None               If you feel that you have not received excellent quality care or timely care, please ask to speak to the nurse manager. Please choose us in the future for your continued health care needs. ------------------------------------------------------------------------------------------------------------  The exam and treatment you received in the Emergency Department were for an urgent problem and are not intended as complete care.  It is important that you follow-up with a doctor, nurse practitioner, or physician assistant to:  (1) confirm your diagnosis,  (2) re-evaluation of changes in your illness and treatment, and  (3) for ongoing care. If your symptoms become worse or you do not improve as expected and you are unable to reach your usual health care provider, you should return to the Emergency Department. We are available 24 hours a day. Please take your discharge instructions with you when you go to your follow-up appointment. If you have any problem arranging a follow-up appointment, contact the Emergency Department immediately. If a prescription has been provided, please have it filled as soon as possible to prevent a delay in treatment. Read the entire medication instruction sheet provided to you by the pharmacy. If you have any questions or reservations about taking the medication due to side effects or interactions with other medications, please call your primary care physician or contact the ER to speak with the charge nurse. Make an appointment with your family doctor or the physician you were referred to for follow-up of this visit as instructed on your discharge paperwork, as this is a mandatory follow-up. Return to the ER if you are unable to be seen or if you are unable to be seen in a timely manner. If you have any problem arranging the follow-up visit, contact the Emergency Department immediately.

## 2021-12-31 NOTE — ED NOTES
Patient alert and oriented at this time self ambulated to the waiting room with no assistance no question or complaints at this time family member coming to pick her up

## 2022-01-05 ENCOUNTER — APPOINTMENT (OUTPATIENT)
Dept: CT IMAGING | Age: 57
End: 2022-01-05
Attending: NURSE PRACTITIONER
Payer: MEDICARE

## 2022-01-05 VITALS
DIASTOLIC BLOOD PRESSURE: 61 MMHG | HEART RATE: 99 BPM | WEIGHT: 180 LBS | OXYGEN SATURATION: 95 % | SYSTOLIC BLOOD PRESSURE: 123 MMHG | HEIGHT: 68 IN | BODY MASS INDEX: 27.28 KG/M2 | TEMPERATURE: 98.3 F | RESPIRATION RATE: 16 BRPM

## 2022-01-05 LAB
ANION GAP SERPL CALC-SCNC: 5 MMOL/L (ref 5–15)
BASOPHILS # BLD: 0.1 K/UL (ref 0–0.1)
BASOPHILS NFR BLD: 1 % (ref 0–1)
BUN SERPL-MCNC: 23 MG/DL (ref 6–20)
BUN/CREAT SERPL: 22 (ref 12–20)
CA-I BLD-MCNC: 9.1 MG/DL (ref 8.5–10.1)
CHLORIDE SERPL-SCNC: 105 MMOL/L (ref 97–108)
CO2 SERPL-SCNC: 27 MMOL/L (ref 21–32)
CREAT SERPL-MCNC: 1.04 MG/DL (ref 0.55–1.02)
DIFFERENTIAL METHOD BLD: ABNORMAL
EOSINOPHIL # BLD: 0.1 K/UL (ref 0–0.4)
EOSINOPHIL NFR BLD: 1 % (ref 0–7)
ERYTHROCYTE [DISTWIDTH] IN BLOOD BY AUTOMATED COUNT: 13.7 % (ref 11.5–14.5)
GLUCOSE SERPL-MCNC: 124 MG/DL (ref 65–100)
HCT VFR BLD AUTO: 46.5 % (ref 35–47)
HGB BLD-MCNC: 15.3 G/DL (ref 11.5–16)
IMM GRANULOCYTES # BLD AUTO: 0 K/UL (ref 0–0.04)
IMM GRANULOCYTES NFR BLD AUTO: 0 % (ref 0–0.5)
LYMPHOCYTES # BLD: 5.9 K/UL (ref 0.8–3.5)
LYMPHOCYTES NFR BLD: 51 % (ref 12–49)
MCH RBC QN AUTO: 28.6 PG (ref 26–34)
MCHC RBC AUTO-ENTMCNC: 32.9 G/DL (ref 30–36.5)
MCV RBC AUTO: 86.9 FL (ref 80–99)
MONOCYTES # BLD: 0.4 K/UL (ref 0–1)
MONOCYTES NFR BLD: 4 % (ref 5–13)
NEUTS SEG # BLD: 5.1 K/UL (ref 1.8–8)
NEUTS SEG NFR BLD: 43 % (ref 32–75)
NRBC # BLD: 0 K/UL (ref 0–0.01)
NRBC BLD-RTO: 0 PER 100 WBC
PLATELET # BLD AUTO: 460 K/UL (ref 150–400)
PMV BLD AUTO: 9.2 FL (ref 8.9–12.9)
POTASSIUM SERPL-SCNC: 4 MMOL/L (ref 3.5–5.1)
RBC # BLD AUTO: 5.35 M/UL (ref 3.8–5.2)
SODIUM SERPL-SCNC: 137 MMOL/L (ref 136–145)
VALPROATE SERPL-MCNC: <3 UG/ML (ref 50–100)
WBC # BLD AUTO: 11.7 K/UL (ref 3.6–11)

## 2022-01-05 PROCEDURE — 85025 COMPLETE CBC W/AUTO DIFF WBC: CPT

## 2022-01-05 PROCEDURE — 80164 ASSAY DIPROPYLACETIC ACD TOT: CPT

## 2022-01-05 PROCEDURE — 36415 COLL VENOUS BLD VENIPUNCTURE: CPT

## 2022-01-05 PROCEDURE — 96372 THER/PROPH/DIAG INJ SC/IM: CPT

## 2022-01-05 PROCEDURE — 80048 BASIC METABOLIC PNL TOTAL CA: CPT

## 2022-01-05 PROCEDURE — 70450 CT HEAD/BRAIN W/O DYE: CPT

## 2022-01-05 PROCEDURE — 99283 EMERGENCY DEPT VISIT LOW MDM: CPT

## 2022-01-05 PROCEDURE — 74011250637 HC RX REV CODE- 250/637: Performed by: STUDENT IN AN ORGANIZED HEALTH CARE EDUCATION/TRAINING PROGRAM

## 2022-01-05 RX ORDER — ACETAMINOPHEN 325 MG/1
650 TABLET ORAL ONCE
Status: COMPLETED | OUTPATIENT
Start: 2022-01-05 | End: 2022-01-05

## 2022-01-05 RX ADMIN — ACETAMINOPHEN 650 MG: 325 TABLET ORAL at 22:03

## 2022-01-05 NOTE — ED TRIAGE NOTES
[facility] pt stated that she has had a HA and sinus congestion for the past 3 days along with ear pain; stated that she started with involuntary jerking today; has a Hx of seizures and stated that she is med compliant

## 2022-01-06 ENCOUNTER — HOSPITAL ENCOUNTER (EMERGENCY)
Age: 57
Discharge: HOME | End: 2022-01-06
Attending: STUDENT IN AN ORGANIZED HEALTH CARE EDUCATION/TRAINING PROGRAM | Admitting: STUDENT IN AN ORGANIZED HEALTH CARE EDUCATION/TRAINING PROGRAM
Payer: MEDICARE

## 2022-01-06 DIAGNOSIS — J06.9 UPPER RESPIRATORY TRACT INFECTION, UNSPECIFIED TYPE: Primary | ICD-10-CM

## 2022-01-06 PROCEDURE — 74011250636 HC RX REV CODE- 250/636: Performed by: STUDENT IN AN ORGANIZED HEALTH CARE EDUCATION/TRAINING PROGRAM

## 2022-01-06 PROCEDURE — 74011250637 HC RX REV CODE- 250/637: Performed by: STUDENT IN AN ORGANIZED HEALTH CARE EDUCATION/TRAINING PROGRAM

## 2022-01-06 RX ORDER — DIAZEPAM 5 MG/1
10 TABLET ORAL
Status: COMPLETED | OUTPATIENT
Start: 2022-01-06 | End: 2022-01-06

## 2022-01-06 RX ORDER — KETOROLAC TROMETHAMINE 30 MG/ML
15 INJECTION, SOLUTION INTRAMUSCULAR; INTRAVENOUS
Status: DISCONTINUED | OUTPATIENT
Start: 2022-01-06 | End: 2022-01-06

## 2022-01-06 RX ORDER — DIPHENHYDRAMINE HYDROCHLORIDE 50 MG/ML
50 INJECTION, SOLUTION INTRAMUSCULAR; INTRAVENOUS ONCE
Status: DISCONTINUED | OUTPATIENT
Start: 2022-01-06 | End: 2022-01-06

## 2022-01-06 RX ORDER — PROCHLORPERAZINE EDISYLATE 5 MG/ML
10 INJECTION INTRAMUSCULAR; INTRAVENOUS
Status: DISCONTINUED | OUTPATIENT
Start: 2022-01-06 | End: 2022-01-06

## 2022-01-06 RX ORDER — KETOROLAC TROMETHAMINE 30 MG/ML
15 INJECTION, SOLUTION INTRAMUSCULAR; INTRAVENOUS
Status: COMPLETED | OUTPATIENT
Start: 2022-01-06 | End: 2022-01-06

## 2022-01-06 RX ADMIN — KETOROLAC TROMETHAMINE 15 MG: 30 INJECTION, SOLUTION INTRAMUSCULAR; INTRAVENOUS at 00:57

## 2022-01-06 RX ADMIN — DIAZEPAM 10 MG: 5 TABLET ORAL at 00:55

## 2022-01-06 NOTE — DISCHARGE INSTRUCTIONS
Thank you! Thank you for allowing me to care for you in the emergency department. I sincerely hope that you are satisfied with your visit today. It is my goal to provide you with excellent care. Below you will find a list of your labs and imaging from your visit today. Should you have any questions regarding these results please do not hesitate to call the emergency department. Labs -     Recent Results (from the past 12 hour(s))   CBC WITH AUTOMATED DIFF    Collection Time: 01/05/22  8:02 PM   Result Value Ref Range    WBC 11.7 (H) 3.6 - 11.0 K/uL    RBC 5.35 (H) 3.80 - 5.20 M/uL    HGB 15.3 11.5 - 16.0 g/dL    HCT 46.5 35.0 - 47.0 %    MCV 86.9 80.0 - 99.0 FL    MCH 28.6 26.0 - 34.0 PG    MCHC 32.9 30.0 - 36.5 g/dL    RDW 13.7 11.5 - 14.5 %    PLATELET 545 (H) 089 - 400 K/uL    MPV 9.2 8.9 - 12.9 FL    NRBC 0.0 0.0  WBC    ABSOLUTE NRBC 0.00 0.00 - 0.01 K/uL    NEUTROPHILS 43 32 - 75 %    LYMPHOCYTES 51 (H) 12 - 49 %    MONOCYTES 4 (L) 5 - 13 %    EOSINOPHILS 1 0 - 7 %    BASOPHILS 1 0 - 1 %    IMMATURE GRANULOCYTES 0 0 - 0.5 %    ABS. NEUTROPHILS 5.1 1.8 - 8.0 K/UL    ABS. LYMPHOCYTES 5.9 (H) 0.8 - 3.5 K/UL    ABS. MONOCYTES 0.4 0.0 - 1.0 K/UL    ABS. EOSINOPHILS 0.1 0.0 - 0.4 K/UL    ABS. BASOPHILS 0.1 0.0 - 0.1 K/UL    ABS. IMM.  GRANS. 0.0 0.00 - 0.04 K/UL    DF AUTOMATED     VALPROIC ACID    Collection Time: 01/05/22  8:02 PM   Result Value Ref Range    Valproic acid <3 (L) 50 - 214 ug/mL   METABOLIC PANEL, BASIC    Collection Time: 01/05/22  8:02 PM   Result Value Ref Range    Sodium 137 136 - 145 mmol/L    Potassium 4.0 3.5 - 5.1 mmol/L    Chloride 105 97 - 108 mmol/L    CO2 27 21 - 32 mmol/L    Anion gap 5 5 - 15 mmol/L    Glucose 124 (H) 65 - 100 mg/dL    BUN 23 (H) 6 - 20 mg/dL    Creatinine 1.04 (H) 0.55 - 1.02 mg/dL    BUN/Creatinine ratio 22 (H) 12 - 20      GFR est AA >60 >60 ml/min/1.73m2    GFR est non-AA 55 (L) >60 ml/min/1.73m2    Calcium 9.1 8.5 - 10.1 mg/dL       Radiologic Studies -   CT HEAD WO CONT   Final Result   No evidence of acute intracranial process. CT Results  (Last 48 hours)                 01/05/22 1856  CT HEAD WO CONT Final result    Impression:  No evidence of acute intracranial process. Narrative:  CT HEAD WITHOUT IV CONTRAST       CLINICAL INDICATION: Headache, tremors       TECHNIQUE: Routine axial images were obtained through the brain without the use   of IV contrast. Sagittal and coronal reformatted images were performed at the CT   console. Dose reduction: Per department policy, all CT scans at this facility   are performed using dose reduction optimization techniques as appropriate to a   performed examination including the following: Automated exposure control,   adjustments of the mA and/or KV according to patient size, or use of iterative   reconstruction technique. COMPARISON: Noncontrast head CT from 12/30/2021       FINDINGS:        No evidence of acute intracranial hemorrhage or mass effect. Brain parenchymal attenuation is unremarkable for patient age. Ventricles and extra-axial spaces are normal in size and configuration for age. Portions of the posterior fossa not obscured by streak artifact are   unremarkable. Globes and orbits are normal in CT appearance. Paranasal sinuses are predominantly clear. Tympanomastoid cavities are clear. No acute calvarial abnormality. Atherosclerotic calcification of the internal carotid arteries. CXR Results  (Last 48 hours)      None               If you feel that you have not received excellent quality care or timely care, please ask to speak to the nurse manager. Please choose us in the future for your continued health care needs.    ------------------------------------------------------------------------------------------------------------  The exam and treatment you received in the Emergency Department were for an urgent problem and are not intended as complete care. It is important that you follow-up with a doctor, nurse practitioner, or physician assistant to:  (1) confirm your diagnosis,  (2) re-evaluation of changes in your illness and treatment, and  (3) for ongoing care. If your symptoms become worse or you do not improve as expected and you are unable to reach your usual health care provider, you should return to the Emergency Department. We are available 24 hours a day. Please take your discharge instructions with you when you go to your follow-up appointment. If you have any problem arranging a follow-up appointment, contact the Emergency Department immediately. If a prescription has been provided, please have it filled as soon as possible to prevent a delay in treatment. Read the entire medication instruction sheet provided to you by the pharmacy. If you have any questions or reservations about taking the medication due to side effects or interactions with other medications, please call your primary care physician or contact the ER to speak with the charge nurse. Make an appointment with your family doctor or the physician you were referred to for follow-up of this visit as instructed on your discharge paperwork, as this is a mandatory follow-up. Return to the ER if you are unable to be seen or if you are unable to be seen in a timely manner. If you have any problem arranging the follow-up visit, contact the Emergency Department immediately.

## 2022-01-06 NOTE — ED PROVIDER NOTES
EMERGENCY DEPARTMENT HISTORY AND PHYSICAL EXAM      Date: 1/6/2022  Patient Name: Cecilia Jordan      History of Presenting Illness     Chief Complaint   Patient presents with    Headache       History Provided By: Patient    HPI: Cecilia Jordan, 64 y.o. female with with multiple comorbidities as below resents to the ED for evaluation of headache, runny nose, nasal congestion, sinus pressure, sore throat and mild cough. Symptoms of mild to moderate severity with no aggravating relieving factors no associate additional symptoms. Patient is vaccinated against Covid with a J&J vaccine. She is denying anosmia, ageusia, or decreased appetite. Patient is also denying any difficulty walking, numbness, weakness, changes in vision or hearing. Of note, patient is taking Valium twice a day. Reports last time she took it was at 8 AM and now is becoming more tremulous and shaky. She is taking diazepam 10 mg twice daily. Dose was confirmed through [facility]. There are no other complaints, changes, or physical findings at this time. PCP: None    Current Outpatient Medications   Medication Sig Dispense Refill    ondansetron hcl (Zofran) 4 mg tablet Take 1 Tablet by mouth every eight (8) hours as needed for PRN Reason (Other) (Dizziness or nausea). 20 Tablet 0    diclofenac EC (VOLTAREN) 75 mg EC tablet Take 1 Tablet by mouth two (2) times a day. 28 Tablet 0    diclofenac potassium (CATAFLAM) 50 mg tablet Take 1 Tablet by mouth three (3) times daily. 21 Tablet 0    azithromycin (Zithromax Z-Terrence) 250 mg tablet Take 2 tablet p.o. day 1 then 1 tablet p.o. day 2 through 5 6 Tablet 0    albuterol (PROVENTIL HFA, VENTOLIN HFA, PROAIR HFA) 90 mcg/actuation inhaler Take 2 Puffs by inhalation every four (4) hours as needed for Wheezing. 18 g 0    dextromethorphan-guaiFENesin (Robitussin Cough-Chest Jorge DM) 5-100 mg/5 mL liqd Take 5 mL by mouth every six (6) hours.  200 mL 0    dextroamphetamine-amphetamine (ADDERALL) 20 mg tablet Take 10 mg by mouth four (4) times daily.  QUEtiapine (SEROquel) 300 mg tablet Take 300 mg by mouth nightly.  QUEtiapine (SEROquel) 50 mg tablet Take 50 mg by mouth daily.  hydrOXYzine HCL (ATARAX) 50 mg tablet Take 50 mg by mouth every six (6) hours as needed (max 4 tablets a day).  naloxone (Narcan) 4 mg/actuation nasal spray Use 1 spray intranasally, then discard. Repeat with new spray every 2 min as needed for opioid overdose symptoms, alternating nostrils. 2 Each 0    clonazePAM (KLONOPIN) 1 mg tablet Take  by mouth three (3) times daily.  gabapentin (NEURONTIN) 600 mg tablet Take  by mouth four (4) times daily. Past History     Past Medical History:  Past Medical History:   Diagnosis Date    DDD (degenerative disc disease), lumbar     Fatigue     GERD (gastroesophageal reflux disease)     Hypothyroidism     Psychiatric disorder     PTSD per patient    Seizures ([facility] [address]        Past Surgical History:  Past Surgical History:   Procedure Laterality Date    HX APPENDECTOMY      HX  SECTION      HX CHOLECYSTECTOMY      HX HYSTERECTOMY         Family History:  Family History   Family history unknown: Yes       Social History:  Social History     Tobacco Use    Smoking status: Current Every Day Smoker     Packs/day: 0.25    Smokeless tobacco: Never Used   Vaping Use    Vaping Use: Never used   Substance Use Topics    Alcohol use: Not Currently     Alcohol/week: 0.0 standard drinks    Drug use: Never       Allergies: Allergies   Allergen Reactions    Augmentin [Amoxicillin-Pot Clavulanate] Nausea and Vomiting         Review of Systems     Review of Systems   Constitutional: Negative for chills and fever. HENT: Positive for congestion, rhinorrhea and sore throat. Eyes: Negative for pain and discharge. Respiratory: Negative for shortness of breath and wheezing. Cardiovascular: Negative for chest pain and leg swelling.    Gastrointestinal: Negative for diarrhea and vomiting. Genitourinary: Negative for dysuria and flank pain. Musculoskeletal: Negative for arthralgias and myalgias. Skin: Negative for color change and rash. Neurological: Positive for headaches. Negative for seizures. Physical Exam     Physical Exam  Vitals and nursing note reviewed. Constitutional:       Appearance: She is not ill-appearing or diaphoretic. HENT:      Head: Normocephalic and atraumatic. Eyes:      Extraocular Movements: Extraocular movements intact. Conjunctiva/sclera: Conjunctivae normal.   Cardiovascular:      Rate and Rhythm: Normal rate and regular rhythm. Pulmonary:      Effort: Pulmonary effort is normal.      Breath sounds: Normal breath sounds. Abdominal:      Palpations: Abdomen is soft. Tenderness: There is no abdominal tenderness. Musculoskeletal:      Cervical back: No rigidity. Neurological:      Mental Status: She is alert. Comments: Alert and oriented x3, cranial nerves II through XII grossly intact, motor is 5 out of 5 bilaterally, sensation is intact bilaterally, gait is intact, mild tremor noted on extension of upper extremities. Psychiatric:         Mood and Affect: Mood normal.         Behavior: Behavior normal.         Lab and Diagnostic Study Results     Labs -     No results found for this or any previous visit (from the past 12 hour(s)). Radiologic Studies -   [unfilled]  CT Results  (Last 48 hours)               01/05/22 1856  CT HEAD WO CONT Final result    Impression:  No evidence of acute intracranial process. Narrative:  CT HEAD WITHOUT IV CONTRAST       CLINICAL INDICATION: Headache, tremors       TECHNIQUE: Routine axial images were obtained through the brain without the use   of IV contrast. Sagittal and coronal reformatted images were performed at the CT   console.  Dose reduction: Per department policy, all CT scans at this facility   are performed using dose reduction optimization techniques as appropriate to a   performed examination including the following: Automated exposure control,   adjustments of the mA and/or KV according to patient size, or use of iterative   reconstruction technique. COMPARISON: Noncontrast head CT from 12/30/2021       FINDINGS:        No evidence of acute intracranial hemorrhage or mass effect. Brain parenchymal attenuation is unremarkable for patient age. Ventricles and extra-axial spaces are normal in size and configuration for age. Portions of the posterior fossa not obscured by streak artifact are   unremarkable. Globes and orbits are normal in CT appearance. Paranasal sinuses are predominantly clear. Tympanomastoid cavities are clear. No acute calvarial abnormality. Atherosclerotic calcification of the internal carotid arteries. CXR Results  (Last 48 hours)    None          Medical Decision Making and ED Course   - I am the first and primary provider for this patient AND AM THE PRIMARY PROVIDER OF RECORD. - I reviewed the vital signs, available nursing notes, past medical history, past surgical history, family history and social history. - Initial assessment performed. The patients presenting problems have been discussed, and the staff are in agreement with the care plan formulated and outlined with them. I have encouraged them to ask questions as they arise throughout their visit. Vital Signs-Reviewed the patient's vital signs. No data found. Records Reviewed: Nursing Notes and Old Medical Records    Provider Notes (Medical Decision Making):     Patient is presenting with URI-like symptoms. She is otherwise well-appearing in no acute distress. Vital signs are within normal.  Her neurological exam is completely normal except for mild tremor noted on examination. This is attributed to not taking her Valium dose which she is supposed to be 8 PM according to the patient.   She already received Tylenol prior to my evaluation and still have some residual symptoms. Headache appears to be secondary to a URI. Patient does not have any signs of meningeal irritation. There is no photophobia. Additionally, her neurological exam is within normal.  There are no symptoms nor signs suggestive of vascular nor ocular emergency. Thus we will give her ketorolac and her dose of Valium, observed in the ED and then discharged home follow-up with her PMD.      Disposition     Disposition: Condition improved  DC- Adult Discharges: All of the diagnostic tests were reviewed and questions answered. Diagnosis, care plan and treatment options were discussed. The patient understands the instructions and will follow up as directed. The patients results have been reviewed with them. They have been counseled regarding their diagnosis. The patient verbally convey understanding and agreement of the signs, symptoms, diagnosis, treatment and prognosis and additionally agrees to follow up as recommended with their PCP in 24 - 48 hours. They also agree with the care-plan and convey that all of their questions have been answered. I have also put together some discharge instructions for them that include: 1) educational information regarding their diagnosis, 2) how to care for their diagnosis at home, as well a 3) list of reasons why they would want to return to the ED prior to their follow-up appointment, should their condition change. Discharged      DISCHARGE PLAN:  1. Current Discharge Medication List      CONTINUE these medications which have NOT CHANGED    Details   ondansetron hcl (Zofran) 4 mg tablet Take 1 Tablet by mouth every eight (8) hours as needed for PRN Reason (Other) (Dizziness or nausea). Qty: 20 Tablet, Refills: 0      diclofenac EC (VOLTAREN) 75 mg EC tablet Take 1 Tablet by mouth two (2) times a day.   Qty: 28 Tablet, Refills: 0      diclofenac potassium (CATAFLAM) 50 mg tablet Take 1 Tablet by mouth three (3) times daily.  Qty: 21 Tablet, Refills: 0      azithromycin (Zithromax Z-Terrence) 250 mg tablet Take 2 tablet p.o. day 1 then 1 tablet p.o. day 2 through 5  Qty: 6 Tablet, Refills: 0      albuterol (PROVENTIL HFA, VENTOLIN HFA, PROAIR HFA) 90 mcg/actuation inhaler Take 2 Puffs by inhalation every four (4) hours as needed for Wheezing. Qty: 18 g, Refills: 0      dextromethorphan-guaiFENesin (Robitussin Cough-Chest Jorge DM) 5-100 mg/5 mL liqd Take 5 mL by mouth every six (6) hours. Qty: 200 mL, Refills: 0      dextroamphetamine-amphetamine (ADDERALL) 20 mg tablet Take 10 mg by mouth four (4) times daily. !! QUEtiapine (SEROquel) 300 mg tablet Take 300 mg by mouth nightly. !! QUEtiapine (SEROquel) 50 mg tablet Take 50 mg by mouth daily. hydrOXYzine HCL (ATARAX) 50 mg tablet Take 50 mg by mouth every six (6) hours as needed (max 4 tablets a day). naloxone (Narcan) 4 mg/actuation nasal spray Use 1 spray intranasally, then discard. Repeat with new spray every 2 min as needed for opioid overdose symptoms, alternating nostrils. Qty: 2 Each, Refills: 0      clonazePAM (KLONOPIN) 1 mg tablet Take  by mouth three (3) times daily. Associated Diagnoses: Idiopathic hypotension; Hypothyroidism due to acquired atrophy of thyroid; Anemia due to vitamin B12 deficiency; Dizziness; Arthralgia      gabapentin (NEURONTIN) 600 mg tablet Take  by mouth four (4) times daily. Associated Diagnoses: Idiopathic hypotension; Hypothyroidism due to acquired atrophy of thyroid; Anemia due to vitamin B12 deficiency; Dizziness; Arthralgia       !! - Potential duplicate medications found. Please discuss with provider.         2.   Follow-up Information     Follow up With Specialties Details Why [address] EMERGENCY DEPT Emergency Medicine Go to  As needed, If symptoms worsen [address] [facility] [address] [phone]    Primary Care Doctor  Schedule an appointment as soon as possible for a visit on 1/10/2022 For reevaluation, Discuss your visit to the ER         3. Return to ED if worse   4. Discharge Medication List as of 1/6/2022  2:19 AM          Diagnosis     Clinical Impression:   1. Upper respiratory tract infection, unspecified type        Attestations: Mohit Thao MD    Please note that this dictation was completed with [employer], the computer voice recognition software. Quite often unanticipated grammatical, syntax, homophones, and other interpretive errors are inadvertently transcribed by the computer software. Please disregard these errors. Please excuse any errors that have escaped final proofreading. Thank you.

## 2022-01-25 ENCOUNTER — APPOINTMENT (OUTPATIENT)
Dept: CT IMAGING | Age: 57
End: 2022-01-25
Attending: PHYSICIAN ASSISTANT
Payer: MEDICARE

## 2022-01-25 ENCOUNTER — HOSPITAL ENCOUNTER (EMERGENCY)
Age: 57
Discharge: HOME | End: 2022-01-25
Payer: MEDICARE

## 2022-01-25 VITALS
HEIGHT: 68 IN | TEMPERATURE: 98.3 F | WEIGHT: 185 LBS | HEART RATE: 112 BPM | DIASTOLIC BLOOD PRESSURE: 80 MMHG | OXYGEN SATURATION: 96 % | BODY MASS INDEX: 28.04 KG/M2 | SYSTOLIC BLOOD PRESSURE: 118 MMHG | RESPIRATION RATE: 20 BRPM

## 2022-01-25 DIAGNOSIS — K59.00 CONSTIPATION, UNSPECIFIED CONSTIPATION TYPE: Primary | ICD-10-CM

## 2022-01-25 LAB
ALBUMIN SERPL-MCNC: 3.6 G/DL (ref 3.5–5)
ALBUMIN/GLOB SERPL: 0.9 {RATIO} (ref 1.1–2.2)
ALP SERPL-CCNC: 81 U/L (ref 45–117)
ALT SERPL-CCNC: 18 U/L (ref 12–78)
ANION GAP SERPL CALC-SCNC: 6 MMOL/L (ref 5–15)
APPEARANCE UR: CLEAR
AST SERPL W P-5'-P-CCNC: 11 U/L (ref 15–37)
BACTERIA URNS QL MICRO: NEGATIVE /HPF
BASOPHILS # BLD: 0.1 K/UL (ref 0–0.1)
BASOPHILS NFR BLD: 1 % (ref 0–1)
BILIRUB SERPL-MCNC: 0.2 MG/DL (ref 0.2–1)
BILIRUB UR QL: NEGATIVE
BUN SERPL-MCNC: 18 MG/DL (ref 6–20)
BUN/CREAT SERPL: 16 (ref 12–20)
CA-I BLD-MCNC: 8.9 MG/DL (ref 8.5–10.1)
CHLORIDE SERPL-SCNC: 106 MMOL/L (ref 97–108)
CO2 SERPL-SCNC: 26 MMOL/L (ref 21–32)
COLOR UR: NORMAL
CREAT SERPL-MCNC: 1.12 MG/DL (ref 0.55–1.02)
DIFFERENTIAL METHOD BLD: ABNORMAL
EOSINOPHIL # BLD: 0.1 K/UL (ref 0–0.4)
EOSINOPHIL NFR BLD: 1 % (ref 0–7)
ERYTHROCYTE [DISTWIDTH] IN BLOOD BY AUTOMATED COUNT: 14 % (ref 11.5–14.5)
GLOBULIN SER CALC-MCNC: 4 G/DL (ref 2–4)
GLUCOSE SERPL-MCNC: 105 MG/DL (ref 65–100)
GLUCOSE UR STRIP.AUTO-MCNC: NEGATIVE MG/DL
HCT VFR BLD AUTO: 47.2 % (ref 35–47)
HGB BLD-MCNC: 15.6 G/DL (ref 11.5–16)
HGB UR QL STRIP: NEGATIVE
IMM GRANULOCYTES # BLD AUTO: 0.1 K/UL (ref 0–0.04)
IMM GRANULOCYTES NFR BLD AUTO: 1 % (ref 0–0.5)
KETONES UR QL STRIP.AUTO: NEGATIVE MG/DL
LEUKOCYTE ESTERASE UR QL STRIP.AUTO: NEGATIVE
LYMPHOCYTES # BLD: 4.5 K/UL (ref 0.8–3.5)
LYMPHOCYTES NFR BLD: 41 % (ref 12–49)
MCH RBC QN AUTO: 28.7 PG (ref 26–34)
MCHC RBC AUTO-ENTMCNC: 33.1 G/DL (ref 30–36.5)
MCV RBC AUTO: 86.8 FL (ref 80–99)
MONOCYTES # BLD: 0.4 K/UL (ref 0–1)
MONOCYTES NFR BLD: 4 % (ref 5–13)
NEUTS SEG # BLD: 5.7 K/UL (ref 1.8–8)
NEUTS SEG NFR BLD: 52 % (ref 32–75)
NITRITE UR QL STRIP.AUTO: NEGATIVE
NRBC # BLD: 0 K/UL (ref 0–0.01)
NRBC BLD-RTO: 0 PER 100 WBC
PH UR STRIP: 6 [PH] (ref 5–8)
PLATELET # BLD AUTO: 369 K/UL (ref 150–400)
PMV BLD AUTO: 8.7 FL (ref 8.9–12.9)
POTASSIUM SERPL-SCNC: 4 MMOL/L (ref 3.5–5.1)
PROT SERPL-MCNC: 7.6 G/DL (ref 6.4–8.2)
PROT UR STRIP-MCNC: NEGATIVE MG/DL
RBC # BLD AUTO: 5.44 M/UL (ref 3.8–5.2)
RBC #/AREA URNS HPF: NORMAL /HPF (ref 0–5)
SODIUM SERPL-SCNC: 138 MMOL/L (ref 136–145)
SP GR UR REFRACTOMETRY: 1.01 (ref 1–1.03)
UA: UC IF INDICATED,UAUC: NORMAL
UROBILINOGEN UR QL STRIP.AUTO: 0.1 EU/DL (ref 0.1–1)
WBC # BLD AUTO: 10.8 K/UL (ref 3.6–11)
WBC URNS QL MICRO: NORMAL /HPF (ref 0–4)

## 2022-01-25 PROCEDURE — 80053 COMPREHEN METABOLIC PANEL: CPT

## 2022-01-25 PROCEDURE — 81001 URINALYSIS AUTO W/SCOPE: CPT

## 2022-01-25 PROCEDURE — 96376 TX/PRO/DX INJ SAME DRUG ADON: CPT

## 2022-01-25 PROCEDURE — 96375 TX/PRO/DX INJ NEW DRUG ADDON: CPT

## 2022-01-25 PROCEDURE — 74011250636 HC RX REV CODE- 250/636: Performed by: PHYSICIAN ASSISTANT

## 2022-01-25 PROCEDURE — 36415 COLL VENOUS BLD VENIPUNCTURE: CPT

## 2022-01-25 PROCEDURE — 74176 CT ABD & PELVIS W/O CONTRAST: CPT

## 2022-01-25 PROCEDURE — 85025 COMPLETE CBC W/AUTO DIFF WBC: CPT

## 2022-01-25 PROCEDURE — 96374 THER/PROPH/DIAG INJ IV PUSH: CPT

## 2022-01-25 PROCEDURE — 99282 EMERGENCY DEPT VISIT SF MDM: CPT

## 2022-01-25 PROCEDURE — 75810000275 HC EMERGENCY DEPT VISIT NO LEVEL OF CARE

## 2022-01-25 RX ORDER — ONDANSETRON 2 MG/ML
4 INJECTION INTRAMUSCULAR; INTRAVENOUS
Status: COMPLETED | OUTPATIENT
Start: 2022-01-25 | End: 2022-01-25

## 2022-01-25 RX ORDER — POLYETHYLENE GLYCOL 3350 17 G/17G
17 POWDER, FOR SOLUTION ORAL DAILY
Qty: 17 G | Refills: 0 | Status: SHIPPED | OUTPATIENT
Start: 2022-01-25 | End: 2022-03-28

## 2022-01-25 RX ORDER — MORPHINE SULFATE 2 MG/ML
2 INJECTION, SOLUTION INTRAMUSCULAR; INTRAVENOUS ONCE
Status: COMPLETED | OUTPATIENT
Start: 2022-01-25 | End: 2022-01-25

## 2022-01-25 RX ADMIN — MORPHINE SULFATE 2 MG: 2 INJECTION, SOLUTION INTRAMUSCULAR; INTRAVENOUS at 16:21

## 2022-01-25 RX ADMIN — SODIUM CHLORIDE 1000 ML: 9 INJECTION, SOLUTION INTRAVENOUS at 14:19

## 2022-01-25 RX ADMIN — ONDANSETRON 4 MG: 2 INJECTION INTRAMUSCULAR; INTRAVENOUS at 15:17

## 2022-01-25 RX ADMIN — MORPHINE SULFATE 2 MG: 2 INJECTION, SOLUTION INTRAMUSCULAR; INTRAVENOUS at 15:17

## 2022-01-25 NOTE — ED PROVIDER NOTES
EMERGENCY DEPARTMENT HISTORY AND PHYSICAL EXAM      Date: 1/25/2022  Patient Name: Humberto Wong    History of Presenting Illness     Chief Complaint   Patient presents with    Abdominal Pain    Diarrhea       History Provided By: Patient    HPI: Humberto Wong, 64 y.o. female with a past medical history significant for hypothyroidism and GERD who presents to the ED with cc of generalized abdominal pain and diarrhea. Pt c/o intermittent sx's for the past 2 weeks and notes that her pain is worst in her LLQ. She describes her pain as \"cramping\" and notes no modifying factors. Pt has additional c/o nausea, subjective fever, and chills. She denies treating sx's with anything. Denies any known sick contacts. Pt states that she is otherwise well and has no further concerns. She specifically denies any chest pain, shortness of breath, palpitations, cough, congestion, sore throat, episodes of emesis, difficulty urinating, dysuria, hematuria, frequency, headaches, light headedness, dizziness, diaphoresis, or rash. There are no other complaints, changes, or physical findings at this time. PCP: None    No current facility-administered medications on file prior to encounter. Current Outpatient Medications on File Prior to Encounter   Medication Sig Dispense Refill    ondansetron hcl (Zofran) 4 mg tablet Take 1 Tablet by mouth every eight (8) hours as needed for PRN Reason (Other) (Dizziness or nausea). 20 Tablet 0    diclofenac EC (VOLTAREN) 75 mg EC tablet Take 1 Tablet by mouth two (2) times a day. 28 Tablet 0    diclofenac potassium (CATAFLAM) 50 mg tablet Take 1 Tablet by mouth three (3) times daily. 21 Tablet 0    azithromycin (Zithromax Z-Terrence) 250 mg tablet Take 2 tablet p.o. day 1 then 1 tablet p.o. day 2 through 5 6 Tablet 0    albuterol (PROVENTIL HFA, VENTOLIN HFA, PROAIR HFA) 90 mcg/actuation inhaler Take 2 Puffs by inhalation every four (4) hours as needed for Wheezing.  18 g 0    dextromethorphan-guaiFENesin (Robitussin Cough-Chest Jorge DM) 5-100 mg/5 mL liqd Take 5 mL by mouth every six (6) hours. 200 mL 0    dextroamphetamine-amphetamine (ADDERALL) 20 mg tablet Take 10 mg by mouth four (4) times daily.  QUEtiapine (SEROquel) 300 mg tablet Take 300 mg by mouth nightly.  QUEtiapine (SEROquel) 50 mg tablet Take 50 mg by mouth daily.  hydrOXYzine HCL (ATARAX) 50 mg tablet Take 50 mg by mouth every six (6) hours as needed (max 4 tablets a day).  naloxone (Narcan) 4 mg/actuation nasal spray Use 1 spray intranasally, then discard. Repeat with new spray every 2 min as needed for opioid overdose symptoms, alternating nostrils. 2 Each 0    clonazePAM (KLONOPIN) 1 mg tablet Take  by mouth three (3) times daily.  gabapentin (NEURONTIN) 600 mg tablet Take  by mouth four (4) times daily. Past History     Past Medical History:  Past Medical History:   Diagnosis Date    DDD (degenerative disc disease), lumbar     Fatigue     GERD (gastroesophageal reflux disease)     Hypothyroidism     Psychiatric disorder     PTSD per patient    Seizures ([facility] [address]        Past Surgical History:  Past Surgical History:   Procedure Laterality Date    HX APPENDECTOMY      HX  SECTION      HX CHOLECYSTECTOMY      HX HYSTERECTOMY         Family History:  Family History   Family history unknown: Yes       Social History:  Social History     Tobacco Use    Smoking status: Current Every Day Smoker     Packs/day: 0.25    Smokeless tobacco: Never Used   Vaping Use    Vaping Use: Never used   Substance Use Topics    Alcohol use: Not Currently     Alcohol/week: 0.0 standard drinks    Drug use: Never       Allergies: Allergies   Allergen Reactions    Augmentin [Amoxicillin-Pot Clavulanate] Nausea and Vomiting         Review of Systems     Review of Systems   Constitutional: Positive for chills and fever (subjective). Negative for diaphoresis. HENT: Negative.   Negative for congestion, rhinorrhea and sore throat. Eyes: Negative. Respiratory: Negative. Negative for cough and shortness of breath. Cardiovascular: Negative. Negative for chest pain. Gastrointestinal: Positive for abdominal pain and diarrhea. Negative for blood in stool, constipation, nausea and vomiting. Genitourinary: Negative. Negative for difficulty urinating, dysuria, flank pain, frequency, hematuria, vaginal bleeding and vaginal discharge. Musculoskeletal: Negative. Skin: Negative. Negative for rash. Neurological: Negative. Negative for dizziness, weakness, light-headedness, numbness and headaches. Psychiatric/Behavioral: Negative. All other systems reviewed and are negative. Physical Exam     Physical Exam  Vitals and nursing note reviewed. Constitutional:       General: She is not in acute distress. Appearance: Normal appearance. She is not ill-appearing or toxic-appearing. HENT:      Head: Normocephalic and atraumatic. Mouth/Throat:      Mouth: Mucous membranes are dry. Pharynx: Oropharynx is clear. Eyes:      Extraocular Movements: Extraocular movements intact. Conjunctiva/sclera: Conjunctivae normal.      Pupils: Pupils are equal, round, and reactive to light. Cardiovascular:      Rate and Rhythm: Regular rhythm. Tachycardia present. Pulses: Normal pulses. Heart sounds: Normal heart sounds, S1 normal and S2 normal. No murmur heard. No friction rub. No gallop. Pulmonary:      Effort: Pulmonary effort is normal.      Breath sounds: Normal breath sounds. No wheezing, rhonchi or rales. Abdominal:      General: Bowel sounds are normal. There is no distension. Palpations: Abdomen is soft. Tenderness: There is generalized abdominal tenderness (worst in LLQ). There is no right CVA tenderness, left CVA tenderness, guarding or rebound. Negative signs include Garcia's sign and McBurney's sign.    Musculoskeletal:      Cervical back: Neck supple. No tenderness. Right lower leg: No edema. Left lower leg: No edema. Skin:     General: Skin is warm and dry. Capillary Refill: Capillary refill takes less than 2 seconds. Findings: No rash. Neurological:      General: No focal deficit present. Mental Status: She is alert and oriented to person, place, and time. Psychiatric:         Mood and Affect: Mood normal.         Behavior: Behavior normal.         Lab and Diagnostic Study Results     Labs -   No results found for this or any previous visit (from the past 12 hour(s)). Radiologic Studies -   @lastxrresult@  CT Results  (Last 48 hours)    None        CXR Results  (Last 48 hours)    None            Medical Decision Making   - I am the first provider for this patient. - I reviewed the vital signs, available nursing notes, past medical history, past surgical history, family history and social history. - Initial assessment performed. The patients presenting problems have been discussed, and they are in agreement with the care plan formulated and outlined with them. I have encouraged them to ask questions as they arise throughout their visit. Vital Signs-Reviewed the patient's vital signs. No data found. Records Reviewed: Nursing Notes and Old Medical Records       Provider Notes (Medical Decision Making):     MDM  Number of Diagnoses or Management Options  Constipation, unspecified constipation type  Diagnosis management comments: Pt presents with acute abdominal pain; vital signs stable with currently a non-peritoneal exam; DDx includes: Gastroenteritis, hepatitis, pancreatitis, constipation, obstruction, appendicitis, viral illness, IBD, diverticulitis, mesenteric ischemia, AAA or descending dissection, ACS, kidney stone. Will get labs, treat symptomatically and obtain serial abdominal exams to determine if additional imaging is indicated. Will reassess and monitor closely.         Amount and/or Complexity of Data Reviewed  Clinical lab tests: ordered and reviewed  Tests in the radiology section of CPT®: ordered and reviewed  Tests in the medicine section of CPT®: ordered and reviewed  Review and summarize past medical records: yes       ED Course:   Pt updated on all results and findings. She reports feeling significantly better at this time. Pt educated on strict return precautions and conveys good understanding and agreement with care plan as outlined. She will schedule close follow-up with her PCP within the next week should her condition persist, or return to ED sooner if worse. She has no additional complaints or concerns. All of her questions have been answered. Anticipate discharge home shortly. Procedures   Medical Decision Makingedical Decision Making  Performed by: Oswaldo Paulson PA-C  PROCEDURES:  Procedures       Disposition   Disposition: DC- Adult Discharges: All of the diagnostic tests were reviewed and questions answered. Diagnosis, care plan and treatment options were discussed. The patient understands the instructions and will follow up as directed. The patients results have been reviewed with them. They have been counseled regarding their diagnosis. The patient verbally convey understanding and agreement of the signs, symptoms, diagnosis, treatment and prognosis and additionally agrees to follow up as recommended with their PCP in 24 - 48 hours. They also agree with the care-plan and convey that all of their questions have been answered. I have also put together some discharge instructions for them that include: 1) educational information regarding their diagnosis, 2) how to care for their diagnosis at home, as well a 3) list of reasons why they would want to return to the ED prior to their follow-up appointment, should their condition change. Discharged    DISCHARGE PLAN:  1.    Current Discharge Medication List      CONTINUE these medications which have NOT CHANGED    Details ondansetron hcl (Zofran) 4 mg tablet Take 1 Tablet by mouth every eight (8) hours as needed for PRN Reason (Other) (Dizziness or nausea). Qty: 20 Tablet, Refills: 0      diclofenac EC (VOLTAREN) 75 mg EC tablet Take 1 Tablet by mouth two (2) times a day. Qty: 28 Tablet, Refills: 0      diclofenac potassium (CATAFLAM) 50 mg tablet Take 1 Tablet by mouth three (3) times daily. Qty: 21 Tablet, Refills: 0      azithromycin (Zithromax Z-Terrence) 250 mg tablet Take 2 tablet p.o. day 1 then 1 tablet p.o. day 2 through 5  Qty: 6 Tablet, Refills: 0      albuterol (PROVENTIL HFA, VENTOLIN HFA, PROAIR HFA) 90 mcg/actuation inhaler Take 2 Puffs by inhalation every four (4) hours as needed for Wheezing. Qty: 18 g, Refills: 0      dextromethorphan-guaiFENesin (Robitussin Cough-Chest Jorge DM) 5-100 mg/5 mL liqd Take 5 mL by mouth every six (6) hours. Qty: 200 mL, Refills: 0      dextroamphetamine-amphetamine (ADDERALL) 20 mg tablet Take 10 mg by mouth four (4) times daily. !! QUEtiapine (SEROquel) 300 mg tablet Take 300 mg by mouth nightly. !! QUEtiapine (SEROquel) 50 mg tablet Take 50 mg by mouth daily. hydrOXYzine HCL (ATARAX) 50 mg tablet Take 50 mg by mouth every six (6) hours as needed (max 4 tablets a day). naloxone (Narcan) 4 mg/actuation nasal spray Use 1 spray intranasally, then discard. Repeat with new spray every 2 min as needed for opioid overdose symptoms, alternating nostrils. Qty: 2 Each, Refills: 0      clonazePAM (KLONOPIN) 1 mg tablet Take  by mouth three (3) times daily. Associated Diagnoses: Idiopathic hypotension; Hypothyroidism due to acquired atrophy of thyroid; Anemia due to vitamin B12 deficiency; Dizziness; Arthralgia      gabapentin (NEURONTIN) 600 mg tablet Take  by mouth four (4) times daily. Associated Diagnoses: Idiopathic hypotension; Hypothyroidism due to acquired atrophy of thyroid; Anemia due to vitamin B12 deficiency; Dizziness;  Arthralgia       !! - Potential duplicate medications found. Please discuss with provider. 2.   Follow-up Information     Follow up With Specialties Details Why Contact Candice Catalan MD Gastroenterology Schedule an appointment as soon as possible for a visit  As needed [address] [address]       [facility]   As needed [address] [facility]  [phone]        3. Return to ED if worse   4. Discharge Medication List as of 1/25/2022  4:20 PM      START taking these medications    Details   polyethylene glycol (Miralax) 17 gram/dose powder Take 17 g by mouth daily. 1 tablespoon with 8 oz of water daily, Normal, Disp-17 g, R-0         CONTINUE these medications which have NOT CHANGED    Details   ondansetron hcl (Zofran) 4 mg tablet Take 1 Tablet by mouth every eight (8) hours as needed for PRN Reason (Other) (Dizziness or nausea). , Normal, Disp-20 Tablet, R-0      diclofenac EC (VOLTAREN) 75 mg EC tablet Take 1 Tablet by mouth two (2) times a day., Normal, Disp-28 Tablet, R-0      diclofenac potassium (CATAFLAM) 50 mg tablet Take 1 Tablet by mouth three (3) times daily. , Normal, Disp-21 Tablet, R-0      azithromycin (Zithromax Z-Terrence) 250 mg tablet Take 2 tablet p.o. day 1 then 1 tablet p.o. day 2 through 5, Normal, Disp-6 Tablet, R-0      albuterol (PROVENTIL HFA, VENTOLIN HFA, PROAIR HFA) 90 mcg/actuation inhaler Take 2 Puffs by inhalation every four (4) hours as needed for Wheezing., Normal, Disp-18 g, R-0      dextromethorphan-guaiFENesin (Robitussin Cough-Chest Jorge DM) 5-100 mg/5 mL liqd Take 5 mL by mouth every six (6) hours. , Normal, Disp-200 mL, R-0      dextroamphetamine-amphetamine (ADDERALL) 20 mg tablet Take 10 mg by mouth four (4) times daily. , Historical Med      !! QUEtiapine (SEROquel) 300 mg tablet Take 300 mg by mouth nightly.  , Historical Med      !! QUEtiapine (SEROquel) 50 mg tablet Take 50 mg by mouth daily., Historical Med      hydrOXYzine HCL (ATARAX) 50 mg tablet Take 50 mg by mouth every six (6) hours as needed (max 4 tablets a day). , Historical Med      naloxone (Narcan) 4 mg/actuation nasal spray Use 1 spray intranasally, then discard. Repeat with new spray every 2 min as needed for opioid overdose symptoms, alternating nostrils. , Normal, Disp-2 Each, R-0      clonazePAM (KLONOPIN) 1 mg tablet Take  by mouth three (3) times daily. , Historical Med      gabapentin (NEURONTIN) 600 mg tablet Take  by mouth four (4) times daily. , Historical Med       !! - Potential duplicate medications found. Please discuss with provider. Diagnosis     Clinical Impression:   1. Constipation, unspecified constipation type        Attestations:    Chartlatanya Cabezas, PANolanC    Please note that this dictation was completed with [employer], the [employer] voice recognition software. Quite often unanticipated grammatical, syntax, homophones, and other interpretive errors are inadvertently transcribed by the computer software. Please disregard these errors. Please excuse any errors that have escaped final proofreading. Thank you.

## 2022-01-25 NOTE — Clinical Note
[facility] EMERGENCY DEPT  [address] [place] [id number]  [phone]    Work/School Note    Date: 1/25/2022    To Whom It May concern:      Irineo Olson was seen and treated today in the emergency room by the following provider(s):  Physician Assistant: aRdha Brown PA-C. Irineo Olson is excused from work/school on 01/25/22. She is clear to return to work/school on 01/26/22.         Sincerely,          Kunal Lombardi PA-C

## 2022-01-30 ENCOUNTER — HOSPITAL ENCOUNTER (EMERGENCY)
Age: 57
Discharge: HOME | End: 2022-01-30
Payer: MEDICARE

## 2022-01-30 ENCOUNTER — APPOINTMENT (OUTPATIENT)
Dept: GENERAL RADIOLOGY | Age: 57
End: 2022-01-30
Attending: EMERGENCY MEDICINE
Payer: MEDICARE

## 2022-01-30 ENCOUNTER — APPOINTMENT (OUTPATIENT)
Dept: CT IMAGING | Age: 57
End: 2022-01-30
Attending: PHYSICIAN ASSISTANT
Payer: MEDICARE

## 2022-01-30 VITALS
WEIGHT: 185 LBS | BODY MASS INDEX: 28.04 KG/M2 | DIASTOLIC BLOOD PRESSURE: 87 MMHG | TEMPERATURE: 98.7 F | SYSTOLIC BLOOD PRESSURE: 126 MMHG | OXYGEN SATURATION: 95 % | HEIGHT: 68 IN | RESPIRATION RATE: 13 BRPM | HEART RATE: 83 BPM

## 2022-01-30 DIAGNOSIS — R07.81 PLEURITIC CHEST PAIN: Primary | ICD-10-CM

## 2022-01-30 DIAGNOSIS — F41.0 PANIC ATTACK: ICD-10-CM

## 2022-01-30 DIAGNOSIS — J98.11 ATELECTASIS: ICD-10-CM

## 2022-01-30 LAB
ALBUMIN SERPL-MCNC: 3.4 G/DL (ref 3.5–5)
ALBUMIN/GLOB SERPL: 0.9 {RATIO} (ref 1.1–2.2)
ALP SERPL-CCNC: 93 U/L (ref 45–117)
ALT SERPL-CCNC: 25 U/L (ref 12–78)
ANION GAP SERPL CALC-SCNC: 5 MMOL/L (ref 5–15)
AST SERPL W P-5'-P-CCNC: 16 U/L (ref 15–37)
ATRIAL RATE: 111 BPM
BASOPHILS # BLD: 0.1 K/UL (ref 0–0.1)
BASOPHILS NFR BLD: 1 % (ref 0–1)
BILIRUB SERPL-MCNC: 0.2 MG/DL (ref 0.2–1)
BNP SERPL-MCNC: 28 PG/ML
BUN SERPL-MCNC: 14 MG/DL (ref 6–20)
BUN/CREAT SERPL: 12 (ref 12–20)
CA-I BLD-MCNC: 8.4 MG/DL (ref 8.5–10.1)
CALCULATED P AXIS, ECG09: 31 DEGREES
CALCULATED R AXIS, ECG10: 44 DEGREES
CALCULATED T AXIS, ECG11: 49 DEGREES
CHLORIDE SERPL-SCNC: 109 MMOL/L (ref 97–108)
CK SERPL-CCNC: 103 NG/ML (ref 26–192)
CO2 SERPL-SCNC: 25 MMOL/L (ref 21–32)
CREAT SERPL-MCNC: 1.13 MG/DL (ref 0.55–1.02)
D DIMER PPP FEU-MCNC: 1.27 UG/ML(FEU)
DIAGNOSIS, 93000: NORMAL
DIFFERENTIAL METHOD BLD: ABNORMAL
EOSINOPHIL # BLD: 0.1 K/UL (ref 0–0.4)
EOSINOPHIL NFR BLD: 1 % (ref 0–7)
ERYTHROCYTE [DISTWIDTH] IN BLOOD BY AUTOMATED COUNT: 14.4 % (ref 11.5–14.5)
GLOBULIN SER CALC-MCNC: 3.6 G/DL (ref 2–4)
GLUCOSE SERPL-MCNC: 150 MG/DL (ref 65–100)
HCT VFR BLD AUTO: 44.2 % (ref 35–47)
HGB BLD-MCNC: 14.4 G/DL (ref 11.5–16)
IMM GRANULOCYTES # BLD AUTO: 0 K/UL (ref 0–0.04)
IMM GRANULOCYTES NFR BLD AUTO: 0 % (ref 0–0.5)
LYMPHOCYTES # BLD: 5 K/UL (ref 0.8–3.5)
LYMPHOCYTES NFR BLD: 47 % (ref 12–49)
MCH RBC QN AUTO: 28.6 PG (ref 26–34)
MCHC RBC AUTO-ENTMCNC: 32.6 G/DL (ref 30–36.5)
MCV RBC AUTO: 87.7 FL (ref 80–99)
MONOCYTES # BLD: 0.5 K/UL (ref 0–1)
MONOCYTES NFR BLD: 5 % (ref 5–13)
NEUTS SEG # BLD: 5 K/UL (ref 1.8–8)
NEUTS SEG NFR BLD: 46 % (ref 32–75)
NRBC # BLD: 0 K/UL (ref 0–0.01)
NRBC BLD-RTO: 0 PER 100 WBC
P-R INTERVAL, ECG05: 134 MS
PLATELET # BLD AUTO: 365 K/UL (ref 150–400)
PMV BLD AUTO: 8.8 FL (ref 8.9–12.9)
POTASSIUM SERPL-SCNC: 3.9 MMOL/L (ref 3.5–5.1)
PROT SERPL-MCNC: 7 G/DL (ref 6.4–8.2)
Q-T INTERVAL, ECG07: 326 MS
QRS DURATION, ECG06: 74 MS
QTC CALCULATION (BEZET), ECG08: 443 MS
RBC # BLD AUTO: 5.04 M/UL (ref 3.8–5.2)
SODIUM SERPL-SCNC: 139 MMOL/L (ref 136–145)
TROPONIN-HIGH SENSITIVITY: 9 NG/L (ref 0–51)
VENTRICULAR RATE, ECG03: 111 BPM
WBC # BLD AUTO: 10.8 K/UL (ref 3.6–11)

## 2022-01-30 PROCEDURE — 96374 THER/PROPH/DIAG INJ IV PUSH: CPT

## 2022-01-30 PROCEDURE — 85025 COMPLETE CBC W/AUTO DIFF WBC: CPT

## 2022-01-30 PROCEDURE — 71045 X-RAY EXAM CHEST 1 VIEW: CPT

## 2022-01-30 PROCEDURE — 83880 ASSAY OF NATRIURETIC PEPTIDE: CPT

## 2022-01-30 PROCEDURE — 74011250636 HC RX REV CODE- 250/636: Performed by: PHYSICIAN ASSISTANT

## 2022-01-30 PROCEDURE — 82550 ASSAY OF CK (CPK): CPT

## 2022-01-30 PROCEDURE — 71275 CT ANGIOGRAPHY CHEST: CPT

## 2022-01-30 PROCEDURE — 74011000636 HC RX REV CODE- 636: Performed by: PHYSICIAN ASSISTANT

## 2022-01-30 PROCEDURE — 99285 EMERGENCY DEPT VISIT HI MDM: CPT

## 2022-01-30 PROCEDURE — 93005 ELECTROCARDIOGRAM TRACING: CPT

## 2022-01-30 PROCEDURE — 85379 FIBRIN DEGRADATION QUANT: CPT

## 2022-01-30 PROCEDURE — 74011250637 HC RX REV CODE- 250/637: Performed by: PHYSICIAN ASSISTANT

## 2022-01-30 PROCEDURE — 36415 COLL VENOUS BLD VENIPUNCTURE: CPT

## 2022-01-30 PROCEDURE — 80053 COMPREHEN METABOLIC PANEL: CPT

## 2022-01-30 PROCEDURE — 84484 ASSAY OF TROPONIN QUANT: CPT

## 2022-01-30 PROCEDURE — 96375 TX/PRO/DX INJ NEW DRUG ADDON: CPT

## 2022-01-30 RX ORDER — LORAZEPAM 2 MG/ML
1 INJECTION INTRAMUSCULAR
Status: COMPLETED | OUTPATIENT
Start: 2022-01-30 | End: 2022-01-30

## 2022-01-30 RX ORDER — NAPROXEN 500 MG/1
500 TABLET ORAL 2 TIMES DAILY WITH MEALS
Qty: 20 TABLET | Refills: 0 | Status: SHIPPED | OUTPATIENT
Start: 2022-01-30 | End: 2022-02-09

## 2022-01-30 RX ORDER — MORPHINE SULFATE 2 MG/ML
2 INJECTION, SOLUTION INTRAMUSCULAR; INTRAVENOUS ONCE
Status: COMPLETED | OUTPATIENT
Start: 2022-01-30 | End: 2022-01-30

## 2022-01-30 RX ORDER — NAPROXEN 500 MG/1
500 TABLET ORAL ONCE
Status: COMPLETED | OUTPATIENT
Start: 2022-01-30 | End: 2022-01-30

## 2022-01-30 RX ORDER — KETOROLAC TROMETHAMINE 10 MG/1
10 TABLET, FILM COATED ORAL
Qty: 18 TABLET | Refills: 0 | Status: SHIPPED | OUTPATIENT
Start: 2022-01-30 | End: 2022-01-30

## 2022-01-30 RX ADMIN — NITROGLYCERIN 0.5 INCH: 20 OINTMENT TOPICAL at 12:46

## 2022-01-30 RX ADMIN — LORAZEPAM 1 MG: 2 INJECTION INTRAMUSCULAR; INTRAVENOUS at 12:05

## 2022-01-30 RX ADMIN — NAPROXEN 500 MG: 500 TABLET ORAL at 17:17

## 2022-01-30 RX ADMIN — IOPAMIDOL 100 ML: 755 INJECTION, SOLUTION INTRAVENOUS at 16:16

## 2022-01-30 RX ADMIN — MORPHINE SULFATE 2 MG: 2 INJECTION, SOLUTION INTRAMUSCULAR; INTRAVENOUS at 13:34

## 2022-01-30 NOTE — ED TRIAGE NOTES
CP and L arm numbness starting yesterday 1300. Reported thinking it was Gas with no improvement throughout night. Hx of Anxiety and Decreased Carotid flow.

## 2022-01-30 NOTE — ED PROVIDER NOTES
EMERGENCY DEPARTMENT HISTORY AND PHYSICAL EXAM      Date: 1/30/2022  Patient Name: Prasanna Heredia    History of Presenting Illness     Chief Complaint   Patient presents with    Chest Pain    Anxiety    Numbness     L arm       History Provided By: Patient    HPI: Prasanna Heredia, 64 y.o. female with a past medical history significant Hypothyroidism, GERD, seizure disorder, severe PTSD with associated anxiety, mesenteric ischemia presents to the ED with cc of left-sided chest pain, located just proximal to the left breast, radiating into the left arm, worse with deep breathing onset about 24 hours ago. Patient thought her pain was gas however it has not subsided. Patient also reports that her pain has caused her to have an anxiety attack. Patient usually takes Valium at home, currently requesting a dose of Ativan to ease her anxiety. Patient also reports a history of narcotic dependence (more than 5 years ago). The pain is also causing shortness of breath. She specifically denies fever, recent travel, cough, congestion, abdominal pain, nausea, vomiting, diarrhea. There are no other complaints, changes, or physical findings at this time. PCP: None    Current Outpatient Medications   Medication Sig Dispense Refill    naproxen (NAPROSYN) 500 mg tablet Take 1 Tablet by mouth two (2) times daily (with meals) for 10 days. 20 Tablet 0    polyethylene glycol (Miralax) 17 gram/dose powder Take 17 g by mouth daily. 1 tablespoon with 8 oz of water daily 17 g 0    ondansetron hcl (Zofran) 4 mg tablet Take 1 Tablet by mouth every eight (8) hours as needed for PRN Reason (Other) (Dizziness or nausea). 20 Tablet 0    azithromycin (Zithromax Z-Terrence) 250 mg tablet Take 2 tablet p.o. day 1 then 1 tablet p.o. day 2 through 5 6 Tablet 0    albuterol (PROVENTIL HFA, VENTOLIN HFA, PROAIR HFA) 90 mcg/actuation inhaler Take 2 Puffs by inhalation every four (4) hours as needed for Wheezing.  18 g 0    dextromethorphan-guaiFENesin (Robitussin Cough-Chest Jorge DM) 5-100 mg/5 mL liqd Take 5 mL by mouth every six (6) hours. 200 mL 0    dextroamphetamine-amphetamine (ADDERALL) 20 mg tablet Take 10 mg by mouth four (4) times daily.  QUEtiapine (SEROquel) 300 mg tablet Take 300 mg by mouth nightly.  QUEtiapine (SEROquel) 50 mg tablet Take 50 mg by mouth daily.  hydrOXYzine HCL (ATARAX) 50 mg tablet Take 50 mg by mouth every six (6) hours as needed (max 4 tablets a day).  naloxone (Narcan) 4 mg/actuation nasal spray Use 1 spray intranasally, then discard. Repeat with new spray every 2 min as needed for opioid overdose symptoms, alternating nostrils. 2 Each 0    clonazePAM (KLONOPIN) 1 mg tablet Take  by mouth three (3) times daily.  gabapentin (NEURONTIN) 600 mg tablet Take  by mouth four (4) times daily. Past History     Past Medical History:  Past Medical History:   Diagnosis Date    DDD (degenerative disc disease), lumbar     Fatigue     GERD (gastroesophageal reflux disease)     Hypothyroidism     Psychiatric disorder     PTSD per patient    Seizures ([facility] [address]        Past Surgical History:  Past Surgical History:   Procedure Laterality Date    HX APPENDECTOMY      HX  SECTION      HX CHOLECYSTECTOMY      HX HYSTERECTOMY         Family History:  Family History   Family history unknown: Yes       Social History:  Social History     Tobacco Use    Smoking status: Current Every Day Smoker     Packs/day: 0.25    Smokeless tobacco: Never Used   Vaping Use    Vaping Use: Never used   Substance Use Topics    Alcohol use: Not Currently     Alcohol/week: 0.0 standard drinks    Drug use: Never       Allergies: Allergies   Allergen Reactions    Augmentin [Amoxicillin-Pot Clavulanate] Nausea and Vomiting         Review of Systems   Review of Systems   Constitutional: Negative for activity change, chills and fever.    HENT: Negative for congestion, ear pain, rhinorrhea, sneezing and sore throat. Eyes: Negative for pain and visual disturbance. Respiratory: Positive for shortness of breath. Negative for cough. Cardiovascular: Positive for chest pain. Gastrointestinal: Negative for abdominal pain, diarrhea, nausea and vomiting. Genitourinary: Negative for dysuria and hematuria. Musculoskeletal: Negative for gait problem. Skin: Negative for rash. Neurological: Positive for numbness. Negative for speech difficulty, weakness and headaches. Psychiatric/Behavioral: The patient is nervous/anxious. All other systems reviewed and are negative. Physical Exam   Physical Exam  Vitals and nursing note reviewed. Constitutional:       General: She is not in acute distress. Appearance: Normal appearance. She is not toxic-appearing or diaphoretic. Comments: Actively hyperventilating, able to slow breathing down   HENT:      Head: Normocephalic and atraumatic. Nose: Nose normal.      Mouth/Throat:      Mouth: Mucous membranes are moist.      Pharynx: Oropharynx is clear. Eyes:      Extraocular Movements: Extraocular movements intact. Conjunctiva/sclera: Conjunctivae normal.      Pupils: Pupils are equal, round, and reactive to light. Cardiovascular:      Rate and Rhythm: Tachycardia present. Pulses: Normal pulses. Heart sounds: Normal heart sounds. Pulmonary:      Effort: Pulmonary effort is normal. No respiratory distress. Breath sounds: Normal breath sounds. No wheezing or rhonchi. Chest:      Chest wall: Tenderness present. No deformity, swelling or crepitus. Abdominal:      General: Bowel sounds are normal.      Palpations: Abdomen is soft. Tenderness: There is no abdominal tenderness. Musculoskeletal:         General: No deformity or signs of injury. Normal range of motion. Cervical back: Normal range of motion. Skin:     General: Skin is warm and dry.       Capillary Refill: Capillary refill takes less than 2 seconds. Findings: No rash. Neurological:      General: No focal deficit present. Mental Status: She is alert and oriented to person, place, and time. Cranial Nerves: No cranial nerve deficit or dysarthria. Sensory: Sensation is intact. Motor: Motor function is intact. Comments: Resting tremors of bilateral upper extremities, resolved when patient slows her breathing or focuses on another task   Psychiatric:         Mood and Affect: Mood is anxious. Diagnostic Study Results     Labs -     Recent Results (from the past 48 hour(s))   EKG, 12 LEAD, INITIAL    Collection Time: 01/30/22 11:30 AM   Result Value Ref Range    Ventricular Rate 111 BPM    Atrial Rate 111 BPM    P-R Interval 134 ms    QRS Duration 74 ms    Q-T Interval 326 ms    QTC Calculation (Bezet) 443 ms    Calculated P Axis 31 degrees    Calculated R Axis 44 degrees    Calculated T Axis 49 degrees    Diagnosis       Sinus tachycardia  Otherwise normal ECG  No previous ECGs available  Confirmed by Rikki Harp ([id number]) on 1/30/2022 3:38:17 PM     CBC WITH AUTOMATED DIFF    Collection Time: 01/30/22 11:45 AM   Result Value Ref Range    WBC 10.8 3.6 - 11.0 K/uL    RBC 5.04 3.80 - 5.20 M/uL    HGB 14.4 11.5 - 16.0 g/dL    HCT 44.2 35.0 - 47.0 %    MCV 87.7 80.0 - 99.0 FL    MCH 28.6 26.0 - 34.0 PG    MCHC 32.6 30.0 - 36.5 g/dL    RDW 14.4 11.5 - 14.5 %    PLATELET 451 379 - 582 K/uL    MPV 8.8 (L) 8.9 - 12.9 FL    NRBC 0.0 0.0  WBC    ABSOLUTE NRBC 0.00 0.00 - 0.01 K/uL    NEUTROPHILS 46 32 - 75 %    LYMPHOCYTES 47 12 - 49 %    MONOCYTES 5 5 - 13 %    EOSINOPHILS 1 0 - 7 %    BASOPHILS 1 0 - 1 %    IMMATURE GRANULOCYTES 0 0 - 0.5 %    ABS. NEUTROPHILS 5.0 1.8 - 8.0 K/UL    ABS. LYMPHOCYTES 5.0 (H) 0.8 - 3.5 K/UL    ABS. MONOCYTES 0.5 0.0 - 1.0 K/UL    ABS. EOSINOPHILS 0.1 0.0 - 0.4 K/UL    ABS. BASOPHILS 0.1 0.0 - 0.1 K/UL    ABS. IMM.  GRANS. 0.0 0.00 - 0.04 K/UL    DF AUTOMATED     METABOLIC PANEL, COMPREHENSIVE    Collection Time: 01/30/22 11:45 AM   Result Value Ref Range    Sodium 139 136 - 145 mmol/L    Potassium 3.9 3.5 - 5.1 mmol/L    Chloride 109 (H) 97 - 108 mmol/L    CO2 25 21 - 32 mmol/L    Anion gap 5 5 - 15 mmol/L    Glucose 150 (H) 65 - 100 mg/dL    BUN 14 6 - 20 mg/dL    Creatinine 1.13 (H) 0.55 - 1.02 mg/dL    BUN/Creatinine ratio 12 12 - 20      GFR est AA >60 >60 ml/min/1.73m2    GFR est non-AA 50 (L) >60 ml/min/1.73m2    Calcium 8.4 (L) 8.5 - 10.1 mg/dL    Bilirubin, total 0.2 0.2 - 1.0 mg/dL    AST (SGOT) 16 15 - 37 U/L    ALT (SGPT) 25 12 - 78 U/L    Alk. phosphatase 93 45 - 117 U/L    Protein, total 7.0 6.4 - 8.2 g/dL    Albumin 3.4 (L) 3.5 - 5.0 g/dL    Globulin 3.6 2.0 - 4.0 g/dL    A-G Ratio 0.9 (L) 1.1 - 2.2     CK W/ REFLX CKMB    Collection Time: 01/30/22 11:45 AM   Result Value Ref Range    .0 26 - 192 ng/mL   TROPONIN-HIGH SENSITIVITY    Collection Time: 01/30/22 11:45 AM   Result Value Ref Range    Troponin-High Sensitivity 9 0 - 51 ng/L   D DIMER    Collection Time: 01/30/22 11:45 AM   Result Value Ref Range    D DIMER 1.27 (H) <0.50 ug/ml(FEU)   NT-PRO BNP    Collection Time: 01/30/22 12:01 PM   Result Value Ref Range    NT pro-BNP 28 <125 pg/mL       Radiologic Studies -   XR Results (most recent):  Results from [place] encounter on 01/30/22    XR CHEST PORT    Narrative  Chest one view. AP upright portable at 1146 dated 1/30/2022. Comparison 9/30/2021. The cardiomediastinal silhouette is stable. The pulmonary blood flow remains  normal. Minor ligular atelectasis/scar is unchanged from before. There is no  lobar consolidation, pleural fluid or pneumothorax. Impression  Comparison 9/30/2021. No interval change. CT Results  (Last 48 hours)               01/30/22 1616  CTA CHEST W OR W WO CONT Final result    Impression:  No evidence of pulmonary embolism.  Minimal bilateral pulmonary   opacities suggesting small areas of pulmonary scarring and/or mild subsegmental   atelectasis noted. Narrative:  Dose Reduction:        All CT scans at this facility are performed using dose reduction optimization   techniques as appropriate to a performed exam including the following: Automated   exposure control, adjustments of the mA and/or kV according to patient size, or   use of iterative reconstruction technique. CT angiography of the chest with contrast. Axial images of the chest were   obtained during IV administration of 100 mL Isovue-370. Coronal reconstructions   including MIP reconstructions were created. There are minimal areas of pulmonary   opacity consistent with fibrotic changes and/or minimal atelectasis. No focal   airspace disease pneumothorax or pleural effusion is seen. No pulmonary arterial   filling defects are seen. Thoracic aorta is unremarkable. No mediastinal mass or   abnormal mediastinal fluid collection is seen. In                   Medical Decision Making and ED Course   I am the first provider for this patient. I reviewed the vital signs, available nursing notes, past medical history, past surgical history, family history and social history. Vital Signs-Reviewed the patient's vital signs. Wt Readings from Last 3 Encounters:   01/30/22 83.9 kg (185 lb)   01/25/22 83.9 kg (185 lb)   01/05/22 81.6 kg (180 lb)     Temp Readings from Last 3 Encounters:   01/30/22 98.7 °F (37.1 °C)   01/25/22 98.3 °F (36.8 °C)   01/05/22 98.3 °F (36.8 °C)     BP Readings from Last 3 Encounters:   01/30/22 126/87   01/25/22 118/80   01/05/22 123/61     Pulse Readings from Last 3 Encounters:   01/30/22 83   01/25/22 (!) 112   01/05/22 99       No data found.     Records Reviewed: Nursing Notes and Old Medical Records    Provider Notes (Medical Decision Making):       MDM  Number of Diagnoses or Management Options  Atelectasis  Panic attack  Pleuritic chest pain  Diagnosis management comments: 80-year-old female is presenting with an acute panic attack, hyperventilating on arrival with generalized shaking of her upper extremities. Patient was given 1 mg of Ativan, panic attack resolved. Patient is also complaining of a left-sided chest pain, described as deep however she is tender to palpation along the left sternal border. No fever or cough, no recent travel, no trauma. She does have a history of blood clot. D-dimer mildly elevated, CTA chest without any evidence of PE or consolidation, mild atelectasis noted which is consistent with a clinical history of hyperventilation and pleuritic chest pain. No leukocytosis, troponin is negative, no elevation of BNP. Patient is presenting with opioid seeking behaviors. She continues to request doses of morphine for chest pain although she has reported a personal history of narcotic dependence. Will prescribe naproxen for home to help ease her pain but has been advised that she would not be receiving any narcotic pain medication prescriptions. She received a dose of Ativan and a dose of morphine in the emergency department, no further doses will be given due to risk of respiratory suppression. Patient also takes Valium at home. Patient is stable for discharge and given follow-up for PCP. Amount and/or Complexity of Data Reviewed  Clinical lab tests: ordered and reviewed  Tests in the radiology section of CPT®: ordered and reviewed  Review and summarize past medical records: yes          ED Course:   Initial assessment performed. The patients presenting problems have been discussed, and they are in agreement with the care plan formulated and outlined with them. I have encouraged them to ask questions as they arise throughout their visit. 1:00 PM  Progress Note:  Patient was re-evaluated at bedside. Patient is resting more comfortably after Ativan, no further panic attack, hyperventilation has resolved as well as tremors.  She states that the Nitropaste is not helping her pain, she states that morphine has worked in the past. Will give small dose however patient has been advised that she would not be receiving any narcotic prescription pain medications outpatient due to her history of narcotic abuse/dependence. 3:45 PM  Progress Note:  Patient was re-evaluated at bedside. Patient reports that she is still having severe pain in her left chest after 1 mg of Ativan and 2 mg of morphine as well as Nitropaste. Will obtain CTA of chest as ordered. 4:45 PM  Progress Note:  Patient was re-evaluated at bedside. Resting comfortably, no acute distress, saturating 98% on room air, blood pressure stable, no tachycardia. Stable for discharge. Procedures       Kiana Nolasco PA-C    Procedures   Kiana Nolasco PA-C        Disposition     Disposition: DC- Adult Discharges: All of the diagnostic tests were reviewed and questions answered. Diagnosis, care plan and treatment options were discussed. The patient understands the instructions and will follow up as directed. The patients results have been reviewed with them. They have been counseled regarding their diagnosis. The patient verbally convey understanding and agreement of the signs, symptoms, diagnosis, treatment and prognosis and additionally agrees to follow up as recommended with their PCP in 24 - 48 hours. They also agree with the care-plan and convey that all of their questions have been answered. I have also put together some discharge instructions for them that include: 1) educational information regarding their diagnosis, 2) how to care for their diagnosis at home, as well a 3) list of reasons why they would want to return to the ED prior to their follow-up appointment, should their condition change. Discharged     DISCHARGE PLAN:  1. Current Discharge Medication List      CONTINUE these medications which have NOT CHANGED    Details   polyethylene glycol (Miralax) 17 gram/dose powder Take 17 g by mouth daily.  1 tablespoon with 8 oz of water daily  Qty: 17 g, Refills: 0      ondansetron hcl (Zofran) 4 mg tablet Take 1 Tablet by mouth every eight (8) hours as needed for PRN Reason (Other) (Dizziness or nausea). Qty: 20 Tablet, Refills: 0      diclofenac EC (VOLTAREN) 75 mg EC tablet Take 1 Tablet by mouth two (2) times a day. Qty: 28 Tablet, Refills: 0      diclofenac potassium (CATAFLAM) 50 mg tablet Take 1 Tablet by mouth three (3) times daily. Qty: 21 Tablet, Refills: 0      azithromycin (Zithromax Z-Terrence) 250 mg tablet Take 2 tablet p.o. day 1 then 1 tablet p.o. day 2 through 5  Qty: 6 Tablet, Refills: 0      albuterol (PROVENTIL HFA, VENTOLIN HFA, PROAIR HFA) 90 mcg/actuation inhaler Take 2 Puffs by inhalation every four (4) hours as needed for Wheezing. Qty: 18 g, Refills: 0      dextromethorphan-guaiFENesin (Robitussin Cough-Chest Jorge DM) 5-100 mg/5 mL liqd Take 5 mL by mouth every six (6) hours. Qty: 200 mL, Refills: 0      dextroamphetamine-amphetamine (ADDERALL) 20 mg tablet Take 10 mg by mouth four (4) times daily. !! QUEtiapine (SEROquel) 300 mg tablet Take 300 mg by mouth nightly. !! QUEtiapine (SEROquel) 50 mg tablet Take 50 mg by mouth daily. hydrOXYzine HCL (ATARAX) 50 mg tablet Take 50 mg by mouth every six (6) hours as needed (max 4 tablets a day). naloxone (Narcan) 4 mg/actuation nasal spray Use 1 spray intranasally, then discard. Repeat with new spray every 2 min as needed for opioid overdose symptoms, alternating nostrils. Qty: 2 Each, Refills: 0      clonazePAM (KLONOPIN) 1 mg tablet Take  by mouth three (3) times daily. Associated Diagnoses: Idiopathic hypotension; Hypothyroidism due to acquired atrophy of thyroid; Anemia due to vitamin B12 deficiency; Dizziness; Arthralgia      gabapentin (NEURONTIN) 600 mg tablet Take  by mouth four (4) times daily. Associated Diagnoses: Idiopathic hypotension; Hypothyroidism due to acquired atrophy of thyroid;  Anemia due to vitamin B12 deficiency; Dizziness; Arthralgia       !! - Potential duplicate medications found. Please discuss with provider. 2.   Follow-up Information     Follow up With Specialties Details Why Darius Judge MD Psychiatry Schedule an appointment as soon as possible for a visit  for follow up from ER visit [address] [facility]  [address] [facility]  [phone]      Nguyen Linn MD Pulmonary Disease Schedule an appointment as soon as possible for a visit  for follow up from ER visit [address] [facility] [address] [facility] [address] [phone]      [address] [place] [address] DEPT Emergency Medicine  As needed, If symptoms worsen [address] [facility] [id number] [phone]        3. Return to ED if worse   4. Discharge Medication List as of 1/30/2022  5:24 PM      START taking these medications    Details   naproxen (NAPROSYN) 500 mg tablet Take 1 Tablet by mouth two (2) times daily (with meals) for 10 days. , Normal, Disp-20 Tablet, R-0         CONTINUE these medications which have NOT CHANGED    Details   polyethylene glycol (Miralax) 17 gram/dose powder Take 17 g by mouth daily. 1 tablespoon with 8 oz of water daily, Normal, Disp-17 g, R-0      ondansetron hcl (Zofran) 4 mg tablet Take 1 Tablet by mouth every eight (8) hours as needed for PRN Reason (Other) (Dizziness or nausea). , Normal, Disp-20 Tablet, R-0      azithromycin (Zithromax Z-Terrence) 250 mg tablet Take 2 tablet p.o. day 1 then 1 tablet p.o. day 2 through 5, Normal, Disp-6 Tablet, R-0      albuterol (PROVENTIL HFA, VENTOLIN HFA, PROAIR HFA) 90 mcg/actuation inhaler Take 2 Puffs by inhalation every four (4) hours as needed for Wheezing., Normal, Disp-18 g, R-0      dextromethorphan-guaiFENesin (Robitussin Cough-Chest Jorge DM) 5-100 mg/5 mL liqd Take 5 mL by mouth every six (6) hours. , Normal, Disp-200 mL, R-0      dextroamphetamine-amphetamine (ADDERALL) 20 mg tablet Take 10 mg by mouth four (4) times daily. , Historical Med      !! QUEtiapine (SEROquel) 300 mg tablet Take 300 mg by mouth nightly.  , Historical Med      !! QUEtiapine (SEROquel) 50 mg tablet Take 50 mg by mouth daily. , Historical Med      hydrOXYzine HCL (ATARAX) 50 mg tablet Take 50 mg by mouth every six (6) hours as needed (max 4 tablets a day). , Historical Med      naloxone (Narcan) 4 mg/actuation nasal spray Use 1 spray intranasally, then discard. Repeat with new spray every 2 min as needed for opioid overdose symptoms, alternating nostrils. , Normal, Disp-2 Each, R-0      clonazePAM (KLONOPIN) 1 mg tablet Take  by mouth three (3) times daily. , Historical Med      gabapentin (NEURONTIN) 600 mg tablet Take  by mouth four (4) times daily. , Historical Med       !! - Potential duplicate medications found. Please discuss with provider. STOP taking these medications       diclofenac EC (VOLTAREN) 75 mg EC tablet Comments:   Reason for Stopping:         diclofenac potassium (CATAFLAM) 50 mg tablet Comments:   Reason for Stopping:               Diagnosis     Clinical Impression:   1. Pleuritic chest pain    2. Atelectasis    3. Panic attack        Attestations:    Chanda Murphy PA-C    Please note that this dictation was completed with [email or web address], the computer voice recognition software. Quite often unanticipated grammatical, syntax, homophones, and other interpretive errors are inadvertently transcribed by the computer software. Please disregard these errors. Please excuse any errors that have escaped final proofreading. Thank you.

## 2022-02-17 ENCOUNTER — HOSPITAL ENCOUNTER (EMERGENCY)
Age: 57
Discharge: HOME | End: 2022-02-17
Attending: EMERGENCY MEDICINE
Payer: MEDICARE

## 2022-02-17 VITALS
SYSTOLIC BLOOD PRESSURE: 129 MMHG | DIASTOLIC BLOOD PRESSURE: 75 MMHG | HEART RATE: 118 BPM | BODY MASS INDEX: 27.28 KG/M2 | WEIGHT: 180 LBS | OXYGEN SATURATION: 98 % | TEMPERATURE: 98.2 F | RESPIRATION RATE: 18 BRPM | HEIGHT: 68 IN

## 2022-02-17 DIAGNOSIS — M54.6 ACUTE THORACIC BACK PAIN, UNSPECIFIED BACK PAIN LATERALITY: Primary | ICD-10-CM

## 2022-02-17 PROCEDURE — 99283 EMERGENCY DEPT VISIT LOW MDM: CPT

## 2022-02-17 PROCEDURE — 74011250637 HC RX REV CODE- 250/637: Performed by: EMERGENCY MEDICINE

## 2022-02-17 RX ORDER — HYDROCODONE BITARTRATE AND ACETAMINOPHEN 5; 325 MG/1; MG/1
1 TABLET ORAL
Status: COMPLETED | OUTPATIENT
Start: 2022-02-17 | End: 2022-02-17

## 2022-02-17 RX ORDER — ONDANSETRON 4 MG/1
4 TABLET, ORALLY DISINTEGRATING ORAL
Status: COMPLETED | OUTPATIENT
Start: 2022-02-17 | End: 2022-02-17

## 2022-02-17 RX ORDER — HYDROCODONE BITARTRATE AND ACETAMINOPHEN 5; 325 MG/1; MG/1
1 TABLET ORAL
Qty: 16 TABLET | Refills: 0 | Status: SHIPPED | OUTPATIENT
Start: 2022-02-17 | End: 2022-02-20

## 2022-02-17 RX ADMIN — HYDROCODONE BITARTRATE AND ACETAMINOPHEN 1 TABLET: 5; 325 TABLET ORAL at 11:44

## 2022-02-17 RX ADMIN — ONDANSETRON 4 MG: 4 TABLET, ORALLY DISINTEGRATING ORAL at 11:47

## 2022-02-17 NOTE — ED PROVIDER NOTES
EMERGENCY DEPARTMENT HISTORY AND PHYSICAL EXAM      Date: 2/17/2022  Patient Name: Humberto Wong    History of Presenting Illness     Chief Complaint   Patient presents with    Back Pain       History Provided By: Patient    HPI: Humberto Wong, 64 y.o. female with a past medical history significant No significant past medical history presents to the ED with chief complaint of Back Pain  . D10year-old with ongoing back pain shoulder pain since November. Was post follow-up with orthopedics has not seen them yet has an appointment coming up next Tuesday as the pain into recently intensified again. No difficulty breathing. Movement does not worsen it but did have an x-ray showing a lot of spine pathology causing her pain. Has taken Naprosyn Motrin Tylenol with no relief. Was prescribed Norco before. There are no other complaints, changes, or physical findings at this time. PCP: None    Current Facility-Administered Medications   Medication Dose Route Frequency Provider Last Rate Last Admin    HYDROcodone-acetaminophen (NORCO) 5-325 mg per tablet 1 Tablet  1 Tablet Oral NOW Danielle Farooq MD         Current Outpatient Medications   Medication Sig Dispense Refill    HYDROcodone-acetaminophen (Norco) 5-325 mg per tablet Take 1 Tablet by mouth every six (6) hours as needed for Pain for up to 3 days. Max Daily Amount: 4 Tablets. 16 Tablet 0    polyethylene glycol (Miralax) 17 gram/dose powder Take 17 g by mouth daily. 1 tablespoon with 8 oz of water daily 17 g 0    ondansetron hcl (Zofran) 4 mg tablet Take 1 Tablet by mouth every eight (8) hours as needed for PRN Reason (Other) (Dizziness or nausea). 20 Tablet 0    azithromycin (Zithromax Z-Terrence) 250 mg tablet Take 2 tablet p.o. day 1 then 1 tablet p.o. day 2 through 5 6 Tablet 0    albuterol (PROVENTIL HFA, VENTOLIN HFA, PROAIR HFA) 90 mcg/actuation inhaler Take 2 Puffs by inhalation every four (4) hours as needed for Wheezing.  18 g 0  dextromethorphan-guaiFENesin (Robitussin Cough-Chest Jorge DM) 5-100 mg/5 mL liqd Take 5 mL by mouth every six (6) hours. 200 mL 0    dextroamphetamine-amphetamine (ADDERALL) 20 mg tablet Take 10 mg by mouth four (4) times daily.  QUEtiapine (SEROquel) 300 mg tablet Take 300 mg by mouth nightly.  QUEtiapine (SEROquel) 50 mg tablet Take 50 mg by mouth daily.  hydrOXYzine HCL (ATARAX) 50 mg tablet Take 50 mg by mouth every six (6) hours as needed (max 4 tablets a day).  naloxone (Narcan) 4 mg/actuation nasal spray Use 1 spray intranasally, then discard. Repeat with new spray every 2 min as needed for opioid overdose symptoms, alternating nostrils. 2 Each 0    clonazePAM (KLONOPIN) 1 mg tablet Take  by mouth three (3) times daily.  gabapentin (NEURONTIN) 600 mg tablet Take  by mouth four (4) times daily. Past History     Past Medical History:  Past Medical History:   Diagnosis Date    DDD (degenerative disc disease), lumbar     Fatigue     GERD (gastroesophageal reflux disease)     Hypothyroidism     Psychiatric disorder     PTSD per patient    Seizures ([facility] [address]        Past Surgical History:  Past Surgical History:   Procedure Laterality Date    HX APPENDECTOMY      HX  SECTION      HX CHOLECYSTECTOMY      HX HYSTERECTOMY         Family History:  Family History   Family history unknown: Yes       Social History:  Social History     Tobacco Use    Smoking status: Current Every Day Smoker     Packs/day: 0.25    Smokeless tobacco: Never Used   Vaping Use    Vaping Use: Never used   Substance Use Topics    Alcohol use: Not Currently     Alcohol/week: 0.0 standard drinks    Drug use: Never       Allergies: Allergies   Allergen Reactions    Augmentin [Amoxicillin-Pot Clavulanate] Nausea and Vomiting         Review of Systems   Review of Systems   Constitutional: Negative. Negative for chills, fatigue and fever. HENT: Negative.   Negative for congestion, nosebleeds and sore throat. Eyes: Negative. Negative for pain, discharge and visual disturbance. Respiratory: Negative. Negative for cough, chest tightness and shortness of breath. Cardiovascular: Negative for chest pain, palpitations and leg swelling. Gastrointestinal: Negative for abdominal pain, blood in stool, constipation, diarrhea, nausea and vomiting. Endocrine: Negative. Genitourinary: Negative. Negative for difficulty urinating, dysuria, pelvic pain and vaginal bleeding. Musculoskeletal: Negative. Negative for arthralgias, back pain and myalgias. Skin: Negative. Negative for rash and wound. Allergic/Immunologic: Negative. Neurological: Negative. Negative for dizziness, syncope, weakness, numbness and headaches. Hematological: Negative. Psychiatric/Behavioral: Negative. Negative for agitation, confusion and suicidal ideas. All other systems reviewed and are negative. Physical Exam   Physical Exam  Vitals and nursing note reviewed. Exam conducted with a chaperone present. Constitutional:       Appearance: Normal appearance. She is normal weight. HENT:      Head: Normocephalic and atraumatic. Nose: Nose normal.      Mouth/Throat:      Mouth: Mucous membranes are moist.      Pharynx: Oropharynx is clear. Eyes:      Extraocular Movements: Extraocular movements intact. Conjunctiva/sclera: Conjunctivae normal.      Pupils: Pupils are equal, round, and reactive to light. Cardiovascular:      Rate and Rhythm: Normal rate and regular rhythm. Pulses: Normal pulses. Heart sounds: Normal heart sounds. Pulmonary:      Effort: Pulmonary effort is normal. No respiratory distress. Breath sounds: Normal breath sounds. Abdominal:      General: Abdomen is flat. Bowel sounds are normal. There is no distension. Palpations: Abdomen is soft. Tenderness: There is no abdominal tenderness. There is no guarding.    Musculoskeletal: General: No swelling, tenderness, deformity or signs of injury. Normal range of motion. Cervical back: Normal range of motion and neck supple. Right lower leg: No edema. Left lower leg: No edema. Comments: ttp R throacic   Skin:     General: Skin is warm and dry. Capillary Refill: Capillary refill takes less than 2 seconds. Findings: No lesion or rash. Neurological:      General: No focal deficit present. Mental Status: She is alert and oriented to person, place, and time. Mental status is at baseline. Cranial Nerves: No cranial nerve deficit. Psychiatric:         Mood and Affect: Mood normal.         Behavior: Behavior normal.         Thought Content: Thought content normal.         Judgment: Judgment normal.         Diagnostic Study Results     Labs -   No results found for this or any previous visit (from the past 12 hour(s)). Radiologic Studies -   No orders to display     CT Results  (Last 48 hours)    None        CXR Results  (Last 48 hours)    None          Medical Decision Making and ED Course   I am the first provider for this patient. I reviewed the vital signs, available nursing notes, past medical history, past surgical history, family history and social history. Vital Signs-Reviewed the patient's vital signs. Patient Vitals for the past 12 hrs:   Temp Pulse Resp BP SpO2   02/17/22 1124 98.2 °F (36.8 °C) (!) 118 18 129/75 98 %       EKG interpretation:         Records Reviewed: Previous Hospital chart. EMS run report      ED Course:   Initial assessment performed. The patients presenting problems have been discussed, and they are in agreement with the care plan formulated and outlined with them. I have encouraged them to ask questions as they arise throughout their visit.     Orders Placed This Encounter    HYDROcodone-acetaminophen (NORCO) 5-325 mg per tablet 1 Tablet    HYDROcodone-acetaminophen (Norco) 5-325 mg per tablet     Sig: Take 1 Tablet by mouth every six (6) hours as needed for Pain for up to 3 days. Max Daily Amount: 4 Tablets. Dispense:  16 Tablet     Refill:  0       11/2021  Lumbar spine, 5 views. Comparison with previous study from 10/18/2021. Colonic  stool. Cholecystectomy clips. No evidence of spondylolysis or spondylolisthesis. Mild disc space narrowing at L4-L5. Tiny anterior osteophytes at L1-L2, L2-L3,  and L3-L4. Aortoiliac arterial calcification. Mild lower thoracic degenerative  disc disease. No evidence of fracture or focal lytic or blastic bone lesion.     IMPRESSION  Mild degenerative changes. No evidence of fracture or focal  destructive lesion. Colonic stool. Atherosclerosis. Provider Notes (Medical Decision Making):   59-year-old female needing pain control as she awaits orthopedics evaluation for a known thoracic DDD causing her pain in her back and shoulder region. Benign exam.  No evidence of respiratory distress. She is tachycardic secondary to pain will reassess heart rate after pain control here in the ER and discharge. HR improved after medication. Consults               Discharged    Procedures                       Disposition       Emergency Department Disposition:  Discharged      Diagnosis     Clinical Impression:   1. Acute thoracic back pain, unspecified back pain laterality        Attestations:    Danielle Katz MD    Please note that this dictation was completed with [employer], the computer voice recognition software. Quite often unanticipated grammatical, syntax, homophones, and other interpretive errors are inadvertently transcribed by the computer software. Please disregard these errors. Please excuse any errors that have escaped final proofreading. Thank you.

## 2022-02-17 NOTE — ED TRIAGE NOTES
Right shoulder pain radiating to her back hx of the same, seen here in November follow up with ortho is not until next week, here for pain control

## 2022-02-17 NOTE — Clinical Note
[facility] EMERGENCY DEPT  [place] [id number]  [phone]    Work/School Note    Date: 2/17/2022    To Whom It May concern:    Cecilia Jordan was seen and treated today in the emergency room by the following provider(s):  Attending Provider: Arthur Lau MD.      Cecilia Jordan is excused from work/school on 02/17/22 and 02/18/22. She is medically clear to return to work/school on 2/19/2022.        Sincerely,          Geraldine Riley MD

## 2022-02-25 ENCOUNTER — APPOINTMENT (OUTPATIENT)
Dept: CT IMAGING | Age: 57
End: 2022-02-25
Attending: EMERGENCY MEDICINE
Payer: MEDICARE

## 2022-02-25 ENCOUNTER — HOSPITAL ENCOUNTER (EMERGENCY)
Age: 57
Discharge: HOME | End: 2022-02-25
Attending: EMERGENCY MEDICINE
Payer: MEDICARE

## 2022-02-25 VITALS
RESPIRATION RATE: 16 BRPM | TEMPERATURE: 98.8 F | HEART RATE: 78 BPM | HEIGHT: 68 IN | BODY MASS INDEX: 28.79 KG/M2 | WEIGHT: 190 LBS | SYSTOLIC BLOOD PRESSURE: 134 MMHG | DIASTOLIC BLOOD PRESSURE: 87 MMHG | OXYGEN SATURATION: 96 %

## 2022-02-25 DIAGNOSIS — R19.7 DIARRHEA OF PRESUMED INFECTIOUS ORIGIN: Primary | ICD-10-CM

## 2022-02-25 LAB
ABO + RH BLD: NORMAL
ANION GAP SERPL CALC-SCNC: 3 MMOL/L (ref 5–15)
ATRIAL RATE: 109 BPM
BASOPHILS # BLD: 0.1 K/UL (ref 0–0.1)
BASOPHILS NFR BLD: 1 % (ref 0–1)
BLOOD GROUP ANTIBODIES SERPL: NEGATIVE
BUN SERPL-MCNC: 25 MG/DL (ref 6–20)
BUN/CREAT SERPL: 24 (ref 12–20)
CA-I BLD-MCNC: 8.8 MG/DL (ref 8.5–10.1)
CALCULATED P AXIS, ECG09: 51 DEGREES
CALCULATED R AXIS, ECG10: 83 DEGREES
CALCULATED T AXIS, ECG11: 48 DEGREES
CHLORIDE SERPL-SCNC: 106 MMOL/L (ref 97–108)
CO2 SERPL-SCNC: 27 MMOL/L (ref 21–32)
CREAT SERPL-MCNC: 1.03 MG/DL (ref 0.55–1.02)
DIAGNOSIS, 93000: NORMAL
DIFFERENTIAL METHOD BLD: ABNORMAL
EOSINOPHIL # BLD: 0.1 K/UL (ref 0–0.4)
EOSINOPHIL NFR BLD: 1 % (ref 0–7)
ERYTHROCYTE [DISTWIDTH] IN BLOOD BY AUTOMATED COUNT: 14.7 % (ref 11.5–14.5)
GLUCOSE SERPL-MCNC: 102 MG/DL (ref 65–100)
HCT VFR BLD AUTO: 48.3 % (ref 35–47)
HGB BLD-MCNC: 15.9 G/DL (ref 11.5–16)
IMM GRANULOCYTES # BLD AUTO: 0.1 K/UL (ref 0–0.04)
IMM GRANULOCYTES NFR BLD AUTO: 0 % (ref 0–0.5)
LYMPHOCYTES # BLD: 5.3 K/UL (ref 0.8–3.5)
LYMPHOCYTES NFR BLD: 45 % (ref 12–49)
MCH RBC QN AUTO: 28.5 PG (ref 26–34)
MCHC RBC AUTO-ENTMCNC: 32.9 G/DL (ref 30–36.5)
MCV RBC AUTO: 86.6 FL (ref 80–99)
MONOCYTES # BLD: 0.5 K/UL (ref 0–1)
MONOCYTES NFR BLD: 5 % (ref 5–13)
NEUTS SEG # BLD: 5.7 K/UL (ref 1.8–8)
NEUTS SEG NFR BLD: 48 % (ref 32–75)
NRBC # BLD: 0 K/UL (ref 0–0.01)
NRBC BLD-RTO: 0 PER 100 WBC
P-R INTERVAL, ECG05: 144 MS
PLATELET # BLD AUTO: 358 K/UL (ref 150–400)
PMV BLD AUTO: 9.2 FL (ref 8.9–12.9)
POTASSIUM SERPL-SCNC: 3.5 MMOL/L (ref 3.5–5.1)
Q-T INTERVAL, ECG07: 322 MS
QRS DURATION, ECG06: 68 MS
QTC CALCULATION (BEZET), ECG08: 433 MS
RBC # BLD AUTO: 5.58 M/UL (ref 3.8–5.2)
SODIUM SERPL-SCNC: 136 MMOL/L (ref 136–145)
SPECIMEN EXP DATE BLD: NORMAL
VENTRICULAR RATE, ECG03: 109 BPM
WBC # BLD AUTO: 11.8 K/UL (ref 3.6–11)

## 2022-02-25 PROCEDURE — 36415 COLL VENOUS BLD VENIPUNCTURE: CPT

## 2022-02-25 PROCEDURE — 93005 ELECTROCARDIOGRAM TRACING: CPT

## 2022-02-25 PROCEDURE — 74011250636 HC RX REV CODE- 250/636: Performed by: EMERGENCY MEDICINE

## 2022-02-25 PROCEDURE — 74177 CT ABD & PELVIS W/CONTRAST: CPT

## 2022-02-25 PROCEDURE — 96375 TX/PRO/DX INJ NEW DRUG ADDON: CPT

## 2022-02-25 PROCEDURE — 99285 EMERGENCY DEPT VISIT HI MDM: CPT

## 2022-02-25 PROCEDURE — 85025 COMPLETE CBC W/AUTO DIFF WBC: CPT

## 2022-02-25 PROCEDURE — 74011000636 HC RX REV CODE- 636: Performed by: EMERGENCY MEDICINE

## 2022-02-25 PROCEDURE — 96374 THER/PROPH/DIAG INJ IV PUSH: CPT

## 2022-02-25 PROCEDURE — 86900 BLOOD TYPING SEROLOGIC ABO: CPT

## 2022-02-25 PROCEDURE — 80048 BASIC METABOLIC PNL TOTAL CA: CPT

## 2022-02-25 PROCEDURE — 96361 HYDRATE IV INFUSION ADD-ON: CPT

## 2022-02-25 RX ORDER — TRAZODONE HYDROCHLORIDE 100 MG/1
100-200 TABLET ORAL AT BEDTIME
COMMUNITY
Start: 2022-02-01

## 2022-02-25 RX ORDER — METHYLPREDNISOLONE 4 MG/1
TABLET ORAL
COMMUNITY
Start: 2022-02-21 | End: 2022-03-28

## 2022-02-25 RX ORDER — DIAZEPAM 10 MG/1
10 TABLET ORAL 2 TIMES DAILY
COMMUNITY
Start: 2021-12-28 | End: 2022-05-27

## 2022-02-25 RX ORDER — MORPHINE SULFATE 4 MG/ML
4 INJECTION INTRAVENOUS ONCE
Status: COMPLETED | OUTPATIENT
Start: 2022-02-25 | End: 2022-02-25

## 2022-02-25 RX ORDER — ONDANSETRON 2 MG/ML
4 INJECTION INTRAMUSCULAR; INTRAVENOUS
Status: COMPLETED | OUTPATIENT
Start: 2022-02-25 | End: 2022-02-25

## 2022-02-25 RX ORDER — ATORVASTATIN CALCIUM 10 MG/1
10 TABLET, FILM COATED ORAL
COMMUNITY
Start: 2022-01-11 | End: 2022-08-24

## 2022-02-25 RX ORDER — OMEPRAZOLE 40 MG/1
40 CAPSULE, DELAYED RELEASE ORAL AT BEDTIME
Status: ON HOLD | COMMUNITY
Start: 2021-10-12 | End: 2022-03-28 | Stop reason: SDUPTHER

## 2022-02-25 RX ADMIN — SODIUM CHLORIDE 1000 ML: 9 INJECTION, SOLUTION INTRAVENOUS at 12:46

## 2022-02-25 RX ADMIN — ONDANSETRON 4 MG: 2 INJECTION INTRAMUSCULAR; INTRAVENOUS at 14:46

## 2022-02-25 RX ADMIN — MORPHINE SULFATE 4 MG: 4 INJECTION INTRAVENOUS at 12:41

## 2022-02-25 RX ADMIN — IOPAMIDOL 100 ML: 755 INJECTION, SOLUTION INTRAVENOUS at 13:47

## 2022-02-25 NOTE — ED PROVIDER NOTES
EMERGENCY DEPARTMENT HISTORY AND PHYSICAL EXAM      Date: 2/25/2022  Patient Name: Cecilia Thurman      History of Presenting Illness     Chief Complaint   Patient presents with    Abdominal Pain    Diarrhea    Rectal Bleeding       History Provided By: Patient    HPI: Cecilia Thurman, 64 y.o. female with a past medical history significant for PTSD, Seizures, Mesenteric ischemia presents to the ED with cc of abd pain, diarrhea, rectal bleeding. Onset 4d ago w/LUQ pain, nonradiating assoc. W/watery and loose diarrhea. Constant past few days, last night with some blood in stool, no melena. Denies CP, SOB. Endorses chills but denies fevers. No coughing or sick contacts. Denies irregular heartbeat/palpitations or AC use. Was on Amoxicillin 2wks ago for dental infx and has had C. Diff colitis before. There are no other complaints, changes, or physical findings at this time. PCP: None    Current Facility-Administered Medications   Medication Dose Route Frequency Provider Last Rate Last Admin    sodium chloride 0.9 % bolus infusion 1,000 mL  1,000 mL IntraVENous ONCE Akosua Simon MD        morphine injection 4 mg  4 mg IntraVENous ONCE Akosua Simon MD         Current Outpatient Medications   Medication Sig Dispense Refill    traZODone (DESYREL) 100 mg tablet Take 100-200 mg by mouth At bedtime.  omeprazole (PRILOSEC) 40 mg capsule Take 40 mg by mouth At bedtime.  methylPREDNISolone (MEDROL DOSEPACK) 4 mg tablet Take as directed per package insert      diazePAM (VALIUM) 10 mg tablet Take 10 mg by mouth two (2) times a day.  atorvastatin (LIPITOR) 10 mg tablet Take 10 mg by mouth nightly.  polyethylene glycol (Miralax) 17 gram/dose powder Take 17 g by mouth daily. 1 tablespoon with 8 oz of water daily 17 g 0    ondansetron hcl (Zofran) 4 mg tablet Take 1 Tablet by mouth every eight (8) hours as needed for PRN Reason (Other) (Dizziness or nausea).  20 Tablet 0    albuterol (PROVENTIL HFA, VENTOLIN HFA, PROAIR HFA) 90 mcg/actuation inhaler Take 2 Puffs by inhalation every four (4) hours as needed for Wheezing. 18 g 0    dextroamphetamine-amphetamine (ADDERALL) 20 mg tablet Take 10 mg by mouth four (4) times daily. (Patient not taking: Reported on 2022)      hydrOXYzine HCL (ATARAX) 50 mg tablet Take 50 mg by mouth every six (6) hours as needed (max 4 tablets a day).  naloxone (Narcan) 4 mg/actuation nasal spray Use 1 spray intranasally, then discard. Repeat with new spray every 2 min as needed for opioid overdose symptoms, alternating nostrils. (Patient not taking: Reported on 2022) 2 Each 0    clonazePAM (KLONOPIN) 1 mg tablet Take  by mouth three (3) times daily. (Patient not taking: Reported on 2022)      gabapentin (NEURONTIN) 600 mg tablet Take  by mouth four (4) times daily. Past History     Past Medical History:  Past Medical History:   Diagnosis Date    DDD (degenerative disc disease), lumbar     Fatigue     GERD (gastroesophageal reflux disease)     Hypothyroidism     Psychiatric disorder     PTSD per patient    Seizures ([facility] [address]        Past Surgical History:  Past Surgical History:   Procedure Laterality Date    HX APPENDECTOMY      HX  SECTION      HX CHOLECYSTECTOMY      HX HYSTERECTOMY         Family History:  Family History   Family history unknown: Yes       Social History:  Social History     Tobacco Use    Smoking status: Current Every Day Smoker     Packs/day: 0.25    Smokeless tobacco: Never Used   Vaping Use    Vaping Use: Never used   Substance Use Topics    Alcohol use: Not Currently     Alcohol/week: 0.0 standard drinks    Drug use: Never       Allergies: Allergies   Allergen Reactions    Augmentin [Amoxicillin-Pot Clavulanate] Nausea and Vomiting         Review of Systems   Constitutional: Negative except as in HPI. Eyes: Negative except as in HPI.  ENT: Negative except as in HPI.   Cardiovascular: Negative except as in HPI. Respiratory: Negative except as in HPI. Gastrointestinal: Negative except as in HPI. Genitourinary: Negative except as in HPI. Musculoskeletal: Negative except as in HPI. Integumentary: Negative except as in HPI. Neurological: Negative except as in HPI. Psychiatric: Negative except as in HPI. Endocrine: Negative except as in HPI. Hematologic/Lymphatic: Negative except as in HPI. Allergic/Immunologic: Negative except as in HPI. Physical Exam   Constitutional: Awake and alert, interactive, NAD  Eyes: PERRL, no injection or scleral icterus, no discharge  HEENT: NCAT, neck supple, MMM, no oropharyngeal exudates  CV: RRR, no m/r/g  Respiratory: CTAB, no r/r/w  GI: Abd soft, nondistended, ttp LUQ/LLQ  : BARB no blood or melena, no stool appreciated. Hemorrhoid w/topical cream  MSK: no joint effusions or edema  Skin: No rashes  Neuro: CN2-12 intact, symmetric facies, fluent speech. Psych: Well-groomed, normal speech, behavior, appropriate mood    Lab and Diagnostic Study Results     Labs -   No results found for this or any previous visit (from the past 12 hour(s)). Radiologic Studies -   [unfilled]  CT Results  (Last 48 hours)    None        CXR Results  (Last 48 hours)    None          Medical Decision Making and ED Course   - I am the first and primary provider for this patient AND AM THE PRIMARY PROVIDER OF RECORD. - I reviewed the vital signs, available nursing notes, past medical history, past surgical history, family history and social history. - Initial assessment performed. The patients presenting problems have been discussed, and the staff are in agreement with the care plan formulated and outlined with them. I have encouraged them to ask questions as they arise throughout their visit. Vital Signs-Reviewed the patient's vital signs.     Patient Vitals for the past 12 hrs:   Temp Pulse Resp BP SpO2   02/25/22 1205 98.8 °F (37.1 °C) (!) 102 18 125/86 96 %       EKG interpretation: [email or web address], nl QRS/QTc/axis, no GET/STD or nonanatomic TWI. Provider Notes (Medical Decision Making):   56F w/abd pain, diarrhea, rectal bleeding. Broad differential including C. Diff colitis, diverticulitis, mesenteric ischemia. No afib or significant blood per history and exam, less likely mesenteric ischemia, will get CTAP to eval, IVF, morphine to control sx. Dispo pending workup, likely admit. ED Course:       ED Course as of 02/25/22 1512   Fri Feb 25, 2022   1347 WBC(!): 11.8 [YA]   1347 Creatinine(!): 1.03 [YA]   1502 CT ABD PELV W CONT    IMPRESSION  No bowel obstruction. Colon entirely decompressed. No findings to suggest inflammatory/infectious or  ischemic process involving colon. No ascites.     Atherosclerotic change normal caliber abdominal aorta. Proximal branch vessels  from the abdominal aorta appear patent. [YA]   9285 Will discharge with return precautions. [YA]   1511 HGB: 15.9 [YA]      ED Course User Index  [YA] Heather Samuel MD         Disposition     Disposition: DC- Adult Discharges: All of the diagnostic tests were reviewed and questions answered. Diagnosis, care plan and treatment options were discussed. The patient understands the instructions and will follow up as directed. The patients results have been reviewed with them. They have been counseled regarding their diagnosis. The patient verbally convey understanding and agreement of the signs, symptoms, diagnosis, treatment and prognosis and additionally agrees to follow up as recommended with their PCP in 24 - 48 hours. They also agree with the care-plan and convey that all of their questions have been answered.   I have also put together some discharge instructions for them that include: 1) educational information regarding their diagnosis, 2) how to care for their diagnosis at home, as well a 3) list of reasons why they would want to return to the ED prior to their follow-up appointment, should their condition change. Discharged      Diagnosis     Clinical Impression:   1. Diarrhea of presumed infectious origin        Attestations:     Deanne Calderón MD

## 2022-02-25 NOTE — DISCHARGE INSTRUCTIONS
You were seen in the ER for your abdominal pain and diarrhea with small amounts of bleeding. Thankfully, your bloodwork and CT scan did not show any dangerous blockages or infections and your bleeding is likely due to hemorrhoids. It is less likely to be C. Difficile as you have not had any further diarrhea here in the ER. You should follow up with your primary care doctor in the next week to make sure your symptoms are getting better. We are giving you the name of one as well to ensure you have follow-up. Return to the ER for any new or worsening pain, uncontrollable vomiting or diarrhea, new or worsened bleeding or black stools, or any other new or concerning symptoms. Thank you! Thank you for allowing me to care for you in the emergency department. I sincerely hope that you are satisfied with your visit today. It is my goal to provide you with excellent care. Below you will find a list of your labs and imaging from your visit today. Should you have any questions regarding these results please do not hesitate to call the emergency department. Labs -     Recent Results (from the past 12 hour(s))   CBC WITH AUTOMATED DIFF    Collection Time: 02/25/22 12:15 PM   Result Value Ref Range    WBC 11.8 (H) 3.6 - 11.0 K/uL    RBC 5.58 (H) 3.80 - 5.20 M/uL    HGB 15.9 11.5 - 16.0 g/dL    HCT 48.3 (H) 35.0 - 47.0 %    MCV 86.6 80.0 - 99.0 FL    MCH 28.5 26.0 - 34.0 PG    MCHC 32.9 30.0 - 36.5 g/dL    RDW 14.7 (H) 11.5 - 14.5 %    PLATELET 051 630 - 981 K/uL    MPV 9.2 8.9 - 12.9 FL    NRBC 0.0 0.0  WBC    ABSOLUTE NRBC 0.00 0.00 - 0.01 K/uL    NEUTROPHILS 48 32 - 75 %    LYMPHOCYTES 45 12 - 49 %    MONOCYTES 5 5 - 13 %    EOSINOPHILS 1 0 - 7 %    BASOPHILS 1 0 - 1 %    IMMATURE GRANULOCYTES 0 0 - 0.5 %    ABS. NEUTROPHILS 5.7 1.8 - 8.0 K/UL    ABS. LYMPHOCYTES 5.3 (H) 0.8 - 3.5 K/UL    ABS. MONOCYTES 0.5 0.0 - 1.0 K/UL    ABS. EOSINOPHILS 0.1 0.0 - 0.4 K/UL    ABS. BASOPHILS 0.1 0.0 - 0.1 K/UL    ABS. IMM. Janell Neither. 0.1 (H) 0.00 - 0.04 K/UL    DF AUTOMATED     METABOLIC PANEL, BASIC    Collection Time: 02/25/22 12:15 PM   Result Value Ref Range    Sodium 136 136 - 145 mmol/L    Potassium 3.5 3.5 - 5.1 mmol/L    Chloride 106 97 - 108 mmol/L    CO2 27 21 - 32 mmol/L    Anion gap 3 (L) 5 - 15 mmol/L    Glucose 102 (H) 65 - 100 mg/dL    BUN 25 (H) 6 - 20 mg/dL    Creatinine 1.03 (H) 0.55 - 1.02 mg/dL    BUN/Creatinine ratio 24 (H) 12 - 20      GFR est AA >60 >60 ml/min/1.73m2    GFR est non-AA 55 (L) >60 ml/min/1.73m2    Calcium 8.8 8.5 - 10.1 mg/dL   TYPE & SCREEN    Collection Time: 02/25/22 12:15 PM   Result Value Ref Range    Crossmatch Expiration 02/28/2022,2359     ABO/Rh(D) Larena Due Positive     Antibody screen Negative        Radiologic Studies -   CT ABD PELV W CONT   Final Result   No bowel obstruction. Colon entirely decompressed. No findings to suggest inflammatory/infectious or   ischemic process involving colon. No ascites. Atherosclerotic change normal caliber abdominal aorta. Proximal branch vessels   from the abdominal aorta appear patent. CT Results  (Last 48 hours)                 02/25/22 1402  CT ABD PELV W CONT Final result    Impression:  No bowel obstruction. Colon entirely decompressed. No findings to suggest inflammatory/infectious or   ischemic process involving colon. No ascites. Atherosclerotic change normal caliber abdominal aorta. Proximal branch vessels   from the abdominal aorta appear patent. Narrative:  CT abdomen and pelvis with IV contrast       Comparison CT abdomen and pelvis January 25, 2022. Axial images are reviewed along with reformatted sagittal/coronal images. 100 mL   Isovue 370 administered.     Dose reduction: All CT scans at this facility are performed using dose reduction   optimization techniques as appropriate to a performed exam including the   following-   automated exposure control, adjustments of mA and/or Kv according to patient size, or use of iterative reconstructive technique. .       Bibasilar subsegmental atelectasis similar compared to prior imaging. Liver normally enhances. Prior cholecystectomy. Unchanged CBD prominence. Pancreas, spleen, and bilateral adrenal glands appear unremarkable. Normal   enhancement bilateral kidneys. No hydronephrosis. Stomach and small bowel loops are decompressed. Stool/fluid and air through nondistended colon. No segmental wall edema as might   be seen with inflammatory or ischemic process involving the colon. Prior hysterectomy. Atherosclerotic change normal caliber abdominal aorta. Proximal branch vessels   from the abdominal aorta are patent. CXR Results  (Last 48 hours)      None               If you feel that you have not received excellent quality care or timely care, please ask to speak to the nurse manager. Please choose us in the future for your continued health care needs. ------------------------------------------------------------------------------------------------------------  The exam and treatment you received in the Emergency Department were for an urgent problem and are not intended as complete care. It is important that you follow-up with a doctor, nurse practitioner, or physician assistant to:  (1) confirm your diagnosis,  (2) re-evaluation of changes in your illness and treatment, and  (3) for ongoing care. If your symptoms become worse or you do not improve as expected and you are unable to reach your usual health care provider, you should return to the Emergency Department. We are available 24 hours a day. Please take your discharge instructions with you when you go to your follow-up appointment. If you have any problem arranging a follow-up appointment, contact the Emergency Department immediately. If a prescription has been provided, please have it filled as soon as possible to prevent a delay in treatment.  Read the entire medication instruction sheet provided to you by the pharmacy. If you have any questions or reservations about taking the medication due to side effects or interactions with other medications, please call your primary care physician or contact the ER to speak with the charge nurse. Make an appointment with your family doctor or the physician you were referred to for follow-up of this visit as instructed on your discharge paperwork, as this is a mandatory follow-up. Return to the ER if you are unable to be seen or if you are unable to be seen in a timely manner. If you have any problem arranging the follow-up visit, contact the Emergency Department immediately.

## 2022-02-25 NOTE — ED TRIAGE NOTES
Diarrhea/abd pain starting Monday, progressed to dark red blood stool yesterday. Denies blood thinners.

## 2022-03-19 PROBLEM — E87.6 HYPOKALEMIA: Status: ACTIVE | Noted: 2021-08-09

## 2022-03-27 ENCOUNTER — APPOINTMENT (OUTPATIENT)
Dept: CT IMAGING | Age: 57
End: 2022-03-27
Attending: EMERGENCY MEDICINE
Payer: MEDICARE

## 2022-03-27 ENCOUNTER — HOSPITAL ENCOUNTER (OUTPATIENT)
Age: 57
Setting detail: OBSERVATION
Discharge: HOME | End: 2022-03-28
Attending: EMERGENCY MEDICINE | Admitting: INTERNAL MEDICINE
Payer: MEDICARE

## 2022-03-27 DIAGNOSIS — R11.2 NON-INTRACTABLE VOMITING WITH NAUSEA, UNSPECIFIED VOMITING TYPE: ICD-10-CM

## 2022-03-27 DIAGNOSIS — R10.84 ABDOMINAL PAIN, GENERALIZED: Primary | ICD-10-CM

## 2022-03-27 DIAGNOSIS — R19.7 DIARRHEA OF PRESUMED INFECTIOUS ORIGIN: ICD-10-CM

## 2022-03-27 PROBLEM — N17.9 AKI (ACUTE KIDNEY INJURY) (HCC): Status: ACTIVE | Noted: 2022-03-27

## 2022-03-27 LAB
ALBUMIN SERPL-MCNC: 3.9 G/DL (ref 3.5–5)
ALBUMIN/GLOB SERPL: 1.1 {RATIO} (ref 1.1–2.2)
ALP SERPL-CCNC: 86 U/L (ref 45–117)
ALT SERPL-CCNC: 22 U/L (ref 12–78)
ANION GAP SERPL CALC-SCNC: 3 MMOL/L (ref 5–15)
APPEARANCE UR: ABNORMAL
AST SERPL W P-5'-P-CCNC: 21 U/L (ref 15–37)
BACTERIA URNS QL MICRO: NEGATIVE /HPF
BACTERIA URNS QL MICRO: NEGATIVE /HPF
BASOPHILS # BLD: 0.1 K/UL (ref 0–0.1)
BASOPHILS NFR BLD: 1 % (ref 0–1)
BILIRUB SERPL-MCNC: 0.4 MG/DL (ref 0.2–1)
BILIRUB UR QL: NEGATIVE
BUN SERPL-MCNC: 22 MG/DL (ref 6–20)
BUN/CREAT SERPL: 15 (ref 12–20)
CA-I BLD-MCNC: 9 MG/DL (ref 8.5–10.1)
CHLORIDE SERPL-SCNC: 106 MMOL/L (ref 97–108)
CO2 SERPL-SCNC: 28 MMOL/L (ref 21–32)
COLOR UR: ABNORMAL
COVID-19 RAPID TEST, COVR: NOT DETECTED
CREAT SERPL-MCNC: 1.48 MG/DL (ref 0.55–1.02)
DIFFERENTIAL METHOD BLD: ABNORMAL
EOSINOPHIL # BLD: 0.1 K/UL (ref 0–0.4)
EOSINOPHIL NFR BLD: 1 % (ref 0–7)
ERYTHROCYTE [DISTWIDTH] IN BLOOD BY AUTOMATED COUNT: 14.3 % (ref 11.5–14.5)
GLOBULIN SER CALC-MCNC: 3.4 G/DL (ref 2–4)
GLUCOSE SERPL-MCNC: 100 MG/DL (ref 65–100)
GLUCOSE UR STRIP.AUTO-MCNC: NEGATIVE MG/DL
HCT VFR BLD AUTO: 45.8 % (ref 35–47)
HGB BLD-MCNC: 15 G/DL (ref 11.5–16)
HGB UR QL STRIP: NEGATIVE
IMM GRANULOCYTES # BLD AUTO: 0 K/UL (ref 0–0.04)
IMM GRANULOCYTES NFR BLD AUTO: 0 % (ref 0–0.5)
KETONES UR QL STRIP.AUTO: NEGATIVE MG/DL
LEUKOCYTE ESTERASE UR QL STRIP.AUTO: NEGATIVE
LIPASE SERPL-CCNC: 155 U/L (ref 73–393)
LYMPHOCYTES # BLD: 4.8 K/UL (ref 0.8–3.5)
LYMPHOCYTES NFR BLD: 50 % (ref 12–49)
MCH RBC QN AUTO: 28.3 PG (ref 26–34)
MCHC RBC AUTO-ENTMCNC: 32.8 G/DL (ref 30–36.5)
MCV RBC AUTO: 86.4 FL (ref 80–99)
MONOCYTES # BLD: 0.5 K/UL (ref 0–1)
MONOCYTES NFR BLD: 5 % (ref 5–13)
MUCOUS THREADS URNS QL MICRO: ABNORMAL /LPF
MUCOUS THREADS URNS QL MICRO: ABNORMAL /LPF
NEUTS SEG # BLD: 4.3 K/UL (ref 1.8–8)
NEUTS SEG NFR BLD: 43 % (ref 32–75)
NITRITE UR QL STRIP.AUTO: NEGATIVE
NRBC # BLD: 0 K/UL (ref 0–0.01)
NRBC BLD-RTO: 0 PER 100 WBC
PH UR STRIP: 5 [PH] (ref 5–8)
PLATELET # BLD AUTO: 377 K/UL (ref 150–400)
PMV BLD AUTO: 9.4 FL (ref 8.9–12.9)
POTASSIUM SERPL-SCNC: 3.7 MMOL/L (ref 3.5–5.1)
POTASSIUM SERPL-SCNC: 4.1 MMOL/L (ref 3.5–5.1)
PROT SERPL-MCNC: 7.3 G/DL (ref 6.4–8.2)
PROT UR STRIP-MCNC: 30 MG/DL
RBC # BLD AUTO: 5.3 M/UL (ref 3.8–5.2)
RBC #/AREA URNS HPF: ABNORMAL /HPF (ref 0–5)
RBC #/AREA URNS HPF: ABNORMAL /HPF (ref 0–5)
SODIUM SERPL-SCNC: 137 MMOL/L (ref 136–145)
SP GR UR REFRACTOMETRY: 1.03 (ref 1–1.03)
UROBILINOGEN UR QL STRIP.AUTO: 0.1 EU/DL (ref 0.1–1)
WBC # BLD AUTO: 9.8 K/UL (ref 3.6–11)
WBC URNS QL MICRO: ABNORMAL /HPF (ref 0–4)
WBC URNS QL MICRO: ABNORMAL /HPF (ref 0–4)

## 2022-03-27 PROCEDURE — 87635 SARS-COV-2 COVID-19 AMP PRB: CPT

## 2022-03-27 PROCEDURE — 74011250636 HC RX REV CODE- 250/636: Performed by: EMERGENCY MEDICINE

## 2022-03-27 PROCEDURE — 99285 EMERGENCY DEPT VISIT HI MDM: CPT

## 2022-03-27 PROCEDURE — 84132 ASSAY OF SERUM POTASSIUM: CPT

## 2022-03-27 PROCEDURE — 96376 TX/PRO/DX INJ SAME DRUG ADON: CPT

## 2022-03-27 PROCEDURE — 74011000250 HC RX REV CODE- 250: Performed by: INTERNAL MEDICINE

## 2022-03-27 PROCEDURE — 74011250636 HC RX REV CODE- 250/636: Performed by: INTERNAL MEDICINE

## 2022-03-27 PROCEDURE — 74011250637 HC RX REV CODE- 250/637: Performed by: INTERNAL MEDICINE

## 2022-03-27 PROCEDURE — 96361 HYDRATE IV INFUSION ADD-ON: CPT

## 2022-03-27 PROCEDURE — 83690 ASSAY OF LIPASE: CPT

## 2022-03-27 PROCEDURE — 96375 TX/PRO/DX INJ NEW DRUG ADDON: CPT

## 2022-03-27 PROCEDURE — G0378 HOSPITAL OBSERVATION PER HR: HCPCS

## 2022-03-27 PROCEDURE — 74011250637 HC RX REV CODE- 250/637: Performed by: EMERGENCY MEDICINE

## 2022-03-27 PROCEDURE — 85025 COMPLETE CBC W/AUTO DIFF WBC: CPT

## 2022-03-27 PROCEDURE — 74176 CT ABD & PELVIS W/O CONTRAST: CPT

## 2022-03-27 PROCEDURE — 96374 THER/PROPH/DIAG INJ IV PUSH: CPT

## 2022-03-27 PROCEDURE — 81001 URINALYSIS AUTO W/SCOPE: CPT

## 2022-03-27 PROCEDURE — 36415 COLL VENOUS BLD VENIPUNCTURE: CPT

## 2022-03-27 RX ORDER — HYDROMORPHONE HYDROCHLORIDE 1 MG/ML
1 INJECTION, SOLUTION INTRAMUSCULAR; INTRAVENOUS; SUBCUTANEOUS ONCE
Status: COMPLETED | OUTPATIENT
Start: 2022-03-27 | End: 2022-03-27

## 2022-03-27 RX ORDER — PANTOPRAZOLE SODIUM 40 MG/1
40 TABLET, DELAYED RELEASE ORAL
Status: DISCONTINUED | OUTPATIENT
Start: 2022-03-28 | End: 2022-03-28 | Stop reason: HOSPADM

## 2022-03-27 RX ORDER — ONDANSETRON 4 MG/1
4 TABLET, ORALLY DISINTEGRATING ORAL
Status: DISCONTINUED | OUTPATIENT
Start: 2022-03-27 | End: 2022-03-28 | Stop reason: HOSPADM

## 2022-03-27 RX ORDER — SODIUM CHLORIDE 0.9 % (FLUSH) 0.9 %
5-40 SYRINGE (ML) INJECTION AS NEEDED
Status: DISCONTINUED | OUTPATIENT
Start: 2022-03-27 | End: 2022-03-28 | Stop reason: HOSPADM

## 2022-03-27 RX ORDER — POLYETHYLENE GLYCOL 3350 17 G/17G
17 POWDER, FOR SOLUTION ORAL DAILY PRN
Status: DISCONTINUED | OUTPATIENT
Start: 2022-03-27 | End: 2022-03-27

## 2022-03-27 RX ORDER — TRAZODONE HYDROCHLORIDE 50 MG/1
100 TABLET ORAL
Status: DISCONTINUED | OUTPATIENT
Start: 2022-03-27 | End: 2022-03-28 | Stop reason: HOSPADM

## 2022-03-27 RX ORDER — DICYCLOMINE HYDROCHLORIDE 10 MG/1
10 CAPSULE ORAL
Status: COMPLETED | OUTPATIENT
Start: 2022-03-27 | End: 2022-03-27

## 2022-03-27 RX ORDER — CLONAZEPAM 0.5 MG/1
1 TABLET ORAL
Status: DISCONTINUED | OUTPATIENT
Start: 2022-03-27 | End: 2022-03-28 | Stop reason: HOSPADM

## 2022-03-27 RX ORDER — ACETAMINOPHEN 325 MG/1
650 TABLET ORAL
Status: DISCONTINUED | OUTPATIENT
Start: 2022-03-27 | End: 2022-03-28 | Stop reason: HOSPADM

## 2022-03-27 RX ORDER — SODIUM CHLORIDE 9 MG/ML
100 INJECTION, SOLUTION INTRAVENOUS CONTINUOUS
Status: DISPENSED | OUTPATIENT
Start: 2022-03-27 | End: 2022-03-28

## 2022-03-27 RX ORDER — GABAPENTIN 300 MG/1
600 CAPSULE ORAL 4 TIMES DAILY
Status: DISCONTINUED | OUTPATIENT
Start: 2022-03-27 | End: 2022-03-28 | Stop reason: HOSPADM

## 2022-03-27 RX ORDER — ONDANSETRON 2 MG/ML
4 INJECTION INTRAMUSCULAR; INTRAVENOUS
Status: DISCONTINUED | OUTPATIENT
Start: 2022-03-27 | End: 2022-03-28 | Stop reason: HOSPADM

## 2022-03-27 RX ORDER — BISMUTH SUBSALICYLATE 262 MG/1
2 TABLET, CHEWABLE ORAL
Status: DISCONTINUED | OUTPATIENT
Start: 2022-03-27 | End: 2022-03-28 | Stop reason: HOSPADM

## 2022-03-27 RX ORDER — HYDROXYZINE 25 MG/1
50 TABLET, FILM COATED ORAL
Status: DISCONTINUED | OUTPATIENT
Start: 2022-03-27 | End: 2022-03-28 | Stop reason: HOSPADM

## 2022-03-27 RX ORDER — ACETAMINOPHEN 650 MG/1
650 SUPPOSITORY RECTAL
Status: DISCONTINUED | OUTPATIENT
Start: 2022-03-27 | End: 2022-03-28 | Stop reason: HOSPADM

## 2022-03-27 RX ORDER — SODIUM CHLORIDE 0.9 % (FLUSH) 0.9 %
5-40 SYRINGE (ML) INJECTION EVERY 8 HOURS
Status: DISCONTINUED | OUTPATIENT
Start: 2022-03-27 | End: 2022-03-28 | Stop reason: HOSPADM

## 2022-03-27 RX ORDER — ONDANSETRON 2 MG/ML
4 INJECTION INTRAMUSCULAR; INTRAVENOUS
Status: COMPLETED | OUTPATIENT
Start: 2022-03-27 | End: 2022-03-27

## 2022-03-27 RX ORDER — QUETIAPINE FUMARATE 300 MG/1
300 TABLET, FILM COATED ORAL
Status: DISCONTINUED | OUTPATIENT
Start: 2022-03-27 | End: 2022-03-28 | Stop reason: HOSPADM

## 2022-03-27 RX ORDER — DIAZEPAM 5 MG/1
10 TABLET ORAL 2 TIMES DAILY
Status: DISCONTINUED | OUTPATIENT
Start: 2022-03-27 | End: 2022-03-28 | Stop reason: HOSPADM

## 2022-03-27 RX ORDER — HYDROMORPHONE HYDROCHLORIDE 1 MG/ML
0.5 INJECTION, SOLUTION INTRAMUSCULAR; INTRAVENOUS; SUBCUTANEOUS ONCE
Status: COMPLETED | OUTPATIENT
Start: 2022-03-27 | End: 2022-03-27

## 2022-03-27 RX ORDER — ENOXAPARIN SODIUM 100 MG/ML
40 INJECTION SUBCUTANEOUS DAILY
Status: DISCONTINUED | OUTPATIENT
Start: 2022-03-28 | End: 2022-03-28 | Stop reason: HOSPADM

## 2022-03-27 RX ORDER — ALBUTEROL SULFATE 90 UG/1
2 AEROSOL, METERED RESPIRATORY (INHALATION)
Status: DISCONTINUED | OUTPATIENT
Start: 2022-03-27 | End: 2022-03-28 | Stop reason: HOSPADM

## 2022-03-27 RX ORDER — ATORVASTATIN CALCIUM 10 MG/1
10 TABLET, FILM COATED ORAL
Status: DISCONTINUED | OUTPATIENT
Start: 2022-03-27 | End: 2022-03-28 | Stop reason: HOSPADM

## 2022-03-27 RX ADMIN — QUETIAPINE FUMARATE 300 MG: 300 TABLET ORAL at 22:41

## 2022-03-27 RX ADMIN — DIAZEPAM 10 MG: 5 TABLET ORAL at 22:40

## 2022-03-27 RX ADMIN — SODIUM CHLORIDE 1000 ML: 9 INJECTION, SOLUTION INTRAVENOUS at 19:21

## 2022-03-27 RX ADMIN — GABAPENTIN 600 MG: 300 CAPSULE ORAL at 22:41

## 2022-03-27 RX ADMIN — SODIUM CHLORIDE 1000 ML: 9 INJECTION, SOLUTION INTRAVENOUS at 20:36

## 2022-03-27 RX ADMIN — HYDROMORPHONE HYDROCHLORIDE 0.5 MG: 1 INJECTION, SOLUTION INTRAMUSCULAR; INTRAVENOUS; SUBCUTANEOUS at 19:26

## 2022-03-27 RX ADMIN — HYDROMORPHONE HYDROCHLORIDE 1 MG: 1 INJECTION, SOLUTION INTRAMUSCULAR; INTRAVENOUS; SUBCUTANEOUS at 20:06

## 2022-03-27 RX ADMIN — DICYCLOMINE HYDROCHLORIDE 10 MG: 10 CAPSULE ORAL at 20:40

## 2022-03-27 RX ADMIN — SODIUM CHLORIDE, PRESERVATIVE FREE 10 ML: 5 INJECTION INTRAVENOUS at 22:00

## 2022-03-27 RX ADMIN — SODIUM CHLORIDE 100 ML/HR: 9 INJECTION, SOLUTION INTRAVENOUS at 21:52

## 2022-03-27 RX ADMIN — ONDANSETRON 4 MG: 2 INJECTION INTRAMUSCULAR; INTRAVENOUS at 19:21

## 2022-03-27 RX ADMIN — ONDANSETRON 4 MG: 2 INJECTION INTRAMUSCULAR; INTRAVENOUS at 21:00

## 2022-03-27 RX ADMIN — ATORVASTATIN CALCIUM 10 MG: 10 TABLET, FILM COATED ORAL at 22:41

## 2022-03-27 NOTE — ED TRIAGE NOTES
Vela Nawaf since Thursday with rectal pain starting Friday. Abd cramping with decreased appetite. Denies blood in stool and urinary s/s.

## 2022-03-27 NOTE — ED PROVIDER NOTES
EMERGENCY DEPARTMENT HISTORY AND PHYSICAL EXAM      Date: 3/27/2022  Patient Name: Elvis Riley      History of Presenting Illness     Chief Complaint   Patient presents with    Diarrhea    Anal Pain       History Provided By: Patient    HPI: Elvis Riley, 64 y.o. female with a past medical history significant for PTSD, Seizures, Mesenteric ischemia presents to the ED with cc of rectal pain, abdominal pain, diarrhea. Onset 3d ago, constant watery diarrhea, no melena or hematochezia. Began with nausea and NBNB emesis. Endorsing chills, weakness, denies fevers, known sick contacts. Feels dehydrated like she cannot produce saliva. There are no other complaints, changes, or physical findings at this time. PCP: None    Current Facility-Administered Medications   Medication Dose Route Frequency Provider Last Rate Last Admin    sodium chloride 0.9 % bolus infusion 1,000 mL  1,000 mL IntraVENous ONCE Marisela Thomas W,         HYDROmorphone (DILAUDID) syringe 0.5 mg  0.5 mg IntraVENous ONCE Akosua Simon MD        ondansetron [facility]) injection 4 mg  4 mg IntraVENous NOW Akosua Simon MD         Current Outpatient Medications   Medication Sig Dispense Refill    traZODone (DESYREL) 100 mg tablet Take 100-200 mg by mouth At bedtime.  omeprazole (PRILOSEC) 40 mg capsule Take 40 mg by mouth At bedtime.  methylPREDNISolone (MEDROL DOSEPACK) 4 mg tablet Take as directed per package insert      diazePAM (VALIUM) 10 mg tablet Take 10 mg by mouth two (2) times a day.  atorvastatin (LIPITOR) 10 mg tablet Take 10 mg by mouth nightly.  polyethylene glycol (Miralax) 17 gram/dose powder Take 17 g by mouth daily. 1 tablespoon with 8 oz of water daily 17 g 0    ondansetron hcl (Zofran) 4 mg tablet Take 1 Tablet by mouth every eight (8) hours as needed for PRN Reason (Other) (Dizziness or nausea).  20 Tablet 0    albuterol (PROVENTIL HFA, VENTOLIN HFA, PROAIR HFA) 90 mcg/actuation inhaler Take 2 Puffs by inhalation every four (4) hours as needed for Wheezing. 18 g 0    dextroamphetamine-amphetamine (ADDERALL) 20 mg tablet Take 10 mg by mouth four (4) times daily. (Patient not taking: Reported on 2022)      hydrOXYzine HCL (ATARAX) 50 mg tablet Take 50 mg by mouth every six (6) hours as needed (max 4 tablets a day).  naloxone (Narcan) 4 mg/actuation nasal spray Use 1 spray intranasally, then discard. Repeat with new spray every 2 min as needed for opioid overdose symptoms, alternating nostrils. (Patient not taking: Reported on 2022) 2 Each 0    clonazePAM (KLONOPIN) 1 mg tablet Take  by mouth three (3) times daily. (Patient not taking: Reported on 2022)      gabapentin (NEURONTIN) 600 mg tablet Take  by mouth four (4) times daily. Past History     Past Medical History:  Past Medical History:   Diagnosis Date    DDD (degenerative disc disease), lumbar     Fatigue     GERD (gastroesophageal reflux disease)     Hypothyroidism     Psychiatric disorder     PTSD per patient    Seizures ([facility] [address]        Past Surgical History:  Past Surgical History:   Procedure Laterality Date    HX APPENDECTOMY      HX  SECTION      HX CHOLECYSTECTOMY      HX HYSTERECTOMY         Family History:  Family History   Family history unknown: Yes       Social History:  Social History     Tobacco Use    Smoking status: Current Every Day Smoker     Packs/day: 0.25    Smokeless tobacco: Never Used   Vaping Use    Vaping Use: Never used   Substance Use Topics    Alcohol use: Not Currently     Alcohol/week: 0.0 standard drinks    Drug use: Never       Allergies: Allergies   Allergen Reactions    Augmentin [Amoxicillin-Pot Clavulanate] Nausea and Vomiting         Review of Systems   Constitutional: Negative except as in HPI. Eyes: Negative except as in HPI.  ENT: Negative except as in HPI. Cardiovascular: Negative except as in HPI.   Respiratory: Negative except as in HPI.  Gastrointestinal: Negative except as in HPI. Genitourinary: Negative except as in HPI. Musculoskeletal: Negative except as in HPI. Integumentary: Negative except as in HPI. Neurological: Negative except as in HPI. Psychiatric: Negative except as in HPI. Endocrine: Negative except as in HPI. Hematologic/Lymphatic: Negative except as in HPI. Allergic/Immunologic: Negative except as in HPI. Physical Exam   Constitutional: Awake and alert, interactive, NAD  Eyes: PERRL, no injection or scleral icterus, no discharge  HEENT: NCAT, neck supple, dry MM, no oropharyngeal exudates  CV: RRR, no m/r/g  Respiratory: CTAB, no r/r/w  GI: Abd soft, nondistended, diffusely ttp, no r/g  : perianal erythema but no induration or fluctuance or other skin changes to suggest abscess. MSK: no joint effusions or edema  Skin: No rashes  Neuro: CN2-12 intact, symmetric facies, fluent speech. Psych: Well-groomed, normal speech, behavior, appropriate mood    Lab and Diagnostic Study Results     Labs -   No results found for this or any previous visit (from the past 12 hour(s)). Radiologic Studies -   [unfilled]  CT Results  (Last 48 hours)    None        CXR Results  (Last 48 hours)    None          Medical Decision Making and ED Course   - I am the first and primary provider for this patient AND AM THE PRIMARY PROVIDER OF RECORD. - I reviewed the vital signs, available nursing notes, past medical history, past surgical history, family history and social history. - Initial assessment performed. The patients presenting problems have been discussed, and the staff are in agreement with the care plan formulated and outlined with them. I have encouraged them to ask questions as they arise throughout their visit. Vital Signs-Reviewed the patient's vital signs.     Patient Vitals for the past 12 hrs:   Temp Pulse Resp BP SpO2   03/27/22 1840 98.1 °F (36.7 °C) 99 18 109/72 98 %       EKG interpretation: Provider Notes (Medical Decision Making):   56F w/N/V/D, suspecct viral gastroenteritis but given history of mesenteric ischemia and age will get CTAP to eval for diverticulitis, perianal/perirectal abscess. Dispo pending workup. ED Course:       ED Course as of 03/27/22 2041   Valentina Browne Mar 27, 2022   2025 Creatinine(!): 1.48  Will admit for ANASTACIO. Giving additional liter of IVF. [YA]   2026 Potassium: 4.1  Will repeat. [YA]   2041 CT ABD PELV WO CONT  No free air or intra-abd abscess or phlegmon on my read. [YA]   2041 Admitted to Dr. Favian Munoz      ED Course User Index  [YA] Hailey Herrera MD         Consultations:     Hospitalist Dr. Erin Cortes. Disposition     Disposition: Admitted to Floor Medical Floor the case was discussed with the admitting physician Dr. Erin Cortes. Admitted      Diagnosis     Clinical Impression:   1. Abdominal pain, generalized    2. Diarrhea of presumed infectious origin    3. Non-intractable vomiting with nausea, unspecified vomiting type        Attestations:     Efrain Cardona MD

## 2022-03-28 VITALS
TEMPERATURE: 97.5 F | OXYGEN SATURATION: 91 % | BODY MASS INDEX: 27.28 KG/M2 | HEART RATE: 81 BPM | DIASTOLIC BLOOD PRESSURE: 72 MMHG | SYSTOLIC BLOOD PRESSURE: 107 MMHG | RESPIRATION RATE: 18 BRPM | HEIGHT: 68 IN | WEIGHT: 180 LBS

## 2022-03-28 LAB
ANION GAP SERPL CALC-SCNC: 3 MMOL/L (ref 5–15)
BUN SERPL-MCNC: 19 MG/DL (ref 6–20)
BUN/CREAT SERPL: 19 (ref 12–20)
CA-I BLD-MCNC: 7.5 MG/DL (ref 8.5–10.1)
CHLORIDE SERPL-SCNC: 113 MMOL/L (ref 97–108)
CO2 SERPL-SCNC: 26 MMOL/L (ref 21–32)
CREAT SERPL-MCNC: 1 MG/DL (ref 0.55–1.02)
GLUCOSE SERPL-MCNC: 94 MG/DL (ref 65–100)
POTASSIUM SERPL-SCNC: 4.1 MMOL/L (ref 3.5–5.1)
SODIUM SERPL-SCNC: 142 MMOL/L (ref 136–145)

## 2022-03-28 PROCEDURE — 74011250637 HC RX REV CODE- 250/637: Performed by: NURSE PRACTITIONER

## 2022-03-28 PROCEDURE — 74011000250 HC RX REV CODE- 250: Performed by: INTERNAL MEDICINE

## 2022-03-28 PROCEDURE — 74011250637 HC RX REV CODE- 250/637: Performed by: INTERNAL MEDICINE

## 2022-03-28 PROCEDURE — 74011250636 HC RX REV CODE- 250/636: Performed by: NURSE PRACTITIONER

## 2022-03-28 PROCEDURE — 36415 COLL VENOUS BLD VENIPUNCTURE: CPT

## 2022-03-28 PROCEDURE — 96372 THER/PROPH/DIAG INJ SC/IM: CPT

## 2022-03-28 PROCEDURE — 80048 BASIC METABOLIC PNL TOTAL CA: CPT

## 2022-03-28 PROCEDURE — G0378 HOSPITAL OBSERVATION PER HR: HCPCS

## 2022-03-28 PROCEDURE — 74011250636 HC RX REV CODE- 250/636: Performed by: INTERNAL MEDICINE

## 2022-03-28 RX ORDER — DOXYCYCLINE 100 MG/1
100 CAPSULE ORAL EVERY 12 HOURS
Status: DISCONTINUED | OUTPATIENT
Start: 2022-03-28 | End: 2022-03-28 | Stop reason: HOSPADM

## 2022-03-28 RX ORDER — OMEPRAZOLE 40 MG/1
40 CAPSULE, DELAYED RELEASE ORAL DAILY
Qty: 30 CAPSULE | Refills: 0 | Status: SHIPPED | OUTPATIENT
Start: 2022-03-28 | End: 2022-04-27

## 2022-03-28 RX ORDER — SODIUM CHLORIDE 9 MG/ML
100 INJECTION, SOLUTION INTRAVENOUS CONTINUOUS
Status: DISCONTINUED | OUTPATIENT
Start: 2022-03-28 | End: 2022-03-28

## 2022-03-28 RX ORDER — ACETAMINOPHEN 325 MG/1
650 TABLET ORAL
Qty: 180 TABLET | Refills: 0 | Status: SHIPPED
Start: 2022-03-28 | End: 2022-04-27

## 2022-03-28 RX ORDER — DOXYCYCLINE 100 MG/1
100 CAPSULE ORAL EVERY 12 HOURS
Qty: 14 CAPSULE | Refills: 0 | Status: SHIPPED | OUTPATIENT
Start: 2022-03-28 | End: 2022-04-04

## 2022-03-28 RX ADMIN — PANTOPRAZOLE SODIUM 40 MG: 40 TABLET, DELAYED RELEASE ORAL at 05:43

## 2022-03-28 RX ADMIN — SODIUM CHLORIDE 100 ML/HR: 9 INJECTION, SOLUTION INTRAVENOUS at 08:42

## 2022-03-28 RX ADMIN — GABAPENTIN 600 MG: 300 CAPSULE ORAL at 08:41

## 2022-03-28 RX ADMIN — DOXYCYCLINE HYCLATE 100 MG: 100 CAPSULE ORAL at 13:46

## 2022-03-28 RX ADMIN — GABAPENTIN 600 MG: 300 CAPSULE ORAL at 13:46

## 2022-03-28 RX ADMIN — ACETAMINOPHEN 650 MG: 325 TABLET ORAL at 01:39

## 2022-03-28 RX ADMIN — SODIUM CHLORIDE, PRESERVATIVE FREE 10 ML: 5 INJECTION INTRAVENOUS at 13:46

## 2022-03-28 RX ADMIN — DIAZEPAM 10 MG: 5 TABLET ORAL at 08:41

## 2022-03-28 RX ADMIN — ENOXAPARIN SODIUM 40 MG: 40 INJECTION SUBCUTANEOUS at 08:41

## 2022-03-28 RX ADMIN — SODIUM CHLORIDE 100 ML/HR: 9 INJECTION, SOLUTION INTRAVENOUS at 05:47

## 2022-03-28 RX ADMIN — SODIUM CHLORIDE, PRESERVATIVE FREE 10 ML: 5 INJECTION INTRAVENOUS at 06:00

## 2022-03-28 NOTE — PROGRESS NOTES
Discharge plan of care/case management plan validated with provider discharge order. Discharged home to self care,Discharge instructions given to patient and she verbalized understanding. IV removed with no complications,Escorted per wheelchair to the exit in satisfactory condition.

## 2022-03-28 NOTE — PROGRESS NOTES
Problem: Falls - Risk of  Goal: *Absence of Falls  Description: Document Ehsan Pandey Fall Risk and appropriate interventions in the flowsheet.   Outcome: Progressing Towards Goal  Note: Fall Risk Interventions:            Medication Interventions: Patient to call before getting OOB                   Problem: Patient Education: Go to Patient Education Activity  Goal: Patient/Family Education  Outcome: Progressing Towards Goal

## 2022-03-28 NOTE — H&P
History and Physical    Patient: Rochelle Lombard MRN: [id number]  SSN: [id number]    YOB: 1965  Age: 64 y.o. Sex: female      Subjective:      Rochelle Lombard is a 64 y.o. female with PMH as below presented with chief complaint of persistent nonbloody diarrhea for the past 3 days. Associated symptoms including lower abdominal pain, nausea and generalized weakness. Otherwise denies fever. No recent antibiotics, travels or sick contact. In the ED, vital signs within normal limits. No leukocytosis. However noted ANASTACIO, creatinine 1.5, baseline 1.1. CT abdomen findings supportive of diarrheal process. Past Medical History:   Diagnosis Date    DDD (degenerative disc disease), lumbar     Fatigue     GERD (gastroesophageal reflux disease)     Hypothyroidism     Psychiatric disorder     PTSD per patient    Seizures ([facility] [address]      Past Surgical History:   Procedure Laterality Date    HX APPENDECTOMY      HX  SECTION      HX CHOLECYSTECTOMY      HX HYSTERECTOMY        Family History   Family history unknown: Yes     Social History     Tobacco Use    Smoking status: Current Every Day Smoker     Packs/day: 0.25    Smokeless tobacco: Never Used   Substance Use Topics    Alcohol use: Not Currently     Alcohol/week: 0.0 standard drinks      Prior to Admission medications    Medication Sig Start Date End Date Taking? Authorizing Provider   traZODone (DESYREL) 100 mg tablet Take 100-200 mg by mouth At bedtime. 22   Kendall Sosa MD   omeprazole (PRILOSEC) 40 mg capsule Take 40 mg by mouth At bedtime. 10/12/21   Kendall Sosa MD   methylPREDNISolone (MEDROL DOSEPACK) 4 mg tablet Take as directed per package insert 22   Kendall Sosa MD   diazePAM (VALIUM) 10 mg tablet Take 10 mg by mouth two (2) times a day. 21   Kendall Sosa MD   atorvastatin (LIPITOR) 10 mg tablet Take 10 mg by mouth nightly.    22   Kendall Sosa MD   polyethylene glycol (Miralax) 17 gram/dose powder Take 17 g by mouth daily. 1 tablespoon with 8 oz of water daily 1/25/22   Vahe Lombardi PA-C   ondansetron hcl (Zofran) 4 mg tablet Take 1 Tablet by mouth every eight (8) hours as needed for PRN Reason (Other) (Dizziness or nausea). 12/30/21   Valentino Guevara MD   albuterol (PROVENTIL HFA, VENTOLIN HFA, PROAIR HFA) 90 mcg/actuation inhaler Take 2 Puffs by inhalation every four (4) hours as needed for Wheezing. 9/30/21   Arthur Lau MD   dextroamphetamine-amphetamine (ADDERALL) 20 mg tablet Take 10 mg by mouth four (4) times daily. Patient not taking: Reported on 2/25/2022 8/21/21   Provider, Historical   hydrOXYzine HCL (ATARAX) 50 mg tablet Take 50 mg by mouth every six (6) hours as needed (max 4 tablets a day). Other, MD Kendall   naloxone (Narcan) 4 mg/actuation nasal spray Use 1 spray intranasally, then discard. Repeat with new spray every 2 min as needed for opioid overdose symptoms, alternating nostrils. Patient not taking: Reported on 2/25/2022 8/6/21   Juanita Bravo DO   clonazePAM (KLONOPIN) 1 mg tablet Take  by mouth three (3) times daily. Patient not taking: Reported on 2/25/2022    Provider, Historical   gabapentin (NEURONTIN) 600 mg tablet Take  by mouth four (4) times daily. Provider, Historical        Allergies   Allergen Reactions    Augmentin [Amoxicillin-Pot Clavulanate] Nausea and Vomiting       Review of Systems:   Constitutional: See HPI.   Positive for chills  Eyes: No visual disturbance  Ears, Nose, Mouth, Throat, and Face: No nasal congestion, No sore throat  Respiratory: No cough, No sputum, No wheezing, No SOB  Cardiovascular: No chest pain, No lower extremity edema, No Palpitations   Gastrointestinal: See HPI  Genitourinary: No frequency, No dysuria, No hematuria  Integument/Breast: No rash, No skin lesion(s), No dryness  Musculoskeletal: No arthralgias, No neck pain, No back pain  Neurological: No headaches, No dizziness, No confusion,  No seizures  Behavioral/Psychiatric: No anxiety, No depression      Objective:     Vitals:    03/27/22 1930 03/27/22 2000 03/27/22 2008 03/27/22 2100   BP: 108/79  95/79 108/69   Pulse:   86 84   Resp:       Temp:    98.3 °F (36.8 °C)   SpO2:  93% 94% 93%   Weight:       Height:            Physical Exam:   General: alert, cooperative, in moderate distress  Eye: conjunctivae/corneas clear. PERRL, EOM's intact. Throat and Neck: normal and no erythema or exudates noted. No mass   Lung: clear to auscultation bilaterally  Heart: regular rate and rhythm,   Abdomen: Generalized abdominal tenderness. Soft. Bowel sounds decreased. No masses,  Extremities:  able to move all extremities normal, atraumatic  Skin: Normal.  Neurologic: AOx3. Motor function and sensation grossly intact. Psychiatric: non focal    Recent Results (from the past 24 hour(s))   CBC WITH AUTOMATED DIFF    Collection Time: 03/27/22  7:01 PM   Result Value Ref Range    WBC 9.8 3.6 - 11.0 K/uL    RBC 5.30 (H) 3.80 - 5.20 M/uL    HGB 15.0 11.5 - 16.0 g/dL    HCT 45.8 35.0 - 47.0 %    MCV 86.4 80.0 - 99.0 FL    MCH 28.3 26.0 - 34.0 PG    MCHC 32.8 30.0 - 36.5 g/dL    RDW 14.3 11.5 - 14.5 %    PLATELET 326 841 - 021 K/uL    MPV 9.4 8.9 - 12.9 FL    NRBC 0.0 0.0  WBC    ABSOLUTE NRBC 0.00 0.00 - 0.01 K/uL    NEUTROPHILS 43 32 - 75 %    LYMPHOCYTES 50 (H) 12 - 49 %    MONOCYTES 5 5 - 13 %    EOSINOPHILS 1 0 - 7 %    BASOPHILS 1 0 - 1 %    IMMATURE GRANULOCYTES 0 0 - 0.5 %    ABS. NEUTROPHILS 4.3 1.8 - 8.0 K/UL    ABS. LYMPHOCYTES 4.8 (H) 0.8 - 3.5 K/UL    ABS. MONOCYTES 0.5 0.0 - 1.0 K/UL    ABS. EOSINOPHILS 0.1 0.0 - 0.4 K/UL    ABS. BASOPHILS 0.1 0.0 - 0.1 K/UL    ABS. IMM.  GRANS. 0.0 0.00 - 0.04 K/UL    DF AUTOMATED     METABOLIC PANEL, COMPREHENSIVE    Collection Time: 03/27/22  7:01 PM   Result Value Ref Range    Sodium 137 136 - 145 mmol/L    Potassium 4.1 3.5 - 5.1 mmol/L    Chloride 106 97 - 108 mmol/L    CO2 28 21 - 32 mmol/L    Anion gap 3 (L) 5 - 15 mmol/L    Glucose 100 65 - 100 mg/dL    BUN 22 (H) 6 - 20 mg/dL    Creatinine 1.48 (H) 0.55 - 1.02 mg/dL    BUN/Creatinine ratio 15 12 - 20      GFR est AA 44 (L) >60 ml/min/1.73m2    GFR est non-AA 36 (L) >60 ml/min/1.73m2    Calcium 9.0 8.5 - 10.1 mg/dL    Bilirubin, total 0.4 0.2 - 1.0 mg/dL    AST (SGOT) 21 15 - 37 U/L    ALT (SGPT) 22 12 - 78 U/L    Alk. phosphatase 86 45 - 117 U/L    Protein, total 7.3 6.4 - 8.2 g/dL    Albumin 3.9 3.5 - 5.0 g/dL    Globulin 3.4 2.0 - 4.0 g/dL    A-G Ratio 1.1 1.1 - 2.2     URINALYSIS W/ RFLX MICROSCOPIC    Collection Time: 03/27/22  7:01 PM   Result Value Ref Range    Color Yellow/Straw      Appearance Turbid (A) Clear      Specific gravity 1.028 1.003 - 1.030      pH (UA) 5.0 5.0 - 8.0      Protein 30 (A) Negative mg/dL    Glucose Negative Negative mg/dL    Ketone Negative Negative mg/dL    Bilirubin Negative Negative      Blood Negative Negative      Urobilinogen 0.1 0.1 - 1.0 EU/dL    Nitrites Negative Negative      Leukocyte Esterase Negative Negative      WBC 0-4 0 - 4 /hpf    RBC 0-5 0 - 5 /hpf    Bacteria Negative Negative /hpf    Mucus 3+ /lpf   LIPASE    Collection Time: 03/27/22  7:01 PM   Result Value Ref Range    Lipase 155 73 - 393 U/L   COVID-19 RAPID TEST    Collection Time: 03/27/22  7:01 PM   Result Value Ref Range    COVID-19 rapid test Not Detected Not Detected     URINE MICROSCOPIC    Collection Time: 03/27/22  7:01 PM   Result Value Ref Range    WBC 0-4 0 - 4 /hpf    RBC 0-5 0 - 5 /hpf    Bacteria Negative Negative /hpf    Mucus 3+ (A) Negative /lpf   POTASSIUM    Collection Time: 03/27/22  8:36 PM   Result Value Ref Range    Potassium 3.7 3.5 - 5.1 mmol/L       XR Results (maximum last 3): Results from [place] encounter on 01/30/22    XR CHEST PORT    Narrative  Chest one view. AP upright portable at 1146 dated 1/30/2022. Comparison 9/30/2021. The cardiomediastinal silhouette is stable.  The pulmonary blood flow remains  normal. Minor ligular atelectasis/scar is unchanged from before. There is no  lobar consolidation, pleural fluid or pneumothorax. Impression  Comparison 9/30/2021. No interval change. Results from [place] encounter on 11/07/21    XR SPINE LUMB MIN 4 V    Narrative  Lumbar spine, 5 views. Comparison with previous study from 10/18/2021. Colonic  stool. Cholecystectomy clips. No evidence of spondylolysis or spondylolisthesis. Mild disc space narrowing at L4-L5. Tiny anterior osteophytes at L1-L2, L2-L3,  and L3-L4. Aortoiliac arterial calcification. Mild lower thoracic degenerative  disc disease. No evidence of fracture or focal lytic or blastic bone lesion. Impression  Mild degenerative changes. No evidence of fracture or focal  destructive lesion. Colonic stool. Atherosclerosis. Results from [place] [facility] encounter on 10/18/21    XR SPINE LUMB 2 OR 3 V    Narrative  3 views lumbar spine    History:  Low back pain    COMPARISON: None    There is a slight rotatory dextroconvex lumbar curvature. No vertebral  compression or subluxation involving lower thoracic or lumbar vertebra. There  are small anterolateral marginal osteophytes involving lumbar vertebra. Partial  disc narrowing is seen at L1-L2, L4-L5 and L5-S1. There is calcific plaque  involving the aorta and iliac arteries, no evidence of aneurysm. .    Impression  Lumbar spondylosis with multilevel degenerative disc narrowing. CT Results (maximum last 3): Results from [place] [facility] encounter on 03/27/22    CT ABD PELV WO CONT    Narrative  Diarrhea. Comparison CT abdomen pelvis 2/25/2022. Technique: Axial images abdomen and pelvis without IV or oral contrast.  Multiplanar reformatting.   Dose reduction: All CT scans at this facility are performed using dose reduction  optimization techniques as appropriate to a performed exam including the  following: Automated exposure control, adjustments of the mA and/or kV according  to patient's size, or use of iterative reconstruction technique. FINDINGS: Bilateral lower lung atelectasis or infiltrate. No pleural effusion. Status post cholecystectomy. No biliary ductal dilation. Liver, spleen,  pancreas, adrenal glands, kidneys, bladder unremarkable. No urinary stone or  collecting system dilation. Uterus absent. Calcific atherosclerosis. Abdominal  aorta normal caliber. Small hiatal hernia. Similar nondistention of the left  colon, rectosigmoid colon. There is fluid mildly distending the right colon. No  free air, free fluid, lymphadenopathy. Small umbilical defect contains  noninflamed fat. No acute bony findings. Impression  1. Bilateral lower lung atelectasis or infiltrate. 2. Fluid in the right colon, can be seen with diarrheal process. 3. No urinary collecting system dilation. Results from [facility] encounter on 02/25/22    CT ABD PELV W CONT    Narrative  CT abdomen and pelvis with IV contrast    Comparison CT abdomen and pelvis January 25, 2022. Axial images are reviewed along with reformatted sagittal/coronal images. 100 mL  Isovue 370 administered. Dose reduction: All CT scans at this facility are performed using dose reduction  optimization techniques as appropriate to a performed exam including the  following-  automated exposure control, adjustments of mA and/or Kv according to patient  size, or use of iterative reconstructive technique. .    Bibasilar subsegmental atelectasis similar compared to prior imaging. Liver normally enhances. Prior cholecystectomy. Unchanged CBD prominence. Pancreas, spleen, and bilateral adrenal glands appear unremarkable. Normal  enhancement bilateral kidneys. No hydronephrosis. Stomach and small bowel loops are decompressed. Stool/fluid and air through nondistended colon. No segmental wall edema as might  be seen with inflammatory or ischemic process involving the colon. Prior hysterectomy.   Atherosclerotic change normal caliber abdominal aorta. Proximal branch vessels  from the abdominal aorta are patent. Impression  No bowel obstruction. Colon entirely decompressed. No findings to suggest inflammatory/infectious or  ischemic process involving colon. No ascites. Atherosclerotic change normal caliber abdominal aorta. Proximal branch vessels  from the abdominal aorta appear patent. Results from [place] encounter on 01/30/22    CTA CHEST W OR W WO CONT    Narrative  Dose Reduction:    All CT scans at this facility are performed using dose reduction optimization  techniques as appropriate to a performed exam including the following: Automated  exposure control, adjustments of the mA and/or kV according to patient size, or  use of iterative reconstruction technique. CT angiography of the chest with contrast. Axial images of the chest were  obtained during IV administration of 100 mL Isovue-370. Coronal reconstructions  including MIP reconstructions were created. There are minimal areas of pulmonary  opacity consistent with fibrotic changes and/or minimal atelectasis. No focal  airspace disease pneumothorax or pleural effusion is seen. No pulmonary arterial  filling defects are seen. Thoracic aorta is unremarkable. No mediastinal mass or  abnormal mediastinal fluid collection is seen. In    Impression  No evidence of pulmonary embolism. Minimal bilateral pulmonary  opacities suggesting small areas of pulmonary scarring and/or mild subsegmental  atelectasis noted. MRI Results (maximum last 3): Results from [facility] encounter on 10/08/15    MRI [address] CONT      Nuclear Medicine Results (maximum last 3): No results found for this or any previous visit. US Results (maximum last 3): No results found for this or any previous visit.       Assessment and plan:   #Acute diarrhea  -Hold off antibiotics in the absence of leukocytosis or recent travel.  -Bismuth for symptomatic treatment    #ANASTACIO secondary to dehydration secondary to above  - Admit for observation  - IV fluid  - monitor kidney function in the morning    #GERD  -Pantoprazole    #PTSD  - Continue home medications.         Signed By: Adamaris Riggs MD     March 27, 2022

## 2022-03-28 NOTE — PROGRESS NOTES
Patient discharging home today, no needs. Discharge plan of care/case management plan validated with provider discharge order. Medicare Outpatient Observation Notice (MOON)/ [address] [place] [address] Outpatient Observation Notice (Ladarius Backers) provided to patient/representative with verbal explanation of the notice. Time allotted for questions regarding the notice. Patient /representative provided a completed copy of the MOON/VOON notice. Copy placed on bedside chart.

## 2022-03-28 NOTE — PROGRESS NOTES
Hospitalist paged regarding low bp and meds ordered seroquel and valium, was told to give meds as ordered. No new orders.

## 2022-03-28 NOTE — PROGRESS NOTES
Patient's blood pressure did recover last night, VSS as of last set. Patient did wake up and request pain medication, tylenol provided. Patient now on 5L via NC no home O2. Patient woken up this morning to ambulate to bathroom to void and was very unsteady, x2 nurse assist utilized for safe transfer back to bed. [place] catheter on patient for the time being.

## 2022-03-28 NOTE — DISCHARGE SUMMARY
Hospitalist Discharge Summary     Patient ID:    Irineo Olson  [id number]  64 y.o.  1965    Admit date: 3/27/2022    Discharge date : 3/28/2022    Chronic Diagnoses:    Problem List as of 3/28/2022 Date Reviewed: 11/7/2021          Codes Class Noted - Resolved    ANASTACIO (acute kidney injury) ([facility] ICD-10-CM: N17.9  ICD-9-CM: 584.9  3/27/2022 - Present        Hypokalemia ICD-10-CM: E87.6  ICD-9-CM: 276.8  8/9/2021 - Present        Hypothyroidism due to acquired atrophy of thyroid ICD-10-CM: E03.4  ICD-9-CM: 244.8, 246.8  9/30/2015 - Present          22    Final Diagnoses: Active Problems:    ANASTACIO (acute kidney injury) ([facility] (3/27/2022)        Reason for Hospitalization  Diarrhea,lower abdominal pain    Hospital Course:   Irineo Olson is a 64 y.o. female with PMH as below presented with chief complaint of persistent nonbloody diarrhea for the past 3 days. Associated symptoms including lower abdominal pain, nausea and generalized weakness. Otherwise denies fever. No recent antibiotics, travels or sick contact. CT abdomen/pelvis does not show any infectious process, right colon with mild fluid distension as correlated with acute diarrhea process. She presented with ANASTACIO secondary to dehydration, was given IV fluids and creatinine normalized. UA is abnormal with 3 + mucous, and symptoms of lower abdominal pain, will treat for UTI and antibiotic for 7 days. Pt was recommended to see a new PCP for general health care in one week, will follow up with psychiatrist for regular appointments, low fat , low salt meals recommended, and smoking cessation was discussed. Pt is able to tolerate PO diet without nausea/vomiting or and diarrhea has subsided. Pt is stable for discharge home today.      Discharge Medications:   Current Discharge Medication List      START taking these medications    Details   acetaminophen (TYLENOL) 325 mg tablet Take 2 Tablets by mouth every six (6) hours as needed for Pain or Fever for up to 30 days. Qty: 180 Tablet, Refills: 0  Start date: 3/28/2022, End date: 4/27/2022      doxycycline (VIBRAMYCIN) 100 mg capsule Take 1 Capsule by mouth every twelve (12) hours for 7 days. Qty: 14 Capsule, Refills: 0  Start date: 3/28/2022, End date: 4/4/2022         CONTINUE these medications which have CHANGED    Details   omeprazole (PRILOSEC) 40 mg capsule Take 1 Capsule by mouth daily for 30 days. Qty: 30 Capsule, Refills: 0  Start date: 3/28/2022, End date: 4/27/2022         CONTINUE these medications which have NOT CHANGED    Details   traZODone (DESYREL) 100 mg tablet Take 100-200 mg by mouth At bedtime. diazePAM (VALIUM) 10 mg tablet Take 10 mg by mouth two (2) times a day. atorvastatin (LIPITOR) 10 mg tablet Take 10 mg by mouth nightly. clonazePAM (KLONOPIN) 1 mg tablet Take  by mouth three (3) times daily. Associated Diagnoses: Idiopathic hypotension; Hypothyroidism due to acquired atrophy of thyroid; Anemia due to vitamin B12 deficiency; Dizziness; Arthralgia      gabapentin (NEURONTIN) 600 mg tablet Take  by mouth four (4) times daily. Associated Diagnoses: Idiopathic hypotension; Hypothyroidism due to acquired atrophy of thyroid; Anemia due to vitamin B12 deficiency; Dizziness; Arthralgia      hydrOXYzine HCL (ATARAX) 50 mg tablet Take 50 mg by mouth every six (6) hours as needed (max 4 tablets a day).          STOP taking these medications       methylPREDNISolone (MEDROL DOSEPACK) 4 mg tablet Comments:   Reason for Stopping:         polyethylene glycol (Miralax) 17 gram/dose powder Comments:   Reason for Stopping:         ondansetron hcl (Zofran) 4 mg tablet Comments:   Reason for Stopping:         albuterol (PROVENTIL HFA, VENTOLIN HFA, PROAIR HFA) 90 mcg/actuation inhaler Comments:   Reason for Stopping:         dextroamphetamine-amphetamine (ADDERALL) 20 mg tablet Comments:   Reason for Stopping:         naloxone (Narcan) 4 mg/actuation nasal spray Comments:   Reason for Stopping: Follow up Care:    1. PCP in 1 weeks. Follow-up Information     Follow up With Specialties Details Why Contact Tobi Emerson NP Nurse Practitioner In 1 week establish new PCP [address] [place] [address]  [phone]              * Follow-up Care/Patient Instructions: Activity: Activity as tolerated  Diet: Low fat, Low cholesterol  Wound Care: None needed      Condition at Discharge:  Stable  __________________________________________________________________    Disposition  Home or Self Care  ____________________________________________________________________    Code Status:  Full Code  ___________________________________________________________________    Discharge Exam:  Patient seen and examined by me on discharge day. Physical Exam  Constitutional:       General: She is not in acute distress. Appearance: She is obese. Cardiovascular:      Rate and Rhythm: Normal rate and regular rhythm. Pulses: Normal pulses. Heart sounds: Normal heart sounds. Pulmonary:      Effort: Pulmonary effort is normal.      Breath sounds: Normal breath sounds. Abdominal:      General: Bowel sounds are normal.      Palpations: Abdomen is soft. Skin:     General: Skin is warm and dry. Neurological:      Mental Status: She is oriented to person, place, and time.    Psychiatric:         Behavior: Behavior normal.          CONSULTATIONS: None    Significant Diagnostic Studies:   Recent Results (from the past 24 hour(s))   CBC WITH AUTOMATED DIFF    Collection Time: 03/27/22  7:01 PM   Result Value Ref Range    WBC 9.8 3.6 - 11.0 K/uL    RBC 5.30 (H) 3.80 - 5.20 M/uL    HGB 15.0 11.5 - 16.0 g/dL    HCT 45.8 35.0 - 47.0 %    MCV 86.4 80.0 - 99.0 FL    MCH 28.3 26.0 - 34.0 PG    MCHC 32.8 30.0 - 36.5 g/dL    RDW 14.3 11.5 - 14.5 %    PLATELET 258 373 - 338 K/uL    MPV 9.4 8.9 - 12.9 FL    NRBC 0.0 0.0  WBC    ABSOLUTE NRBC 0.00 0.00 - 0.01 K/uL    NEUTROPHILS 43 32 - 75 %    LYMPHOCYTES 50 (H) 12 - 49 %    MONOCYTES 5 5 - 13 %    EOSINOPHILS 1 0 - 7 %    BASOPHILS 1 0 - 1 %    IMMATURE GRANULOCYTES 0 0 - 0.5 %    ABS. NEUTROPHILS 4.3 1.8 - 8.0 K/UL    ABS. LYMPHOCYTES 4.8 (H) 0.8 - 3.5 K/UL    ABS. MONOCYTES 0.5 0.0 - 1.0 K/UL    ABS. EOSINOPHILS 0.1 0.0 - 0.4 K/UL    ABS. BASOPHILS 0.1 0.0 - 0.1 K/UL    ABS. IMM. GRANS. 0.0 0.00 - 0.04 K/UL    DF AUTOMATED     METABOLIC PANEL, COMPREHENSIVE    Collection Time: 03/27/22  7:01 PM   Result Value Ref Range    Sodium 137 136 - 145 mmol/L    Potassium 4.1 3.5 - 5.1 mmol/L    Chloride 106 97 - 108 mmol/L    CO2 28 21 - 32 mmol/L    Anion gap 3 (L) 5 - 15 mmol/L    Glucose 100 65 - 100 mg/dL    BUN 22 (H) 6 - 20 mg/dL    Creatinine 1.48 (H) 0.55 - 1.02 mg/dL    BUN/Creatinine ratio 15 12 - 20      GFR est AA 44 (L) >60 ml/min/1.73m2    GFR est non-AA 36 (L) >60 ml/min/1.73m2    Calcium 9.0 8.5 - 10.1 mg/dL    Bilirubin, total 0.4 0.2 - 1.0 mg/dL    AST (SGOT) 21 15 - 37 U/L    ALT (SGPT) 22 12 - 78 U/L    Alk.  phosphatase 86 45 - 117 U/L    Protein, total 7.3 6.4 - 8.2 g/dL    Albumin 3.9 3.5 - 5.0 g/dL    Globulin 3.4 2.0 - 4.0 g/dL    A-G Ratio 1.1 1.1 - 2.2     URINALYSIS W/ RFLX MICROSCOPIC    Collection Time: 03/27/22  7:01 PM   Result Value Ref Range    Color Yellow/Straw      Appearance Turbid (A) Clear      Specific gravity 1.028 1.003 - 1.030      pH (UA) 5.0 5.0 - 8.0      Protein 30 (A) Negative mg/dL    Glucose Negative Negative mg/dL    Ketone Negative Negative mg/dL    Bilirubin Negative Negative      Blood Negative Negative      Urobilinogen 0.1 0.1 - 1.0 EU/dL    Nitrites Negative Negative      Leukocyte Esterase Negative Negative      WBC 0-4 0 - 4 /hpf    RBC 0-5 0 - 5 /hpf    Bacteria Negative Negative /hpf    Mucus 3+ /lpf   LIPASE    Collection Time: 03/27/22  7:01 PM   Result Value Ref Range    Lipase 155 73 - 393 U/L   COVID-19 RAPID TEST    Collection Time: 03/27/22  7:01 PM   Result Value Ref Range    COVID-19 rapid test Not Detected Not Detected     URINE MICROSCOPIC    Collection Time: 03/27/22  7:01 PM   Result Value Ref Range    WBC 0-4 0 - 4 /hpf    RBC 0-5 0 - 5 /hpf    Bacteria Negative Negative /hpf    Mucus 3+ (A) Negative /lpf   POTASSIUM    Collection Time: 03/27/22  8:36 PM   Result Value Ref Range    Potassium 3.7 3.5 - 5.1 mmol/L   METABOLIC PANEL, BASIC    Collection Time: 03/28/22  6:14 AM   Result Value Ref Range    Sodium 142 136 - 145 mmol/L    Potassium 4.1 3.5 - 5.1 mmol/L    Chloride 113 (H) 97 - 108 mmol/L    CO2 26 21 - 32 mmol/L    Anion gap 3 (L) 5 - 15 mmol/L    Glucose 94 65 - 100 mg/dL    BUN 19 6 - 20 mg/dL    Creatinine 1.00 0.55 - 1.02 mg/dL    BUN/Creatinine ratio 19 12 - 20      GFR est AA >60 >60 ml/min/1.73m2    GFR est non-AA 57 (L) >60 ml/min/1.73m2    Calcium 7.5 (L) 8.5 - 10.1 mg/dL     CT ABD PELV WO CONT   Final Result   1. Bilateral lower lung atelectasis or infiltrate. 2. Fluid in the right colon, can be seen with diarrheal process. 3. No urinary collecting system dilation. Discharge: time spent 35 minutes in discharge  Education and counseling.      Signed:  Sid Hoang NP  3/28/2022  1:06 PM

## 2022-03-28 NOTE — PROGRESS NOTES
Problem: Falls - Risk of  Goal: *Absence of Falls  Description: Document Giovani Harris Fall Risk and appropriate interventions in the flowsheet.   Outcome: Progressing Towards Goal  Note: Fall Risk Interventions:            Medication Interventions: Patient to call before getting OOB

## 2022-03-28 NOTE — DISCHARGE INSTRUCTIONS

## 2022-04-26 ENCOUNTER — HOSPITAL ENCOUNTER (EMERGENCY)
Age: 57
Discharge: HOME | End: 2022-04-27
Attending: EMERGENCY MEDICINE
Payer: MEDICARE

## 2022-04-26 ENCOUNTER — APPOINTMENT (OUTPATIENT)
Dept: GENERAL RADIOLOGY | Age: 57
End: 2022-04-26
Attending: NURSE PRACTITIONER
Payer: MEDICARE

## 2022-04-26 DIAGNOSIS — G89.29 CHRONIC RIGHT-SIDED LOW BACK PAIN WITH RIGHT-SIDED SCIATICA: Primary | ICD-10-CM
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
## 2022-04-26 DIAGNOSIS — Z87.898 HISTORY OF BENZODIAZEPINE USE: ICD-10-CM

## 2022-04-26 DIAGNOSIS — M54.41 CHRONIC RIGHT-SIDED LOW BACK PAIN WITH RIGHT-SIDED SCIATICA: Primary | ICD-10-CM

## 2022-04-26 PROCEDURE — 74011250636 HC RX REV CODE- 250/636: Performed by: NURSE PRACTITIONER

## 2022-04-26 PROCEDURE — 72110 X-RAY EXAM L-2 SPINE 4/>VWS: CPT

## 2022-04-26 PROCEDURE — 73502 X-RAY EXAM HIP UNI 2-3 VIEWS: CPT

## 2022-04-26 PROCEDURE — 73552 X-RAY EXAM OF FEMUR 2/>: CPT

## 2022-04-26 PROCEDURE — 96372 THER/PROPH/DIAG INJ SC/IM: CPT

## 2022-04-26 PROCEDURE — 99284 EMERGENCY DEPT VISIT MOD MDM: CPT

## 2022-04-26 RX ORDER — KETOROLAC TROMETHAMINE 30 MG/ML
30 INJECTION, SOLUTION INTRAMUSCULAR; INTRAVENOUS ONCE
Status: COMPLETED | OUTPATIENT
Start: 2022-04-26 | End: 2022-04-26

## 2022-04-26 RX ADMIN — KETOROLAC TROMETHAMINE 30 MG: 30 INJECTION, SOLUTION INTRAMUSCULAR at 21:46

## 2022-04-27 ENCOUNTER — APPOINTMENT (OUTPATIENT)
Dept: CT IMAGING | Age: 57
End: 2022-04-27
Attending: NURSE PRACTITIONER
Payer: MEDICARE

## 2022-04-27 VITALS
BODY MASS INDEX: 28.25 KG/M2 | OXYGEN SATURATION: 96 % | RESPIRATION RATE: 14 BRPM | WEIGHT: 180 LBS | SYSTOLIC BLOOD PRESSURE: 94 MMHG | DIASTOLIC BLOOD PRESSURE: 65 MMHG | HEART RATE: 86 BPM | HEIGHT: 67 IN | TEMPERATURE: 98.4 F

## 2022-04-27 LAB
AMPHET UR QL SCN: NEGATIVE
APPEARANCE UR: ABNORMAL
BACTERIA URNS QL MICRO: NEGATIVE /HPF
BARBITURATES UR QL SCN: NEGATIVE
BENZODIAZ UR QL: POSITIVE
BILIRUB UR QL: NEGATIVE
CANNABINOIDS UR QL SCN: NEGATIVE
COCAINE UR QL SCN: NEGATIVE
COLOR UR: YELLOW
DRUG SCRN COMMENT,DRGCM: ABNORMAL
GLUCOSE UR STRIP.AUTO-MCNC: NEGATIVE MG/DL
HGB UR QL STRIP: NEGATIVE
KETONES UR QL STRIP.AUTO: NEGATIVE MG/DL
LEUKOCYTE ESTERASE UR QL STRIP.AUTO: ABNORMAL
METHADONE UR QL: NEGATIVE
MUCOUS THREADS URNS QL MICRO: ABNORMAL /LPF
NITRITE UR QL STRIP.AUTO: NEGATIVE
OPIATES UR QL: NEGATIVE
PCP UR QL: NEGATIVE
PH UR STRIP: 5 [PH] (ref 5–8)
PROT UR STRIP-MCNC: 30 MG/DL
RBC #/AREA URNS HPF: ABNORMAL /HPF (ref 0–5)
SP GR UR REFRACTOMETRY: 1.03 (ref 1–1.03)
UA: UC IF INDICATED,UAUC: ABNORMAL
UROBILINOGEN UR QL STRIP.AUTO: 2 EU/DL (ref 0.1–1)
WBC URNS QL MICRO: ABNORMAL /HPF (ref 0–4)

## 2022-04-27 PROCEDURE — 80307 DRUG TEST PRSMV CHEM ANLYZR: CPT

## 2022-04-27 PROCEDURE — 70450 CT HEAD/BRAIN W/O DYE: CPT

## 2022-04-27 PROCEDURE — 81001 URINALYSIS AUTO W/SCOPE: CPT

## 2022-04-27 RX ORDER — PREDNISONE 50 MG/1
50 TABLET ORAL DAILY
Qty: 3 TABLET | Refills: 0 | Status: SHIPPED | OUTPATIENT
Start: 2022-04-27 | End: 2022-04-30

## 2022-04-27 RX ORDER — MELOXICAM 15 MG/1
15 TABLET ORAL DAILY
Qty: 5 TABLET | Refills: 0 | Status: SHIPPED | OUTPATIENT
Start: 2022-04-27 | End: 2022-05-02

## 2022-04-27 NOTE — ED PROVIDER NOTES
EMERGENCY DEPARTMENT HISTORY AND PHYSICAL EXAM      Date: 4/26/2022  Patient Name: Gricel Castaneda    History of Presenting Illness     No chief complaint on file. History Provided By: Patient    HPI: Gricel Castaneda, 64 y.o. female with a past medical history significant obesity, osteoarthritis, COPD and chronic lumbar pain with sciatica, seizure disorder, polypharmacy presents to the ED with cc of right-sided back pain with radiation to right hip and down lateral side of right leg after GLF this afternoon. States she was lifting heavy boxes which she knows she is not supposed to do, and experienced sharp pain in her paravertebral muscle on right side of back causing her to drop the boxes and fall onto her right side on the floor. She denies any other injury and was able to stand and can only ambulate with a limp and a slow, guarded gait. She denies head injury and reports only taking her valium and gabapentin PTA. Denies any urinary or bowel incontinence, numbness, or altered sensation in lower extremities. There are no other complaints, changes, or physical findings at this time. PCP: None    No current facility-administered medications on file prior to encounter. Current Outpatient Medications on File Prior to Encounter   Medication Sig Dispense Refill    omeprazole (PRILOSEC) 40 mg capsule Take 1 Capsule by mouth daily for 30 days. 30 Capsule 0    acetaminophen (TYLENOL) 325 mg tablet Take 2 Tablets by mouth every six (6) hours as needed for Pain or Fever for up to 30 days. 180 Tablet 0    traZODone (DESYREL) 100 mg tablet Take 100-200 mg by mouth At bedtime.  diazePAM (VALIUM) 10 mg tablet Take 10 mg by mouth two (2) times a day.  atorvastatin (LIPITOR) 10 mg tablet Take 10 mg by mouth nightly.  hydrOXYzine HCL (ATARAX) 50 mg tablet Take 50 mg by mouth every six (6) hours as needed (max 4 tablets a day).  (Patient not taking: Reported on 3/27/2022)      clonazePAM (KLONOPIN) 1 mg tablet Take  by mouth three (3) times daily.  gabapentin (NEURONTIN) 600 mg tablet Take  by mouth four (4) times daily. Past History     Past Medical History:  Past Medical History:   Diagnosis Date    DDD (degenerative disc disease), lumbar     Fatigue     GERD (gastroesophageal reflux disease)     Hypothyroidism     Psychiatric disorder     PTSD per patient    Seizures ([facility] [address]        Past Surgical History:  Past Surgical History:   Procedure Laterality Date    HX APPENDECTOMY      HX  SECTION      HX CHOLECYSTECTOMY      HX HYSTERECTOMY         Family History:  Family History   Family history unknown: Yes       Social History:  Social History     Tobacco Use    Smoking status: Current Every Day Smoker     Packs/day: 0.25    Smokeless tobacco: Never Used   Vaping Use    Vaping Use: Never used   Substance Use Topics    Alcohol use: Not Currently     Alcohol/week: 0.0 standard drinks    Drug use: Never       Allergies: Allergies   Allergen Reactions    Augmentin [Amoxicillin-Pot Clavulanate] Nausea and Vomiting         Review of Systems     Review of Systems   Constitutional: Negative. HENT: Negative. Eyes: Negative. Respiratory: Negative. Cardiovascular: Negative. Gastrointestinal: Negative. Endocrine: Negative. Genitourinary: Negative. Musculoskeletal: Positive for arthralgias, back pain and gait problem. Negative for myalgias and neck pain. Skin: Negative. Neurological: Negative for dizziness, syncope, weakness, light-headedness, numbness and headaches. Hematological: Negative. Psychiatric/Behavioral: Negative. Negative for confusion. The patient is not nervous/anxious. All other systems reviewed and are negative. Physical Exam     Physical Exam  Vitals and nursing note reviewed. Constitutional:       General: She is not in acute distress. Appearance: Normal appearance. She is obese.  She is not ill-appearing, toxic-appearing or diaphoretic. HENT:      Head: Normocephalic and atraumatic. Right Ear: External ear normal.      Left Ear: External ear normal.      Nose: Nose normal.      Mouth/Throat:      Pharynx: Oropharynx is clear. Eyes:      Extraocular Movements: Extraocular movements intact. Pupils: Pupils are equal, round, and reactive to light. Cardiovascular:      Rate and Rhythm: Normal rate. Pulses: Normal pulses. Pulmonary:      Effort: Pulmonary effort is normal. No respiratory distress. Breath sounds: Normal breath sounds. Abdominal:      General: There is no distension. Palpations: Abdomen is soft. Tenderness: There is no abdominal tenderness. Musculoskeletal:      Cervical back: Normal, normal range of motion and neck supple. No rigidity or tenderness. Thoracic back: Normal.      Lumbar back: Tenderness present. No swelling, edema, deformity, signs of trauma or bony tenderness. Normal range of motion. Right hip: Tenderness present. Left hip: Normal.      Right upper leg: Tenderness present. Left upper leg: Normal.      Right knee: Normal.      Left knee: Normal.      Right lower leg: Normal. No edema. Left lower leg: Normal. No edema. Skin:     General: Skin is warm and dry. Capillary Refill: Capillary refill takes less than 2 seconds. Neurological:      General: No focal deficit present. Mental Status: She is alert and oriented to person, place, and time. Sensory: No sensory deficit. Motor: No weakness.          Lab and Diagnostic Study Results     Labs -  Labs Reviewed   URINALYSIS W/ REFLEX CULTURE - Abnormal; Notable for the following components:       Result Value    Appearance Turbid (*)     Protein 30 (*)     Urobilinogen 2.0 (*)     Leukocyte Esterase Moderate (*)     All other components within normal limits   DRUG SCREEN, URINE - Abnormal; Notable for the following components:    BENZODIAZEPINES Positive (*) All other components within normal limits       Radiologic Studies -   @lastxrresult@  CT Results  (Last 48 hours)    None        CXR Results  (Last 48 hours)    None        CT HEAD WO CONT   Final Result   [Normal noncontrast CT of the head]        XR SPINE LUMB MIN 4 V   Final Result   No acute bony findings. For persistent clinical concern recommend   CT. XR HIP RT W OR WO PELV 2-3 VWS   Final Result   FINDINGS: IMPRESSION: AP pelvis and frog leg view of right hip. No acute   fracture or dislocation. XR FEMUR RT 2 VS   Final Result   FINDINGS: IMPRESSION: Frontal and lateral views of the right femur. No acute   fracture or dislocation. No radiopaque foreign body. Medical Decision Making   - I am the first provider for this patient. - I reviewed the vital signs, available nursing notes, past medical history, past surgical history, family history and social history. - Initial assessment performed. The patients presenting problems have been discussed, and they are in agreement with the care plan formulated and outlined with them. I have encouraged them to ask questions as they arise throughout their visit. Vital Signs-Reviewed the patient's vital signs. Patient Vitals for the past 12 hrs:   Temp Pulse Resp BP SpO2   04/26/22 2120 -- 98 -- -- --   04/26/22 2103 -- (!) 121 -- -- --   04/26/22 2053 98.4 °F (36.9 °C) (!) 131 18 132/79 98 %       Records Reviewed: Nursing Notes, Old Medical Records, Previous electrocardiograms, Previous Radiology Studies and Previous Laboratory Studies    The patient presents with back pain with a differential diagnosis of  lumbar strain, pyelonephritis, traumatic injury and DDD, disc rupture, osteoarthritis, sciatica, chronic pain, neuropathy      ED Course:     ED Course as of 04/28/22 2243   Tue Apr 26, 2022   2350 Patient able to bear weight and take steps on her right leg however she is still drowsy.   Admits to only taking a Valium and gabapentin prior to arrival.  States she is not sure if she hit her head now when she fell but there is no evidence of swelling injury or abnormality. She has history of polysubstance abuse. We will check head CT given her inaccurate and unreliable story for fall due to her drowsiness and check urine and urine drug screen. [KR]   After informing patient of plan and orders, she was more interactive but with continued mild drowsiness and slowed speech and stated she was Casey Garry now and just need more pain medication. \" Required frequent education that she had received medication and due to her drowsiness, it was not appropriate to give narcotics. She frequently requested oxycodone by name and recalled previous providers that prescribed. PDMP reviewed. Patient with previous prescriptions but none current. Head CT negative and urine drug screen positive for benzodiazepines. She was noted to equally bear weight bilaterally and able to ambulate and called spouse for transportation. She was provided with antiinflammatories and steroids appropriate for lumbar strain with sciatica which she accepted and was instructed to follow up with ortho and her PCP for further medications and management and was provided with new orthopedic referral per her request due to not liking the plan of care and not receiving pain medications for her back pain.     ED Course User Index  [KR] Lara Caldwell NP       Provider Notes (Medical Decision Making):     MDM  Number of Diagnoses or Management Options  Chronic right-sided low back pain with right-sided sciatica: new, needed workup  Fall, initial encounter: new, needed workup  History of benzodiazepine use: new, no workup     Amount and/or Complexity of Data Reviewed  Clinical lab tests: ordered and reviewed  Tests in the radiology section of CPT®: ordered and reviewed  Review and summarize past medical records: yes  Discuss the patient with other providers: yes  Independent visualization of images, tracings, or specimens: yes    Patient Progress  Patient progress: improved         Disposition   Disposition: Condition stable and improved  DC- Adult Discharges: All of the diagnostic tests were reviewed and questions answered. Diagnosis, care plan and treatment options were discussed. The patient understands the instructions and will follow up as directed. The patients results have been reviewed with them. They have been counseled regarding their diagnosis. The patient verbally convey understanding and agreement of the signs, symptoms, diagnosis, treatment and prognosis and additionally agrees to follow up as recommended with their PCP in 24 - 48 hours. They also agree with the care-plan and convey that all of their questions have been answered. I have also put together some discharge instructions for them that include: 1) educational information regarding their diagnosis, 2) how to care for their diagnosis at home, as well a 3) list of reasons why they would want to return to the ED prior to their follow-up appointment, should their condition change. DISCHARGE PLAN:  1. Current Discharge Medication List      CONTINUE these medications which have NOT CHANGED    Details   omeprazole (PRILOSEC) 40 mg capsule Take 1 Capsule by mouth daily for 30 days. Qty: 30 Capsule, Refills: 0      acetaminophen (TYLENOL) 325 mg tablet Take 2 Tablets by mouth every six (6) hours as needed for Pain or Fever for up to 30 days. Qty: 180 Tablet, Refills: 0      traZODone (DESYREL) 100 mg tablet Take 100-200 mg by mouth At bedtime. diazePAM (VALIUM) 10 mg tablet Take 10 mg by mouth two (2) times a day. atorvastatin (LIPITOR) 10 mg tablet Take 10 mg by mouth nightly. hydrOXYzine HCL (ATARAX) 50 mg tablet Take 50 mg by mouth every six (6) hours as needed (max 4 tablets a day). clonazePAM (KLONOPIN) 1 mg tablet Take  by mouth three (3) times daily.     Associated Diagnoses: Idiopathic hypotension; Hypothyroidism due to acquired atrophy of thyroid; Anemia due to vitamin B12 deficiency; Dizziness; Arthralgia      gabapentin (NEURONTIN) 600 mg tablet Take  by mouth four (4) times daily. Associated Diagnoses: Idiopathic hypotension; Hypothyroidism due to acquired atrophy of thyroid; Anemia due to vitamin B12 deficiency; Dizziness; Arthralgia           2. Follow-up Information     Follow up With Specialties Details Why [id number] [facility]  Call  New orthopedic referral [address]  [phone]    Galilea Owen MD Internal Medicine  PCP referral [address] [place] [address]  [address]  [phone]      Follow up with primary care, urgent care, or this Emergency department   As needed, If symptoms worsen         3. Return to ED if worse   4. Discharge Medication List as of 4/27/2022  1:24 AM      START taking these medications    Details   predniSONE (DELTASONE) 50 mg tablet Take 1 Tablet by mouth daily for 3 days. , Normal, Disp-3 Tablet, R-0      meloxicam (Mobic) 15 mg tablet Take 1 Tablet by mouth daily for 5 days. , Normal, Disp-5 Tablet, R-0         CONTINUE these medications which have NOT CHANGED    Details   omeprazole (PRILOSEC) 40 mg capsule Take 1 Capsule by mouth daily for 30 days. , Normal, Disp-30 Capsule, R-0      acetaminophen (TYLENOL) 325 mg tablet Take 2 Tablets by mouth every six (6) hours as needed for Pain or Fever for up to 30 days. , No Print, Disp-180 Tablet, R-0      traZODone (DESYREL) 100 mg tablet Take 100-200 mg by mouth At bedtime. , Historical Med      diazePAM (VALIUM) 10 mg tablet Take 10 mg by mouth two (2) times a day., Historical Med      atorvastatin (LIPITOR) 10 mg tablet Take 10 mg by mouth nightly.  , Historical Med      hydrOXYzine HCL (ATARAX) 50 mg tablet Take 50 mg by mouth every six (6) hours as needed (max 4 tablets a day). , Historical Med      clonazePAM (KLONOPIN) 1 mg tablet Take  by mouth three (3) times daily. , Historical Med      gabapentin (NEURONTIN) 600 mg tablet Take  by mouth four (4) times daily. , Historical Med               Diagnosis     Clinical Impression:   1. Chronic right-sided low back pain with right-sided sciatica    2. Fall, initial encounter    3. History of benzodiazepine use        Attestations:    Lexi Merida NP    Please note that this dictation was completed with [employer], the computer voice recognition software. Quite often unanticipated grammatical, syntax, homophones, and other interpretive errors are inadvertently transcribed by the computer software. Please disregard these errors. Please excuse any errors that have escaped final proofreading. Thank you.

## 2022-05-18 ENCOUNTER — HOSPITAL ENCOUNTER (EMERGENCY)
Age: 57
Discharge: HOME | End: 2022-05-18
Attending: STUDENT IN AN ORGANIZED HEALTH CARE EDUCATION/TRAINING PROGRAM | Admitting: STUDENT IN AN ORGANIZED HEALTH CARE EDUCATION/TRAINING PROGRAM
Payer: MEDICARE

## 2022-05-18 ENCOUNTER — APPOINTMENT (OUTPATIENT)
Dept: CT IMAGING | Age: 57
End: 2022-05-18
Attending: PHYSICIAN ASSISTANT
Payer: MEDICARE

## 2022-05-18 VITALS
DIASTOLIC BLOOD PRESSURE: 90 MMHG | HEIGHT: 68 IN | HEART RATE: 82 BPM | WEIGHT: 180 LBS | SYSTOLIC BLOOD PRESSURE: 115 MMHG | OXYGEN SATURATION: 96 % | RESPIRATION RATE: 16 BRPM | BODY MASS INDEX: 27.28 KG/M2 | TEMPERATURE: 98.5 F

## 2022-05-18 DIAGNOSIS — E86.0 DEHYDRATION: ICD-10-CM

## 2022-05-18 DIAGNOSIS — R10.84 ABDOMINAL PAIN, GENERALIZED: Primary | ICD-10-CM

## 2022-05-18 DIAGNOSIS — R19.7 DIARRHEA, UNSPECIFIED TYPE: ICD-10-CM

## 2022-05-18 LAB
ABO + RH BLD: NORMAL
ALBUMIN SERPL-MCNC: 3.8 G/DL (ref 3.5–5)
ALBUMIN/GLOB SERPL: 1 {RATIO} (ref 1.1–2.2)
ALP SERPL-CCNC: 88 U/L (ref 45–117)
ALT SERPL-CCNC: 19 U/L (ref 12–78)
ANION GAP SERPL CALC-SCNC: 6 MMOL/L (ref 5–15)
AST SERPL W P-5'-P-CCNC: 25 U/L (ref 15–37)
BASOPHILS # BLD: 0.1 K/UL (ref 0–0.1)
BASOPHILS NFR BLD: 1 % (ref 0–1)
BILIRUB SERPL-MCNC: 0.5 MG/DL (ref 0.2–1)
BLOOD GROUP ANTIBODIES SERPL: NEGATIVE
BUN SERPL-MCNC: 22 MG/DL (ref 6–20)
BUN/CREAT SERPL: 18 (ref 12–20)
CA-I BLD-MCNC: 9.4 MG/DL (ref 8.5–10.1)
CHLORIDE SERPL-SCNC: 104 MMOL/L (ref 97–108)
CO2 SERPL-SCNC: 25 MMOL/L (ref 21–32)
COLLECT DATE STL: 9
CREAT SERPL-MCNC: 1.24 MG/DL (ref 0.55–1.02)
DIFFERENTIAL METHOD BLD: ABNORMAL
EOSINOPHIL # BLD: 0.1 K/UL (ref 0–0.4)
EOSINOPHIL NFR BLD: 1 % (ref 0–7)
ERYTHROCYTE [DISTWIDTH] IN BLOOD BY AUTOMATED COUNT: 13.4 % (ref 11.5–14.5)
GLOBULIN SER CALC-MCNC: 3.8 G/DL (ref 2–4)
GLUCOSE SERPL-MCNC: 111 MG/DL (ref 65–100)
HCT VFR BLD AUTO: 47.6 % (ref 35–47)
HEMOCCULT SP1 STL QL: NEGATIVE
HGB BLD-MCNC: 15.9 G/DL (ref 11.5–16)
IMM GRANULOCYTES # BLD AUTO: 0 K/UL (ref 0–0.04)
IMM GRANULOCYTES NFR BLD AUTO: 0 % (ref 0–0.5)
LIPASE SERPL-CCNC: 132 U/L (ref 73–393)
LYMPHOCYTES # BLD: 3.8 K/UL (ref 0.8–3.5)
LYMPHOCYTES NFR BLD: 37 % (ref 12–49)
MCH RBC QN AUTO: 28.5 PG (ref 26–34)
MCHC RBC AUTO-ENTMCNC: 33.4 G/DL (ref 30–36.5)
MCV RBC AUTO: 85.3 FL (ref 80–99)
MONOCYTES # BLD: 0.5 K/UL (ref 0–1)
MONOCYTES NFR BLD: 5 % (ref 5–13)
NEUTS SEG # BLD: 5.8 K/UL (ref 1.8–8)
NEUTS SEG NFR BLD: 56 % (ref 32–75)
NRBC # BLD: 0 K/UL (ref 0–0.01)
NRBC BLD-RTO: 0 PER 100 WBC
PLATELET # BLD AUTO: 454 K/UL (ref 150–400)
PMV BLD AUTO: 9 FL (ref 8.9–12.9)
POTASSIUM SERPL-SCNC: 4.4 MMOL/L (ref 3.5–5.1)
PROT SERPL-MCNC: 7.6 G/DL (ref 6.4–8.2)
RBC # BLD AUTO: 5.58 M/UL (ref 3.8–5.2)
SODIUM SERPL-SCNC: 135 MMOL/L (ref 136–145)
SPECIMEN EXP DATE BLD: NORMAL
WBC # BLD AUTO: 10.3 K/UL (ref 3.6–11)

## 2022-05-18 PROCEDURE — 80053 COMPREHEN METABOLIC PANEL: CPT

## 2022-05-18 PROCEDURE — 86900 BLOOD TYPING SEROLOGIC ABO: CPT

## 2022-05-18 PROCEDURE — 74011250636 HC RX REV CODE- 250/636: Performed by: PHYSICIAN ASSISTANT

## 2022-05-18 PROCEDURE — 99285 EMERGENCY DEPT VISIT HI MDM: CPT

## 2022-05-18 PROCEDURE — 96374 THER/PROPH/DIAG INJ IV PUSH: CPT

## 2022-05-18 PROCEDURE — 96376 TX/PRO/DX INJ SAME DRUG ADON: CPT

## 2022-05-18 PROCEDURE — 74011000250 HC RX REV CODE- 250: Performed by: PHYSICIAN ASSISTANT

## 2022-05-18 PROCEDURE — 96375 TX/PRO/DX INJ NEW DRUG ADDON: CPT

## 2022-05-18 PROCEDURE — 36415 COLL VENOUS BLD VENIPUNCTURE: CPT

## 2022-05-18 PROCEDURE — 83690 ASSAY OF LIPASE: CPT

## 2022-05-18 PROCEDURE — 85025 COMPLETE CBC W/AUTO DIFF WBC: CPT

## 2022-05-18 PROCEDURE — 74011000636 HC RX REV CODE- 636: Performed by: STUDENT IN AN ORGANIZED HEALTH CARE EDUCATION/TRAINING PROGRAM

## 2022-05-18 PROCEDURE — 82272 OCCULT BLD FECES 1-3 TESTS: CPT

## 2022-05-18 PROCEDURE — 74177 CT ABD & PELVIS W/CONTRAST: CPT

## 2022-05-18 PROCEDURE — C9113 INJ PANTOPRAZOLE SODIUM, VIA: HCPCS | Performed by: PHYSICIAN ASSISTANT

## 2022-05-18 RX ORDER — HYDROMORPHONE HYDROCHLORIDE 1 MG/ML
1 INJECTION, SOLUTION INTRAMUSCULAR; INTRAVENOUS; SUBCUTANEOUS ONCE
Status: COMPLETED | OUTPATIENT
Start: 2022-05-18 | End: 2022-05-18

## 2022-05-18 RX ORDER — ONDANSETRON 2 MG/ML
4 INJECTION INTRAMUSCULAR; INTRAVENOUS
Status: COMPLETED | OUTPATIENT
Start: 2022-05-18 | End: 2022-05-18

## 2022-05-18 RX ORDER — ONDANSETRON 4 MG/1
4 TABLET, ORALLY DISINTEGRATING ORAL
Qty: 10 TABLET | Refills: 0 | Status: SHIPPED | OUTPATIENT
Start: 2022-05-18 | End: 2022-05-27

## 2022-05-18 RX ORDER — MORPHINE SULFATE 2 MG/ML
2 INJECTION, SOLUTION INTRAMUSCULAR; INTRAVENOUS ONCE
Status: COMPLETED | OUTPATIENT
Start: 2022-05-18 | End: 2022-05-18

## 2022-05-18 RX ORDER — DICYCLOMINE HYDROCHLORIDE 10 MG/1
10 CAPSULE ORAL 4 TIMES DAILY
Qty: 28 CAPSULE | Refills: 0 | Status: SHIPPED | OUTPATIENT
Start: 2022-05-18 | End: 2022-05-27

## 2022-05-18 RX ADMIN — ONDANSETRON 4 MG: 2 INJECTION INTRAMUSCULAR; INTRAVENOUS at 13:15

## 2022-05-18 RX ADMIN — MORPHINE SULFATE 2 MG: 2 INJECTION, SOLUTION INTRAMUSCULAR; INTRAVENOUS at 16:36

## 2022-05-18 RX ADMIN — MORPHINE SULFATE 2 MG: 2 INJECTION, SOLUTION INTRAMUSCULAR; INTRAVENOUS at 13:15

## 2022-05-18 RX ADMIN — SODIUM CHLORIDE 1000 ML: 9 INJECTION, SOLUTION INTRAVENOUS at 13:16

## 2022-05-18 RX ADMIN — SODIUM CHLORIDE, PRESERVATIVE FREE 40 MG: 5 INJECTION INTRAVENOUS at 13:15

## 2022-05-18 RX ADMIN — HYDROMORPHONE HYDROCHLORIDE 1 MG: 1 INJECTION, SOLUTION INTRAMUSCULAR; INTRAVENOUS; SUBCUTANEOUS at 14:27

## 2022-05-18 RX ADMIN — IOPAMIDOL 100 ML: 755 INJECTION, SOLUTION INTRAVENOUS at 15:49

## 2022-05-18 NOTE — Clinical Note
[address] [facility] EMERGENCY DEPT  [address] [facility] [id number] [phone]    Work/School Note    Date: 5/18/2022    To Whom It May concern:    Elvi Oconnell was seen and treated today in the emergency room by the following provider(s):  Attending Provider: Valentina Goodson MD  Physician Assistant: Guillermina Holbrook PA-C. Elvi Oconnell is excused from work/school on 05/18/22 and 05/19/22. She is medically clear to return to work/school on 5/20/2022.        Sincerely,          Brody Browning PA-C

## 2022-05-18 NOTE — ED PROVIDER NOTES
EMERGENCY DEPARTMENT HISTORY AND PHYSICAL EXAM      Date: 2022  Patient Name: Sera Vitale    History of Presenting Illness     Chief Complaint   Patient presents with    Diarrhea    Vomiting    Abdominal Pain       History Provided By: Patient    HPI: Sera Vitale, 64 y.o. female with a past medical history significant GERD presents to the ED with cc of upper to middle abdominal pain with associated black stool x5 days. Patient denies a history of gastric ulcer or GI bleeding. Associated nausea, vomiting, diarrhea. Patient specifically denies chest pain, shortness of breath, fever, blood in vomit, iron supplement, taking Pepto-Bismol, recent travel, recent antibiotic use. There are no other complaints, changes, or physical findings at this time. PCP: None    No current facility-administered medications on file prior to encounter. Current Outpatient Medications on File Prior to Encounter   Medication Sig Dispense Refill    traZODone (DESYREL) 100 mg tablet Take 100-200 mg by mouth At bedtime.  diazePAM (VALIUM) 10 mg tablet Take 10 mg by mouth two (2) times a day.  atorvastatin (LIPITOR) 10 mg tablet Take 10 mg by mouth nightly.  hydrOXYzine HCL (ATARAX) 50 mg tablet Take 50 mg by mouth every six (6) hours as needed (max 4 tablets a day). (Patient not taking: Reported on 3/27/2022)      clonazePAM (KLONOPIN) 1 mg tablet Take  by mouth three (3) times daily.  gabapentin (NEURONTIN) 600 mg tablet Take  by mouth four (4) times daily.          Past History     Past Medical History:  Past Medical History:   Diagnosis Date    DDD (degenerative disc disease), lumbar     Fatigue     GERD (gastroesophageal reflux disease)     Hypothyroidism     Psychiatric disorder     PTSD per patient    Seizures ([facility] [address]        Past Surgical History:  Past Surgical History:   Procedure Laterality Date    HX APPENDECTOMY      HX  SECTION      HX CHOLECYSTECTOMY      HX HYSTERECTOMY         Family History:  Family History   Family history unknown: Yes       Social History:  Social History     Tobacco Use    Smoking status: Current Every Day Smoker     Packs/day: 0.25    Smokeless tobacco: Never Used   Vaping Use    Vaping Use: Never used   Substance Use Topics    Alcohol use: Not Currently     Alcohol/week: 0.0 standard drinks    Drug use: Never       Allergies: Allergies   Allergen Reactions    Augmentin [Amoxicillin-Pot Clavulanate] Nausea and Vomiting         Review of Systems   Review of Systems   Constitutional: Negative for activity change, chills and fever. HENT: Negative for congestion, ear pain, rhinorrhea, sneezing and sore throat. Eyes: Negative for pain and visual disturbance. Respiratory: Negative for cough and shortness of breath. Cardiovascular: Negative for chest pain. Gastrointestinal: Positive for abdominal pain, blood in stool, diarrhea, nausea and vomiting. Negative for constipation. Genitourinary: Negative for dysuria and hematuria. Musculoskeletal: Negative for gait problem. Skin: Negative for rash. Neurological: Negative for speech difficulty, weakness and headaches. Psychiatric/Behavioral: The patient is not nervous/anxious. All other systems reviewed and are negative. Physical Exam   Physical Exam  Vitals and nursing note reviewed. Exam conducted with a chaperone present. Constitutional:       General: She is not in acute distress. Appearance: Normal appearance. She is not ill-appearing or toxic-appearing. HENT:      Head: Normocephalic and atraumatic. Nose: Nose normal.      Mouth/Throat:      Mouth: Mucous membranes are moist.   Eyes:      Extraocular Movements: Extraocular movements intact. Conjunctiva/sclera: Conjunctivae normal.      Pupils: Pupils are equal, round, and reactive to light. Cardiovascular:      Rate and Rhythm: Normal rate. Pulses: Normal pulses.       Heart sounds: Normal heart sounds. Pulmonary:      Effort: Pulmonary effort is normal. No respiratory distress. Breath sounds: Normal breath sounds. Abdominal:      General: Bowel sounds are normal.      Palpations: Abdomen is soft. Tenderness: There is generalized abdominal tenderness and tenderness in the epigastric area. There is guarding (voluntary). There is no right CVA tenderness, left CVA tenderness or rebound. Hernia: No hernia is present. Genitourinary:     Rectum: Normal. Guaiac result negative. Comments: Light brown stool noted  Musculoskeletal:         General: No deformity or signs of injury. Normal range of motion. Cervical back: Normal range of motion. Skin:     General: Skin is warm and dry. Capillary Refill: Capillary refill takes less than 2 seconds. Findings: No rash. Neurological:      General: No focal deficit present. Mental Status: She is alert and oriented to person, place, and time. Cranial Nerves: No cranial nerve deficit. Psychiatric:         Mood and Affect: Mood normal.         Diagnostic Study Results     Labs -     Recent Results (from the past 200 hour(s))   OCCULT BLOOD, STOOL    Collection Time: 05/18/22 12:45 PM   Result Value Ref Range    Occult Blood,day 1 Negative Negative      Day 1 date: 9     CBC WITH AUTOMATED DIFF    Collection Time: 05/18/22  1:07 PM   Result Value Ref Range    WBC 10.3 3.6 - 11.0 K/uL    RBC 5.58 (H) 3.80 - 5.20 M/uL    HGB 15.9 11.5 - 16.0 g/dL    HCT 47.6 (H) 35.0 - 47.0 %    MCV 85.3 80.0 - 99.0 FL    MCH 28.5 26.0 - 34.0 PG    MCHC 33.4 30.0 - 36.5 g/dL    RDW 13.4 11.5 - 14.5 %    PLATELET 234 (H) 440 - 400 K/uL    MPV 9.0 8.9 - 12.9 FL    NRBC 0.0 0.0  WBC    ABSOLUTE NRBC 0.00 0.00 - 0.01 K/uL    NEUTROPHILS 56 32 - 75 %    LYMPHOCYTES 37 12 - 49 %    MONOCYTES 5 5 - 13 %    EOSINOPHILS 1 0 - 7 %    BASOPHILS 1 0 - 1 %    IMMATURE GRANULOCYTES 0 0 - 0.5 %    ABS. NEUTROPHILS 5.8 1.8 - 8.0 K/UL    ABS. LYMPHOCYTES 3.8 (H) 0.8 - 3.5 K/UL    ABS. MONOCYTES 0.5 0.0 - 1.0 K/UL    ABS. EOSINOPHILS 0.1 0.0 - 0.4 K/UL    ABS. BASOPHILS 0.1 0.0 - 0.1 K/UL    ABS. IMM. GRANS. 0.0 0.00 - 0.04 K/UL    DF AUTOMATED     METABOLIC PANEL, COMPREHENSIVE    Collection Time: 05/18/22  1:07 PM   Result Value Ref Range    Sodium 135 (L) 136 - 145 mmol/L    Potassium 4.4 3.5 - 5.1 mmol/L    Chloride 104 97 - 108 mmol/L    CO2 25 21 - 32 mmol/L    Anion gap 6 5 - 15 mmol/L    Glucose 111 (H) 65 - 100 mg/dL    BUN 22 (H) 6 - 20 mg/dL    Creatinine 1.24 (H) 0.55 - 1.02 mg/dL    BUN/Creatinine ratio 18 12 - 20      GFR est AA 54 (L) >60 ml/min/1.73m2    GFR est non-AA 45 (L) >60 ml/min/1.73m2    Calcium 9.4 8.5 - 10.1 mg/dL    Bilirubin, total 0.5 0.2 - 1.0 mg/dL    AST (SGOT) 25 15 - 37 U/L    ALT (SGPT) 19 12 - 78 U/L    Alk. phosphatase 88 45 - 117 U/L    Protein, total 7.6 6.4 - 8.2 g/dL    Albumin 3.8 3.5 - 5.0 g/dL    Globulin 3.8 2.0 - 4.0 g/dL    A-G Ratio 1.0 (L) 1.1 - 2.2     TYPE & SCREEN    Collection Time: 05/18/22  1:07 PM   Result Value Ref Range    Crossmatch Expiration 05/21/2022,2359     ABO/Rh(D) Ivanna Eglin Positive     Antibody screen Negative    LIPASE    Collection Time: 05/18/22  1:07 PM   Result Value Ref Range    Lipase 132 73 - 393 U/L       Radiologic Studies -   Results from Hospital Encounter encounter on 04/26/22    XR FEMUR RT 2 VS    Narrative  Pain post fall. Impression  FINDINGS: IMPRESSION: Frontal and lateral views of the right femur. No acute  fracture or dislocation. No radiopaque foreign body. CT Results  (Last 48 hours)    None        CT Results (most recent):  Results from Hospital Encounter encounter on 05/18/22    CT ABD PELV W CONT    Narrative  EXAM: CT ABD PELV W CONT    INDICATION: abdominal pain, blood in stool    COMPARISON: CT abdomen pelvis without contrast March 27, 2022. CONTRAST: 100 mL of Isovue-370. Oral contrast was not administered.     TECHNIQUE:  Multislice helical CT was performed from the diaphragm to the symphysis pubis  during uneventful rapid bolus intravenous contrast administration. Contiguous  axial images were reconstructed and lung and soft tissue windows were generated. Coronal and sagittal reformations were generated. CT dose reduction was achieved  through use of a standardized protocol tailored for this examination and  automatic exposure control for dose modulation. FINDINGS:  Lower chest: Redemonstration of the basal atelectasis, slightly improved. Liver, biliary tree, and gallbladder: Unchanged low density lesion in the right  hepatic lobe (201-56). No focal enhancing hepatic mass. Cholecystectomy. No  suspicious common duct dilatation. Spleen: Within normal limits. Pancreas: No mass or ductal dilatation. Kidneys and adrenals: No renal mass, obstructing calculus, or hydronephrosis. Unchanged adrenals. Bowels: No bowel wall thickening or dilatation. Appendix is not well seen. Peritoneum and retroperitoneum: No ascites or pneumoperitoneum. No  lymphadenopathy or aortic aneurysm. Pelvis: Hysterectomy. Bones and abdominal wall: No destructive bone lesion. No abdominal wall mass or  hernia. Impression  No acute findings to accommodate patient's symptoms. Medical Decision Making   I am the first provider for this patient. I reviewed the vital signs, available nursing notes, past medical history, past surgical history, family history and social history. Vital Signs-Reviewed the patient's vital signs.   Wt Readings from Last 3 Encounters:   05/18/22 81.6 kg (180 lb)   04/26/22 81.6 kg (180 lb)   03/27/22 81.6 kg (180 lb)     Temp Readings from Last 3 Encounters:   05/18/22 98.5 °F (36.9 °C)   04/26/22 98.4 °F (36.9 °C)   03/28/22 97.5 °F (36.4 °C)     BP Readings from Last 3 Encounters:   05/18/22 (!) 115/90   04/27/22 94/65   03/28/22 107/72     Pulse Readings from Last 3 Encounters:   05/18/22 82   04/27/22 86   03/28/22 81 No data found. Records Reviewed: Nursing Notes and Old Medical Records    Provider Notes (Medical Decision Making):     MDM  Number of Diagnoses or Management Options  Abdominal pain, generalized  Dehydration  Diarrhea, unspecified type  Diagnosis management comments: 49-year-old female presenting to the emergency department with abdominal pain, complaints of black stool, nondrinker, no history of liver disease, not on any anticoagulation. Her vital signs are stable, she is afebrile, no tachycardia. Fecal occult negative for blood. Abdominal CT reveals no acute findings, no active bleeding. Her lab work-up was negative aside from dehydration. She was hydrated with IV fluids. Suspect Gastroenteritis contributing to diarrhea. She is stable for discharge with nausea medication and pain medication and advised of close return precautions. She voices understanding. Amount and/or Complexity of Data Reviewed  Clinical lab tests: ordered and reviewed  Tests in the radiology section of CPT®: ordered and reviewed  Review and summarize past medical records: yes        ED Course:   Initial assessment performed. The patients presenting problems have been discussed, and they are in agreement with the care plan formulated and outlined with them. I have encouraged them to ask questions as they arise throughout their visit. PROCEDURES    Procedures       Disposition     Disposition: DC- Adult Discharges: All of the diagnostic tests were reviewed and questions answered. Diagnosis, care plan and treatment options were discussed. The patient understands the instructions and will follow up as directed. The patients results have been reviewed with them. They have been counseled regarding their diagnosis. The patient verbally convey understanding and agreement of the signs, symptoms, diagnosis, treatment and prognosis and additionally agrees to follow up as recommended with their PCP in 24 - 48 hours. They also agree with the care-plan and convey that all of their questions have been answered. I have also put together some discharge instructions for them that include: 1) educational information regarding their diagnosis, 2) how to care for their diagnosis at home, as well a 3) list of reasons why they would want to return to the ED prior to their follow-up appointment, should their condition change. Discharged     DISCHARGE PLAN:  1. Current Discharge Medication List      CONTINUE these medications which have NOT CHANGED    Details   traZODone (DESYREL) 100 mg tablet Take 100-200 mg by mouth At bedtime. diazePAM (VALIUM) 10 mg tablet Take 10 mg by mouth two (2) times a day. atorvastatin (LIPITOR) 10 mg tablet Take 10 mg by mouth nightly. hydrOXYzine HCL (ATARAX) 50 mg tablet Take 50 mg by mouth every six (6) hours as needed (max 4 tablets a day). clonazePAM (KLONOPIN) 1 mg tablet Take  by mouth three (3) times daily. Associated Diagnoses: Idiopathic hypotension; Hypothyroidism due to acquired atrophy of thyroid; Anemia due to vitamin B12 deficiency; Dizziness; Arthralgia      gabapentin (NEURONTIN) 600 mg tablet Take  by mouth four (4) times daily. Associated Diagnoses: Idiopathic hypotension; Hypothyroidism due to acquired atrophy of thyroid; Anemia due to vitamin B12 deficiency; Dizziness; Arthralgia           2. Follow-up Information     Follow up With Specialties Details Why Emmanuel Ibanez MD Gastroenterology Schedule an appointment as soon as possible for a visit  for follow up [facility]  [address] [facility] [address] [phone]      [address] [place] [address] DEPT Emergency Medicine  As needed, If symptoms worsen [address] [facility] [address] [phone]        3. Return to ED if worse   4.    Discharge Medication List as of 5/18/2022  5:20 PM      START taking these medications    Details   dicyclomine (BENTYL) 10 mg capsule Take 1 Capsule by mouth four (4) times daily for 7 days. , Normal, Disp-28 Capsule, R-0      ondansetron (ZOFRAN ODT) 4 mg disintegrating tablet Take 1 Tablet by mouth every eight (8) hours as needed for Nausea or Vomiting., Normal, Disp-10 Tablet, R-0         CONTINUE these medications which have NOT CHANGED    Details   traZODone (DESYREL) 100 mg tablet Take 100-200 mg by mouth At bedtime. , Historical Med      diazePAM (VALIUM) 10 mg tablet Take 10 mg by mouth two (2) times a day., Historical Med      atorvastatin (LIPITOR) 10 mg tablet Take 10 mg by mouth nightly.  , Historical Med      hydrOXYzine HCL (ATARAX) 50 mg tablet Take 50 mg by mouth every six (6) hours as needed (max 4 tablets a day). , Historical Med      clonazePAM (KLONOPIN) 1 mg tablet Take  by mouth three (3) times daily. , Historical Med      gabapentin (NEURONTIN) 600 mg tablet Take  by mouth four (4) times daily. , Historical Med             Diagnosis     Clinical Impression:   1. Abdominal pain, generalized    2. Diarrhea, unspecified type    3.  Dehydration

## 2022-05-25 ENCOUNTER — APPOINTMENT (OUTPATIENT)
Dept: CT IMAGING | Age: 57
DRG: 918 | End: 2022-05-25
Attending: EMERGENCY MEDICINE
Payer: MEDICARE

## 2022-05-25 ENCOUNTER — HOSPITAL ENCOUNTER (INPATIENT)
Age: 57
LOS: 2 days | Discharge: HOME HEALTH | DRG: 918 | End: 2022-05-27
Attending: EMERGENCY MEDICINE | Admitting: FAMILY MEDICINE
Payer: MEDICARE

## 2022-05-25 DIAGNOSIS — R56.9 SEIZURE (HCC): Primary | ICD-10-CM

## 2022-05-25 DIAGNOSIS — R41.82 ALTERED MENTAL STATUS, UNSPECIFIED ALTERED MENTAL STATUS TYPE: ICD-10-CM

## 2022-05-25 LAB
ALBUMIN SERPL-MCNC: 3.7 G/DL (ref 3.5–5)
ALBUMIN/GLOB SERPL: 1 {RATIO} (ref 1.1–2.2)
ALP SERPL-CCNC: 75 U/L (ref 45–117)
ALT SERPL-CCNC: 17 U/L (ref 12–78)
AMPHET UR QL SCN: NEGATIVE
ANION GAP SERPL CALC-SCNC: 4 MMOL/L (ref 5–15)
APPEARANCE UR: CLEAR
APTT PPP: 32.7 SEC (ref 21.2–34.1)
AST SERPL W P-5'-P-CCNC: 31 U/L (ref 15–37)
BACTERIA URNS QL MICRO: NEGATIVE /HPF
BARBITURATES UR QL SCN: NEGATIVE
BASOPHILS # BLD: 0.1 K/UL (ref 0–0.1)
BASOPHILS NFR BLD: 1 % (ref 0–1)
BENZODIAZ UR QL: POSITIVE
BILIRUB SERPL-MCNC: 0.4 MG/DL (ref 0.2–1)
BILIRUB UR QL: NEGATIVE
BUN SERPL-MCNC: 19 MG/DL (ref 6–20)
BUN/CREAT SERPL: 13 (ref 12–20)
CA-I BLD-MCNC: 9 MG/DL (ref 8.5–10.1)
CANNABINOIDS UR QL SCN: NEGATIVE
CHLORIDE SERPL-SCNC: 105 MMOL/L (ref 97–108)
CO2 SERPL-SCNC: 24 MMOL/L (ref 21–32)
COCAINE UR QL SCN: NEGATIVE
COLOR UR: ABNORMAL
CREAT SERPL-MCNC: 1.49 MG/DL (ref 0.55–1.02)
DIFFERENTIAL METHOD BLD: ABNORMAL
DRUG SCRN COMMENT,DRGCM: ABNORMAL
EOSINOPHIL # BLD: 0.1 K/UL (ref 0–0.4)
EOSINOPHIL NFR BLD: 1 % (ref 0–7)
ERYTHROCYTE [DISTWIDTH] IN BLOOD BY AUTOMATED COUNT: 13.4 % (ref 11.5–14.5)
GLOBULIN SER CALC-MCNC: 3.7 G/DL (ref 2–4)
GLUCOSE SERPL-MCNC: 124 MG/DL (ref 65–100)
GLUCOSE UR STRIP.AUTO-MCNC: NEGATIVE MG/DL
HCT VFR BLD AUTO: 45.2 % (ref 35–47)
HGB BLD-MCNC: 15.1 G/DL (ref 11.5–16)
HGB UR QL STRIP: NEGATIVE
HYALINE CASTS URNS QL MICRO: ABNORMAL /LPF (ref 0–5)
IMM GRANULOCYTES # BLD AUTO: 0 K/UL (ref 0–0.04)
IMM GRANULOCYTES NFR BLD AUTO: 0 % (ref 0–0.5)
INR PPP: 1 (ref 0.9–1.1)
KETONES UR QL STRIP.AUTO: NEGATIVE MG/DL
LACTATE SERPL-SCNC: 1 MMOL/L (ref 0.4–2)
LEUKOCYTE ESTERASE UR QL STRIP.AUTO: NEGATIVE
LYMPHOCYTES # BLD: 3.7 K/UL (ref 0.8–3.5)
LYMPHOCYTES NFR BLD: 39 % (ref 12–49)
MCH RBC QN AUTO: 28.7 PG (ref 26–34)
MCHC RBC AUTO-ENTMCNC: 33.4 G/DL (ref 30–36.5)
MCV RBC AUTO: 85.9 FL (ref 80–99)
METHADONE UR QL: NEGATIVE
MONOCYTES # BLD: 0.5 K/UL (ref 0–1)
MONOCYTES NFR BLD: 5 % (ref 5–13)
MUCOUS THREADS URNS QL MICRO: ABNORMAL /LPF
NEUTS SEG # BLD: 5 K/UL (ref 1.8–8)
NEUTS SEG NFR BLD: 54 % (ref 32–75)
NITRITE UR QL STRIP.AUTO: NEGATIVE
NRBC # BLD: 0 K/UL (ref 0–0.01)
NRBC BLD-RTO: 0 PER 100 WBC
OPIATES UR QL: NEGATIVE
PCP UR QL: NEGATIVE
PH UR STRIP: 6 [PH] (ref 5–8)
PLATELET # BLD AUTO: 352 K/UL (ref 150–400)
PMV BLD AUTO: 9.5 FL (ref 8.9–12.9)
POTASSIUM SERPL-SCNC: 4.6 MMOL/L (ref 3.5–5.1)
PROT SERPL-MCNC: 7.4 G/DL (ref 6.4–8.2)
PROT UR STRIP-MCNC: NEGATIVE MG/DL
PROTHROMBIN TIME: 13.2 SEC (ref 11.9–14.6)
RBC # BLD AUTO: 5.26 M/UL (ref 3.8–5.2)
RBC #/AREA URNS HPF: ABNORMAL /HPF (ref 0–5)
SODIUM SERPL-SCNC: 133 MMOL/L (ref 136–145)
SP GR UR REFRACTOMETRY: 1.02 (ref 1–1.03)
THERAPEUTIC RANGE,PTTT: NORMAL SEC (ref 82–109)
TROPONIN-HIGH SENSITIVITY: 7 NG/L (ref 0–51)
UROBILINOGEN UR QL STRIP.AUTO: 0.1 EU/DL (ref 0.1–1)
VALPROATE SERPL-MCNC: <3 UG/ML (ref 50–100)
WBC # BLD AUTO: 9.3 K/UL (ref 3.6–11)
WBC URNS QL MICRO: ABNORMAL /HPF (ref 0–4)

## 2022-05-25 PROCEDURE — 74011000636 HC RX REV CODE- 636: Performed by: EMERGENCY MEDICINE

## 2022-05-25 PROCEDURE — 80164 ASSAY DIPROPYLACETIC ACD TOT: CPT

## 2022-05-25 PROCEDURE — 93005 ELECTROCARDIOGRAM TRACING: CPT

## 2022-05-25 PROCEDURE — 74011000258 HC RX REV CODE- 258: Performed by: EMERGENCY MEDICINE

## 2022-05-25 PROCEDURE — 99285 EMERGENCY DEPT VISIT HI MDM: CPT

## 2022-05-25 PROCEDURE — 70450 CT HEAD/BRAIN W/O DYE: CPT

## 2022-05-25 PROCEDURE — 85610 PROTHROMBIN TIME: CPT

## 2022-05-25 PROCEDURE — 83605 ASSAY OF LACTIC ACID: CPT

## 2022-05-25 PROCEDURE — 80307 DRUG TEST PRSMV CHEM ANLYZR: CPT

## 2022-05-25 PROCEDURE — 81003 URINALYSIS AUTO W/O SCOPE: CPT

## 2022-05-25 PROCEDURE — 74011250636 HC RX REV CODE- 250/636: Performed by: FAMILY MEDICINE

## 2022-05-25 PROCEDURE — 70496 CT ANGIOGRAPHY HEAD: CPT

## 2022-05-25 PROCEDURE — 80053 COMPREHEN METABOLIC PANEL: CPT

## 2022-05-25 PROCEDURE — 65270000029 HC RM PRIVATE

## 2022-05-25 PROCEDURE — 94761 N-INVAS EAR/PLS OXIMETRY MLT: CPT

## 2022-05-25 PROCEDURE — 36415 COLL VENOUS BLD VENIPUNCTURE: CPT

## 2022-05-25 PROCEDURE — 96365 THER/PROPH/DIAG IV INF INIT: CPT

## 2022-05-25 PROCEDURE — 74011000250 HC RX REV CODE- 250: Performed by: EMERGENCY MEDICINE

## 2022-05-25 PROCEDURE — 74011250636 HC RX REV CODE- 250/636: Performed by: EMERGENCY MEDICINE

## 2022-05-25 PROCEDURE — 84484 ASSAY OF TROPONIN QUANT: CPT

## 2022-05-25 PROCEDURE — 85730 THROMBOPLASTIN TIME PARTIAL: CPT

## 2022-05-25 PROCEDURE — 85025 COMPLETE CBC W/AUTO DIFF WBC: CPT

## 2022-05-25 PROCEDURE — 4A03X5D MEASUREMENT OF ARTERIAL FLOW, INTRACRANIAL, EXTERNAL APPROACH: ICD-10-PCS | Performed by: STUDENT IN AN ORGANIZED HEALTH CARE EDUCATION/TRAINING PROGRAM

## 2022-05-25 RX ORDER — GABAPENTIN 300 MG/1
600 CAPSULE ORAL 4 TIMES DAILY
Status: DISCONTINUED | OUTPATIENT
Start: 2022-05-25 | End: 2022-05-27 | Stop reason: HOSPADM

## 2022-05-25 RX ORDER — ONDANSETRON 2 MG/ML
4 INJECTION INTRAMUSCULAR; INTRAVENOUS
Status: DISCONTINUED | OUTPATIENT
Start: 2022-05-25 | End: 2022-05-27 | Stop reason: HOSPADM

## 2022-05-25 RX ORDER — DIAZEPAM 5 MG/1
10 TABLET ORAL 2 TIMES DAILY
Status: DISCONTINUED | OUTPATIENT
Start: 2022-05-25 | End: 2022-05-27 | Stop reason: HOSPADM

## 2022-05-25 RX ORDER — CLONAZEPAM 0.5 MG/1
0.5 TABLET ORAL 3 TIMES DAILY
Status: DISCONTINUED | OUTPATIENT
Start: 2022-05-25 | End: 2022-05-27 | Stop reason: HOSPADM

## 2022-05-25 RX ORDER — ACETAMINOPHEN 325 MG/1
650 TABLET ORAL
Status: DISCONTINUED | OUTPATIENT
Start: 2022-05-25 | End: 2022-05-27 | Stop reason: HOSPADM

## 2022-05-25 RX ORDER — ACETAMINOPHEN 650 MG/1
650 SUPPOSITORY RECTAL
Status: DISCONTINUED | OUTPATIENT
Start: 2022-05-25 | End: 2022-05-27 | Stop reason: HOSPADM

## 2022-05-25 RX ORDER — ONDANSETRON 4 MG/1
4 TABLET, ORALLY DISINTEGRATING ORAL
Status: DISCONTINUED | OUTPATIENT
Start: 2022-05-25 | End: 2022-05-27 | Stop reason: HOSPADM

## 2022-05-25 RX ORDER — TRAZODONE HYDROCHLORIDE 50 MG/1
100 TABLET ORAL
Status: DISCONTINUED | OUTPATIENT
Start: 2022-05-25 | End: 2022-05-27 | Stop reason: HOSPADM

## 2022-05-25 RX ORDER — POLYETHYLENE GLYCOL 3350 17 G/17G
17 POWDER, FOR SOLUTION ORAL DAILY PRN
Status: DISCONTINUED | OUTPATIENT
Start: 2022-05-25 | End: 2022-05-27 | Stop reason: HOSPADM

## 2022-05-25 RX ORDER — HEPARIN SODIUM 5000 [USP'U]/ML
5000 INJECTION, SOLUTION INTRAVENOUS; SUBCUTANEOUS EVERY 8 HOURS
Status: DISCONTINUED | OUTPATIENT
Start: 2022-05-25 | End: 2022-05-27 | Stop reason: HOSPADM

## 2022-05-25 RX ORDER — SODIUM CHLORIDE 9 MG/ML
75 INJECTION, SOLUTION INTRAVENOUS CONTINUOUS
Status: DISCONTINUED | OUTPATIENT
Start: 2022-05-25 | End: 2022-05-27 | Stop reason: HOSPADM

## 2022-05-25 RX ORDER — TRAZODONE HYDROCHLORIDE 50 MG/1
50 TABLET ORAL
Status: DISCONTINUED | OUTPATIENT
Start: 2022-05-25 | End: 2022-05-25

## 2022-05-25 RX ORDER — ATORVASTATIN CALCIUM 10 MG/1
10 TABLET, FILM COATED ORAL
Status: DISCONTINUED | OUTPATIENT
Start: 2022-05-25 | End: 2022-05-27 | Stop reason: HOSPADM

## 2022-05-25 RX ADMIN — VALPROATE SODIUM 800 MG: 100 INJECTION, SOLUTION INTRAVENOUS at 18:30

## 2022-05-25 RX ADMIN — IOPAMIDOL 100 ML: 755 INJECTION, SOLUTION INTRAVENOUS at 17:36

## 2022-05-25 RX ADMIN — SODIUM CHLORIDE 1000 ML: 9 INJECTION, SOLUTION INTRAVENOUS at 17:22

## 2022-05-25 RX ADMIN — SODIUM CHLORIDE 75 ML/HR: 9 INJECTION, SOLUTION INTRAVENOUS at 23:31

## 2022-05-25 NOTE — ED TRIAGE NOTES
Patient presents to the ED with EMS after having a syncopal episode, pt is very weak. Pt did not hit her head and was lowered to a chair by bystanders. Pt A/Ox4 GCS 15. Hx of seizures but patient reports not thinking this was a seizure. EMS stroke scale was negative.   and LKW 1430

## 2022-05-26 LAB
ALBUMIN SERPL-MCNC: 3.3 G/DL (ref 3.5–5)
ALBUMIN/GLOB SERPL: 1 {RATIO} (ref 1.1–2.2)
ALP SERPL-CCNC: 70 U/L (ref 45–117)
ALT SERPL-CCNC: 16 U/L (ref 12–78)
ANION GAP SERPL CALC-SCNC: 3 MMOL/L (ref 5–15)
AST SERPL W P-5'-P-CCNC: 28 U/L (ref 15–37)
ATRIAL RATE: 97 BPM
BASOPHILS # BLD: 0.1 K/UL (ref 0–0.1)
BASOPHILS NFR BLD: 1 % (ref 0–1)
BILIRUB SERPL-MCNC: 0.5 MG/DL (ref 0.2–1)
BUN SERPL-MCNC: 15 MG/DL (ref 6–20)
BUN/CREAT SERPL: 12 (ref 12–20)
CA-I BLD-MCNC: 8.4 MG/DL (ref 8.5–10.1)
CALCULATED P AXIS, ECG09: 42 DEGREES
CALCULATED R AXIS, ECG10: 36 DEGREES
CALCULATED T AXIS, ECG11: -14 DEGREES
CHLORIDE SERPL-SCNC: 108 MMOL/L (ref 97–108)
CO2 SERPL-SCNC: 27 MMOL/L (ref 21–32)
CREAT SERPL-MCNC: 1.29 MG/DL (ref 0.55–1.02)
DIAGNOSIS, 93000: NORMAL
DIFFERENTIAL METHOD BLD: NORMAL
EOSINOPHIL # BLD: 0.1 K/UL (ref 0–0.4)
EOSINOPHIL NFR BLD: 1 % (ref 0–7)
ERYTHROCYTE [DISTWIDTH] IN BLOOD BY AUTOMATED COUNT: 13.7 % (ref 11.5–14.5)
GLOBULIN SER CALC-MCNC: 3.3 G/DL (ref 2–4)
GLUCOSE SERPL-MCNC: 98 MG/DL (ref 65–100)
HCT VFR BLD AUTO: 42.9 % (ref 35–47)
HGB BLD-MCNC: 14 G/DL (ref 11.5–16)
IMM GRANULOCYTES # BLD AUTO: 0 K/UL (ref 0–0.04)
IMM GRANULOCYTES NFR BLD AUTO: 0 % (ref 0–0.5)
LYMPHOCYTES # BLD: 3.5 K/UL (ref 0.8–3.5)
LYMPHOCYTES NFR BLD: 40 % (ref 12–49)
MCH RBC QN AUTO: 28.4 PG (ref 26–34)
MCHC RBC AUTO-ENTMCNC: 32.6 G/DL (ref 30–36.5)
MCV RBC AUTO: 87 FL (ref 80–99)
MONOCYTES # BLD: 0.5 K/UL (ref 0–1)
MONOCYTES NFR BLD: 6 % (ref 5–13)
NEUTS SEG # BLD: 4.6 K/UL (ref 1.8–8)
NEUTS SEG NFR BLD: 52 % (ref 32–75)
NRBC # BLD: 0 K/UL (ref 0–0.01)
NRBC BLD-RTO: 0 PER 100 WBC
P-R INTERVAL, ECG05: 140 MS
PLATELET # BLD AUTO: 307 K/UL (ref 150–400)
PMV BLD AUTO: 9.3 FL (ref 8.9–12.9)
POTASSIUM SERPL-SCNC: 4.8 MMOL/L (ref 3.5–5.1)
PROT SERPL-MCNC: 6.6 G/DL (ref 6.4–8.2)
Q-T INTERVAL, ECG07: 364 MS
QRS DURATION, ECG06: 78 MS
QTC CALCULATION (BEZET), ECG08: 462 MS
RBC # BLD AUTO: 4.93 M/UL (ref 3.8–5.2)
SODIUM SERPL-SCNC: 138 MMOL/L (ref 136–145)
VENTRICULAR RATE, ECG03: 97 BPM
WBC # BLD AUTO: 8.8 K/UL (ref 3.6–11)

## 2022-05-26 PROCEDURE — 74011250637 HC RX REV CODE- 250/637: Performed by: FAMILY MEDICINE

## 2022-05-26 PROCEDURE — 65270000029 HC RM PRIVATE

## 2022-05-26 PROCEDURE — 74011250636 HC RX REV CODE- 250/636: Performed by: PSYCHIATRY & NEUROLOGY

## 2022-05-26 PROCEDURE — 74011250636 HC RX REV CODE- 250/636: Performed by: FAMILY MEDICINE

## 2022-05-26 PROCEDURE — 80053 COMPREHEN METABOLIC PANEL: CPT

## 2022-05-26 PROCEDURE — 85025 COMPLETE CBC W/AUTO DIFF WBC: CPT

## 2022-05-26 PROCEDURE — 36415 COLL VENOUS BLD VENIPUNCTURE: CPT

## 2022-05-26 RX ORDER — KETOROLAC TROMETHAMINE 15 MG/ML
15 INJECTION, SOLUTION INTRAMUSCULAR; INTRAVENOUS
Status: COMPLETED | OUTPATIENT
Start: 2022-05-26 | End: 2022-05-26

## 2022-05-26 RX ORDER — TRAMADOL HYDROCHLORIDE 50 MG/1
50 TABLET ORAL
Status: DISCONTINUED | OUTPATIENT
Start: 2022-05-26 | End: 2022-05-27 | Stop reason: HOSPADM

## 2022-05-26 RX ADMIN — GABAPENTIN 600 MG: 300 CAPSULE ORAL at 09:26

## 2022-05-26 RX ADMIN — GABAPENTIN 600 MG: 300 CAPSULE ORAL at 19:03

## 2022-05-26 RX ADMIN — HEPARIN SODIUM 5000 UNITS: 5000 INJECTION INTRAVENOUS; SUBCUTANEOUS at 23:08

## 2022-05-26 RX ADMIN — DIAZEPAM 10 MG: 5 TABLET ORAL at 23:09

## 2022-05-26 RX ADMIN — HEPARIN SODIUM 5000 UNITS: 5000 INJECTION INTRAVENOUS; SUBCUTANEOUS at 08:01

## 2022-05-26 RX ADMIN — ATORVASTATIN CALCIUM 10 MG: 10 TABLET, FILM COATED ORAL at 23:09

## 2022-05-26 RX ADMIN — SODIUM CHLORIDE 75 ML/HR: 9 INJECTION, SOLUTION INTRAVENOUS at 13:17

## 2022-05-26 RX ADMIN — HEPARIN SODIUM 5000 UNITS: 5000 INJECTION INTRAVENOUS; SUBCUTANEOUS at 13:17

## 2022-05-26 RX ADMIN — GABAPENTIN 600 MG: 300 CAPSULE ORAL at 13:17

## 2022-05-26 RX ADMIN — CLONAZEPAM 0.5 MG: 0.5 TABLET ORAL at 15:26

## 2022-05-26 RX ADMIN — ONDANSETRON 4 MG: 2 INJECTION INTRAMUSCULAR; INTRAVENOUS at 08:01

## 2022-05-26 RX ADMIN — GABAPENTIN 600 MG: 300 CAPSULE ORAL at 23:09

## 2022-05-26 RX ADMIN — HEPARIN SODIUM 5000 UNITS: 5000 INJECTION INTRAVENOUS; SUBCUTANEOUS at 00:21

## 2022-05-26 RX ADMIN — DIAZEPAM 10 MG: 5 TABLET ORAL at 09:26

## 2022-05-26 RX ADMIN — CLONAZEPAM 0.5 MG: 0.5 TABLET ORAL at 09:26

## 2022-05-26 RX ADMIN — CLONAZEPAM 0.5 MG: 0.5 TABLET ORAL at 23:09

## 2022-05-26 RX ADMIN — TRAMADOL HYDROCHLORIDE 50 MG: 50 TABLET, COATED ORAL at 15:26

## 2022-05-26 RX ADMIN — KETOROLAC TROMETHAMINE 15 MG: 15 INJECTION, SOLUTION INTRAMUSCULAR; INTRAVENOUS at 10:25

## 2022-05-26 RX ADMIN — TRAZODONE HYDROCHLORIDE 100 MG: 50 TABLET ORAL at 23:09

## 2022-05-26 NOTE — CONSULTS
Consult Date: 2022    Consults Ms. Shaunna Mortimer is a 64year old woman with remote history of seizures (GTC) in  with no seizures since then on gabapentin 500 gm daily and valium 10 mg bid for PTSD who come sin because she lost conciousness. She said she was going to work and first took 500 mg gabapentin and then sometime after 10 mg valium. She was trying to walk and her legs felt wobbly and she felt dizzy. Eventually she passed out and woke up in ER. For some reason she had a stroke eval donw with Ct head and CTa head and neck WNL . Curently patient has a left temporal throbbing headache.      Subjective      Past Medical History:   Diagnosis Date    DDD (degenerative disc disease), lumbar     Fatigue     GERD (gastroesophageal reflux disease)     Hypothyroidism     Psychiatric disorder     PTSD per patient    Seizures ([facility] [address]       Past Surgical History:   Procedure Laterality Date    HX APPENDECTOMY      HX  SECTION      HX CHOLECYSTECTOMY      HX HYSTERECTOMY       Family History   Family history unknown: Yes      Social History     Tobacco Use    Smoking status: Current Every Day Smoker     Packs/day: 0.25    Smokeless tobacco: Never Used   Substance Use Topics    Alcohol use: Not Currently     Alcohol/week: 0.0 standard drinks       Current Facility-Administered Medications   Medication Dose Route Frequency Provider Last Rate Last Admin    atorvastatin (LIPITOR) tablet 10 mg  10 mg Oral QHS Ha Recinos MD        clonazePAM (KlonoPIN) tablet 0.5 mg  0.5 mg Oral TID Adair Recinos MD        diazePAM (VALIUM) tablet 10 mg  10 mg Oral BID Ha Recinos MD        gabapentin (NEURONTIN) capsule 600 mg  600 mg Oral QID Yarile Farmer MD        0.9% sodium chloride infusion  75 mL/hr IntraVENous CONTINUOUS Ha Recinos MD 75 mL/hr at 22 2331 75 mL/hr at 22 2331    acetaminophen (TYLENOL) tablet 650 mg  650 mg Oral Q6H PRN Yariel Farmer MD Or    acetaminophen (TYLENOL) suppository 650 mg  650 mg Rectal Q6H PRN Ernestina Recinos MD        polyethylene glycol (MIRALAX) packet 17 g  17 g Oral DAILY PRN Ernestina Recinos MD        ondansetron (ZOFRAN ODT) tablet 4 mg  4 mg Oral Q8H PRN Ha Recinos MD        Or    ondansetron [facility]) injection 4 mg  4 mg IntraVENous Q6H PRN Ha Recinos MD   4 mg at 05/26/22 0801    heparin (porcine) injection 5,000 Units  5,000 Units SubCUTAneous Q8H Ha Recinos MD   5,000 Units at 05/26/22 0801    traZODone (DESYREL) tablet 100 mg  100 mg Oral QHS Ha Recinos MD            Review of Systems   Neurological: Positive for headaches. All other systems reviewed and are negative. Objective     Vital signs for last 24 hours:  Visit Vitals  /76   Pulse 73   Temp 97.6 °F (36.4 °C)   Resp 18   Ht 5' 7\" (1.702 m)   Wt 81.6 kg (180 lb)   SpO2 98%   Breastfeeding No   BMI 28.19 kg/m²       Intake/Output this shift:  Current Shift: No intake/output data recorded. Last 3 Shifts: 05/24 1901 - 05/26 0700  In: -   Out: 600 [Urine:600]    Data Review:   Recent Results (from the past 24 hour(s))   METABOLIC PANEL, COMPREHENSIVE    Collection Time: 05/25/22  3:45 PM   Result Value Ref Range    Sodium 133 (L) 136 - 145 mmol/L    Potassium 4.6 3.5 - 5.1 mmol/L    Chloride 105 97 - 108 mmol/L    CO2 24 21 - 32 mmol/L    Anion gap 4 (L) 5 - 15 mmol/L    Glucose 124 (H) 65 - 100 mg/dL    BUN 19 6 - 20 mg/dL    Creatinine 1.49 (H) 0.55 - 1.02 mg/dL    BUN/Creatinine ratio 13 12 - 20      GFR est AA 44 (L) >60 ml/min/1.73m2    GFR est non-AA 36 (L) >60 ml/min/1.73m2    Calcium 9.0 8.5 - 10.1 mg/dL    Bilirubin, total 0.4 0.2 - 1.0 mg/dL    AST (SGOT) 31 15 - 37 U/L    ALT (SGPT) 17 12 - 78 U/L    Alk.  phosphatase 75 45 - 117 U/L    Protein, total 7.4 6.4 - 8.2 g/dL    Albumin 3.7 3.5 - 5.0 g/dL    Globulin 3.7 2.0 - 4.0 g/dL    A-G Ratio 1.0 (L) 1.1 - 2.2     CBC WITH AUTOMATED DIFF    Collection Time: 05/25/22  3:45 PM   Result Value Ref Range    WBC 9.3 3.6 - 11.0 K/uL    RBC 5.26 (H) 3.80 - 5.20 M/uL    HGB 15.1 11.5 - 16.0 g/dL    HCT 45.2 35.0 - 47.0 %    MCV 85.9 80.0 - 99.0 FL    MCH 28.7 26.0 - 34.0 PG    MCHC 33.4 30.0 - 36.5 g/dL    RDW 13.4 11.5 - 14.5 %    PLATELET 689 900 - 723 K/uL    MPV 9.5 8.9 - 12.9 FL    NRBC 0.0 0.0  WBC    ABSOLUTE NRBC 0.00 0.00 - 0.01 K/uL    NEUTROPHILS 54 32 - 75 %    LYMPHOCYTES 39 12 - 49 %    MONOCYTES 5 5 - 13 %    EOSINOPHILS 1 0 - 7 %    BASOPHILS 1 0 - 1 %    IMMATURE GRANULOCYTES 0 0 - 0.5 %    ABS. NEUTROPHILS 5.0 1.8 - 8.0 K/UL    ABS. LYMPHOCYTES 3.7 (H) 0.8 - 3.5 K/UL    ABS. MONOCYTES 0.5 0.0 - 1.0 K/UL    ABS. EOSINOPHILS 0.1 0.0 - 0.4 K/UL    ABS. BASOPHILS 0.1 0.0 - 0.1 K/UL    ABS. IMM.  GRANS. 0.0 0.00 - 0.04 K/UL    DF AUTOMATED     PROTHROMBIN TIME + INR    Collection Time: 05/25/22  3:45 PM   Result Value Ref Range    Prothrombin time 13.2 11.9 - 14.6 sec    INR 1.0 0.9 - 1.1     TROPONIN-HIGH SENSITIVITY    Collection Time: 05/25/22  3:45 PM   Result Value Ref Range    Troponin-High Sensitivity 7 0 - 51 ng/L   PTT    Collection Time: 05/25/22  3:45 PM   Result Value Ref Range    aPTT 32.7 21.2 - 34.1 sec    aPTT, therapeutic range   82 - 109 sec   LACTIC ACID    Collection Time: 05/25/22  4:15 PM   Result Value Ref Range    Lactic acid 1.0 0.4 - 2.0 mmol/L   VALPROIC ACID    Collection Time: 05/25/22  4:30 PM   Result Value Ref Range    Valproic acid <3 (L) 50 - 100 ug/mL   URINALYSIS W/ RFLX MICROSCOPIC    Collection Time: 05/25/22  4:57 PM   Result Value Ref Range    Color Yellow/Straw      Appearance Clear Clear      Specific gravity 1.016 1.003 - 1.030      pH (UA) 6.0 5.0 - 8.0      Protein Negative Negative mg/dL    Glucose Negative Negative mg/dL    Ketone Negative Negative mg/dL    Bilirubin Negative Negative      Blood Negative Negative      Urobilinogen 0.1 0.1 - 1.0 EU/dL    Nitrites Negative Negative      Leukocyte Esterase Negative Negative      WBC 0-4 0 - 4 /hpf    RBC 0-5 0 - 5 /hpf    Bacteria Negative Negative /hpf    Mucus Trace (A) Negative /lpf    Hyaline cast 10-20 0 - 5 /lpf   DRUG SCREEN, URINE    Collection Time: 05/25/22  4:57 PM   Result Value Ref Range    AMPHETAMINES Negative Negative      BARBITURATES Negative Negative      BENZODIAZEPINES Positive (A) Negative      COCAINE Negative Negative      METHADONE Negative Negative      OPIATES Negative Negative      PCP(PHENCYCLIDINE) Negative Negative      THC (TH-CANNABINOL) Negative Negative      Drug screen comment        This test is a screen for drugs of abuse in a medical setting only (i.e., they are unconfirmed results and as such must not be used for non-medical purposes, e.g.,employment testing, legal testing). Due to its inherent nature, false positive (FP) and false negative (FN) results may be obtained. Therefore, if necessary for medical care, recommend confirmation of positive findings by GC/MS. METABOLIC PANEL, COMPREHENSIVE    Collection Time: 05/26/22  4:34 AM   Result Value Ref Range    Sodium 138 136 - 145 mmol/L    Potassium 4.8 3.5 - 5.1 mmol/L    Chloride 108 97 - 108 mmol/L    CO2 27 21 - 32 mmol/L    Anion gap 3 (L) 5 - 15 mmol/L    Glucose 98 65 - 100 mg/dL    BUN 15 6 - 20 mg/dL    Creatinine 1.29 (H) 0.55 - 1.02 mg/dL    BUN/Creatinine ratio 12 12 - 20      GFR est AA 52 (L) >60 ml/min/1.73m2    GFR est non-AA 43 (L) >60 ml/min/1.73m2    Calcium 8.4 (L) 8.5 - 10.1 mg/dL    Bilirubin, total 0.5 0.2 - 1.0 mg/dL    AST (SGOT) 28 15 - 37 U/L    ALT (SGPT) 16 12 - 78 U/L    Alk.  phosphatase 70 45 - 117 U/L    Protein, total 6.6 6.4 - 8.2 g/dL    Albumin 3.3 (L) 3.5 - 5.0 g/dL    Globulin 3.3 2.0 - 4.0 g/dL    A-G Ratio 1.0 (L) 1.1 - 2.2     CBC WITH AUTOMATED DIFF    Collection Time: 05/26/22  4:34 AM   Result Value Ref Range    WBC 8.8 3.6 - 11.0 K/uL    RBC 4.93 3.80 - 5.20 M/uL    HGB 14.0 11.5 - 16.0 g/dL    HCT 42.9 35.0 - 47.0 %    MCV 87.0 80.0 - 99.0 FL    MCH 28.4 26.0 - 34.0 PG    MCHC 32.6 30.0 - 36.5 g/dL    RDW 13.7 11.5 - 14.5 %    PLATELET 911 708 - 581 K/uL    MPV 9.3 8.9 - 12.9 FL    NRBC 0.0 0.0  WBC    ABSOLUTE NRBC 0.00 0.00 - 0.01 K/uL    NEUTROPHILS 52 32 - 75 %    LYMPHOCYTES 40 12 - 49 %    MONOCYTES 6 5 - 13 %    EOSINOPHILS 1 0 - 7 %    BASOPHILS 1 0 - 1 %    IMMATURE GRANULOCYTES 0 0 - 0.5 %    ABS. NEUTROPHILS 4.6 1.8 - 8.0 K/UL    ABS. LYMPHOCYTES 3.5 0.8 - 3.5 K/UL    ABS. MONOCYTES 0.5 0.0 - 1.0 K/UL    ABS. EOSINOPHILS 0.1 0.0 - 0.4 K/UL    ABS. BASOPHILS 0.1 0.0 - 0.1 K/UL    ABS. IMM. GRANS. 0.0 0.00 - 0.04 K/UL    DF AUTOMATED         Physical Exam    Neuro Physical Exam      General: Well developed, well nourished. Cardiac: Regular rate and rhythm      Neurological Exam:  Mental Status: Awake, alert, oriented x4   Cranial Nerves:   Intact visual fields. Fundi are benign. PERRL, EOM's full, no nystagmus, no ptosis. Facial sensation is normal. Facial movement is symmetric. Palate is midline. Normal sternocleidomastoid strength. Tongue is midline. Hearing is intact bilaterally. Motor:  5/5 strength in upper and lower proximal and distal muscles. Normal bulk and tone. Reflexes:   Deep tendon reflexes 2+/4 and symmetrical.   Sensory:   Normal to light touch throughout   Gait:  defferred   Tremor:   No tremor noted. Cerebellar:  No cerebellar signs present. Babinski:       down b/l     Assessment and Plan: Ms. Zoë Marroquin is a 64year old woman with LOC likely due to high dose of valium and gabapentin. Headache: IV toradol as needed. Will sign off.

## 2022-05-26 NOTE — ED NOTES
Patient reports pain in her abd and that she wants pain medication.  I explained that tylenol was ordered and she refused it

## 2022-05-26 NOTE — ED NOTES
Pt resting on stretcher with eyes closed. Easily aroused. No needs expressed. Pt remains on continuous cardiac, nibp, pulse ox monitoring. Zunilda Rummer remains in place with urine noted in suction canister. Bed low/locked. Side rails upx2. Call bell in reach.

## 2022-05-26 NOTE — ED NOTES
Pt is drowsy, eyes spontaneous opening to name. Pt is A&Ox4, states she fainted and did not have a seizure. Pt reports weakness and \"pain all over\" During interview pt is unable to stay awake, drifts back off to sleep but is easily aroused again to continue interview and assessment. Pt states that she needs to void, pt in agreement with purwick being placed, placed at this time and attached to low wall suction. No other needs expressed at this time. Bed low/locked side rails upx2. Pt remains on continuous cardiac, nibp, and pulse ox monitoring. Will continue to monitor. Call bell in reach.

## 2022-05-26 NOTE — PROGRESS NOTES
Pt. States she takes seroquel 300mg at bedtime. Call was placed to Dr. Emi Cheadle to see if it was ok to order,. Message was left to return call. Awaiting call back from Dr. Татьяна Constantino.

## 2022-05-26 NOTE — ACP (ADVANCE CARE PLANNING)
Advance Care Planning   Healthcare Decision Maker:       Primary Decision Maker: Abbe Mason - [phone]

## 2022-05-26 NOTE — ED NOTES
TRANSFER - OUT REPORT:    Verbal report given to [facility] on [employer]  being transferred to [facility] for routine progression of care       Report consisted of patients Situation, Background, Assessment and   Recommendations(SBAR). Information from the following report(s) SBAR, ED Summary, [facility] and Recent Results was reviewed with the receiving nurse. Lines:   Peripheral IV 05/25/22 Posterior;Right Hand (Active)       Peripheral IV 05/25/22 Anterior; Left Forearm (Active)        Opportunity for questions and clarification was provided.       Patient transported with:   Monitor  Tech

## 2022-05-26 NOTE — PROGRESS NOTES
Skin Assessment: Pt.'s skin was warm to the touch with positive pedal pulses bilaterally. Pt. Had no pressure ulcers or markings to report despite her reporting she had fallen on her left side. Will continue to monitor pt. For any changes.

## 2022-05-26 NOTE — PROGRESS NOTES
Reason for Admission:   Seizure                    RUR Score:   15%               PCP: First and Last name:   None     Name of Practice:    Are you a current patient: Yes/No:    Approximate date of last visit:    Can you participate in a virtual visit if needed:     Do you (patient/family) have any concerns for transition/discharge? No                 Plan for utilizing home health:  None @ this time/uses no DME. Current Advanced Directive/Advance Care Plan:  Full Code      Healthcare Decision Maker:               Primary Decision Maker: Rich Olmstead - [phone]    Transition of Care Plan:    D/C Plan is home with boyfriend & he will transport home. Call Rxs into [facility] on [address], in [facility] [address].

## 2022-05-26 NOTE — H&P
History and Physical    NAME: [employer]   :  1965   MRN:  [id number]     Date/Time:  2022 9:03 AM    Patient PCP: None  ______________________________________________________________________             Subjective:     CHIEF COMPLAINT: syncope    HISTORY OF PRESENT ILLNESS:       Patient is a 64y.o. year old female with a PMHx of anxiety, epilepsy, hypertension, hypothyroidism, presented to the ED with altered mental status s/p a witnessed syncopal episode. Patient states she was walking to work yesterday when she began feeling lightheaded and nauseous. She reports she lost consciousness for 2-3 minutes and was lowered to the ground by bystanders who also called EMS. Patient notes she did not feel like herself after regaining consciousness and was noted to have global weakness and difficulty speaking upon ED arrival. A stroke alert was called in the ED. Patient's noncontrast CT Head was negative and the [facility] neurology felt that patient was postictal so tPA was not indicated. Patient has a prior history of seizures but she states this did not feel similar to her prior seizures. ED workup showed subtherapeutic Depakote level and patient was given 1 dose IV. Patient was recommended to be admitted for MRI brain with/without contrast.       Patient is currently resting in bed, complains of a left sided headache. She denies any chest pain, SOB, abdominal pain, n/v/d, lightheadedness, numbness or tingling.    Labs notable for Creatinine 1.29    Past Medical History:   Diagnosis Date    DDD (degenerative disc disease), lumbar     Fatigue     GERD (gastroesophageal reflux disease)     Hypothyroidism     Psychiatric disorder     PTSD per patient    Seizures ([facility] [address]         Past Surgical History:   Procedure Laterality Date    HX APPENDECTOMY      HX  SECTION      HX CHOLECYSTECTOMY      HX HYSTERECTOMY         Social History     Tobacco Use    Smoking status: Current Every Day Smoker Packs/day: 0.25    Smokeless tobacco: Never Used   Substance Use Topics    Alcohol use: Not Currently     Alcohol/week: 0.0 standard drinks        Family History   Family history unknown: Yes       Allergies   Allergen Reactions    Augmentin [Amoxicillin-Pot Clavulanate] Nausea and Vomiting        Prior to Admission medications    Medication Sig Start Date End Date Taking? Authorizing Provider   diazePAM (VALIUM) 10 mg tablet Take 10 mg by mouth two (2) times a day. 12/28/21  Yes Kendall Sosa MD   dicyclomine (BENTYL) 10 mg capsule Take 1 Capsule by mouth four (4) times daily for 7 days. 5/18/22 5/25/22  Nicolasa DACOSTA PA-C   ondansetron (ZOFRAN ODT) 4 mg disintegrating tablet Take 1 Tablet by mouth every eight (8) hours as needed for Nausea or Vomiting. 5/18/22   Nicolasa DACOSTA PA-C   traZODone (DESYREL) 100 mg tablet Take 100-200 mg by mouth At bedtime. 2/1/22   Ian, MD Kendall   atorvastatin (LIPITOR) 10 mg tablet Take 10 mg by mouth nightly. 1/11/22   Ian, MD Kendall   hydrOXYzine HCL (ATARAX) 50 mg tablet Take 50 mg by mouth every six (6) hours as needed (max 4 tablets a day). Patient not taking: Reported on 3/27/2022    OtherKendall MD   clonazePAM (KLONOPIN) 1 mg tablet Take  by mouth three (3) times daily. Provider, Historical   gabapentin (NEURONTIN) 600 mg tablet Take  by mouth four (4) times daily.     Provider, Historical         Current Facility-Administered Medications:     atorvastatin (LIPITOR) tablet 10 mg, 10 mg, Oral, QHS, Ha Recinos MD    clonazePAM (KlonoPIN) tablet 0.5 mg, 0.5 mg, Oral, TID, Ha Recinos MD, 0.5 mg at 05/26/22 7201    diazePAM (VALIUM) tablet 10 mg, 10 mg, Oral, BID, Ha Recinos MD, 10 mg at 05/26/22 3208    gabapentin (NEURONTIN) capsule 600 mg, 600 mg, Oral, QID, Ha Recinos MD, 600 mg at 05/26/22 6623    0.9% sodium chloride infusion, 75 mL/hr, IntraVENous, CONTINUOUS, Ha Recinos MD, Last Rate: 75 mL/hr at 05/25/22 5627, 30 mL/hr at 05/25/22 2331    acetaminophen (TYLENOL) tablet 650 mg, 650 mg, Oral, Q6H PRN **OR** acetaminophen (TYLENOL) suppository 650 mg, 650 mg, Rectal, Q6H PRN, Del Recinos MD    polyethylene glycol (MIRALAX) packet 17 g, 17 g, Oral, DAILY PRN, Del Recinos MD    ondansetron (ZOFRAN ODT) tablet 4 mg, 4 mg, Oral, Q8H PRN **OR** ondansetron (ZOFRAN) injection 4 mg, 4 mg, IntraVENous, Q6H PRN, Ha Recinos MD, 4 mg at 05/26/22 0801    heparin (porcine) injection 5,000 Units, 5,000 Units, SubCUTAneous, Q8H, Ha Recinos MD, 5,000 Units at 05/26/22 0801    traZODone (DESYREL) tablet 100 mg, 100 mg, Oral, QHS, Ha Recinos MD    LAB DATA REVIEWED:    Recent Results (from the past 24 hour(s))   METABOLIC PANEL, COMPREHENSIVE    Collection Time: 05/25/22  3:45 PM   Result Value Ref Range    Sodium 133 (L) 136 - 145 mmol/L    Potassium 4.6 3.5 - 5.1 mmol/L    Chloride 105 97 - 108 mmol/L    CO2 24 21 - 32 mmol/L    Anion gap 4 (L) 5 - 15 mmol/L    Glucose 124 (H) 65 - 100 mg/dL    BUN 19 6 - 20 mg/dL    Creatinine 1.49 (H) 0.55 - 1.02 mg/dL    BUN/Creatinine ratio 13 12 - 20      GFR est AA 44 (L) >60 ml/min/1.73m2    GFR est non-AA 36 (L) >60 ml/min/1.73m2    Calcium 9.0 8.5 - 10.1 mg/dL    Bilirubin, total 0.4 0.2 - 1.0 mg/dL    AST (SGOT) 31 15 - 37 U/L    ALT (SGPT) 17 12 - 78 U/L    Alk.  phosphatase 75 45 - 117 U/L    Protein, total 7.4 6.4 - 8.2 g/dL    Albumin 3.7 3.5 - 5.0 g/dL    Globulin 3.7 2.0 - 4.0 g/dL    A-G Ratio 1.0 (L) 1.1 - 2.2     CBC WITH AUTOMATED DIFF    Collection Time: 05/25/22  3:45 PM   Result Value Ref Range    WBC 9.3 3.6 - 11.0 K/uL    RBC 5.26 (H) 3.80 - 5.20 M/uL    HGB 15.1 11.5 - 16.0 g/dL    HCT 45.2 35.0 - 47.0 %    MCV 85.9 80.0 - 99.0 FL    MCH 28.7 26.0 - 34.0 PG    MCHC 33.4 30.0 - 36.5 g/dL    RDW 13.4 11.5 - 14.5 %    PLATELET 196 278 - 986 K/uL    MPV 9.5 8.9 - 12.9 FL    NRBC 0.0 0.0  WBC    ABSOLUTE NRBC 0.00 0.00 - 0.01 K/uL    NEUTROPHILS 54 32 - 75 %    LYMPHOCYTES 39 12 - 49 %    MONOCYTES 5 5 - 13 %    EOSINOPHILS 1 0 - 7 %    BASOPHILS 1 0 - 1 %    IMMATURE GRANULOCYTES 0 0 - 0.5 %    ABS. NEUTROPHILS 5.0 1.8 - 8.0 K/UL    ABS. LYMPHOCYTES 3.7 (H) 0.8 - 3.5 K/UL    ABS. MONOCYTES 0.5 0.0 - 1.0 K/UL    ABS. EOSINOPHILS 0.1 0.0 - 0.4 K/UL    ABS. BASOPHILS 0.1 0.0 - 0.1 K/UL    ABS. IMM.  GRANS. 0.0 0.00 - 0.04 K/UL    DF AUTOMATED     PROTHROMBIN TIME + INR    Collection Time: 05/25/22  3:45 PM   Result Value Ref Range    Prothrombin time 13.2 11.9 - 14.6 sec    INR 1.0 0.9 - 1.1     TROPONIN-HIGH SENSITIVITY    Collection Time: 05/25/22  3:45 PM   Result Value Ref Range    Troponin-High Sensitivity 7 0 - 51 ng/L   PTT    Collection Time: 05/25/22  3:45 PM   Result Value Ref Range    aPTT 32.7 21.2 - 34.1 sec    aPTT, therapeutic range   82 - 109 sec   EKG, 12 LEAD, INITIAL    Collection Time: 05/25/22  4:02 PM   Result Value Ref Range    Ventricular Rate 97 BPM    Atrial Rate 97 BPM    P-R Interval 140 ms    QRS Duration 78 ms    Q-T Interval 364 ms    QTC Calculation (Bezet) 462 ms    Calculated P Axis 42 degrees    Calculated R Axis 36 degrees    Calculated T Axis -14 degrees    Diagnosis       Normal sinus rhythm  Normal ECG  When compared with ECG of 25-MAY-2022 16:01, (Unconfirmed)  ST no longer elevated in Inferior leads  Nonspecific T wave abnormality now evident in Inferior leads  Confirmed by Lena Bassett ([id number]) on 5/26/2022 10:56:28 AM     LACTIC ACID    Collection Time: 05/25/22  4:15 PM   Result Value Ref Range    Lactic acid 1.0 0.4 - 2.0 mmol/L   VALPROIC ACID    Collection Time: 05/25/22  4:30 PM   Result Value Ref Range    Valproic acid <3 (L) 50 - 100 ug/mL   URINALYSIS W/ RFLX MICROSCOPIC    Collection Time: 05/25/22  4:57 PM   Result Value Ref Range    Color Yellow/Straw      Appearance Clear Clear      Specific gravity 1.016 1.003 - 1.030      pH (UA) 6.0 5.0 - 8.0      Protein Negative Negative mg/dL    Glucose Negative Negative mg/dL    Ketone Negative Negative mg/dL    Bilirubin Negative Negative      Blood Negative Negative      Urobilinogen 0.1 0.1 - 1.0 EU/dL    Nitrites Negative Negative      Leukocyte Esterase Negative Negative      WBC 0-4 0 - 4 /hpf    RBC 0-5 0 - 5 /hpf    Bacteria Negative Negative /hpf    Mucus Trace (A) Negative /lpf    Hyaline cast 10-20 0 - 5 /lpf   DRUG SCREEN, URINE    Collection Time: 05/25/22  4:57 PM   Result Value Ref Range    AMPHETAMINES Negative Negative      BARBITURATES Negative Negative      BENZODIAZEPINES Positive (A) Negative      COCAINE Negative Negative      METHADONE Negative Negative      OPIATES Negative Negative      PCP(PHENCYCLIDINE) Negative Negative      THC (TH-CANNABINOL) Negative Negative      Drug screen comment        This test is a screen for drugs of abuse in a medical setting only (i.e., they are unconfirmed results and as such must not be used for non-medical purposes, e.g.,employment testing, legal testing). Due to its inherent nature, false positive (FP) and false negative (FN) results may be obtained. Therefore, if necessary for medical care, recommend confirmation of positive findings by GC/MS. METABOLIC PANEL, COMPREHENSIVE    Collection Time: 05/26/22  4:34 AM   Result Value Ref Range    Sodium 138 136 - 145 mmol/L    Potassium 4.8 3.5 - 5.1 mmol/L    Chloride 108 97 - 108 mmol/L    CO2 27 21 - 32 mmol/L    Anion gap 3 (L) 5 - 15 mmol/L    Glucose 98 65 - 100 mg/dL    BUN 15 6 - 20 mg/dL    Creatinine 1.29 (H) 0.55 - 1.02 mg/dL    BUN/Creatinine ratio 12 12 - 20      GFR est AA 52 (L) >60 ml/min/1.73m2    GFR est non-AA 43 (L) >60 ml/min/1.73m2    Calcium 8.4 (L) 8.5 - 10.1 mg/dL    Bilirubin, total 0.5 0.2 - 1.0 mg/dL    AST (SGOT) 28 15 - 37 U/L    ALT (SGPT) 16 12 - 78 U/L    Alk.  phosphatase 70 45 - 117 U/L    Protein, total 6.6 6.4 - 8.2 g/dL    Albumin 3.3 (L) 3.5 - 5.0 g/dL    Globulin 3.3 2.0 - 4.0 g/dL    A-G Ratio 1.0 (L) 1.1 - 2.2     CBC WITH AUTOMATED DIFF    Collection Time: 05/26/22  4:34 AM   Result Value Ref Range    WBC 8.8 3.6 - 11.0 K/uL    RBC 4.93 3.80 - 5.20 M/uL    HGB 14.0 11.5 - 16.0 g/dL    HCT 42.9 35.0 - 47.0 %    MCV 87.0 80.0 - 99.0 FL    MCH 28.4 26.0 - 34.0 PG    MCHC 32.6 30.0 - 36.5 g/dL    RDW 13.7 11.5 - 14.5 %    PLATELET 205 259 - 364 K/uL    MPV 9.3 8.9 - 12.9 FL    NRBC 0.0 0.0  WBC    ABSOLUTE NRBC 0.00 0.00 - 0.01 K/uL    NEUTROPHILS 52 32 - 75 %    LYMPHOCYTES 40 12 - 49 %    MONOCYTES 6 5 - 13 %    EOSINOPHILS 1 0 - 7 %    BASOPHILS 1 0 - 1 %    IMMATURE GRANULOCYTES 0 0 - 0.5 %    ABS. NEUTROPHILS 4.6 1.8 - 8.0 K/UL    ABS. LYMPHOCYTES 3.5 0.8 - 3.5 K/UL    ABS. MONOCYTES 0.5 0.0 - 1.0 K/UL    ABS. EOSINOPHILS 0.1 0.0 - 0.4 K/UL    ABS. BASOPHILS 0.1 0.0 - 0.1 K/UL    ABS. IMM. GRANS. 0.0 0.00 - 0.04 K/UL    DF AUTOMATED         XR Results (most recent):  Results from Hospital Encounter encounter on 04/26/22    XR FEMUR RT 2 VS    Narrative  Pain post fall. Impression  FINDINGS: IMPRESSION: Frontal and lateral views of the right femur. No acute  fracture or dislocation. No radiopaque foreign body. CTA CODE NEURO HEAD AND NECK W CONT   Final Result      1. No large vessel occlusion or high-grade intracranial stenosis. 2. No hemodynamically significant stenosis or cervical carotid or vertebral   arteries. All measurements are based on NASCET-like criteria. The results were discussed with/ relayed to Dr. Rosalynd Klinefelter at the completion of the   performance of the examination. CT CODE NEURO HEAD WO CONTRAST   Final Result   No acute intracranial process. Other findings as above. Findings conveyed to Dr. Jackqulyn Aschoff on 5/25/2022 at 12 PM.                       Review of Systems:  Constitutional: Negative for chills and fever. HENT: Negative. Eyes: Negative. Respiratory: Negative. Cardiovascular: Negative. Gastrointestinal: Negative for abdominal pain and nausea. Skin: Negative. Neurological: +headache. Denies lightheadedness, dizziness, weakness. Objective:   VITALS:    Visit Vitals  /76   Pulse 73   Temp 97.6 °F (36.4 °C)   Resp 18   Ht 5' 7\" (1.702 m)   Wt 180 lb (81.6 kg)   SpO2 98%   Breastfeeding No   BMI 28.19 kg/m²       Physical Exam:   Constitutional: pt is oriented to person, place, and time. HENT:   Head: Normocephalic and atraumatic. Eyes: Pupils are equal, round, and reactive to light. EOM are normal.   Cardiovascular: Normal rate, regular rhythm and normal heart sounds. Pulmonary/Chest: Breath sounds normal. No wheezes. No rales. Exhibits no tenderness. Abdominal: Soft. Bowel sounds are normal. There is no abdominal tenderness. There is no rebound and no guarding. Musculoskeletal: Normal range of motion. Neurological: pt is alert and oriented to person, place, and time. Alert. Normal strength. No cranial nerve deficit or sensory deficit. Displays a negative Romberg sign. ASSESSMENT:    Altered mental status s/p syncopal episode/ possible seizure   History of epilepsy on Depakote  Anxiety  Hypothyroidism  hypertension     PLAN:  Lipitor 10mg po qhs  Clonazepam 0.5mg po TID  Valium 10mg po BID  Gabapentin 600mg po QID  Heparin 5,000 subQ q8h  Trazodone 100mg po qhs  Toradol 15mg IV- 1 dose given    IV fluids at 75cc/hour  PT/OT consult    Per [facility] neuro consult while patient was in ER  Did not feel IV thrombolytic therapy is indicated as patient is likely post-ictal   Obtain EEG, MRI Brain with/without contrast  Obtain MRA head and neck- if CTA cannot be done. Patient was also seen by neurology today, feels its likely syncope due to high dose of valium and gabapentin which patient reported taking this morning. They recommend IV Toradol as needed.   ________________________________________________________________________    Signed: Cl Rodriguez

## 2022-05-26 NOTE — ED PROVIDER NOTES
EMERGENCY DEPARTMENT HISTORY AND PHYSICAL EXAM      Date: 5/25/2022  Patient Name: Fabrizio Taveras    History of Presenting Illness     Chief Complaint   Patient presents with    Dizziness       History Provided By: Patient    HPI: Fabrizio Taveras, 64 y.o. female with a past medical history significant seizure and Hypothyroidism presents to the ED with cc of altered mental status. Patient reportedly found to have a syncopal event while walking. EMS reports that patient was lowered to the chair by bystanders. She did not hit her head. Prearrival stroke screening was negative. Patient with history of seizure, however no seizure-like activity was noted. Upon arrival to the emergency department, patient found to have slurred speech and global weakness. Level One stroke alert paged at this time. There are no other complaints, changes, or physical findings at this time. PCP: None    No current facility-administered medications on file prior to encounter. Current Outpatient Medications on File Prior to Encounter   Medication Sig Dispense Refill    dicyclomine (BENTYL) 10 mg capsule Take 1 Capsule by mouth four (4) times daily for 7 days. 28 Capsule 0    ondansetron (ZOFRAN ODT) 4 mg disintegrating tablet Take 1 Tablet by mouth every eight (8) hours as needed for Nausea or Vomiting. 10 Tablet 0    traZODone (DESYREL) 100 mg tablet Take 100-200 mg by mouth At bedtime.  diazePAM (VALIUM) 10 mg tablet Take 10 mg by mouth two (2) times a day.  atorvastatin (LIPITOR) 10 mg tablet Take 10 mg by mouth nightly.  hydrOXYzine HCL (ATARAX) 50 mg tablet Take 50 mg by mouth every six (6) hours as needed (max 4 tablets a day). (Patient not taking: Reported on 3/27/2022)      clonazePAM (KLONOPIN) 1 mg tablet Take  by mouth three (3) times daily.  gabapentin (NEURONTIN) 600 mg tablet Take  by mouth four (4) times daily.          Past History     Past Medical History:  Past Medical History: Diagnosis Date    DDD (degenerative disc disease), lumbar     Fatigue     GERD (gastroesophageal reflux disease)     Hypothyroidism     Psychiatric disorder     PTSD per patient    Seizures ([address]        Past Surgical History:  Past Surgical History:   Procedure Laterality Date    HX APPENDECTOMY      HX  SECTION      HX CHOLECYSTECTOMY      HX HYSTERECTOMY         Family History:  Family History   Family history unknown: Yes       Social History:  Social History     Tobacco Use    Smoking status: Current Every Day Smoker     Packs/day: 0.25    Smokeless tobacco: Never Used   Vaping Use    Vaping Use: Never used   Substance Use Topics    Alcohol use: Not Currently     Alcohol/week: 0.0 standard drinks    Drug use: Never       Allergies: Allergies   Allergen Reactions    Augmentin [Amoxicillin-Pot Clavulanate] Nausea and Vomiting         Review of Systems     Review of Systems   Constitutional: Negative for chills and fever. HENT: Negative for congestion and sore throat. Eyes: Negative for pain and visual disturbance. Respiratory: Negative for cough and shortness of breath. Cardiovascular: Negative for chest pain and palpitations. Gastrointestinal: Negative for constipation, diarrhea, nausea and vomiting. Genitourinary: Negative for dysuria and frequency. Musculoskeletal: Negative for arthralgias and myalgias. Skin: Negative for color change and rash. Neurological: Positive for speech difficulty and weakness. Negative for dizziness, light-headedness and headaches. Psychiatric/Behavioral: Negative for dysphoric mood and sleep disturbance. Physical Exam     Physical Exam  Constitutional:       Appearance: Normal appearance. HENT:      Head: Normocephalic and atraumatic.       Right Ear: External ear normal.      Left Ear: External ear normal.      Nose: Nose normal.      Mouth/Throat:      Mouth: Mucous membranes are moist.   Eyes:      Extraocular Movements: Extraocular movements intact. Conjunctiva/sclera: Conjunctivae normal.   Cardiovascular:      Rate and Rhythm: Normal rate and regular rhythm. Pulses: Normal pulses. Heart sounds: Normal heart sounds. Pulmonary:      Effort: Pulmonary effort is normal.      Breath sounds: Normal breath sounds. Abdominal:      General: Abdomen is flat. There is no distension. Palpations: Abdomen is soft. Tenderness: There is no abdominal tenderness. Musculoskeletal:         General: Normal range of motion. Cervical back: Normal range of motion. Skin:     General: Skin is warm and dry. Capillary Refill: Capillary refill takes less than 2 seconds. Neurological:      General: No focal deficit present. Mental Status: She is oriented to person, place, and time. Mental status is at baseline. She is lethargic. GCS: GCS eye subscore is 3. GCS verbal subscore is 4. GCS motor subscore is 6. Cranial Nerves: Dysarthria present. Sensory: Sensory deficit present. Motor: Weakness present. Psychiatric:         Mood and Affect: Mood normal.         Behavior: Behavior normal.         Lab and Diagnostic Study Results     Labs -     Recent Results (from the past 12 hour(s))   METABOLIC PANEL, COMPREHENSIVE    Collection Time: 05/25/22  3:45 PM   Result Value Ref Range    Sodium 133 (L) 136 - 145 mmol/L    Potassium 4.6 3.5 - 5.1 mmol/L    Chloride 105 97 - 108 mmol/L    CO2 24 21 - 32 mmol/L    Anion gap 4 (L) 5 - 15 mmol/L    Glucose 124 (H) 65 - 100 mg/dL    BUN 19 6 - 20 mg/dL    Creatinine 1.49 (H) 0.55 - 1.02 mg/dL    BUN/Creatinine ratio 13 12 - 20      GFR est AA 44 (L) >60 ml/min/1.73m2    GFR est non-AA 36 (L) >60 ml/min/1.73m2    Calcium 9.0 8.5 - 10.1 mg/dL    Bilirubin, total 0.4 0.2 - 1.0 mg/dL    AST (SGOT) 31 15 - 37 U/L    ALT (SGPT) 17 12 - 78 U/L    Alk.  phosphatase 75 45 - 117 U/L    Protein, total 7.4 6.4 - 8.2 g/dL    Albumin 3.7 3.5 - 5.0 g/dL    Globulin 3.7 2.0 - 4.0 g/dL    A-G Ratio 1.0 (L) 1.1 - 2.2     CBC WITH AUTOMATED DIFF    Collection Time: 05/25/22  3:45 PM   Result Value Ref Range    WBC 9.3 3.6 - 11.0 K/uL    RBC 5.26 (H) 3.80 - 5.20 M/uL    HGB 15.1 11.5 - 16.0 g/dL    HCT 45.2 35.0 - 47.0 %    MCV 85.9 80.0 - 99.0 FL    MCH 28.7 26.0 - 34.0 PG    MCHC 33.4 30.0 - 36.5 g/dL    RDW 13.4 11.5 - 14.5 %    PLATELET 882 910 - 955 K/uL    MPV 9.5 8.9 - 12.9 FL    NRBC 0.0 0.0  WBC    ABSOLUTE NRBC 0.00 0.00 - 0.01 K/uL    NEUTROPHILS 54 32 - 75 %    LYMPHOCYTES 39 12 - 49 %    MONOCYTES 5 5 - 13 %    EOSINOPHILS 1 0 - 7 %    BASOPHILS 1 0 - 1 %    IMMATURE GRANULOCYTES 0 0 - 0.5 %    ABS. NEUTROPHILS 5.0 1.8 - 8.0 K/UL    ABS. LYMPHOCYTES 3.7 (H) 0.8 - 3.5 K/UL    ABS. MONOCYTES 0.5 0.0 - 1.0 K/UL    ABS. EOSINOPHILS 0.1 0.0 - 0.4 K/UL    ABS. BASOPHILS 0.1 0.0 - 0.1 K/UL    ABS. IMM.  GRANS. 0.0 0.00 - 0.04 K/UL    DF AUTOMATED     PROTHROMBIN TIME + INR    Collection Time: 05/25/22  3:45 PM   Result Value Ref Range    Prothrombin time 13.2 11.9 - 14.6 sec    INR 1.0 0.9 - 1.1     TROPONIN-HIGH SENSITIVITY    Collection Time: 05/25/22  3:45 PM   Result Value Ref Range    Troponin-High Sensitivity 7 0 - 51 ng/L   PTT    Collection Time: 05/25/22  3:45 PM   Result Value Ref Range    aPTT 32.7 21.2 - 34.1 sec    aPTT, therapeutic range   82 - 109 sec   LACTIC ACID    Collection Time: 05/25/22  4:15 PM   Result Value Ref Range    Lactic acid 1.0 0.4 - 2.0 mmol/L   VALPROIC ACID    Collection Time: 05/25/22  4:30 PM   Result Value Ref Range    Valproic acid <3 (L) 50 - 100 ug/mL   URINALYSIS W/ RFLX MICROSCOPIC    Collection Time: 05/25/22  4:57 PM   Result Value Ref Range    Color Yellow/Straw      Appearance Clear Clear      Specific gravity 1.016 1.003 - 1.030      pH (UA) 6.0 5.0 - 8.0      Protein Negative Negative mg/dL    Glucose Negative Negative mg/dL    Ketone Negative Negative mg/dL    Bilirubin Negative Negative      Blood Negative Negative Urobilinogen 0.1 0.1 - 1.0 EU/dL    Nitrites Negative Negative      Leukocyte Esterase Negative Negative      WBC 0-4 0 - 4 /hpf    RBC 0-5 0 - 5 /hpf    Bacteria Negative Negative /hpf    Mucus Trace (A) Negative /lpf    Hyaline cast 10-20 0 - 5 /lpf   DRUG SCREEN, URINE    Collection Time: 05/25/22  4:57 PM   Result Value Ref Range    AMPHETAMINES Negative Negative      BARBITURATES Negative Negative      BENZODIAZEPINES Positive (A) Negative      COCAINE Negative Negative      METHADONE Negative Negative      OPIATES Negative Negative      PCP(PHENCYCLIDINE) Negative Negative      THC (TH-CANNABINOL) Negative Negative      Drug screen comment        This test is a screen for drugs of abuse in a medical setting only (i.e., they are unconfirmed results and as such must not be used for non-medical purposes, e.g.,employment testing, legal testing). Due to its inherent nature, false positive (FP) and false negative (FN) results may be obtained. Therefore, if necessary for medical care, recommend confirmation of positive findings by GC/MS. Radiologic Studies -   @lastxrresult@  CT Results  (Last 48 hours)               05/25/22 1804  CTA CODE NEURO HEAD AND NECK W CONT Final result    Impression:      1. No large vessel occlusion or high-grade intracranial stenosis. 2. No hemodynamically significant stenosis or cervical carotid or vertebral   arteries. All measurements are based on NASCET-like criteria. The results were discussed with/ relayed to Dr. Mireya Howard at the completion of the   performance of the examination. Narrative:  Willard Cooper. AI - CODE NEURO CTA HEAD AND NECK:       HISTORY: Weakness       COMPARISON: CT head, 4/27/2022.        Technique: Axial images were obtained through the brain and neck following IV   administration of contrast. CT angiography was performed of the head and neck   following additional bolus administration of contrast. Images of the head and   neck were separately reformatted in coronal, sagittal and axial planes. In   addition, this institution utilizes the [employer] processing and   evaluation for acute stroke imaging and communication. CTA imaging analyzed by [facility] LVO ContaCT to enable computer-assisted triage   notification to rapidly detect a LVO and shorten time to notification via secure   communication to neurology and ED. All CT scans at this facility are performed using dose reduction optimization   techniques as appropriate to a performed exam including the following: Automated   exposure control, adjustments of the mA and/or kV according to patient size, or   use of iterative reconstruction technique. Contrast: Isovue-370 100 cc       Please note that this CT evaluation is optimized for examination of the vascular   structures and minor/chronic degenerative, bony, or other findings that do not   impact the intended evaluation and management of this patient will not be   included in this report. **All stenosis measurements are based on NASCET-like criteria*       CTA HEAD AND NECK:   Technique: Axial images were obtained through the brain and neck following IV   administration of contrast. CT perfusion imaging was initially obtained and CT   angiography was performed of the head and neck following additional bolus   administration of contrast. Images of the head and neck were separately   reformatted in coronal, sagittal and axial planes. In addition, MIP images of   the head and neck  vessels were separately reconstructed at an independent CT   work station. Vessel analysis was also performed when required. CTA Head:   Petrous, cavernous, supraclinoid ICAs are patent without flow-limiting stenosis. Ophthalmic arteries are visualized bilaterally. Mild irregularity along the course of the anterior, middle, and posterior   cerebral arteries without flow-limiting stenosis.  Fetal configuration of the   both posterior cerebral arteries and anatomic variant. Both intradural vertebral, vertebrobasilar junction, basilar artery are patent   without flow-limiting stenosis. Robust visualization of both AICA vessels. The   superior cerebellar arteries are visualized proximally. No CT identifiable aneurysm. Major dural venous sinuses are patent. CTA Neck:   Aortic arch is not visualized. Great vessel origins are widely patent. Visualized subclavian arteries are widely patent, noting suboptimal evaluation   of the mid and distal portions of the left subclavian artery due to streak   artifact from hyperdense contrast in adjacent left subclavian vein. Common   carotid arteries are normal in course and caliber. ECA origins are patent. No hemodynamically significant stenosis involving the proximal cervical internal   carotid arteries by NASCET criteria (0%). Mid and distal cervical ICAs are   patent without flow-limiting stenosis. Codominant cervical vertebral arteries. Cervical vertebral arteries are patent   from origins level of skull base without flow-limiting stenosis. No dissection or pseudoaneurysm. Other Findings:   - Edentulous maxilla. Multiple absent mandibular dentition.    - Multilevel cervical spondylosis including posterior disc abnormalities,   uncovertebral, and facet hypertrophy, contributing to varying degrees of spinal   canal or neural foraminal narrowing.   -Visualized lungs are clear. 05/25/22 1554  CT CODE NEURO HEAD WO CONTRAST Final result    Impression:  No acute intracranial process. Other findings as above. Findings conveyed to Dr. Selene Wiggins on 5/25/2022 at 12 PM.                       Narrative:  CT head, 5/25/2022       History: Stroke. Technique: No intravenous contrast was administered. Multiple contiguous axial   images were acquired from the vertex to the skull base. Coronal and sagittal   reconstruction was made.        All CT scans at this facility are performed using dose reduction optimization   techniques as appropriate to perform the exam including the following: Automated   exposure control, adjustments of the mA and/or kV according to patient size, or   use iterative reconstruction technique. Comparison: Including CT head 4/27/2022. Findings:  Cortical volume and ventricular system size are within normal limits. Gray-white differentiation is preserved. No acute intracranial hemorrhage or   midline shift is identified. The calvarium is intact. The orbits and globes are intact. The visualized paranasal sinuses and mastoid   air cells are clear. CXR Results  (Last 48 hours)    None            Medical Decision Making   - I am the first provider for this patient. - I reviewed the vital signs, available nursing notes, past medical history, past surgical history, family history and social history. - Initial assessment performed. The patients presenting problems have been discussed, and they are in agreement with the care plan formulated and outlined with them. I have encouraged them to ask questions as they arise throughout their visit. Vital Signs-Reviewed the patient's vital signs.   Patient Vitals for the past 12 hrs:   Temp Pulse Resp BP SpO2   05/25/22 2057 97 °F (36.1 °C) 79 15 116/77 99 %   05/25/22 1939 -- 85 15 117/77 100 %   05/25/22 1831 -- 84 15 112/66 100 %   05/25/22 1723 -- 89 19 109/75 92 %   05/25/22 1612 -- 98 21 113/83 93 %   05/25/22 1534 98 °F (36.7 °C) (!) 110 18 97/75 98 %       Records Reviewed: Nursing Notes    The patient presents with altered mental status with a differential diagnosis of  ETOH intoxication, cerebral hemorrhage, CVA, encephalopathy, hepatic encephalopathy, hypernatremia, hyponatremia, hypoxia, ICB and UTI      ED Course:      EKG performed at 1602 and read at 1603: Normal sinus rhythm with a ventricular to 97, , QRS 78, QTc 462 without evidence of ST depression or elevation. Normal axis    Provider Notes (Medical Decision Making):   49-year-old female with past medical history significant for seizure disorder presenting to the ED for evaluation of altered mental status and syncopal episode. On arrival to the ED, patient with global weakness and difficulty speaking. Stroke work-up with no evidence of intracranial bleed. [facility] neurology recommending against tPA as they suspect patient is postictal.  There was no witnessed seizure-like activity. Laboratory evaluation demonstrating subtherapeutic Depakote. We will give dose via IV per neurology recommendation. Neurology recommending admission for MRI at this time. Patient signed out to admitting physician, Dr. Mae Banerjee Making  Performed by: Darlene Tucker DO  PROCEDURES:  Procedures       Disposition   Disposition: Admitted to Floor Medical Floor the case was discussed with the admitting physician     Admitted      Diagnosis     Clinical Impression:   1. Seizure ([address]    2. Altered mental status, unspecified altered mental status type        Attestations:    Darlene Tucker DO    Please note that this dictation was completed with [employer], the computer voice recognition software. Quite often unanticipated grammatical, syntax, homophones, and other interpretive errors are inadvertently transcribed by the computer software. Please disregard these errors. Please excuse any errors that have escaped final proofreading. Thank you.

## 2022-05-26 NOTE — ED NOTES
Pt placed on inpatient hospital bed. Pt able to move independently from stretcher to hospital bed. Continuous cardiac, nibp, and pulse ox monitoring remains in place. IV infusion running. Purwick placed again and attached to wall suction. Bed low/locked. Side rails upx2. Call bell in reach. Pt requesting to use phone, phone provided and pt currently talking to significant other on the phone. Nasal canula removed and oxygen saturations remain 95-96% on room air. No other needs expressed at this time.

## 2022-05-26 NOTE — H&P
History and Physical    NAME: Carol Tello   :  1965   MRN:  [id number]     Date/Time:  2022 9:03 AM    Patient PCP: None  ______________________________________________________________________             Subjective:     CHIEF COMPLAINT: syncope    HISTORY OF PRESENT ILLNESS:       Patient is a 64y.o. year old female with a PMHx of anxiety, epilepsy, hypertension, hypothyroidism, presented to the ED with altered mental status s/p a witnessed syncopal episode. Patient states she was walking to work yesterday when she began feeling lightheaded and nauseous. She reports she lost consciousness for 2-3 minutes and was lowered to the ground by bystanders who also called EMS. Patient notes she did not feel like herself after regaining consciousness and was noted to have global weakness and difficulty speaking upon ED arrival. A stroke alert was called in the ED. Patient's noncontrast CT Head was negative and the [facility] neurology felt that patient was postictal so tPA was not indicated. Patient has a prior history of seizures but she states this did not feel similar to her prior seizures. ED workup showed subtherapeutic Depakote level and patient was given 1 dose IV. Patient was recommended to be admitted for MRI brain with/without contrast.       Patient is currently resting in bed, complains of a left sided headache. She denies any chest pain, SOB, abdominal pain, n/v/d, lightheadedness, numbness or tingling.    Labs notable for Creatinine 1.29    Past Medical History:   Diagnosis Date    DDD (degenerative disc disease), lumbar     Fatigue     GERD (gastroesophageal reflux disease)     Hypothyroidism     Psychiatric disorder     PTSD per patient    Seizures ([facility] [address]         Past Surgical History:   Procedure Laterality Date    HX APPENDECTOMY      HX  SECTION      HX CHOLECYSTECTOMY      HX HYSTERECTOMY         Social History     Tobacco Use    Smoking status: Current Every Day Smoker Packs/day: 0.25    Smokeless tobacco: Never Used   Substance Use Topics    Alcohol use: Not Currently     Alcohol/week: 0.0 standard drinks        Family History   Family history unknown: Yes       Allergies   Allergen Reactions    Augmentin [Amoxicillin-Pot Clavulanate] Nausea and Vomiting        Prior to Admission medications    Medication Sig Start Date End Date Taking? Authorizing Provider   diazePAM (VALIUM) 10 mg tablet Take 10 mg by mouth two (2) times a day. 12/28/21  Yes Ian, MD Kendall   dicyclomine (BENTYL) 10 mg capsule Take 1 Capsule by mouth four (4) times daily for 7 days. 5/18/22 5/25/22  Toña DACOSTA PA-C   ondansetron (ZOFRAN ODT) 4 mg disintegrating tablet Take 1 Tablet by mouth every eight (8) hours as needed for Nausea or Vomiting. 5/18/22   Toña DACOSTA PA-C   traZODone (DESYREL) 100 mg tablet Take 100-200 mg by mouth At bedtime. 2/1/22   Other, MD Kendall   atorvastatin (LIPITOR) 10 mg tablet Take 10 mg by mouth nightly. 1/11/22   Other, MD Kendall   hydrOXYzine HCL (ATARAX) 50 mg tablet Take 50 mg by mouth every six (6) hours as needed (max 4 tablets a day). Patient not taking: Reported on 3/27/2022    OtherKendall MD   clonazePAM (KLONOPIN) 1 mg tablet Take  by mouth three (3) times daily. Provider, Historical   gabapentin (NEURONTIN) 600 mg tablet Take  by mouth four (4) times daily.     Provider, Historical         Current Facility-Administered Medications:     atorvastatin (LIPITOR) tablet 10 mg, 10 mg, Oral, QHS, Ha Recinos MD    clonazePAM (KlonoPIN) tablet 0.5 mg, 0.5 mg, Oral, TID, Ha Recinos MD, 0.5 mg at 05/26/22 5137    diazePAM (VALIUM) tablet 10 mg, 10 mg, Oral, BID, Ha Recinos MD, 10 mg at 05/26/22 2025    gabapentin (NEURONTIN) capsule 600 mg, 600 mg, Oral, QID, Ha Recinos MD, 600 mg at 05/26/22 1353    0.9% sodium chloride infusion, 75 mL/hr, IntraVENous, CONTINUOUS, Ha Recinos MD, Last Rate: 75 mL/hr at 05/25/22 3010, 26 mL/hr at 05/25/22 2331    acetaminophen (TYLENOL) tablet 650 mg, 650 mg, Oral, Q6H PRN **OR** acetaminophen (TYLENOL) suppository 650 mg, 650 mg, Rectal, Q6H PRN, Adair Recinos MD    polyethylene glycol (MIRALAX) packet 17 g, 17 g, Oral, DAILY PRN, Adair Recinos MD    ondansetron (ZOFRAN ODT) tablet 4 mg, 4 mg, Oral, Q8H PRN **OR** ondansetron (ZOFRAN) injection 4 mg, 4 mg, IntraVENous, Q6H PRN, Ha Recinos MD, 4 mg at 05/26/22 0801    heparin (porcine) injection 5,000 Units, 5,000 Units, SubCUTAneous, Q8H, Ha Recinos MD, 5,000 Units at 05/26/22 0801    traZODone (DESYREL) tablet 100 mg, 100 mg, Oral, QHS, Ha Recinos MD    LAB DATA REVIEWED:    Recent Results (from the past 24 hour(s))   METABOLIC PANEL, COMPREHENSIVE    Collection Time: 05/25/22  3:45 PM   Result Value Ref Range    Sodium 133 (L) 136 - 145 mmol/L    Potassium 4.6 3.5 - 5.1 mmol/L    Chloride 105 97 - 108 mmol/L    CO2 24 21 - 32 mmol/L    Anion gap 4 (L) 5 - 15 mmol/L    Glucose 124 (H) 65 - 100 mg/dL    BUN 19 6 - 20 mg/dL    Creatinine 1.49 (H) 0.55 - 1.02 mg/dL    BUN/Creatinine ratio 13 12 - 20      GFR est AA 44 (L) >60 ml/min/1.73m2    GFR est non-AA 36 (L) >60 ml/min/1.73m2    Calcium 9.0 8.5 - 10.1 mg/dL    Bilirubin, total 0.4 0.2 - 1.0 mg/dL    AST (SGOT) 31 15 - 37 U/L    ALT (SGPT) 17 12 - 78 U/L    Alk.  phosphatase 75 45 - 117 U/L    Protein, total 7.4 6.4 - 8.2 g/dL    Albumin 3.7 3.5 - 5.0 g/dL    Globulin 3.7 2.0 - 4.0 g/dL    A-G Ratio 1.0 (L) 1.1 - 2.2     CBC WITH AUTOMATED DIFF    Collection Time: 05/25/22  3:45 PM   Result Value Ref Range    WBC 9.3 3.6 - 11.0 K/uL    RBC 5.26 (H) 3.80 - 5.20 M/uL    HGB 15.1 11.5 - 16.0 g/dL    HCT 45.2 35.0 - 47.0 %    MCV 85.9 80.0 - 99.0 FL    MCH 28.7 26.0 - 34.0 PG    MCHC 33.4 30.0 - 36.5 g/dL    RDW 13.4 11.5 - 14.5 %    PLATELET 399 016 - 220 K/uL    MPV 9.5 8.9 - 12.9 FL    NRBC 0.0 0.0  WBC    ABSOLUTE NRBC 0.00 0.00 - 0.01 K/uL    NEUTROPHILS 54 32 - 75 %    LYMPHOCYTES 39 12 - 49 %    MONOCYTES 5 5 - 13 %    EOSINOPHILS 1 0 - 7 %    BASOPHILS 1 0 - 1 %    IMMATURE GRANULOCYTES 0 0 - 0.5 %    ABS. NEUTROPHILS 5.0 1.8 - 8.0 K/UL    ABS. LYMPHOCYTES 3.7 (H) 0.8 - 3.5 K/UL    ABS. MONOCYTES 0.5 0.0 - 1.0 K/UL    ABS. EOSINOPHILS 0.1 0.0 - 0.4 K/UL    ABS. BASOPHILS 0.1 0.0 - 0.1 K/UL    ABS. IMM.  GRANS. 0.0 0.00 - 0.04 K/UL    DF AUTOMATED     PROTHROMBIN TIME + INR    Collection Time: 05/25/22  3:45 PM   Result Value Ref Range    Prothrombin time 13.2 11.9 - 14.6 sec    INR 1.0 0.9 - 1.1     TROPONIN-HIGH SENSITIVITY    Collection Time: 05/25/22  3:45 PM   Result Value Ref Range    Troponin-High Sensitivity 7 0 - 51 ng/L   PTT    Collection Time: 05/25/22  3:45 PM   Result Value Ref Range    aPTT 32.7 21.2 - 34.1 sec    aPTT, therapeutic range   82 - 109 sec   EKG, 12 LEAD, INITIAL    Collection Time: 05/25/22  4:02 PM   Result Value Ref Range    Ventricular Rate 97 BPM    Atrial Rate 97 BPM    P-R Interval 140 ms    QRS Duration 78 ms    Q-T Interval 364 ms    QTC Calculation (Bezet) 462 ms    Calculated P Axis 42 degrees    Calculated R Axis 36 degrees    Calculated T Axis -14 degrees    Diagnosis       Normal sinus rhythm  Normal ECG  When compared with ECG of 25-MAY-2022 16:01, (Unconfirmed)  ST no longer elevated in Inferior leads  Nonspecific T wave abnormality now evident in Inferior leads  Confirmed by Lena Bassett ([id number]) on 5/26/2022 10:56:28 AM     LACTIC ACID    Collection Time: 05/25/22  4:15 PM   Result Value Ref Range    Lactic acid 1.0 0.4 - 2.0 mmol/L   VALPROIC ACID    Collection Time: 05/25/22  4:30 PM   Result Value Ref Range    Valproic acid <3 (L) 50 - 100 ug/mL   URINALYSIS W/ RFLX MICROSCOPIC    Collection Time: 05/25/22  4:57 PM   Result Value Ref Range    Color Yellow/Straw      Appearance Clear Clear      Specific gravity 1.016 1.003 - 1.030      pH (UA) 6.0 5.0 - 8.0      Protein Negative Negative mg/dL    Glucose Negative Negative mg/dL    Ketone Negative Negative mg/dL    Bilirubin Negative Negative      Blood Negative Negative      Urobilinogen 0.1 0.1 - 1.0 EU/dL    Nitrites Negative Negative      Leukocyte Esterase Negative Negative      WBC 0-4 0 - 4 /hpf    RBC 0-5 0 - 5 /hpf    Bacteria Negative Negative /hpf    Mucus Trace (A) Negative /lpf    Hyaline cast 10-20 0 - 5 /lpf   DRUG SCREEN, URINE    Collection Time: 05/25/22  4:57 PM   Result Value Ref Range    AMPHETAMINES Negative Negative      BARBITURATES Negative Negative      BENZODIAZEPINES Positive (A) Negative      COCAINE Negative Negative      METHADONE Negative Negative      OPIATES Negative Negative      PCP(PHENCYCLIDINE) Negative Negative      THC (TH-CANNABINOL) Negative Negative      Drug screen comment        This test is a screen for drugs of abuse in a medical setting only (i.e., they are unconfirmed results and as such must not be used for non-medical purposes, e.g.,employment testing, legal testing). Due to its inherent nature, false positive (FP) and false negative (FN) results may be obtained. Therefore, if necessary for medical care, recommend confirmation of positive findings by GC/MS. METABOLIC PANEL, COMPREHENSIVE    Collection Time: 05/26/22  4:34 AM   Result Value Ref Range    Sodium 138 136 - 145 mmol/L    Potassium 4.8 3.5 - 5.1 mmol/L    Chloride 108 97 - 108 mmol/L    CO2 27 21 - 32 mmol/L    Anion gap 3 (L) 5 - 15 mmol/L    Glucose 98 65 - 100 mg/dL    BUN 15 6 - 20 mg/dL    Creatinine 1.29 (H) 0.55 - 1.02 mg/dL    BUN/Creatinine ratio 12 12 - 20      GFR est AA 52 (L) >60 ml/min/1.73m2    GFR est non-AA 43 (L) >60 ml/min/1.73m2    Calcium 8.4 (L) 8.5 - 10.1 mg/dL    Bilirubin, total 0.5 0.2 - 1.0 mg/dL    AST (SGOT) 28 15 - 37 U/L    ALT (SGPT) 16 12 - 78 U/L    Alk.  phosphatase 70 45 - 117 U/L    Protein, total 6.6 6.4 - 8.2 g/dL    Albumin 3.3 (L) 3.5 - 5.0 g/dL    Globulin 3.3 2.0 - 4.0 g/dL    A-G Ratio 1.0 (L) 1.1 - 2.2     CBC WITH AUTOMATED DIFF    Collection Time: 05/26/22  4:34 AM   Result Value Ref Range    WBC 8.8 3.6 - 11.0 K/uL    RBC 4.93 3.80 - 5.20 M/uL    HGB 14.0 11.5 - 16.0 g/dL    HCT 42.9 35.0 - 47.0 %    MCV 87.0 80.0 - 99.0 FL    MCH 28.4 26.0 - 34.0 PG    MCHC 32.6 30.0 - 36.5 g/dL    RDW 13.7 11.5 - 14.5 %    PLATELET 858 205 - 146 K/uL    MPV 9.3 8.9 - 12.9 FL    NRBC 0.0 0.0  WBC    ABSOLUTE NRBC 0.00 0.00 - 0.01 K/uL    NEUTROPHILS 52 32 - 75 %    LYMPHOCYTES 40 12 - 49 %    MONOCYTES 6 5 - 13 %    EOSINOPHILS 1 0 - 7 %    BASOPHILS 1 0 - 1 %    IMMATURE GRANULOCYTES 0 0 - 0.5 %    ABS. NEUTROPHILS 4.6 1.8 - 8.0 K/UL    ABS. LYMPHOCYTES 3.5 0.8 - 3.5 K/UL    ABS. MONOCYTES 0.5 0.0 - 1.0 K/UL    ABS. EOSINOPHILS 0.1 0.0 - 0.4 K/UL    ABS. BASOPHILS 0.1 0.0 - 0.1 K/UL    ABS. IMM. GRANS. 0.0 0.00 - 0.04 K/UL    DF AUTOMATED         XR Results (most recent):  Results from Hospital Encounter encounter on 04/26/22    XR FEMUR RT 2 VS    Narrative  Pain post fall. Impression  FINDINGS: IMPRESSION: Frontal and lateral views of the right femur. No acute  fracture or dislocation. No radiopaque foreign body. CTA CODE NEURO HEAD AND NECK W CONT   Final Result      1. No large vessel occlusion or high-grade intracranial stenosis. 2. No hemodynamically significant stenosis or cervical carotid or vertebral   arteries. All measurements are based on NASCET-like criteria. The results were discussed with/ relayed to Dr. Meagan Martínez at the completion of the   performance of the examination. CT CODE NEURO HEAD WO CONTRAST   Final Result   No acute intracranial process. Other findings as above. Findings conveyed to Dr. Gillian Rosa on 5/25/2022 at 12 PM.                       Review of Systems:  Constitutional: Negative for chills and fever. HENT: Negative. Eyes: Negative. Respiratory: Negative. Cardiovascular: Negative. Gastrointestinal: Negative for abdominal pain and nausea. Skin: Negative. Neurological: +headache. Denies lightheadedness, dizziness, weakness. Objective:   VITALS:    Visit Vitals  /76   Pulse 73   Temp 97.6 °F (36.4 °C)   Resp 18   Ht 5' 7\" (1.702 m)   Wt 81.6 kg (180 lb)   SpO2 98%   Breastfeeding No   BMI 28.19 kg/m²       Physical Exam:   Constitutional: pt is oriented to person, place, and time. HENT:   Head: Normocephalic and atraumatic. Eyes: Pupils are equal, round, and reactive to light. EOM are normal.   Cardiovascular: Normal rate, regular rhythm and normal heart sounds. Pulmonary/Chest: Breath sounds normal. No wheezes. No rales. Exhibits no tenderness. Abdominal: Soft. Bowel sounds are normal. There is no abdominal tenderness. There is no rebound and no guarding. Musculoskeletal: Normal range of motion. Neurological: pt is alert and oriented to person, place, and time. Alert. Normal strength. No cranial nerve deficit or sensory deficit. Displays a negative Romberg sign. ASSESSMENT:    Altered mental status s/p syncopal episode/ possible seizure   History of epilepsy on Depakote  Anxiety  Hypothyroidism  hypertension     PLAN:  Lipitor 10mg po qhs  Clonazepam 0.5mg po TID  Valium 10mg po BID  Gabapentin 600mg po QID  Heparin 5,000 subQ q8h  Trazodone 100mg po qhs  Toradol 15mg IV- 1 dose given    IV fluids at 75cc/hour  PT/OT consult    Per [facility] neuro consult while patient was in ER  Did not feel IV thrombolytic therapy is indicated as patient is likely post-ictal       Patient was also seen by neurology today, feels its likely syncope due to high dose of valium and gabapentin which patient reported taking this morning. They recommend IV Toradol as needed.     Monitor for next 24 hours and possible discharge  ________________________________________________________________________    Signed: Brian Gonzalez MD

## 2022-05-27 ENCOUNTER — APPOINTMENT (OUTPATIENT)
Dept: GENERAL RADIOLOGY | Age: 57
DRG: 918 | End: 2022-05-27
Attending: FAMILY MEDICINE
Payer: MEDICARE

## 2022-05-27 VITALS
HEIGHT: 67 IN | HEART RATE: 84 BPM | RESPIRATION RATE: 19 BRPM | OXYGEN SATURATION: 93 % | TEMPERATURE: 98.3 F | SYSTOLIC BLOOD PRESSURE: 121 MMHG | DIASTOLIC BLOOD PRESSURE: 80 MMHG | WEIGHT: 180 LBS | BODY MASS INDEX: 28.25 KG/M2

## 2022-05-27 PROCEDURE — 73502 X-RAY EXAM HIP UNI 2-3 VIEWS: CPT

## 2022-05-27 PROCEDURE — 97162 PT EVAL MOD COMPLEX 30 MIN: CPT

## 2022-05-27 PROCEDURE — 74011250637 HC RX REV CODE- 250/637: Performed by: FAMILY MEDICINE

## 2022-05-27 PROCEDURE — 74011250636 HC RX REV CODE- 250/636: Performed by: FAMILY MEDICINE

## 2022-05-27 RX ADMIN — DIAZEPAM 10 MG: 5 TABLET ORAL at 08:02

## 2022-05-27 RX ADMIN — HEPARIN SODIUM 5000 UNITS: 5000 INJECTION INTRAVENOUS; SUBCUTANEOUS at 05:55

## 2022-05-27 RX ADMIN — SODIUM CHLORIDE 75 ML/HR: 9 INJECTION, SOLUTION INTRAVENOUS at 05:54

## 2022-05-27 RX ADMIN — CLONAZEPAM 0.5 MG: 0.5 TABLET ORAL at 08:02

## 2022-05-27 RX ADMIN — GABAPENTIN 600 MG: 300 CAPSULE ORAL at 12:03

## 2022-05-27 RX ADMIN — TRAMADOL HYDROCHLORIDE 50 MG: 50 TABLET, COATED ORAL at 10:43

## 2022-05-27 RX ADMIN — GABAPENTIN 600 MG: 300 CAPSULE ORAL at 08:02

## 2022-05-27 NOTE — PROGRESS NOTES
CM discussed discharge planning with patient at discharge. Patient will d/c with Home health services. Choice letter received, reviewed, signed and placed in chart. Choice: [facility]. Referral sent. Patient accepted by [facility]. Anticipated SOC date 5/28/2022.  CM informed patient of [facility] Service acceptance

## 2022-05-27 NOTE — PROGRESS NOTES
Pt walked out on her own to meet her ride. States she feels fine and did not want to ride in the wheel chair. Pt instructed to follow up with PCP for klonopin prescription per Dr. Isaura Monge.

## 2022-05-27 NOTE — PROGRESS NOTES
History and Physical    NAME: Sivan Anthony   :  1965   MRN:  [id number]     Date/Time:  2022 9:03 AM    Patient PCP: None  ______________________________________________________________________         Subjective:     CHIEF COMPLAINT: syncope    HISTORY OF PRESENT ILLNESS:       Patient is a 64y.o. year old female with a PMHx of anxiety, epilepsy, hypertension, hypothyroidism, presented to the ED with altered mental status s/p a witnessed syncopal episode. Patient states she was walking to work yesterday when she began feeling lightheaded and nauseous. She reports she lost consciousness for 2-3 minutes and was lowered to the ground by bystanders who also called EMS. Patient notes she did not feel like herself after regaining consciousness and was noted to have global weakness and difficulty speaking upon ED arrival. A stroke alert was called in the ED. Patient's noncontrast CT Head was negative and the [facility] neurology felt that patient was postictal so tPA was not indicated. Patient has a prior history of seizures but she states this did not feel similar to her prior seizures. ED workup showed subtherapeutic Depakote level and patient was given 1 dose IV. Patient was recommended to be admitted for MRI brain with/without contrast.       Patient is currently resting in bed, complains of a left sided headache. She denies any chest pain, SOB, abdominal pain, n/v/d, lightheadedness, numbness or tingling. Labs notable for Creatinine 1.29      Patient complains of left hip pain. Vitals are stable. She denies any headache, lightheadedness, dizziness, chest pain, cough, shortness of breath.      Past Medical History:   Diagnosis Date    DDD (degenerative disc disease), lumbar     Fatigue     GERD (gastroesophageal reflux disease)     Hypothyroidism     Psychiatric disorder     PTSD per patient    Seizures ([facility] [address]         Past Surgical History:   Procedure Laterality Date    HX APPENDECTOMY      HX  SECTION      HX CHOLECYSTECTOMY      HX HYSTERECTOMY         Social History     Tobacco Use    Smoking status: Current Every Day Smoker     Packs/day: 0.25    Smokeless tobacco: Never Used   Substance Use Topics    Alcohol use: Not Currently     Alcohol/week: 0.0 standard drinks        Family History   Family history unknown: Yes       Allergies   Allergen Reactions    Augmentin [Amoxicillin-Pot Clavulanate] Nausea and Vomiting        Prior to Admission medications    Medication Sig Start Date End Date Taking? Authorizing Provider   diazePAM (VALIUM) 10 mg tablet Take 10 mg by mouth two (2) times a day. 21  Yes Ian, MD Kendall   ondansetron (ZOFRAN ODT) 4 mg disintegrating tablet Take 1 Tablet by mouth every eight (8) hours as needed for Nausea or Vomiting. 22   Gracia DACOSTA PA-C   traZODone (DESYREL) 100 mg tablet Take 100-200 mg by mouth At bedtime. 22   Other, MD Kendall   atorvastatin (LIPITOR) 10 mg tablet Take 10 mg by mouth nightly. 22   Other, MD Kendall   hydrOXYzine HCL (ATARAX) 50 mg tablet Take 50 mg by mouth every six (6) hours as needed (max 4 tablets a day). Patient not taking: Reported on 3/27/2022    Other, MD Kendall   clonazePAM (KLONOPIN) 1 mg tablet Take  by mouth three (3) times daily. Provider, Historical   gabapentin (NEURONTIN) 600 mg tablet Take  by mouth four (4) times daily.     Provider, Historical         Current Facility-Administered Medications:     traMADoL (ULTRAM) tablet 50 mg, 50 mg, Oral, Q6H PRN, Ha Recinos MD, 50 mg at 22 1526    atorvastatin (LIPITOR) tablet 10 mg, 10 mg, Oral, QHS, Ha Recinos MD, 10 mg at 22 2309    clonazePAM (KlonoPIN) tablet 0.5 mg, 0.5 mg, Oral, TID, Ha Recinos MD, 0.5 mg at 22 08    diazePAM (VALIUM) tablet 10 mg, 10 mg, Oral, BID, Ha Recinos MD, 10 mg at 22 0802    gabapentin (NEURONTIN) capsule 600 mg, 600 mg, Oral, QID, Ronaldo Recinos MD, 600 mg at 05/27/22 0802    0.9% sodium chloride infusion, 75 mL/hr, IntraVENous, CONTINUOUS, Ha Recinos MD, Last Rate: 75 mL/hr at 05/27/22 0554, 75 mL/hr at 05/27/22 0554    acetaminophen (TYLENOL) tablet 650 mg, 650 mg, Oral, Q6H PRN **OR** acetaminophen (TYLENOL) suppository 650 mg, 650 mg, Rectal, Q6H PRN, Deborah Recinos MD    polyethylene glycol (MIRALAX) packet 17 g, 17 g, Oral, DAILY PRN, Deborah Recinos MD    ondansetron (ZOFRAN ODT) tablet 4 mg, 4 mg, Oral, Q8H PRN **OR** ondansetron (ZOFRAN) injection 4 mg, 4 mg, IntraVENous, Q6H PRN, Ha Recinos MD, 4 mg at 05/26/22 0801    heparin (porcine) injection 5,000 Units, 5,000 Units, SubCUTAneous, Q8H, Ha Recinos MD, 5,000 Units at 05/27/22 0555    traZODone (DESYREL) tablet 100 mg, 100 mg, Oral, QHS, Ha Recinos MD, 100 mg at 05/26/22 2309    LAB DATA REVIEWED:    No results found for this or any previous visit (from the past 24 hour(s)). XR Results (most recent):  Results from Hospital Encounter encounter on 04/26/22    XR FEMUR RT 2 VS    Narrative  Pain post fall. Impression  FINDINGS: IMPRESSION: Frontal and lateral views of the right femur. No acute  fracture or dislocation. No radiopaque foreign body. CTA CODE NEURO HEAD AND NECK W CONT   Final Result      1. No large vessel occlusion or high-grade intracranial stenosis. 2. No hemodynamically significant stenosis or cervical carotid or vertebral   arteries. All measurements are based on NASCET-like criteria. The results were discussed with/ relayed to Dr. Ernestina Orozco at the completion of the   performance of the examination. CT CODE NEURO HEAD WO CONTRAST   Final Result   No acute intracranial process. Other findings as above. Findings conveyed to Dr. Bailee Giles on 5/25/2022 at 12 PM.                       Review of Systems:  Constitutional: Negative for chills and fever. HENT: Negative. Eyes: Negative. Respiratory: Negative.     Cardiovascular: Negative. Gastrointestinal: Negative for abdominal pain and nausea. Skin: Negative. Neurological: +headache. Denies lightheadedness, dizziness, weakness. Objective:   VITALS:    Visit Vitals  /80 (BP 1 Location: Right upper arm, BP Patient Position: At rest;Lying)   Pulse 80   Temp 98.3 °F (36.8 °C)   Resp 19   Ht 5' 7\" (1.702 m)   Wt 180 lb (81.6 kg)   SpO2 93%   Breastfeeding No   BMI 28.19 kg/m²       Physical Exam:   Constitutional: pt is oriented to person, place, and time. HENT:   Head: Normocephalic and atraumatic. Eyes: Pupils are equal, round, and reactive to light. EOM are normal.   Cardiovascular: Normal rate, regular rhythm and normal heart sounds. Pulmonary/Chest: Breath sounds normal. No wheezes. No rales. Exhibits no tenderness. Abdominal: Soft. Bowel sounds are normal. There is no abdominal tenderness. There is no rebound and no guarding. Musculoskeletal: Normal range of motion. Neurological: pt is alert and oriented to person, place, and time. Alert. Normal strength. No cranial nerve deficit or sensory deficit. Displays a negative Romberg sign.         ASSESSMENT:    Altered mental status s/p syncopal episode/ possible seizure   History of epilepsy on Depakote  Anxiety  Hypothyroidism  Hypertension     PLAN:  Lipitor 10mg po qhs  Clonazepam 0.5mg po TID  Valium 10mg po BID  Gabapentin 600mg po QID  Heparin 5,000 subQ q8h  Trazodone 100mg po qhs  Tramadol 50mg po q6h prn    15mg IV- 1 dose given    IV fluids at 75cc/hour  PT/OT consult    5/26  Per [facility] neuro consult while patient was in ER  Did not feel IV thrombolytic therapy is indicated as patient is likely post-ictal     Patient was also seen by neurology, feels its likely syncope due to high dose of valium and gabapentin which patient reported taking this morning prior to ED.      5/27  Stat left hip XR ordered for pain  Monitor for next 24 hours and possible discharge  ________________________________________________________________________    Signed: Delora Mariano

## 2022-05-27 NOTE — PROGRESS NOTES
PHYSICAL THERAPY EVALUATION  Patient: Janina Carroll (89 y.o. female)  Date: 5/27/2022  Primary Diagnosis: Seizure ([facility] [address] [R56.9]        Precautions: falls       ASSESSMENT  Pt is a 65 yo F with PMH of anxiety, epilepsy, HTN, hypothyroidism, presenting to ED with AMS s/p a witnessed syncope episode. Per notes pt lost consciousness for 2-3 minutes and was lowered to ground by bystanders who also called EMS. Per notes pt has a history of seizures but per notes she states this did not feel similar to her prior seizures. Upon arrival to ED, pt noted to have global weakness and difficulty speaking in ED. Stroke alert called in ED, noncontrast head CT was negative. ED workup showed subtherapeutic depakote level. Pt was recommended to be admitted for MRI brain with/without contrast.     Prior level pt reports being independent prior to this. Reports no other history of falls. Does not use any DME for ambulation. Lives with uncle at home who is able to assist. 1 level house with no steps to enter. Based on the objective data described below, the patient presents with L hip pain at rest and with mobility, grossly decreased B LE strength, and decreased functional indep. Pt with >1 personal factors and or coormorbidities impacting current POC, with evolving clinical presentation 2/2 L hip pain, which began after her syncope per reports. Initially in session pt reported 6/10 resting L hip pain, RN made aware. Able to complete supine to sit transfer with supervision. Good sitting balance noted. With sit to stand, pt cued on hand placement and was able to complete with stand by A. Sitting /78, standing /75. No reports of dizziness or lightheadedness noted. Static standing balance was good, however pts L hip pain reported to increase to 8/10 w/ weightbearing. Had pt sit back at EOB. Difficulty noted with L hip AROM flex at EOB along with SLR in supine (once back in supine), maybe related to pain.  Pt was also tender to touch throughout L lateral hip. Due to these findings, further OOB mobility was deferred, pt completed sit to supine with supervision. RN made aware, and MD made aware of findings. Pt did good with session today, however unable to progress OOB mobility 2/2 pain. Would benefit from further PT to maximize mobility and continue with assessment on OOB function. Current Level of Function Impacting Discharge (mobility/balance): supervision-stand by A, 6-8/10 L hip pain    Other factors to consider for discharge: L hip pain      PLAN :  Recommendations and Planned Interventions: bed mobility training, transfer training, gait training, therapeutic exercises, neuromuscular re-education, patient and family training/education and therapeutic activities      Frequency/Duration: Patient will be followed by physical therapy:  3-5x/week to address goals. Recommendation for discharge: (in order for the patient to meet his/her long term goals)  Home with [address] [place] [address]    This discharge recommendation:  Has been made in collaboration with the attending provider and/or case management    IF patient discharges home will need the following DME: to be determined (TBD)         SUBJECTIVE:   Patient stated I haven't seen anyone yet.  Agreeable to PT.    OBJECTIVE DATA SUMMARY:   HISTORY:    Past Medical History:   Diagnosis Date    DDD (degenerative disc disease), lumbar     Fatigue     GERD (gastroesophageal reflux disease)     Hypothyroidism     Psychiatric disorder     PTSD per patient    Seizures ([address]      Past Surgical History:   Procedure Laterality Date    HX APPENDECTOMY      HX  SECTION      HX CHOLECYSTECTOMY      HX HYSTERECTOMY         Home Situation  Home Environment: Private residence  [place] to Enter: No  One/Two Story Residence: One story  Living Alone: No  Support Systems: Other Family Member(s)  Patient Expects to be Discharged to[de-identified] Home with family assistance  Current DME Used/Available at Home: None    EXAMINATION/PRESENTATION/DECISION MAKING:   Critical Behavior:  Neurologic State: Alert  Orientation Level: Oriented to person,Oriented to place,Oriented to time  Cognition: Appropriate decision making,Appropriate safety awareness     Hearing: Auditory  Auditory Impairment: None    Range Of Motion:  AROM: Generally decreased, functional (decreased L hip AROM flex)                       Strength:    Strength: Generally decreased, functional (3-/5 L hip ext (pain primarily limiting), 4+/5 DF)                    Tone & Sensation:                  Sensation: Intact (BLE)               Coordination:     Vision:      Functional Mobility:  Bed Mobility:     Supine to Sit: Supervision  Sit to Supine: Supervision  Scooting: Supervision  Transfers:  Sit to Stand: Stand-by assistance  Stand to Sit: Stand-by assistance                       Balance:   Sitting: Intact; Without support  Standing: Intact; Without support  Ambulation/Gait Training:                                                         Functional Measure:  [address] [place] [id number] AM-PAC 6 Clicks         Basic Mobility Inpatient Short Form  How much difficulty does the patient currently have. .. Unable A Lot A Little None   1. Turning over in bed (including adjusting bedclothes, sheets and blankets)? [] 1   [] 2   [] 3   [x] 4   2. Sitting down on and standing up from a chair with arms ( e.g., wheelchair, bedside commode, etc.)   [] 1   [] 2   [] 3   [x] 4   3. Moving from lying on back to sitting on the side of the bed? [] 1   [] 2   [] 3   [x] 4          How much help from another person does the patient currently need. .. Total A Lot A Little None   4. Moving to and from a bed to a chair (including a wheelchair)? [] 1   [] 2   [x] 3   [] 4   5. Need to walk in hospital room? [] 1   [] 2   [x] 3   [] 4   6. Climbing 3-5 steps with a railing?    [] 1   [] 2   [x] 3   [] 4   © 2007, Trustees of [address] [place] [id number], under license to [employer]. All rights reserved     Score:  Initial:  Most Recent: (Date: 22 )   Interpretation of Tool:  Represents activities that are increasingly more difficult (i.e. Bed mobility, Transfers, Gait). Score 24 23 22-20 19-15 14-10 9-7 6   Modifier CH CI CJ CK CL CM CN         Physical Therapy Evaluation Charge Determination   History Examination Presentation Decision-Making   MEDIUM  Complexity : 1-2 comorbidities / personal factors will impact the outcome/ POC  MEDIUM Complexity : 3 Standardized tests and measures addressing body structure, function, activity limitation and / or participation in recreation  MEDIUM Complexity : Evolving with changing characteristics  Other outcome measures [facility] 6  low      Based on the above components, the patient evaluation is determined to be of the following complexity level: MEDIUM    Pain Ratin/10 pain throughout L hip    Activity Tolerance:   Good    After treatment patient left in no apparent distress:   Supine in bed and Call bell within reach and RN/MD updated. GOALS:    Problem: Mobility Impaired (Adult and Pediatric)  Goal: *Acute Goals and Plan of Care (Insert Text)  Description:   Pt will perform bed mobility with mod I in 7 days. Pt will perform transfers with mod I in 7 days. Pt will amb >100 feet with LRAD safely with mod I in 7 days. 2022 1713 by Opal Seats, PT  Outcome: Not Met  2022 1712 by Opal Seats, PT  Outcome: Not Met       COMMUNICATION/EDUCATION:   The patients plan of care was discussed with: Registered nurse and Physician. Patient/family have participated as able in goal setting and plan of care.       Thank you for this referral.  Kris Seymour, PT, PT, DPT   Time Calculation: 21 mins

## 2022-05-27 NOTE — PROGRESS NOTES
Problem: Falls - Risk of  Goal: *Absence of Falls  Description: Document Denice Deacon Fall Risk and appropriate interventions in the flowsheet.   Outcome: Progressing Towards Goal  Note: Fall Risk Interventions:                                Problem: Patient Education: Go to Patient Education Activity  Goal: Patient/Family Education  Outcome: Progressing Towards Goal

## 2022-05-27 NOTE — DISCHARGE SUMMARY
Discharge Summary       PATIENT ID: Elvi Oconnell  MRN: [id number]   YOB: 1965    DATE OF ADMISSION: 5/25/2022  3:30 PM    DATE OF DISCHARGE:   PRIMARY CARE PROVIDER: None     ATTENDING PHYSICIAN: Justice Recinos  DISCHARGING PROVIDER: Justice Recinos      CONSULTATIONS: IP CONSULT TO HOSPITALIST  IP CONSULT TO NEUROLOGY    PROCEDURES/SURGERIES: * No surgery found *    ADMITTING DIAGNOSES:    Patient Active Problem List    Diagnosis Date Noted    Seizure ([facility] [address] 05/25/2022    ANASTACIO (acute kidney injury) ([facility] 03/27/2022    Hypokalemia 08/09/2021    Hypothyroidism due to acquired atrophy of thyroid 09/30/2015       DISCHARGE DIAGNOSES / PLAN:      Altered mental status s/p syncopal episode/ possible seizure   History of epilepsy on Depakote  Anxiety  Hypothyroidism  Hypertension      DISCHARGE MEDICATIONS:  Current Discharge Medication List      CONTINUE these medications which have NOT CHANGED    Details   traZODone (DESYREL) 100 mg tablet Take 100-200 mg by mouth At bedtime. atorvastatin (LIPITOR) 10 mg tablet Take 10 mg by mouth nightly. clonazePAM (KLONOPIN) 1 mg tablet Take  by mouth three (3) times daily. Associated Diagnoses: Idiopathic hypotension; Hypothyroidism due to acquired atrophy of thyroid; Anemia due to vitamin B12 deficiency; Dizziness; Arthralgia      gabapentin (NEURONTIN) 600 mg tablet Take  by mouth four (4) times daily. Associated Diagnoses: Idiopathic hypotension; Hypothyroidism due to acquired atrophy of thyroid; Anemia due to vitamin B12 deficiency; Dizziness;  Arthralgia         STOP taking these medications       diazePAM (VALIUM) 10 mg tablet Comments:   Reason for Stopping:         dicyclomine (BENTYL) 10 mg capsule Comments:   Reason for Stopping:         ondansetron (ZOFRAN ODT) 4 mg disintegrating tablet Comments:   Reason for Stopping:         hydrOXYzine HCL (ATARAX) 50 mg tablet Comments:   Reason for Stopping:                 NOTIFY YOUR PHYSICIAN FOR ANY OF THE FOLLOWING:   Fever over 101 degrees for 24 hours. Chest pain, shortness of breath, fever, chills, nausea, vomiting, diarrhea, change in mentation, falling, weakness, bleeding. Severe pain or pain not relieved by medications. Or, any other signs or symptoms that you may have questions about. DISPOSITION:  x  Home With:   OT  PT  HH  RN       Long term SNF/Inpatient Rehab    Independent/assisted living    Hospice    Other:       PATIENT CONDITION AT DISCHARGE: Stable      PHYSICAL EXAMINATION AT DISCHARGE:  General:          Alert, cooperative, no distress, appears stated age. HEENT:           Atraumatic, anicteric sclerae, pink conjunctivae                          No oral ulcers, mucosa moist, throat clear, dentition fair  Neck:               Supple, symmetrical  Lungs:             Clear to auscultation bilaterally. No Wheezing or Rhonchi. No rales. Chest wall:      No tenderness  No Accessory muscle use. Heart:              Regular  rhythm,  No  murmur   No edema  Abdomen:        Soft, non-tender. Not distended. Bowel sounds normal  Extremities:     No cyanosis. No clubbing,                            Skin turgor normal, Capillary refill normal  Skin:                Not pale. Not Jaundiced  No rashes   Psych:             Not anxious or agitated. Neurologic:      Alert, moves all extremities, answers questions appropriately and responds to commands     XR HIP LT W OR WO PELV 2-3 VWS   Final Result      CTA CODE NEURO HEAD AND NECK W CONT   Final Result      1. No large vessel occlusion or high-grade intracranial stenosis. 2. No hemodynamically significant stenosis or cervical carotid or vertebral   arteries. All measurements are based on NASCET-like criteria. The results were discussed with/ relayed to Dr. Jarett Mclean at the completion of the   performance of the examination. CT CODE NEURO HEAD WO CONTRAST   Final Result   No acute intracranial process.   Other findings as above. Findings conveyed to Dr. Gillian Rosa on 5/25/2022 at 406 PM.                       No results found for this or any previous visit (from the past 24 hour(s)). HOSPITAL COURSE:    Patient is a 64y.o. year old female with a PMHx of anxiety, epilepsy, hypertension, hypothyroidism, presented to the ED with altered mental status s/p a witnessed syncopal episode. Patient states she was walking to work yesterday when she began feeling lightheaded and nauseous. She reports she lost consciousness for 2-3 minutes and was lowered to the ground by bystanders who also called EMS. Patient notes she did not feel like herself after regaining consciousness and was noted to have global weakness and difficulty speaking upon ED arrival. A stroke alert was called in the ED. Patient's noncontrast CT Head was negative and the [facility] neurology felt that patient was postictal so tPA was not indicated. Patient has a prior history of seizures but she states this did not feel similar to her prior seizures. ED workup showed subtherapeutic Depakote level and patient was given 1 dose IV. Patient was recommended to be admitted for MRI brain with/without contrast.      5/26  Patient is currently resting in bed, complains of a left sided headache. She denies any chest pain, SOB, abdominal pain, n/v/d, lightheadedness, numbness or tingling. Labs notable for Creatinine 1.29     5/27  Patient complains of left hip pain. Vitals are stable.  She denies any headache, lightheadedness, dizziness, chest pain, cough, shortness of breath.        Patient was seen by the physical therapy x-ray of the hip and negative for any fracture patient also seen by the neurology during his admission no work-up recommended at this time    Likely cause taking Neurontin and diazepam and Klonopin high doses      Follow-up with PCP and neurology as an outpatient    Medication reconciliation done      Signed:   Ildefonso Jacobs MD  5/27/2022  12:11 PM

## 2022-05-27 NOTE — PROGRESS NOTES
Discharge plan of care/case management plan validated with provider discharge order. Pt discharging home in self care. X-ray negative for fracture. Pt will call for a ride. No distress noted. IV site removed.

## 2022-05-30 ENCOUNTER — APPOINTMENT (OUTPATIENT)
Dept: CT IMAGING | Age: 57
End: 2022-05-30
Attending: EMERGENCY MEDICINE
Payer: MEDICARE

## 2022-05-30 ENCOUNTER — HOSPITAL ENCOUNTER (EMERGENCY)
Age: 57
Discharge: HOME | End: 2022-05-30
Attending: EMERGENCY MEDICINE
Payer: MEDICARE

## 2022-05-30 VITALS
WEIGHT: 200 LBS | OXYGEN SATURATION: 96 % | HEIGHT: 68 IN | DIASTOLIC BLOOD PRESSURE: 96 MMHG | TEMPERATURE: 98.2 F | SYSTOLIC BLOOD PRESSURE: 131 MMHG | BODY MASS INDEX: 30.31 KG/M2 | HEART RATE: 117 BPM | RESPIRATION RATE: 19 BRPM

## 2022-05-30 DIAGNOSIS — R10.84 ABDOMINAL PAIN, GENERALIZED: ICD-10-CM

## 2022-05-30 DIAGNOSIS — T14.8XXA HEMATOMA: Primary | ICD-10-CM

## 2022-05-30 LAB
ALBUMIN SERPL-MCNC: 3.6 G/DL (ref 3.5–5)
ALBUMIN/GLOB SERPL: 0.9 {RATIO} (ref 1.1–2.2)
ALP SERPL-CCNC: 82 U/L (ref 45–117)
ALT SERPL-CCNC: 22 U/L (ref 12–78)
ANION GAP SERPL CALC-SCNC: 4 MMOL/L (ref 5–15)
AST SERPL W P-5'-P-CCNC: 14 U/L (ref 15–37)
BASOPHILS # BLD: 0.1 K/UL (ref 0–0.1)
BASOPHILS NFR BLD: 1 % (ref 0–1)
BILIRUB SERPL-MCNC: 0.2 MG/DL (ref 0.2–1)
BUN SERPL-MCNC: 12 MG/DL (ref 6–20)
BUN/CREAT SERPL: 10 (ref 12–20)
CA-I BLD-MCNC: 9.4 MG/DL (ref 8.5–10.1)
CHLORIDE SERPL-SCNC: 103 MMOL/L (ref 97–108)
CO2 SERPL-SCNC: 31 MMOL/L (ref 21–32)
CREAT SERPL-MCNC: 1.22 MG/DL (ref 0.55–1.02)
DIFFERENTIAL METHOD BLD: ABNORMAL
EOSINOPHIL # BLD: 0.1 K/UL (ref 0–0.4)
EOSINOPHIL NFR BLD: 1 % (ref 0–7)
ERYTHROCYTE [DISTWIDTH] IN BLOOD BY AUTOMATED COUNT: 13.2 % (ref 11.5–14.5)
GLOBULIN SER CALC-MCNC: 4 G/DL (ref 2–4)
GLUCOSE SERPL-MCNC: 112 MG/DL (ref 65–100)
HCT VFR BLD AUTO: 49.2 % (ref 35–47)
HGB BLD-MCNC: 16.4 G/DL (ref 11.5–16)
IMM GRANULOCYTES # BLD AUTO: 0 K/UL (ref 0–0.04)
IMM GRANULOCYTES NFR BLD AUTO: 0 % (ref 0–0.5)
INR PPP: 1 (ref 0.9–1.1)
LYMPHOCYTES # BLD: 4.4 K/UL (ref 0.8–3.5)
LYMPHOCYTES NFR BLD: 49 % (ref 12–49)
MCH RBC QN AUTO: 28.5 PG (ref 26–34)
MCHC RBC AUTO-ENTMCNC: 33.3 G/DL (ref 30–36.5)
MCV RBC AUTO: 85.4 FL (ref 80–99)
MONOCYTES # BLD: 0.6 K/UL (ref 0–1)
MONOCYTES NFR BLD: 7 % (ref 5–13)
NEUTS SEG # BLD: 3.8 K/UL (ref 1.8–8)
NEUTS SEG NFR BLD: 42 % (ref 32–75)
NRBC # BLD: 0 K/UL (ref 0–0.01)
NRBC BLD-RTO: 0 PER 100 WBC
PLATELET # BLD AUTO: 332 K/UL (ref 150–400)
PMV BLD AUTO: 9.7 FL (ref 8.9–12.9)
POTASSIUM SERPL-SCNC: 4 MMOL/L (ref 3.5–5.1)
PROT SERPL-MCNC: 7.6 G/DL (ref 6.4–8.2)
PROTHROMBIN TIME: 12.7 SEC (ref 11.9–14.6)
RBC # BLD AUTO: 5.76 M/UL (ref 3.8–5.2)
SODIUM SERPL-SCNC: 138 MMOL/L (ref 136–145)
WBC # BLD AUTO: 9 K/UL (ref 3.6–11)

## 2022-05-30 PROCEDURE — 80053 COMPREHEN METABOLIC PANEL: CPT

## 2022-05-30 PROCEDURE — 96374 THER/PROPH/DIAG INJ IV PUSH: CPT

## 2022-05-30 PROCEDURE — 85025 COMPLETE CBC W/AUTO DIFF WBC: CPT

## 2022-05-30 PROCEDURE — 36415 COLL VENOUS BLD VENIPUNCTURE: CPT

## 2022-05-30 PROCEDURE — 74011000636 HC RX REV CODE- 636: Performed by: EMERGENCY MEDICINE

## 2022-05-30 PROCEDURE — 99285 EMERGENCY DEPT VISIT HI MDM: CPT

## 2022-05-30 PROCEDURE — 93005 ELECTROCARDIOGRAM TRACING: CPT

## 2022-05-30 PROCEDURE — 96375 TX/PRO/DX INJ NEW DRUG ADDON: CPT

## 2022-05-30 PROCEDURE — 85610 PROTHROMBIN TIME: CPT

## 2022-05-30 PROCEDURE — 74011250636 HC RX REV CODE- 250/636: Performed by: EMERGENCY MEDICINE

## 2022-05-30 PROCEDURE — 74177 CT ABD & PELVIS W/CONTRAST: CPT

## 2022-05-30 RX ORDER — MORPHINE SULFATE 4 MG/ML
4 INJECTION INTRAVENOUS
Status: COMPLETED | OUTPATIENT
Start: 2022-05-30 | End: 2022-05-30

## 2022-05-30 RX ORDER — HYDROMORPHONE HYDROCHLORIDE 1 MG/ML
1 INJECTION, SOLUTION INTRAMUSCULAR; INTRAVENOUS; SUBCUTANEOUS ONCE
Status: COMPLETED | OUTPATIENT
Start: 2022-05-30 | End: 2022-05-30

## 2022-05-30 RX ADMIN — MORPHINE SULFATE 4 MG: 4 INJECTION, SOLUTION INTRAMUSCULAR; INTRAVENOUS at 13:57

## 2022-05-30 RX ADMIN — IOPAMIDOL 100 ML: 755 INJECTION, SOLUTION INTRAVENOUS at 15:31

## 2022-05-30 RX ADMIN — HYDROMORPHONE HYDROCHLORIDE 1 MG: 1 INJECTION, SOLUTION INTRAMUSCULAR; INTRAVENOUS; SUBCUTANEOUS at 16:35

## 2022-05-30 RX ADMIN — SODIUM CHLORIDE 1000 ML: 9 INJECTION, SOLUTION INTRAVENOUS at 16:53

## 2022-05-30 RX ADMIN — SODIUM CHLORIDE 1000 ML: 9 INJECTION, SOLUTION INTRAVENOUS at 13:57

## 2022-05-30 NOTE — ED TRIAGE NOTES
Pt came in from home with complaints of abdominal pain and bruising to the right side. Pt stated she was recently dc'd from inpatient care for a seizure or stroke (she was not certain). Pt denies SOB, but states she feels radiating pain to her chest when she breathes. Pt is alert and oriented x4. Pt cooperative with care at this time.

## 2022-05-30 NOTE — ED PROVIDER NOTES
EMERGENCY DEPARTMENT HISTORY AND PHYSICAL EXAM      Date: 2022  Patient Name: Rhea Obregon    History of Presenting Illness     No chief complaint on file. History Provided By: Patient    HPI: Rhea Obregon, 64 y.o. female with a past medical history significant Recent fall presents to the ED with cc of left-sided abdominal pain. Pain is been going on for the past 2 days. She states she fell last week on her left side but was also admitted to the hospital.  She denies any fever, chills, nausea, vomiting, chest pain, shortness of breath, rash, diarrhea, headache. Symptoms are made worse with activity relieved at rest.  She has not treated with anything and does not take any blood thinning medications. There are no other complaints, changes, or physical findings at this time. PCP: None    No current facility-administered medications on file prior to encounter. Current Outpatient Medications on File Prior to Encounter   Medication Sig Dispense Refill    traZODone (DESYREL) 100 mg tablet Take 100-200 mg by mouth At bedtime.  atorvastatin (LIPITOR) 10 mg tablet Take 10 mg by mouth nightly.  clonazePAM (KLONOPIN) 1 mg tablet Take  by mouth three (3) times daily.  gabapentin (NEURONTIN) 600 mg tablet Take  by mouth four (4) times daily.          Past History     Past Medical History:  Past Medical History:   Diagnosis Date    DDD (degenerative disc disease), lumbar     Fatigue     GERD (gastroesophageal reflux disease)     Hypothyroidism     Psychiatric disorder     PTSD per patient    Seizures ([facility] [address]        Past Surgical History:  Past Surgical History:   Procedure Laterality Date    HX APPENDECTOMY      HX  SECTION      HX CHOLECYSTECTOMY      HX HYSTERECTOMY         Family History:  Family History   Family history unknown: Yes       Social History:  Social History     Tobacco Use    Smoking status: Current Every Day Smoker     Packs/day: 0.25    Smokeless tobacco: Never Used   Vaping Use    Vaping Use: Never used   Substance Use Topics    Alcohol use: Not Currently     Alcohol/week: 0.0 standard drinks    Drug use: Never       Allergies: Allergies   Allergen Reactions    Augmentin [Amoxicillin-Pot Clavulanate] Nausea and Vomiting         Review of Systems   Review of Systems   Constitutional: Negative. Negative for appetite change, chills, fatigue and fever. HENT: Negative. Negative for congestion and sinus pain. Eyes: Negative. Negative for pain and visual disturbance. Respiratory: Negative. Negative for chest tightness and shortness of breath. Cardiovascular: Negative. Negative for chest pain. Gastrointestinal: Positive for abdominal pain. Negative for diarrhea, nausea and vomiting. Genitourinary: Negative. Negative for difficulty urinating. No discharge   Musculoskeletal: Negative. Negative for arthralgias. Skin: Negative. Negative for rash. Neurological: Negative. Negative for weakness and headaches. Hematological: Negative. Psychiatric/Behavioral: Negative. Negative for agitation. The patient is not nervous/anxious. All other systems reviewed and are negative. Physical Exam     Physical Exam  Vitals and nursing note reviewed. Constitutional:       General: She is not in acute distress. Appearance: She is well-developed. HENT:      Head: Normocephalic and atraumatic. Nose: Nose normal.      Mouth/Throat:      Mouth: Mucous membranes are moist.      Pharynx: Oropharynx is clear. No oropharyngeal exudate. Eyes:      General:         Right eye: No discharge. Left eye: No discharge. Conjunctiva/sclera: Conjunctivae normal.      Pupils: Pupils are equal, round, and reactive to light. Cardiovascular:      Rate and Rhythm: Regular rhythm. Tachycardia present. Chest Wall: PMI is not displaced. No thrill. Heart sounds: Normal heart sounds. No murmur heard.   No friction rub. No gallop. Pulmonary:      Effort: Pulmonary effort is normal. No respiratory distress. Breath sounds: Normal breath sounds. No wheezing or rales. Chest:      Chest wall: No tenderness. Abdominal:      General: Bowel sounds are normal. There is distension. Palpations: Abdomen is soft. There is no mass. Tenderness: There is abdominal tenderness. There is guarding. There is no rebound. Comments: 2 hematomas on left side of abdomen   Musculoskeletal:         General: Normal range of motion. Cervical back: Normal range of motion and neck supple. Lymphadenopathy:      Cervical: No cervical adenopathy. Skin:     General: Skin is warm and dry. Capillary Refill: Capillary refill takes less than 2 seconds. Findings: No erythema or rash. Neurological:      Mental Status: She is alert and oriented to person, place, and time. Cranial Nerves: No cranial nerve deficit. Coordination: Coordination normal.   Psychiatric:         Mood and Affect: Mood normal.         Behavior: Behavior normal.         Lab and Diagnostic Study Results     Labs -     Recent Results (from the past 12 hour(s))   CBC WITH AUTOMATED DIFF    Collection Time: 05/30/22  1:59 PM   Result Value Ref Range    WBC 9.0 3.6 - 11.0 K/uL    RBC 5.76 (H) 3.80 - 5.20 M/uL    HGB 16.4 (H) 11.5 - 16.0 g/dL    HCT 49.2 (H) 35.0 - 47.0 %    MCV 85.4 80.0 - 99.0 FL    MCH 28.5 26.0 - 34.0 PG    MCHC 33.3 30.0 - 36.5 g/dL    RDW 13.2 11.5 - 14.5 %    PLATELET 724 827 - 784 K/uL    MPV 9.7 8.9 - 12.9 FL    NRBC 0.0 0.0  WBC    ABSOLUTE NRBC 0.00 0.00 - 0.01 K/uL    NEUTROPHILS 42 32 - 75 %    LYMPHOCYTES 49 12 - 49 %    MONOCYTES 7 5 - 13 %    EOSINOPHILS 1 0 - 7 %    BASOPHILS 1 0 - 1 %    IMMATURE GRANULOCYTES 0 0 - 0.5 %    ABS. NEUTROPHILS 3.8 1.8 - 8.0 K/UL    ABS. LYMPHOCYTES 4.4 (H) 0.8 - 3.5 K/UL    ABS. MONOCYTES 0.6 0.0 - 1.0 K/UL    ABS. EOSINOPHILS 0.1 0.0 - 0.4 K/UL    ABS.  BASOPHILS 0.1 0.0 - 0.1 K/UL    ABS. IMM. GRANS. 0.0 0.00 - 0.04 K/UL    DF AUTOMATED     METABOLIC PANEL, COMPREHENSIVE    Collection Time: 05/30/22  1:59 PM   Result Value Ref Range    Sodium 138 136 - 145 mmol/L    Potassium 4.0 3.5 - 5.1 mmol/L    Chloride 103 97 - 108 mmol/L    CO2 31 21 - 32 mmol/L    Anion gap 4 (L) 5 - 15 mmol/L    Glucose 112 (H) 65 - 100 mg/dL    BUN 12 6 - 20 mg/dL    Creatinine 1.22 (H) 0.55 - 1.02 mg/dL    BUN/Creatinine ratio 10 (L) 12 - 20      GFR est AA 55 (L) >60 ml/min/1.73m2    GFR est non-AA 46 (L) >60 ml/min/1.73m2    Calcium 9.4 8.5 - 10.1 mg/dL    Bilirubin, total 0.2 0.2 - 1.0 mg/dL    AST (SGOT) 14 (L) 15 - 37 U/L    ALT (SGPT) 22 12 - 78 U/L    Alk. phosphatase 82 45 - 117 U/L    Protein, total 7.6 6.4 - 8.2 g/dL    Albumin 3.6 3.5 - 5.0 g/dL    Globulin 4.0 2.0 - 4.0 g/dL    A-G Ratio 0.9 (L) 1.1 - 2.2     PROTHROMBIN TIME + INR    Collection Time: 05/30/22  1:59 PM   Result Value Ref Range    Prothrombin time 12.7 11.9 - 14.6 sec    INR 1.0 0.9 - 1.1         Radiologic Studies -   @lastxrresult@  CT Results  (Last 48 hours)               05/30/22 1532  CT ABD PELV W CONT Final result    Impression:  1. No acute intra-abdominal findings at this time. 2. Unchanged bilateral lower lung airspace disease. Narrative:  CT ABDOMEN PELVIS       CLINICAL: Trauma. COMPARISON: 5/18/2022 for abdominal pain and blood in stool. .       TECHNIQUE: Axial imaging abdomen pelvis with IV contrast, multiplanar   reformatting. 100mL Isovue 370 administered IV. Dose reduction: All CT scans at this facility are performed using dose reduction   optimization techniques as appropriate to a performed exam including the   following: Automated exposure control, adjustments of the mA and/or kV according   to patient's size, or use of iterative reconstruction technique. FINDINGS:   LUNG BASES: Bilateral lower lung airspace disease, similar to previous. LIVER: Unremarkable.    GALLBLADDER AND BILIARY TREE: Biliary ductal dilation within normal limits   postcholecystectomy. PANCREAS: Unremarkable. SPLEEN: Unremarkable. ADRENALS: Unremarkable. KIDNEYS AND URETERS: Symmetric renal size and enhancement. No hydronephrosis or   hydroureter. BLADDER: Unremarkable. REPRODUCTIVE ORGANS: Uterus absent. BOWEL: Nondilated. LYMPH NODES:  No lymphadenopathy. PERITONEUM: No free air, ascites, walled off fluid collection. VESSELS: Calcific atherosclerosis abdominal aorta and branch vessels. No   abdominal aortic aneurysm or dissection. ABDOMINAL WALL: No evident significant superficial soft tissue swelling. BONES: No acute bony findings. CXR Results  (Last 48 hours)    None            Medical Decision Making   - I am the first provider for this patient. - I reviewed the vital signs, available nursing notes, past medical history, past surgical history, family history and social history. - Initial assessment performed. The patients presenting problems have been discussed, and they are in agreement with the care plan formulated and outlined with them. I have encouraged them to ask questions as they arise throughout their visit. Vital Signs-Reviewed the patient's vital signs. Patient Vitals for the past 12 hrs:   Temp Pulse Resp BP SpO2   05/30/22 1305 98.2 °F (36.8 °C) (!) 117 19 (!) 131/96 96 %     Test with basic labs, PT/INR and CT of the abdomen    ED Course:          Provider Notes (Medical Decision Making): 10year-old female with multiple emergency room visits over the past several months presents with hematomas after a fall and abdominal pain. Pain is resolved at this point in time CT scan does not show anything acute. I also instructed the patient to dose her Coumadin appropriately based on her PT/INR. Patient conveys that she would like to be discharged at this point time.   We discussed staying in the benefits of further evaluation admission but she defers at this point. She conveys that she understands the risks. MDM       Procedures   Medical Decision Makingedical Decision Making  Performed by: Gogo Beasley MD  PROCEDURES:  Procedures       Disposition   Disposition: Condition stable    Discharged    DISCHARGE PLAN:  1. Current Discharge Medication List      CONTINUE these medications which have NOT CHANGED    Details   traZODone (DESYREL) 100 mg tablet Take 100-200 mg by mouth At bedtime. atorvastatin (LIPITOR) 10 mg tablet Take 10 mg by mouth nightly. clonazePAM (KLONOPIN) 1 mg tablet Take  by mouth three (3) times daily. Associated Diagnoses: Idiopathic hypotension; Hypothyroidism due to acquired atrophy of thyroid; Anemia due to vitamin B12 deficiency; Dizziness; Arthralgia      gabapentin (NEURONTIN) 600 mg tablet Take  by mouth four (4) times daily. Associated Diagnoses: Idiopathic hypotension; Hypothyroidism due to acquired atrophy of thyroid; Anemia due to vitamin B12 deficiency; Dizziness; Arthralgia           2. Follow-up Information     Follow up With Specialties Details Why [facility] EMERGENCY DEPT Emergency Medicine Call in 2 days  [address] [facility] [address] [phone]        3. Return to ED if worse   4. Current Discharge Medication List            Diagnosis     Clinical Impression:   1. Hematoma    2. Abdominal pain, generalized        Attestations:    Gogo Beasley MD    Please note that this dictation was completed with [email or web address], the computer voice recognition software. Quite often unanticipated grammatical, syntax, homophones, and other interpretive errors are inadvertently transcribed by the computer software. Please disregard these errors. Please excuse any errors that have escaped final proofreading. Thank you.

## 2022-05-31 LAB
ATRIAL RATE: 116 BPM
CALCULATED P AXIS, ECG09: 46 DEGREES
CALCULATED R AXIS, ECG10: 69 DEGREES
CALCULATED T AXIS, ECG11: 42 DEGREES
DIAGNOSIS, 93000: NORMAL
P-R INTERVAL, ECG05: 134 MS
Q-T INTERVAL, ECG07: 322 MS
QRS DURATION, ECG06: 74 MS
QTC CALCULATION (BEZET), ECG08: 447 MS
VENTRICULAR RATE, ECG03: 116 BPM

## 2022-06-24 ENCOUNTER — HOSPITAL ENCOUNTER (EMERGENCY)
Age: 57
Discharge: HOME | End: 2022-06-24
Attending: EMERGENCY MEDICINE
Payer: MEDICARE

## 2022-06-24 VITALS
BODY MASS INDEX: 28.79 KG/M2 | HEART RATE: 120 BPM | SYSTOLIC BLOOD PRESSURE: 128 MMHG | DIASTOLIC BLOOD PRESSURE: 90 MMHG | WEIGHT: 190 LBS | OXYGEN SATURATION: 95 % | TEMPERATURE: 98.1 F | HEIGHT: 68 IN | RESPIRATION RATE: 18 BRPM

## 2022-06-24 DIAGNOSIS — K08.89 DENTALGIA: Primary | ICD-10-CM

## 2022-06-24 PROCEDURE — 74011250637 HC RX REV CODE- 250/637: Performed by: EMERGENCY MEDICINE

## 2022-06-24 PROCEDURE — 99283 EMERGENCY DEPT VISIT LOW MDM: CPT

## 2022-06-24 RX ORDER — OXYCODONE HYDROCHLORIDE 5 MG/1
5 TABLET ORAL
Status: CANCELLED | OUTPATIENT
Start: 2022-06-24

## 2022-06-24 RX ORDER — CLINDAMYCIN HYDROCHLORIDE 300 MG/1
300 CAPSULE ORAL 4 TIMES DAILY
Qty: 28 CAPSULE | Refills: 0 | Status: SHIPPED | OUTPATIENT
Start: 2022-06-24 | End: 2022-07-01

## 2022-06-24 RX ORDER — ONDANSETRON 4 MG/1
4 TABLET, ORALLY DISINTEGRATING ORAL
Status: COMPLETED | OUTPATIENT
Start: 2022-06-24 | End: 2022-06-24

## 2022-06-24 RX ORDER — OXYCODONE HYDROCHLORIDE 5 MG/1
5 TABLET ORAL
Status: COMPLETED | OUTPATIENT
Start: 2022-06-24 | End: 2022-06-24

## 2022-06-24 RX ORDER — NAPROXEN 500 MG/1
500 TABLET ORAL 2 TIMES DAILY WITH MEALS
Qty: 20 TABLET | Refills: 0 | Status: SHIPPED | OUTPATIENT
Start: 2022-06-24 | End: 2022-07-04

## 2022-06-24 RX ADMIN — ONDANSETRON 4 MG: 4 TABLET, ORALLY DISINTEGRATING ORAL at 19:17

## 2022-06-24 RX ADMIN — OXYCODONE 5 MG: 5 TABLET ORAL at 19:16

## 2022-06-24 NOTE — ED TRIAGE NOTES
Pt presents with pain in bottom left jaw, states she having trouble eating and drinking d/t multiple teeth needing to be removed but can't get into dentist until end of July.  Swelling to left face

## 2022-06-25 NOTE — ED PROVIDER NOTES
EMERGENCY DEPARTMENT HISTORY AND PHYSICAL EXAM      Date: 2022  Patient Name: Nahed Schaffer    History of Presenting Illness     Chief Complaint   Patient presents with    Dental Pain    Facial Pain     swelling       History Provided By: Patient    HPI: Nahed Schaffer, 62 y.o. female with a past medical history significant fatigue, GERD, hypothyroidism, seizure presents to the ED with cc of pain. Patient states that since Monday she has had worsening left upper dental pain. She called her dentist but she is unable to be seen till . They started her on amoxicillin but this has made her nauseous every time she takes it and she vomits. No fevers, trismus, voice change, submental fullness or chest pain. There are no other complaints, changes, or physical findings at this time. PCP: None    No current facility-administered medications on file prior to encounter. Current Outpatient Medications on File Prior to Encounter   Medication Sig Dispense Refill    traZODone (DESYREL) 100 mg tablet Take 100-200 mg by mouth At bedtime.  atorvastatin (LIPITOR) 10 mg tablet Take 10 mg by mouth nightly.  clonazePAM (KLONOPIN) 1 mg tablet Take  by mouth three (3) times daily.  gabapentin (NEURONTIN) 600 mg tablet Take  by mouth four (4) times daily.          Past History     Past Medical History:  Past Medical History:   Diagnosis Date    DDD (degenerative disc disease), lumbar     Fatigue     GERD (gastroesophageal reflux disease)     Hypothyroidism     Psychiatric disorder     PTSD per patient    Seizures ([facility] [address]        Past Surgical History:  Past Surgical History:   Procedure Laterality Date    HX APPENDECTOMY      HX  SECTION      HX CHOLECYSTECTOMY      HX HYSTERECTOMY         Family History:  Family History   Family history unknown: Yes       Social History:  Social History     Tobacco Use    Smoking status: Current Every Day Smoker     Packs/day: 0.25    Smokeless tobacco: Never Used   Vaping Use    Vaping Use: Never used   Substance Use Topics    Alcohol use: Not Currently     Alcohol/week: 0.0 standard drinks    Drug use: Never       Allergies: Allergies   Allergen Reactions    Augmentin [Amoxicillin-Pot Clavulanate] Nausea and Vomiting         Review of Systems     Review of Systems   Constitutional: Negative for activity change and fever. HENT: Positive for dental problem. Negative for rhinorrhea and sore throat. Respiratory: Negative for cough. Cardiovascular: Negative for chest pain. Gastrointestinal: Positive for nausea and vomiting. Negative for diarrhea. Musculoskeletal: Negative for arthralgias. Skin: Negative for rash and wound. Neurological: Negative for syncope and headaches. All other systems reviewed and are negative. Physical Exam     Physical Exam  Vitals and nursing note reviewed. Constitutional:       Appearance: Normal appearance. She is normal weight. HENT:      Head: Normocephalic and atraumatic. Nose: Nose normal.      Mouth/Throat:      Mouth: Mucous membranes are moist.      Comments: Poor dentition. No focal abscess appreciated. No trismus, no submental fullness. Speaks with normal voice. Eyes:      Conjunctiva/sclera: Conjunctivae normal.   Cardiovascular:      Rate and Rhythm: Normal rate. Pulses: Normal pulses. Pulmonary:      Effort: Pulmonary effort is normal. No respiratory distress. Musculoskeletal:         General: No swelling or deformity. Normal range of motion. Skin:     General: Skin is warm and dry. Findings: No rash. Neurological:      General: No focal deficit present. Mental Status: She is alert. Psychiatric:         Mood and Affect: Mood normal.         Behavior: Behavior normal.         Lab and Diagnostic Study Results     Labs -   No results found for this or any previous visit (from the past 12 hour(s)).     Radiologic Studies -   @lastxrresult@  CT Results (Last 48 hours)    None        CXR Results  (Last 48 hours)    None            Medical Decision Making   - I am the first provider for this patient. - I reviewed the vital signs, available nursing notes, past medical history, past surgical history, family history and social history. - Initial assessment performed. The patients presenting problems have been discussed, and they are in agreement with the care plan formulated and outlined with them. I have encouraged them to ask questions as they arise throughout their visit. Vital Signs-Reviewed the patient's vital signs. Patient Vitals for the past 12 hrs:   Temp Pulse Resp BP SpO2   06/24/22 1752 98.1 °F (36.7 °C) (!) 120 18 (!) 128/90 95 %       Records Reviewed: Nursing Notes        ED Course:       80-year-old female presents for evaluation of dental pain. Has poor dentition. No trismus or submental fullness. Has tried Tylenol and ibuprofen at home with minimal relief. She is try to get follow-up with her dentist but cannot be seen until July. Was started on amoxicillin but this is making her vomit. We will change her to clindamycin and have this will be tolerated better. Recommended she take it with food food. Patient is in significant pain so given a dose of oxycodone here. Recommended Tylenol Motrin and Orajel at home. Patient understands and agrees with plan. Discharged home. Also gave her information for the [facility] dental school. Disposition   Disposition: DC- Adult Discharges: All of the diagnostic tests were reviewed and questions answered. Diagnosis, care plan and treatment options were discussed. The patient understands the instructions and will follow up as directed. The patients results have been reviewed with them. They have been counseled regarding their diagnosis.   The patient verbally convey understanding and agreement of the signs, symptoms, diagnosis, treatment and prognosis and additionally agrees to follow up as recommended with their PCP in 24 - 48 hours. They also agree with the care-plan and convey that all of their questions have been answered. I have also put together some discharge instructions for them that include: 1) educational information regarding their diagnosis, 2) how to care for their diagnosis at home, as well a 3) list of reasons why they would want to return to the ED prior to their follow-up appointment, should their condition change. DISCHARGE PLAN:  1. Current Discharge Medication List      CONTINUE these medications which have NOT CHANGED    Details   traZODone (DESYREL) 100 mg tablet Take 100-200 mg by mouth At bedtime. atorvastatin (LIPITOR) 10 mg tablet Take 10 mg by mouth nightly. clonazePAM (KLONOPIN) 1 mg tablet Take  by mouth three (3) times daily. Associated Diagnoses: Idiopathic hypotension; Hypothyroidism due to acquired atrophy of thyroid; Anemia due to vitamin B12 deficiency; Dizziness; Arthralgia      gabapentin (NEURONTIN) 600 mg tablet Take  by mouth four (4) times daily. Associated Diagnoses: Idiopathic hypotension; Hypothyroidism due to acquired atrophy of thyroid; Anemia due to vitamin B12 deficiency; Dizziness; Arthralgia           2. Follow-up Information    None       3. Return to ED if worse   4. Current Discharge Medication List            Diagnosis     Clinical Impression: No diagnosis found. Attestations:    Marycruz President, MD    Please note that this dictation was completed with [employer], the computer voice recognition software. Quite often unanticipated grammatical, syntax, homophones, and other interpretive errors are inadvertently transcribed by the computer software. Please disregard these errors. Please excuse any errors that have escaped final proofreading. Thank you.

## 2022-07-27 ENCOUNTER — HOSPITAL ENCOUNTER (OUTPATIENT)
Age: 57
Setting detail: OBSERVATION
Discharge: HOME | End: 2022-07-29
Attending: EMERGENCY MEDICINE | Admitting: INTERNAL MEDICINE
Payer: MEDICARE

## 2022-07-27 ENCOUNTER — APPOINTMENT (OUTPATIENT)
Dept: CT IMAGING | Age: 57
End: 2022-07-27
Attending: EMERGENCY MEDICINE
Payer: MEDICARE

## 2022-07-27 ENCOUNTER — APPOINTMENT (OUTPATIENT)
Dept: GENERAL RADIOLOGY | Age: 57
End: 2022-07-27
Attending: EMERGENCY MEDICINE
Payer: MEDICARE

## 2022-07-27 DIAGNOSIS — R55 SYNCOPE, UNSPECIFIED SYNCOPE TYPE: ICD-10-CM

## 2022-07-27 DIAGNOSIS — R07.9 CHEST PAIN, UNSPECIFIED TYPE: Primary | ICD-10-CM

## 2022-07-27 LAB
ALBUMIN SERPL-MCNC: 3.7 G/DL (ref 3.5–5)
ALBUMIN/GLOB SERPL: 1.1 {RATIO} (ref 1.1–2.2)
ALP SERPL-CCNC: 84 U/L (ref 45–117)
ALT SERPL-CCNC: 36 U/L (ref 12–78)
ANION GAP SERPL CALC-SCNC: 7 MMOL/L (ref 5–15)
AST SERPL W P-5'-P-CCNC: 38 U/L (ref 15–37)
BASOPHILS # BLD: 0.1 K/UL (ref 0–0.1)
BASOPHILS NFR BLD: 1 % (ref 0–1)
BILIRUB SERPL-MCNC: 0.3 MG/DL (ref 0.2–1)
BUN SERPL-MCNC: 16 MG/DL (ref 6–20)
BUN/CREAT SERPL: 13 (ref 12–20)
CA-I BLD-MCNC: 9 MG/DL (ref 8.5–10.1)
CHLORIDE SERPL-SCNC: 102 MMOL/L (ref 97–108)
CO2 SERPL-SCNC: 28 MMOL/L (ref 21–32)
CREAT SERPL-MCNC: 1.2 MG/DL (ref 0.55–1.02)
DIFFERENTIAL METHOD BLD: ABNORMAL
EOSINOPHIL # BLD: 0.2 K/UL (ref 0–0.4)
EOSINOPHIL NFR BLD: 2 % (ref 0–7)
ERYTHROCYTE [DISTWIDTH] IN BLOOD BY AUTOMATED COUNT: 15.7 % (ref 11.5–14.5)
GLOBULIN SER CALC-MCNC: 3.5 G/DL (ref 2–4)
GLUCOSE SERPL-MCNC: 140 MG/DL (ref 65–100)
HCT VFR BLD AUTO: 45.2 % (ref 35–47)
HGB BLD-MCNC: 15.3 G/DL (ref 11.5–16)
IMM GRANULOCYTES # BLD AUTO: 0 K/UL (ref 0–0.04)
IMM GRANULOCYTES NFR BLD AUTO: 0 % (ref 0–0.5)
INR PPP: 0.9 (ref 0.9–1.1)
LYMPHOCYTES # BLD: 5.1 K/UL (ref 0.8–3.5)
LYMPHOCYTES NFR BLD: 54 % (ref 12–49)
MCH RBC QN AUTO: 29.5 PG (ref 26–34)
MCHC RBC AUTO-ENTMCNC: 33.8 G/DL (ref 30–36.5)
MCV RBC AUTO: 87.3 FL (ref 80–99)
MONOCYTES # BLD: 0.5 K/UL (ref 0–1)
MONOCYTES NFR BLD: 6 % (ref 5–13)
NEUTS SEG # BLD: 3.6 K/UL (ref 1.8–8)
NEUTS SEG NFR BLD: 37 % (ref 32–75)
NRBC # BLD: 0 K/UL (ref 0–0.01)
NRBC BLD-RTO: 0 PER 100 WBC
PLATELET # BLD AUTO: 402 K/UL (ref 150–400)
PMV BLD AUTO: 9.6 FL (ref 8.9–12.9)
POTASSIUM SERPL-SCNC: 4.8 MMOL/L (ref 3.5–5.1)
PROT SERPL-MCNC: 7.2 G/DL (ref 6.4–8.2)
PROTHROMBIN TIME: 11.8 SEC (ref 11.9–14.6)
RBC # BLD AUTO: 5.18 M/UL (ref 3.8–5.2)
SODIUM SERPL-SCNC: 137 MMOL/L (ref 136–145)
TROPONIN-HIGH SENSITIVITY: 7 NG/L (ref 0–51)
TROPONIN-HIGH SENSITIVITY: 7 NG/L (ref 0–51)
WBC # BLD AUTO: 9.6 K/UL (ref 3.6–11)

## 2022-07-27 PROCEDURE — 71045 X-RAY EXAM CHEST 1 VIEW: CPT

## 2022-07-27 PROCEDURE — 99285 EMERGENCY DEPT VISIT HI MDM: CPT

## 2022-07-27 PROCEDURE — 80053 COMPREHEN METABOLIC PANEL: CPT

## 2022-07-27 PROCEDURE — 85025 COMPLETE CBC W/AUTO DIFF WBC: CPT

## 2022-07-27 PROCEDURE — 84484 ASSAY OF TROPONIN QUANT: CPT

## 2022-07-27 PROCEDURE — 74011250637 HC RX REV CODE- 250/637: Performed by: EMERGENCY MEDICINE

## 2022-07-27 PROCEDURE — 36415 COLL VENOUS BLD VENIPUNCTURE: CPT

## 2022-07-27 PROCEDURE — 74011250636 HC RX REV CODE- 250/636: Performed by: EMERGENCY MEDICINE

## 2022-07-27 PROCEDURE — 93005 ELECTROCARDIOGRAM TRACING: CPT

## 2022-07-27 PROCEDURE — 96374 THER/PROPH/DIAG INJ IV PUSH: CPT

## 2022-07-27 PROCEDURE — 96375 TX/PRO/DX INJ NEW DRUG ADDON: CPT

## 2022-07-27 PROCEDURE — 71275 CT ANGIOGRAPHY CHEST: CPT

## 2022-07-27 PROCEDURE — 96376 TX/PRO/DX INJ SAME DRUG ADON: CPT

## 2022-07-27 PROCEDURE — 96372 THER/PROPH/DIAG INJ SC/IM: CPT

## 2022-07-27 PROCEDURE — 96361 HYDRATE IV INFUSION ADD-ON: CPT

## 2022-07-27 PROCEDURE — 74011000636 HC RX REV CODE- 636: Performed by: EMERGENCY MEDICINE

## 2022-07-27 PROCEDURE — 85610 PROTHROMBIN TIME: CPT

## 2022-07-27 RX ORDER — NITROGLYCERIN 0.4 MG/1
0.4 TABLET SUBLINGUAL
Status: COMPLETED | OUTPATIENT
Start: 2022-07-27 | End: 2022-07-27

## 2022-07-27 RX ORDER — MORPHINE SULFATE 4 MG/ML
4 INJECTION INTRAVENOUS ONCE
Status: COMPLETED | OUTPATIENT
Start: 2022-07-27 | End: 2022-07-27

## 2022-07-27 RX ORDER — ONDANSETRON 2 MG/ML
4 INJECTION INTRAMUSCULAR; INTRAVENOUS
Status: COMPLETED | OUTPATIENT
Start: 2022-07-27 | End: 2022-07-27

## 2022-07-27 RX ORDER — ASPIRIN 325 MG
325 TABLET ORAL
Status: COMPLETED | OUTPATIENT
Start: 2022-07-27 | End: 2022-07-27

## 2022-07-27 RX ADMIN — IOPAMIDOL 100 ML: 755 INJECTION, SOLUTION INTRAVENOUS at 22:08

## 2022-07-27 RX ADMIN — NITROGLYCERIN 0.4 MG: 0.4 TABLET, ORALLY DISINTEGRATING SUBLINGUAL at 20:28

## 2022-07-27 RX ADMIN — ONDANSETRON 4 MG: 2 INJECTION INTRAMUSCULAR; INTRAVENOUS at 21:02

## 2022-07-27 RX ADMIN — SODIUM CHLORIDE 500 ML: 9 INJECTION, SOLUTION INTRAVENOUS at 22:29

## 2022-07-27 RX ADMIN — MORPHINE SULFATE 4 MG: 4 INJECTION INTRAVENOUS at 22:29

## 2022-07-27 RX ADMIN — MORPHINE SULFATE 4 MG: 4 INJECTION INTRAVENOUS at 20:28

## 2022-07-27 RX ADMIN — ASPIRIN 325 MG ORAL TABLET 325 MG: 325 PILL ORAL at 20:27

## 2022-07-27 NOTE — ED PROVIDER NOTES
EMERGENCY DEPARTMENT HISTORY AND PHYSICAL EXAM      Date: 2022  Patient Name: Kamila Kamara    History of Presenting Illness     Chief Complaint   Patient presents with    Chest Pain (Angina)     History Provided By: Patient    HPI: Kamila Kamara, 62 y.o. female with history of degenerative disc disease, GERD, PTSD, and seizures who presents with chest pain. States pain is on the left side of her chest, constant, radiating down her left arm, and severe. Pain is 10/10. She is having palpitations and sweating as well. She is having shortness of breath. Symptoms started today. Denies any leg swelling, history of DVT or PE, cough, fevers. There are no other complaints, changes, or physical findings at this time. PCP: None    Current Facility-Administered Medications   Medication Dose Route Frequency Provider Last Rate Last Admin    sodium chloride 0.9 % bolus infusion 500 mL  500 mL IntraVENous ONCE Viet Turner  mL/hr at 22 500 mL at 22     Current Outpatient Medications   Medication Sig Dispense Refill    traZODone (DESYREL) 100 mg tablet Take 100-200 mg by mouth At bedtime. atorvastatin (LIPITOR) 10 mg tablet Take 10 mg by mouth nightly. clonazePAM (KLONOPIN) 1 mg tablet Take  by mouth three (3) times daily. gabapentin (NEURONTIN) 600 mg tablet Take  by mouth four (4) times daily.          Past History   Past Medical History:  Past Medical History:   Diagnosis Date    DDD (degenerative disc disease), lumbar     Fatigue     GERD (gastroesophageal reflux disease)     Hypothyroidism     Psychiatric disorder     PTSD per patient    Seizures ([facility] [address]         Past Surgical History:  Past Surgical History:   Procedure Laterality Date    HX APPENDECTOMY      HX  SECTION      HX CHOLECYSTECTOMY      HX HYSTERECTOMY         Family History:  Family History   Family history unknown: Yes       Social History:  Social History     Tobacco Use    Smoking status: Every Day     Packs/day: 0.25     Types: Cigarettes    Smokeless tobacco: Never   Vaping Use    Vaping Use: Never used   Substance Use Topics    Alcohol use: Not Currently     Alcohol/week: 0.0 standard drinks    Drug use: Never       Allergies: Allergies   Allergen Reactions    Augmentin [Amoxicillin-Pot Clavulanate] Nausea and Vomiting        Review of Systems   Review of Systems   Constitutional:  Negative for fever. HENT:  Negative for congestion. Eyes:  Negative for visual disturbance. Respiratory:  Negative for shortness of breath. Cardiovascular:  Positive for chest pain. Gastrointestinal:  Negative for abdominal pain. Genitourinary:  Negative for dysuria. Musculoskeletal:  Negative for arthralgias. Skin:  Negative for rash. Neurological:  Negative for headaches. Physical Exam   Constitutional: Uncomfortable but nontoxic. Well-nourished. Skin: No rash. ENT: No rhinorrhea. No cough. Head is normocephalic and atraumatic. Eye: No proptosis or conjunctival injections. Respiratory: No apparent respiratory distress. Lung sounds are clear. Gastrointestinal: Nondistended. Soft and nontender. Musculoskeletal: No obvious bony deformities. Cardio: Regular rate and rhythm. No murmurs. 2+ radial pulses.     Lab and Diagnostic Study Results   Labs -     Recent Results (from the past 12 hour(s))   CBC WITH AUTOMATED DIFF    Collection Time: 07/27/22  6:52 PM   Result Value Ref Range    WBC 9.6 3.6 - 11.0 K/uL    RBC 5.18 3.80 - 5.20 M/uL    HGB 15.3 11.5 - 16.0 g/dL    HCT 45.2 35.0 - 47.0 %    MCV 87.3 80.0 - 99.0 FL    MCH 29.5 26.0 - 34.0 PG    MCHC 33.8 30.0 - 36.5 g/dL    RDW 15.7 (H) 11.5 - 14.5 %    PLATELET 239 (H) 003 - 400 K/uL    MPV 9.6 8.9 - 12.9 FL    NRBC 0.0 0.0  WBC    ABSOLUTE NRBC 0.00 0.00 - 0.01 K/uL    NEUTROPHILS 37 32 - 75 %    LYMPHOCYTES 54 (H) 12 - 49 %    MONOCYTES 6 5 - 13 %    EOSINOPHILS 2 0 - 7 %    BASOPHILS 1 0 - 1 %    IMMATURE GRANULOCYTES 0 0 - 0.5 %    ABS. NEUTROPHILS 3.6 1.8 - 8.0 K/UL    ABS. LYMPHOCYTES 5.1 (H) 0.8 - 3.5 K/UL    ABS. MONOCYTES 0.5 0.0 - 1.0 K/UL    ABS. EOSINOPHILS 0.2 0.0 - 0.4 K/UL    ABS. BASOPHILS 0.1 0.0 - 0.1 K/UL    ABS. IMM. GRANS. 0.0 0.00 - 0.04 K/UL    DF AUTOMATED     METABOLIC PANEL, COMPREHENSIVE    Collection Time: 07/27/22  6:52 PM   Result Value Ref Range    Sodium 137 136 - 145 mmol/L    Potassium 4.8 3.5 - 5.1 mmol/L    Chloride 102 97 - 108 mmol/L    CO2 28 21 - 32 mmol/L    Anion gap 7 5 - 15 mmol/L    Glucose 140 (H) 65 - 100 mg/dL    BUN 16 6 - 20 mg/dL    Creatinine 1.20 (H) 0.55 - 1.02 mg/dL    BUN/Creatinine ratio 13 12 - 20      GFR est AA 56 (L) >60 ml/min/1.73m2    GFR est non-AA 46 (L) >60 ml/min/1.73m2    Calcium 9.0 8.5 - 10.1 mg/dL    Bilirubin, total 0.3 0.2 - 1.0 mg/dL    AST (SGOT) 38 (H) 15 - 37 U/L    ALT (SGPT) 36 12 - 78 U/L    Alk. phosphatase 84 45 - 117 U/L    Protein, total 7.2 6.4 - 8.2 g/dL    Albumin 3.7 3.5 - 5.0 g/dL    Globulin 3.5 2.0 - 4.0 g/dL    A-G Ratio 1.1 1.1 - 2.2     TROPONIN-HIGH SENSITIVITY    Collection Time: 07/27/22  6:52 PM   Result Value Ref Range    Troponin-High Sensitivity 7 0 - 51 ng/L   PROTHROMBIN TIME + INR    Collection Time: 07/27/22  8:50 PM   Result Value Ref Range    Prothrombin time 11.8 (L) 11.9 - 14.6 sec    INR 0.9 0.9 - 1.1     TROPONIN-HIGH SENSITIVITY    Collection Time: 07/27/22  8:50 PM   Result Value Ref Range    Troponin-High Sensitivity 7 0 - 51 ng/L       Radiologic Studies -   [unfilled]  CT Results  (Last 48 hours)                 07/27/22 2207  CTA CHEST W OR W WO CONT Final result    Impression:  1. No evidence of pulmonary embolism or acute airspace disease.            Narrative:  EXAM:  CTA CHEST W OR W WO CONT       INDICATION: Chest pain       COMPARISON: Chest CT from 1/30/2022       TECHNIQUE: Helical thin section chest CT following uneventful intravenous   administration of nonionic contrast 100 mL of isovue 370 according to   departmental PE protocol. Coronal and sagittal reformats were performed. 3D post   processing was performed. CT dose reduction was achieved through the use of a   standardized protocol tailored for this examination and automatic exposure   control for dose modulation. FINDINGS: This is a good quality study for the evaluation of pulmonary embolism   to the first subsegmental arterial level. There is no pulmonary embolism to this   level. CHEST WALL: No axillary or supraclavicular lymphadenopathy. THYROID: No nodule. MEDIASTINUM: No mass or lymphadenopathy. TAWANNA: Nonspecific 11 mm right hilar node, likely reactive. THORACIC AORTA: No aneurysm. HEART: Normal in size. ESOPHAGUS: No wall thickening or dilatation. TRACHEA/BRONCHI: Patent. PLEURA: No effusion or pneumothorax. LUNGS: Emphysema. Dependent groundglass likely related to atelectatic changes. Mild scarring. No suspicious pulmonary nodules or masses. UPPER ABDOMEN: Partially imaged. No acute pathology. BONES: No aggressive bone lesion or fracture. CXR Results  (Last 48 hours)                 07/27/22 1905  XR CHEST PORT Final result    Impression:      Scattered patchy opacities in the left lung base may reflect atelectasis and/or   airspace disease. Narrative:  EXAM:  XR CHEST PORT       INDICATION: Chest pain       COMPARISON: T chest 1/30/2022       TECHNIQUE: Upright portable chest AP view       FINDINGS:        Cardiac mediastinal silhouette within normal limits. Scattered patchy opacities   in the left lung base may reflect atelectasis and/or airspace disease. Pleural   spaces grossly clear. Medical Decision Making and ED Course   - I am the first and primary provider for this patient AND AM THE PRIMARY PROVIDER OF RECORD.  I reviewed the vital signs, available nursing notes, past medical history, past surgical history, family history and social history. - Initial assessment performed. The patients presenting problems have been discussed, and the staff are in agreement with the care plan formulated and outlined with them. I have encouraged them to ask questions as they arise throughout their visit. Vital Signs-Reviewed the patient's vital signs. Patient Vitals for the past 12 hrs:   Temp Pulse Resp BP SpO2   07/27/22 2139 -- 89 15 107/70 (!) 88 %   07/27/22 2050 -- 92 20 93/70 94 %   07/27/22 1834 98.5 °F (36.9 °C) (!) 125 20 113/76 100 %     EKG interpretation: (Preliminary): EKG Interpreted by ED physician. Obtained on 7/27/2022 at 2047. Read at 2052. Normal sinus rhythm at rate of 93 bpm.  Normal MO interval, QRS duration, QTc interval.  No ST segment abnormalities. Normal axis. MDM  The differential diagnosis is ACS, pulmonary embolism, aortic dissection, costochondritis. Work-up reveals normal troponin and normal CTA of the chest.  Unclear etiology of her symptoms however she continues to have severe chest pain. She was given sublingual nitroglycerin, aspirin, and morphine x2. She did have a syncopal type episode in the emergency department. She was given IV fluids. Discussed with hospitalist and will admit for chest pain. ED Heart Score  History: Highly Suspicious  ECG: Normal  Age: Greater than 45 years - Less than 65 years  Risk Factors: Greater than or equal to 3 risk factors, or history of atherosclerotic disease  Troponin: Less than or equal to normal limit  HEART Score Total : 5         Disposition     Disposition: Admitted to hospitalist    Diagnosis/Clinical Impression   Clinical Impression:   1. Chest pain, unspecified type    2. Syncope, unspecified syncope type       Attestations:  Filemon Angel, DO    Please note that this dictation was completed with [employer], the [employer] voice recognition software.   Quite often unanticipated grammatical, syntax, homophones, and other interpretive errors are inadvertently transcribed by the computer software. Please disregard these errors. Please excuse any errors that have escaped final proofreading. Thank you.

## 2022-07-27 NOTE — Clinical Note
Status[de-identified] INPATIENT [101]   Type of Bed: Telemetry [19]   Cardiac Monitoring Required?: Yes   Inpatient Hospitalization Certified Necessary for the Following Reasons: 3.  Patient receiving treatment that can only be provided in an inpatient setting (further clarification in H&P documentation)   Admitting Diagnosis: Chest pain [499878]   Admitting Physician: Gregg CUMMINGS [facility] [[id number]]   Attending Physician: Lauren Atwood [[id number]]   Estimated Length of Stay: 3-4 Midnights   Discharge Plan[de-identified] Home with Office Follow-up

## 2022-07-28 ENCOUNTER — APPOINTMENT (OUTPATIENT)
Dept: NON INVASIVE DIAGNOSTICS | Age: 57
End: 2022-07-28
Attending: INTERNAL MEDICINE
Payer: MEDICARE

## 2022-07-28 PROBLEM — R07.9 CHEST PAIN: Status: ACTIVE | Noted: 2022-07-28

## 2022-07-28 LAB
ATRIAL RATE: 126 BPM
CALCULATED P AXIS, ECG09: 18 DEGREES
CALCULATED R AXIS, ECG10: 49 DEGREES
CALCULATED T AXIS, ECG11: 43 DEGREES
CRP SERPL-MCNC: <0.29 MG/DL (ref 0–0.6)
DIAGNOSIS, 93000: NORMAL
ECHO AO DESC DIAM: 2.1 CM
ECHO AO DESCENDING AORTA INDEX: 1.05 CM/M2
ECHO AO ROOT DIAM: 3 CM
ECHO AO ROOT INDEX: 1.5 CM/M2
ECHO AV AREA PEAK VELOCITY: 2.8 CM2
ECHO AV AREA/BSA PEAK VELOCITY: 1.4 CM2/M2
ECHO AV PEAK GRADIENT: 8 MMHG
ECHO AV PEAK VELOCITY: 1.4 M/S
ECHO AV VELOCITY RATIO: 0.86
ECHO EST RA PRESSURE: 3 MMHG
ECHO LA AREA 4C: 18.7 CM2
ECHO LA DIAMETER INDEX: 1.8 CM/M2
ECHO LA DIAMETER: 3.6 CM
ECHO LA MAJOR AXIS: 6.2 CM
ECHO LA TO AORTIC ROOT RATIO: 1.2
ECHO LV E' LATERAL VELOCITY: 14 CM/S
ECHO LV E' SEPTAL VELOCITY: 10 CM/S
ECHO LV EDV A4C: 75 ML
ECHO LV EDV INDEX A4C: 38 ML/M2
ECHO LV EJECTION FRACTION A4C: 57 %
ECHO LV EJECTION FRACTION BIPLANE: 58 % (ref 55–100)
ECHO LV ESV A4C: 32 ML
ECHO LV ESV INDEX A4C: 16 ML/M2
ECHO LV FRACTIONAL SHORTENING: 31 % (ref 28–44)
ECHO LV INTERNAL DIMENSION DIASTOLE INDEX: 1.95 CM/M2
ECHO LV INTERNAL DIMENSION DIASTOLIC: 3.9 CM (ref 3.9–5.3)
ECHO LV INTERNAL DIMENSION SYSTOLIC INDEX: 1.35 CM/M2
ECHO LV INTERNAL DIMENSION SYSTOLIC: 2.7 CM
ECHO LV IVSD: 1.3 CM (ref 0.6–0.9)
ECHO LV MASS 2D: 159.3 G (ref 67–162)
ECHO LV MASS INDEX 2D: 79.6 G/M2 (ref 43–95)
ECHO LV POSTERIOR WALL DIASTOLIC: 1.1 CM (ref 0.6–0.9)
ECHO LV RELATIVE WALL THICKNESS RATIO: 0.56
ECHO LVOT AREA: 3.5 CM2
ECHO LVOT DIAM: 2.1 CM
ECHO LVOT PEAK GRADIENT: 5 MMHG
ECHO LVOT PEAK VELOCITY: 1.2 M/S
ECHO MV A VELOCITY: 1.12 M/S
ECHO MV E DECELERATION TIME (DT): 187 MS
ECHO MV E VELOCITY: 0.47 M/S
ECHO MV E/A RATIO: 0.42
ECHO MV E/E' LATERAL: 3.36
ECHO MV E/E' RATIO (AVERAGED): 4.03
ECHO MV E/E' SEPTAL: 4.7
ECHO MV MAX VELOCITY: 1.5 M/S
ECHO MV MEAN GRADIENT: 3 MMHG
ECHO MV MEAN VELOCITY: 0.7 M/S
ECHO MV PEAK GRADIENT: 9 MMHG
ECHO MV REGURGITANT PEAK GRADIENT: 55 MMHG
ECHO MV REGURGITANT PEAK VELOCITY: 3.7 M/S
ECHO MV VTI: 19.9 CM
ECHO PVEIN A DURATION: 123 MS
ECHO PVEIN A VELOCITY: 0.4 M/S
ECHO RIGHT VENTRICULAR SYSTOLIC PRESSURE (RVSP): 15 MMHG
ECHO RV TAPSE: 1.7 CM (ref 1.7–?)
ECHO TV REGURGITANT MAX VELOCITY: 1.74 M/S
ECHO TV REGURGITANT PEAK GRADIENT: 12 MMHG
ERYTHROCYTE [SEDIMENTATION RATE] IN BLOOD: 8 MM/HR (ref 0–30)
P-R INTERVAL, ECG05: 132 MS
Q-T INTERVAL, ECG07: 316 MS
QRS DURATION, ECG06: 74 MS
QTC CALCULATION (BEZET), ECG08: 457 MS
VENTRICULAR RATE, ECG03: 126 BPM

## 2022-07-28 PROCEDURE — G0378 HOSPITAL OBSERVATION PER HR: HCPCS

## 2022-07-28 PROCEDURE — 85652 RBC SED RATE AUTOMATED: CPT

## 2022-07-28 PROCEDURE — 74011250636 HC RX REV CODE- 250/636: Performed by: INTERNAL MEDICINE

## 2022-07-28 PROCEDURE — 86140 C-REACTIVE PROTEIN: CPT

## 2022-07-28 PROCEDURE — 96372 THER/PROPH/DIAG INJ SC/IM: CPT

## 2022-07-28 PROCEDURE — 74011000250 HC RX REV CODE- 250: Performed by: INTERNAL MEDICINE

## 2022-07-28 PROCEDURE — 96375 TX/PRO/DX INJ NEW DRUG ADDON: CPT

## 2022-07-28 PROCEDURE — 96376 TX/PRO/DX INJ SAME DRUG ADON: CPT

## 2022-07-28 PROCEDURE — 36415 COLL VENOUS BLD VENIPUNCTURE: CPT

## 2022-07-28 PROCEDURE — 93306 TTE W/DOPPLER COMPLETE: CPT

## 2022-07-28 PROCEDURE — 74011250637 HC RX REV CODE- 250/637: Performed by: INTERNAL MEDICINE

## 2022-07-28 RX ORDER — QUETIAPINE FUMARATE 300 MG/1
300 TABLET, FILM COATED ORAL
Status: DISCONTINUED | OUTPATIENT
Start: 2022-07-28 | End: 2022-07-29 | Stop reason: HOSPADM

## 2022-07-28 RX ORDER — CLONAZEPAM 1 MG/1
1 TABLET ORAL
Status: DISCONTINUED | OUTPATIENT
Start: 2022-07-28 | End: 2022-07-28

## 2022-07-28 RX ORDER — ACETAMINOPHEN 325 MG/1
650 TABLET ORAL
Status: DISCONTINUED | OUTPATIENT
Start: 2022-07-28 | End: 2022-07-29 | Stop reason: HOSPADM

## 2022-07-28 RX ORDER — LORAZEPAM 1 MG/1
1 TABLET ORAL
Status: COMPLETED | OUTPATIENT
Start: 2022-07-28 | End: 2022-07-28

## 2022-07-28 RX ORDER — DIAZEPAM 10 MG/1
10 TABLET ORAL 2 TIMES DAILY
COMMUNITY
Start: 2022-07-05 | End: 2022-08-24

## 2022-07-28 RX ORDER — ACETAMINOPHEN 650 MG/1
650 SUPPOSITORY RECTAL
Status: DISCONTINUED | OUTPATIENT
Start: 2022-07-28 | End: 2022-07-29 | Stop reason: HOSPADM

## 2022-07-28 RX ORDER — CHLORHEXIDINE GLUCONATE 1.2 MG/ML
RINSE ORAL
COMMUNITY
Start: 2022-06-30 | End: 2022-07-28

## 2022-07-28 RX ORDER — ONDANSETRON 4 MG/1
4 TABLET, ORALLY DISINTEGRATING ORAL
Status: DISCONTINUED | OUTPATIENT
Start: 2022-07-28 | End: 2022-07-29 | Stop reason: HOSPADM

## 2022-07-28 RX ORDER — SODIUM CHLORIDE 0.9 % (FLUSH) 0.9 %
5-40 SYRINGE (ML) INJECTION EVERY 8 HOURS
Status: DISCONTINUED | OUTPATIENT
Start: 2022-07-28 | End: 2022-07-29 | Stop reason: HOSPADM

## 2022-07-28 RX ORDER — KETOROLAC TROMETHAMINE 15 MG/ML
15 INJECTION, SOLUTION INTRAMUSCULAR; INTRAVENOUS
Status: DISCONTINUED | OUTPATIENT
Start: 2022-07-28 | End: 2022-07-29 | Stop reason: HOSPADM

## 2022-07-28 RX ORDER — IBUPROFEN 600 MG/1
TABLET ORAL
COMMUNITY
Start: 2022-06-30 | End: 2022-07-28

## 2022-07-28 RX ORDER — HYDROXYZINE 25 MG/1
50 TABLET, FILM COATED ORAL 4 TIMES DAILY
Status: DISCONTINUED | OUTPATIENT
Start: 2022-07-28 | End: 2022-07-29 | Stop reason: HOSPADM

## 2022-07-28 RX ORDER — MORPHINE SULFATE 15 MG/1
30 TABLET ORAL
Status: COMPLETED | OUTPATIENT
Start: 2022-07-28 | End: 2022-07-28

## 2022-07-28 RX ORDER — ATOMOXETINE 60 MG/1
60 CAPSULE ORAL DAILY
COMMUNITY
Start: 2022-07-18 | End: 2022-07-28

## 2022-07-28 RX ORDER — TRAZODONE HYDROCHLORIDE 150 MG/1
150 TABLET ORAL
Status: DISCONTINUED | OUTPATIENT
Start: 2022-07-28 | End: 2022-07-29 | Stop reason: HOSPADM

## 2022-07-28 RX ORDER — NALOXONE HYDROCHLORIDE 4 MG/.1ML
SPRAY NASAL
Qty: 1 EACH | Refills: 3 | Status: SHIPPED | OUTPATIENT
Start: 2022-07-28 | End: 2022-08-24

## 2022-07-28 RX ORDER — ENOXAPARIN SODIUM 100 MG/ML
40 INJECTION SUBCUTANEOUS DAILY
Status: DISCONTINUED | OUTPATIENT
Start: 2022-07-28 | End: 2022-07-29 | Stop reason: HOSPADM

## 2022-07-28 RX ORDER — GABAPENTIN 300 MG/1
600 CAPSULE ORAL 4 TIMES DAILY
Status: DISCONTINUED | OUTPATIENT
Start: 2022-07-28 | End: 2022-07-29 | Stop reason: HOSPADM

## 2022-07-28 RX ORDER — CLONAZEPAM 0.5 MG/1
0.5 TABLET ORAL 3 TIMES DAILY
Status: DISCONTINUED | OUTPATIENT
Start: 2022-07-28 | End: 2022-07-29 | Stop reason: HOSPADM

## 2022-07-28 RX ORDER — POLYETHYLENE GLYCOL 3350 17 G/17G
17 POWDER, FOR SOLUTION ORAL DAILY PRN
Status: DISCONTINUED | OUTPATIENT
Start: 2022-07-28 | End: 2022-07-29 | Stop reason: HOSPADM

## 2022-07-28 RX ORDER — ATORVASTATIN CALCIUM 10 MG/1
10 TABLET, FILM COATED ORAL
Status: DISCONTINUED | OUTPATIENT
Start: 2022-07-28 | End: 2022-07-29 | Stop reason: HOSPADM

## 2022-07-28 RX ORDER — SODIUM CHLORIDE 0.9 % (FLUSH) 0.9 %
5-40 SYRINGE (ML) INJECTION AS NEEDED
Status: DISCONTINUED | OUTPATIENT
Start: 2022-07-28 | End: 2022-07-29 | Stop reason: HOSPADM

## 2022-07-28 RX ORDER — ONDANSETRON 2 MG/ML
4 INJECTION INTRAMUSCULAR; INTRAVENOUS
Status: DISCONTINUED | OUTPATIENT
Start: 2022-07-28 | End: 2022-07-29 | Stop reason: HOSPADM

## 2022-07-28 RX ORDER — HYDROXYZINE 50 MG/1
50 TABLET, FILM COATED ORAL 4 TIMES DAILY
COMMUNITY
Start: 2022-07-08 | End: 2022-07-28

## 2022-07-28 RX ORDER — ERGOCALCIFEROL 1.25 MG/1
1 CAPSULE ORAL
COMMUNITY
Start: 2022-07-18

## 2022-07-28 RX ORDER — QUETIAPINE FUMARATE 300 MG/1
300 TABLET, FILM COATED ORAL
COMMUNITY
Start: 2022-07-13

## 2022-07-28 RX ORDER — HYDROCODONE BITARTRATE AND ACETAMINOPHEN 5; 325 MG/1; MG/1
1 TABLET ORAL
Status: DISCONTINUED | OUTPATIENT
Start: 2022-07-28 | End: 2022-07-28

## 2022-07-28 RX ORDER — HYDROCODONE BITARTRATE AND ACETAMINOPHEN 5; 325 MG/1; MG/1
1 TABLET ORAL
COMMUNITY
Start: 2022-06-30 | End: 2022-07-28

## 2022-07-28 RX ADMIN — ATORVASTATIN CALCIUM 10 MG: 10 TABLET, FILM COATED ORAL at 21:55

## 2022-07-28 RX ADMIN — GABAPENTIN 600 MG: 300 CAPSULE ORAL at 10:22

## 2022-07-28 RX ADMIN — KETOROLAC TROMETHAMINE 15 MG: 15 INJECTION, SOLUTION INTRAMUSCULAR; INTRAVENOUS at 22:16

## 2022-07-28 RX ADMIN — LORAZEPAM 1 MG: 1 TABLET ORAL at 02:16

## 2022-07-28 RX ADMIN — ENOXAPARIN SODIUM 40 MG: 100 INJECTION SUBCUTANEOUS at 10:22

## 2022-07-28 RX ADMIN — SODIUM CHLORIDE, PRESERVATIVE FREE 10 ML: 5 INJECTION INTRAVENOUS at 21:55

## 2022-07-28 RX ADMIN — CLONAZEPAM 0.5 MG: 0.5 TABLET ORAL at 16:09

## 2022-07-28 RX ADMIN — GABAPENTIN 600 MG: 300 CAPSULE ORAL at 14:15

## 2022-07-28 RX ADMIN — ACETAMINOPHEN 650 MG: 325 TABLET ORAL at 16:09

## 2022-07-28 RX ADMIN — GABAPENTIN 600 MG: 300 CAPSULE ORAL at 21:55

## 2022-07-28 RX ADMIN — CLONAZEPAM 0.5 MG: 0.5 TABLET ORAL at 21:55

## 2022-07-28 RX ADMIN — KETOROLAC TROMETHAMINE 15 MG: 15 INJECTION, SOLUTION INTRAMUSCULAR; INTRAVENOUS at 10:25

## 2022-07-28 RX ADMIN — QUETIAPINE FUMARATE 300 MG: 300 TABLET ORAL at 21:55

## 2022-07-28 RX ADMIN — CLONAZEPAM 0.5 MG: 0.5 TABLET ORAL at 10:22

## 2022-07-28 RX ADMIN — HYDROXYZINE HYDROCHLORIDE 50 MG: 25 TABLET ORAL at 10:22

## 2022-07-28 RX ADMIN — MORPHINE SULFATE 30 MG: 15 TABLET ORAL at 05:56

## 2022-07-28 RX ADMIN — HYDROXYZINE HYDROCHLORIDE 50 MG: 25 TABLET ORAL at 21:54

## 2022-07-28 RX ADMIN — HYDROXYZINE HYDROCHLORIDE 50 MG: 25 TABLET ORAL at 14:15

## 2022-07-28 NOTE — PROGRESS NOTES
[facility] Hospitalist Progress Note  Dominik Dove MD    Date:2022       Room:ER1414  Patient Name:Haydee Serrato     YOB: 1965     Age:57 y.o.    22 admission course  Neela Fountain is a 62 y.o. female with PMH of seizure, hypothyroidism, GERD, lumbar DDD and psyciatric disorder. She presented to the ED with chief complaint of chest pain started yesterday afternoon. States pain is on the left side of her chest, sharp in nature, intermittent which lasts about 5 mins each, not related to exertion, radiating down her left arm, and rated 10/10. Associated with shortness of breath, and nausea without vomiting. No diaphoresis. Troponin negative x2, EKG no ST changes. 22 no new complaints, tolerating medications well  Reports she smokes a pack of cigarettes daily for many years  Troponins negative  Echocardiogram  With her risk factors, may need a stress test  Defer to cardiology    Subjective    Subjective:  Symptoms:  Stable. Review of Systems   All other systems reviewed and are negative. Objective         Vitals Last 24 Hours:  TEMPERATURE:  Temp  Av.5 °F (36.9 °C)  Min: 98.5 °F (36.9 °C)  Max: 98.5 °F (36.9 °C)  RESPIRATIONS RANGE: Resp  Av.9  Min: 10  Max: 20  PULSE OXIMETRY RANGE: SpO2  Av.9 %  Min: 88 %  Max: 100 %  PULSE RANGE: Pulse  Av.4  Min: 76  Max: 125  BLOOD PRESSURE RANGE: Systolic (75YRH), GDE:069 , Min:93 , TPP:342   ; Diastolic (06NXP), IOP:28, Min:68, Max:80    I/O (24Hr): Intake/Output Summary (Last 24 hours) at 2022 0704  Last data filed at 2022 0010  Gross per 24 hour   Intake 500 ml   Output --   Net 500 ml     Objective:  General Appearance:  Comfortable. Vital signs: (most recent): Blood pressure 121/80, pulse 86, temperature 98.5 °F (36.9 °C), resp. rate 10, height 5' 8\" (1.727 m), weight 86.2 kg (190 lb), SpO2 97 %. HEENT: Normal HEENT exam.    Lungs:  Normal effort and normal respiratory rate.     Heart: Normal rate.  Regular rhythm. Abdomen: Hyperactive bowel sounds. Extremities: Normal range of motion. Neurological: Patient is alert and oriented to person, place and time. Pupils:  Pupils are equal, round, and reactive to light. Skin:  Warm and dry. Labs/Imaging/Diagnostics    Labs:  CBC:  Recent Labs     07/27/22 1852   WBC 9.6   RBC 5.18   HGB 15.3   HCT 45.2   MCV 87.3   RDW 15.7*   *     CHEMISTRIES:  Recent Labs     07/27/22 1852      K 4.8      CO2 28   BUN 16   CA 9.0   PT/INR:  Recent Labs     07/27/22 2050   INR 0.9     APTT:No results for input(s): APTT in the last 72 hours. LIVER PROFILE:  Recent Labs     07/27/22 1852   AST 38*   ALT 36     Lab Results   Component Value Date/Time    ALT (SGPT) 36 07/27/2022 06:52 PM    AST (SGOT) 38 (H) 07/27/2022 06:52 PM    Alk. phosphatase 84 07/27/2022 06:52 PM    Bilirubin, total 0.3 07/27/2022 06:52 PM       Imaging Last 24 Hours:  CTA CHEST W OR W WO CONT    Result Date: 7/27/2022  EXAM:  CTA CHEST W OR W WO CONT INDICATION: Chest pain COMPARISON: Chest CT from 8/79/5995 TECHNIQUE: Helical thin section chest CT following uneventful intravenous administration of nonionic contrast 100 mL of isovue 370 according to departmental PE protocol. Coronal and sagittal reformats were performed. 3D post processing was performed. CT dose reduction was achieved through the use of a standardized protocol tailored for this examination and automatic exposure control for dose modulation. FINDINGS: This is a good quality study for the evaluation of pulmonary embolism to the first subsegmental arterial level. There is no pulmonary embolism to this level. CHEST WALL: No axillary or supraclavicular lymphadenopathy. THYROID: No nodule. MEDIASTINUM: No mass or lymphadenopathy. TAWANNA: Nonspecific 11 mm right hilar node, likely reactive. THORACIC AORTA: No aneurysm. HEART: Normal in size. ESOPHAGUS: No wall thickening or dilatation.  TRACHEA/BRONCHI: Patent. PLEURA: No effusion or pneumothorax. LUNGS: Emphysema. Dependent groundglass likely related to atelectatic changes. Mild scarring. No suspicious pulmonary nodules or masses. UPPER ABDOMEN: Partially imaged. No acute pathology. BONES: No aggressive bone lesion or fracture. 1.  No evidence of pulmonary embolism or acute airspace disease. XR CHEST PORT    Result Date: 7/27/2022  EXAM:  XR CHEST PORT INDICATION: Chest pain COMPARISON: T chest 1/30/2022 TECHNIQUE: Upright portable chest AP view FINDINGS: Cardiac mediastinal silhouette within normal limits. Scattered patchy opacities in the left lung base may reflect atelectasis and/or airspace disease. Pleural spaces grossly clear. Scattered patchy opacities in the left lung base may reflect atelectasis and/or airspace disease. Assessment//Plan   Active Problems:    Chest pain (7/28/2022)      Assessment & Plan  7/27/22 admission course  Sia Trujillo is a 62 y.o. female with PMH of seizure, hypothyroidism, GERD, lumbar DDD and psyciatric disorder. She presented to the ED with chief complaint of chest pain started yesterday afternoon. States pain is on the left side of her chest, sharp in nature, intermittent which lasts about 5 mins each, not related to exertion, radiating down her left arm, and rated 10/10. Associated with shortness of breath, and nausea without vomiting. No diaphoresis. Troponin negative x2, EKG no ST changes. 7/28/22 no new complaints, tolerating medications well  Reports she smokes a pack of cigarettes daily for many years  Troponins negative  Echocardiogram  With her risk factors, may need a stress test  Defer to cardiology    ASSESSMENT AND PLAN    1) Chest pain substernal, radiation down the left arm. Risk factors of smoking, dyslipidemia. Troponins negative.      Telemetry monitoring   Echocardiogram   Continue home atorvastatin   Cardiology    2) DVT prophylaxis with enoxaparin       Electronically signed by Dominik Tulio Maddox MD on 7/28/2022 at 8:21 AM

## 2022-07-28 NOTE — PROGRESS NOTES
Medicare Outpatient Observation Notice (MOON)/ [place] Outpatient Observation Notice (Perez Scarce) provided to patient/representative with verbal explanation of the notice. Time allotted for questions regarding the notice. Patient /representative provided a completed copy of the MOON/VOON notice. Copy placed on bedside chart.

## 2022-07-28 NOTE — ACP (ADVANCE CARE PLANNING)
Advance Care Planning   Healthcare Decision Maker:       Primary Decision Maker: X[facility] [phone]

## 2022-07-28 NOTE — H&P
History and Physical    Patient: Sia Trujillo MRN: [id number]  SSN: [id number]    YOB: 1965  Age: 62 y.o. Sex: female      Subjective:      Sia Trujillo is a 62 y.o. female with PMH of seizure, hypothyroidism, GERD, lumbar DDD and psyciatric disorder. She presented to the ED with chief complaint of chest pain started yesterday afternoon. States pain is on the left side of her chest, sharp in nature, intermittent which lasts about 5 mins each, not related to exertion, radiating down her left arm, and rated 10/10. Associated with shortness of breath, and nausea without vomiting. No diaphoresis. Troponin negative x2, EKG no ST changes. Past Medical History:   Diagnosis Date    DDD (degenerative disc disease), lumbar     Fatigue     GERD (gastroesophageal reflux disease)     Hypothyroidism     Psychiatric disorder     PTSD per patient    Seizures ([facility] [address]      Past Surgical History:   Procedure Laterality Date    HX APPENDECTOMY      HX  SECTION      HX CHOLECYSTECTOMY      HX HYSTERECTOMY        Family History   Family history unknown: Yes     Social History     Tobacco Use    Smoking status: Every Day     Packs/day: 0.25     Types: Cigarettes    Smokeless tobacco: Never   Substance Use Topics    Alcohol use: Not Currently     Alcohol/week: 0.0 standard drinks      Prior to Admission medications    Medication Sig Start Date End Date Taking? Authorizing Provider   naloxone [facility]) 4 mg/actuation nasal spray Use 1 spray intranasally, then discard. Repeat with new spray every 2 min as needed for opioid overdose symptoms, alternating nostrils. 22  Yes Kirt Gallegos MD   atomoxetine (STRATTERA) 60 mg capsule Take 60 mg by mouth in the morning.  22   Provider, Historical   chlorhexidine (PERIDEX) 0.12 % solution RINSE MOUTH WITH 15ML BY MOUTH FOR 30 SECONDS EVERY MORNING AND EVERY EVENING AFTER TOOTHBRUSHING SPIT AFTER RINSING DO NOT SWALLOW 22   Provider, Historical diazePAM (VALIUM) 10 mg tablet Take 10 mg by mouth two (2) times a day. 7/5/22   Provider, Historical   ergocalciferol (ERGOCALCIFEROL) 1,250 mcg (50,000 unit) capsule Take 1 Capsule by mouth every Monday, Wednesday, Friday. 7/18/22   Provider, Historical   HYDROcodone-acetaminophen (NORCO) 5-325 mg per tablet Take 1 Tablet by mouth every six (6) hours as needed. 6/30/22   Provider, Historical   hydrOXYzine HCL (ATARAX) 50 mg tablet Take 50 mg by mouth four (4) times daily. 7/8/22   Provider, Historical   ibuprofen (MOTRIN) 600 mg tablet TAKE 1 TABLET BY MOUTH EVERY 6 HOURS AS NEEDED 6/30/22   Provider, Historical   QUEtiapine (SEROquel) 300 mg tablet Take 300 mg by mouth nightly. 7/13/22   Provider, Historical   traZODone (DESYREL) 100 mg tablet Take 100-200 mg by mouth At bedtime. 2/1/22   Other, MD Kendall   atorvastatin (LIPITOR) 10 mg tablet Take 10 mg by mouth nightly. 1/11/22   Other, MD Kendall   clonazePAM (KLONOPIN) 1 mg tablet Take  by mouth three (3) times daily. Provider, Historical   gabapentin (NEURONTIN) 600 mg tablet Take  by mouth four (4) times daily.     Provider, Historical        Allergies   Allergen Reactions    Augmentin [Amoxicillin-Pot Clavulanate] Nausea and Vomiting       Review of Systems:   Constitutional: No fevers, No chills, No fatigue, No weakness  Eyes: No visual disturbance  Ears, Nose, Mouth, Throat, and Face: No nasal congestion, No sore throat  Respiratory: No cough, No sputum, No wheezing, No SOB  Cardiovascular: See HPI    Gastrointestinal: No nausea, No vomiting, No diarrhea, No constipation, No abdominal pain  Genitourinary: No frequency, No dysuria, No hematuria  Integument/Breast: No rash, No skin lesion(s), No dryness  Musculoskeletal: No arthralgias, No neck pain, No back pain  Neurological: No headaches, No dizziness, No confusion,  No seizures  Behavioral/Psychiatric: No anxiety, No depression      Objective:     Vitals:    07/28/22 0042 07/28/22 0230 07/28/22 0340 07/28/22 0357   BP: 98/68 109/78 104/77    Pulse: 83 76 82    Resp: 16 11 12    Temp:       SpO2: 92% 92% 94% 94%   Weight:       Height:            Physical Exam:   General: alert, cooperative, no distress  Eye: conjunctivae/corneas clear. PERRL, EOM's intact. Throat and Neck: normal and no erythema or exudates noted. No mass. No neck tenderness. Lung: clear to auscultation bilaterally  Heart: regular rate and rhythm,   Abdomen: soft, non-tender. Bowel sounds normal. No masses,  Extremities:  left shoulder pain with movement. Sternal tenderness with palpation. Skin: Normal.  Neurologic: AOx3. Motor function and sensation grossly intact. Psychiatric: non focal    Recent Results (from the past 24 hour(s))   CBC WITH AUTOMATED DIFF    Collection Time: 07/27/22  6:52 PM   Result Value Ref Range    WBC 9.6 3.6 - 11.0 K/uL    RBC 5.18 3.80 - 5.20 M/uL    HGB 15.3 11.5 - 16.0 g/dL    HCT 45.2 35.0 - 47.0 %    MCV 87.3 80.0 - 99.0 FL    MCH 29.5 26.0 - 34.0 PG    MCHC 33.8 30.0 - 36.5 g/dL    RDW 15.7 (H) 11.5 - 14.5 %    PLATELET 450 (H) 162 - 400 K/uL    MPV 9.6 8.9 - 12.9 FL    NRBC 0.0 0.0  WBC    ABSOLUTE NRBC 0.00 0.00 - 0.01 K/uL    NEUTROPHILS 37 32 - 75 %    LYMPHOCYTES 54 (H) 12 - 49 %    MONOCYTES 6 5 - 13 %    EOSINOPHILS 2 0 - 7 %    BASOPHILS 1 0 - 1 %    IMMATURE GRANULOCYTES 0 0 - 0.5 %    ABS. NEUTROPHILS 3.6 1.8 - 8.0 K/UL    ABS. LYMPHOCYTES 5.1 (H) 0.8 - 3.5 K/UL    ABS. MONOCYTES 0.5 0.0 - 1.0 K/UL    ABS. EOSINOPHILS 0.2 0.0 - 0.4 K/UL    ABS. BASOPHILS 0.1 0.0 - 0.1 K/UL    ABS. IMM.  GRANS. 0.0 0.00 - 0.04 K/UL    DF AUTOMATED     METABOLIC PANEL, COMPREHENSIVE    Collection Time: 07/27/22  6:52 PM   Result Value Ref Range    Sodium 137 136 - 145 mmol/L    Potassium 4.8 3.5 - 5.1 mmol/L    Chloride 102 97 - 108 mmol/L    CO2 28 21 - 32 mmol/L    Anion gap 7 5 - 15 mmol/L    Glucose 140 (H) 65 - 100 mg/dL    BUN 16 6 - 20 mg/dL    Creatinine 1.20 (H) 0.55 - 1.02 mg/dL    BUN/Creatinine ratio 13 12 - 20      GFR est AA 56 (L) >60 ml/min/1.73m2    GFR est non-AA 46 (L) >60 ml/min/1.73m2    Calcium 9.0 8.5 - 10.1 mg/dL    Bilirubin, total 0.3 0.2 - 1.0 mg/dL    AST (SGOT) 38 (H) 15 - 37 U/L    ALT (SGPT) 36 12 - 78 U/L    Alk. phosphatase 84 45 - 117 U/L    Protein, total 7.2 6.4 - 8.2 g/dL    Albumin 3.7 3.5 - 5.0 g/dL    Globulin 3.5 2.0 - 4.0 g/dL    A-G Ratio 1.1 1.1 - 2.2     TROPONIN-HIGH SENSITIVITY    Collection Time: 07/27/22  6:52 PM   Result Value Ref Range    Troponin-High Sensitivity 7 0 - 51 ng/L   PROTHROMBIN TIME + INR    Collection Time: 07/27/22  8:50 PM   Result Value Ref Range    Prothrombin time 11.8 (L) 11.9 - 14.6 sec    INR 0.9 0.9 - 1.1     TROPONIN-HIGH SENSITIVITY    Collection Time: 07/27/22  8:50 PM   Result Value Ref Range    Troponin-High Sensitivity 7 0 - 51 ng/L   SED RATE (ESR)    Collection Time: 07/28/22  3:40 AM   Result Value Ref Range    Sed rate, automated 8 0 - 30 mm/hr       XR Results (maximum last 3): Results from Hospital Encounter encounter on 07/27/22    XR CHEST PORT    Narrative  EXAM:  XR CHEST PORT    INDICATION: Chest pain    COMPARISON: T chest 1/30/2022    TECHNIQUE: Upright portable chest AP view    FINDINGS:    Cardiac mediastinal silhouette within normal limits. Scattered patchy opacities  in the left lung base may reflect atelectasis and/or airspace disease. Pleural  spaces grossly clear. Impression  Scattered patchy opacities in the left lung base may reflect atelectasis and/or  airspace disease. Results from Hospital Encounter encounter on 05/25/22    XR HIP LT W OR WO PELV 2-3 VWS    Narrative  Left hip, 2 views    No plain film evidence for fracture or dislocation. SI joints and hip joint  intact. Results from [place] encounter on 04/26/22    XR FEMUR RT 2 VS    Narrative  Pain post fall. Impression  FINDINGS: IMPRESSION: Frontal and lateral views of the right femur. No acute  fracture or dislocation.  No radiopaque foreign body. CT Results (maximum last 3): Results from [place] encounter on 07/27/22    CTA CHEST W OR W WO CONT    Narrative  EXAM:  CTA CHEST W OR W WO CONT    INDICATION: Chest pain    COMPARISON: Chest CT from 1/30/2022    TECHNIQUE: Helical thin section chest CT following uneventful intravenous  administration of nonionic contrast 100 mL of isovue 370 according to  departmental PE protocol. Coronal and sagittal reformats were performed. 3D post  processing was performed. CT dose reduction was achieved through the use of a  standardized protocol tailored for this examination and automatic exposure  control for dose modulation. FINDINGS: This is a good quality study for the evaluation of pulmonary embolism  to the first subsegmental arterial level. There is no pulmonary embolism to this  level. CHEST WALL: No axillary or supraclavicular lymphadenopathy. THYROID: No nodule. MEDIASTINUM: No mass or lymphadenopathy. TAWANNA: Nonspecific 11 mm right hilar node, likely reactive. THORACIC AORTA: No aneurysm. HEART: Normal in size. ESOPHAGUS: No wall thickening or dilatation. TRACHEA/BRONCHI: Patent. PLEURA: No effusion or pneumothorax. LUNGS: Emphysema. Dependent groundglass likely related to atelectatic changes. Mild scarring. No suspicious pulmonary nodules or masses. UPPER ABDOMEN: Partially imaged. No acute pathology. BONES: No aggressive bone lesion or fracture. Impression  1. No evidence of pulmonary embolism or acute airspace disease. Results from [place] encounter on 05/30/22    CT ABD PELV W CONT    Narrative  CT ABDOMEN PELVIS    CLINICAL: Trauma. COMPARISON: 5/18/2022 for abdominal pain and blood in stool. .    TECHNIQUE: Axial imaging abdomen pelvis with IV contrast, multiplanar  reformatting. 100mL Isovue 370 administered IV.   Dose reduction: All CT scans at this facility are performed using dose reduction  optimization techniques as appropriate to a performed exam including the  following: Automated exposure control, adjustments of the mA and/or kV according  to patient's size, or use of iterative reconstruction technique. FINDINGS:  LUNG BASES: Bilateral lower lung airspace disease, similar to previous. LIVER: Unremarkable. GALLBLADDER AND BILIARY TREE: Biliary ductal dilation within normal limits  postcholecystectomy. PANCREAS: Unremarkable. SPLEEN: Unremarkable. ADRENALS: Unremarkable. KIDNEYS AND URETERS: Symmetric renal size and enhancement. No hydronephrosis or  hydroureter. BLADDER: Unremarkable. REPRODUCTIVE ORGANS: Uterus absent. BOWEL: Nondilated. LYMPH NODES:  No lymphadenopathy. PERITONEUM: No free air, ascites, walled off fluid collection. VESSELS: Calcific atherosclerosis abdominal aorta and branch vessels. No  abdominal aortic aneurysm or dissection. ABDOMINAL WALL: No evident significant superficial soft tissue swelling. BONES: No acute bony findings. Impression  1. No acute intra-abdominal findings at this time. 2. Unchanged bilateral lower lung airspace disease. Results from [place] encounter on 05/25/22    CTA CODE NEURO HEAD AND NECK W CONT    Narrative  VIZ. AI - CODE NEURO CTA HEAD AND NECK:    HISTORY: Weakness    COMPARISON: CT head, 4/27/2022. Technique: Axial images were obtained through the brain and neck following IV  administration of contrast. CT angiography was performed of the head and neck  following additional bolus administration of contrast. Images of the head and  neck were separately reformatted in coronal, sagittal and axial planes. In  addition, this institution utilizes the OOT[employer] processing and  evaluation for acute stroke imaging and communication. CTA imaging analyzed by [employer] LVO ContaCT to enable computer-assisted triage  notification to rapidly detect a LVO and shorten time to notification via secure  communication to neurology and ED.     All CT scans at this facility are performed using dose reduction optimization  techniques as appropriate to a performed exam including the following: Automated  exposure control, adjustments of the mA and/or kV according to patient size, or  use of iterative reconstruction technique. Contrast: Isovue-370 100 cc    Please note that this CT evaluation is optimized for examination of the vascular  structures and minor/chronic degenerative, bony, or other findings that do not  impact the intended evaluation and management of this patient will not be  included in this report. **All stenosis measurements are based on NASCET-like criteria*    CTA HEAD AND NECK:  Technique: Axial images were obtained through the brain and neck following IV  administration of contrast. CT perfusion imaging was initially obtained and CT  angiography was performed of the head and neck following additional bolus  administration of contrast. Images of the head and neck were separately  reformatted in coronal, sagittal and axial planes. In addition, MIP images of  the head and neck  vessels were separately reconstructed at an independent CT  work station. Vessel analysis was also performed when required. CTA Head:  Petrous, cavernous, supraclinoid ICAs are patent without flow-limiting stenosis. Ophthalmic arteries are visualized bilaterally. Mild irregularity along the course of the anterior, middle, and posterior  cerebral arteries without flow-limiting stenosis. Fetal configuration of the  both posterior cerebral arteries and anatomic variant. Both intradural vertebral, vertebrobasilar junction, basilar artery are patent  without flow-limiting stenosis. Robust visualization of both AICA vessels. The  superior cerebellar arteries are visualized proximally. No CT identifiable aneurysm. Major dural venous sinuses are patent. CTA Neck:  Aortic arch is not visualized. Great vessel origins are widely patent.   Visualized subclavian arteries are widely patent, noting suboptimal evaluation  of the mid and distal portions of the left subclavian artery due to streak  artifact from hyperdense contrast in adjacent left subclavian vein. Common  carotid arteries are normal in course and caliber. ECA origins are patent. No hemodynamically significant stenosis involving the proximal cervical internal  carotid arteries by NASCET criteria (0%). Mid and distal cervical ICAs are  patent without flow-limiting stenosis. Codominant cervical vertebral arteries. Cervical vertebral arteries are patent  from origins level of skull base without flow-limiting stenosis. No dissection or pseudoaneurysm. Other Findings:  - Edentulous maxilla. Multiple absent mandibular dentition.  - Multilevel cervical spondylosis including posterior disc abnormalities,  uncovertebral, and facet hypertrophy, contributing to varying degrees of spinal  canal or neural foraminal narrowing.  -Visualized lungs are clear. Impression  1. No large vessel occlusion or high-grade intracranial stenosis. 2. No hemodynamically significant stenosis or cervical carotid or vertebral  arteries. All measurements are based on NASCET-like criteria. The results were discussed with/ relayed to Dr. Ehsan Sheppard at the completion of the  performance of the examination. MRI Results (maximum last 3): Results from Abstract encounter on 10/08/15    MRI [address] CONT      Nuclear Medicine Results (maximum last 3): No results found for this or any previous visit. US Results (maximum last 3): No results found for this or any previous visit.     Pt's SAMINA Score =     SAMINA Score Calculation (1 point for each):  - Age >= 65  - Aspirin use in the last 7 days   - At least 2 angina episodes within the last 24hrs  - ST changes of at least 0.5mm on admission EKG  - Elevated serum cardiac biomarkers  - Known Coronary Artery Disease (CAD) (coronary stenosis >= 50%)  - At least 3 risk factors for CAD:  Hypertension -> 140/90 or on antihypertensives, Cigarette smoking, HDL < 40, Diabetes, Family history of premature CAD (CAD in male first-degree relative, or father less than 54, or female first-degree relative or mother less than 72). Score Interpretation:  % risk at 14 days of: all-cause mortality, new or recurrent MI, or severe recurrent ischemia requiring urgent revascularization. Score of 0-1 = 4.7% risk  Score of 2 = 8.3% risk  Score of 3 = 13.2% risk  Score of 4 = 19.9% risk  Score of 5 = 26.2% risk  Score of 6-7 = at least 40.9% risk    Two or More Episodes of Severe Angina within 24 Hours: Yes  Elevated Cardiac Markers: No  ST Changes > 0.5 mm: No  Aspirin Use in the Past 7 Days: No  Age 65+: No  3+ CAD Risk Factors: No  CAD Stenosis >= 50%: No  SAMINA Risk Score (Calculated): 1         Assessment and plan:   # Chest pain  - atypical, low suspicious of ACS. SAMINA 1. Possible MSK in nature  - Check esr and crp for pericarditis  - ECHO   - Ketorolac with morphine PRN. - Hold off cardiology consult for now. # Anxiety  - Continue home medications. # Full code by default, need further clarification    # Medication list reviewed on Epic and/or outside documentation. Not reviewed with patient.         Signed By: Vick Cardenas MD     July 28, 2022

## 2022-07-28 NOTE — PROGRESS NOTES
Admission Medication Reconciliation:    Information obtained from:  Patient    Comments/Recommendations: Reviewed PTA medications and patient's allergies. Removed atomoxetine 60 mg  Removed peridex mouthwash soln  Removed hydrocodone 5-325 mg  Removed hydroxyzine 50 mg  Removed ibuprofen 600 mg        Allergies:  Augmentin [amoxicillin-pot clavulanate]    Significant PMH/Disease States:   Past Medical History:   Diagnosis Date    DDD (degenerative disc disease), lumbar     Fatigue     GERD (gastroesophageal reflux disease)     Hypothyroidism     Psychiatric disorder     PTSD per patient    Seizures [facility])      Chief Complaint for this Admission:    Chief Complaint   Patient presents with    Chest Pain (Angina)     Prior to Admission Medications:   Prior to Admission Medications   Prescriptions Last Dose Informant Patient Reported? Taking? QUEtiapine (SEROquel) 300 mg tablet  Self Yes Yes   Sig: Take 300 mg by mouth nightly. atorvastatin (LIPITOR) 10 mg tablet  Self Yes Yes   Sig: Take 10 mg by mouth nightly. clonazePAM (KlonoPIN) 1 mg tablet  Self Yes Yes   Sig: Take 1 mg by mouth three (3) times daily. diazePAM (VALIUM) 10 mg tablet  Self Yes Yes   Sig: Take 10 mg by mouth two (2) times a day. ergocalciferol (ERGOCALCIFEROL) 1,250 mcg (50,000 unit) capsule  Self Yes Yes   Sig: Take 1 Capsule by mouth every seven (7) days. Every Sunday   gabapentin (NEURONTIN) 600 mg tablet  Self Yes Yes   Sig: Take 600 mg by mouth four (4) times daily. traZODone (DESYREL) 100 mg tablet  Self Yes Yes   Sig: Take 100-200 mg by mouth At bedtime.       Facility-Administered Medications: None       Aparna Leon

## 2022-07-28 NOTE — PROGRESS NOTES
7/28/22 CM just received Call from Mervin @ [phone] - stated pt was down graded to OBS this am & is a Code 44. Pt informed & documentation completed.

## 2022-07-28 NOTE — CONSULTS
Consult    Patient: Carlos Lam MRN: [id number]  SSN: [id number]    YOB: 1965  Age: 62 y.o. Sex: female       Subjective:      Date of  Admission: 2022     Admission type: Emergency    Carlos Lam is a 62 y.o. female with a history of tobacco abuse, hypothyroidism no prior cardiac history who presented to the hospital with complaints of chest pain which started while she was making dinner. Pain is intermittent, described as a stabbing sensation which overall lasted about an hour and was associated with shortness of breath but no diaphoresis. She has had no chest pain since presentation to the hospital.  She denies any other recent episodes of chest pain. She does report having a syncopal episode about 2 months ago. There is no history of worsening dyspnea on exertion, orthopnea, paroxysmal nocturnal dyspnea or any other complaints.     Primary Care Provider: None  Past Medical History:   Diagnosis Date    DDD (degenerative disc disease), lumbar     Fatigue     GERD (gastroesophageal reflux disease)     Hypothyroidism     Psychiatric disorder     PTSD per patient    Seizures ([facility] [address]       Past Surgical History:   Procedure Laterality Date    HX APPENDECTOMY      HX  SECTION      HX CHOLECYSTECTOMY      HX HYSTERECTOMY       Family History   Family history unknown: Yes      Social History     Tobacco Use    Smoking status: Every Day     Packs/day: 0.25     Types: Cigarettes    Smokeless tobacco: Never   Substance Use Topics    Alcohol use: Not Currently     Alcohol/week: 0.0 standard drinks      Current Facility-Administered Medications   Medication Dose Route Frequency    atorvastatin (LIPITOR) tablet 10 mg  10 mg Oral QHS    gabapentin (NEURONTIN) capsule 600 mg  600 mg Oral QID    hydrOXYzine HCL (ATARAX) tablet 50 mg  50 mg Oral QID    QUEtiapine (SEROquel) tablet 300 mg  300 mg Oral QHS    traZODone (DESYREL) tablet 150 mg  150 mg Oral QHS PRN    sodium chloride (NS) flush 5-40 mL  5-40 mL IntraVENous Q8H    sodium chloride (NS) flush 5-40 mL  5-40 mL IntraVENous PRN    acetaminophen (TYLENOL) tablet 650 mg  650 mg Oral Q6H PRN    Or    acetaminophen (TYLENOL) suppository 650 mg  650 mg Rectal Q6H PRN    polyethylene glycol (MIRALAX) packet 17 g  17 g Oral DAILY PRN    ondansetron (ZOFRAN ODT) tablet 4 mg  4 mg Oral Q8H PRN    Or    ondansetron (ZOFRAN) injection 4 mg  4 mg IntraVENous Q6H PRN    enoxaparin (LOVENOX) injection 40 mg  40 mg SubCUTAneous DAILY    ketorolac (TORADOL) injection 15 mg  15 mg IntraVENous Q8H PRN    clonazePAM (KlonoPIN) tablet 0.5 mg  0.5 mg Oral TID     Current Outpatient Medications   Medication Sig    naloxone (NARCAN) 4 mg/actuation nasal spray Use 1 spray intranasally, then discard. Repeat with new spray every 2 min as needed for opioid overdose symptoms, alternating nostrils. atomoxetine (STRATTERA) 60 mg capsule Take 60 mg by mouth in the morning. chlorhexidine (PERIDEX) 0.12 % solution RINSE MOUTH WITH 15ML BY MOUTH FOR 30 SECONDS EVERY MORNING AND EVERY EVENING AFTER TOOTHBRUSHING SPIT AFTER RINSING DO NOT SWALLOW    diazePAM (VALIUM) 10 mg tablet Take 10 mg by mouth two (2) times a day. ergocalciferol (ERGOCALCIFEROL) 1,250 mcg (50,000 unit) capsule Take 1 Capsule by mouth every Monday, Wednesday, Friday. HYDROcodone-acetaminophen (NORCO) 5-325 mg per tablet Take 1 Tablet by mouth every six (6) hours as needed. hydrOXYzine HCL (ATARAX) 50 mg tablet Take 50 mg by mouth four (4) times daily. ibuprofen (MOTRIN) 600 mg tablet TAKE 1 TABLET BY MOUTH EVERY 6 HOURS AS NEEDED    QUEtiapine (SEROquel) 300 mg tablet Take 300 mg by mouth nightly. traZODone (DESYREL) 100 mg tablet Take 100-200 mg by mouth At bedtime. atorvastatin (LIPITOR) 10 mg tablet Take 10 mg by mouth nightly. clonazePAM (KLONOPIN) 1 mg tablet Take  by mouth three (3) times daily.     gabapentin (NEURONTIN) 600 mg tablet Take  by mouth four (4) times daily. Allergies   Allergen Reactions    Augmentin [Amoxicillin-Pot Clavulanate] Nausea and Vomiting        Review of Systems:  A comprehensive review of systems was negative except for that written in the History of Present Illness. Subjective:     Visit Vitals  /85   Pulse 86   Temp 98.5 °F (36.9 °C)   Resp 20   Ht 5' 8\" (1.727 m)   Wt 86.2 kg (190 lb)   SpO2 96%   BMI 28.89 kg/m²        Physical Exam:  Visit Vitals  /85   Pulse 86   Temp 98.5 °F (36.9 °C)   Resp 20   Ht 5' 8\" (1.727 m)   Wt 86.2 kg (190 lb)   SpO2 96%   BMI 28.89 kg/m²     General Appearance:  Well developed, well nourished,alert and oriented x 3, and individual in no acute distress. Ears/Nose/Mouth/Throat:   Hearing grossly normal.         Neck: Supple. Chest:   Lungs clear to auscultation bilaterally. Cardiovascular:  Regular rate and rhythm, S1, S2 normal, no murmur. Abdomen:   Soft, non-tender, bowel sounds are active. Extremities: No edema bilaterally. Skin: Warm and dry.                Cardiographics:  Telemetry: normal sinus rhythm  ECG: normal EKG, normal sinus rhythm    Data Reviewed: BMP:   Lab Results   Component Value Date/Time     07/27/2022 06:52 PM    K 4.8 07/27/2022 06:52 PM     07/27/2022 06:52 PM    CO2 28 07/27/2022 06:52 PM    AGAP 7 07/27/2022 06:52 PM     (H) 07/27/2022 06:52 PM    BUN 16 07/27/2022 06:52 PM    CREA 1.20 (H) 07/27/2022 06:52 PM    GFRAA 56 (L) 07/27/2022 06:52 PM    GFRNA 46 (L) 07/27/2022 06:52 PM     CMP:   Lab Results   Component Value Date/Time     07/27/2022 06:52 PM    K 4.8 07/27/2022 06:52 PM     07/27/2022 06:52 PM    CO2 28 07/27/2022 06:52 PM    AGAP 7 07/27/2022 06:52 PM     (H) 07/27/2022 06:52 PM    BUN 16 07/27/2022 06:52 PM    CREA 1.20 (H) 07/27/2022 06:52 PM    GFRAA 56 (L) 07/27/2022 06:52 PM    GFRNA 46 (L) 07/27/2022 06:52 PM    CA 9.0 07/27/2022 06:52 PM    ALB 3.7 07/27/2022 06:52 PM    TP 7.2 07/27/2022 06:52 PM    GLOB 3.5 07/27/2022 06:52 PM    AGRAT 1.1 07/27/2022 06:52 PM    ALT 36 07/27/2022 06:52 PM     CBC:   Lab Results   Component Value Date/Time    WBC 9.6 07/27/2022 06:52 PM    HGB 15.3 07/27/2022 06:52 PM    HCT 45.2 07/27/2022 06:52 PM     (H) 07/27/2022 06:52 PM     All Cardiac Markers in the last 24 hours: No results found for: CPK, CK, CKMMB, CKMB, RCK3, CKMBT, CKNDX, CKND1, WINIFRED, TROPT, TROIQ, ELIOT, TROPT, TNIPOC, BNP, BNPP  Recent Glucose Results:   Lab Results   Component Value Date/Time     (H) 07/27/2022 06:52 PM     ABG: No results found for: PH, PHI, PCO2, PCO2I, PO2, PO2I, HCO3, HCO3I, FIO2, FIO2I  COAGS:   Lab Results   Component Value Date/Time    PTP 11.8 (L) 07/27/2022 08:50 PM    INR 0.9 07/27/2022 08:50 PM     Liver Panel:   Lab Results   Component Value Date/Time    ALB 3.7 07/27/2022 06:52 PM    TP 7.2 07/27/2022 06:52 PM    GLOB 3.5 07/27/2022 06:52 PM    AGRAT 1.1 07/27/2022 06:52 PM    ALT 36 07/27/2022 06:52 PM    AP 84 07/27/2022 06:52 PM        Assessment:   Chest pain  Hypothyroidism  Tobacco abuse     Plan:   49-year-old female with a past medical history as above who is seen for chest pain  -Patient with atypical however concerning symptoms of chest pain with associated shortness of breath. Her troponins have been negative. EKG shows no acute ischemic changes. We will get a transthoracic echocardiogram to evaluate her LV function and wall motion. If this is unremarkable, will proceed with Cardiolite stress testing tomorrow morning. Unfortunately she has had breakfast today hence we are unable to get this done today.   Keep n.p.o. after midnight  -Further based on findings of the above    Thank you for allowing us to participate in the care of your patient

## 2022-07-28 NOTE — PROGRESS NOTES
Reason for Admission:  Chest Pain                      RUR Score:  N/A                  Plan for utilizing home health:    None @ this time/uses no DME. PCP: First and Last name:  Per pt no PCP. Name of Practice:    Are you a current patient: Yes/No:    Approximate date of last visit:    Can you participate in a virtual visit with your PCP:                     Current Advanced Directive/Advance Care Plan: Full Code      Healthcare Decision Maker:              Primary Decision Maker: Mikey Iron - [phone]                  Transition of Care Plan:     D/C Plan is home with family & a family member to transport. Call Rxs into [facility] on [address], in [place] [address].

## 2022-07-29 ENCOUNTER — APPOINTMENT (OUTPATIENT)
Dept: NUCLEAR MEDICINE | Age: 57
End: 2022-07-29
Attending: INTERNAL MEDICINE
Payer: MEDICARE

## 2022-07-29 ENCOUNTER — APPOINTMENT (OUTPATIENT)
Dept: NON INVASIVE DIAGNOSTICS | Age: 57
End: 2022-07-29
Attending: INTERNAL MEDICINE
Payer: MEDICARE

## 2022-07-29 VITALS
RESPIRATION RATE: 17 BRPM | BODY MASS INDEX: 33.34 KG/M2 | WEIGHT: 220 LBS | SYSTOLIC BLOOD PRESSURE: 128 MMHG | DIASTOLIC BLOOD PRESSURE: 71 MMHG | HEART RATE: 74 BPM | TEMPERATURE: 98 F | OXYGEN SATURATION: 96 % | HEIGHT: 68 IN

## 2022-07-29 LAB
ANION GAP SERPL CALC-SCNC: 3 MMOL/L (ref 5–15)
ATRIAL RATE: 93 BPM
BUN SERPL-MCNC: 19 MG/DL (ref 6–20)
BUN/CREAT SERPL: 20 (ref 12–20)
CA-I BLD-MCNC: 8.2 MG/DL (ref 8.5–10.1)
CALCULATED P AXIS, ECG09: 35 DEGREES
CALCULATED R AXIS, ECG10: 64 DEGREES
CALCULATED T AXIS, ECG11: 50 DEGREES
CHLORIDE SERPL-SCNC: 107 MMOL/L (ref 97–108)
CO2 SERPL-SCNC: 29 MMOL/L (ref 21–32)
CREAT SERPL-MCNC: 0.94 MG/DL (ref 0.55–1.02)
DIAGNOSIS, 93000: NORMAL
ERYTHROCYTE [DISTWIDTH] IN BLOOD BY AUTOMATED COUNT: 15.5 % (ref 11.5–14.5)
GLUCOSE SERPL-MCNC: 107 MG/DL (ref 65–100)
HCT VFR BLD AUTO: 39 % (ref 35–47)
HGB BLD-MCNC: 12.7 G/DL (ref 11.5–16)
MCH RBC QN AUTO: 29.2 PG (ref 26–34)
MCHC RBC AUTO-ENTMCNC: 32.6 G/DL (ref 30–36.5)
MCV RBC AUTO: 89.7 FL (ref 80–99)
NRBC # BLD: 0 K/UL (ref 0–0.01)
NRBC BLD-RTO: 0 PER 100 WBC
NUC STRESS EJECTION FRACTION: 67 %
P-R INTERVAL, ECG05: 136 MS
PLATELET # BLD AUTO: 273 K/UL (ref 150–400)
PMV BLD AUTO: 9.4 FL (ref 8.9–12.9)
POTASSIUM SERPL-SCNC: 4.1 MMOL/L (ref 3.5–5.1)
Q-T INTERVAL, ECG07: 362 MS
QRS DURATION, ECG06: 76 MS
QTC CALCULATION (BEZET), ECG08: 450 MS
RBC # BLD AUTO: 4.35 M/UL (ref 3.8–5.2)
SODIUM SERPL-SCNC: 139 MMOL/L (ref 136–145)
STRESS BASELINE DIAS BP: 92 MMHG
STRESS BASELINE HR: 74 BPM
STRESS BASELINE ST DEPRESSION: 0 MM
STRESS BASELINE SYS BP: 143 MMHG
STRESS PERCENT HR ACHIEVED: 85 %
STRESS POST PEAK HR: 139 BPM
STRESS ST DEPRESSION: 0 MM
STRESS STAGE 1 DURATION: NORMAL MIN:SEC
STRESS STAGE 1 HR: 100 BPM
STRESS STAGE 2 BP: NORMAL MMHG
STRESS STAGE 2 DURATION: NORMAL MIN:SEC
STRESS STAGE 2 HR: 102 BPM
STRESS STAGE 3 DURATION: NORMAL MIN:SEC
STRESS STAGE 3 HR: 100 BPM
STRESS STAGE 4 BP: NORMAL MMHG
STRESS STAGE 4 DURATION: NORMAL MIN:SEC
STRESS STAGE 4 HR: 98 BPM
STRESS STAGE 5 DURATION: NORMAL MIN:SEC
STRESS STAGE 5 HR: 97 BPM
STRESS TARGET HR: 163 BPM
VENTRICULAR RATE, ECG03: 93 BPM
WBC # BLD AUTO: 6.7 K/UL (ref 3.6–11)

## 2022-07-29 PROCEDURE — 36415 COLL VENOUS BLD VENIPUNCTURE: CPT

## 2022-07-29 PROCEDURE — G0378 HOSPITAL OBSERVATION PER HR: HCPCS

## 2022-07-29 PROCEDURE — A9500 TC99M SESTAMIBI: HCPCS

## 2022-07-29 PROCEDURE — 74011250637 HC RX REV CODE- 250/637: Performed by: INTERNAL MEDICINE

## 2022-07-29 PROCEDURE — 74011000250 HC RX REV CODE- 250: Performed by: INTERNAL MEDICINE

## 2022-07-29 PROCEDURE — 80048 BASIC METABOLIC PNL TOTAL CA: CPT

## 2022-07-29 PROCEDURE — 96372 THER/PROPH/DIAG INJ SC/IM: CPT

## 2022-07-29 PROCEDURE — 85027 COMPLETE CBC AUTOMATED: CPT

## 2022-07-29 PROCEDURE — 74011250636 HC RX REV CODE- 250/636: Performed by: INTERNAL MEDICINE

## 2022-07-29 PROCEDURE — 74011250636 HC RX REV CODE- 250/636

## 2022-07-29 RX ADMIN — GABAPENTIN 600 MG: 300 CAPSULE ORAL at 09:08

## 2022-07-29 RX ADMIN — SODIUM CHLORIDE, PRESERVATIVE FREE 10 ML: 5 INJECTION INTRAVENOUS at 14:04

## 2022-07-29 RX ADMIN — CLONAZEPAM 0.5 MG: 0.5 TABLET ORAL at 09:08

## 2022-07-29 RX ADMIN — ENOXAPARIN SODIUM 40 MG: 100 INJECTION SUBCUTANEOUS at 09:07

## 2022-07-29 RX ADMIN — REGADENOSON 0.4 MG: 0.08 INJECTION, SOLUTION INTRAVENOUS at 11:55

## 2022-07-29 RX ADMIN — HYDROXYZINE HYDROCHLORIDE 50 MG: 25 TABLET ORAL at 14:04

## 2022-07-29 RX ADMIN — GABAPENTIN 600 MG: 300 CAPSULE ORAL at 14:04

## 2022-07-29 RX ADMIN — HYDROXYZINE HYDROCHLORIDE 50 MG: 25 TABLET ORAL at 09:07

## 2022-07-29 RX ADMIN — SODIUM CHLORIDE, PRESERVATIVE FREE 10 ML: 5 INJECTION INTRAVENOUS at 06:45

## 2022-07-29 NOTE — PROGRESS NOTES
Cardiology Progress Note      7/29/2022 2:21 PM    Admit Date: 7/27/2022    Admit Diagnosis: Chest pain [R07.9]      Subjective:     Patient seen and examined. She remains chest pain free since presentation. Visit Vitals  /71 (BP 1 Location: Left upper arm, BP Patient Position: Lying right side)   Pulse 74   Temp 98 °F (36.7 °C)   Resp 17   Ht 5' 8\" (1.727 m)   Wt 99.8 kg (220 lb)   SpO2 96%   BMI 33.45 kg/m²     Current Facility-Administered Medications   Medication Dose Route Frequency    atorvastatin (LIPITOR) tablet 10 mg  10 mg Oral QHS    gabapentin (NEURONTIN) capsule 600 mg  600 mg Oral QID    hydrOXYzine HCL (ATARAX) tablet 50 mg  50 mg Oral QID    QUEtiapine (SEROquel) tablet 300 mg  300 mg Oral QHS    traZODone (DESYREL) tablet 150 mg  150 mg Oral QHS PRN    sodium chloride (NS) flush 5-40 mL  5-40 mL IntraVENous Q8H    sodium chloride (NS) flush 5-40 mL  5-40 mL IntraVENous PRN    acetaminophen (TYLENOL) tablet 650 mg  650 mg Oral Q6H PRN    Or    acetaminophen (TYLENOL) suppository 650 mg  650 mg Rectal Q6H PRN    polyethylene glycol (MIRALAX) packet 17 g  17 g Oral DAILY PRN    ondansetron (ZOFRAN ODT) tablet 4 mg  4 mg Oral Q8H PRN    Or    ondansetron (ZOFRAN) injection 4 mg  4 mg IntraVENous Q6H PRN    enoxaparin (LOVENOX) injection 40 mg  40 mg SubCUTAneous DAILY    ketorolac (TORADOL) injection 15 mg  15 mg IntraVENous Q8H PRN    clonazePAM (KlonoPIN) tablet 0.5 mg  0.5 mg Oral TID         Objective:      Physical Exam:  Visit Vitals  /71 (BP 1 Location: Left upper arm, BP Patient Position: Lying right side)   Pulse 74   Temp 98 °F (36.7 °C)   Resp 17   Ht 5' 8\" (1.727 m)   Wt 99.8 kg (220 lb)   SpO2 96%   BMI 33.45 kg/m²     General Appearance:  Well developed, well nourished,alert and oriented x 3, and individual in no acute distress. Ears/Nose/Mouth/Throat:   Hearing grossly normal.         Neck: Supple. Chest:   Lungs clear to auscultation bilaterally.    Cardiovascular: Regular rate and rhythm, S1, S2 normal, no murmur. Abdomen:   Soft, non-tender, bowel sounds are active. Extremities: No edema bilaterally. Skin: Warm and dry.                Data Review:   Labs:    Recent Results (from the past 24 hour(s))   CBC W/O DIFF    Collection Time: 07/29/22  6:28 AM   Result Value Ref Range    WBC 6.7 3.6 - 11.0 K/uL    RBC 4.35 3.80 - 5.20 M/uL    HGB 12.7 11.5 - 16.0 g/dL    HCT 39.0 35.0 - 47.0 %    MCV 89.7 80.0 - 99.0 FL    MCH 29.2 26.0 - 34.0 PG    MCHC 32.6 30.0 - 36.5 g/dL    RDW 15.5 (H) 11.5 - 14.5 %    PLATELET 054 828 - 703 K/uL    MPV 9.4 8.9 - 12.9 FL    NRBC 0.0 0.0  WBC    ABSOLUTE NRBC 0.00 0.00 - 1.34 K/uL   METABOLIC PANEL, BASIC    Collection Time: 07/29/22  6:28 AM   Result Value Ref Range    Sodium 139 136 - 145 mmol/L    Potassium 4.1 3.5 - 5.1 mmol/L    Chloride 107 97 - 108 mmol/L    CO2 29 21 - 32 mmol/L    Anion gap 3 (L) 5 - 15 mmol/L    Glucose 107 (H) 65 - 100 mg/dL    BUN 19 6 - 20 mg/dL    Creatinine 0.94 0.55 - 1.02 mg/dL    BUN/Creatinine ratio 20 12 - 20      GFR est AA >60 >60 ml/min/1.73m2    GFR est non-AA >60 >60 ml/min/1.73m2    Calcium 8.2 (L) 8.5 - 10.1 mg/dL   NUCLEAR CARDIAC STRESS TEST    Collection Time: 07/29/22  1:12 PM   Result Value Ref Range    Stress Target  bpm    Baseline HR 74 bpm    Baseline Systolic  mmHg    Baseline Diastolic BP 92 mmHg    Stress Peak  bpm    Stress Percent HR Achieved 85 %    Stress Stage 1 Duration 1min min:sec    Stress Stage 1  bpm    Stress Stage 2 Duration 2min min:sec    Stress Stage 2  bpm    Stress Stage 2 /88 mmHg    Stress Stage 3 Duration 3min min:sec    Stress Stage 3  bpm    Stress Stage 4 Duration 4min min:sec    Stress Stage 4 HR 98 bpm    Stress Stage 4 /92 mmHg    Stress Stage 5 Duration 5min min:sec    Stress Stage 5 HR 97 bpm    Baseline ST Depression 0 mm    Stress ST Depression 0 mm    Nuc Stress EF 67 %       Telemetry: normal sinus rhythm      Assessment:   Chest pain  Hypothyroidism  Tobacco abuse    Plan:   80-year-old female with a past medical history as above who is seen for chest pain  -Patient with atypical however concerning symptoms of chest pain with associated shortness of breath. Her troponins have been negative. EKG shows no acute ischemic changes.  -Echocardiogram showed preserved LV systolic function with no wall motion abnormalities or significant valvular heart disease.  -Cardiolite stress testing shows no significant ischemia, calculated ejection fraction of 67%. -She can be discharged from the cardiac standpoint; if she has ongoing symptoms cardiac catheterization will need to be considered.  -I will see her in the office in 2-3 weeks    Thank you for allowing us to participate in the care of your patient.

## 2022-07-29 NOTE — PROGRESS NOTES
Patient with discharge order present. Patient with no home needs at this time. Discharge plan of care/case management plan validated with provider discharge order.

## 2022-07-29 NOTE — DISCHARGE SUMMARY
Physician Discharge Summary     Patient ID:    Ines López  [id number]  62 y.o.  1965    Admit date: 7/27/2022    Discharge date : 7/29/2022    Chronic Diagnoses:    Problem List as of 7/29/2022 Date Reviewed: 11/7/2021            Codes Class Noted - Resolved    Chest pain ICD-10-CM: R07.9  ICD-9-CM: 786.50  7/28/2022 - Present        Seizure ([facility] [address] ICD-10-CM: R56.9  ICD-9-CM: 780.39  5/25/2022 - Present        ANASTACIO (acute kidney injury) ([facility] [address] ICD-10-CM: N17.9  ICD-9-CM: 584.9  3/27/2022 - Present        Hypokalemia ICD-10-CM: E87.6  ICD-9-CM: 276.8  8/9/2021 - Present        Hypothyroidism due to acquired atrophy of thyroid ICD-10-CM: E03.4  ICD-9-CM: 244.8, 246.8  9/30/2015 - Present       22    Final Diagnoses:   Chest pain [R07.9]  Gastroesophageal reflux disease  Lumbar degenerative joint disease  History of seizure disorder  Hypothyroidism    Reason for Hospitalization:  Chest pain      Hospital Course:   Ines López is a 62 y.o. female with PMH of seizure, hypothyroidism, GERD, lumbar DDD and psyciatric disorder. She presented to the ED with chief complaint of chest pain started yesterday afternoon. States pain is on the left side of her chest, sharp in nature, intermittent which lasts about 5 mins each, not related to exertion, radiating down her left arm, and rated 10/10. Associated with shortness of breath, and nausea without vomiting. No diaphoresis. Troponin negative x2, EKG no ST changes. Pharmacological stress test unremarkable. Stable for discharge to home with outpatient follow-up. Discharge Medications:   Current Discharge Medication List        START taking these medications    Details   naloxone (NARCAN) 4 mg/actuation nasal spray Use 1 spray intranasally, then discard. Repeat with new spray every 2 min as needed for opioid overdose symptoms, alternating nostrils.   Qty: 1 Each, Refills: 3  Start date: 7/28/2022           CONTINUE these medications which have NOT CHANGED    Details   diazePAM (VALIUM) 10 mg tablet Take 10 mg by mouth two (2) times a day. ergocalciferol (ERGOCALCIFEROL) 1,250 mcg (50,000 unit) capsule Take 1 Capsule by mouth every seven (7) days. Every       QUEtiapine (SEROquel) 300 mg tablet Take 300 mg by mouth nightly. traZODone (DESYREL) 100 mg tablet Take 100-200 mg by mouth At bedtime. atorvastatin (LIPITOR) 10 mg tablet Take 10 mg by mouth nightly. clonazePAM (KlonoPIN) 1 mg tablet Take 1 mg by mouth three (3) times daily. Associated Diagnoses: Idiopathic hypotension; Hypothyroidism due to acquired atrophy of thyroid; Anemia due to vitamin B12 deficiency; Dizziness; Arthralgia      gabapentin (NEURONTIN) 600 mg tablet Take 600 mg by mouth four (4) times daily. Associated Diagnoses: Idiopathic hypotension; Hypothyroidism due to acquired atrophy of thyroid; Anemia due to vitamin B12 deficiency; Dizziness; Arthralgia           STOP taking these medications       hydrOXYzine HCL (ATARAX) 50 mg tablet Comments:   Reason for Stopping:             Pharmacological stress test    Normal pharmacologic myocardial perfusion scan. Normal left ventricular systolic function with calculated ejection fraction 67%. There is normal contractility of all segments. Follow up Care:    1. None in 1-2 weeks. Please call to set up an appointment shortly after discharge. Diet:  Cardiac Diet    Disposition:  Home. Advanced Directive:   FULL    DNR      Discharge Exam:  Visit Vitals  /71 (BP 1 Location: Left upper arm, BP Patient Position: Lying right side)   Pulse 74   Temp 98 °F (36.7 °C)   Resp 17   Ht 5' 8\" (1.727 m)   Wt 99.8 kg (220 lb)   SpO2 96%   BMI 33.45 kg/m²    O2 Flow Rate (L/min): 4 l/min O2 Device: None (Room air)    Temp (24hrs), Av.2 °F (36.8 °C), Min:97.7 °F (36.5 °C), Max:98.5 °F (36.9 °C)    No intake/output data recorded.     1901 -  0700  In: 500 [I.V.:500]  Out: -     General:  Alert, cooperative, no distress, appears stated age. Lungs:   Clear to auscultation bilaterally. Chest wall:  No tenderness or deformity. Heart:  Regular rate and rhythm, S1, S2 normal, no murmur, click, rub or gallop. Abdomen:   Soft, non-tender. Bowel sounds normal. No masses,  No organomegaly. Extremities: Extremities normal, atraumatic, no cyanosis or edema. Pulses: 2+ and symmetric all extremities. Skin: Skin color, texture, turgor normal. No rashes or lesions   Neurologic: CNII-XII intact. No gross sensory or motor deficits         CONSULTATIONS: Cardiology    Significant Diagnostic Studies:   7/27/2022: BUN 16 mg/dL (Ref range: 6 - 20 mg/dL); Calcium 9.0 mg/dL (Ref range: 8.5 - 10.1 mg/dL); CO2 28 mmol/L (Ref range: 21 - 32 mmol/L); Creatinine 1.20 mg/dL (H; Ref range: 0.55 - 1.02 mg/dL); Glucose 140 mg/dL (H; Ref range: 65 - 100 mg/dL); HCT 45.2 % (Ref range: 35.0 - 47.0 %); HGB 15.3 g/dL (Ref range: 11.5 - 16.0 g/dL); Potassium 4.8 mmol/L (Ref range: 3.5 - 5.1 mmol/L); Sodium 137 mmol/L (Ref range: 136 - 145 mmol/L)  Recent Labs     07/29/22 0628 07/27/22 1852   WBC 6.7 9.6   HGB 12.7 15.3   HCT 39.0 45.2    402*     Recent Labs     07/29/22 0628 07/27/22 1852    137   K 4.1 4.8    102   CO2 29 28   BUN 19 16   CREA 0.94 1.20*   * 140*   CA 8.2* 9.0     Recent Labs     07/27/22 1852   ALT 36   AP 84   TBILI 0.3   TP 7.2   ALB 3.7   GLOB 3.5     Recent Labs     07/27/22 2050   INR 0.9   PTP 11.8*      No results for input(s): FE, TIBC, PSAT, FERR in the last 72 hours. No results for input(s): PH, PCO2, PO2 in the last 72 hours. No results for input(s): CPK, CKMB in the last 72 hours.     No lab exists for component: TROPONINI  Lab Results   Component Value Date/Time    Glucose (POC) 120 (H) 12/30/2021 04:56 PM    Glucose (POC) 114 12/30/2021 04:35 PM       Total Time: 30 minutes    Signed:  Lex Paiz MD  7/29/2022  2:21 PM

## 2022-07-29 NOTE — ROUTINE PROCESS
Primary Nurse Zeus Singleton RN and Dee Dee Avalos RN performed a dual skin assessment on this patient No impairment noted.

## 2022-08-09 ENCOUNTER — HOSPITAL ENCOUNTER (EMERGENCY)
Age: 57
Discharge: HOME | End: 2022-08-09
Attending: EMERGENCY MEDICINE
Payer: MEDICARE

## 2022-08-09 VITALS
WEIGHT: 219.4 LBS | RESPIRATION RATE: 18 BRPM | BODY MASS INDEX: 33.25 KG/M2 | SYSTOLIC BLOOD PRESSURE: 117 MMHG | HEART RATE: 80 BPM | OXYGEN SATURATION: 98 % | HEIGHT: 68 IN | DIASTOLIC BLOOD PRESSURE: 81 MMHG | TEMPERATURE: 98.2 F

## 2022-08-09 DIAGNOSIS — S39.012A STRAIN OF MUSCLE, FASCIA AND TENDON OF LOWER BACK, INITIAL ENCOUNTER: Primary | ICD-10-CM

## 2022-08-09 DIAGNOSIS — B37.31 YEAST VAGINITIS: ICD-10-CM

## 2022-08-09 LAB
ALBUMIN SERPL-MCNC: 3.8 G/DL (ref 3.5–5)
ALBUMIN/GLOB SERPL: 1.1 {RATIO} (ref 1.1–2.2)
ALP SERPL-CCNC: 62 U/L (ref 45–117)
ALT SERPL-CCNC: 22 U/L (ref 12–78)
ANION GAP SERPL CALC-SCNC: 2 MMOL/L (ref 5–15)
APPEARANCE UR: CLEAR
AST SERPL W P-5'-P-CCNC: 22 U/L (ref 15–37)
BACTERIA URNS QL MICRO: ABNORMAL /HPF
BASOPHILS # BLD: 0.1 K/UL (ref 0–0.1)
BASOPHILS NFR BLD: 1 % (ref 0–1)
BILIRUB SERPL-MCNC: 0.4 MG/DL (ref 0.2–1)
BILIRUB UR QL: NEGATIVE
BUN SERPL-MCNC: 15 MG/DL (ref 6–20)
BUN/CREAT SERPL: 14 (ref 12–20)
CA-I BLD-MCNC: 9.1 MG/DL (ref 8.5–10.1)
CHLORIDE SERPL-SCNC: 108 MMOL/L (ref 97–108)
CO2 SERPL-SCNC: 27 MMOL/L (ref 21–32)
COLOR UR: YELLOW
CREAT SERPL-MCNC: 1.05 MG/DL (ref 0.55–1.02)
DIFFERENTIAL METHOD BLD: ABNORMAL
EOSINOPHIL # BLD: 0.2 K/UL (ref 0–0.4)
EOSINOPHIL NFR BLD: 3 % (ref 0–7)
ERYTHROCYTE [DISTWIDTH] IN BLOOD BY AUTOMATED COUNT: 16.3 % (ref 11.5–14.5)
GLOBULIN SER CALC-MCNC: 3.6 G/DL (ref 2–4)
GLUCOSE SERPL-MCNC: 113 MG/DL (ref 65–100)
GLUCOSE UR STRIP.AUTO-MCNC: NEGATIVE MG/DL
HCT VFR BLD AUTO: 45.4 % (ref 35–47)
HGB BLD-MCNC: 14.6 G/DL (ref 11.5–16)
HGB UR QL STRIP: NEGATIVE
IMM GRANULOCYTES # BLD AUTO: 0.1 K/UL (ref 0–0.04)
IMM GRANULOCYTES NFR BLD AUTO: 1 % (ref 0–0.5)
KETONES UR QL STRIP.AUTO: NEGATIVE MG/DL
LEUKOCYTE ESTERASE UR QL STRIP.AUTO: ABNORMAL
LIPASE SERPL-CCNC: 160 U/L (ref 73–393)
LYMPHOCYTES # BLD: 2.7 K/UL (ref 0.8–3.5)
LYMPHOCYTES NFR BLD: 33 % (ref 12–49)
MCH RBC QN AUTO: 29 PG (ref 26–34)
MCHC RBC AUTO-ENTMCNC: 32.2 G/DL (ref 30–36.5)
MCV RBC AUTO: 90.3 FL (ref 80–99)
MONOCYTES # BLD: 0.4 K/UL (ref 0–1)
MONOCYTES NFR BLD: 5 % (ref 5–13)
NEUTS SEG # BLD: 4.9 K/UL (ref 1.8–8)
NEUTS SEG NFR BLD: 57 % (ref 32–75)
NITRITE UR QL STRIP.AUTO: NEGATIVE
NRBC # BLD: 0 K/UL (ref 0–0.01)
NRBC BLD-RTO: 0 PER 100 WBC
PH UR STRIP: 5 [PH] (ref 5–8)
PLATELET # BLD AUTO: 409 K/UL (ref 150–400)
PMV BLD AUTO: 9.5 FL (ref 8.9–12.9)
POTASSIUM SERPL-SCNC: 4.6 MMOL/L (ref 3.5–5.1)
PROT SERPL-MCNC: 7.4 G/DL (ref 6.4–8.2)
PROT UR STRIP-MCNC: NEGATIVE MG/DL
RBC # BLD AUTO: 5.03 M/UL (ref 3.8–5.2)
RBC #/AREA URNS HPF: ABNORMAL /HPF (ref 0–5)
SODIUM SERPL-SCNC: 137 MMOL/L (ref 136–145)
SP GR UR REFRACTOMETRY: 1.01 (ref 1–1.03)
UA: UC IF INDICATED,UAUC: ABNORMAL
UROBILINOGEN UR QL STRIP.AUTO: NEGATIVE EU/DL (ref 0.1–1)
WBC # BLD AUTO: 8.3 K/UL (ref 3.6–11)
WBC URNS QL MICRO: ABNORMAL /HPF (ref 0–4)

## 2022-08-09 PROCEDURE — 74011250636 HC RX REV CODE- 250/636: Performed by: EMERGENCY MEDICINE

## 2022-08-09 PROCEDURE — 74011250637 HC RX REV CODE- 250/637: Performed by: EMERGENCY MEDICINE

## 2022-08-09 PROCEDURE — 96375 TX/PRO/DX INJ NEW DRUG ADDON: CPT

## 2022-08-09 PROCEDURE — 96374 THER/PROPH/DIAG INJ IV PUSH: CPT

## 2022-08-09 PROCEDURE — 96372 THER/PROPH/DIAG INJ SC/IM: CPT

## 2022-08-09 PROCEDURE — 80053 COMPREHEN METABOLIC PANEL: CPT

## 2022-08-09 PROCEDURE — 99284 EMERGENCY DEPT VISIT MOD MDM: CPT

## 2022-08-09 PROCEDURE — 85025 COMPLETE CBC W/AUTO DIFF WBC: CPT

## 2022-08-09 PROCEDURE — 81001 URINALYSIS AUTO W/SCOPE: CPT

## 2022-08-09 PROCEDURE — 83690 ASSAY OF LIPASE: CPT

## 2022-08-09 RX ORDER — ORPHENADRINE CITRATE 30 MG/ML
60 INJECTION INTRAMUSCULAR; INTRAVENOUS ONCE
Status: COMPLETED | OUTPATIENT
Start: 2022-08-09 | End: 2022-08-09

## 2022-08-09 RX ORDER — ONDANSETRON 2 MG/ML
4 INJECTION INTRAMUSCULAR; INTRAVENOUS
Status: COMPLETED | OUTPATIENT
Start: 2022-08-09 | End: 2022-08-09

## 2022-08-09 RX ORDER — CYCLOBENZAPRINE HCL 10 MG
10 TABLET ORAL EVERY 8 HOURS
Qty: 15 TABLET | Refills: 0 | Status: SHIPPED | OUTPATIENT
Start: 2022-08-09 | End: 2022-08-24

## 2022-08-09 RX ORDER — FLUCONAZOLE 150 MG/1
150 TABLET ORAL
Qty: 1 TABLET | Refills: 0 | Status: SHIPPED | OUTPATIENT
Start: 2022-08-09 | End: 2022-08-09

## 2022-08-09 RX ORDER — KETOROLAC TROMETHAMINE 30 MG/ML
30 INJECTION, SOLUTION INTRAMUSCULAR; INTRAVENOUS
Status: COMPLETED | OUTPATIENT
Start: 2022-08-09 | End: 2022-08-09

## 2022-08-09 RX ORDER — ACETAMINOPHEN 325 MG/1
650 TABLET ORAL
Status: COMPLETED | OUTPATIENT
Start: 2022-08-09 | End: 2022-08-09

## 2022-08-09 RX ADMIN — ACETAMINOPHEN 650 MG: 325 TABLET, FILM COATED ORAL at 12:18

## 2022-08-09 RX ADMIN — ONDANSETRON 4 MG: 2 INJECTION INTRAMUSCULAR; INTRAVENOUS at 12:18

## 2022-08-09 RX ADMIN — KETOROLAC TROMETHAMINE 30 MG: 30 INJECTION, SOLUTION INTRAMUSCULAR at 15:01

## 2022-08-09 RX ADMIN — ORPHENADRINE CITRATE 60 MG: 30 INJECTION INTRAMUSCULAR; INTRAVENOUS at 15:01

## 2022-08-09 NOTE — DISCHARGE INSTRUCTIONS
Take medications as prescribed for pain and soreness. Take medications on a tight scheduled for maximum benefit. Do not take a hot shower of soak in a tub for the next 48 hours as this will increase your pain and soreness. Expect to be increasingly more sore for next 3-4 days. After that you will begin to feel better. Make follow up appointment with  your primary care physician for reassessment of your injuries as soon a possible. Return to the ED as needed or if you become concerned.

## 2022-08-09 NOTE — ED NOTES
Pt states no relief to back pain from tylenol, but did receive relief from nausea. Dr. Delonte Monge notified.

## 2022-08-09 NOTE — ED TRIAGE NOTES
Reports she began c vomiting yesterday then R lower back pain. Admits to urinating but reports it burns.

## 2022-08-24 ENCOUNTER — HOSPITAL ENCOUNTER (EMERGENCY)
Age: 57
Discharge: HOME | End: 2022-08-24
Attending: EMERGENCY MEDICINE
Payer: MEDICARE

## 2022-08-24 VITALS
HEART RATE: 121 BPM | OXYGEN SATURATION: 96 % | HEIGHT: 68 IN | SYSTOLIC BLOOD PRESSURE: 113 MMHG | DIASTOLIC BLOOD PRESSURE: 78 MMHG | TEMPERATURE: 99.7 F | BODY MASS INDEX: 30.31 KG/M2 | WEIGHT: 200 LBS | RESPIRATION RATE: 20 BRPM

## 2022-08-24 DIAGNOSIS — M54.50 ACUTE RIGHT-SIDED LOW BACK PAIN WITHOUT SCIATICA: Primary | ICD-10-CM

## 2022-08-24 PROCEDURE — 99283 EMERGENCY DEPT VISIT LOW MDM: CPT

## 2022-08-24 PROCEDURE — 74011250637 HC RX REV CODE- 250/637: Performed by: EMERGENCY MEDICINE

## 2022-08-24 RX ORDER — OXYCODONE AND ACETAMINOPHEN 5; 325 MG/1; MG/1
2 TABLET ORAL
Status: COMPLETED | OUTPATIENT
Start: 2022-08-24 | End: 2022-08-24

## 2022-08-24 RX ORDER — CYCLOBENZAPRINE HCL 10 MG
10 TABLET ORAL
Qty: 12 TABLET | Refills: 0 | Status: SHIPPED | OUTPATIENT
Start: 2022-08-24 | End: 2022-08-29

## 2022-08-24 RX ORDER — OXYCODONE AND ACETAMINOPHEN 5; 325 MG/1; MG/1
1 TABLET ORAL
Qty: 12 TABLET | Refills: 0 | Status: SHIPPED | OUTPATIENT
Start: 2022-08-24 | End: 2022-08-27

## 2022-08-24 RX ADMIN — OXYCODONE HYDROCHLORIDE AND ACETAMINOPHEN 2 TABLET: 5; 325 TABLET ORAL at 17:50

## 2022-08-24 NOTE — ED PROVIDER NOTES
EMERGENCY DEPARTMENT HISTORY AND PHYSICAL EXAM      Date: 8/24/2022  Patient Name: Kenneth Powell    History of Presenting Illness     Chief Complaint   Patient presents with    Back Pain       History Provided By: Patient    HPI: Kenneth Powell, 62 y.o. female with history of degenerative disc disease, GERD, seizures, and PTSD who presents with right low back pain that started about 2 or 3 days ago. She has been having acute on chronic episodes since April 2022. She does have an appointment with an orthopedic doctor in about 2 or 3 weeks. States she denies any bladder or bowel problems. No weakness. No falls or injuries. No fevers. No history of IV drug use or cancer. States the pain is severe, constant, right lower back, worse with movement and ambulation. There are no other complaints, changes, or physical findings at this time. PCP: None    Current Facility-Administered Medications   Medication Dose Route Frequency Provider Last Rate Last Admin    oxyCODONE-acetaminophen (PERCOCET) 5-325 mg per tablet 2 Tablet  2 Tablet Oral NOW Ilan Mendoza DO         Current Outpatient Medications   Medication Sig Dispense Refill    oxyCODONE-acetaminophen (Percocet) 5-325 mg per tablet Take 1 Tablet by mouth every six (6) hours as needed for Pain for up to 3 days. Max Daily Amount: 4 Tablets. 12 Tablet 0    cyclobenzaprine (FLEXERIL) 10 mg tablet Take 1 Tablet by mouth three (3) times daily as needed for Muscle Spasm(s) for up to 5 days. 12 Tablet 0    ergocalciferol (ERGOCALCIFEROL) 1,250 mcg (50,000 unit) capsule Take 1 Capsule by mouth every seven (7) days. Every Sunday      QUEtiapine (SEROquel) 300 mg tablet Take 300 mg by mouth nightly. traZODone (DESYREL) 100 mg tablet Take 100-200 mg by mouth At bedtime. clonazePAM (KlonoPIN) 1 mg tablet Take 1 mg by mouth three (3) times daily. gabapentin (NEURONTIN) 600 mg tablet Take 600 mg by mouth four (4) times daily.          Past History Past Medical History:  Past Medical History:   Diagnosis Date    DDD (degenerative disc disease), lumbar     Fatigue     GERD (gastroesophageal reflux disease)     Hypothyroidism     Psychiatric disorder     PTSD per patient    Seizures ([facility] [address]         Past Surgical History:  Past Surgical History:   Procedure Laterality Date    HX APPENDECTOMY      HX  SECTION      HX CHOLECYSTECTOMY      HX HYSTERECTOMY         Family History:  Family History   Family history unknown: Yes       Social History:  Social History     Tobacco Use    Smoking status: Every Day     Packs/day: 0.25     Types: Cigarettes    Smokeless tobacco: Never   Vaping Use    Vaping Use: Never used   Substance Use Topics    Alcohol use: Not Currently     Alcohol/week: 0.0 standard drinks    Drug use: Never       Allergies: Allergies   Allergen Reactions    Augmentin [Amoxicillin-Pot Clavulanate] Nausea and Vomiting        Review of Systems   Review of Systems   Constitutional:  Negative for fever. HENT:  Negative for congestion. Eyes:  Negative for visual disturbance. Respiratory:  Negative for shortness of breath. Gastrointestinal:  Negative for abdominal pain. Genitourinary:  Negative for dysuria. Musculoskeletal:  Positive for back pain. Negative for arthralgias. Skin:  Negative for rash. Neurological:  Negative for headaches. Physical Exam     Constitutional: Uncomfortable but nontoxic. Well-nourished. Skin: No rash. ENT: No rhinorrhea. No cough. Head is normocephalic and atraumatic. Eye: No proptosis or conjunctival injections. Respiratory: No apparent respiratory distress. Gastrointestinal: Nondistended. Musculoskeletal: No obvious bony deformities. Tenderness to the right lumbar region in the para musculature. Ambulates well but appears to be in pain with ambulation and ambulates slow. 5/5 strength in lower extremities. Psychiatric: Cooperative. Appropriate mood and affect.        Lab and Diagnostic Study Results     Labs -   No results found for this or any previous visit (from the past 12 hour(s)). Radiologic Studies -   [unfilled]  CT Results  (Last 48 hours)      None          CXR Results  (Last 48 hours)      None            Medical Decision Making and ED Course     - I am the first and primary provider for this patient AND AM THE PRIMARY PROVIDER OF RECORD. I reviewed the vital signs, available nursing notes, past medical history, past surgical history, family history and social history. - Initial assessment performed. The patients presenting problems have been discussed, and the staff are in agreement with the care plan formulated and outlined with them. I have encouraged them to ask questions as they arise throughout their visit. Vital Signs-Reviewed the patient's vital signs. Patient Vitals for the past 12 hrs:   Temp Pulse Resp BP SpO2   08/24/22 1650 99.7 °F (37.6 °C) (!) 121 20 113/78 96 %     MDM  The differential diagnosis is herniated disc, sciatica, arthritis. Patient tachycardic in triage however not tachycardic on my examination. Tachycardia likely related to her pain and possibly anxiety. I did give her Norco here and will prescribe this for home along with Flexeril as needed. She does have follow-up with orthopedics. I recommended physical therapy as well. I see no red flag signs I would warrant an MRI at this time. Disposition     Disposition: Discharged    DISCHARGE PLAN:  1. Current Discharge Medication List        START taking these medications    Details   oxyCODONE-acetaminophen (Percocet) 5-325 mg per tablet Take 1 Tablet by mouth every six (6) hours as needed for Pain for up to 3 days. Max Daily Amount: 4 Tablets. Qty: 12 Tablet, Refills: 0    Associated Diagnoses: Acute right-sided low back pain without sciatica      cyclobenzaprine (FLEXERIL) 10 mg tablet Take 1 Tablet by mouth three (3) times daily as needed for Muscle Spasm(s) for up to 5 days.   Qty: 12 Tablet, Refills: 0           CONTINUE these medications which have NOT CHANGED    Details   ergocalciferol (ERGOCALCIFEROL) 1,250 mcg (50,000 unit) capsule Take 1 Capsule by mouth every seven (7) days. Every Sunday      QUEtiapine (SEROquel) 300 mg tablet Take 300 mg by mouth nightly. traZODone (DESYREL) 100 mg tablet Take 100-200 mg by mouth At bedtime. clonazePAM (KlonoPIN) 1 mg tablet Take 1 mg by mouth three (3) times daily. Associated Diagnoses: Idiopathic hypotension; Hypothyroidism due to acquired atrophy of thyroid; Anemia due to vitamin B12 deficiency; Dizziness; Arthralgia      gabapentin (NEURONTIN) 600 mg tablet Take 600 mg by mouth four (4) times daily. Associated Diagnoses: Idiopathic hypotension; Hypothyroidism due to acquired atrophy of thyroid; Anemia due to vitamin B12 deficiency; Dizziness; Arthralgia           2. Follow-up Information       Follow up With Specialties Details Why [address] EMERGENCY DEPT Emergency Medicine Go today As soon as possible if symptoms worsen [facility] [place] [address]  [phone]    Your orthopedic doctor as scheduled              3.  Return to ED if worse   4. Current Discharge Medication List        START taking these medications    Details   oxyCODONE-acetaminophen (Percocet) 5-325 mg per tablet Take 1 Tablet by mouth every six (6) hours as needed for Pain for up to 3 days. Max Daily Amount: 4 Tablets. Qty: 12 Tablet, Refills: 0  Start date: 8/24/2022, End date: 8/27/2022    Associated Diagnoses: Acute right-sided low back pain without sciatica      cyclobenzaprine (FLEXERIL) 10 mg tablet Take 1 Tablet by mouth three (3) times daily as needed for Muscle Spasm(s) for up to 5 days. Qty: 12 Tablet, Refills: 0  Start date: 8/24/2022, End date: 8/29/2022              Diagnosis/Clinical Impression     Clinical Impression:   1.  Acute right-sided low back pain without sciatica         Attestations:  Roland Lopez DO    Please note that this dictation was completed with [employer], the computer voice recognition software. Quite often unanticipated grammatical, syntax, homophones, and other interpretive errors are inadvertently transcribed by the computer software. Please disregard these errors. Please excuse any errors that have escaped final proofreading. Thank you.

## 2022-08-27 ENCOUNTER — APPOINTMENT (OUTPATIENT)
Dept: GENERAL RADIOLOGY | Age: 57
End: 2022-08-27
Attending: EMERGENCY MEDICINE
Payer: MEDICARE

## 2022-08-27 ENCOUNTER — HOSPITAL ENCOUNTER (EMERGENCY)
Age: 57
Discharge: HOME | End: 2022-08-27
Attending: EMERGENCY MEDICINE
Payer: MEDICARE

## 2022-08-27 VITALS
HEART RATE: 98 BPM | HEIGHT: 68 IN | OXYGEN SATURATION: 96 % | WEIGHT: 200 LBS | RESPIRATION RATE: 16 BRPM | BODY MASS INDEX: 30.31 KG/M2 | SYSTOLIC BLOOD PRESSURE: 123 MMHG | DIASTOLIC BLOOD PRESSURE: 84 MMHG | TEMPERATURE: 98.4 F

## 2022-08-27 DIAGNOSIS — J06.9 UPPER RESPIRATORY TRACT INFECTION, UNSPECIFIED TYPE: Primary | ICD-10-CM

## 2022-08-27 LAB
DEPRECATED S PYO AG THROAT QL EIA: NEGATIVE
SARS-COV-2, COV2: NORMAL

## 2022-08-27 PROCEDURE — 71045 X-RAY EXAM CHEST 1 VIEW: CPT

## 2022-08-27 PROCEDURE — 99284 EMERGENCY DEPT VISIT MOD MDM: CPT

## 2022-08-27 PROCEDURE — 87880 STREP A ASSAY W/OPTIC: CPT

## 2022-08-27 PROCEDURE — U0005 INFEC AGEN DETEC AMPLI PROBE: HCPCS

## 2022-08-27 RX ORDER — BENZONATATE 100 MG/1
100 CAPSULE ORAL
Status: DISCONTINUED | OUTPATIENT
Start: 2022-08-27 | End: 2022-08-27 | Stop reason: HOSPADM

## 2022-08-27 RX ORDER — ALBUTEROL SULFATE 90 UG/1
1 AEROSOL, METERED RESPIRATORY (INHALATION)
Qty: 1 EACH | Refills: 0 | Status: SHIPPED | OUTPATIENT
Start: 2022-08-27 | End: 2022-09-27 | Stop reason: ALTCHOICE

## 2022-08-27 NOTE — ED TRIAGE NOTES
Pt c/o fever, cough, n/v since Thursday. Denies recent covid test. States she's been taking tylenol and otc cough syrup.

## 2022-08-27 NOTE — ED PROVIDER NOTES
EMERGENCY DEPARTMENT HISTORY AND PHYSICAL EXAM      Date: 8/27/2022  Patient Name: Sia Trujillo    History of Presenting Illness     Chief Complaint   Patient presents with    Cold Symptoms     History Provided By: Patient    HPI: Sia Trujillo, 62 y.o. female with history of degenerative disc disease, GERD, hypothyroidism, seizures, and PTSD who presents with cough, rhinorrhea, and sore throat for 2 days in duration. Denies difficulty breathing. No other associated symptoms. There are no other complaints, changes, or physical findings at this time. PCP: None    Current Facility-Administered Medications   Medication Dose Route Frequency Provider Last Rate Last Admin    benzonatate (TESSALON) capsule 100 mg  100 mg Oral NOW Colleen KLEIN, DO         Current Outpatient Medications   Medication Sig Dispense Refill    albuterol (PROVENTIL HFA, VENTOLIN HFA, PROAIR HFA) 90 mcg/actuation inhaler Take 1 Puff by inhalation every six (6) hours as needed for Wheezing or Shortness of Breath. 1 Each 0    oxyCODONE-acetaminophen (Percocet) 5-325 mg per tablet Take 1 Tablet by mouth every six (6) hours as needed for Pain for up to 3 days. Max Daily Amount: 4 Tablets. 12 Tablet 0    cyclobenzaprine (FLEXERIL) 10 mg tablet Take 1 Tablet by mouth three (3) times daily as needed for Muscle Spasm(s) for up to 5 days. 12 Tablet 0    ergocalciferol (ERGOCALCIFEROL) 1,250 mcg (50,000 unit) capsule Take 1 Capsule by mouth every seven (7) days. Every Sunday      QUEtiapine (SEROquel) 300 mg tablet Take 300 mg by mouth nightly. traZODone (DESYREL) 100 mg tablet Take 100-200 mg by mouth At bedtime. clonazePAM (KlonoPIN) 1 mg tablet Take 1 mg by mouth three (3) times daily. gabapentin (NEURONTIN) 600 mg tablet Take 600 mg by mouth four (4) times daily.          Past History     Past Medical History:  Past Medical History:   Diagnosis Date    DDD (degenerative disc disease), lumbar     Fatigue     GERD (gastroesophageal reflux disease)     Hypothyroidism     Psychiatric disorder     PTSD per patient    Seizures ([facility] [address]         Past Surgical History:  Past Surgical History:   Procedure Laterality Date    HX APPENDECTOMY      HX  SECTION      HX CHOLECYSTECTOMY      HX HYSTERECTOMY         Family History:  Family History   Family history unknown: Yes       Social History:  Social History     Tobacco Use    Smoking status: Every Day     Packs/day: 0.25     Types: Cigarettes    Smokeless tobacco: Never   Vaping Use    Vaping Use: Never used   Substance Use Topics    Alcohol use: Not Currently     Alcohol/week: 0.0 standard drinks    Drug use: Never       Allergies: Allergies   Allergen Reactions    Augmentin [Amoxicillin-Pot Clavulanate] Nausea and Vomiting        Review of Systems     Review of Systems   Constitutional:  Positive for fever. HENT:  Positive for sore throat. Negative for congestion. Eyes:  Negative for visual disturbance. Respiratory:  Positive for cough. Negative for shortness of breath. Cardiovascular:  Negative for chest pain. Gastrointestinal:  Negative for abdominal pain. Genitourinary:  Negative for dysuria. Musculoskeletal:  Negative for arthralgias. Skin:  Negative for rash. Neurological:  Negative for headaches. Physical Exam     Constitutional: No acute distress. Well-nourished. Skin: No rash. ENT: No rhinorrhea. No cough. Head is normocephalic and atraumatic. Eye: No proptosis or conjunctival injections. Respiratory: No apparent respiratory distress. Lung sounds are clear. No wheezing. Gastrointestinal: Nondistended. Musculoskeletal: No obvious bony deformities. Cardiac: Regular rate and rhythm. 2+ radial pulses. No murmurs.       Lab and Diagnostic Study Results     Labs -     Recent Results (from the past 12 hour(s))   SARS-COV-2    Collection Time: 22  6:30 PM   Result Value Ref Range    SARS-CoV-2 by PCR Please find results under separate order     STREP AG SCREEN, GROUP A    Collection Time: 08/27/22  6:30 PM    Specimen: Throat   Result Value Ref Range    Group A Strep Ag ID Negative         Radiologic Studies -   [unfilled]  CT Results  (Last 48 hours)      None          CXR Results  (Last 48 hours)                 08/27/22 1826  XR CHEST SNGL V Final result    Impression:  No evidence of acute cardiopulmonary process. Narrative:  INDICATION:  cough        COMPARISON: July 27       FINDINGS: Single AP portable view of the chest obtained at [facility] demonstrates a   stable cardiomediastinal silhouette. The lungs are clear bilaterally. No osseous   abnormalities are seen. Medical Decision Making and ED Course     - I am the first and primary provider for this patient AND AM THE PRIMARY PROVIDER OF RECORD. I reviewed the vital signs, available nursing notes, past medical history, past surgical history, family history and social history. - Initial assessment performed. The patients presenting problems have been discussed, and the staff are in agreement with the care plan formulated and outlined with them. I have encouraged them to ask questions as they arise throughout their visit. Vital Signs-Reviewed the patient's vital signs. Patient Vitals for the past 12 hrs:   Temp Pulse Resp BP SpO2   08/27/22 1722 98.4 °F (36.9 °C) 98 16 123/84 96 %       MDM  The differential diagnosis is COPD, asthma, pneumonia, COVID-19, influenza, strep throat, URI. Patient likely has atypical URI. Will prescribe albuterol. X-ray shows no pneumonia. Lungs are not wheezy. Will prescribe albuterol as needed for home due to history of COPD. Discharged in good condition with return precautions. COVID-19 is pending. Recommend quarantine. Disposition     Disposition: Discharged    DISCHARGE PLAN:  1.    Current Discharge Medication List        CONTINUE these medications which have NOT CHANGED    Details   oxyCODONE-acetaminophen (Percocet) 5-325 mg per tablet Take 1 Tablet by mouth every six (6) hours as needed for Pain for up to 3 days. Max Daily Amount: 4 Tablets. Qty: 12 Tablet, Refills: 0    Associated Diagnoses: Acute right-sided low back pain without sciatica      cyclobenzaprine (FLEXERIL) 10 mg tablet Take 1 Tablet by mouth three (3) times daily as needed for Muscle Spasm(s) for up to 5 days. Qty: 12 Tablet, Refills: 0      ergocalciferol (ERGOCALCIFEROL) 1,250 mcg (50,000 unit) capsule Take 1 Capsule by mouth every seven (7) days. Every Sunday      QUEtiapine (SEROquel) 300 mg tablet Take 300 mg by mouth nightly. traZODone (DESYREL) 100 mg tablet Take 100-200 mg by mouth At bedtime. clonazePAM (KlonoPIN) 1 mg tablet Take 1 mg by mouth three (3) times daily. Associated Diagnoses: Idiopathic hypotension; Hypothyroidism due to acquired atrophy of thyroid; Anemia due to vitamin B12 deficiency; Dizziness; Arthralgia      gabapentin (NEURONTIN) 600 mg tablet Take 600 mg by mouth four (4) times daily. Associated Diagnoses: Idiopathic hypotension; Hypothyroidism due to acquired atrophy of thyroid; Anemia due to vitamin B12 deficiency; Dizziness; Arthralgia           2. Follow-up Information       Follow up With Specialties Details Why [address] EMERGENCY DEPT Emergency Medicine Go today As soon as possible if symptoms worsen [address] [place] [address]  [phone]    Primary care doctor  Schedule an appointment as soon as possible for a visit in 3 days            3. Return to ED if worse   4. Current Discharge Medication List        START taking these medications    Details   albuterol (PROVENTIL HFA, VENTOLIN HFA, PROAIR HFA) 90 mcg/actuation inhaler Take 1 Puff by inhalation every six (6) hours as needed for Wheezing or Shortness of Breath. Qty: 1 Each, Refills: 0  Start date: 8/27/2022              Diagnosis/Clinical Impression     Clinical Impression:   1.  Upper respiratory tract infection, unspecified type         Attestations:  Jamilah Beltran, DO    Please note that this dictation was completed with [employer], the computer voice recognition software. Quite often unanticipated grammatical, syntax, homophones, and other interpretive errors are inadvertently transcribed by the computer software. Please disregard these errors. Please excuse any errors that have escaped final proofreading. Thank you.

## 2022-08-28 LAB
SARS-COV-2, XPLCVT: NOT DETECTED
SOURCE, COVRS: NORMAL

## 2022-08-29 NOTE — ED PROVIDER NOTES
EMERGENCY DEPARTMENT HISTORY AND PHYSICAL EXAM        Date: 8/9/2022  Patient Name: Orly Mark    History of Presenting Illness     Chief Complaint   Patient presents with    Vomiting    Back Pain       11:48 PM    History Provided By: Patient    HPI: Orly Mark, 62 y.o. female presents to the ED for assessment of right lower back pain that began yesterday following an episode of nausea and vomiting. Patient states that it was only a single episode but that she did have some gagging afterwards and that after that she developed back pain that is worse with palpation, movement, bending over. Patient also complaining of dysuria without urgency or frequency that began after course of antibiotics for UTI. Annmarie  Patient states symptoms consistent with previous yeast infections with itching that improves following shower for approximately 30 minutes and then recurs afterwards. Patient denies any neurologic symptoms, extremity weakness, bowel or bladder incontinence, chest pain, syncope, near syncope. PCP: None    Current Outpatient Medications   Medication Sig Dispense Refill    albuterol (PROVENTIL HFA, VENTOLIN HFA, PROAIR HFA) 90 mcg/actuation inhaler Take 1 Puff by inhalation every six (6) hours as needed for Wheezing or Shortness of Breath. 1 Each 0    cyclobenzaprine (FLEXERIL) 10 mg tablet Take 1 Tablet by mouth three (3) times daily as needed for Muscle Spasm(s) for up to 5 days. 12 Tablet 0    ergocalciferol (ERGOCALCIFEROL) 1,250 mcg (50,000 unit) capsule Take 1 Capsule by mouth every seven (7) days. Every Sunday      QUEtiapine (SEROquel) 300 mg tablet Take 300 mg by mouth nightly.  traZODone (DESYREL) 100 mg tablet Take 100-200 mg by mouth At bedtime.  clonazePAM (KlonoPIN) 1 mg tablet Take 1 mg by mouth three (3) times daily.  gabapentin (NEURONTIN) 600 mg tablet Take 600 mg by mouth four (4) times daily.          Past History     Past Medical History:  Past Medical History:   Diagnosis Date    DDD (degenerative disc disease), lumbar     Fatigue     GERD (gastroesophageal reflux disease)     Hypothyroidism     Psychiatric disorder     PTSD per patient    Seizures ([facility] [address]        Past Surgical History:  Past Surgical History:   Procedure Laterality Date    HX APPENDECTOMY      HX  SECTION      HX CHOLECYSTECTOMY      HX HYSTERECTOMY         Family History:  Family History   Family history unknown: Yes       Social History:  Social History     Tobacco Use    Smoking status: Every Day     Packs/day: 0.25     Types: Cigarettes    Smokeless tobacco: Never   Vaping Use    Vaping Use: Never used   Substance Use Topics    Alcohol use: Not Currently     Alcohol/week: 0.0 standard drinks    Drug use: Never       Allergies: Allergies   Allergen Reactions    Augmentin [Amoxicillin-Pot Clavulanate] Nausea and Vomiting       Review of Systems   Review of Systems   Constitutional:  Negative for chills, diaphoresis and fever. HENT:  Negative for congestion, sore throat and trouble swallowing. Eyes:  Negative for visual disturbance. Respiratory:  Negative for cough and shortness of breath. Cardiovascular:  Negative for chest pain and palpitations. Gastrointestinal:  Negative for abdominal pain, diarrhea, nausea and vomiting. Endocrine: Negative for polydipsia, polyphagia and polyuria. Genitourinary:  Negative for dysuria, frequency, hematuria and urgency. Musculoskeletal:  Positive for back pain. Negative for gait problem and neck pain. Skin:  Negative for rash. Neurological:  Negative for dizziness, syncope and headaches. Physical Exam   Physical Exam  Vitals and nursing note reviewed. Constitutional:       General: She is not in acute distress. Appearance: Normal appearance. She is well-developed. She is not ill-appearing or toxic-appearing.       Comments: Well-developed well-nourished obese white female; pain out of proportion with exam and extremely emotional.  Patient asking for dose of narcotics to manage her pain. HENT:      Head: Normocephalic and atraumatic. Nose: Nose normal.      Mouth/Throat:      Mouth: Mucous membranes are moist.      Pharynx: No posterior oropharyngeal erythema. Eyes:      General: Vision grossly intact. Extraocular Movements: Extraocular movements intact. Conjunctiva/sclera: Conjunctivae normal.      Pupils: Pupils are equal, round, and reactive to light. Neck:      Vascular: No JVD. Trachea: No tracheal deviation. Cardiovascular:      Rate and Rhythm: Normal rate and regular rhythm. Pulses: Normal pulses. Carotid pulses are 2+ on the right side and 2+ on the left side. Radial pulses are 2+ on the right side and 2+ on the left side. Femoral pulses are 2+ on the right side and 2+ on the left side. Popliteal pulses are 2+ on the right side and 2+ on the left side. Dorsalis pedis pulses are 2+ on the right side and 2+ on the left side. Posterior tibial pulses are 2+ on the right side and 2+ on the left side. Heart sounds: Normal heart sounds. Pulmonary:      Effort: Pulmonary effort is normal.      Breath sounds: Normal breath sounds and air entry. No wheezing or rhonchi. Abdominal:      General: Bowel sounds are normal.      Palpations: Abdomen is soft. Tenderness: There is no abdominal tenderness. There is no guarding or rebound. Musculoskeletal:         General: Tenderness present. No swelling or deformity. Normal range of motion. Right shoulder: No swelling. Normal range of motion. Cervical back: Neck supple. Right lower leg: No edema. Left lower leg: No edema. Comments: Patient with tenderness to the right left external oblique from origins to insertion into the iliac crest.  Area is acutely tender with no evidence of bruising or hematoma. Patient able to flex to 45 degrees before onset of pain. Strength 5/5 diffusely   Skin:     General: Skin is warm and dry. Capillary Refill: Capillary refill takes less than 2 seconds. Findings: No signs of injury or rash. Neurological:      General: No focal deficit present. Mental Status: She is alert. Psychiatric:         Behavior: Behavior is cooperative. Diagnostic Study Results     Labs -  1  08/09/22 1254  URINALYSIS W/ REFLEX CULTURE   Collected: 08/09/22 0945  Final result  Specimen: Urine     Color Yellow      Appearance Clear     Specific gravity 1.010     pH (UA) 5.0     Protein Negative mg/dL   Glucose Negative mg/dL   Ketone Negative mg/dL   Bilirubin Negative     Blood Negative     Urobilinogen Negative EU/dL   Nitrites Negative     Leukocyte Esterase Trace Abnormal      UA:UC IF INDICATED Culture not indicated by UA result     WBC 0-4 /hpf   RBC 0-5 /hpf   Bacteria 1+ Abnormal  /hpf          08/09/22 1037  LIPASE   Collected: 08/09/22 0945  Final result  Specimen: Serum or Plasma     Lipase 160 U/L          44/35/62 7565  METABOLIC PANEL, COMPREHENSIVE   Collected: 08/09/22 0945  Final result  Specimen: Serum or Plasma     Sodium 137 mmol/L   Potassium 4.6 mmol/L    Chloride 108 mmol/L   CO2 27 mmol/L   Anion gap 2 Low  mmol/L   Glucose 113 High  mg/dL   BUN 15 mg/dL   Creatinine 1.05 High  mg/dL   BUN/Creatinine ratio 14     GFR est AA >60 ml/min/1.73m2   GFR est non-AA 54 Low  ml/min/1.73m2    Calcium 9.1 mg/dL   Bilirubin, total 0.4 mg/dL   AST (SGOT) 22 U/L    ALT (SGPT) 22 U/L   Alk.  phosphatase 62 U/L   Protein, total 7.4 g/dL   Albumin 3.8 g/dL   Globulin 3.6 g/dL   A-G Ratio 1.1            08/09/22 1032  CBC WITH AUTOMATED DIFF   Collected: 08/09/22 0945  Final result  Specimen: Whole Blood     WBC 8.3 K/uL   RBC 5.03 M/uL   HGB 14.6 g/dL   HCT 45.4 %   MCV 90.3 FL   MCH 29.0 PG   MCHC 32.2 g/dL   RDW 16.3 High  %   PLATELET 170 High  K/uL   MPV 9.5 FL   NRBC 0.0  WBC   ABSOLUTE NRBC 0.00 K/uL   NEUTROPHILS 57 %   LYMPHOCYTES 33 %   MONOCYTES 5 %   EOSINOPHILS 3 %   BASOPHILS 1 %   IMMATURE GRANULOCYTES 1 High  %   ABS. NEUTROPHILS 4.9 K/UL   ABS. LYMPHOCYTES 2.7 K/UL   ABS. MONOCYTES 0.4 K/UL   ABS. EOSINOPHILS 0.2 K/UL   ABS. BASOPHILS 0.1 K/UL   ABS. IMM. GRANS. 0.1 High  K/UL   DF AUTOMATED            Radiologic Studies -   No orders to display     CT Results  (Last 48 hours)      None          CXR Results  (Last 48 hours)                 08/27/22 1826  XR CHEST SNGL V Final result    Impression:  No evidence of acute cardiopulmonary process. Narrative:  INDICATION:  cough        COMPARISON: July 27       FINDINGS: Single AP portable view of the chest obtained at [facility] demonstrates a   stable cardiomediastinal silhouette. The lungs are clear bilaterally. No osseous   abnormalities are seen. Medical Decision Making and ED Course     I have also reviewed the vital signs, available nursing notes, past medical history, past surgical history, family history and social history as made available. Vital Signs - Reviewed the patient's vital signs. Medical Decision Making/Diff Dx:  Differential diagnoses: Low back strain, yeast infection, muscle strain      MDM:     78-year-old presents with history and physical consistent with low back muscle strain and likely vaginal yeast infection have again reiterated to patient that narcotics would not be appropriate for her injury she does agree to IM Toradol and discharged home. Procedures     PROCEDURES:  Procedures  Staci Suggs MD      Disposition     Discharged    [unfilled] [unfilled]     DISCHARGE PLAN:    1. Discharge Medication List as of 8/9/2022  3:19 PM        START taking these medications    Details   fluconazole (Diflucan) 150 mg tablet Take 1 Tablet by mouth now for 1 dose. FDA advises cautious prescribing of oral fluconazole in pregnancy. , Normal, Disp-1 Tablet, R-0      cyclobenzaprine (FLEXERIL) 10 mg tablet Take 1 Tablet by mouth every eight (8) hours. Indications: muscle spasm, Normal, Disp-15 Tablet, R-0           CONTINUE these medications which have NOT CHANGED    Details   ergocalciferol (ERGOCALCIFEROL) 1,250 mcg (50,000 unit) capsule Take 1 Capsule by mouth every seven (7) days. Every Sunday, Historical Med      QUEtiapine (SEROquel) 300 mg tablet Take 300 mg by mouth nightly., Historical Med      diazePAM (VALIUM) 10 mg tablet Take 10 mg by mouth two (2) times a day., Historical Med      naloxone (NARCAN) 4 mg/actuation nasal spray Use 1 spray intranasally, then discard. Repeat with new spray every 2 min as needed for opioid overdose symptoms, alternating nostrils. , Normal, Disp-1 Each, R-3      traZODone (DESYREL) 100 mg tablet Take 100-200 mg by mouth At bedtime. , Historical Med      atorvastatin (LIPITOR) 10 mg tablet Take 10 mg by mouth nightly.  , Historical Med      clonazePAM (KlonoPIN) 1 mg tablet Take 1 mg by mouth three (3) times daily. , Historical Med      gabapentin (NEURONTIN) 600 mg tablet Take 600 mg by mouth four (4) times daily. , Historical Med           2. [unfilled]   ,  Discharge Medication List as of 8/9/2022  3:19 PM        START taking these medications    Details   fluconazole (Diflucan) 150 mg tablet Take 1 Tablet by mouth now for 1 dose. FDA advises cautious prescribing of oral fluconazole in pregnancy. , Normal, Disp-1 Tablet, R-0      cyclobenzaprine (FLEXERIL) 10 mg tablet Take 1 Tablet by mouth every eight (8) hours. Indications: muscle spasm, Normal, Disp-15 Tablet, R-0           CONTINUE these medications which have NOT CHANGED    Details   ergocalciferol (ERGOCALCIFEROL) 1,250 mcg (50,000 unit) capsule Take 1 Capsule by mouth every seven (7) days.  Every Sunday, Historical Med      QUEtiapine (SEROquel) 300 mg tablet Take 300 mg by mouth nightly., Historical Med      diazePAM (VALIUM) 10 mg tablet Take 10 mg by mouth two (2) times a day., Historical Med      naloxone (NARCAN) 4 mg/actuation nasal spray Use 1 spray intranasally, then discard. Repeat with new spray every 2 min as needed for opioid overdose symptoms, alternating nostrils. , Normal, Disp-1 Each, R-3      traZODone (DESYREL) 100 mg tablet Take 100-200 mg by mouth At bedtime. , Historical Med      atorvastatin (LIPITOR) 10 mg tablet Take 10 mg by mouth nightly.  , Historical Med      clonazePAM (KlonoPIN) 1 mg tablet Take 1 mg by mouth three (3) times daily. , Historical Med      gabapentin (NEURONTIN) 600 mg tablet Take 600 mg by mouth four (4) times daily. , Historical Med            3.   Follow-up Information       Follow up With Specialties Details Why Sarah Salomon MD [facility] Schedule an appointment as soon as possible for a visit   [address] [place] [address] [id number] [phone]            4. Return to ED if worse     Diagnosis     Clinical impression:   1. Strain of muscle, fascia and tendon of lower back, initial encounter    2.  Yeast vaginitis

## 2022-09-16 ENCOUNTER — HOSPITAL ENCOUNTER (OUTPATIENT)
Age: 57
Setting detail: OBSERVATION
Discharge: HOME | End: 2022-09-17
Attending: EMERGENCY MEDICINE | Admitting: HOSPITALIST
Payer: MEDICARE

## 2022-09-16 DIAGNOSIS — R11.10 INTRACTABLE VOMITING: ICD-10-CM

## 2022-09-16 DIAGNOSIS — N17.9 AKI (ACUTE KIDNEY INJURY) (HCC): Primary | ICD-10-CM

## 2022-09-16 DIAGNOSIS — E87.1 HYPONATREMIA: ICD-10-CM

## 2022-09-16 DIAGNOSIS — E86.0 DEHYDRATION: ICD-10-CM

## 2022-09-16 LAB
ALBUMIN SERPL-MCNC: 3.2 G/DL (ref 3.5–5)
ALBUMIN/GLOB SERPL: 0.7 {RATIO} (ref 1.1–2.2)
ALP SERPL-CCNC: 59 U/L (ref 45–117)
ALT SERPL-CCNC: ABNORMAL U/L (ref 12–78)
ANION GAP SERPL CALC-SCNC: ABNORMAL MMOL/L (ref 5–15)
AST SERPL W P-5'-P-CCNC: ABNORMAL U/L (ref 15–37)
BASOPHILS # BLD: 0.1 K/UL (ref 0–0.1)
BASOPHILS NFR BLD: 1 % (ref 0–1)
BILIRUB SERPL-MCNC: <0.2 MG/DL (ref 0.2–1)
BUN SERPL-MCNC: 24 MG/DL (ref 6–20)
BUN/CREAT SERPL: 18 (ref 12–20)
CA-I BLD-MCNC: 8.7 MG/DL (ref 8.5–10.1)
CHLORIDE SERPL-SCNC: 104 MMOL/L (ref 97–108)
CO2 SERPL-SCNC: 26 MMOL/L (ref 21–32)
CREAT SERPL-MCNC: 1.3 MG/DL (ref 0.55–1.02)
DIFFERENTIAL METHOD BLD: ABNORMAL
EOSINOPHIL # BLD: 0.1 K/UL (ref 0–0.4)
EOSINOPHIL NFR BLD: 1 % (ref 0–7)
ERYTHROCYTE [DISTWIDTH] IN BLOOD BY AUTOMATED COUNT: 13.8 % (ref 11.5–14.5)
GLOBULIN SER CALC-MCNC: 4.9 G/DL (ref 2–4)
GLUCOSE SERPL-MCNC: 114 MG/DL (ref 65–100)
HCT VFR BLD AUTO: 42.5 % (ref 35–47)
HGB BLD-MCNC: 14.5 G/DL (ref 11.5–16)
IMM GRANULOCYTES # BLD AUTO: 0 K/UL (ref 0–0.04)
IMM GRANULOCYTES NFR BLD AUTO: 0 % (ref 0–0.5)
LIPASE SERPL-CCNC: 82 U/L (ref 73–393)
LYMPHOCYTES # BLD: 4 K/UL (ref 0.8–3.5)
LYMPHOCYTES NFR BLD: 46 % (ref 12–49)
MAGNESIUM SERPL-MCNC: 2.3 MG/DL (ref 1.6–2.4)
MCH RBC QN AUTO: 30.7 PG (ref 26–34)
MCHC RBC AUTO-ENTMCNC: 34.1 G/DL (ref 30–36.5)
MCV RBC AUTO: 90 FL (ref 80–99)
MONOCYTES # BLD: 0.5 K/UL (ref 0–1)
MONOCYTES NFR BLD: 6 % (ref 5–13)
NEUTS SEG # BLD: 4 K/UL (ref 1.8–8)
NEUTS SEG NFR BLD: 46 % (ref 32–75)
NRBC # BLD: 0 K/UL (ref 0–0.01)
NRBC BLD-RTO: 0 PER 100 WBC
PLATELET # BLD AUTO: 366 K/UL (ref 150–400)
PMV BLD AUTO: 9.7 FL (ref 8.9–12.9)
POTASSIUM SERPL-SCNC: 4 MMOL/L (ref 3.5–5.1)
POTASSIUM SERPL-SCNC: ABNORMAL MMOL/L (ref 3.5–5.1)
PROT SERPL-MCNC: 8.1 G/DL (ref 6.4–8.2)
RBC # BLD AUTO: 4.72 M/UL (ref 3.8–5.2)
SODIUM SERPL-SCNC: 128 MMOL/L (ref 136–145)
WBC # BLD AUTO: 8.7 K/UL (ref 3.6–11)

## 2022-09-16 PROCEDURE — G0378 HOSPITAL OBSERVATION PER HR: HCPCS

## 2022-09-16 PROCEDURE — 96374 THER/PROPH/DIAG INJ IV PUSH: CPT

## 2022-09-16 PROCEDURE — 83690 ASSAY OF LIPASE: CPT

## 2022-09-16 PROCEDURE — 36415 COLL VENOUS BLD VENIPUNCTURE: CPT

## 2022-09-16 PROCEDURE — 74011250636 HC RX REV CODE- 250/636: Performed by: HOSPITALIST

## 2022-09-16 PROCEDURE — C9113 INJ PANTOPRAZOLE SODIUM, VIA: HCPCS | Performed by: PHYSICIAN ASSISTANT

## 2022-09-16 PROCEDURE — 85025 COMPLETE CBC W/AUTO DIFF WBC: CPT

## 2022-09-16 PROCEDURE — 74011250637 HC RX REV CODE- 250/637: Performed by: HOSPITALIST

## 2022-09-16 PROCEDURE — 84132 ASSAY OF SERUM POTASSIUM: CPT

## 2022-09-16 PROCEDURE — 84443 ASSAY THYROID STIM HORMONE: CPT

## 2022-09-16 PROCEDURE — 96375 TX/PRO/DX INJ NEW DRUG ADDON: CPT

## 2022-09-16 PROCEDURE — 83735 ASSAY OF MAGNESIUM: CPT

## 2022-09-16 PROCEDURE — 99285 EMERGENCY DEPT VISIT HI MDM: CPT

## 2022-09-16 PROCEDURE — 74011250636 HC RX REV CODE- 250/636: Performed by: PHYSICIAN ASSISTANT

## 2022-09-16 PROCEDURE — 74011000250 HC RX REV CODE- 250: Performed by: PHYSICIAN ASSISTANT

## 2022-09-16 RX ORDER — METHOCARBAMOL 500 MG/1
500 TABLET, FILM COATED ORAL DAILY
COMMUNITY
Start: 2022-09-14 | End: 2022-09-27 | Stop reason: ALTCHOICE

## 2022-09-16 RX ORDER — MAG HYDROX/ALUMINUM HYD/SIMETH 200-200-20
30 SUSPENSION, ORAL (FINAL DOSE FORM) ORAL
Status: DISCONTINUED | OUTPATIENT
Start: 2022-09-16 | End: 2022-09-17 | Stop reason: HOSPADM

## 2022-09-16 RX ORDER — CLONAZEPAM 0.5 MG/1
0.5 TABLET ORAL 3 TIMES DAILY
Status: DISCONTINUED | OUTPATIENT
Start: 2022-09-16 | End: 2022-09-17 | Stop reason: HOSPADM

## 2022-09-16 RX ORDER — SODIUM CHLORIDE 0.9 % (FLUSH) 0.9 %
5-40 SYRINGE (ML) INJECTION AS NEEDED
Status: DISCONTINUED | OUTPATIENT
Start: 2022-09-16 | End: 2022-09-17 | Stop reason: HOSPADM

## 2022-09-16 RX ORDER — PROCHLORPERAZINE EDISYLATE 5 MG/ML
10 INJECTION INTRAMUSCULAR; INTRAVENOUS
Status: DISCONTINUED | OUTPATIENT
Start: 2022-09-16 | End: 2022-09-17

## 2022-09-16 RX ORDER — QUETIAPINE FUMARATE 100 MG/1
300 TABLET, FILM COATED ORAL
Status: DISCONTINUED | OUTPATIENT
Start: 2022-09-16 | End: 2022-09-17 | Stop reason: HOSPADM

## 2022-09-16 RX ORDER — SODIUM CHLORIDE 0.9 % (FLUSH) 0.9 %
5-40 SYRINGE (ML) INJECTION EVERY 8 HOURS
Status: DISCONTINUED | OUTPATIENT
Start: 2022-09-16 | End: 2022-09-17 | Stop reason: HOSPADM

## 2022-09-16 RX ORDER — PANTOPRAZOLE SODIUM 40 MG/1
40 TABLET, DELAYED RELEASE ORAL
Status: DISCONTINUED | OUTPATIENT
Start: 2022-09-16 | End: 2022-09-17 | Stop reason: HOSPADM

## 2022-09-16 RX ORDER — MORPHINE SULFATE 2 MG/ML
2 INJECTION, SOLUTION INTRAMUSCULAR; INTRAVENOUS ONCE
Status: COMPLETED | OUTPATIENT
Start: 2022-09-16 | End: 2022-09-16

## 2022-09-16 RX ORDER — SODIUM CHLORIDE, SODIUM LACTATE, POTASSIUM CHLORIDE, CALCIUM CHLORIDE 600; 310; 30; 20 MG/100ML; MG/100ML; MG/100ML; MG/100ML
125 INJECTION, SOLUTION INTRAVENOUS CONTINUOUS
Status: DISPENSED | OUTPATIENT
Start: 2022-09-16 | End: 2022-09-17

## 2022-09-16 RX ORDER — ACETAMINOPHEN 650 MG/1
650 SUPPOSITORY RECTAL
Status: DISCONTINUED | OUTPATIENT
Start: 2022-09-16 | End: 2022-09-17 | Stop reason: HOSPADM

## 2022-09-16 RX ORDER — LAMOTRIGINE 25 MG/1
25 TABLET ORAL 2 TIMES DAILY
COMMUNITY
Start: 2022-09-03 | End: 2022-09-27 | Stop reason: ALTCHOICE

## 2022-09-16 RX ORDER — ONDANSETRON 2 MG/ML
4 INJECTION INTRAMUSCULAR; INTRAVENOUS
Status: COMPLETED | OUTPATIENT
Start: 2022-09-16 | End: 2022-09-16

## 2022-09-16 RX ORDER — DICLOFENAC SODIUM 50 MG/1
50 TABLET, DELAYED RELEASE ORAL 2 TIMES DAILY
COMMUNITY
Start: 2022-09-14 | End: 2022-09-17

## 2022-09-16 RX ORDER — LAMOTRIGINE 25 MG/1
25 TABLET ORAL 2 TIMES DAILY
Status: DISCONTINUED | OUTPATIENT
Start: 2022-09-16 | End: 2022-09-17 | Stop reason: HOSPADM

## 2022-09-16 RX ORDER — ENOXAPARIN SODIUM 100 MG/ML
40 INJECTION SUBCUTANEOUS DAILY
Status: DISCONTINUED | OUTPATIENT
Start: 2022-09-17 | End: 2022-09-17 | Stop reason: HOSPADM

## 2022-09-16 RX ORDER — CLONAZEPAM 0.5 MG/1
0.5 TABLET ORAL 3 TIMES DAILY
COMMUNITY
Start: 2022-09-06

## 2022-09-16 RX ORDER — ONDANSETRON 2 MG/ML
4 INJECTION INTRAMUSCULAR; INTRAVENOUS
Status: DISCONTINUED | OUTPATIENT
Start: 2022-09-16 | End: 2022-09-17 | Stop reason: HOSPADM

## 2022-09-16 RX ORDER — HYDROXYZINE 50 MG/1
50 TABLET, FILM COATED ORAL 4 TIMES DAILY
COMMUNITY
Start: 2022-09-03 | End: 2022-09-27 | Stop reason: ALTCHOICE

## 2022-09-16 RX ORDER — ONDANSETRON 4 MG/1
4 TABLET, ORALLY DISINTEGRATING ORAL
Status: DISCONTINUED | OUTPATIENT
Start: 2022-09-16 | End: 2022-09-17 | Stop reason: HOSPADM

## 2022-09-16 RX ORDER — ALBUTEROL SULFATE 90 UG/1
1 AEROSOL, METERED RESPIRATORY (INHALATION)
Status: DISCONTINUED | OUTPATIENT
Start: 2022-09-16 | End: 2022-09-17 | Stop reason: HOSPADM

## 2022-09-16 RX ORDER — GABAPENTIN 300 MG/1
600 CAPSULE ORAL 3 TIMES DAILY
Status: DISCONTINUED | OUTPATIENT
Start: 2022-09-16 | End: 2022-09-17 | Stop reason: HOSPADM

## 2022-09-16 RX ORDER — ACETAMINOPHEN 325 MG/1
650 TABLET ORAL
Status: DISCONTINUED | OUTPATIENT
Start: 2022-09-16 | End: 2022-09-17 | Stop reason: HOSPADM

## 2022-09-16 RX ORDER — HYDROXYZINE 25 MG/1
50 TABLET, FILM COATED ORAL
Status: DISCONTINUED | OUTPATIENT
Start: 2022-09-16 | End: 2022-09-17 | Stop reason: HOSPADM

## 2022-09-16 RX ORDER — HYDROCODONE POLISTIREX AND CHLORPHENIRAMINE POLISTIREX 10; 8 MG/5ML; MG/5ML
5 SUSPENSION, EXTENDED RELEASE ORAL
Status: ON HOLD | COMMUNITY
Start: 2022-08-04 | End: 2022-09-17

## 2022-09-16 RX ADMIN — QUETIAPINE FUMARATE 300 MG: 100 TABLET ORAL at 22:45

## 2022-09-16 RX ADMIN — LAMOTRIGINE 25 MG: 25 TABLET ORAL at 22:46

## 2022-09-16 RX ADMIN — SODIUM CHLORIDE 1000 ML: 9 INJECTION, SOLUTION INTRAVENOUS at 18:33

## 2022-09-16 RX ADMIN — CLONAZEPAM 0.5 MG: 0.5 TABLET ORAL at 22:46

## 2022-09-16 RX ADMIN — SODIUM CHLORIDE, POTASSIUM CHLORIDE, SODIUM LACTATE AND CALCIUM CHLORIDE 125 ML/HR: 600; 310; 30; 20 INJECTION, SOLUTION INTRAVENOUS at 21:22

## 2022-09-16 RX ADMIN — ONDANSETRON 4 MG: 2 INJECTION INTRAMUSCULAR; INTRAVENOUS at 18:35

## 2022-09-16 RX ADMIN — PANTOPRAZOLE SODIUM 40 MG: 40 TABLET, DELAYED RELEASE ORAL at 22:46

## 2022-09-16 RX ADMIN — GABAPENTIN 600 MG: 300 CAPSULE ORAL at 22:46

## 2022-09-16 RX ADMIN — PROCHLORPERAZINE EDISYLATE 10 MG: 5 INJECTION INTRAMUSCULAR; INTRAVENOUS at 19:22

## 2022-09-16 RX ADMIN — MORPHINE SULFATE 2 MG: 2 INJECTION, SOLUTION INTRAMUSCULAR; INTRAVENOUS at 19:22

## 2022-09-16 RX ADMIN — SODIUM CHLORIDE, PRESERVATIVE FREE 40 MG: 5 INJECTION INTRAVENOUS at 19:21

## 2022-09-16 NOTE — ED PROVIDER NOTES
EMERGENCY DEPARTMENT HISTORY AND PHYSICAL EXAM      Date: 9/16/2022  Patient Name: Cory Callaway    History of Presenting Illness     Chief Complaint   Patient presents with    Vomiting       History Provided By: Patient    HPI: Cory Callaway, 62 y.o. female with a past medical history significant for GERD, hypothyroidism, and seizures who presents to this ED with cc of abdominal pain. Patient reports a 5-day history of intermittent generalized abdominal cramping with associated nausea, vomiting, diarrhea. Patient reports that her symptoms are exacerbated after eating and relieved with nothing. Denies treating symptoms with anything. Notes no additional modifying factors of pain or associated symptoms. Patient states he denies any fevers, chills, cough, congestion, sore throat, chest pain, shortness of breath, palpitations, headaches, lightheadedness, dizziness, diaphoresis, rash. There are no other complaints, changes, or physical findings at this time. PCP: None    No current facility-administered medications on file prior to encounter. Current Outpatient Medications on File Prior to Encounter   Medication Sig Dispense Refill    hydrOXYzine HCL (ATARAX) 50 mg tablet Take 50 mg by mouth four (4) times daily. methocarbamoL (ROBAXIN) 500 mg tablet Take 500 mg by mouth daily. lamoTRIgine (LaMICtal) 25 mg tablet Take 25 mg by mouth two (2) times a day. clonazePAM (KlonoPIN) 0.5 mg tablet Take 0.5 mg by mouth three (3) times daily. [DISCONTINUED] diclofenac EC (VOLTAREN) 50 mg EC tablet Take 50 mg by mouth two (2) times a day. [DISCONTINUED] HYDROcodone-chlorpheniramine (TUSSIONEX) 10-8 mg/5 mL suspension Take 5 mL by mouth two (2) times daily as needed for Cough. albuterol (PROVENTIL HFA, VENTOLIN HFA, PROAIR HFA) 90 mcg/actuation inhaler Take 1 Puff by inhalation every six (6) hours as needed for Wheezing or Shortness of Breath.  1 Each 0    ergocalciferol (ERGOCALCIFEROL) 1,250 mcg (50,000 unit) capsule Take 1 Capsule by mouth every seven (7) days. Every       QUEtiapine (SEROquel) 300 mg tablet Take 300 mg by mouth nightly. traZODone (DESYREL) 100 mg tablet Take 100-200 mg by mouth At bedtime. gabapentin (NEURONTIN) 600 mg tablet Take 600 mg by mouth four (4) times daily. Past History     Past Medical History:  Past Medical History:   Diagnosis Date    DDD (degenerative disc disease), lumbar     Fatigue     GERD (gastroesophageal reflux disease)     Hypothyroidism     Psychiatric disorder     PTSD per patient    Seizures ([facility] [address]        Past Surgical History:  Past Surgical History:   Procedure Laterality Date    HX APPENDECTOMY      HX  SECTION      HX CHOLECYSTECTOMY      HX HYSTERECTOMY         Family History:  Family History   Family history unknown: Yes       Social History:  Social History     Tobacco Use    Smoking status: Every Day     Packs/day: 0.25     Types: Cigarettes    Smokeless tobacco: Never   Vaping Use    Vaping Use: Never used   Substance Use Topics    Alcohol use: Not Currently     Alcohol/week: 0.0 standard drinks    Drug use: Never       Allergies: Allergies   Allergen Reactions    Augmentin [Amoxicillin-Pot Clavulanate] Nausea and Vomiting       Review of Systems   Review of Systems   Constitutional: Negative. Negative for chills, diaphoresis and fever. HENT: Negative. Negative for congestion, rhinorrhea and sore throat. Eyes: Negative. Respiratory: Negative. Negative for cough, chest tightness, shortness of breath and wheezing. Cardiovascular: Negative. Negative for chest pain and palpitations. Gastrointestinal:  Positive for abdominal pain, diarrhea, nausea and vomiting. Genitourinary: Negative. Negative for difficulty urinating, dysuria, flank pain, frequency and hematuria. Musculoskeletal: Negative. Negative for back pain and gait problem. Skin: Negative. Negative for rash. Neurological: Negative. Negative for dizziness, syncope, weakness, light-headedness, numbness and headaches. Psychiatric/Behavioral: Negative. All other systems reviewed and are negative. Physical Exam   Physical Exam  Vitals and nursing note reviewed. Constitutional:       General: She is not in acute distress. Appearance: Normal appearance. She is not ill-appearing or toxic-appearing. HENT:      Head: Normocephalic and atraumatic. Mouth/Throat:      Mouth: Mucous membranes are dry. Pharynx: Oropharynx is clear. Eyes:      Extraocular Movements: Extraocular movements intact. Conjunctiva/sclera: Conjunctivae normal.      Pupils: Pupils are equal, round, and reactive to light. Cardiovascular:      Rate and Rhythm: Normal rate and regular rhythm. Heart sounds: No murmur heard. No friction rub. No gallop. Pulmonary:      Effort: Pulmonary effort is normal.      Breath sounds: Normal breath sounds. No wheezing, rhonchi or rales. Abdominal:      General: Bowel sounds are normal. There is no distension. Palpations: Abdomen is soft. Tenderness: There is generalized abdominal tenderness. There is no right CVA tenderness, left CVA tenderness, guarding or rebound. Negative signs include Garcia's sign and McBurney's sign. Musculoskeletal:      Cervical back: Neck supple. No tenderness. Skin:     General: Skin is warm and dry. Capillary Refill: Capillary refill takes less than 2 seconds. Findings: No rash. Neurological:      General: No focal deficit present. Mental Status: She is alert and oriented to person, place, and time. Psychiatric:         Mood and Affect: Mood normal.         Behavior: Behavior normal.       Lab and Diagnostic Study Results   Labs -     No results found for this or any previous visit (from the past 12 hour(s)).       Radiologic Studies -   @lastxrresult@  CT Results  (Last 48 hours)      None          CXR Results (Last 48 hours)      None            Medical Decision Making and ED Course   Differential Diagnosis & Medical Decision Making Provider Note:   Pt presents with acute abdominal pain; vital signs stable with currently a non-peritoneal exam; DDx includes: Gastroenteritis, hepatitis, pancreatitis, obstruction, appendicitis, viral illness, IBD, diverticulitis, mesenteric ischemia, AAA or descending dissection, ACS, kidney stone. Will get labs, treat symptomatically and obtain serial abdominal exams to determine if additional imaging is indicated. Will reassess and monitor closely. - I am the first provider for this patient. I reviewed the vital signs, available nursing notes, past medical history, past surgical history, family history and social history. The patients presenting problems have been discussed, and they are in agreement with the care plan formulated and outlined with them. I have encouraged them to ask questions as they arise throughout their visit. Vital Signs-Reviewed the patient's vital signs. No data found. ED Course:     Consult Note:  8:13 PM  Edgar Barragan PA-C spoke with Dr. Vikki Xie,  internal medicine  Discussed pt's hx, disposition, and available diagnostic and imaging results. Reviewed care plans. Consultant agrees with plans as outlined. Dr. Vikki Xie will see and evaluate for admission. Procedures   Performed by: Edgar Barragan PA-C  Procedures      Disposition   Disposition: Admitted to Floor Medical Floor the case was discussed with the admitting physician Dr. Vikki Xie      Diagnosis/Clinical Impression     Clinical Impression:   1. ANASTACIO (acute kidney injury) ([facility] [address]    2. Dehydration    3. Intractable vomiting    4. Hyponatremia        Attestations: Kit Lombardi PA-C, am the primary clinician of record. Please note that this dictation was completed with [employer], the [employer] voice recognition software.   Quite often unanticipated grammatical, syntax, homophones, and other interpretive errors are inadvertently transcribed by the computer software. Please disregard these errors. Please excuse any errors that have escaped final proofreading. Thank you.

## 2022-09-17 VITALS
WEIGHT: 220 LBS | SYSTOLIC BLOOD PRESSURE: 92 MMHG | RESPIRATION RATE: 16 BRPM | HEART RATE: 69 BPM | TEMPERATURE: 98.6 F | HEIGHT: 68 IN | OXYGEN SATURATION: 93 % | BODY MASS INDEX: 33.34 KG/M2 | DIASTOLIC BLOOD PRESSURE: 57 MMHG

## 2022-09-17 LAB
ALBUMIN SERPL-MCNC: 3 G/DL (ref 3.5–5)
ANION GAP SERPL CALC-SCNC: 5 MMOL/L (ref 5–15)
BUN SERPL-MCNC: 22 MG/DL (ref 6–20)
BUN/CREAT SERPL: 20 (ref 12–20)
CA-I BLD-MCNC: 8.5 MG/DL (ref 8.5–10.1)
CHLORIDE SERPL-SCNC: 109 MMOL/L (ref 97–108)
CO2 SERPL-SCNC: 27 MMOL/L (ref 21–32)
CREAT SERPL-MCNC: 1.1 MG/DL (ref 0.55–1.02)
ERYTHROCYTE [DISTWIDTH] IN BLOOD BY AUTOMATED COUNT: 13.6 % (ref 11.5–14.5)
GLUCOSE SERPL-MCNC: 94 MG/DL (ref 65–100)
HCT VFR BLD AUTO: 39.2 % (ref 35–47)
HGB BLD-MCNC: 12.9 G/DL (ref 11.5–16)
MCH RBC QN AUTO: 30.1 PG (ref 26–34)
MCHC RBC AUTO-ENTMCNC: 32.9 G/DL (ref 30–36.5)
MCV RBC AUTO: 91.6 FL (ref 80–99)
NRBC # BLD: 0 K/UL (ref 0–0.01)
NRBC BLD-RTO: 0 PER 100 WBC
PHOSPHATE SERPL-MCNC: 3.9 MG/DL (ref 2.6–4.7)
PLATELET # BLD AUTO: 300 K/UL (ref 150–400)
PMV BLD AUTO: 9.6 FL (ref 8.9–12.9)
POTASSIUM SERPL-SCNC: 4.4 MMOL/L (ref 3.5–5.1)
RBC # BLD AUTO: 4.28 M/UL (ref 3.8–5.2)
SODIUM SERPL-SCNC: 141 MMOL/L (ref 136–145)
T3FREE SERPL-MCNC: 2 PG/ML (ref 2.2–4)
T4 FREE SERPL-MCNC: 0.9 NG/DL (ref 0.8–1.5)
TSH SERPL DL<=0.05 MIU/L-ACNC: 18.4 UIU/ML (ref 0.36–3.74)
WBC # BLD AUTO: 7.4 K/UL (ref 3.6–11)

## 2022-09-17 PROCEDURE — 85027 COMPLETE CBC AUTOMATED: CPT

## 2022-09-17 PROCEDURE — 84481 FREE ASSAY (FT-3): CPT

## 2022-09-17 PROCEDURE — 74011250636 HC RX REV CODE- 250/636: Performed by: HOSPITALIST

## 2022-09-17 PROCEDURE — 36415 COLL VENOUS BLD VENIPUNCTURE: CPT

## 2022-09-17 PROCEDURE — 96376 TX/PRO/DX INJ SAME DRUG ADON: CPT

## 2022-09-17 PROCEDURE — G0378 HOSPITAL OBSERVATION PER HR: HCPCS

## 2022-09-17 PROCEDURE — 74011250637 HC RX REV CODE- 250/637: Performed by: HOSPITALIST

## 2022-09-17 PROCEDURE — 74011250637 HC RX REV CODE- 250/637: Performed by: STUDENT IN AN ORGANIZED HEALTH CARE EDUCATION/TRAINING PROGRAM

## 2022-09-17 PROCEDURE — 74011250636 HC RX REV CODE- 250/636: Performed by: PHYSICIAN ASSISTANT

## 2022-09-17 PROCEDURE — 80069 RENAL FUNCTION PANEL: CPT

## 2022-09-17 PROCEDURE — 84439 ASSAY OF FREE THYROXINE: CPT

## 2022-09-17 RX ORDER — PROMETHAZINE HYDROCHLORIDE 25 MG/1
25 TABLET ORAL
Status: DISCONTINUED | OUTPATIENT
Start: 2022-09-17 | End: 2022-09-17 | Stop reason: HOSPADM

## 2022-09-17 RX ORDER — LEVOTHYROXINE SODIUM 50 UG/1
50 TABLET ORAL
Qty: 30 TABLET | Refills: 0 | Status: SHIPPED | OUTPATIENT
Start: 2022-09-17

## 2022-09-17 RX ORDER — HYDRALAZINE HYDROCHLORIDE 20 MG/ML
10 INJECTION INTRAMUSCULAR; INTRAVENOUS
Status: DISCONTINUED | OUTPATIENT
Start: 2022-09-17 | End: 2022-09-17 | Stop reason: HOSPADM

## 2022-09-17 RX ORDER — LEVOTHYROXINE SODIUM 100 UG/1
100 TABLET ORAL
Status: DISCONTINUED | OUTPATIENT
Start: 2022-09-17 | End: 2022-09-17

## 2022-09-17 RX ORDER — LEVOTHYROXINE SODIUM 25 UG/1
50 TABLET ORAL
Status: DISCONTINUED | OUTPATIENT
Start: 2022-09-17 | End: 2022-09-17 | Stop reason: HOSPADM

## 2022-09-17 RX ORDER — PROMETHAZINE HYDROCHLORIDE 25 MG/1
25 TABLET ORAL
Qty: 30 TABLET | Refills: 0 | Status: SHIPPED | OUTPATIENT
Start: 2022-09-17 | End: 2022-09-27 | Stop reason: ALTCHOICE

## 2022-09-17 RX ORDER — PANTOPRAZOLE SODIUM 40 MG/1
40 TABLET, DELAYED RELEASE ORAL
Qty: 30 TABLET | Refills: 0 | Status: SHIPPED | OUTPATIENT
Start: 2022-09-17

## 2022-09-17 RX ORDER — ONDANSETRON 4 MG/1
4 TABLET, ORALLY DISINTEGRATING ORAL
Qty: 30 TABLET | Refills: 0 | Status: SHIPPED | OUTPATIENT
Start: 2022-09-17

## 2022-09-17 RX ADMIN — ONDANSETRON 4 MG: 4 TABLET, ORALLY DISINTEGRATING ORAL at 05:47

## 2022-09-17 RX ADMIN — LEVOTHYROXINE SODIUM 50 MCG: 0.03 TABLET ORAL at 09:00

## 2022-09-17 RX ADMIN — GABAPENTIN 600 MG: 300 CAPSULE ORAL at 09:29

## 2022-09-17 RX ADMIN — PROCHLORPERAZINE EDISYLATE 10 MG: 5 INJECTION INTRAMUSCULAR; INTRAVENOUS at 12:11

## 2022-09-17 RX ADMIN — CLONAZEPAM 0.5 MG: 0.5 TABLET ORAL at 09:29

## 2022-09-17 RX ADMIN — PANTOPRAZOLE SODIUM 40 MG: 40 TABLET, DELAYED RELEASE ORAL at 09:29

## 2022-09-17 RX ADMIN — ONDANSETRON 4 MG: 2 INJECTION INTRAMUSCULAR; INTRAVENOUS at 09:38

## 2022-09-17 RX ADMIN — LAMOTRIGINE 25 MG: 25 TABLET ORAL at 09:29

## 2022-09-17 RX ADMIN — ENOXAPARIN SODIUM 40 MG: 100 INJECTION SUBCUTANEOUS at 09:28

## 2022-09-17 NOTE — PROGRESS NOTES
Problem: Falls - Risk of  Goal: *Absence of Falls  Description: Document Clydene Bone Fall Risk and appropriate interventions in the flowsheet.   Outcome: Progressing Towards Goal  Note: Fall Risk Interventions:                                Problem: Patient Education: Go to Patient Education Activity  Goal: Patient/Family Education  Outcome: Progressing Towards Goal

## 2022-09-17 NOTE — H&P
Hospitalist History & Physical Notes. [facility]. Name : Orly Mark      MRN number : [id number]     YOB: 1965     Subjective :   Chief Complaint : Tractable vomiting with inability to keep anything down. Abdominal pain. Source of information : Patient, ED provider. History of present illness:   62 y.o. female with history of degenerative disc disease on pain medications including NSAIDs, hypothyroidism, history of seizure disorder presents to the emergency room complaining of intractable vomiting associated with nausea. Anything that she is eating she is throwing out, has nausea. No food for the last few days. Denies any fever or chills. Complains of feeling pain in the abdomen like a burning, no blood in the stool. Denies any diarrhea or constipation. Had similar complaints in the past that resolved. Given treatment in the emergency room without improvement so admitted for observation, started on IV fluids. Laboratory data with mild acute kidney injury, hyponatremia. Past Medical History:   Diagnosis Date    DDD (degenerative disc disease), lumbar     Fatigue     GERD (gastroesophageal reflux disease)     Hypothyroidism     Psychiatric disorder     PTSD per patient    Seizures ([facility] [address]      Past Surgical History:   Procedure Laterality Date    HX APPENDECTOMY      HX  SECTION      HX CHOLECYSTECTOMY      HX HYSTERECTOMY       Family History   Family history unknown: Yes      Social History     Tobacco Use    Smoking status: Every Day     Packs/day: 0.25     Types: Cigarettes    Smokeless tobacco: Never   Substance Use Topics    Alcohol use: Not Currently     Alcohol/week: 0.0 standard drinks       Prior to Admission medications    Medication Sig Start Date End Date Taking? Authorizing Provider   diclofenac EC (VOLTAREN) 50 mg EC tablet Take 50 mg by mouth two (2) times a day.  22   Provider, Historical   hydrOXYzine HCL (ATARAX) 50 mg tablet Take 50 mg by mouth four (4) times daily. 9/3/22   Provider, Historical   HYDROcodone-chlorpheniramine (TUSSIONEX) 10-8 mg/5 mL suspension Take 5 mL by mouth two (2) times daily as needed for Cough. 8/4/22   Provider, Historical   methocarbamoL (ROBAXIN) 500 mg tablet Take 500 mg by mouth daily. 9/14/22   Provider, Historical   lamoTRIgine (LaMICtal) 25 mg tablet Take 25 mg by mouth two (2) times a day. 9/3/22   Provider, Historical   clonazePAM (KlonoPIN) 0.5 mg tablet Take 0.5 mg by mouth three (3) times daily. 9/6/22   Provider, Historical   albuterol (PROVENTIL HFA, VENTOLIN HFA, PROAIR HFA) 90 mcg/actuation inhaler Take 1 Puff by inhalation every six (6) hours as needed for Wheezing or Shortness of Breath. 8/27/22   Cynthia KLEIN,    ergocalciferol (ERGOCALCIFEROL) 1,250 mcg (50,000 unit) capsule Take 1 Capsule by mouth every seven (7) days. Every Sunday 7/18/22   Provider, Historical   QUEtiapine (SEROquel) 300 mg tablet Take 300 mg by mouth nightly. 7/13/22   Provider, Historical   traZODone (DESYREL) 100 mg tablet Take 100-200 mg by mouth At bedtime. 2/1/22   Other, MD Kendall   gabapentin (NEURONTIN) 600 mg tablet Take 600 mg by mouth four (4) times daily. Provider, Historical     Allergies   Allergen Reactions    Augmentin [Amoxicillin-Pot Clavulanate] Nausea and Vomiting             Review of Systems:  Constitutional: At baseline appetite is good, denies weight loss, no fever, no chills, no night sweats. Eye: No recent visual disturbances, no discharge, no double vision. Ear/nose/mouth/throat : No hearing disturbance, no ear pain, no nasal congestion, no sore throat, no trouble swallowing. Respiratory : No trouble breathing, no cough, ++ shortness of breath with exertion, no hemoptysis, no wheezing. Cardiovascular : No chest pain, no palpitation,  no orthopnea, no  peripheral edema. Gastrointestinal : As in history of present illness.   Genitourinary : No dysuria, no hematuria, .  Endocrine : No excessive thirst, No polyuria . Immunologic : No hives, urticaria, No seasonal allergies. Musculoskeletal : No joint swelling, No pain, No effusion,    Integumentary : No rash, No pruritus,   Hematology : No petechiae, No easy bruising,  No tendency to bleed easy. Neurology : Denies change in mental status, , , No numbness or tingling. Psychiatric : No mood swings, ++ anxiety, No depression. Vitals:   Patient Vitals for the past 12 hrs:   Temp Pulse Resp BP SpO2   09/16/22 1930 -- 80 16 114/71 99 %   09/16/22 1738 98 °F (36.7 °C) 99 18 127/75 94 %       Physical Exam:   General : Looks tired but comfortable, no respiratory distress noted. HEENT : PERRLA, normal oral mucosa, atraumatic normocephalic, Normal ear and nose. Neck : Supple, no JVD, no masses noted, no carotid bruit. Lungs : Breath sounds with good air entry bilaterally, no wheezes or rales, no accessory muscle use. CVS : Rhythm rate regular, S1+, S2+, no murmur or gallop. Abdomen : Soft, nontender, bowel sounds active. Extremities : No edema noted,  pedal pulses palpable. Musculoskeletal : Fair range of motion, no joint swelling or effusion, muscle tone and power appears normal.   Skin : Dry, warm. No pathological rash. Lymphatic : No cervical lymphadenopathy. Neurological : Awake, alert, oriented to time place person. No neurological deficits. Psychiatric : Mood and affect appears appropriate to the situation.          Data Review:   Recent Results (from the past 24 hour(s))   CBC WITH AUTOMATED DIFF    Collection Time: 09/16/22  6:29 PM   Result Value Ref Range    WBC 8.7 3.6 - 11.0 K/uL    RBC 4.72 3.80 - 5.20 M/uL    HGB 14.5 11.5 - 16.0 g/dL    HCT 42.5 35.0 - 47.0 %    MCV 90.0 80.0 - 99.0 FL    MCH 30.7 26.0 - 34.0 PG    MCHC 34.1 30.0 - 36.5 g/dL    RDW 13.8 11.5 - 14.5 %    PLATELET 721 352 - 122 K/uL    MPV 9.7 8.9 - 12.9 FL    NRBC 0.0 0.0  WBC    ABSOLUTE NRBC 0.00 0.00 - 0.01 K/uL    NEUTROPHILS 46 32 - 75 %    LYMPHOCYTES 46 12 - 49 %    MONOCYTES 6 5 - 13 %    EOSINOPHILS 1 0 - 7 %    BASOPHILS 1 0 - 1 %    IMMATURE GRANULOCYTES 0 0 - 0.5 %    ABS. NEUTROPHILS 4.0 1.8 - 8.0 K/UL    ABS. LYMPHOCYTES 4.0 (H) 0.8 - 3.5 K/UL    ABS. MONOCYTES 0.5 0.0 - 1.0 K/UL    ABS. EOSINOPHILS 0.1 0.0 - 0.4 K/UL    ABS. BASOPHILS 0.1 0.0 - 0.1 K/UL    ABS. IMM. GRANS. 0.0 0.00 - 0.04 K/UL    DF AUTOMATED     METABOLIC PANEL, COMPREHENSIVE    Collection Time: 09/16/22  6:29 PM   Result Value Ref Range    Sodium 128 (L) 136 - 145 mmol/L    Potassium Hemolyzed, recollect requested 3.5 - 5.1 mmol/L    Chloride 104 97 - 108 mmol/L    CO2 26 21 - 32 mmol/L    Anion gap NEG 2 5 - 15 mmol/L    Glucose 114 (H) 65 - 100 mg/dL    BUN 24 (H) 6 - 20 mg/dL    Creatinine 1.30 (H) 0.55 - 1.02 mg/dL    BUN/Creatinine ratio 18 12 - 20      GFR est AA 51 (L) >60 ml/min/1.73m2    GFR est non-AA 42 (L) >60 ml/min/1.73m2    Calcium 8.7 8.5 - 10.1 mg/dL    Bilirubin, total <0.2 (L) 0.2 - 1.0 mg/dL    AST (SGOT) Hemolyzed, recollect requested 15 - 37 U/L    ALT (SGPT) Hemolyzed, recollect requested 12 - 78 U/L    Alk. phosphatase 59 45 - 117 U/L    Protein, total 8.1 6.4 - 8.2 g/dL    Albumin 3.2 (L) 3.5 - 5.0 g/dL    Globulin 4.9 (H) 2.0 - 4.0 g/dL    A-G Ratio 0.7 (L) 1.1 - 2.2     LIPASE    Collection Time: 09/16/22  6:29 PM   Result Value Ref Range    Lipase 82 73 - 393 U/L       Radiologic Studies : None      Assessment and Plan :     Intractable vomiting: Etiology unclear, most likely from gastritis with abdominal pain due to NSAID use. States that she stopped using narcotics. Acute kidney injury with volume contraction: Started on IV fluids, we will monitor renal functions closely. History of chronic spine issues with surgery, requiring pain medications. On high-dose of Neurontin.     History of psychiatric problems including PTSD: On medications which we will continue    Admitted for observation to medical floor, full CODE STATUS, home medications reviewed and verified with patient. CC : None  Signed By: Shaunna Malin MD     September 16, 2022      This dictation was done by dragon, computer voice recognition software. Often unanticipated grammatical, syntax, [place] phones and other interpretive errors are inadvertently transcribed. Please excuse errors that have escaped final proofreading.

## 2022-09-17 NOTE — PROGRESS NOTES
Discussed with the patient and all questioned fully answered. She will call me if any problems arise. Discharge plan of care/case management plan validated with provider discharge order. I have reviewed discharge instructions with the patient. The patient verbalized understanding. Current Discharge Medication List        START taking these medications    Details   ondansetron (ZOFRAN ODT) 4 mg disintegrating tablet Take 1 Tablet by mouth every six (6) hours as needed for Nausea or Vomiting, Nausea or Vomiting. Qty: 30 Tablet, Refills: 0  Start date: 9/17/2022      levothyroxine (SYNTHROID) 50 mcg tablet Take 1 Tablet by mouth every morning. Qty: 30 Tablet, Refills: 0  Start date: 9/17/2022      pantoprazole (PROTONIX) 40 mg tablet Take 1 Tablet by mouth Before breakfast and dinner. Qty: 30 Tablet, Refills: 0  Start date: 9/17/2022      promethazine (PHENERGAN) 25 mg tablet Take 1 Tablet by mouth every six (6) hours as needed for Nausea. Qty: 30 Tablet, Refills: 0  Start date: 9/17/2022           CONTINUE these medications which have NOT CHANGED    Details   hydrOXYzine HCL (ATARAX) 50 mg tablet Take 50 mg by mouth four (4) times daily. methocarbamoL (ROBAXIN) 500 mg tablet Take 500 mg by mouth daily. lamoTRIgine (LaMICtal) 25 mg tablet Take 25 mg by mouth two (2) times a day. clonazePAM (KlonoPIN) 0.5 mg tablet Take 0.5 mg by mouth three (3) times daily. albuterol (PROVENTIL HFA, VENTOLIN HFA, PROAIR HFA) 90 mcg/actuation inhaler Take 1 Puff by inhalation every six (6) hours as needed for Wheezing or Shortness of Breath. Qty: 1 Each, Refills: 0      ergocalciferol (ERGOCALCIFEROL) 1,250 mcg (50,000 unit) capsule Take 1 Capsule by mouth every seven (7) days. Every Sunday      QUEtiapine (SEROquel) 300 mg tablet Take 300 mg by mouth nightly. traZODone (DESYREL) 100 mg tablet Take 100-200 mg by mouth At bedtime.       gabapentin (NEURONTIN) 600 mg tablet Take 600 mg by mouth four (4) times daily. Associated Diagnoses: Idiopathic hypotension; Hypothyroidism due to acquired atrophy of thyroid; Anemia due to vitamin B12 deficiency; Dizziness; Arthralgia           STOP taking these medications       diclofenac EC (VOLTAREN) 50 mg EC tablet Comments:   Reason for Stopping:              IV removed, patient signature on all required docs. Patient vitals stable at time of discharge, PT to DC home self, family friend to provide transport.

## 2022-09-17 NOTE — DISCHARGE SUMMARY
Admit date: 9/16/2022   Admitting Provider: Lisa Issa MD    Discharge date: 9/17/2022  Discharging Provider: GILDARDO Navarrete      * Admission Diagnoses: Intractable vomiting [R11.10]  Acute kidney injury [facility]) [N17.9]    * Discharge Diagnoses:    Hospital Problems as of 9/17/2022 Date Reviewed: 9/16/2022            Codes Class Noted - Resolved POA    Intractable vomiting ICD-10-CM: R11.10  ICD-9-CM: 536.2  9/16/2022 - Present Yes        Acute kidney injury [facility]) ICD-10-CM: N17.9  ICD-9-CM: 584.9  3/27/2022 - Present Yes           * Hospital Course:     Manuel Nj is a 30-year-old female with a PMH of PTSD and degenerative disc disease who presented with intractable nausea and vomiting. In ED vital signs unremarkable. Initial labs significant for sodium of 128, creatinine of 1.3, and TSH of 18.4. Patient admitted for observation. Patient started on IV fluids. Patient tolerated p.o. intake with no reoccurrence of nausea. Patient started on levothyroxine and encouraged to establish PCP within two weeks. All questions answered at time of encounter    * Procedures:   * No surgery found *      Consults:   None    Significant Diagnostic Studies: As discussed in hospital course    Discharge Exam:  Visit Vitals  BP (!) 92/57 (BP 1 Location: Right upper arm, BP Patient Position: Semi fowlers)   Pulse 69   Temp 98.6 °F (37 °C)   Resp 16   Ht 5' 8\" (1.727 m)   Wt 99.8 kg (220 lb)   SpO2 93%   Breastfeeding No   BMI 33.45 kg/m²       PHYSICAL EXAM:  Vitals and nursing note reviewed. HENT:      Head: Normocephalic and atraumatic. Cardiovascular:      Rate and Rhythm: Normal rate and regular rhythm. Pulmonary:      Effort: No respiratory distress. Breath sounds: No wheezing. Abdominal:      General: Bowel sounds are normal. There is no distension. Palpations: Abdomen is soft. Tenderness: There is no abdominal tenderness.    Genitourinary:     Comments: No rubi present  Musculoskeletal:      Right lower leg: No edema. Left lower leg: No edema. Skin:     Capillary Refill: Capillary refill takes less than 2 seconds. Neurological:      Mental Status: She is alert and oriented to person, place, and time. Psychiatric:         Mood and Affect: Mood normal.      * Discharge Condition: improved  * Disposition: Home    Discharge Medications:  Current Discharge Medication List        START taking these medications    Details   ondansetron (ZOFRAN ODT) 4 mg disintegrating tablet Take 1 Tablet by mouth every six (6) hours as needed for Nausea or Vomiting, Nausea or Vomiting. Qty: 30 Tablet, Refills: 0  Start date: 9/17/2022      levothyroxine (SYNTHROID) 50 mcg tablet Take 1 Tablet by mouth every morning. Qty: 30 Tablet, Refills: 0  Start date: 9/17/2022      pantoprazole (PROTONIX) 40 mg tablet Take 1 Tablet by mouth Before breakfast and dinner. Qty: 30 Tablet, Refills: 0  Start date: 9/17/2022      promethazine (PHENERGAN) 25 mg tablet Take 1 Tablet by mouth every six (6) hours as needed for Nausea. Qty: 30 Tablet, Refills: 0  Start date: 9/17/2022           CONTINUE these medications which have NOT CHANGED    Details   hydrOXYzine HCL (ATARAX) 50 mg tablet Take 50 mg by mouth four (4) times daily. methocarbamoL (ROBAXIN) 500 mg tablet Take 500 mg by mouth daily. lamoTRIgine (LaMICtal) 25 mg tablet Take 25 mg by mouth two (2) times a day. clonazePAM (KlonoPIN) 0.5 mg tablet Take 0.5 mg by mouth three (3) times daily. albuterol (PROVENTIL HFA, VENTOLIN HFA, PROAIR HFA) 90 mcg/actuation inhaler Take 1 Puff by inhalation every six (6) hours as needed for Wheezing or Shortness of Breath. Qty: 1 Each, Refills: 0      ergocalciferol (ERGOCALCIFEROL) 1,250 mcg (50,000 unit) capsule Take 1 Capsule by mouth every seven (7) days. Every Sunday      QUEtiapine (SEROquel) 300 mg tablet Take 300 mg by mouth nightly.       traZODone (DESYREL) 100 mg tablet Take 100-200 mg by mouth At bedtime. gabapentin (NEURONTIN) 600 mg tablet Take 600 mg by mouth four (4) times daily. Associated Diagnoses: Idiopathic hypotension; Hypothyroidism due to acquired atrophy of thyroid; Anemia due to vitamin B12 deficiency; Dizziness; Arthralgia           STOP taking these medications       diclofenac EC (VOLTAREN) 50 mg EC tablet Comments:   Reason for Stopping:               * Follow-up Care/Patient Instructions:   Activity: Activity as tolerated  Diet: GI bland  Wound Care: None needed    Follow-up Information       Follow up With Specialties Details Why Contact Info    Ania Quintana NP Nurse Practitioner Schedule an appointment as soon as possible for a visit in 1 week(s) Establishing primary care provider - hypothyroidism started on 50mcg [facility] [address]  [phone]      None    None (828) Patient stated that they have no PCP              Discharge summary greater than 35 minutes spent with the patient performing discharge instructions, medication review and physical exam    Signed:  GILDARDO Cary  9/17/2022  10:33 AM

## 2022-09-23 ENCOUNTER — HOSPITAL ENCOUNTER (EMERGENCY)
Age: 57
Discharge: HOME | End: 2022-09-23
Attending: EMERGENCY MEDICINE
Payer: MEDICARE

## 2022-09-23 VITALS
HEART RATE: 98 BPM | SYSTOLIC BLOOD PRESSURE: 117 MMHG | HEIGHT: 68 IN | TEMPERATURE: 98.1 F | OXYGEN SATURATION: 100 % | DIASTOLIC BLOOD PRESSURE: 82 MMHG | RESPIRATION RATE: 19 BRPM | BODY MASS INDEX: 30.31 KG/M2 | WEIGHT: 200 LBS

## 2022-09-23 DIAGNOSIS — R10.13 ABDOMINAL PAIN, EPIGASTRIC: Primary | ICD-10-CM

## 2022-09-23 LAB
ALBUMIN SERPL-MCNC: 3.7 G/DL (ref 3.5–5)
ALBUMIN/GLOB SERPL: 0.9 {RATIO} (ref 1.1–2.2)
ALP SERPL-CCNC: 66 U/L (ref 45–117)
ALT SERPL-CCNC: 20 U/L (ref 12–78)
AMPHET UR QL SCN: NEGATIVE
ANION GAP SERPL CALC-SCNC: 4 MMOL/L (ref 5–15)
APPEARANCE UR: ABNORMAL
AST SERPL W P-5'-P-CCNC: 33 U/L (ref 15–37)
BACTERIA URNS QL MICRO: NEGATIVE /HPF
BARBITURATES UR QL SCN: NEGATIVE
BASOPHILS # BLD: 0.1 K/UL (ref 0–0.1)
BASOPHILS NFR BLD: 1 % (ref 0–1)
BENZODIAZ UR QL: NEGATIVE
BILIRUB SERPL-MCNC: 0.4 MG/DL (ref 0.2–1)
BILIRUB UR QL: NEGATIVE
BUN SERPL-MCNC: 18 MG/DL (ref 6–20)
BUN/CREAT SERPL: 14 (ref 12–20)
CA-I BLD-MCNC: 8.9 MG/DL (ref 8.5–10.1)
CANNABINOIDS UR QL SCN: NEGATIVE
CHLORIDE SERPL-SCNC: 107 MMOL/L (ref 97–108)
CO2 SERPL-SCNC: 27 MMOL/L (ref 21–32)
COCAINE UR QL SCN: NEGATIVE
COLOR UR: ABNORMAL
CREAT SERPL-MCNC: 1.27 MG/DL (ref 0.55–1.02)
DIFFERENTIAL METHOD BLD: ABNORMAL
DRUG SCRN COMMENT,DRGCM: NORMAL
EOSINOPHIL # BLD: 0.1 K/UL (ref 0–0.4)
EOSINOPHIL NFR BLD: 1 % (ref 0–7)
EPITH CASTS URNS QL MICRO: ABNORMAL /LPF
ERYTHROCYTE [DISTWIDTH] IN BLOOD BY AUTOMATED COUNT: 13.2 % (ref 11.5–14.5)
GLOBULIN SER CALC-MCNC: 4.2 G/DL (ref 2–4)
GLUCOSE SERPL-MCNC: 103 MG/DL (ref 65–100)
GLUCOSE UR STRIP.AUTO-MCNC: NEGATIVE MG/DL
HCT VFR BLD AUTO: 46.5 % (ref 35–47)
HGB BLD-MCNC: 15.8 G/DL (ref 11.5–16)
HGB UR QL STRIP: NEGATIVE
HYALINE CASTS URNS QL MICRO: ABNORMAL /LPF (ref 0–5)
IMM GRANULOCYTES # BLD AUTO: 0 K/UL (ref 0–0.04)
IMM GRANULOCYTES NFR BLD AUTO: 0 % (ref 0–0.5)
KETONES UR QL STRIP.AUTO: NEGATIVE MG/DL
LEUKOCYTE ESTERASE UR QL STRIP.AUTO: NEGATIVE
LIPASE SERPL-CCNC: 229 U/L (ref 73–393)
LYMPHOCYTES # BLD: 3.7 K/UL (ref 0.8–3.5)
LYMPHOCYTES NFR BLD: 45 % (ref 12–49)
MCH RBC QN AUTO: 30.6 PG (ref 26–34)
MCHC RBC AUTO-ENTMCNC: 34 G/DL (ref 30–36.5)
MCV RBC AUTO: 90.1 FL (ref 80–99)
METHADONE UR QL: NEGATIVE
MONOCYTES # BLD: 0.4 K/UL (ref 0–1)
MONOCYTES NFR BLD: 5 % (ref 5–13)
NEUTS SEG # BLD: 4 K/UL (ref 1.8–8)
NEUTS SEG NFR BLD: 48 % (ref 32–75)
NITRITE UR QL STRIP.AUTO: NEGATIVE
NRBC # BLD: 0 K/UL (ref 0–0.01)
NRBC BLD-RTO: 0 PER 100 WBC
OPIATES UR QL: NEGATIVE
PCP UR QL: NEGATIVE
PH UR STRIP: 5 [PH] (ref 5–8)
PLATELET # BLD AUTO: 431 K/UL (ref 150–400)
PMV BLD AUTO: 9.4 FL (ref 8.9–12.9)
POTASSIUM SERPL-SCNC: 4.8 MMOL/L (ref 3.5–5.1)
PROT SERPL-MCNC: 7.9 G/DL (ref 6.4–8.2)
PROT UR STRIP-MCNC: NEGATIVE MG/DL
RBC # BLD AUTO: 5.16 M/UL (ref 3.8–5.2)
RBC #/AREA URNS HPF: ABNORMAL /HPF (ref 0–5)
SODIUM SERPL-SCNC: 138 MMOL/L (ref 136–145)
SP GR UR REFRACTOMETRY: 1.02 (ref 1–1.03)
UA: UC IF INDICATED,UAUC: ABNORMAL
UROBILINOGEN UR QL STRIP.AUTO: 0.1 EU/DL (ref 0.1–1)
WBC # BLD AUTO: 8.3 K/UL (ref 3.6–11)
WBC URNS QL MICRO: ABNORMAL /HPF (ref 0–4)

## 2022-09-23 PROCEDURE — 99284 EMERGENCY DEPT VISIT MOD MDM: CPT

## 2022-09-23 PROCEDURE — 80053 COMPREHEN METABOLIC PANEL: CPT

## 2022-09-23 PROCEDURE — 74011000250 HC RX REV CODE- 250: Performed by: EMERGENCY MEDICINE

## 2022-09-23 PROCEDURE — 96374 THER/PROPH/DIAG INJ IV PUSH: CPT

## 2022-09-23 PROCEDURE — 96375 TX/PRO/DX INJ NEW DRUG ADDON: CPT

## 2022-09-23 PROCEDURE — 36415 COLL VENOUS BLD VENIPUNCTURE: CPT

## 2022-09-23 PROCEDURE — 74011250636 HC RX REV CODE- 250/636: Performed by: EMERGENCY MEDICINE

## 2022-09-23 PROCEDURE — 74011250637 HC RX REV CODE- 250/637: Performed by: EMERGENCY MEDICINE

## 2022-09-23 PROCEDURE — 83690 ASSAY OF LIPASE: CPT

## 2022-09-23 PROCEDURE — 80307 DRUG TEST PRSMV CHEM ANLYZR: CPT

## 2022-09-23 PROCEDURE — 85025 COMPLETE CBC W/AUTO DIFF WBC: CPT

## 2022-09-23 PROCEDURE — 81001 URINALYSIS AUTO W/SCOPE: CPT

## 2022-09-23 RX ORDER — LIDOCAINE HYDROCHLORIDE 20 MG/ML
15 SOLUTION OROPHARYNGEAL AS NEEDED
Qty: 250 ML | Refills: 1 | Status: SHIPPED | OUTPATIENT
Start: 2022-09-23 | End: 2022-09-27 | Stop reason: ALTCHOICE

## 2022-09-23 RX ORDER — DIPHENHYDRAMINE HYDROCHLORIDE 50 MG/ML
25 INJECTION, SOLUTION INTRAMUSCULAR; INTRAVENOUS
Status: COMPLETED | OUTPATIENT
Start: 2022-09-23 | End: 2022-09-23

## 2022-09-23 RX ORDER — ONDANSETRON 2 MG/ML
4 INJECTION INTRAMUSCULAR; INTRAVENOUS
Status: COMPLETED | OUTPATIENT
Start: 2022-09-23 | End: 2022-09-23

## 2022-09-23 RX ORDER — MAG HYDROX/ALUMINUM HYD/SIMETH 200-200-20
30 SUSPENSION, ORAL (FINAL DOSE FORM) ORAL ONCE
Status: COMPLETED | OUTPATIENT
Start: 2022-09-23 | End: 2022-09-23

## 2022-09-23 RX ORDER — PHENOL/SODIUM PHENOLATE
20 AEROSOL, SPRAY (ML) MUCOUS MEMBRANE DAILY
Qty: 28 TABLET | Refills: 0 | Status: SHIPPED | OUTPATIENT
Start: 2022-09-23 | End: 2022-10-21

## 2022-09-23 RX ORDER — LIDOCAINE HYDROCHLORIDE 20 MG/ML
15 SOLUTION OROPHARYNGEAL
Status: COMPLETED | OUTPATIENT
Start: 2022-09-23 | End: 2022-09-23

## 2022-09-23 RX ORDER — KETOROLAC TROMETHAMINE 15 MG/ML
15 INJECTION, SOLUTION INTRAMUSCULAR; INTRAVENOUS
Status: COMPLETED | OUTPATIENT
Start: 2022-09-23 | End: 2022-09-23

## 2022-09-23 RX ORDER — METOCLOPRAMIDE HYDROCHLORIDE 5 MG/ML
10 INJECTION INTRAMUSCULAR; INTRAVENOUS
Status: COMPLETED | OUTPATIENT
Start: 2022-09-23 | End: 2022-09-23

## 2022-09-23 RX ADMIN — ALUMINUM HYDROXIDE, MAGNESIUM HYDROXIDE, AND SIMETHICONE 30 ML: 200; 200; 20 SUSPENSION ORAL at 16:02

## 2022-09-23 RX ADMIN — LIDOCAINE HYDROCHLORIDE 15 ML: 20 SOLUTION ORAL; TOPICAL at 16:02

## 2022-09-23 RX ADMIN — DIPHENHYDRAMINE HYDROCHLORIDE 25 MG: 50 INJECTION, SOLUTION INTRAMUSCULAR; INTRAVENOUS at 14:51

## 2022-09-23 RX ADMIN — SODIUM CHLORIDE 1000 ML: 9 INJECTION, SOLUTION INTRAVENOUS at 13:59

## 2022-09-23 RX ADMIN — KETOROLAC TROMETHAMINE 15 MG: 15 INJECTION, SOLUTION INTRAMUSCULAR; INTRAVENOUS at 14:51

## 2022-09-23 RX ADMIN — ONDANSETRON 4 MG: 2 INJECTION INTRAMUSCULAR; INTRAVENOUS at 13:59

## 2022-09-23 RX ADMIN — METOCLOPRAMIDE HYDROCHLORIDE 10 MG: 5 INJECTION INTRAMUSCULAR; INTRAVENOUS at 15:12

## 2022-09-23 NOTE — ED TRIAGE NOTES
Pt reports recent admission for gastritis. Pt endorses no resolve.  Increased abdominal pain, vomiting unable to keep food for fluids down

## 2022-09-23 NOTE — Clinical Note
[facility] EMERGENCY DEPT  [address] [place] [id number]  [phone]    Work/School Note    Date: 9/23/2022    To Whom It May concern:    Orly Mark was seen and treated today in the emergency room by the following provider(s):  Attending Provider: Troy Merida MD.      Orly Mark is excused from work/school on 09/23/22 and 09/24/22. She is medically clear to return to work/school on 9/25/2022.        Sincerely,          Michaela Mart MD

## 2022-09-23 NOTE — Clinical Note
[facility] EMERGENCY DEPT  [address] [place] [id number]  [phone]    Work/School Note    Date: 9/23/2022    To Whom It May concern:    Libia Maki was seen and treated today in the emergency room by the following provider(s):  Attending Provider: Cate Garber MD.      Libia Maki is excused from work/school on 09/23/22 and 09/24/22. She is medically clear to return to work/school on 9/25/2022.        Sincerely,          Sang Flores RN

## 2022-09-23 NOTE — Clinical Note
[facility] EMERGENCY DEPT  [address] [place] [id number]  [phone]    Work/School Note    Date: 9/23/2022    To Whom It May concern:    Sia Trujillo was seen and treated today in the emergency room by the following provider(s):  Attending Provider: Baltazar Caraballo MD.      Sia Trujillo is excused from work/school on 09/23/22 and 09/24/22. She is medically clear to return to work/school on 9/25/2022.        Sincerely,          Carlie Gilbert

## 2022-09-23 NOTE — ED PROVIDER NOTES
EMERGENCY DEPARTMENT HISTORY AND PHYSICAL EXAM      Date: 9/23/2022  Patient Name: Ines López    History of Presenting Illness     Chief Complaint   Patient presents with    Abdominal Pain       History Provided By: Patient    HPI: Ines López, 62 y.o. female presents the emergency department complaint of sharp epigastric abdominal pain. Patient reports the pain is moderate in severity, also reports associated nausea and vomiting. Patient states she was seen for the same recently and diagnosed with gastritis. Patient denies radiation of the pain, states it is worse when she eats or drinks. There are no other complaints, changes, or physical findings at this time. PCP: None    No current facility-administered medications on file prior to encounter. Current Outpatient Medications on File Prior to Encounter   Medication Sig Dispense Refill    ondansetron (ZOFRAN ODT) 4 mg disintegrating tablet Take 1 Tablet by mouth every six (6) hours as needed for Nausea or Vomiting, Nausea or Vomiting. 30 Tablet 0    levothyroxine (SYNTHROID) 50 mcg tablet Take 1 Tablet by mouth every morning. 30 Tablet 0    pantoprazole (PROTONIX) 40 mg tablet Take 1 Tablet by mouth Before breakfast and dinner. 30 Tablet 0    promethazine (PHENERGAN) 25 mg tablet Take 1 Tablet by mouth every six (6) hours as needed for Nausea. 30 Tablet 0    hydrOXYzine HCL (ATARAX) 50 mg tablet Take 50 mg by mouth four (4) times daily. methocarbamoL (ROBAXIN) 500 mg tablet Take 500 mg by mouth daily. lamoTRIgine (LaMICtal) 25 mg tablet Take 25 mg by mouth two (2) times a day. clonazePAM (KlonoPIN) 0.5 mg tablet Take 0.5 mg by mouth three (3) times daily. albuterol (PROVENTIL HFA, VENTOLIN HFA, PROAIR HFA) 90 mcg/actuation inhaler Take 1 Puff by inhalation every six (6) hours as needed for Wheezing or Shortness of Breath.  1 Each 0    ergocalciferol (ERGOCALCIFEROL) 1,250 mcg (50,000 unit) capsule Take 1 Capsule by mouth every seven (7) days. Every       QUEtiapine (SEROquel) 300 mg tablet Take 300 mg by mouth nightly. traZODone (DESYREL) 100 mg tablet Take 100-200 mg by mouth At bedtime. gabapentin (NEURONTIN) 600 mg tablet Take 600 mg by mouth four (4) times daily. Past History     Past Medical History:  Past Medical History:   Diagnosis Date    DDD (degenerative disc disease), lumbar     Fatigue     GERD (gastroesophageal reflux disease)     Hypothyroidism     Psychiatric disorder     PTSD per patient    Seizures ([facility] [address]        Past Surgical History:  Past Surgical History:   Procedure Laterality Date    HX APPENDECTOMY      HX  SECTION      HX CHOLECYSTECTOMY      HX HYSTERECTOMY         Family History:  Family History   Family history unknown: Yes       Social History:  Social History     Tobacco Use    Smoking status: Every Day     Packs/day: 0.25     Types: Cigarettes    Smokeless tobacco: Never   Vaping Use    Vaping Use: Never used   Substance Use Topics    Alcohol use: Not Currently     Alcohol/week: 0.0 standard drinks    Drug use: Never       Allergies: Allergies   Allergen Reactions    Augmentin [Amoxicillin-Pot Clavulanate] Nausea and Vomiting       Review of Systems   Review of Systems  Review of Systems   Constitutional: Negative for chills and fever. HENT: Negative for sinus pressure and sinus pain. Eyes: Negative for photophobia and redness. Respiratory: Negative for shortness of breath and wheezing. Cardiovascular: Negative for chest pain and palpitations. Gastrointestinal: Positive for abdominal pain and nausea. Genitourinary: Negative for flank pain and hematuria. Musculoskeletal: Negative for arthralgias and gait problem. Skin: Negative for color change and pallor. Neurological: Negative for dizziness and weakness. Physical Exam   Physical Exam  Physical Exam  Constitutional:       General: No acute distress. Appearance: Normal appearance.   Not toxic-appearing. HENT:      Head: Normocephalic and atraumatic. Nose: Nose normal.      Mouth/Throat:      Mouth: Mucous membranes are moist.   Eyes:      Extraocular Movements: Extraocular movements intact. Pupils: Pupils are equal, round, and reactive to light. Cardiovascular:      Rate and Rhythm: Normal rate. Pulses: Normal pulses. Pulmonary:      Effort: Pulmonary effort is normal.      Breath sounds: No stridor. Abdominal:      General: Abdomen is flat. There is no distension. Epigastric tenderness to palpation without guarding or rebound. Musculoskeletal:         General: Normal range of motion. Cervical back: Normal range of motion and neck supple. Skin:     General: Skin is warm and dry. Capillary Refill: Capillary refill takes less than 2 seconds. Neurological:      General: No focal deficit present. Mental Status: Aert and oriented to person, place, and time. Psychiatric:         Mood and Affect: Anxious appearing. Behavior: Behavior normal.     Lab and Diagnostic Study Results   Labs -     Recent Results (from the past 12 hour(s))   CBC WITH AUTOMATED DIFF    Collection Time: 09/23/22  1:09 PM   Result Value Ref Range    WBC 8.3 3.6 - 11.0 K/uL    RBC 5.16 3.80 - 5.20 M/uL    HGB 15.8 11.5 - 16.0 g/dL    HCT 46.5 35.0 - 47.0 %    MCV 90.1 80.0 - 99.0 FL    MCH 30.6 26.0 - 34.0 PG    MCHC 34.0 30.0 - 36.5 g/dL    RDW 13.2 11.5 - 14.5 %    PLATELET 511 (H) 593 - 400 K/uL    MPV 9.4 8.9 - 12.9 FL    NRBC 0.0 0.0  WBC    ABSOLUTE NRBC 0.00 0.00 - 0.01 K/uL    NEUTROPHILS 48 32 - 75 %    LYMPHOCYTES 45 12 - 49 %    MONOCYTES 5 5 - 13 %    EOSINOPHILS 1 0 - 7 %    BASOPHILS 1 0 - 1 %    IMMATURE GRANULOCYTES 0 0 - 0.5 %    ABS. NEUTROPHILS 4.0 1.8 - 8.0 K/UL    ABS. LYMPHOCYTES 3.7 (H) 0.8 - 3.5 K/UL    ABS. MONOCYTES 0.4 0.0 - 1.0 K/UL    ABS. EOSINOPHILS 0.1 0.0 - 0.4 K/UL    ABS. BASOPHILS 0.1 0.0 - 0.1 K/UL    ABS. IMM.  GRANS. 0.0 0.00 - 0.04 K/UL    DF AUTOMATED     METABOLIC PANEL, COMPREHENSIVE    Collection Time: 09/23/22  1:09 PM   Result Value Ref Range    Sodium 138 136 - 145 mmol/L    Potassium 4.8 3.5 - 5.1 mmol/L    Chloride 107 97 - 108 mmol/L    CO2 27 21 - 32 mmol/L    Anion gap 4 (L) 5 - 15 mmol/L    Glucose 103 (H) 65 - 100 mg/dL    BUN 18 6 - 20 mg/dL    Creatinine 1.27 (H) 0.55 - 1.02 mg/dL    BUN/Creatinine ratio 14 12 - 20      GFR est AA 53 (L) >60 ml/min/1.73m2    GFR est non-AA 43 (L) >60 ml/min/1.73m2    Calcium 8.9 8.5 - 10.1 mg/dL    Bilirubin, total 0.4 0.2 - 1.0 mg/dL    AST (SGOT) 33 15 - 37 U/L    ALT (SGPT) 20 12 - 78 U/L    Alk.  phosphatase 66 45 - 117 U/L    Protein, total 7.9 6.4 - 8.2 g/dL    Albumin 3.7 3.5 - 5.0 g/dL    Globulin 4.2 (H) 2.0 - 4.0 g/dL    A-G Ratio 0.9 (L) 1.1 - 2.2     LIPASE    Collection Time: 09/23/22  1:09 PM   Result Value Ref Range    Lipase 229 73 - 393 U/L   URINALYSIS W/ REFLEX CULTURE    Collection Time: 09/23/22  1:09 PM    Specimen: Urine   Result Value Ref Range    Color Yellow/Straw      Appearance Turbid (A) Clear      Specific gravity 1.019 1.003 - 1.030      pH (UA) 5.0 5.0 - 8.0      Protein Negative Negative mg/dL    Glucose Negative Negative mg/dL    Ketone Negative Negative mg/dL    Bilirubin Negative Negative      Blood Negative Negative      Urobilinogen 0.1 0.1 - 1.0 EU/dL    Nitrites Negative Negative      Leukocyte Esterase Negative Negative      WBC 0-4 0 - 4 /hpf    RBC 0-5 0 - 5 /hpf    Epithelial cells Moderate (A) Few /lpf    Bacteria Negative Negative /hpf    UA:UC IF INDICATED Culture not indicated by UA result Culture not indicated by UA result      Hyaline cast 0-2 0 - 5 /lpf   DRUG SCREEN, URINE    Collection Time: 09/23/22  1:20 PM   Result Value Ref Range    AMPHETAMINES Negative Negative      BARBITURATES Negative Negative      BENZODIAZEPINES Negative Negative      COCAINE Negative Negative      METHADONE Negative Negative      OPIATES Negative Negative PCP(PHENCYCLIDINE) Negative Negative      THC (TH-CANNABINOL) Negative Negative      Drug screen comment        This test is a screen for drugs of abuse in a medical setting only (i.e., they are unconfirmed results and as such must not be used for non-medical purposes, e.g.,employment testing, legal testing). Due to its inherent nature, false positive (FP) and false negative (FN) results may be obtained. Therefore, if necessary for medical care, recommend confirmation of positive findings by GC/MS. Radiologic Studies -   @[email or web address]@  CT Results  (Last 48 hours)      None          CXR Results  (Last 48 hours)      None            Medical Decision Making and ED Course   Differential Diagnosis & Medical Decision Making Provider Note:       - I am the first provider for this patient. I reviewed the vital signs, available nursing notes, past medical history, past surgical history, family history and social history. The patients presenting problems have been discussed, and they are in agreement with the care plan formulated and outlined with them. I have encouraged them to ask questions as they arise throughout their visit. Vital Signs-Reviewed the patient's vital signs. Patient Vitals for the past 12 hrs:   Temp Pulse Resp BP SpO2   09/23/22 1216 98.1 °F (36.7 °C) 98 19 117/82 100 %       ED Course:   ED Course as of 09/23/22 1646   Fri Sep 23, 2022   1642 Patient reports feeling better after GI cocktail. Patient reports she has seen a gastroenterologist a couple of years ago, currently only takes an H2 blocker. Discussed the treatment plan to use a PPI for 4 weeks, as well as at home GI cocktails. Discussed the need for follow-up with GI, potential for H. pylori infection, return precautions discussed. [CS]      ED Course User Index  [CS] Charlene Tsang MD       Disposition   Disposition: DC- Adult Discharges: All of the diagnostic tests were reviewed and questions answered.  Diagnosis, care plan and treatment options were discussed. The patient understands the instructions and will follow up as directed. The patients results have been reviewed with them. They have been counseled regarding their diagnosis. The patient verbally convey understanding and agreement of the signs, symptoms, diagnosis, treatment and prognosis and additionally agrees to follow up as recommended with their PCP in 24 - 48 hours. They also agree with the care-plan and convey that all of their questions have been answered. I have also put together some discharge instructions for them that include: 1) educational information regarding their diagnosis, 2) how to care for their diagnosis at home, as well a 3) list of reasons why they would want to return to the ED prior to their follow-up appointment, should their condition change. DC-The patient was given verbal abdominal pain warning signs and instructions    DISCHARGE PLAN:  1. Current Discharge Medication List        CONTINUE these medications which have NOT CHANGED    Details   ondansetron (ZOFRAN ODT) 4 mg disintegrating tablet Take 1 Tablet by mouth every six (6) hours as needed for Nausea or Vomiting, Nausea or Vomiting. Qty: 30 Tablet, Refills: 0      levothyroxine (SYNTHROID) 50 mcg tablet Take 1 Tablet by mouth every morning. Qty: 30 Tablet, Refills: 0      pantoprazole (PROTONIX) 40 mg tablet Take 1 Tablet by mouth Before breakfast and dinner. Qty: 30 Tablet, Refills: 0      promethazine (PHENERGAN) 25 mg tablet Take 1 Tablet by mouth every six (6) hours as needed for Nausea. Qty: 30 Tablet, Refills: 0      hydrOXYzine HCL (ATARAX) 50 mg tablet Take 50 mg by mouth four (4) times daily. methocarbamoL (ROBAXIN) 500 mg tablet Take 500 mg by mouth daily. lamoTRIgine (LaMICtal) 25 mg tablet Take 25 mg by mouth two (2) times a day. clonazePAM (KlonoPIN) 0.5 mg tablet Take 0.5 mg by mouth three (3) times daily.       albuterol (PROVENTIL HFA, VENTOLIN HFA, PROAIR HFA) 90 mcg/actuation inhaler Take 1 Puff by inhalation every six (6) hours as needed for Wheezing or Shortness of Breath. Qty: 1 Each, Refills: 0      ergocalciferol (ERGOCALCIFEROL) 1,250 mcg (50,000 unit) capsule Take 1 Capsule by mouth every seven (7) days. Every Sunday      QUEtiapine (SEROquel) 300 mg tablet Take 300 mg by mouth nightly. traZODone (DESYREL) 100 mg tablet Take 100-200 mg by mouth At bedtime. gabapentin (NEURONTIN) 600 mg tablet Take 600 mg by mouth four (4) times daily. Associated Diagnoses: Idiopathic hypotension; Hypothyroidism due to acquired atrophy of thyroid; Anemia due to vitamin B12 deficiency; Dizziness; Arthralgia           2. Follow-up Information    None       3. Return to ED if worse   4. Current Discharge Medication List            Diagnosis/Clinical Impression     Clinical Impression:   1. Abdominal pain, epigastric        Attestations: Can Lewis MD, am the primary clinician of record. Please note that this dictation was completed with [employer], the [employer] voice recognition software. Quite often unanticipated grammatical, syntax, homophones, and other interpretive errors are inadvertently transcribed by the computer software. Please disregard these errors. Please excuse any errors that have escaped final proofreading. Thank you.

## 2022-09-23 NOTE — DISCHARGE INSTRUCTIONS
Thank you! Thank you for allowing me to care for you in the emergency department. It is my goal to provide you with excellent care. If you have not received excellent quality care, please ask to speak to the nurse manager. Please fill out the survey that will come to you by mail or email since we listen to your feedback! Below you will find a list of your tests from today's visit. Should you have any questions, please do not hesitate to call the emergency department. Labs  Recent Results (from the past 12 hour(s))   CBC WITH AUTOMATED DIFF    Collection Time: 09/23/22  1:09 PM   Result Value Ref Range    WBC 8.3 3.6 - 11.0 K/uL    RBC 5.16 3.80 - 5.20 M/uL    HGB 15.8 11.5 - 16.0 g/dL    HCT 46.5 35.0 - 47.0 %    MCV 90.1 80.0 - 99.0 FL    MCH 30.6 26.0 - 34.0 PG    MCHC 34.0 30.0 - 36.5 g/dL    RDW 13.2 11.5 - 14.5 %    PLATELET 358 (H) 484 - 400 K/uL    MPV 9.4 8.9 - 12.9 FL    NRBC 0.0 0.0  WBC    ABSOLUTE NRBC 0.00 0.00 - 0.01 K/uL    NEUTROPHILS 48 32 - 75 %    LYMPHOCYTES 45 12 - 49 %    MONOCYTES 5 5 - 13 %    EOSINOPHILS 1 0 - 7 %    BASOPHILS 1 0 - 1 %    IMMATURE GRANULOCYTES 0 0 - 0.5 %    ABS. NEUTROPHILS 4.0 1.8 - 8.0 K/UL    ABS. LYMPHOCYTES 3.7 (H) 0.8 - 3.5 K/UL    ABS. MONOCYTES 0.4 0.0 - 1.0 K/UL    ABS. EOSINOPHILS 0.1 0.0 - 0.4 K/UL    ABS. BASOPHILS 0.1 0.0 - 0.1 K/UL    ABS. IMM.  GRANS. 0.0 0.00 - 0.04 K/UL    DF AUTOMATED     METABOLIC PANEL, COMPREHENSIVE    Collection Time: 09/23/22  1:09 PM   Result Value Ref Range    Sodium 138 136 - 145 mmol/L    Potassium 4.8 3.5 - 5.1 mmol/L    Chloride 107 97 - 108 mmol/L    CO2 27 21 - 32 mmol/L    Anion gap 4 (L) 5 - 15 mmol/L    Glucose 103 (H) 65 - 100 mg/dL    BUN 18 6 - 20 mg/dL    Creatinine 1.27 (H) 0.55 - 1.02 mg/dL    BUN/Creatinine ratio 14 12 - 20      GFR est AA 53 (L) >60 ml/min/1.73m2    GFR est non-AA 43 (L) >60 ml/min/1.73m2    Calcium 8.9 8.5 - 10.1 mg/dL    Bilirubin, total 0.4 0.2 - 1.0 mg/dL    AST (SGOT) 33 15 - 37 U/L    ALT (SGPT) 20 12 - 78 U/L    Alk. phosphatase 66 45 - 117 U/L    Protein, total 7.9 6.4 - 8.2 g/dL    Albumin 3.7 3.5 - 5.0 g/dL    Globulin 4.2 (H) 2.0 - 4.0 g/dL    A-G Ratio 0.9 (L) 1.1 - 2.2     LIPASE    Collection Time: 09/23/22  1:09 PM   Result Value Ref Range    Lipase 229 73 - 393 U/L   URINALYSIS W/ REFLEX CULTURE    Collection Time: 09/23/22  1:09 PM    Specimen: Urine   Result Value Ref Range    Color Yellow/Straw      Appearance Turbid (A) Clear      Specific gravity 1.019 1.003 - 1.030      pH (UA) 5.0 5.0 - 8.0      Protein Negative Negative mg/dL    Glucose Negative Negative mg/dL    Ketone Negative Negative mg/dL    Bilirubin Negative Negative      Blood Negative Negative      Urobilinogen 0.1 0.1 - 1.0 EU/dL    Nitrites Negative Negative      Leukocyte Esterase Negative Negative      WBC 0-4 0 - 4 /hpf    RBC 0-5 0 - 5 /hpf    Epithelial cells Moderate (A) Few /lpf    Bacteria Negative Negative /hpf    UA:UC IF INDICATED Culture not indicated by UA result Culture not indicated by UA result      Hyaline cast 0-2 0 - 5 /lpf   DRUG SCREEN, URINE    Collection Time: 09/23/22  1:20 PM   Result Value Ref Range    AMPHETAMINES Negative Negative      BARBITURATES Negative Negative      BENZODIAZEPINES Negative Negative      COCAINE Negative Negative      METHADONE Negative Negative      OPIATES Negative Negative      PCP(PHENCYCLIDINE) Negative Negative      THC (TH-CANNABINOL) Negative Negative      Drug screen comment        This test is a screen for drugs of abuse in a medical setting only (i.e., they are unconfirmed results and as such must not be used for non-medical purposes, e.g.,employment testing, legal testing). Due to its inherent nature, false positive (FP) and false negative (FN) results may be obtained. Therefore, if necessary for medical care, recommend confirmation of positive findings by GC/MS.          Radiologic Studies  No orders to display     CT Results  (Last 48 hours) None          CXR Results  (Last 48 hours)      None          ------------------------------------------------------------------------------------------------------------  The exam and treatment you received in the Emergency Department were for an urgent problem and are not intended as complete care. It is important that you follow-up with a doctor, nurse practitioner, or physician assistant to:  (1) confirm your diagnosis,  (2) re-evaluation of changes in your illness and treatment, and  (3) for ongoing care. Please take your discharge instructions with you when you go to your follow-up appointment. If you have any problem arranging a follow-up appointment, contact the Emergency Department. If your symptoms become worse or you do not improve as expected and you are unable to reach your health care provider, please return to the Emergency Department. We are available 24 hours a day. If a prescription has been provided, please have it filled as soon as possible to prevent a delay in treatment. If you have any questions or reservations about taking the medication due to side effects or interactions with other medications, please call your primary care provider or contact the ER.

## 2022-09-27 ENCOUNTER — HOSPITAL ENCOUNTER (EMERGENCY)
Age: 57
Discharge: HOME | End: 2022-09-27
Attending: EMERGENCY MEDICINE
Payer: MEDICARE

## 2022-09-27 ENCOUNTER — APPOINTMENT (OUTPATIENT)
Dept: CT IMAGING | Age: 57
End: 2022-09-27
Attending: EMERGENCY MEDICINE
Payer: MEDICARE

## 2022-09-27 VITALS
BODY MASS INDEX: 30.31 KG/M2 | TEMPERATURE: 98 F | HEIGHT: 68 IN | WEIGHT: 200 LBS | SYSTOLIC BLOOD PRESSURE: 115 MMHG | OXYGEN SATURATION: 98 % | DIASTOLIC BLOOD PRESSURE: 71 MMHG | RESPIRATION RATE: 18 BRPM | HEART RATE: 70 BPM

## 2022-09-27 DIAGNOSIS — R10.9 FLANK PAIN: Primary | ICD-10-CM

## 2022-09-27 LAB
ALBUMIN SERPL-MCNC: 3.9 G/DL (ref 3.5–5)
ALBUMIN/GLOB SERPL: 1.1 {RATIO} (ref 1.1–2.2)
ALP SERPL-CCNC: 69 U/L (ref 45–117)
ALT SERPL-CCNC: 17 U/L (ref 12–78)
ANION GAP SERPL CALC-SCNC: 6 MMOL/L (ref 5–15)
APPEARANCE UR: CLEAR
AST SERPL W P-5'-P-CCNC: 12 U/L (ref 15–37)
BACTERIA URNS QL MICRO: NEGATIVE /HPF
BASOPHILS # BLD: 0.1 K/UL (ref 0–0.1)
BASOPHILS NFR BLD: 1 % (ref 0–1)
BILIRUB SERPL-MCNC: 0.3 MG/DL (ref 0.2–1)
BILIRUB UR QL: NEGATIVE
BUN SERPL-MCNC: 9 MG/DL (ref 6–20)
BUN/CREAT SERPL: 8 (ref 12–20)
CA-I BLD-MCNC: 9.1 MG/DL (ref 8.5–10.1)
CHLORIDE SERPL-SCNC: 105 MMOL/L (ref 97–108)
CO2 SERPL-SCNC: 27 MMOL/L (ref 21–32)
COLOR UR: ABNORMAL
CREAT SERPL-MCNC: 1.11 MG/DL (ref 0.55–1.02)
DIFFERENTIAL METHOD BLD: ABNORMAL
EOSINOPHIL # BLD: 0.1 K/UL (ref 0–0.4)
EOSINOPHIL NFR BLD: 1 % (ref 0–7)
ERYTHROCYTE [DISTWIDTH] IN BLOOD BY AUTOMATED COUNT: 12.9 % (ref 11.5–14.5)
GLOBULIN SER CALC-MCNC: 3.5 G/DL (ref 2–4)
GLUCOSE SERPL-MCNC: 97 MG/DL (ref 65–100)
GLUCOSE UR STRIP.AUTO-MCNC: NEGATIVE MG/DL
HCT VFR BLD AUTO: 45.9 % (ref 35–47)
HGB BLD-MCNC: 15.7 G/DL (ref 11.5–16)
HGB UR QL STRIP: NEGATIVE
IMM GRANULOCYTES # BLD AUTO: 0 K/UL (ref 0–0.04)
IMM GRANULOCYTES NFR BLD AUTO: 0 % (ref 0–0.5)
KETONES UR QL STRIP.AUTO: NEGATIVE MG/DL
LEUKOCYTE ESTERASE UR QL STRIP.AUTO: NEGATIVE
LIPASE SERPL-CCNC: 144 U/L (ref 73–393)
LYMPHOCYTES # BLD: 3.5 K/UL (ref 0.8–3.5)
LYMPHOCYTES NFR BLD: 37 % (ref 12–49)
MCH RBC QN AUTO: 31 PG (ref 26–34)
MCHC RBC AUTO-ENTMCNC: 34.2 G/DL (ref 30–36.5)
MCV RBC AUTO: 90.5 FL (ref 80–99)
MONOCYTES # BLD: 0.4 K/UL (ref 0–1)
MONOCYTES NFR BLD: 4 % (ref 5–13)
NEUTS SEG # BLD: 5.6 K/UL (ref 1.8–8)
NEUTS SEG NFR BLD: 57 % (ref 32–75)
NITRITE UR QL STRIP.AUTO: NEGATIVE
NRBC # BLD: 0 K/UL (ref 0–0.01)
NRBC BLD-RTO: 0 PER 100 WBC
PH UR STRIP: 5 [PH] (ref 5–8)
PLATELET # BLD AUTO: 366 K/UL (ref 150–400)
PMV BLD AUTO: 9.4 FL (ref 8.9–12.9)
POTASSIUM SERPL-SCNC: 4.1 MMOL/L (ref 3.5–5.1)
PROT SERPL-MCNC: 7.4 G/DL (ref 6.4–8.2)
PROT UR STRIP-MCNC: NEGATIVE MG/DL
RBC # BLD AUTO: 5.07 M/UL (ref 3.8–5.2)
RBC #/AREA URNS HPF: ABNORMAL /HPF (ref 0–5)
SODIUM SERPL-SCNC: 138 MMOL/L (ref 136–145)
SP GR UR REFRACTOMETRY: 1 (ref 1–1.03)
UA: UC IF INDICATED,UAUC: ABNORMAL
UROBILINOGEN UR QL STRIP.AUTO: 0.1 EU/DL (ref 0.1–1)
WBC # BLD AUTO: 9.6 K/UL (ref 3.6–11)
WBC URNS QL MICRO: ABNORMAL /HPF (ref 0–4)

## 2022-09-27 PROCEDURE — 80053 COMPREHEN METABOLIC PANEL: CPT

## 2022-09-27 PROCEDURE — 96374 THER/PROPH/DIAG INJ IV PUSH: CPT

## 2022-09-27 PROCEDURE — 36415 COLL VENOUS BLD VENIPUNCTURE: CPT

## 2022-09-27 PROCEDURE — 99285 EMERGENCY DEPT VISIT HI MDM: CPT

## 2022-09-27 PROCEDURE — 81001 URINALYSIS AUTO W/SCOPE: CPT

## 2022-09-27 PROCEDURE — 96375 TX/PRO/DX INJ NEW DRUG ADDON: CPT

## 2022-09-27 PROCEDURE — 85025 COMPLETE CBC W/AUTO DIFF WBC: CPT

## 2022-09-27 PROCEDURE — 87086 URINE CULTURE/COLONY COUNT: CPT

## 2022-09-27 PROCEDURE — 74011000636 HC RX REV CODE- 636: Performed by: EMERGENCY MEDICINE

## 2022-09-27 PROCEDURE — 74011250636 HC RX REV CODE- 250/636: Performed by: EMERGENCY MEDICINE

## 2022-09-27 PROCEDURE — 83690 ASSAY OF LIPASE: CPT

## 2022-09-27 PROCEDURE — 74177 CT ABD & PELVIS W/CONTRAST: CPT

## 2022-09-27 RX ORDER — MORPHINE SULFATE 4 MG/ML
4 INJECTION INTRAVENOUS ONCE
Status: COMPLETED | OUTPATIENT
Start: 2022-09-27 | End: 2022-09-27

## 2022-09-27 RX ORDER — HYDROMORPHONE HYDROCHLORIDE 1 MG/ML
2 INJECTION, SOLUTION INTRAMUSCULAR; INTRAVENOUS; SUBCUTANEOUS ONCE
Status: COMPLETED | OUTPATIENT
Start: 2022-09-27 | End: 2022-09-27

## 2022-09-27 RX ADMIN — IOPAMIDOL 100 ML: 755 INJECTION, SOLUTION INTRAVENOUS at 13:57

## 2022-09-27 RX ADMIN — MORPHINE SULFATE 4 MG: 4 INJECTION, SOLUTION INTRAMUSCULAR; INTRAVENOUS at 11:57

## 2022-09-27 RX ADMIN — SODIUM CHLORIDE 1000 ML: 9 INJECTION, SOLUTION INTRAVENOUS at 11:52

## 2022-09-27 RX ADMIN — HYDROMORPHONE HYDROCHLORIDE 2 MG: 1 INJECTION, SOLUTION INTRAMUSCULAR; INTRAVENOUS; SUBCUTANEOUS at 13:11

## 2022-09-27 NOTE — ED TRIAGE NOTES
Pt reports trouble urinating since Sunday, recent admission for kidney levels, left flank pain, denies blood in urine

## 2022-09-27 NOTE — DISCHARGE INSTRUCTIONS
You were seen in the ER for your flank pain and difficulty urinating. Thankfully, we did not find any blockages or kidney stones on your CT and we did not see any signs of an infection of your urine. This could be a muscle strain causing this pain. While you did have difficulty urinating, it was not so bad that it backed up and hurt your kidneys. You have to stop taking seroquel as this can make it difficult to urinate. You should follow up with the urologist (kidney and bladder doctor) to see if you need medications to help you urinate more easily. You should not take any narcotics or sedatives for the next week or so as these can cause problems urinating. Return to the ER for any new severe pain, fevers, vomiting, inability to urinate over 12 hours, or any other new or concerning symptoms. Thank you! Thank you for allowing me to care for you in the emergency department. It is my goal to provide you with excellent care. If you have not received excellent quality care, please ask to speak to the nurse manager. Please fill out the survey that will come to you by mail or email since we listen to your feedback! Below you will find a list of your tests from today's visit. Should you have any questions, please do not hesitate to call the emergency department. Labs  Recent Results (from the past 12 hour(s))   CBC WITH AUTOMATED DIFF    Collection Time: 09/27/22 11:55 AM   Result Value Ref Range    WBC 9.6 3.6 - 11.0 K/uL    RBC 5.07 3.80 - 5.20 M/uL    HGB 15.7 11.5 - 16.0 g/dL    HCT 45.9 35.0 - 47.0 %    MCV 90.5 80.0 - 99.0 FL    MCH 31.0 26.0 - 34.0 PG    MCHC 34.2 30.0 - 36.5 g/dL    RDW 12.9 11.5 - 14.5 %    PLATELET 011 001 - 740 K/uL    MPV 9.4 8.9 - 12.9 FL    NRBC 0.0 0.0  WBC    ABSOLUTE NRBC 0.00 0.00 - 0.01 K/uL    NEUTROPHILS 57 32 - 75 %    LYMPHOCYTES 37 12 - 49 %    MONOCYTES 4 (L) 5 - 13 %    EOSINOPHILS 1 0 - 7 %    BASOPHILS 1 0 - 1 %    IMMATURE GRANULOCYTES 0 0 - 0.5 %    ABS. NEUTROPHILS 5.6 1.8 - 8.0 K/UL    ABS. LYMPHOCYTES 3.5 0.8 - 3.5 K/UL    ABS. MONOCYTES 0.4 0.0 - 1.0 K/UL    ABS. EOSINOPHILS 0.1 0.0 - 0.4 K/UL    ABS. BASOPHILS 0.1 0.0 - 0.1 K/UL    ABS. IMM. GRANS. 0.0 0.00 - 0.04 K/UL    DF AUTOMATED     METABOLIC PANEL, COMPREHENSIVE    Collection Time: 09/27/22 11:55 AM   Result Value Ref Range    Sodium 138 136 - 145 mmol/L    Potassium 4.1 3.5 - 5.1 mmol/L    Chloride 105 97 - 108 mmol/L    CO2 27 21 - 32 mmol/L    Anion gap 6 5 - 15 mmol/L    Glucose 97 65 - 100 mg/dL    BUN 9 6 - 20 mg/dL    Creatinine 1.11 (H) 0.55 - 1.02 mg/dL    BUN/Creatinine ratio 8 (L) 12 - 20      GFR est AA >60 >60 ml/min/1.73m2    GFR est non-AA 51 (L) >60 ml/min/1.73m2    Calcium 9.1 8.5 - 10.1 mg/dL    Bilirubin, total 0.3 0.2 - 1.0 mg/dL    AST (SGOT) 12 (L) 15 - 37 U/L    ALT (SGPT) 17 12 - 78 U/L    Alk. phosphatase 69 45 - 117 U/L    Protein, total 7.4 6.4 - 8.2 g/dL    Albumin 3.9 3.5 - 5.0 g/dL    Globulin 3.5 2.0 - 4.0 g/dL    A-G Ratio 1.1 1.1 - 2.2     LIPASE    Collection Time: 09/27/22 12:07 PM   Result Value Ref Range    Lipase 144 73 - 393 U/L   URINALYSIS W/ REFLEX CULTURE    Collection Time: 09/27/22  1:04 PM    Specimen: Urine   Result Value Ref Range    Color Yellow/Straw      Appearance Clear Clear      Specific gravity 1.005 1.003 - 1.030      pH (UA) 5.0 5.0 - 8.0      Protein Negative Negative mg/dL    Glucose Negative Negative mg/dL    Ketone Negative Negative mg/dL    Bilirubin Negative Negative      Blood Negative Negative      Urobilinogen 0.1 0.1 - 1.0 EU/dL    Nitrites Negative Negative      Leukocyte Esterase Negative Negative      UA:UC IF INDICATED Urine Culture Ordered (A) Culture not indicated by UA result      WBC 0-5 0 - 4 /hpf    RBC 0-5 0 - 5 /hpf    Bacteria Negative Negative /hpf       Radiologic Studies  CT ABD PELV W CONT   Final Result   No acute abnormality. Status post cholecystectomy, hysterectomy, and   appendectomy.         CT Results  (Last 48 hours)                 09/27/22 1356  CT ABD PELV W CONT Final result    Impression:  No acute abnormality. Status post cholecystectomy, hysterectomy, and   appendectomy. Narrative:  EXAM: CT ABD PELV W CONT       INDICATION: Left flank and suprapubic pain       COMPARISON: 5/30/2022        CONTRAST: 100 mL of Isovue-370. ORAL CONTRAST: None       TECHNIQUE:    Following the uneventful intravenous administration of contrast, thin axial   images were obtained through the abdomen and pelvis. Coronal and sagittal   reconstructions were generated. CT dose reduction was achieved through use of a   standardized protocol tailored for this examination and automatic exposure   control for dose modulation. FINDINGS:    LOWER THORAX: Bilateral lower lobe atelectasis. LIVER: Hepatic steatosis. BILIARY TREE: Status post cholecystectomy. CBD is not dilated. SPLEEN: within normal limits. PANCREAS: No mass or ductal dilatation. ADRENALS: Unremarkable. KIDNEYS: No mass, calculus, or hydronephrosis. STOMACH: Unremarkable. SMALL BOWEL: No dilatation or wall thickening. COLON: No dilatation or wall thickening. APPENDIX: Surgically absent. PERITONEUM: No ascites or pneumoperitoneum. RETROPERITONEUM: No lymphadenopathy or aortic aneurysm. REPRODUCTIVE ORGANS: The uterus is surgically absent. URINARY BLADDER: Not distended. BONES: No destructive bone lesion. ABDOMINAL WALL: No mass or hernia. ADDITIONAL COMMENTS: N/A                 CXR Results  (Last 48 hours)      None          ------------------------------------------------------------------------------------------------------------  The exam and treatment you received in the Emergency Department were for an urgent problem and are not intended as complete care.  It is important that you follow-up with a doctor, nurse practitioner, or physician assistant to:  (1) confirm your diagnosis,  (2) re-evaluation of changes in your illness and treatment, and  (3) for ongoing care. Please take your discharge instructions with you when you go to your follow-up appointment. If you have any problem arranging a follow-up appointment, contact the Emergency Department. If your symptoms become worse or you do not improve as expected and you are unable to reach your health care provider, please return to the Emergency Department. We are available 24 hours a day. If a prescription has been provided, please have it filled as soon as possible to prevent a delay in treatment. If you have any questions or reservations about taking the medication due to side effects or interactions with other medications, please call your primary care provider or contact the ER.

## 2022-09-27 NOTE — ED PROVIDER NOTES
EMERGENCY DEPARTMENT HISTORY AND PHYSICAL EXAM      Date: 9/27/2022  Patient Name: Tavia Eastman      History of Presenting Illness     Chief Complaint   Patient presents with    Flank Pain       History Provided By: Patient    HPI: Tavia Eastman, 62 y.o. female with a past medical history significant for PTSD, Seizures, GERD, hypothyroidism presents to the ED with cc of flank pain. Pt endorses 2-3d of flank pain, urinary pressure, malodorous urine and difficulty urinating. States only urinating a small amount. Denies F/C but feeling nauseated, had some diarrhea. Denies cough, CP, SOB, sick contacts. Denies hx of kidney stones, was admitted few weeks ago for dehydration and ANASTACIO. There are no other complaints, changes, or physical findings at this time. PCP: None    Current Facility-Administered Medications   Medication Dose Route Frequency Provider Last Rate Last Admin    sodium chloride 0.9 % bolus infusion 1,000 mL  1,000 mL IntraVENous ONCE Rachel Finn MD 1,000 mL/hr at 09/27/22 1152 1,000 mL at 09/27/22 1152    morphine injection 4 mg  4 mg IntraVENous ONCE Akosua Simon MD         Current Outpatient Medications   Medication Sig Dispense Refill    Omeprazole delayed release (PRILOSEC D/R) 20 mg tablet Take 1 Tablet by mouth daily for 28 days. 28 Tablet 0    ondansetron (ZOFRAN ODT) 4 mg disintegrating tablet Take 1 Tablet by mouth every six (6) hours as needed for Nausea or Vomiting, Nausea or Vomiting. 30 Tablet 0    levothyroxine (SYNTHROID) 50 mcg tablet Take 1 Tablet by mouth every morning. 30 Tablet 0    pantoprazole (PROTONIX) 40 mg tablet Take 1 Tablet by mouth Before breakfast and dinner. 30 Tablet 0    clonazePAM (KlonoPIN) 0.5 mg tablet Take 0.5 mg by mouth three (3) times daily. ergocalciferol (ERGOCALCIFEROL) 1,250 mcg (50,000 unit) capsule Take 1 Capsule by mouth every seven (7) days. Every Sunday      QUEtiapine (SEROquel) 300 mg tablet Take 300 mg by mouth nightly. traZODone (DESYREL) 100 mg tablet Take 100-200 mg by mouth At bedtime. gabapentin (NEURONTIN) 600 mg tablet Take 600 mg by mouth four (4) times daily. Past History     Past Medical History:  Past Medical History:   Diagnosis Date    DDD (degenerative disc disease), lumbar     Fatigue     GERD (gastroesophageal reflux disease)     Hypothyroidism     Psychiatric disorder     PTSD per patient    Seizures ([facility] [address]        Past Surgical History:  Past Surgical History:   Procedure Laterality Date    HX APPENDECTOMY      HX  SECTION      HX CHOLECYSTECTOMY      HX HYSTERECTOMY         Family History:  Family History   Family history unknown: Yes       Social History:  Social History     Tobacco Use    Smoking status: Every Day     Packs/day: 0.25     Types: Cigarettes    Smokeless tobacco: Never   Vaping Use    Vaping Use: Never used   Substance Use Topics    Alcohol use: Not Currently     Alcohol/week: 0.0 standard drinks    Drug use: Never       Allergies: Allergies   Allergen Reactions    Augmentin [Amoxicillin-Pot Clavulanate] Nausea and Vomiting         Review of Systems   Constitutional: Negative except as in HPI. Eyes: Negative except as in HPI.  ENT: Negative except as in HPI. Cardiovascular: Negative except as in HPI. Respiratory: Negative except as in HPI. Gastrointestinal: Negative except as in HPI. Genitourinary: Negative except as in HPI. Musculoskeletal: Negative except as in HPI. Integumentary: Negative except as in HPI. Neurological: Negative except as in HPI. Psychiatric: Negative except as in HPI. Endocrine: Negative except as in HPI. Hematologic/Lymphatic: Negative except as in HPI. Allergic/Immunologic: Negative except as in HPI.     Physical Exam   Constitutional: Awake and alert, interactive, in some distress from pain  Eyes: PERRL, no injection or scleral icterus, no discharge  HEENT: NCAT, neck supple, MMM, no oropharyngeal exudates  CV: RRR, no m/r/g  Respiratory: CTAB, no r/r/w  GI: Abd soft, nondistended, ttp suprapubically, L CVA ttp  : Deferred  MSK: FROM, no joint effusions or edema  Skin: No rashes  Neuro: CN2-12 intact, symmetric facies, fluent speech. Psych: Well-groomed, normal speech, behavior, appropriate mood    Lab and Diagnostic Study Results     Labs -   No results found for this or any previous visit (from the past 12 hour(s)). Radiologic Studies -   [unfilled]  CT Results  (Last 48 hours)      None          CXR Results  (Last 48 hours)      None            Medical Decision Making and ED Course   - I am the first and primary provider for this patient AND AM THE PRIMARY PROVIDER OF RECORD. - I reviewed the vital signs, available nursing notes, past medical history, past surgical history, family history and social history. - Initial assessment performed. The patients presenting problems have been discussed, and the staff are in agreement with the care plan formulated and outlined with them. I have encouraged them to ask questions as they arise throughout their visit. Vital Signs-Reviewed the patient's vital signs. Patient Vitals for the past 12 hrs:   Temp Pulse Resp BP SpO2   09/27/22 1136 98 °F (36.7 °C) 100 18 125/83 95 %       EKG interpretation:         Provider Notes (Medical Decision Making):   57F w/flank pain, suprapubic pain, likely UTI/pyelo, will give morphine and check electrolytes for ANASTACIO given inability to create much urine. Will reassess pending UA need for CT imaging. ED Course:       ED Course as of 09/27/22 1512   Tue Sep 27, 2022   1354 Bacteria: Negative [YA]   1354 RBC: 0-5 [YA]   1354 WBC: 0-5 [YA]   1354 Pending CT results. [YA]   1409 CT ABD PELV W CONT  No hydronephrosis or intra-abd catastrophe on my read. [YA]   1432 CT ABD PELV W CONT  IMPRESSION  No acute abnormality. Status post cholecystectomy, hysterectomy, and  appendectomy. [YA]   1432 Creatinine(!): 1.11  Stable.  [YA]   1439 Lipase: 144 [YA]   3770 Updated patient on results, will have her discontinue seroquel for sleep for now, Urology f/up and tylenol for flank pain, suspect MSK strain. Will discharge with return precautions. [YA]      ED Course User Index  [YA] Marco Antonio Morales MD           Disposition     Disposition: DC- Adult Discharges: All of the diagnostic tests were reviewed and questions answered. Diagnosis, care plan and treatment options were discussed. The patient understands the instructions and will follow up as directed. The patients results have been reviewed with them. They have been counseled regarding their diagnosis. The patient verbally convey understanding and agreement of the signs, symptoms, diagnosis, treatment and prognosis and additionally agrees to follow up as recommended with their PCP in 24 - 48 hours. They also agree with the care-plan and convey that all of their questions have been answered. I have also put together some discharge instructions for them that include: 1) educational information regarding their diagnosis, 2) how to care for their diagnosis at home, as well a 3) list of reasons why they would want to return to the ED prior to their follow-up appointment, should their condition change. Discharged      Diagnosis     Clinical Impression:   1. Flank pain        Attestations:     Mandy Estrada MD

## 2022-09-29 LAB
BACTERIA SPEC CULT: NORMAL
SPECIAL REQUESTS,SREQ: NORMAL

## 2022-10-19 ENCOUNTER — HOSPITAL ENCOUNTER (EMERGENCY)
Age: 57
Discharge: HOME | End: 2022-10-19
Attending: EMERGENCY MEDICINE
Payer: MEDICARE

## 2022-10-19 VITALS
OXYGEN SATURATION: 97 % | RESPIRATION RATE: 17 BRPM | DIASTOLIC BLOOD PRESSURE: 76 MMHG | TEMPERATURE: 98.7 F | HEART RATE: 104 BPM | HEIGHT: 68 IN | SYSTOLIC BLOOD PRESSURE: 124 MMHG | BODY MASS INDEX: 30.31 KG/M2 | WEIGHT: 200 LBS

## 2022-10-19 DIAGNOSIS — M54.50 CHRONIC RIGHT-SIDED LOW BACK PAIN WITHOUT SCIATICA: ICD-10-CM

## 2022-10-19 DIAGNOSIS — S39.012A LOW BACK STRAIN, INITIAL ENCOUNTER: Primary | ICD-10-CM

## 2022-10-19 DIAGNOSIS — Z79.899 POLYPHARMACY: ICD-10-CM

## 2022-10-19 DIAGNOSIS — G89.29 CHRONIC RIGHT-SIDED LOW BACK PAIN WITHOUT SCIATICA: ICD-10-CM

## 2022-10-19 PROCEDURE — 74011250637 HC RX REV CODE- 250/637: Performed by: NURSE PRACTITIONER

## 2022-10-19 PROCEDURE — 96372 THER/PROPH/DIAG INJ SC/IM: CPT

## 2022-10-19 PROCEDURE — 74011250636 HC RX REV CODE- 250/636: Performed by: NURSE PRACTITIONER

## 2022-10-19 PROCEDURE — 99284 EMERGENCY DEPT VISIT MOD MDM: CPT

## 2022-10-19 RX ORDER — KETOROLAC TROMETHAMINE 30 MG/ML
30 INJECTION, SOLUTION INTRAMUSCULAR; INTRAVENOUS
Status: COMPLETED | OUTPATIENT
Start: 2022-10-19 | End: 2022-10-19

## 2022-10-19 RX ORDER — CYCLOBENZAPRINE HCL 10 MG
10 TABLET ORAL
Status: DISCONTINUED | OUTPATIENT
Start: 2022-10-19 | End: 2022-10-19

## 2022-10-19 RX ORDER — METHOCARBAMOL 750 MG/1
750 TABLET, FILM COATED ORAL
Qty: 12 TABLET | Refills: 0 | Status: SHIPPED | OUTPATIENT
Start: 2022-10-19

## 2022-10-19 RX ORDER — METHOCARBAMOL 500 MG/1
1000 TABLET, FILM COATED ORAL ONCE
Status: COMPLETED | OUTPATIENT
Start: 2022-10-19 | End: 2022-10-19

## 2022-10-19 RX ORDER — DICLOFENAC POTASSIUM 50 MG/1
50 TABLET, FILM COATED ORAL
Qty: 12 TABLET | Refills: 0 | Status: SHIPPED | OUTPATIENT
Start: 2022-10-19

## 2022-10-19 RX ORDER — LIDOCAINE 4 G/100G
1 PATCH TOPICAL
Status: DISCONTINUED | OUTPATIENT
Start: 2022-10-19 | End: 2022-10-19

## 2022-10-19 RX ORDER — NAPROXEN 500 MG/1
500 TABLET ORAL
Status: DISCONTINUED | OUTPATIENT
Start: 2022-10-19 | End: 2022-10-19

## 2022-10-19 RX ADMIN — KETOROLAC TROMETHAMINE 30 MG: 30 INJECTION, SOLUTION INTRAMUSCULAR at 13:30

## 2022-10-19 RX ADMIN — METHOCARBAMOL TABLETS 1000 MG: 500 TABLET, COATED ORAL at 13:30

## 2022-10-19 NOTE — ED PROVIDER NOTES
EMERGENCY DEPARTMENT HISTORY AND PHYSICAL EXAM      Date: 10/19/2022  Patient Name: Jessie Siegel    History of Presenting Illness     Chief Complaint   Patient presents with    LOW BACK PAIN       History Provided By: Patient    HPI: Jessie Siegel, 62 y.o. 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 Prior to the increased physical activity and straining caring for her father she has been at her baseline pain awaiting her injections appointment with orthopedics in December. She denies any fever, other injuries or recent falls, numbness or tingling to extremities, radiation of pain, urinary symptoms or difficulty, inability to ambulate. States her chronic pain medications and neurological medications are not controlling her pain at this time. She was able to drive herself and ambulate into the emergency department today. There are no other complaints, changes, or physical findings at this time. PCP: None    No current facility-administered medications on file prior to encounter. Current Outpatient Medications on File Prior to Encounter   Medication Sig Dispense Refill    Omeprazole delayed release (PRILOSEC D/R) 20 mg tablet Take 1 Tablet by mouth daily for 28 days. 28 Tablet 0    clonazePAM (KlonoPIN) 0.5 mg tablet Take 0.5 mg by mouth three (3) times daily. QUEtiapine (SEROquel) 300 mg tablet Take 300 mg by mouth nightly. traZODone (DESYREL) 100 mg tablet Take 100-200 mg by mouth At bedtime. gabapentin (NEURONTIN) 600 mg tablet Take 600 mg by mouth four (4) times daily. ondansetron (ZOFRAN ODT) 4 mg disintegrating tablet Take 1 Tablet by mouth every six (6) hours as needed for Nausea or Vomiting, Nausea or Vomiting.  (Patient not taking: Reported on 10/19/2022) 30 Tablet 0    levothyroxine (SYNTHROID) 50 mcg tablet Take 1 Tablet by mouth every morning. (Patient not taking: Reported on 10/19/2022) 30 Tablet 0    pantoprazole (PROTONIX) 40 mg tablet Take 1 Tablet by mouth Before breakfast and dinner. (Patient not taking: Reported on 10/19/2022) 30 Tablet 0    ergocalciferol (ERGOCALCIFEROL) 1,250 mcg (50,000 unit) capsule Take 1 Capsule by mouth every seven (7) days. Every  (Patient not taking: Reported on 10/19/2022)         Past History     Past Medical History:  Past Medical History:   Diagnosis Date    DDD (degenerative disc disease), lumbar     Fatigue     GERD (gastroesophageal reflux disease)     Hypothyroidism     Psychiatric disorder     PTSD per patient    Seizures ([facility] [address]        Past Surgical History:  Past Surgical History:   Procedure Laterality Date    HX APPENDECTOMY      HX  SECTION      HX CHOLECYSTECTOMY      HX HYSTERECTOMY         Family History:  Family History   Family history unknown: Yes       Social History:  Social History     Tobacco Use    Smoking status: Every Day     Packs/day: 0.50     Types: Cigarettes    Smokeless tobacco: Never   Vaping Use    Vaping Use: Never used   Substance Use Topics    Alcohol use: Not Currently     Alcohol/week: 0.0 standard drinks    Drug use: Never       Allergies: Allergies   Allergen Reactions    Augmentin [Amoxicillin-Pot Clavulanate] Nausea and Vomiting       Review of Systems   Review of Systems   Constitutional: Negative. HENT: Negative. Eyes: Negative. Respiratory: Negative. Cardiovascular: Negative. Gastrointestinal: Negative. Genitourinary: Negative. Musculoskeletal:  Positive for back pain. Skin: Negative. Neurological: Negative. Hematological: Negative. Psychiatric/Behavioral: Negative. All other systems reviewed and are negative. Physical Exam   Physical Exam  Vitals and nursing note reviewed.    Constitutional:       General: She is not in acute distress. Appearance: Normal appearance. She is obese. She is not ill-appearing, toxic-appearing or diaphoretic. HENT:      Head: Normocephalic. Mouth/Throat:      Mouth: Mucous membranes are moist.   Eyes:      Conjunctiva/sclera: Conjunctivae normal.   Cardiovascular:      Rate and Rhythm: Normal rate. Pulmonary:      Effort: Pulmonary effort is normal.   Abdominal:      Tenderness: There is no abdominal tenderness. Musculoskeletal:      Cervical back: Normal, normal range of motion and neck supple. Thoracic back: Normal.      Lumbar back: Spasms and tenderness present. No swelling, deformity, signs of trauma or bony tenderness. Normal range of motion. Negative right straight leg raise test and negative left straight leg raise test.      Right lower leg: No edema. Left lower leg: No edema. Skin:     General: Skin is warm and dry. Capillary Refill: Capillary refill takes less than 2 seconds. Findings: No erythema. Neurological:      General: No focal deficit present. Mental Status: She is alert and oriented to person, place, and time. Sensory: No sensory deficit. Motor: No weakness. Gait: Gait normal.   Psychiatric:         Behavior: Behavior normal.       Lab and Diagnostic Study Results   Labs -   No results found for this or any previous visit (from the past 12 hour(s)). Radiologic Studies -   @lastxrresult@  CT Results  (Last 48 hours)      None          CXR Results  (Last 48 hours)      None            Medical Decision Making and ED Course   Differential Diagnosis & Medical Decision Making Provider Note:   The patient complains of low back pain. These symptoms are consistent with a lumbar strain, muscle spasm, exacerbation of chronic pain.  Less likely  pathology, aortic dissection or ruptured AAA, or cauda equina given that there are no red flags and a benign physical exam.     Identifiable precipitating event of strenuous pulling and lifting with no preceding trauma. No bony tenderness or red flag exam findings as above requiring imaging at this time. Reviewed prior ED visits noting back pain and UTIs. Will check UA given patient's history of frequent UTIs although unlikely cause of pain and treat pain with anti-inflammatories, topical medications avoiding narcotics or sedating medications as patient has driven herself here. - I am the first provider for this patient. I reviewed the vital signs, available nursing notes, past medical history, past surgical history, family history and social history. The patients presenting problems have been discussed, and they are in agreement with the care plan formulated and outlined with them. I have encouraged them to ask questions as they arise throughout their visit. Vital Signs-Reviewed the patient's vital signs. Patient Vitals for the past 12 hrs:   Temp Pulse Resp BP SpO2   10/19/22 1221 98.7 °F (37.1 °C) (!) 104 17 124/76 97 %       ED Course:   ED Course as of 10/19/22 1408   Wed Oct 19, 2022   1326 Per nursing, patient refuses Flexeril, lidocaine, and ibuprofen requesting something stronger because they do not work for her and she takes flexeril and she also states she already voided and cannot provide urine sample. Toradol injection and methocarbamol ordered and patient given po fluids to encourage voiding.  [KR]   2200 Awoke patient in chair to reassess. Patient insistent on receiving narcotic pain medication. Discussed in length treatment of muscle strain and lack of effectiveness of narcotic medication and patient's PDMP scoring and risks with current medications. Argumentative and requested to speak with Dr. Genevieve Hoang. Refuses urine sample as well. [KR]   36 Dr. Genevieve Hoang spoke with patient. Patient will be discharge as planned with recommended rest, avoiding heavy lifting until better, use of intermittent heat (avoid sleeping on a heating pad), and proper prescription use.  Call orthopedist for sooner appointment and given referral for PCP if back pain persists or she develops leg symptoms. Able to ambulate with upright steady gait. [KR]      ED Course User Index  [KR] Oliver Painter, MARTHA         Disposition   Disposition: Condition stable  DC- Adult Discharges: All of the diagnostic tests were reviewed and questions answered. Diagnosis, care plan and treatment options were discussed. The patient understands the instructions and will follow up as directed. The patients results have been reviewed with them. They have been counseled regarding their diagnosis. The patient verbally convey understanding and agreement of the signs, symptoms, diagnosis, treatment and prognosis and additionally agrees to follow up as recommended with their PCP in 24 - 48 hours. They also agree with the care-plan and convey that all of their questions have been answered. I have also put together some discharge instructions for them that include: 1) educational information regarding their diagnosis, 2) how to care for their diagnosis at home, as well a 3) list of reasons why they would want to return to the ED prior to their follow-up appointment, should their condition change. DISCHARGE PLAN:  1. Current Discharge Medication List        CONTINUE these medications which have NOT CHANGED    Details   Omeprazole delayed release (PRILOSEC D/R) 20 mg tablet Take 1 Tablet by mouth daily for 28 days. Qty: 28 Tablet, Refills: 0      clonazePAM (KlonoPIN) 0.5 mg tablet Take 0.5 mg by mouth three (3) times daily. QUEtiapine (SEROquel) 300 mg tablet Take 300 mg by mouth nightly. traZODone (DESYREL) 100 mg tablet Take 100-200 mg by mouth At bedtime. gabapentin (NEURONTIN) 600 mg tablet Take 600 mg by mouth four (4) times daily. Associated Diagnoses: Idiopathic hypotension; Hypothyroidism due to acquired atrophy of thyroid; Anemia due to vitamin B12 deficiency; Dizziness;  Arthralgia      ondansetron (ZOFRAN ODT) 4 mg disintegrating tablet Take 1 Tablet by mouth every six (6) hours as needed for Nausea or Vomiting, Nausea or Vomiting. Qty: 30 Tablet, Refills: 0      levothyroxine (SYNTHROID) 50 mcg tablet Take 1 Tablet by mouth every morning. Qty: 30 Tablet, Refills: 0      pantoprazole (PROTONIX) 40 mg tablet Take 1 Tablet by mouth Before breakfast and dinner. Qty: 30 Tablet, Refills: 0      ergocalciferol (ERGOCALCIFEROL) 1,250 mcg (50,000 unit) capsule Take 1 Capsule by mouth every seven (7) days. Every Sunday           2. Follow-up Information       Follow up With Specialties Details Why [place]  Call  Call for follow up and sooner appointment if able [facility]    Misael East MD Internal Medicine Physician  PCP referral for follow-up and to establish routine medical care [address] [place] [address] [phone]      Follow up with primary care, urgent care, or this Emergency department   As needed, If symptoms worsen           3. Return to ED if worse   4. Current Discharge Medication List        START taking these medications    Details   diclofenac potassium (CATAFLAM) 50 mg tablet Take 1 Tablet by mouth three (3) times daily as needed for Pain. Qty: 12 Tablet, Refills: 0  Start date: 10/19/2022      methocarbamoL (ROBAXIN) 750 mg tablet Take 1 Tablet by mouth four (4) times daily as needed for Muscle Spasm(s). Qty: 12 Tablet, Refills: 0  Start date: 10/19/2022             Diagnosis/Clinical Impression     Clinical Impression:   1. Low back strain, initial encounter    2. Chronic right-sided low back pain without sciatica    3. Polypharmacy        Attestations: Rob Morris NP, am the primary clinician of record. Please note that this dictation was completed with [employer], the [employer] voice recognition software.   Quite often unanticipated grammatical, syntax, homophones, and other interpretive errors are inadvertently transcribed by the computer software. Please disregard these errors. Please excuse any errors that have escaped final proofreading. Thank you.

## 2022-10-19 NOTE — ED TRIAGE NOTES
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  Suppose to see Ortho in November for injections took tylenol 500mg and Motrin 500mg at 0700 today and didn't help

## 2022-10-19 NOTE — DISCHARGE INSTRUCTIONS
Do not take ibuprofen, naproxen with the diclofenac to avoid stomach upset as they are all antiinflammatories. You can use the methocarbamol instead of the flexeril if the flexeril is not improving your pain    Use heat therapy for symptom relief of your backpain    Return for any increased pain, change in urinary or bowel habits, inability to walk, numbness or tingling develop in your legs.

## 2022-11-08 ENCOUNTER — HOSPITAL ENCOUNTER (EMERGENCY)
Age: 57
Discharge: HOME | End: 2022-11-08
Attending: EMERGENCY MEDICINE
Payer: MEDICARE

## 2022-11-08 VITALS
OXYGEN SATURATION: 95 % | HEIGHT: 67 IN | SYSTOLIC BLOOD PRESSURE: 124 MMHG | HEART RATE: 116 BPM | DIASTOLIC BLOOD PRESSURE: 80 MMHG | RESPIRATION RATE: 18 BRPM | TEMPERATURE: 98.6 F | WEIGHT: 200 LBS | BODY MASS INDEX: 31.39 KG/M2

## 2022-11-08 DIAGNOSIS — G89.29 CHRONIC RIGHT-SIDED LOW BACK PAIN WITHOUT SCIATICA: Primary | ICD-10-CM

## 2022-11-08 DIAGNOSIS — M54.50 CHRONIC RIGHT-SIDED LOW BACK PAIN WITHOUT SCIATICA: Primary | ICD-10-CM

## 2022-11-08 PROCEDURE — 99283 EMERGENCY DEPT VISIT LOW MDM: CPT

## 2022-11-08 RX ORDER — LIDOCAINE 50 MG/G
PATCH TOPICAL
Qty: 10 EACH | Refills: 0 | Status: SHIPPED | OUTPATIENT
Start: 2022-11-08

## 2022-11-08 RX ORDER — KETOROLAC TROMETHAMINE 10 MG/1
10 TABLET, FILM COATED ORAL
Qty: 20 TABLET | Refills: 0 | Status: SHIPPED | OUTPATIENT
Start: 2022-11-08 | End: 2022-11-13

## 2022-11-08 RX ORDER — METHOCARBAMOL 750 MG/1
750 TABLET, FILM COATED ORAL
Qty: 15 TABLET | Refills: 0 | Status: SHIPPED | OUTPATIENT
Start: 2022-11-08

## 2022-11-08 NOTE — ED PROVIDER NOTES
EMERGENCY DEPARTMENT HISTORY AND PHYSICAL EXAM      Date: 11/8/2022  Patient Name: Lilian Redding    History of Presenting Illness     Chief Complaint   Patient presents with    Back Pain       History Provided By: Patient    HPI: Lilian Redding, 62 y.o. female presents to the emergency department with complaint of right-sided low back pain x2 days. Patient reports the pain is sharp and character, moderate to severe in severity. Patient reports pain is worse with movement, improved with rest.  Patient reports the pain started yesterday with moving. Patient denies trauma, denies fevers or chills, denies saddle anesthesia, denies urinary or bowel retention or incontinence. Patient reports the pain is chronic, does not have a primary care or pain specialist, does states she has an appointment with a spine specialist next month. There are no other complaints, changes, or physical findings at this time. PCP: None    No current facility-administered medications on file prior to encounter. Current Outpatient Medications on File Prior to Encounter   Medication Sig Dispense Refill    diclofenac potassium (CATAFLAM) 50 mg tablet Take 1 Tablet by mouth three (3) times daily as needed for Pain. 12 Tablet 0    methocarbamoL (ROBAXIN) 750 mg tablet Take 1 Tablet by mouth four (4) times daily as needed for Muscle Spasm(s). 12 Tablet 0    ondansetron (ZOFRAN ODT) 4 mg disintegrating tablet Take 1 Tablet by mouth every six (6) hours as needed for Nausea or Vomiting, Nausea or Vomiting. (Patient not taking: Reported on 10/19/2022) 30 Tablet 0    levothyroxine (SYNTHROID) 50 mcg tablet Take 1 Tablet by mouth every morning. (Patient not taking: Reported on 10/19/2022) 30 Tablet 0    pantoprazole (PROTONIX) 40 mg tablet Take 1 Tablet by mouth Before breakfast and dinner. (Patient not taking: Reported on 10/19/2022) 30 Tablet 0    clonazePAM (KlonoPIN) 0.5 mg tablet Take 0.5 mg by mouth three (3) times daily. ergocalciferol (ERGOCALCIFEROL) 1,250 mcg (50,000 unit) capsule Take 1 Capsule by mouth every seven (7) days. Every  (Patient not taking: Reported on 10/19/2022)      QUEtiapine (SEROquel) 300 mg tablet Take 300 mg by mouth nightly. traZODone (DESYREL) 100 mg tablet Take 100-200 mg by mouth At bedtime. gabapentin (NEURONTIN) 600 mg tablet Take 600 mg by mouth four (4) times daily. Past History     Past Medical History:  Past Medical History:   Diagnosis Date    DDD (degenerative disc disease), lumbar     Fatigue     GERD (gastroesophageal reflux disease)     Hypothyroidism     Psychiatric disorder     PTSD per patient    Seizures ([facility] [address]        Past Surgical History:  Past Surgical History:   Procedure Laterality Date    HX APPENDECTOMY      HX  SECTION      HX CHOLECYSTECTOMY      HX HYSTERECTOMY         Family History:  Family History   Family history unknown: Yes       Social History:  Social History     Tobacco Use    Smoking status: Every Day     Packs/day: 0.50     Types: Cigarettes    Smokeless tobacco: Never   Vaping Use    Vaping Use: Never used   Substance Use Topics    Alcohol use: Not Currently     Alcohol/week: 0.0 standard drinks    Drug use: Never       Allergies: Allergies   Allergen Reactions    Augmentin [Amoxicillin-Pot Clavulanate] Nausea and Vomiting       Review of Systems   Review of Systems  Review of Systems   Constitutional: Negative for chills and fever. HENT: Negative for sinus pressure and sinus pain. Eyes: Negative for photophobia and redness. Respiratory: Negative for shortness of breath and wheezing. Cardiovascular: Negative for chest pain and palpitations. Gastrointestinal: Negative for abdominal pain and nausea. Genitourinary: Negative for flank pain and hematuria. Musculoskeletal: Positive for right-sided low back pain with resultant gait problem. Skin: Negative for color change and pallor.    Neurological: Negative for dizziness and weakness. Physical Exam   Physical Exam  Physical Exam  Constitutional:       General: Uncomfortable appearing. Appearance: Normal appearance. Not toxic-appearing. HENT:      Head: Normocephalic and atraumatic. Nose: Nose normal.      Mouth/Throat:      Mouth: Mucous membranes are moist.   Eyes:      Extraocular Movements: Extraocular movements intact. Pupils: Pupils are equal, round, and reactive to light. Cardiovascular:      Rate and Rhythm: Normal rate. Pulses: Normal pulses. Pulmonary:      Effort: Pulmonary effort is normal.      Breath sounds: No stridor. Abdominal:      General: Abdomen is flat. There is no distension. Musculoskeletal:         General: Normal range of motion. Cervical back: Normal range of motion and neck supple. Lumbar back: Right paraspinal muscular tenderness/spasm noted. No midline tenderness step-off or deformity. Skin:     General: Skin is warm and dry. Capillary Refill: Capillary refill takes less than 2 seconds. Neurological:      General: No focal deficit present. Mental Status: Aert and oriented to person, place, and time. Psychiatric:         Mood and Affect: Mood normal.         Behavior: Behavior normal.     Lab and Diagnostic Study Results   Labs -   No results found for this or any previous visit (from the past 12 hour(s)). Radiologic Studies -   @lastxrresult@  CT Results  (Last 48 hours)      None          CXR Results  (Last 48 hours)      None            Medical Decision Making and ED Course   Differential Diagnosis & Medical Decision Making Provider Note:       - I am the first provider for this patient. I reviewed the vital signs, available nursing notes, past medical history, past surgical history, family history and social history. The patients presenting problems have been discussed, and they are in agreement with the care plan formulated and outlined with them.   I have encouraged them to ask questions as they arise throughout their visit. Vital Signs-Reviewed the patient's vital signs. Patient Vitals for the past 12 hrs:   Temp Pulse Resp BP SpO2   11/08/22 1201 98.6 °F (37 °C) (!) 116 18 124/80 95 %           Disposition   Disposition: DC- Adult Discharges: All of the diagnostic tests were reviewed and questions answered. Diagnosis, care plan and treatment options were discussed. The patient understands the instructions and will follow up as directed. The patients results have been reviewed with them. They have been counseled regarding their diagnosis. The patient verbally convey understanding and agreement of the signs, symptoms, diagnosis, treatment and prognosis and additionally agrees to follow up as recommended with their PCP in 24 - 48 hours. They also agree with the care-plan and convey that all of their questions have been answered. I have also put together some discharge instructions for them that include: 1) educational information regarding their diagnosis, 2) how to care for their diagnosis at home, as well a 3) list of reasons why they would want to return to the ED prior to their follow-up appointment, should their condition change. DISCHARGE PLAN:  1. Current Discharge Medication List        CONTINUE these medications which have NOT CHANGED    Details   diclofenac potassium (CATAFLAM) 50 mg tablet Take 1 Tablet by mouth three (3) times daily as needed for Pain. Qty: 12 Tablet, Refills: 0      methocarbamoL (ROBAXIN) 750 mg tablet Take 1 Tablet by mouth four (4) times daily as needed for Muscle Spasm(s). Qty: 12 Tablet, Refills: 0      ondansetron (ZOFRAN ODT) 4 mg disintegrating tablet Take 1 Tablet by mouth every six (6) hours as needed for Nausea or Vomiting, Nausea or Vomiting. Qty: 30 Tablet, Refills: 0      levothyroxine (SYNTHROID) 50 mcg tablet Take 1 Tablet by mouth every morning.   Qty: 30 Tablet, Refills: 0      pantoprazole (PROTONIX) 40 mg tablet Take 1 Tablet by mouth Before breakfast and dinner. Qty: 30 Tablet, Refills: 0      clonazePAM (KlonoPIN) 0.5 mg tablet Take 0.5 mg by mouth three (3) times daily. ergocalciferol (ERGOCALCIFEROL) 1,250 mcg (50,000 unit) capsule Take 1 Capsule by mouth every seven (7) days. Every Sunday      QUEtiapine (SEROquel) 300 mg tablet Take 300 mg by mouth nightly. traZODone (DESYREL) 100 mg tablet Take 100-200 mg by mouth At bedtime. gabapentin (NEURONTIN) 600 mg tablet Take 600 mg by mouth four (4) times daily. Associated Diagnoses: Idiopathic hypotension; Hypothyroidism due to acquired atrophy of thyroid; Anemia due to vitamin B12 deficiency; Dizziness; Arthralgia           2. Follow-up Information       Follow up With Specialties Details Why [address] [place] [address] [address]  Schedule an appointment as soon as possible for a visit   [address] [place] [address]  [phone]          3. Return to ED if worse   4. Current Discharge Medication List        START taking these medications    Details   !! methocarbamoL (Robaxin-750) 750 mg tablet Take 1 Tablet by mouth three (3) times daily as needed for Muscle Spasm(s). Qty: 15 Tablet, Refills: 0  Start date: 11/8/2022      lidocaine (Lidoderm) 5 % Apply patch to the affected area for 12 hours a day and remove for 12 hours a day. Qty: 10 Each, Refills: 0  Start date: 11/8/2022      ketorolac (TORADOL) 10 mg tablet Take 1 Tablet by mouth every six (6) hours as needed for Pain for up to 5 days. Qty: 20 Tablet, Refills: 0  Start date: 11/8/2022, End date: 11/13/2022       !! - Potential duplicate medications found. Please discuss with provider. CONTINUE these medications which have NOT CHANGED    Details   diclofenac potassium (CATAFLAM) 50 mg tablet Take 1 Tablet by mouth three (3) times daily as needed for Pain.   Qty: 12 Tablet, Refills: 0      !! methocarbamoL (ROBAXIN) 750 mg tablet Take 1 Tablet by mouth four (4) times daily as needed for Muscle Spasm(s). Qty: 12 Tablet, Refills: 0       !! - Potential duplicate medications found. Please discuss with provider. Diagnosis/Clinical Impression     Clinical Impression:   1. Chronic right-sided low back pain without sciatica        Attestations: Tyler Saeed MD, am the primary clinician of record. Please note that this dictation was completed with [employer], the computer voice recognition software. Quite often unanticipated grammatical, syntax, homophones, and other interpretive errors are inadvertently transcribed by the computer software. Please disregard these errors. Please excuse any errors that have escaped final proofreading. Thank you.

## 2022-11-08 NOTE — Clinical Note
[address] [facility] EMERGENCY DEPT  [place] [id number] [phone]    Work/School Note    Date: 11/8/2022    To Whom It May concern:    Odalys Briones was seen and treated today in the emergency room by the following provider(s):  Attending Provider: Chuy Gilbert MD.      Odalys Briones is excused from work/school on 11/08/22 and 11/09/22. She is medically clear to return to work/school on 11/10/2022.        Sincerely,          Kirt Feldman RN

## 2022-11-08 NOTE — Clinical Note
[address] [facility] EMERGENCY DEPT  [address] [place] [id number]  [phone]    Work/School Note    Date: 11/8/2022    To Whom It May concern:    Austin Castro was seen and treated today in the emergency room by the following provider(s):  Attending Provider: Francoise Amin MD.      Austin Castro is excused from work/school on 11/08/22 and 11/09/22. She is medically clear to return to work/school on 11/10/2022.        Sincerely,          Loki Baeza MD

## 2023-01-19 ENCOUNTER — APPOINTMENT (OUTPATIENT)
Dept: CT IMAGING | Age: 58
End: 2023-01-19
Attending: NURSE PRACTITIONER
Payer: MEDICARE

## 2023-01-19 ENCOUNTER — HOSPITAL ENCOUNTER (EMERGENCY)
Age: 58
Discharge: HOME | End: 2023-01-19
Attending: STUDENT IN AN ORGANIZED HEALTH CARE EDUCATION/TRAINING PROGRAM
Payer: MEDICARE

## 2023-01-19 VITALS
DIASTOLIC BLOOD PRESSURE: 73 MMHG | RESPIRATION RATE: 18 BRPM | OXYGEN SATURATION: 99 % | BODY MASS INDEX: 30.31 KG/M2 | SYSTOLIC BLOOD PRESSURE: 119 MMHG | WEIGHT: 200 LBS | HEIGHT: 68 IN | TEMPERATURE: 98.6 F | HEART RATE: 84 BPM

## 2023-01-19 DIAGNOSIS — R10.84 ABDOMINAL PAIN, GENERALIZED: Primary | ICD-10-CM

## 2023-01-19 DIAGNOSIS — R19.7 DIARRHEA, UNSPECIFIED TYPE: ICD-10-CM

## 2023-01-19 DIAGNOSIS — N30.00 ACUTE CYSTITIS WITHOUT HEMATURIA: ICD-10-CM

## 2023-01-19 LAB
ALBUMIN SERPL-MCNC: 3.5 G/DL (ref 3.5–5)
ALBUMIN/GLOB SERPL: 0.9 (ref 1.1–2.2)
ALP SERPL-CCNC: 85 U/L (ref 45–117)
ALT SERPL-CCNC: 27 U/L (ref 12–78)
AMYLASE SERPL-CCNC: 46 U/L (ref 25–115)
ANION GAP SERPL CALC-SCNC: 7 MMOL/L (ref 5–15)
APPEARANCE UR: ABNORMAL
AST SERPL W P-5'-P-CCNC: 39 U/L (ref 15–37)
BACTERIA URNS QL MICRO: ABNORMAL /HPF
BASOPHILS # BLD: 0.1 K/UL (ref 0–0.1)
BASOPHILS NFR BLD: 1 % (ref 0–1)
BILIRUB SERPL-MCNC: 0.4 MG/DL (ref 0.2–1)
BILIRUB UR QL: NEGATIVE
BUN SERPL-MCNC: 21 MG/DL (ref 6–20)
BUN/CREAT SERPL: 18 (ref 12–20)
CA-I BLD-MCNC: 9.1 MG/DL (ref 8.5–10.1)
CHLORIDE SERPL-SCNC: 106 MMOL/L (ref 97–108)
CO2 SERPL-SCNC: 24 MMOL/L (ref 21–32)
COLOR UR: ABNORMAL
CREAT SERPL-MCNC: 1.17 MG/DL (ref 0.55–1.02)
DIFFERENTIAL METHOD BLD: ABNORMAL
EOSINOPHIL # BLD: 0.1 K/UL (ref 0–0.4)
EOSINOPHIL NFR BLD: 1 % (ref 0–7)
EPITH CASTS URNS QL MICRO: ABNORMAL /LPF
ERYTHROCYTE [DISTWIDTH] IN BLOOD BY AUTOMATED COUNT: 13.3 % (ref 11.5–14.5)
GLOBULIN SER CALC-MCNC: 3.7 G/DL (ref 2–4)
GLUCOSE SERPL-MCNC: 107 MG/DL (ref 65–100)
GLUCOSE UR STRIP.AUTO-MCNC: NEGATIVE MG/DL
HCT VFR BLD AUTO: 46.4 % (ref 35–47)
HGB BLD-MCNC: 15.1 G/DL (ref 11.5–16)
HGB UR QL STRIP: ABNORMAL
IMM GRANULOCYTES # BLD AUTO: 0 K/UL (ref 0–0.04)
IMM GRANULOCYTES NFR BLD AUTO: 0 % (ref 0–0.5)
KETONES UR QL STRIP.AUTO: NEGATIVE MG/DL
LEUKOCYTE ESTERASE UR QL STRIP.AUTO: ABNORMAL
LIPASE SERPL-CCNC: 126 U/L (ref 73–393)
LYMPHOCYTES # BLD: 3.1 K/UL (ref 0.8–3.5)
LYMPHOCYTES NFR BLD: 43 % (ref 12–49)
MCH RBC QN AUTO: 28 PG (ref 26–34)
MCHC RBC AUTO-ENTMCNC: 32.5 G/DL (ref 30–36.5)
MCV RBC AUTO: 85.9 FL (ref 80–99)
MONOCYTES # BLD: 0.3 K/UL (ref 0–1)
MONOCYTES NFR BLD: 5 % (ref 5–13)
MUCOUS THREADS URNS QL MICRO: ABNORMAL /LPF
NEUTS SEG # BLD: 3.7 K/UL (ref 1.8–8)
NEUTS SEG NFR BLD: 50 % (ref 32–75)
NITRITE UR QL STRIP.AUTO: POSITIVE
NRBC # BLD: 0 K/UL (ref 0–0.01)
NRBC BLD-RTO: 0 PER 100 WBC
PH UR STRIP: 5 (ref 5–8)
PLATELET # BLD AUTO: 315 K/UL (ref 150–400)
PMV BLD AUTO: 9.3 FL (ref 8.9–12.9)
POTASSIUM SERPL-SCNC: 4.8 MMOL/L (ref 3.5–5.1)
PROT SERPL-MCNC: 7.2 G/DL (ref 6.4–8.2)
PROT UR STRIP-MCNC: ABNORMAL MG/DL
RBC # BLD AUTO: 5.4 M/UL (ref 3.8–5.2)
RBC #/AREA URNS HPF: ABNORMAL /HPF (ref 0–5)
SODIUM SERPL-SCNC: 137 MMOL/L (ref 136–145)
SP GR UR REFRACTOMETRY: 1.02 (ref 1–1.03)
SP GR UR REFRACTOMETRY: 1.02 (ref 1–1.03)
UROBILINOGEN UR QL STRIP.AUTO: 0.1 EU/DL (ref 0.1–1)
WBC # BLD AUTO: 7.3 K/UL (ref 3.6–11)
WBC URNS QL MICRO: ABNORMAL /HPF (ref 0–4)

## 2023-01-19 PROCEDURE — 80053 COMPREHEN METABOLIC PANEL: CPT

## 2023-01-19 PROCEDURE — 85025 COMPLETE CBC W/AUTO DIFF WBC: CPT

## 2023-01-19 PROCEDURE — 96372 THER/PROPH/DIAG INJ SC/IM: CPT

## 2023-01-19 PROCEDURE — 99285 EMERGENCY DEPT VISIT HI MDM: CPT

## 2023-01-19 PROCEDURE — 74011250636 HC RX REV CODE- 250/636: Performed by: NURSE PRACTITIONER

## 2023-01-19 PROCEDURE — 96374 THER/PROPH/DIAG INJ IV PUSH: CPT

## 2023-01-19 PROCEDURE — 81001 URINALYSIS AUTO W/SCOPE: CPT

## 2023-01-19 PROCEDURE — 74011250637 HC RX REV CODE- 250/637: Performed by: PHYSICIAN ASSISTANT

## 2023-01-19 PROCEDURE — 74011000636 HC RX REV CODE- 636: Performed by: STUDENT IN AN ORGANIZED HEALTH CARE EDUCATION/TRAINING PROGRAM

## 2023-01-19 PROCEDURE — 82150 ASSAY OF AMYLASE: CPT

## 2023-01-19 PROCEDURE — 74011250636 HC RX REV CODE- 250/636: Performed by: PHYSICIAN ASSISTANT

## 2023-01-19 PROCEDURE — 83690 ASSAY OF LIPASE: CPT

## 2023-01-19 PROCEDURE — 96376 TX/PRO/DX INJ SAME DRUG ADON: CPT

## 2023-01-19 PROCEDURE — 36415 COLL VENOUS BLD VENIPUNCTURE: CPT

## 2023-01-19 PROCEDURE — 74177 CT ABD & PELVIS W/CONTRAST: CPT

## 2023-01-19 RX ORDER — ONDANSETRON 4 MG/1
4 TABLET, ORALLY DISINTEGRATING ORAL
Status: COMPLETED | OUTPATIENT
Start: 2023-01-19 | End: 2023-01-19

## 2023-01-19 RX ORDER — ONDANSETRON 4 MG/1
4 TABLET, ORALLY DISINTEGRATING ORAL
Qty: 12 TABLET | Refills: 0 | Status: SHIPPED | OUTPATIENT
Start: 2023-01-19 | End: 2023-01-22 | Stop reason: SDUPTHER

## 2023-01-19 RX ORDER — CEPHALEXIN 500 MG/1
500 CAPSULE ORAL
Status: COMPLETED | OUTPATIENT
Start: 2023-01-19 | End: 2023-01-19

## 2023-01-19 RX ORDER — MORPHINE SULFATE 4 MG/ML
4 INJECTION INTRAVENOUS ONCE
Status: COMPLETED | OUTPATIENT
Start: 2023-01-19 | End: 2023-01-19

## 2023-01-19 RX ORDER — CEPHALEXIN 500 MG/1
500 CAPSULE ORAL 2 TIMES DAILY
Qty: 14 CAPSULE | Refills: 0 | Status: SHIPPED | OUTPATIENT
Start: 2023-01-19 | End: 2023-01-26

## 2023-01-19 RX ORDER — DICYCLOMINE HYDROCHLORIDE 10 MG/ML
20 INJECTION INTRAMUSCULAR
Status: COMPLETED | OUTPATIENT
Start: 2023-01-19 | End: 2023-01-19

## 2023-01-19 RX ADMIN — MORPHINE SULFATE 4 MG: 4 INJECTION, SOLUTION INTRAMUSCULAR; INTRAVENOUS at 20:03

## 2023-01-19 RX ADMIN — DICYCLOMINE HYDROCHLORIDE 20 MG: 10 INJECTION, SOLUTION INTRAMUSCULAR at 18:38

## 2023-01-19 RX ADMIN — MORPHINE SULFATE 4 MG: 4 INJECTION, SOLUTION INTRAMUSCULAR; INTRAVENOUS at 17:28

## 2023-01-19 RX ADMIN — CEPHALEXIN 500 MG: 500 CAPSULE ORAL at 20:39

## 2023-01-19 RX ADMIN — ONDANSETRON 4 MG: 4 TABLET, ORALLY DISINTEGRATING ORAL at 17:22

## 2023-01-19 RX ADMIN — IOPAMIDOL 100 ML: 755 INJECTION, SOLUTION INTRAVENOUS at 19:30

## 2023-01-19 NOTE — Clinical Note
[facility] EMERGENCY DEPT  [address] [place] [id number]  [phone]    Work/School Note    Date: 1/19/2023    To Whom It May concern:    Milan Carmen was seen and treated today in the emergency room by the following provider(s):  Attending Provider: Nikole Tucker MD  Physician Assistant: Desirae Keenan PA-C. Milan Carmen is excused from work/school on 01/19/23 and 01/20/23. She is medically clear to return to work/school on 1/21/2023.        Sincerely,          John Lombardi PA-C

## 2023-01-19 NOTE — ED PROVIDER NOTES
[facility] EMERGENCY DEPT  EMERGENCY DEPARTMENT HISTORY AND PHYSICAL EXAM      Date: 2023  Patient Name: Eugenie Kirk  MRN: [id number]  [place]: 1965  Date of evaluation: 2023  Provider: James San NP   Note Started: 5:07 PM 23    HISTORY OF PRESENT ILLNESS     Chief Complaint   Patient presents with    Diarrhea       History Provided By: Patient    HPI: Eugenie Kirk, 62 y.o. female with a past medical history of GERD, hypothyroidism, seizure disorder, and PTSD presented to the ED complaining of abdominal pain/cramping, diarrhea, and vomiting. Patient states she has had intermittent diarrhea for the past 7 days and one episode of vomiting this morning. No bloody or purulent diarrhea. No antibiotic use for the past 6 months. Associated with upper respiratory symptoms. Denies sick contacts. PAST MEDICAL HISTORY   Past Medical History:  Past Medical History:   Diagnosis Date    DDD (degenerative disc disease), lumbar     Fatigue     GERD (gastroesophageal reflux disease)     Hypothyroidism     Psychiatric disorder     PTSD per patient    Seizures ([facility] [address]        Past Surgical History:  Past Surgical History:   Procedure Laterality Date    HX APPENDECTOMY      HX  SECTION      HX CHOLECYSTECTOMY      HX HYSTERECTOMY         Family History:  Family History   Family history unknown: Yes       Social History:  Social History     Tobacco Use    Smoking status: Every Day     Packs/day: 0.50     Types: Cigarettes    Smokeless tobacco: Never   Vaping Use    Vaping Use: Never used   Substance Use Topics    Alcohol use: Not Currently     Alcohol/week: 0.0 standard drinks    Drug use: Never       Allergies:   Allergies   Allergen Reactions    Augmentin [Amoxicillin-Pot Clavulanate] Nausea and Vomiting       PCP: None    Current Meds:   Discharge Medication List as of 2023  8:42 PM        CONTINUE these medications which have NOT CHANGED    Details   !! methocarbamoL (Robaxin-750) 750 mg tablet Take 1 Tablet by mouth three (3) times daily as needed for Muscle Spasm(s). , Normal, Disp-15 Tablet, R-0      lidocaine (Lidoderm) 5 % Apply patch to the affected area for 12 hours a day and remove for 12 hours a day., Normal, Disp-10 Each, R-0      diclofenac potassium (CATAFLAM) 50 mg tablet Take 1 Tablet by mouth three (3) times daily as needed for Pain., Normal, Disp-12 Tablet, R-0      !! methocarbamoL (ROBAXIN) 750 mg tablet Take 1 Tablet by mouth four (4) times daily as needed for Muscle Spasm(s). , Normal, Disp-12 Tablet, R-0      levothyroxine (SYNTHROID) 50 mcg tablet Take 1 Tablet by mouth every morning., Normal, Disp-30 Tablet, R-0      pantoprazole (PROTONIX) 40 mg tablet Take 1 Tablet by mouth Before breakfast and dinner., Normal, Disp-30 Tablet, R-0      clonazePAM (KlonoPIN) 0.5 mg tablet Take 0.5 mg by mouth three (3) times daily. , Historical Med      ergocalciferol (ERGOCALCIFEROL) 1,250 mcg (50,000 unit) capsule Take 1 Capsule by mouth every seven (7) days. Every Sunday, Historical Med      QUEtiapine (SEROquel) 300 mg tablet Take 300 mg by mouth nightly., Historical Med      traZODone (DESYREL) 100 mg tablet Take 100-200 mg by mouth At bedtime. , Historical Med      gabapentin (NEURONTIN) 600 mg tablet Take 600 mg by mouth four (4) times daily. , Historical Med       !! - Potential duplicate medications found. Please discuss with provider. REVIEW OF SYSTEMS   Review of systems  General: No fever. No chills. (-) decreased oral intake  ENT: No rhinorrhea. + nasal congestion. Pulm: + shortness of breath. + cough  GI: (+) abdominal pain; (+) diarrhea, (+) vomiting. : (-) decreased urination    All other systems reviewed are negative except as documented in the HPI.         PHYSICAL EXAM     ED Triage Vitals [01/19/23 1404]   ED Encounter Vitals Group      /81      Pulse (Heart Rate) 96      Resp Rate 18      Temp 98.7 °F (37.1 °C)      Temp src       O2 Sat (%) 93 % Weight       Height       Physical Exam  Exam  General:Well developed, well nourished patient in NAD  Skin/Derm: Skin is warm and dry. Ophth: bulbar conjunctivae are not erythematous. No scleral icterus. ENT: Oral mucosa is moist.   Neck: Neck is supple without thyromegaly. Pulm: No tachypnea or retractions. CV: Regular rate and rhythm. GI: Hyperactive bowel sounds. Soft and tender noé umbilicus. No peritoneal  signs. No hepatosplenomegaly. Neuro: Alert. Psych: Affect is normal.      SCREENINGS               No data recorded        LAB, EKG AND DIAGNOSTIC RESULTS   Labs:  No results found for this or any previous visit (from the past 12 hour(s)). EKG:   Radiologic Studies:  Non-plain film images such as CT, Ultrasound and MRI are read by the radiologist. Plain radiographic images are visualized and preliminarily interpreted by the ED Provider with the below findings:    Interpretation per the Radiologist below, if available at the time of this note:  CT ABD PELV W CONT    Result Date: 1/19/2023  EXAM: CT ABD PELV W CONT INDICATION: abdominal pain COMPARISON: CT 9/27/2022 CONTRAST: 100 mL of Isovue-370. ORAL CONTRAST: None. The lack of oral contrast material diminishes the capacity of CT to evaluate the bowel and adjacent structures. TECHNIQUE: Following the uneventful intravenous administration of contrast, thin axial images were obtained through the abdomen and pelvis. Coronal and sagittal reconstructions were generated. CT dose reduction was achieved through use of a standardized protocol tailored for this examination and automatic exposure control for dose modulation. FINDINGS: LOWER THORAX: Scarring of the inferior lingular and left lower lobe lateral posterior basilar segment is again shown. There is improved linear atelectasis of the basilar right lower lobe. LIVER: No mass. BILIARY TREE: Status post cholecystectomy. CBD is not dilated. SPLEEN: within normal limits.  PANCREAS: No mass or ductal dilatation. ADRENALS: Unremarkable. KIDNEYS: No mass, calculus, or hydronephrosis. STOMACH: Unremarkable. SMALL BOWEL: No dilatation or wall thickening. COLON: No dilatation or wall thickening. APPENDIX: Not demonstrated. PERITONEUM: No ascites or pneumoperitoneum. RETROPERITONEUM: No lymphadenopathy or aortic aneurysm. REPRODUCTIVE ORGANS: Status post hysterectomy. URINARY BLADDER: No mass or calculus. BONES: No destructive bone lesion. ABDOMINAL WALL: No mass or hernia. ADDITIONAL COMMENTS: N/A     No acute findings. PROCEDURES   Unless otherwise noted below, none. Performed by: Daphne Horta NP   Procedures      CRITICAL CARE TIME       ED COURSE and DIFFERENTIAL DIAGNOSIS/MDM   Vitals:    Vitals:    01/19/23 1404 01/19/23 1727 01/19/23 1813 01/19/23 2048   BP: 112/81  126/76 119/73   Pulse: 96  81 84   Resp: 18  18 18   Temp: 98.7 °F (37.1 °C)   98.6 °F (37 °C)   SpO2: 93%  98% 99%   Weight:  90.7 kg (200 lb)     Height:  5' 8\" (1.727 m)          Patient was given the following medications:  Medications   morphine injection 4 mg (4 mg IntraVENous Given 1/19/23 1728)   ondansetron (ZOFRAN ODT) tablet 4 mg (4 mg Oral Given 1/19/23 1722)   dicyclomine (BENTYL) 10 mg/mL injection 20 mg (20 mg IntraMUSCular Given 1/19/23 1838)   iopamidoL (ISOVUE-370) 370 mg iodine /mL (76 %) injection 100 mL (100 mL IntraVENous Given 1/19/23 1930)   morphine injection 4 mg (4 mg IntraVENous Given 1/19/23 2003)   cephALEXin (KEFLEX) capsule 500 mg (500 mg Oral Given 1/19/23 2039)       CONSULTS: (Who and What was discussed)  None     Chronic Conditions: GERD, PTSD, Hypothyroidism, Seizure disorder  Social Determinants affecting Dx or Tx: None  Counseling: TOBACCO COUNSELING: Upon evaluation, pt expressed that they are a current tobacco user. For approximately 10 minutes, pt has been counseled on the dangers of smoking and was encouraged to quit as soon as possible in order to decrease further risks to their health.  Pt has conveyed their understanding of the risks involved should they continue to use tobacco products. Records Reviewed (source and summary of external notes): Nursing Notes and Old Medical Records    CC/HPI Summary, DDx, ED Course, and Reassessment: Patient presented to the ED complaining of abdominal pain, diarrhea and vomiting for the past 7 days. She is complaining of severe abdominal cramping. Labs and imaging studies ordered and pending. Given 4mg morphine x 1 dose and Zofran 0.4 mg SL x 1 dose. Will order NS bolus 1000 mL. Dicyclomine 20 mg IM x 1 dose. Indications for stool culture:  (-)Ill-appearing  (-)Grossly bloody diarrhea  (-)Immunosuppression  (-)>1 wk of diarrhea without improvement      ED Course as of 01/20/23 1009   Thu Jan 19, 2023   1829 Patient was given 4 mg morphine x 1 dose. Patient states pain and cramping  remains 9/10. [TS]   1837 Dicyclomine 20 mg IV x one dose ordered. Will give a fluid bolus NS 1000 mL. [TS]      ED Course User Index  [TS] Denodre Sanchez NP       Disposition Considerations (Tests not done, Shared Decision Making, Pt Expectation of Test or Treatment.):      FINAL IMPRESSION     1. Abdominal pain, generalized    2. Diarrhea, unspecified type    3. Acute cystitis without hematuria          DISPOSITION/PLAN   Discharged    Discharge Note: The patient is stable for discharge home. The signs, symptoms, diagnosis, and discharge instructions have been discussed, understanding conveyed, and agreed upon. The patient is to follow up as recommended or return to ER should their symptoms worsen.       PATIENT REFERRED TO:  Follow-up Information       Follow up With Specialties Details Why Contact Info    [facility]  Schedule an appointment as soon as possible for a visit  As needed [address]  [facility]    Cisco Pierre MD Gastroenterology Schedule an appointment as soon as possible for a visit   [address] [place] [address] [address] EMERGENCY DEPT Emergency Medicine  If symptoms worsen [address] [address]  [phone]              DISCHARGE MEDICATIONS:  Discharge Medication List as of 1/19/2023  8:42 PM        START taking these medications    Details   cephALEXin (Keflex) 500 mg capsule Take 1 Capsule by mouth two (2) times a day for 7 days. , Normal, Disp-14 Capsule, R-0           CONTINUE these medications which have CHANGED    Details   ondansetron (ZOFRAN ODT) 4 mg disintegrating tablet Take 1 Tablet by mouth every eight (8) hours as needed for Nausea or Vomiting., Normal, Disp-12 Tablet, R-0           CONTINUE these medications which have NOT CHANGED    Details   !! methocarbamoL (Robaxin-750) 750 mg tablet Take 1 Tablet by mouth three (3) times daily as needed for Muscle Spasm(s). , Normal, Disp-15 Tablet, R-0      lidocaine (Lidoderm) 5 % Apply patch to the affected area for 12 hours a day and remove for 12 hours a day., Normal, Disp-10 Each, R-0      diclofenac potassium (CATAFLAM) 50 mg tablet Take 1 Tablet by mouth three (3) times daily as needed for Pain., Normal, Disp-12 Tablet, R-0      !! methocarbamoL (ROBAXIN) 750 mg tablet Take 1 Tablet by mouth four (4) times daily as needed for Muscle Spasm(s). , Normal, Disp-12 Tablet, R-0      levothyroxine (SYNTHROID) 50 mcg tablet Take 1 Tablet by mouth every morning., Normal, Disp-30 Tablet, R-0      pantoprazole (PROTONIX) 40 mg tablet Take 1 Tablet by mouth Before breakfast and dinner., Normal, Disp-30 Tablet, R-0      clonazePAM (KlonoPIN) 0.5 mg tablet Take 0.5 mg by mouth three (3) times daily. , Historical Med      ergocalciferol (ERGOCALCIFEROL) 1,250 mcg (50,000 unit) capsule Take 1 Capsule by mouth every seven (7) days.  Every Sunday, Historical Med      QUEtiapine (SEROquel) 300 mg tablet Take 300 mg by mouth nightly., Historical Med      traZODone (DESYREL) 100 mg tablet Take 100-200 mg by mouth At bedtime. , Historical Med      gabapentin (NEURONTIN) 600 mg tablet Take 600 mg by mouth four (4) times daily. , Historical Med       !! - Potential duplicate medications found. Please discuss with provider. DISCONTINUED MEDICATIONS:  Discharge Medication List as of 1/19/2023  8:42 PM          I am the Primary Clinician of Record: Darwin Craft NP (electronically signed)    (Please note that parts of this dictation were completed with voice recognition software. Quite often unanticipated grammatical, syntax, homophones, and other interpretive errors are inadvertently transcribed by the computer software. Please disregards these errors.  Please excuse any errors that have escaped final proofreading.)

## 2023-01-19 NOTE — ED TRIAGE NOTES
Patient comes to the ER with C/O of N/D for the past 7 days. States she does have acid reflux and feels like her stomach is on fire.

## 2023-01-21 ENCOUNTER — HOSPITAL ENCOUNTER (EMERGENCY)
Age: 58
Discharge: HOME | End: 2023-01-21
Attending: STUDENT IN AN ORGANIZED HEALTH CARE EDUCATION/TRAINING PROGRAM
Payer: MEDICARE

## 2023-01-21 VITALS
DIASTOLIC BLOOD PRESSURE: 82 MMHG | BODY MASS INDEX: 31.83 KG/M2 | HEART RATE: 81 BPM | HEIGHT: 68 IN | TEMPERATURE: 98.3 F | OXYGEN SATURATION: 96 % | WEIGHT: 210 LBS | RESPIRATION RATE: 16 BRPM | SYSTOLIC BLOOD PRESSURE: 112 MMHG

## 2023-01-21 DIAGNOSIS — R10.2 SUPRAPUBIC ABDOMINAL PAIN: Primary | ICD-10-CM

## 2023-01-21 DIAGNOSIS — N39.0 URINARY TRACT INFECTION WITHOUT HEMATURIA, SITE UNSPECIFIED: ICD-10-CM

## 2023-01-21 LAB
ALBUMIN SERPL-MCNC: 3.5 G/DL (ref 3.5–5)
ALBUMIN/GLOB SERPL: 1.1 (ref 1.1–2.2)
ALP SERPL-CCNC: 91 U/L (ref 45–117)
ALT SERPL-CCNC: 22 U/L (ref 12–78)
ANION GAP SERPL CALC-SCNC: 5 MMOL/L (ref 5–15)
APPEARANCE UR: CLEAR
AST SERPL W P-5'-P-CCNC: 15 U/L (ref 15–37)
BACTERIA URNS QL MICRO: NEGATIVE /HPF
BASOPHILS # BLD: 0.1 K/UL (ref 0–0.1)
BASOPHILS NFR BLD: 1 % (ref 0–1)
BILIRUB SERPL-MCNC: 0.3 MG/DL (ref 0.2–1)
BILIRUB UR QL: NEGATIVE
BUN SERPL-MCNC: 17 MG/DL (ref 6–20)
BUN/CREAT SERPL: 15 (ref 12–20)
CA-I BLD-MCNC: 8.8 MG/DL (ref 8.5–10.1)
CHLORIDE SERPL-SCNC: 107 MMOL/L (ref 97–108)
CO2 SERPL-SCNC: 26 MMOL/L (ref 21–32)
COLOR UR: ABNORMAL
CREAT SERPL-MCNC: 1.14 MG/DL (ref 0.55–1.02)
DIFFERENTIAL METHOD BLD: ABNORMAL
EOSINOPHIL # BLD: 0.1 K/UL (ref 0–0.4)
EOSINOPHIL NFR BLD: 1 % (ref 0–7)
EPITH CASTS URNS QL MICRO: ABNORMAL /LPF
ERYTHROCYTE [DISTWIDTH] IN BLOOD BY AUTOMATED COUNT: 13.2 % (ref 11.5–14.5)
GLOBULIN SER CALC-MCNC: 3.2 G/DL (ref 2–4)
GLUCOSE SERPL-MCNC: 153 MG/DL (ref 65–100)
GLUCOSE UR STRIP.AUTO-MCNC: NEGATIVE MG/DL
HCT VFR BLD AUTO: 45.8 % (ref 35–47)
HGB BLD-MCNC: 15 G/DL (ref 11.5–16)
HGB UR QL STRIP: NEGATIVE
IMM GRANULOCYTES # BLD AUTO: 0 K/UL (ref 0–0.04)
IMM GRANULOCYTES NFR BLD AUTO: 0 % (ref 0–0.5)
KETONES UR QL STRIP.AUTO: NEGATIVE MG/DL
LEUKOCYTE ESTERASE UR QL STRIP.AUTO: NEGATIVE
LYMPHOCYTES # BLD: 2.5 K/UL (ref 0.8–3.5)
LYMPHOCYTES NFR BLD: 41 % (ref 12–49)
MCH RBC QN AUTO: 27.9 PG (ref 26–34)
MCHC RBC AUTO-ENTMCNC: 32.8 G/DL (ref 30–36.5)
MCV RBC AUTO: 85.3 FL (ref 80–99)
MONOCYTES # BLD: 0.3 K/UL (ref 0–1)
MONOCYTES NFR BLD: 4 % (ref 5–13)
MUCOUS THREADS URNS QL MICRO: ABNORMAL /LPF
NEUTS SEG # BLD: 3.2 K/UL (ref 1.8–8)
NEUTS SEG NFR BLD: 53 % (ref 32–75)
NITRITE UR QL STRIP.AUTO: NEGATIVE
NRBC # BLD: 0 K/UL (ref 0–0.01)
NRBC BLD-RTO: 0 PER 100 WBC
PH UR STRIP: 5
PLATELET # BLD AUTO: 301 K/UL (ref 150–400)
PMV BLD AUTO: 9.5 FL (ref 8.9–12.9)
POTASSIUM SERPL-SCNC: 4.3 MMOL/L (ref 3.5–5.1)
PROT SERPL-MCNC: 6.7 G/DL (ref 6.4–8.2)
PROT UR STRIP-MCNC: NEGATIVE MG/DL
RBC # BLD AUTO: 5.37 M/UL (ref 3.8–5.2)
RBC #/AREA URNS HPF: ABNORMAL /HPF (ref 0–5)
SODIUM SERPL-SCNC: 138 MMOL/L (ref 136–145)
SP GR UR REFRACTOMETRY: 1.01 (ref 1–1.03)
UROBILINOGEN UR QL STRIP.AUTO: 0.1 EU/DL (ref 0.2–1)
WBC # BLD AUTO: 6.1 K/UL (ref 3.6–11)
WBC URNS QL MICRO: ABNORMAL /HPF (ref 0–4)

## 2023-01-21 PROCEDURE — 96376 TX/PRO/DX INJ SAME DRUG ADON: CPT

## 2023-01-21 PROCEDURE — 81003 URINALYSIS AUTO W/O SCOPE: CPT

## 2023-01-21 PROCEDURE — 74011250636 HC RX REV CODE- 250/636: Performed by: NURSE PRACTITIONER

## 2023-01-21 PROCEDURE — 99284 EMERGENCY DEPT VISIT MOD MDM: CPT

## 2023-01-21 PROCEDURE — 96375 TX/PRO/DX INJ NEW DRUG ADDON: CPT

## 2023-01-21 PROCEDURE — 85025 COMPLETE CBC W/AUTO DIFF WBC: CPT

## 2023-01-21 PROCEDURE — 51798 US URINE CAPACITY MEASURE: CPT

## 2023-01-21 PROCEDURE — 96374 THER/PROPH/DIAG INJ IV PUSH: CPT

## 2023-01-21 PROCEDURE — 74011000250 HC RX REV CODE- 250: Performed by: NURSE PRACTITIONER

## 2023-01-21 PROCEDURE — 80053 COMPREHEN METABOLIC PANEL: CPT

## 2023-01-21 PROCEDURE — 36415 COLL VENOUS BLD VENIPUNCTURE: CPT

## 2023-01-21 RX ORDER — MORPHINE SULFATE 4 MG/ML
4 INJECTION INTRAVENOUS ONCE
Status: COMPLETED | OUTPATIENT
Start: 2023-01-21 | End: 2023-01-21

## 2023-01-21 RX ORDER — ONDANSETRON 2 MG/ML
4 INJECTION INTRAMUSCULAR; INTRAVENOUS ONCE
Status: COMPLETED | OUTPATIENT
Start: 2023-01-21 | End: 2023-01-21

## 2023-01-21 RX ORDER — TRAMADOL HYDROCHLORIDE 50 MG/1
50 TABLET ORAL
Qty: 12 TABLET | Refills: 0 | Status: SHIPPED | OUTPATIENT
Start: 2023-01-21 | End: 2023-01-24

## 2023-01-21 RX ORDER — MORPHINE SULFATE 2 MG/ML
2 INJECTION, SOLUTION INTRAMUSCULAR; INTRAVENOUS ONCE
Status: COMPLETED | OUTPATIENT
Start: 2023-01-21 | End: 2023-01-21

## 2023-01-21 RX ADMIN — MORPHINE SULFATE 4 MG: 4 INJECTION, SOLUTION INTRAMUSCULAR; INTRAVENOUS at 14:17

## 2023-01-21 RX ADMIN — SODIUM CHLORIDE 1000 ML: 9 INJECTION, SOLUTION INTRAVENOUS at 14:14

## 2023-01-21 RX ADMIN — ONDANSETRON 4 MG: 2 INJECTION INTRAMUSCULAR; INTRAVENOUS at 14:17

## 2023-01-21 RX ADMIN — CEFTRIAXONE SODIUM 1 G: 1 INJECTION, POWDER, FOR SOLUTION INTRAMUSCULAR; INTRAVENOUS at 14:17

## 2023-01-21 RX ADMIN — MORPHINE SULFATE 2 MG: 2 INJECTION, SOLUTION INTRAMUSCULAR; INTRAVENOUS at 15:07

## 2023-01-21 NOTE — ED PROVIDER NOTES
[facility] EMERGENCY DEPT  EMERGENCY DEPARTMENT HISTORY AND PHYSICAL EXAM      Date: 2023  Patient Name: Dolores Pa  MRN: [id number]  [place] 1965  Date of evaluation: 2023  Provider: Baldemar Jimenez NP   Note Started: 3:31 PM 23    HISTORY OF PRESENT ILLNESS     Chief Complaint   Patient presents with    Abdominal Pain    Vomiting    Bladder Infection       History Provided By: {[facility]:[id number]::\"Patient\"}    HPI: Dolores Pa, 62 y.o. female ***    PAST MEDICAL HISTORY   Past Medical History:  Past Medical History:   Diagnosis Date    DDD (degenerative disc disease), lumbar     Fatigue     GERD (gastroesophageal reflux disease)     Hypothyroidism     Psychiatric disorder     PTSD per patient    Seizures ([facility] [address]        Past Surgical History:  Past Surgical History:   Procedure Laterality Date    HX APPENDECTOMY      HX  SECTION      HX CHOLECYSTECTOMY      HX HYSTERECTOMY         Family History:  Family History   Family history unknown: Yes       Social History:  Social History     Tobacco Use    Smoking status: Every Day     Packs/day: 0.50     Types: Cigarettes    Smokeless tobacco: Never   Vaping Use    Vaping Use: Never used   Substance Use Topics    Alcohol use: Not Currently     Alcohol/week: 0.0 standard drinks    Drug use: Never       Allergies: Allergies   Allergen Reactions    Augmentin [Amoxicillin-Pot Clavulanate] Nausea and Vomiting       PCP: None    Current Meds:   Previous Medications    CEPHALEXIN (KEFLEX) 500 MG CAPSULE    Take 1 Capsule by mouth two (2) times a day for 7 days. CLONAZEPAM (KLONOPIN) 0.5 MG TABLET    Take 0.5 mg by mouth three (3) times daily. DICLOFENAC POTASSIUM (CATAFLAM) 50 MG TABLET    Take 1 Tablet by mouth three (3) times daily as needed for Pain. ERGOCALCIFEROL (ERGOCALCIFEROL) 1,250 MCG (50,000 UNIT) CAPSULE    Take 1 Capsule by mouth every seven (7) days.  Every     GABAPENTIN (NEURONTIN) 600 MG TABLET    Take 600 mg by mouth four (4) times daily. LEVOTHYROXINE (SYNTHROID) 50 MCG TABLET    Take 1 Tablet by mouth every morning. LIDOCAINE (LIDODERM) 5 %    Apply patch to the affected area for 12 hours a day and remove for 12 hours a day. METHOCARBAMOL (ROBAXIN) 750 MG TABLET    Take 1 Tablet by mouth four (4) times daily as needed for Muscle Spasm(s). METHOCARBAMOL (ROBAXIN-750) 750 MG TABLET    Take 1 Tablet by mouth three (3) times daily as needed for Muscle Spasm(s). ONDANSETRON (ZOFRAN ODT) 4 MG DISINTEGRATING TABLET    Take 1 Tablet by mouth every eight (8) hours as needed for Nausea or Vomiting. PANTOPRAZOLE (PROTONIX) 40 MG TABLET    Take 1 Tablet by mouth Before breakfast and dinner. QUETIAPINE (SEROQUEL) 300 MG TABLET    Take 300 mg by mouth nightly. TRAZODONE (DESYREL) 100 MG TABLET    Take 100-200 mg by mouth At bedtime. REVIEW OF SYSTEMS   Review of Systems  Positives and Pertinent negatives as per HPI.     PHYSICAL EXAM     ED Triage Vitals   ED Encounter Vitals Group      BP 01/21/23 1222 (!) 143/85      Pulse (Heart Rate) 01/21/23 1222 (!) 104      Resp Rate 01/21/23 1222 18      Temp 01/21/23 1222 98.3 °F (36.8 °C)      Temp src --       O2 Sat (%) 01/21/23 1222 98 %      Weight 01/21/23 1222 210 lb      Height 01/21/23 1222 5' 8\"      Physical Exam    SCREENINGS               No data recorded        LAB, EKG AND DIAGNOSTIC RESULTS   Labs:  Recent Results (from the past 12 hour(s))   CBC WITH AUTOMATED DIFF    Collection Time: 01/21/23  1:00 PM   Result Value Ref Range    WBC 6.1 3.6 - 11.0 K/uL    RBC 5.37 (H) 3.80 - 5.20 M/uL    HGB 15.0 11.5 - 16.0 g/dL    HCT 45.8 35.0 - 47.0 %    MCV 85.3 80.0 - 99.0 FL    MCH 27.9 26.0 - 34.0 PG    MCHC 32.8 30.0 - 36.5 g/dL    RDW 13.2 11.5 - 14.5 %    PLATELET 005 278 - 243 K/uL    MPV 9.5 8.9 - 12.9 FL    NRBC 0.0 0.0  WBC    ABSOLUTE NRBC 0.00 0.00 - 0.01 K/uL    NEUTROPHILS 53 32 - 75 %    LYMPHOCYTES 41 12 - 49 %    MONOCYTES 4 (L) 5 - 13 % EOSINOPHILS 1 0 - 7 %    BASOPHILS 1 0 - 1 %    IMMATURE GRANULOCYTES 0 0 - 0.5 %    ABS. NEUTROPHILS 3.2 1.8 - 8.0 K/UL    ABS. LYMPHOCYTES 2.5 0.8 - 3.5 K/UL    ABS. MONOCYTES 0.3 0.0 - 1.0 K/UL    ABS. EOSINOPHILS 0.1 0.0 - 0.4 K/UL    ABS. BASOPHILS 0.1 0.0 - 0.1 K/UL    ABS. IMM. GRANS. 0.0 0.00 - 0.04 K/UL    DF AUTOMATED     METABOLIC PANEL, COMPREHENSIVE    Collection Time: 01/21/23  1:00 PM   Result Value Ref Range    Sodium 138 136 - 145 mmol/L    Potassium 4.3 3.5 - 5.1 mmol/L    Chloride 107 97 - 108 mmol/L    CO2 26 21 - 32 mmol/L    Anion gap 5 5 - 15 mmol/L    Glucose 153 (H) 65 - 100 mg/dL    BUN 17 6 - 20 mg/dL    Creatinine 1.14 (H) 0.55 - 1.02 mg/dL    BUN/Creatinine ratio 15 12 - 20      eGFR 56 (L) >60 ml/min/1.73m2    Calcium 8.8 8.5 - 10.1 mg/dL    Bilirubin, total 0.3 0.2 - 1.0 mg/dL    AST (SGOT) 15 15 - 37 U/L    ALT (SGPT) 22 12 - 78 U/L    Alk. phosphatase 91 45 - 117 U/L    Protein, total 6.7 6.4 - 8.2 g/dL    Albumin 3.5 3.5 - 5.0 g/dL    Globulin 3.2 2.0 - 4.0 g/dL    A-G Ratio 1.1 1.1 - 2.2     URINALYSIS W/ RFLX MICROSCOPIC    Collection Time: 01/21/23  3:12 PM   Result Value Ref Range    Color Yellow/Straw      Appearance Clear Clear      Specific gravity 1.015 1.003 - 1.030      pH (UA) 5.0      Protein Negative Negative mg/dL    Glucose Negative Negative mg/dL    Ketone Negative Negative mg/dL    Bilirubin Negative Negative      Blood Negative Negative      Urobilinogen 0.1 (L) 0.2 - 1.0 EU/dL    Nitrites Negative Negative      Leukocyte Esterase Negative Negative         EKG: Initial EKG interpreted by me. Shows ***    Radiologic Studies:  Non-plain film images such as CT, Ultrasound and MRI are read by the radiologist. Plain radiographic images are visualized and preliminarily interpreted by the ED Provider with the below findings:    ****    Interpretation per the Radiologist below, if available at the time of this note:  No results found.      PROCEDURES   Unless otherwise noted below, none  Performed by: Nayan Garcia NP   Procedures      CRITICAL CARE TIME   CRITICAL CARE NOTE :  IMPENDING DETERIORATION -{IMPENDING DETERIORATION:}  ASSOCIATED RISK FACTORS - {ASSOCIATED RISK FACTORS:}  MANAGEMENT- {MANAGEMENT:}  INTERPRETATION -  {INTERPRETATION:}  INTERVENTIONS - {INTERVENTIONS:}  CASE REVIEW - {CASE REVIEW:}  TREATMENT RESPONSE -{TREATMENT RESPONSE:}  PERFORMED BY - {PERFORMED BY:}    NOTES   :  I have spent *** minutes of critical care time involved in lab review, consultations with specialist, family decision- making, bedside attention and documentation. This time excludes time spent in any separate billed procedures. During this entire length of time I was immediately available to the patient .     Nayan Garcia NP     EMERGENCY DEPARTMENT COURSE and DIFFERENTIAL DIAGNOSIS/MDM   Vitals:    Vitals:    23 1222 23 1222 23 1259 23 1509   BP:  (!) 143/85 116/77 117/82   Pulse:  (!) 104 91 82   Resp:  18 16 16   Temp:  98.3 °F (36.8 °C)     SpO2:  98% 95% 94%   Weight: 95.3 kg (210 lb)      Height: 5' 8\" (1.727 m)           Patient was given the following medications:  Medications   sodium chloride 0.9 % bolus infusion 1,000 mL (1,000 mL IntraVENous New Bag 23 1414)   ondansetron (ZOFRAN) injection 4 mg (4 mg IntraVENous Given 23 1417)   morphine injection 4 mg (4 mg IntraVENous Given 23 1417)   cefTRIAXone (ROCEPHIN) 1 g in sterile water (preservative free) 10 mL IV syringe (1 g IntraVENous Given 23 1417)   morphine injection 2 mg (2 mg IntraVENous Given 23 1507)       CONSULTS: (Who and What was discussed)  None     Chronic Conditions: ***  Social Determinants affecting Dx or Tx: {Social Barriers:71444}  Counseling: {BSEDCounselin}     Records Reviewed (source and summary of external notes): {CDIRECORDSREVIEWED:30765}    CC/HPI Summary, DDx, ED Course, and Reassessment: *** Disposition Considerations (Tests not done, Shared Decision Making, Pt Expectation of Test or Treatment.): ***     ED FINAL IMPRESSION     1. Suprapubic abdominal pain    2. Urinary tract infection without hematuria, site unspecified          DISPOSITION/PLAN   Discharged    {Disposition:53225}     PATIENT REFERRED TO:  Follow-up Information       Follow up With Specialties Details Why Tariq Whitlock MD Internal Medicine Physician   [place] [id number] [phone]                DISCHARGE MEDICATIONS:  Current Discharge Medication List            DISCONTINUED MEDICATIONS:  Current Discharge Medication List          I am the Primary Clinician of Record. Mehrdad Brown NP (electronically signed)    (Please note that parts of this dictation were completed with voice recognition software. Quite often unanticipated grammatical, syntax, homophones, and other interpretive errors are inadvertently transcribed by the computer software. Please disregards these errors.  Please excuse any errors that have escaped final proofreading.) sodium chloride 0.9 % bolus infusion 1,000 mL (1,000 mL IntraVENous New Bag 1/21/23 1414)   ondansetron (ZOFRAN) injection 4 mg (4 mg IntraVENous Given 1/21/23 1417)   morphine injection 4 mg (4 mg IntraVENous Given 1/21/23 1417)   cefTRIAXone (ROCEPHIN) 1 g in sterile water (preservative free) 10 mL IV syringe (1 g IntraVENous Given 1/21/23 1417)   morphine injection 2 mg (2 mg IntraVENous Given 1/21/23 1507)       CONSULTS: (Who and What was discussed)  None     Chronic Conditions: see PMH. Social Determinants affecting Dx or Tx: None  Counseling: none     Records Reviewed (source and summary of external notes): Prior medical records, Previous Radiology studies, and Previous Laboratory studies    CC/HPI Summary, DDx, ED Course, and Reassessment: IVF/ Pain management. Reassurance. Review of previous ED visit, Medication management         Disposition Considerations (Tests not done, Shared Decision Making, Pt Expectation of Test or Treatment.): Pt and I reviewed previous ED visit and this visit testing. Education on Medication management. Pt to fu with primary care. Differential.     Uti  Cystitis  Pyelonephritis. ED FINAL IMPRESSION     1. Suprapubic abdominal pain    2. Urinary tract infection without hematuria, site unspecified          DISPOSITION/PLAN   Discharged    Discharge Note: The patient is stable for discharge home. The signs, symptoms, diagnosis, and discharge instructions have been discussed, understanding conveyed, and agreed upon. The patient is to follow up as recommended or return to ER should their symptoms worsen.       PATIENT REFERRED TO:  Follow-up Information       Follow up With Specialties Details Why Servando Craig MD Internal Medicine Physician   [place] [id number] [phone]                DISCHARGE MEDICATIONS:  Current Discharge Medication List            DISCONTINUED MEDICATIONS:  Current Discharge Medication List          I am the Primary Clinician of Record. Adolph Cao NP (electronically signed)    (Please note that parts of this dictation were completed with voice recognition software. Quite often unanticipated grammatical, syntax, homophones, and other interpretive errors are inadvertently transcribed by the computer software. Please disregards these errors.  Please excuse any errors that have escaped final proofreading.)

## 2023-01-21 NOTE — ED NOTES
Pt states that she has not urinated since yesterday around 4ish. Bladder scan preformed. 212cc of urine noted on scan.

## 2023-01-21 NOTE — Clinical Note
[facility] EMERGENCY DEPT  [address] [place] [id number]  [phone]    Work/School Note    Date: 1/21/2023    To Whom It May concern:    Kwabena Caban was seen and treated today in the emergency room by the following provider(s):  Attending Provider: Rima Steel MD  Nurse Practitioner: Anner Phalen, NP. Kwabena Caban is excused from work/school on 01/21/23 and 01/22/23. She is medically clear to return to work/school on 1/23/2023.        Sincerely,          Inga Ivan NP

## 2023-01-22 ENCOUNTER — APPOINTMENT (OUTPATIENT)
Dept: CT IMAGING | Age: 58
End: 2023-01-22
Attending: STUDENT IN AN ORGANIZED HEALTH CARE EDUCATION/TRAINING PROGRAM
Payer: MEDICARE

## 2023-01-22 ENCOUNTER — HOSPITAL ENCOUNTER (EMERGENCY)
Age: 58
Discharge: HOME | End: 2023-01-22
Attending: STUDENT IN AN ORGANIZED HEALTH CARE EDUCATION/TRAINING PROGRAM
Payer: MEDICARE

## 2023-01-22 VITALS
WEIGHT: 200 LBS | SYSTOLIC BLOOD PRESSURE: 125 MMHG | RESPIRATION RATE: 16 BRPM | OXYGEN SATURATION: 100 % | DIASTOLIC BLOOD PRESSURE: 82 MMHG | HEIGHT: 68 IN | BODY MASS INDEX: 30.31 KG/M2 | TEMPERATURE: 99 F | HEART RATE: 89 BPM

## 2023-01-22 DIAGNOSIS — M54.6 ACUTE RIGHT-SIDED THORACIC BACK PAIN: Primary | ICD-10-CM

## 2023-01-22 LAB
ANION GAP SERPL CALC-SCNC: 3 MMOL/L (ref 5–15)
APPEARANCE UR: CLEAR
BACTERIA URNS QL MICRO: ABNORMAL /HPF
BASOPHILS # BLD: 0.1 K/UL (ref 0–0.1)
BASOPHILS NFR BLD: 1 % (ref 0–1)
BILIRUB UR QL: NEGATIVE
BUN SERPL-MCNC: 13 MG/DL (ref 6–20)
BUN/CREAT SERPL: 12 (ref 12–20)
CA-I BLD-MCNC: 8.6 MG/DL (ref 8.5–10.1)
CHLORIDE SERPL-SCNC: 109 MMOL/L (ref 97–108)
CO2 SERPL-SCNC: 27 MMOL/L (ref 21–32)
COLOR UR: YELLOW
CREAT SERPL-MCNC: 1.12 MG/DL (ref 0.55–1.02)
DIFFERENTIAL METHOD BLD: ABNORMAL
EOSINOPHIL # BLD: 0.1 K/UL (ref 0–0.4)
EOSINOPHIL NFR BLD: 1 % (ref 0–7)
EPITH CASTS URNS QL MICRO: ABNORMAL /LPF
ERYTHROCYTE [DISTWIDTH] IN BLOOD BY AUTOMATED COUNT: 13 % (ref 11.5–14.5)
GLUCOSE SERPL-MCNC: 147 MG/DL (ref 65–100)
GLUCOSE UR STRIP.AUTO-MCNC: NEGATIVE MG/DL
HCT VFR BLD AUTO: 44.8 % (ref 35–47)
HGB BLD-MCNC: 14.8 G/DL (ref 11.5–16)
HGB UR QL STRIP: NEGATIVE
IMM GRANULOCYTES # BLD AUTO: 0 K/UL (ref 0–0.04)
IMM GRANULOCYTES NFR BLD AUTO: 0 % (ref 0–0.5)
KETONES UR QL STRIP.AUTO: NEGATIVE MG/DL
LACTATE SERPL-SCNC: 1.3 MMOL/L (ref 0.4–2)
LEUKOCYTE ESTERASE UR QL STRIP.AUTO: NEGATIVE
LYMPHOCYTES # BLD: 2.9 K/UL (ref 0.8–3.5)
LYMPHOCYTES NFR BLD: 44 % (ref 12–49)
MAGNESIUM SERPL-MCNC: 2.1 MG/DL (ref 1.6–2.4)
MCH RBC QN AUTO: 28.2 PG (ref 26–34)
MCHC RBC AUTO-ENTMCNC: 33 G/DL (ref 30–36.5)
MCV RBC AUTO: 85.3 FL (ref 80–99)
MONOCYTES # BLD: 0.3 K/UL (ref 0–1)
MONOCYTES NFR BLD: 5 % (ref 5–13)
MUCOUS THREADS URNS QL MICRO: ABNORMAL /LPF
NEUTS SEG # BLD: 3.3 K/UL (ref 1.8–8)
NEUTS SEG NFR BLD: 49 % (ref 32–75)
NITRITE UR QL STRIP.AUTO: NEGATIVE
NRBC # BLD: 0 K/UL (ref 0–0.01)
NRBC BLD-RTO: 0 PER 100 WBC
PH UR STRIP: 6 (ref 5–8)
PLATELET # BLD AUTO: 304 K/UL (ref 150–400)
PMV BLD AUTO: 9.1 FL (ref 8.9–12.9)
POTASSIUM SERPL-SCNC: 3.7 MMOL/L (ref 3.5–5.1)
PROT UR STRIP-MCNC: NEGATIVE MG/DL
RBC # BLD AUTO: 5.25 M/UL (ref 3.8–5.2)
RBC #/AREA URNS HPF: ABNORMAL /HPF (ref 0–5)
SODIUM SERPL-SCNC: 139 MMOL/L (ref 136–145)
SP GR UR REFRACTOMETRY: 1.01 (ref 1–1.03)
SP GR UR REFRACTOMETRY: 1.01 (ref 1–1.03)
UA: UC IF INDICATED,UAUC: ABNORMAL
UROBILINOGEN UR QL STRIP.AUTO: 0.1 EU/DL (ref 0.1–1)
WBC # BLD AUTO: 6.6 K/UL (ref 3.6–11)
WBC URNS QL MICRO: ABNORMAL /HPF (ref 0–4)

## 2023-01-22 PROCEDURE — 80048 BASIC METABOLIC PNL TOTAL CA: CPT

## 2023-01-22 PROCEDURE — 96361 HYDRATE IV INFUSION ADD-ON: CPT

## 2023-01-22 PROCEDURE — 99284 EMERGENCY DEPT VISIT MOD MDM: CPT

## 2023-01-22 PROCEDURE — 74011250636 HC RX REV CODE- 250/636: Performed by: STUDENT IN AN ORGANIZED HEALTH CARE EDUCATION/TRAINING PROGRAM

## 2023-01-22 PROCEDURE — 96372 THER/PROPH/DIAG INJ SC/IM: CPT

## 2023-01-22 PROCEDURE — 83735 ASSAY OF MAGNESIUM: CPT

## 2023-01-22 PROCEDURE — 96374 THER/PROPH/DIAG INJ IV PUSH: CPT

## 2023-01-22 PROCEDURE — 85025 COMPLETE CBC W/AUTO DIFF WBC: CPT

## 2023-01-22 PROCEDURE — 36415 COLL VENOUS BLD VENIPUNCTURE: CPT

## 2023-01-22 PROCEDURE — 96375 TX/PRO/DX INJ NEW DRUG ADDON: CPT

## 2023-01-22 PROCEDURE — 83605 ASSAY OF LACTIC ACID: CPT

## 2023-01-22 PROCEDURE — 74176 CT ABD & PELVIS W/O CONTRAST: CPT

## 2023-01-22 PROCEDURE — 74011250637 HC RX REV CODE- 250/637: Performed by: STUDENT IN AN ORGANIZED HEALTH CARE EDUCATION/TRAINING PROGRAM

## 2023-01-22 PROCEDURE — 81001 URINALYSIS AUTO W/SCOPE: CPT

## 2023-01-22 PROCEDURE — 96376 TX/PRO/DX INJ SAME DRUG ADON: CPT

## 2023-01-22 RX ORDER — DICYCLOMINE HYDROCHLORIDE 10 MG/ML
20 INJECTION INTRAMUSCULAR
Status: COMPLETED | OUTPATIENT
Start: 2023-01-22 | End: 2023-01-22

## 2023-01-22 RX ORDER — METHOCARBAMOL 750 MG/1
750 TABLET, FILM COATED ORAL 4 TIMES DAILY
Qty: 20 TABLET | Refills: 0 | Status: SHIPPED | OUTPATIENT
Start: 2023-01-22

## 2023-01-22 RX ORDER — ONDANSETRON 2 MG/ML
4 INJECTION INTRAMUSCULAR; INTRAVENOUS
Status: COMPLETED | OUTPATIENT
Start: 2023-01-22 | End: 2023-01-22

## 2023-01-22 RX ORDER — MORPHINE SULFATE 4 MG/ML
4 INJECTION INTRAVENOUS ONCE
Status: COMPLETED | OUTPATIENT
Start: 2023-01-22 | End: 2023-01-22

## 2023-01-22 RX ORDER — ONDANSETRON 4 MG/1
4 TABLET, ORALLY DISINTEGRATING ORAL
Qty: 15 TABLET | Refills: 0 | Status: SHIPPED | OUTPATIENT
Start: 2023-01-22

## 2023-01-22 RX ORDER — KETOROLAC TROMETHAMINE 30 MG/ML
30 INJECTION, SOLUTION INTRAMUSCULAR; INTRAVENOUS
Status: COMPLETED | OUTPATIENT
Start: 2023-01-22 | End: 2023-01-22

## 2023-01-22 RX ORDER — METHOCARBAMOL 500 MG/1
1000 TABLET, FILM COATED ORAL ONCE
Status: COMPLETED | OUTPATIENT
Start: 2023-01-22 | End: 2023-01-22

## 2023-01-22 RX ADMIN — ONDANSETRON 4 MG: 2 INJECTION INTRAMUSCULAR; INTRAVENOUS at 14:22

## 2023-01-22 RX ADMIN — MORPHINE SULFATE 4 MG: 4 INJECTION, SOLUTION INTRAMUSCULAR; INTRAVENOUS at 14:22

## 2023-01-22 RX ADMIN — ONDANSETRON 4 MG: 2 INJECTION INTRAMUSCULAR; INTRAVENOUS at 15:56

## 2023-01-22 RX ADMIN — KETOROLAC TROMETHAMINE 30 MG: 30 INJECTION, SOLUTION INTRAMUSCULAR; INTRAVENOUS at 16:02

## 2023-01-22 RX ADMIN — METHOCARBAMOL 1000 MG: 500 TABLET ORAL at 20:02

## 2023-01-22 RX ADMIN — SODIUM CHLORIDE 1000 ML: 9 INJECTION, SOLUTION INTRAVENOUS at 15:56

## 2023-01-22 RX ADMIN — SODIUM CHLORIDE 1000 ML: 9 INJECTION, SOLUTION INTRAVENOUS at 14:21

## 2023-01-22 RX ADMIN — DICYCLOMINE HYDROCHLORIDE 20 MG: 10 INJECTION INTRAMUSCULAR at 19:28

## 2023-01-22 NOTE — ED PROVIDER NOTES
[address] [id number]  EMERGENCY DEPARTMENT ENCOUNTER NOTE        Date: 1/22/2023  Patient Name: Dawna Dominguez      History of Presenting Illness     Chief Complaint   Patient presents with    Vomiting    Urinary Retention    Chills    Abdominal Pain       History Provided By: Patient    HPI: Dawna Dominguez, 62 y.o. female with PMH and medications as below comes to the ED with inability to urinate, worsening abdominal pain, and vomiting. She was diagnosed with UTI in which she is taking Keflex. She reports that she has not micturate this morning. She has been feeling sick to her stomach with vomiting several times. Pain is in the suprapubic area and now radiating to the right flank area. Nothing specific makes it better or worse without associated symptoms. No fever, chills or sweats. PCP: None    Current Facility-Administered Medications   Medication Dose Route Frequency Provider Last Rate Last Admin    methocarbamoL (ROBAXIN) tablet 1,000 mg  1,000 mg Oral ONCE Cristina Dominguez MD         Current Outpatient Medications   Medication Sig Dispense Refill    methocarbamoL (Robaxin-750) 750 mg tablet Take 1 Tablet by mouth four (4) times daily. 20 Tablet 0    ondansetron (ZOFRAN ODT) 4 mg disintegrating tablet Take 1 Tablet by mouth every eight (8) hours as needed for Nausea or Vomiting. 15 Tablet 0    traMADoL (Ultram) 50 mg tablet Take 1 Tablet by mouth every six (6) hours as needed for Pain for up to 3 days. Max Daily Amount: 200 mg. 12 Tablet 0    cephALEXin (Keflex) 500 mg capsule Take 1 Capsule by mouth two (2) times a day for 7 days. 14 Capsule 0    lidocaine (Lidoderm) 5 % Apply patch to the affected area for 12 hours a day and remove for 12 hours a day. 10 Each 0    diclofenac potassium (CATAFLAM) 50 mg tablet Take 1 Tablet by mouth three (3) times daily as needed for Pain. 12 Tablet 0    levothyroxine (SYNTHROID) 50 mcg tablet Take 1 Tablet by mouth every morning. (Patient not taking: Reported on 10/19/2022) 30 Tablet 0    pantoprazole (PROTONIX) 40 mg tablet Take 1 Tablet by mouth Before breakfast and dinner. (Patient not taking: Reported on 10/19/2022) 30 Tablet 0    clonazePAM (KlonoPIN) 0.5 mg tablet Take 0.5 mg by mouth three (3) times daily. ergocalciferol (ERGOCALCIFEROL) 1,250 mcg (50,000 unit) capsule Take 1 Capsule by mouth every seven (7) days. Every  (Patient not taking: Reported on 10/19/2022)      QUEtiapine (SEROquel) 300 mg tablet Take 300 mg by mouth nightly. traZODone (DESYREL) 100 mg tablet Take 100-200 mg by mouth At bedtime. gabapentin (NEURONTIN) 600 mg tablet Take 600 mg by mouth four (4) times daily. Past History     Past Medical History:  Past Medical History:   Diagnosis Date    DDD (degenerative disc disease), lumbar     Fatigue     GERD (gastroesophageal reflux disease)     Hypothyroidism     Psychiatric disorder     PTSD per patient    Seizures ([facility] [address]        Past Surgical History:  Past Surgical History:   Procedure Laterality Date    HX APPENDECTOMY      HX  SECTION      HX CHOLECYSTECTOMY      HX HYSTERECTOMY         Family History:  Family History   Family history unknown: Yes       Social History:  Social History     Tobacco Use    Smoking status: Every Day     Packs/day: 0.50     Types: Cigarettes    Smokeless tobacco: Never   Vaping Use    Vaping Use: Never used   Substance Use Topics    Alcohol use: Not Currently     Alcohol/week: 0.0 standard drinks    Drug use: Never       Allergies: Allergies   Allergen Reactions    Augmentin [Amoxicillin-Pot Clavulanate] Nausea and Vomiting         Review of Systems     Review of Systems    A 10 point review of system was performed and was negative except as noted above in HPI    Physical Exam     Physical Exam  Vitals and nursing note reviewed. Constitutional:       General: She is not in acute distress. Appearance: She is well-developed.  She is not diaphoretic. HENT:      Head: Normocephalic and atraumatic. Eyes:      Extraocular Movements: Extraocular movements intact. Conjunctiva/sclera: Conjunctivae normal.   Cardiovascular:      Rate and Rhythm: Regular rhythm. Tachycardia present. Heart sounds: Normal heart sounds. Pulmonary:      Effort: Pulmonary effort is normal.      Breath sounds: Normal breath sounds. Abdominal:      Palpations: Abdomen is soft. Tenderness: There is abdominal tenderness in the suprapubic area. There is right CVA tenderness. Musculoskeletal:      Cervical back: Neck supple. Right lower leg: No tenderness. No edema. Left lower leg: No tenderness. No edema. Neurological:      General: No focal deficit present. Mental Status: She is alert and oriented to person, place, and time.        Lab and Diagnostic Study Results     Labs -     Recent Results (from the past 12 hour(s))   METABOLIC PANEL, BASIC    Collection Time: 01/22/23  2:28 PM   Result Value Ref Range    Sodium 139 136 - 145 mmol/L    Potassium 3.7 3.5 - 5.1 mmol/L    Chloride 109 (H) 97 - 108 mmol/L    CO2 27 21 - 32 mmol/L    Anion gap 3 (L) 5 - 15 mmol/L    Glucose 147 (H) 65 - 100 mg/dL    BUN 13 6 - 20 mg/dL    Creatinine 1.12 (H) 0.55 - 1.02 mg/dL    BUN/Creatinine ratio 12 12 - 20      eGFR 57 (L) >60 ml/min/1.73m2    Calcium 8.6 8.5 - 10.1 mg/dL   MAGNESIUM    Collection Time: 01/22/23  2:28 PM   Result Value Ref Range    Magnesium 2.1 1.6 - 2.4 mg/dL   LACTIC ACID    Collection Time: 01/22/23  2:28 PM   Result Value Ref Range    Lactic acid 1.3 0.4 - 2.0 mmol/L   CBC WITH AUTOMATED DIFF    Collection Time: 01/22/23  2:28 PM   Result Value Ref Range    WBC 6.6 3.6 - 11.0 K/uL    RBC 5.25 (H) 3.80 - 5.20 M/uL    HGB 14.8 11.5 - 16.0 g/dL    HCT 44.8 35.0 - 47.0 %    MCV 85.3 80.0 - 99.0 FL    MCH 28.2 26.0 - 34.0 PG    MCHC 33.0 30.0 - 36.5 g/dL    RDW 13.0 11.5 - 14.5 %    PLATELET 722 924 - 321 K/uL    MPV 9.1 8.9 - 12.9 FL NRBC 0.0 0.0  WBC    ABSOLUTE NRBC 0.00 0.00 - 0.01 K/uL    NEUTROPHILS 49 32 - 75 %    LYMPHOCYTES 44 12 - 49 %    MONOCYTES 5 5 - 13 %    EOSINOPHILS 1 0 - 7 %    BASOPHILS 1 0 - 1 %    IMMATURE GRANULOCYTES 0 0 - 0.5 %    ABS. NEUTROPHILS 3.3 1.8 - 8.0 K/UL    ABS. LYMPHOCYTES 2.9 0.8 - 3.5 K/UL    ABS. MONOCYTES 0.3 0.0 - 1.0 K/UL    ABS. EOSINOPHILS 0.1 0.0 - 0.4 K/UL    ABS. BASOPHILS 0.1 0.0 - 0.1 K/UL    ABS. IMM. GRANS. 0.0 0.00 - 0.04 K/UL    DF AUTOMATED     URINALYSIS W/ REFLEX CULTURE    Collection Time: 01/22/23  5:04 PM    Specimen: Urine   Result Value Ref Range    Color Yellow     Appearance Clear Clear    Specific gravity 1.010 1.003 - 1.030    Specific gravity 1.010 1.003 - 1.030      pH (UA) 6.0 5.0 - 8.0      Protein Negative Negative mg/dL    Glucose Negative Negative mg/dL    Ketone Negative Negative mg/dL    Bilirubin Negative Negative      Blood Negative Negative      Urobilinogen 0.1 0.1 - 1.0 EU/dL    Nitrites Negative Negative      Leukocyte Esterase Negative Negative      WBC 0-4 0 - 4 /hpf    RBC 0-5 0 - 5 /hpf    Epithelial cells Many (A) Few /lpf    Bacteria 1+ (A) Negative /hpf    UA:UC IF INDICATED Culture not indicated by UA result Culture not indicated by UA result      Mucus 1+ (A) Negative /lpf       Radiologic Studies -   [unfilled]  CT Results  (Last 48 hours)                 01/22/23 1859  CT ABD PELV WO CONT Final result    Impression:      1. No evidence of acute process in the abdomen or pelvis. No urolithiasis. 2. Bilateral lower lobe dependent consolidation is favored to represent   atelectasis, though difficult to exclude infection or aspiration, particularly   in the left lower lobe. Correlate clinically. Narrative:  EXAM:  CT ABD PELV WO CONT       INDICATION: Flank pain       COMPARISON: CT abdomen pelvis 1/19/2023. CONTRAST:  None. TECHNIQUE:    Thin axial images were obtained through the abdomen and pelvis.  Coronal and sagittal reconstructions were generated. Oral contrast was not administered. CT   dose reduction was achieved through use of a standardized protocol tailored for   this examination and automatic exposure control for dose modulation. FINDINGS:    Lower Thorax:   Lung Bases: Dependent bilateral lower lobe consolidation. No pleural effusion. Unchanged linear scarring in the lingula. Heart: The heart is normal in size. No pericardial effusion. Calcifications of   the LAD. Abdomen/Pelvis:   Evaluation of the solid organs is markedly limited without the use of IV   contrast.       Liver:  No focal liver lesions. Biliary system: Gallbladder is surgically absent. Spleen: Normal.       Pancreas: Normal.       Kidneys/Ureters/Bladder: No renal masses. No renal or ureteral calculi. No   hydronephrosis or hydroureter. The bladder is decompressed with Mohamud catheter   in place. Adrenals: Normal.       Stomach/bowel: No dilation or abnormal wall thickening is present. No free   intraperitoneal air noted. Moderate volume of stool throughout the colon. The   appendix is not seen. Reproductive Organs: Status post hysterectomy. No suspicious adnexal masses. Vasculature: Normal caliber arteries. Moderate calcific atherosclerosis of the   abdominal aorta. Nodes: No pathologically enlarged lymph nodes. Fluid: No free fluid. Bones/Soft Tissue: No acute fractures or aggressive osseous lesions are seen. CXR Results  (Last 48 hours)      None            Medical Decision Making and ED Course   - I am the first and primary provider for this patient AND AM THE PRIMARY PROVIDER OF RECORD. - I reviewed the vital signs, available nursing notes, past medical history, past surgical history, family history and social history. - Initial assessment performed.  The patients presenting problems have been discussed, and the staff are in agreement with the care plan formulated and outlined with them. I have encouraged them to ask questions as they arise throughout their visit. Vital Signs-Reviewed the patient's vital signs. Patient Vitals for the past 24 hrs:   Temp Pulse Resp BP SpO2   01/22/23 1955 -- 89 16 125/82 100 %   01/22/23 1641 -- 70 18 133/85 100 %   01/22/23 1348 99 °F (37.2 °C) (!) 106 18 115/78 97 %       Records Reviewed: Nursing Notes and Old Medical Records      Medical Decision Making     Patient is presenting to the ED with abdominal pain, flank pain, vomiting, and urinary symptoms. She was recently diagnosed with urinary tract infection. Concerns for pyelonephritis. She had some signs of dehydration which we will give her IV fluids. His will get CBC, chemistry, analysis, and that if that is negative we will get CT abdomen to rule out any other findings. I did review her imaging from 2 days ago which was negative. She is currently taking Keflex for UTI that she recently diagnosed with. Symptomatic treatment as well with antiemetics analgesics as needed. Other differential diagnoses including diverticulitis, SBO, appendicitis, and acutely status were considered and highly unlikely based on her evaluation.     ED Course & Reassessment     Medications ordered:  Medications   methocarbamoL (ROBAXIN) tablet 1,000 mg (has no administration in time range)   sodium chloride 0.9 % bolus infusion 1,000 mL (0 mL IntraVENous IV Completed 1/22/23 1521)   ondansetron (ZOFRAN) injection 4 mg (4 mg IntraVENous Given 1/22/23 1422)   morphine injection 4 mg (4 mg IntraVENous Given 1/22/23 1422)   ondansetron (ZOFRAN) injection 4 mg (4 mg IntraVENous Given 1/22/23 1556)   sodium chloride 0.9 % bolus infusion 1,000 mL (0 mL IntraVENous IV Completed 1/22/23 1650)   ketorolac (TORADOL) injection 30 mg (30 mg IntraVENous Given 1/22/23 1602)   dicyclomine (BENTYL) 10 mg/mL injection 20 mg (20 mg IntraMUSCular Given 1/22/23 1928)       ED Course:     I did review and interpret the patient work-up. CBC, chemistry, urinalysis without acute finding. Her CT abdomen did not show any evidence of infectious process, SBO, or any new findings. Based on her evaluation and negative work-up this could be secondary to musculoskeletal pain. I had a discussion with the patient regarding completing her antibiotics as an outpatient and give a trial of muscle relaxant follow-up with PMD within next several days. She verbalized understanding I will be able to arrange for follow-up. In the meanwhile given her vomiting will treat with antiemetics as well. ED evaluation, work-up, clinical impression, and disposition was discussed with the patient. Patient is agreeable. Patient verbalized understanding and will be able to arrange follow-up. Anticipatory guidance and return precautions discussed with the patient. At the time of discharge, all concerns have been addressed and patient had no further questions. Patient is hemodynamically stable and appropriate for discharge. Diagnosis       Clinical Impression:   1. Acute right-sided thoracic back pain        Disposition     Disposition: Condition improved  DC- Adult Discharges: All of the diagnostic tests were reviewed and questions answered. Diagnosis, care plan and treatment options were discussed. The patient understands the instructions and will follow up as directed. The patients results have been reviewed with them. They have been counseled regarding their diagnosis. The patient verbally convey understanding and agreement of the signs, symptoms, diagnosis, treatment and prognosis and additionally agrees to follow up as recommended with their PCP in 24 - 48 hours. They also agree with the care-plan and convey that all of their questions have been answered.   I have also put together some discharge instructions for them that include: 1) educational information regarding their diagnosis, 2) how to care for their diagnosis at home, as well a 3) list of reasons why they would want to return to the ED prior to their follow-up appointment, should their condition change. Discharged      DISCHARGE PLAN:  1. Follow-up Information       Follow up With Specialties Details Why [address] [facility] EMERGENCY DEPT Emergency Medicine Go to  As needed, If symptoms worsen [address] [facility] [id number] [phone]    Primary Care Doctor  Schedule an appointment as soon as possible for a visit  For reevaluation, Discuss your visit to the ER           2. Return to ED if worse   3. Current Discharge Medication List        CONTINUE these medications which have CHANGED    Details   methocarbamoL (Robaxin-750) 750 mg tablet Take 1 Tablet by mouth four (4) times daily. Qty: 20 Tablet, Refills: 0  Start date: 1/22/2023      ondansetron (ZOFRAN ODT) 4 mg disintegrating tablet Take 1 Tablet by mouth every eight (8) hours as needed for Nausea or Vomiting. Qty: 15 Tablet, Refills: 0  Start date: 1/22/2023               Attestations: Taryn Brock MD    Please note that this dictation was completed with [employer], the [employer] voice recognition software. Quite often unanticipated grammatical, syntax, homophones, and other interpretive errors are inadvertently transcribed by the computer software. Please disregard these errors. Please excuse any errors that have escaped final proofreading. Thank you.

## 2023-02-10 ENCOUNTER — APPOINTMENT (OUTPATIENT)
Dept: CT IMAGING | Age: 58
DRG: 870 | End: 2023-02-10
Attending: STUDENT IN AN ORGANIZED HEALTH CARE EDUCATION/TRAINING PROGRAM
Payer: MEDICARE

## 2023-02-10 ENCOUNTER — APPOINTMENT (OUTPATIENT)
Dept: GENERAL RADIOLOGY | Age: 58
DRG: 870 | End: 2023-02-10
Attending: STUDENT IN AN ORGANIZED HEALTH CARE EDUCATION/TRAINING PROGRAM
Payer: MEDICARE

## 2023-02-10 ENCOUNTER — HOSPITAL ENCOUNTER (INPATIENT)
Age: 58
LOS: 9 days | Discharge: HOME | DRG: 870 | End: 2023-02-19
Attending: STUDENT IN AN ORGANIZED HEALTH CARE EDUCATION/TRAINING PROGRAM | Admitting: INTERNAL MEDICINE
Payer: MEDICARE

## 2023-02-10 ENCOUNTER — APPOINTMENT (OUTPATIENT)
Dept: NON INVASIVE DIAGNOSTICS | Age: 58
DRG: 870 | End: 2023-02-10
Attending: INTERNAL MEDICINE
Payer: MEDICARE

## 2023-02-10 DIAGNOSIS — E86.0 DEHYDRATION: ICD-10-CM

## 2023-02-10 DIAGNOSIS — N17.9 AKI (ACUTE KIDNEY INJURY) (HCC): ICD-10-CM

## 2023-02-10 DIAGNOSIS — R41.82 ALTERED MENTAL STATUS, UNSPECIFIED ALTERED MENTAL STATUS TYPE: Primary | ICD-10-CM

## 2023-02-10 DIAGNOSIS — J96.01 ACUTE RESPIRATORY FAILURE WITH HYPOXIA (HCC): ICD-10-CM

## 2023-02-10 DIAGNOSIS — J18.9 PNEUMONIA OF RIGHT LOWER LOBE DUE TO INFECTIOUS ORGANISM: ICD-10-CM

## 2023-02-10 PROBLEM — A41.9 SEVERE SEPSIS (HCC): Status: ACTIVE | Noted: 2023-02-10

## 2023-02-10 PROBLEM — R65.20 SEVERE SEPSIS (HCC): Status: ACTIVE | Noted: 2023-02-10

## 2023-02-10 LAB
ALBUMIN SERPL-MCNC: 2.6 G/DL (ref 3.5–5)
ALBUMIN SERPL-MCNC: 3 G/DL (ref 3.5–5)
ALBUMIN/GLOB SERPL: 1 (ref 1.1–2.2)
ALP SERPL-CCNC: 85 U/L (ref 45–117)
ALT SERPL-CCNC: 63 U/L (ref 12–78)
AMPHET UR QL SCN: NEGATIVE
ANION GAP SERPL CALC-SCNC: 2 MMOL/L (ref 5–15)
ANION GAP SERPL CALC-SCNC: 6 MMOL/L (ref 5–15)
APPEARANCE UR: CLEAR
ARTERIAL PATENCY WRIST A: YES
ARTERIAL PATENCY WRIST A: YES
AST SERPL W P-5'-P-CCNC: 71 U/L (ref 15–37)
ATRIAL RATE: 106 BPM
BACTERIA URNS QL MICRO: NEGATIVE /HPF
BARBITURATES UR QL SCN: NEGATIVE
BASE DEFICIT BLD-SCNC: 2.4 MMOL/L
BASE DEFICIT BLDA-SCNC: 2.2 MMOL/L
BASE DEFICIT BLDA-SCNC: 4.1 MMOL/L
BASE DEFICIT BLDA-SCNC: 4.6 MMOL/L
BASOPHILS # BLD: 0 K/UL (ref 0–0.1)
BASOPHILS NFR BLD: 0 % (ref 0–1)
BDY SITE: ABNORMAL
BENZODIAZ UR QL: POSITIVE
BILIRUB SERPL-MCNC: 0.3 MG/DL (ref 0.2–1)
BILIRUB UR QL: NEGATIVE
BODY TEMPERATURE: 100.8
BODY TEMPERATURE: 102.4
BUN SERPL-MCNC: 18 MG/DL (ref 6–20)
BUN SERPL-MCNC: 22 MG/DL (ref 6–20)
BUN/CREAT SERPL: 11 (ref 12–20)
BUN/CREAT SERPL: 8 (ref 12–20)
CA-I BLD-MCNC: 1.18 MMOL/L (ref 1.12–1.32)
CA-I BLD-MCNC: 7.7 MG/DL (ref 8.5–10.1)
CA-I BLD-MCNC: 7.8 MG/DL (ref 8.5–10.1)
CALCULATED P AXIS, ECG09: 56 DEGREES
CALCULATED R AXIS, ECG10: 82 DEGREES
CALCULATED T AXIS, ECG11: 53 DEGREES
CANNABINOIDS UR QL SCN: NEGATIVE
CHLORIDE BLD-SCNC: 103 MMOL/L (ref 98–107)
CHLORIDE SERPL-SCNC: 106 MMOL/L (ref 97–108)
CHLORIDE SERPL-SCNC: 108 MMOL/L (ref 97–108)
CO2 BLD-SCNC: 28 MMOL/L
CO2 SERPL-SCNC: 26 MMOL/L (ref 21–32)
CO2 SERPL-SCNC: 26 MMOL/L (ref 21–32)
COCAINE UR QL SCN: NEGATIVE
COHGB MFR BLD: 0.7 % (ref 1–2)
COHGB MFR BLD: 0.7 % (ref 1–2)
COLOR UR: ABNORMAL
CREAT SERPL-MCNC: 1.99 MG/DL (ref 0.55–1.02)
CREAT SERPL-MCNC: 2.27 MG/DL (ref 0.55–1.02)
CREAT UR-MCNC: 1.8 MG/DL (ref 0.6–1.3)
CRP SERPL-MCNC: 7.99 MG/DL (ref 0–0.6)
DIAGNOSIS, 93000: NORMAL
DIFFERENTIAL METHOD BLD: ABNORMAL
DRUG SCRN COMMENT,DRGCM: ABNORMAL
ECHO AO ROOT DIAM: 3.2 CM
ECHO AO ROOT INDEX: 1.57 CM/M2
ECHO AV PEAK GRADIENT: 4 MMHG
ECHO AV PEAK VELOCITY: 1 M/S
ECHO AV VELOCITY RATIO: 0.6
ECHO EST RA PRESSURE: 3 MMHG
ECHO LA DIAMETER INDEX: 1.67 CM/M2
ECHO LA DIAMETER: 3.4 CM
ECHO LA TO AORTIC ROOT RATIO: 1.06
ECHO LV E' LATERAL VELOCITY: 10 CM/S
ECHO LV E' SEPTAL VELOCITY: 10 CM/S
ECHO LV EJECTION FRACTION BIPLANE: 50 % (ref 55–100)
ECHO LV FRACTIONAL SHORTENING: 24 % (ref 28–44)
ECHO LV INTERNAL DIMENSION DIASTOLE INDEX: 2.01 CM/M2
ECHO LV INTERNAL DIMENSION DIASTOLIC: 4.1 CM (ref 3.9–5.3)
ECHO LV INTERNAL DIMENSION SYSTOLIC INDEX: 1.52 CM/M2
ECHO LV INTERNAL DIMENSION SYSTOLIC: 3.1 CM
ECHO LV IVSD: 1.4 CM (ref 0.6–0.9)
ECHO LV MASS 2D: 151.3 G (ref 67–162)
ECHO LV MASS INDEX 2D: 74.2 G/M2 (ref 43–95)
ECHO LV POSTERIOR WALL DIASTOLIC: 0.8 CM (ref 0.6–0.9)
ECHO LV RELATIVE WALL THICKNESS RATIO: 0.39
ECHO LVOT PEAK GRADIENT: 1 MMHG
ECHO LVOT PEAK VELOCITY: 0.6 M/S
ECHO MV A VELOCITY: 0.56 M/S
ECHO MV E DECELERATION TIME (DT): 268 MS
ECHO MV E VELOCITY: 0.48 M/S
ECHO MV E/A RATIO: 0.86
ECHO MV E/E' LATERAL: 4.8
ECHO MV E/E' RATIO (AVERAGED): 4.8
ECHO MV E/E' SEPTAL: 4.8
ECHO MV REGURGITANT PEAK GRADIENT: 104 MMHG
ECHO MV REGURGITANT PEAK VELOCITY: 5.1 M/S
ECHO PV MAX VELOCITY: 0.6 M/S
ECHO PV PEAK GRADIENT: 1 MMHG
ECHO PVEIN A DURATION: 56 MS
ECHO PVEIN A VELOCITY: 0.2 M/S
ECHO RIGHT VENTRICULAR SYSTOLIC PRESSURE (RVSP): 22 MMHG
ECHO RV INTERNAL DIMENSION: 3.1 CM
ECHO TV REGURGITANT MAX VELOCITY: 2.17 M/S
ECHO TV REGURGITANT PEAK GRADIENT: 19 MMHG
EOSINOPHIL # BLD: 0 K/UL (ref 0–0.4)
EOSINOPHIL NFR BLD: 0 % (ref 0–7)
EPITH CASTS URNS QL MICRO: ABNORMAL /LPF
ERYTHROCYTE [DISTWIDTH] IN BLOOD BY AUTOMATED COUNT: 14.2 % (ref 11.5–14.5)
FIO2 ON VENT: 100 %
FIO2 ON VENT: 100 %
FIO2 ON VENT: 60 %
FIO2, L/MIN - FIO2P: ABNORMAL
FLUAV AG NPH QL IA: NEGATIVE
FLUBV AG NOSE QL IA: NEGATIVE
GAS FLOW.O2 SETTING OXYMISER: 14
GAS FLOW.O2 SETTING OXYMISER: 16
GAS FLOW.O2 SETTING OXYMISER: 22
GLOBULIN SER CALC-MCNC: 3 G/DL (ref 2–4)
GLUCOSE BLD STRIP.AUTO-MCNC: 198 MG/DL (ref 65–100)
GLUCOSE SERPL-MCNC: 197 MG/DL (ref 65–100)
GLUCOSE SERPL-MCNC: 213 MG/DL (ref 65–100)
GLUCOSE UR STRIP.AUTO-MCNC: NEGATIVE MG/DL
HCO3 BLD-SCNC: 28.3 MMOL/L (ref 19–28)
HCO3 BLDA-SCNC: 22 MMOL/L (ref 22–26)
HCO3 BLDA-SCNC: 22 MMOL/L (ref 22–26)
HCO3 BLDA-SCNC: 24 MMOL/L (ref 22–26)
HCT VFR BLD AUTO: 46.3 % (ref 35–47)
HGB BLD-MCNC: 14 G/DL (ref 11.5–16)
HGB UR QL STRIP: NEGATIVE
IMM GRANULOCYTES # BLD AUTO: 0.1 K/UL (ref 0–0.04)
IMM GRANULOCYTES NFR BLD AUTO: 1 % (ref 0–0.5)
KETONES UR QL STRIP.AUTO: NEGATIVE MG/DL
LACTATE BLD-SCNC: 4.01 MMOL/L (ref 0.4–2)
LACTATE SERPL-SCNC: 2 MMOL/L (ref 0.4–2)
LACTATE SERPL-SCNC: 2.7 MMOL/L (ref 0.4–2)
LACTATE SERPL-SCNC: 4.3 MMOL/L (ref 0.4–2)
LACTATE SERPL-SCNC: 5.2 MMOL/L (ref 0.4–2)
LEUKOCYTE ESTERASE UR QL STRIP.AUTO: NEGATIVE
LYMPHOCYTES # BLD: 1.4 K/UL (ref 0.8–3.5)
LYMPHOCYTES NFR BLD: 13 % (ref 12–49)
MAGNESIUM SERPL-MCNC: 2 MG/DL (ref 1.6–2.4)
MCH RBC QN AUTO: 28 PG (ref 26–34)
MCHC RBC AUTO-ENTMCNC: 30.2 G/DL (ref 30–36.5)
MCV RBC AUTO: 92.6 FL (ref 80–99)
METHADONE UR QL: NEGATIVE
METHGB MFR BLD: 0.4 % (ref 0–1.4)
METHGB MFR BLD: 0.4 % (ref 0–1.4)
MONOCYTES # BLD: 0.9 K/UL (ref 0–1)
MONOCYTES NFR BLD: 8 % (ref 5–13)
MRSA DNA SPEC QL NAA+PROBE: NOT DETECTED
MUCOUS THREADS URNS QL MICRO: ABNORMAL /LPF
NEUTS SEG # BLD: 8.4 K/UL (ref 1.8–8)
NEUTS SEG NFR BLD: 78 % (ref 32–75)
NITRITE UR QL STRIP.AUTO: NEGATIVE
NRBC # BLD: 0 K/UL (ref 0–0.01)
NRBC BLD-RTO: 0 PER 100 WBC
OPIATES UR QL: NEGATIVE
OXYHGB MFR BLD: 92.9 % (ref 95–99)
OXYHGB MFR BLD: 96.2 % (ref 95–99)
P-R INTERVAL, ECG05: 142 MS
PCO2 BLD: 74.9 MMHG (ref 35–45)
PCO2 BLDA: 37 MMHG (ref 35–45)
PCO2 BLDA: 49 MMHG (ref 35–45)
PCO2 BLDA: 60 MMHG (ref 35–45)
PCP UR QL: NEGATIVE
PEEP RESPIRATORY: 10
PEEP RESPIRATORY: 5
PEEP RESPIRATORY: 5
PERFORMED BY, TECHID: ABNORMAL
PH BLD: 7.19 (ref 7.35–7.45)
PH BLDA: 7.22 (ref 7.35–7.45)
PH BLDA: 7.29 (ref 7.35–7.45)
PH BLDA: 7.39 (ref 7.35–7.45)
PH UR STRIP: 5
PHOSPHATE SERPL-MCNC: 2.6 MG/DL (ref 2.6–4.7)
PLATELET # BLD AUTO: 334 K/UL (ref 150–400)
PMV BLD AUTO: 9.5 FL (ref 8.9–12.9)
PO2 BLD: 40 MMHG (ref 75–100)
PO2 BLDA: 107 MMHG (ref 80–100)
PO2 BLDA: 50 MMHG (ref 80–100)
PO2 BLDA: 72 MMHG (ref 80–100)
POTASSIUM BLD-SCNC: 5.7 MMOL/L (ref 3.5–5.5)
POTASSIUM SERPL-SCNC: 5.5 MMOL/L (ref 3.5–5.1)
POTASSIUM SERPL-SCNC: 5.7 MMOL/L (ref 3.5–5.1)
PROCALCITONIN SERPL-MCNC: 0.99 NG/ML
PROT SERPL-MCNC: 6 G/DL (ref 6.4–8.2)
PROT UR STRIP-MCNC: NEGATIVE MG/DL
Q-T INTERVAL, ECG07: 332 MS
QRS DURATION, ECG06: 78 MS
QTC CALCULATION (BEZET), ECG08: 441 MS
RBC # BLD AUTO: 5 M/UL (ref 3.8–5.2)
RBC #/AREA URNS HPF: ABNORMAL /HPF (ref 0–5)
RESPIRATORY RATE: ABNORMAL (ref 5–40)
SAO2 % BLD: 60 %
SAO2 % BLD: 84 % (ref 95–99)
SAO2 % BLD: 94 % (ref 95–99)
SAO2 % BLD: 97 % (ref 95–99)
SAO2% DEVICE SAO2% SENSOR NAME: ABNORMAL
SARS-COV-2 RDRP RESP QL NAA+PROBE: NOT DETECTED
SERVICE CMNT-IMP: ABNORMAL
SODIUM BLD-SCNC: 139 MMOL/L (ref 136–145)
SODIUM SERPL-SCNC: 136 MMOL/L (ref 136–145)
SODIUM SERPL-SCNC: 138 MMOL/L (ref 136–145)
SP GR UR REFRACTOMETRY: 1.02 (ref 1–1.03)
SPECIMEN SITE: ABNORMAL
TROPONIN I SERPL HS-MCNC: 219 NG/L (ref 0–51)
TROPONIN I SERPL HS-MCNC: 224 NG/L (ref 0–51)
TROPONIN I SERPL HS-MCNC: 251 NG/L (ref 0–51)
TROPONIN I SERPL HS-MCNC: 266 NG/L (ref 0–51)
TROPONIN I SERPL HS-MCNC: 269 NG/L (ref 0–51)
TSH SERPL DL<=0.05 MIU/L-ACNC: 1.1 UIU/ML (ref 0.36–3.74)
UA: UC IF INDICATED,UAUC: ABNORMAL
UROBILINOGEN UR QL STRIP.AUTO: 0.1 EU/DL (ref 0.2–1)
VENTILATION MODE VENT: ABNORMAL
VENTRICULAR RATE, ECG03: 106 BPM
VT SETTING VENT: 420
VT SETTING VENT: 450
VT SETTING VENT: 450
WBC # BLD AUTO: 10.9 K/UL (ref 3.6–11)
WBC URNS QL MICRO: ABNORMAL /HPF (ref 0–4)

## 2023-02-10 PROCEDURE — 87804 INFLUENZA ASSAY W/OPTIC: CPT

## 2023-02-10 PROCEDURE — 74011250637 HC RX REV CODE- 250/637: Performed by: HOSPITALIST

## 2023-02-10 PROCEDURE — 36600 WITHDRAWAL OF ARTERIAL BLOOD: CPT

## 2023-02-10 PROCEDURE — 80069 RENAL FUNCTION PANEL: CPT

## 2023-02-10 PROCEDURE — 74011000250 HC RX REV CODE- 250: Performed by: INTERNAL MEDICINE

## 2023-02-10 PROCEDURE — 51702 INSERT TEMP BLADDER CATH: CPT

## 2023-02-10 PROCEDURE — 86140 C-REACTIVE PROTEIN: CPT

## 2023-02-10 PROCEDURE — 02HV33Z INSERTION OF INFUSION DEVICE INTO SUPERIOR VENA CAVA, PERCUTANEOUS APPROACH: ICD-10-PCS | Performed by: STUDENT IN AN ORGANIZED HEALTH CARE EDUCATION/TRAINING PROGRAM

## 2023-02-10 PROCEDURE — 74011000258 HC RX REV CODE- 258: Performed by: INTERNAL MEDICINE

## 2023-02-10 PROCEDURE — 99285 EMERGENCY DEPT VISIT HI MDM: CPT

## 2023-02-10 PROCEDURE — 71045 X-RAY EXAM CHEST 1 VIEW: CPT

## 2023-02-10 PROCEDURE — 74011250636 HC RX REV CODE- 250/636: Performed by: INTERNAL MEDICINE

## 2023-02-10 PROCEDURE — 83735 ASSAY OF MAGNESIUM: CPT

## 2023-02-10 PROCEDURE — 75810000455 HC PLCMT CENT VENOUS CATH LVL 2 5182

## 2023-02-10 PROCEDURE — 81001 URINALYSIS AUTO W/SCOPE: CPT

## 2023-02-10 PROCEDURE — 83605 ASSAY OF LACTIC ACID: CPT

## 2023-02-10 PROCEDURE — 5A1955Z RESPIRATORY VENTILATION, GREATER THAN 96 CONSECUTIVE HOURS: ICD-10-PCS | Performed by: INTERNAL MEDICINE

## 2023-02-10 PROCEDURE — 84484 ASSAY OF TROPONIN QUANT: CPT

## 2023-02-10 PROCEDURE — 87186 SC STD MICRODIL/AGAR DIL: CPT

## 2023-02-10 PROCEDURE — 84145 PROCALCITONIN (PCT): CPT

## 2023-02-10 PROCEDURE — 87635 SARS-COV-2 COVID-19 AMP PRB: CPT

## 2023-02-10 PROCEDURE — 77010033678 HC OXYGEN DAILY

## 2023-02-10 PROCEDURE — 93306 TTE W/DOPPLER COMPLETE: CPT

## 2023-02-10 PROCEDURE — 82803 BLOOD GASES ANY COMBINATION: CPT

## 2023-02-10 PROCEDURE — 87205 SMEAR GRAM STAIN: CPT

## 2023-02-10 PROCEDURE — 74011250637 HC RX REV CODE- 250/637: Performed by: INTERNAL MEDICINE

## 2023-02-10 PROCEDURE — 99223 1ST HOSP IP/OBS HIGH 75: CPT | Performed by: INTERNAL MEDICINE

## 2023-02-10 PROCEDURE — 70450 CT HEAD/BRAIN W/O DYE: CPT

## 2023-02-10 PROCEDURE — 80053 COMPREHEN METABOLIC PANEL: CPT

## 2023-02-10 PROCEDURE — 94002 VENT MGMT INPAT INIT DAY: CPT

## 2023-02-10 PROCEDURE — 84443 ASSAY THYROID STIM HORMONE: CPT

## 2023-02-10 PROCEDURE — 74011250636 HC RX REV CODE- 250/636: Performed by: STUDENT IN AN ORGANIZED HEALTH CARE EDUCATION/TRAINING PROGRAM

## 2023-02-10 PROCEDURE — 87077 CULTURE AEROBIC IDENTIFY: CPT

## 2023-02-10 PROCEDURE — 74011000258 HC RX REV CODE- 258: Performed by: STUDENT IN AN ORGANIZED HEALTH CARE EDUCATION/TRAINING PROGRAM

## 2023-02-10 PROCEDURE — 86738 MYCOPLASMA ANTIBODY: CPT

## 2023-02-10 PROCEDURE — 85025 COMPLETE CBC W/AUTO DIFF WBC: CPT

## 2023-02-10 PROCEDURE — 71250 CT THORAX DX C-: CPT

## 2023-02-10 PROCEDURE — 36415 COLL VENOUS BLD VENIPUNCTURE: CPT

## 2023-02-10 PROCEDURE — 94640 AIRWAY INHALATION TREATMENT: CPT

## 2023-02-10 PROCEDURE — 74011250636 HC RX REV CODE- 250/636

## 2023-02-10 PROCEDURE — 80307 DRUG TEST PRSMV CHEM ANLYZR: CPT

## 2023-02-10 PROCEDURE — 74011250637 HC RX REV CODE- 250/637: Performed by: STUDENT IN AN ORGANIZED HEALTH CARE EDUCATION/TRAINING PROGRAM

## 2023-02-10 PROCEDURE — 74011000250 HC RX REV CODE- 250

## 2023-02-10 PROCEDURE — 65610000006 HC RM INTENSIVE CARE

## 2023-02-10 PROCEDURE — 93005 ELECTROCARDIOGRAM TRACING: CPT

## 2023-02-10 PROCEDURE — 0BH17EZ INSERTION OF ENDOTRACHEAL AIRWAY INTO TRACHEA, VIA NATURAL OR ARTIFICIAL OPENING: ICD-10-PCS | Performed by: INTERNAL MEDICINE

## 2023-02-10 PROCEDURE — 87641 MR-STAPH DNA AMP PROBE: CPT

## 2023-02-10 PROCEDURE — 96375 TX/PRO/DX INJ NEW DRUG ADDON: CPT

## 2023-02-10 PROCEDURE — 96365 THER/PROPH/DIAG IV INF INIT: CPT

## 2023-02-10 PROCEDURE — 74011000250 HC RX REV CODE- 250: Performed by: STUDENT IN AN ORGANIZED HEALTH CARE EDUCATION/TRAINING PROGRAM

## 2023-02-10 PROCEDURE — 87449 NOS EACH ORGANISM AG IA: CPT

## 2023-02-10 PROCEDURE — 87040 BLOOD CULTURE FOR BACTERIA: CPT

## 2023-02-10 RX ORDER — GUAIFENESIN 100 MG/5ML
81 LIQUID (ML) ORAL DAILY
Status: DISCONTINUED | OUTPATIENT
Start: 2023-02-10 | End: 2023-02-19 | Stop reason: HOSPADM

## 2023-02-10 RX ORDER — ONDANSETRON 4 MG/1
4 TABLET, ORALLY DISINTEGRATING ORAL
Status: DISCONTINUED | OUTPATIENT
Start: 2023-02-10 | End: 2023-02-19 | Stop reason: HOSPADM

## 2023-02-10 RX ORDER — ENOXAPARIN SODIUM 100 MG/ML
40 INJECTION SUBCUTANEOUS DAILY
Status: DISCONTINUED | OUTPATIENT
Start: 2023-02-10 | End: 2023-02-19 | Stop reason: HOSPADM

## 2023-02-10 RX ORDER — PROPOFOL 10 MG/ML
0-50 VIAL (ML) INTRAVENOUS
Status: DISCONTINUED | OUTPATIENT
Start: 2023-02-10 | End: 2023-02-15

## 2023-02-10 RX ORDER — SODIUM CHLORIDE 0.9 % (FLUSH) 0.9 %
5-40 SYRINGE (ML) INJECTION EVERY 8 HOURS
Status: DISCONTINUED | OUTPATIENT
Start: 2023-02-10 | End: 2023-02-19 | Stop reason: HOSPADM

## 2023-02-10 RX ORDER — SODIUM POLYSTYRENE SULFONATE 15 G/60ML
30 SUSPENSION ORAL; RECTAL
Status: COMPLETED | OUTPATIENT
Start: 2023-02-10 | End: 2023-02-10

## 2023-02-10 RX ORDER — TRIPROLIDINE/PSEUDOEPHEDRINE 2.5MG-60MG
600 TABLET ORAL
Status: DISCONTINUED | OUTPATIENT
Start: 2023-02-10 | End: 2023-02-10

## 2023-02-10 RX ORDER — POLYETHYLENE GLYCOL 3350 17 G/17G
17 POWDER, FOR SOLUTION ORAL DAILY PRN
Status: DISCONTINUED | OUTPATIENT
Start: 2023-02-10 | End: 2023-02-19 | Stop reason: HOSPADM

## 2023-02-10 RX ORDER — SODIUM CHLORIDE 0.9 % (FLUSH) 0.9 %
5-40 SYRINGE (ML) INJECTION AS NEEDED
Status: DISCONTINUED | OUTPATIENT
Start: 2023-02-10 | End: 2023-02-19 | Stop reason: HOSPADM

## 2023-02-10 RX ORDER — ASPIRIN 300 MG/1
300 SUPPOSITORY RECTAL
Status: COMPLETED | OUTPATIENT
Start: 2023-02-10 | End: 2023-02-10

## 2023-02-10 RX ORDER — ACETAMINOPHEN 325 MG/1
650 TABLET ORAL
Status: DISCONTINUED | OUTPATIENT
Start: 2023-02-10 | End: 2023-02-10

## 2023-02-10 RX ORDER — SODIUM CHLORIDE 0.9 % (FLUSH) 0.9 %
5-40 SYRINGE (ML) INJECTION EVERY 8 HOURS
Status: CANCELLED | OUTPATIENT
Start: 2023-02-10

## 2023-02-10 RX ORDER — KETAMINE HYDROCHLORIDE 10 MG/ML
25 INJECTION INTRAMUSCULAR; INTRAVENOUS ONCE
Status: ACTIVE | OUTPATIENT
Start: 2023-02-10 | End: 2023-02-10

## 2023-02-10 RX ORDER — ACETAMINOPHEN 650 MG/1
650 SUPPOSITORY RECTAL
Status: DISCONTINUED | OUTPATIENT
Start: 2023-02-10 | End: 2023-02-19 | Stop reason: HOSPADM

## 2023-02-10 RX ORDER — DEXMEDETOMIDINE HYDROCHLORIDE 4 UG/ML
.1-1.5 INJECTION, SOLUTION INTRAVENOUS
Status: DISCONTINUED | OUTPATIENT
Start: 2023-02-10 | End: 2023-02-19

## 2023-02-10 RX ORDER — IPRATROPIUM BROMIDE AND ALBUTEROL SULFATE 2.5; .5 MG/3ML; MG/3ML
3 SOLUTION RESPIRATORY (INHALATION)
Status: COMPLETED | OUTPATIENT
Start: 2023-02-10 | End: 2023-02-10

## 2023-02-10 RX ORDER — SODIUM CHLORIDE 9 MG/ML
100 INJECTION, SOLUTION INTRAVENOUS CONTINUOUS
Status: DISPENSED | OUTPATIENT
Start: 2023-02-10 | End: 2023-02-10

## 2023-02-10 RX ORDER — FENTANYL CITRATE 50 UG/ML
100 INJECTION, SOLUTION INTRAMUSCULAR; INTRAVENOUS ONCE
Status: COMPLETED | OUTPATIENT
Start: 2023-02-10 | End: 2023-02-10

## 2023-02-10 RX ORDER — FENTANYL CITRATE 50 UG/ML
INJECTION, SOLUTION INTRAMUSCULAR; INTRAVENOUS
Status: COMPLETED
Start: 2023-02-10 | End: 2023-02-10

## 2023-02-10 RX ORDER — NOREPINEPHRINE BIT/0.9 % NACL 8 MG/250ML
INFUSION BOTTLE (ML) INTRAVENOUS
Status: COMPLETED
Start: 2023-02-10 | End: 2023-02-10

## 2023-02-10 RX ORDER — ONDANSETRON 2 MG/ML
4 INJECTION INTRAMUSCULAR; INTRAVENOUS
Status: DISCONTINUED | OUTPATIENT
Start: 2023-02-10 | End: 2023-02-19 | Stop reason: HOSPADM

## 2023-02-10 RX ORDER — NOREPINEPHRINE BIT/0.9 % NACL 8 MG/250ML
.5-3 INFUSION BOTTLE (ML) INTRAVENOUS
Status: DISCONTINUED | OUTPATIENT
Start: 2023-02-10 | End: 2023-02-19

## 2023-02-10 RX ORDER — CHLORHEXIDINE GLUCONATE 1.2 MG/ML
15 RINSE ORAL EVERY 12 HOURS
Status: DISCONTINUED | OUTPATIENT
Start: 2023-02-10 | End: 2023-02-15

## 2023-02-10 RX ORDER — FENTANYL CITRATE 50 UG/ML
100 INJECTION, SOLUTION INTRAMUSCULAR; INTRAVENOUS
Status: COMPLETED | OUTPATIENT
Start: 2023-02-10 | End: 2023-02-10

## 2023-02-10 RX ORDER — ACETAMINOPHEN 650 MG/1
650 SUPPOSITORY RECTAL
Status: DISCONTINUED | OUTPATIENT
Start: 2023-02-10 | End: 2023-02-10

## 2023-02-10 RX ORDER — SODIUM CHLORIDE 0.9 % (FLUSH) 0.9 %
5-40 SYRINGE (ML) INJECTION AS NEEDED
Status: CANCELLED | OUTPATIENT
Start: 2023-02-10

## 2023-02-10 RX ADMIN — SODIUM CHLORIDE, PRESERVATIVE FREE 10 ML: 5 INJECTION INTRAVENOUS at 13:24

## 2023-02-10 RX ADMIN — Medication 50 MCG/HR: at 20:21

## 2023-02-10 RX ADMIN — SODIUM CHLORIDE, PRESERVATIVE FREE 20 MG: 5 INJECTION INTRAVENOUS at 11:46

## 2023-02-10 RX ADMIN — VANCOMYCIN HYDROCHLORIDE 2000 MG: 10 INJECTION, POWDER, LYOPHILIZED, FOR SOLUTION INTRAVENOUS at 05:57

## 2023-02-10 RX ADMIN — SODIUM POLYSTYRENE SULFONATE 30 G: 15 SUSPENSION ORAL; RECTAL at 17:31

## 2023-02-10 RX ADMIN — DEXMEDETOMIDINE HYDROCHLORIDE 0.4 MCG/KG/HR: 4 INJECTION, SOLUTION INTRAVENOUS at 01:36

## 2023-02-10 RX ADMIN — PROPOFOL 20 MCG/KG/MIN: 10 INJECTION, EMULSION INTRAVENOUS at 11:47

## 2023-02-10 RX ADMIN — ASPIRIN 81 MG CHEWABLE TABLET 81 MG: 81 TABLET CHEWABLE at 13:24

## 2023-02-10 RX ADMIN — Medication 6 MCG/MIN: at 19:46

## 2023-02-10 RX ADMIN — Medication 50 MCG/HR: at 12:21

## 2023-02-10 RX ADMIN — PROPOFOL 24 MCG/KG/MIN: 10 INJECTION, EMULSION INTRAVENOUS at 20:00

## 2023-02-10 RX ADMIN — Medication 4 MCG/MIN: at 02:05

## 2023-02-10 RX ADMIN — SODIUM CHLORIDE, POTASSIUM CHLORIDE, SODIUM LACTATE AND CALCIUM CHLORIDE 1000 ML: 600; 310; 30; 20 INJECTION, SOLUTION INTRAVENOUS at 02:44

## 2023-02-10 RX ADMIN — ACETAMINOPHEN 650 MG: 160 SOLUTION ORAL at 13:26

## 2023-02-10 RX ADMIN — IPRATROPIUM BROMIDE AND ALBUTEROL SULFATE 3 ML: .5; 2.5 SOLUTION RESPIRATORY (INHALATION) at 03:17

## 2023-02-10 RX ADMIN — FENTANYL CITRATE 100 MCG: 50 INJECTION, SOLUTION INTRAMUSCULAR; INTRAVENOUS at 02:17

## 2023-02-10 RX ADMIN — ACETAMINOPHEN 650 MG: 650 SUPPOSITORY RECTAL at 08:01

## 2023-02-10 RX ADMIN — SODIUM CHLORIDE, PRESERVATIVE FREE 10 ML: 5 INJECTION INTRAVENOUS at 21:43

## 2023-02-10 RX ADMIN — FENTANYL CITRATE 100 MCG: 50 INJECTION, SOLUTION INTRAMUSCULAR; INTRAVENOUS at 00:58

## 2023-02-10 RX ADMIN — Medication 20 MCG/MIN: at 06:33

## 2023-02-10 RX ADMIN — SODIUM CHLORIDE 1000 ML: 9 INJECTION, SOLUTION INTRAVENOUS at 00:58

## 2023-02-10 RX ADMIN — CEFEPIME 2 G: 2 INJECTION, POWDER, FOR SOLUTION INTRAVENOUS at 02:39

## 2023-02-10 RX ADMIN — CHLORHEXIDINE GLUCONATE 0.12% ORAL RINSE 15 ML: 1.2 LIQUID ORAL at 11:46

## 2023-02-10 RX ADMIN — DEXMEDETOMIDINE HYDROCHLORIDE 0.9 MCG/KG/HR: 4 INJECTION, SOLUTION INTRAVENOUS at 06:33

## 2023-02-10 RX ADMIN — ASPIRIN 300 MG: 300 SUPPOSITORY RECTAL at 02:27

## 2023-02-10 RX ADMIN — CEFEPIME 2 G: 2 INJECTION, POWDER, FOR SOLUTION INTRAVENOUS at 16:16

## 2023-02-10 RX ADMIN — SODIUM CHLORIDE 100 ML/HR: 9 INJECTION, SOLUTION INTRAVENOUS at 08:01

## 2023-02-10 RX ADMIN — SODIUM CHLORIDE 1000 ML: 9 INJECTION, SOLUTION INTRAVENOUS at 01:34

## 2023-02-10 RX ADMIN — CHLORHEXIDINE GLUCONATE 0.12% ORAL RINSE 15 ML: 1.2 LIQUID ORAL at 20:28

## 2023-02-10 RX ADMIN — ENOXAPARIN SODIUM 40 MG: 100 INJECTION SUBCUTANEOUS at 08:01

## 2023-02-10 NOTE — ED TRIAGE NOTES
EMS was called for unresponsive by patients roommate says she has been sleeping since 8 am on the 9th (16 hours). EMS arrived and patient was hypoxic in the 30s, non rebreather placed on patient improved to 70s, narcan given with no change. Patient was intubated in the field with an 7.5 ET tube, given 100 ketamine, versed 2.5, succ 100, vec 10 mg.

## 2023-02-10 NOTE — CONSULTS
Consult Date: 2/10/2023    Consults  Suspected sepsis    Subjective   This is a 62year old female with reported history of seizures, brought to ED by EMS after being found unresponsive by roommate. She was in respiratory distress with severe hypoxia, requiring intubation in the field. On arrival to the ED, patient had low grade temperature, tachycardia and hypotension along with severe hypoxia. WBC was normal but lactic acid was markedly elevated. UA was unremarkable. CXR showed ET tube and NG tube appear to be in satisfactory position and diminished lung volumes with moderately severe pulmonary edema pattern. CT Chest showed dense RLL consolidation consistent with pneumonia or aspiration. Images reviewed by me. CT Head showed no acute intracranial process. Rapid flu tests and Covid-19 were negative. Blood and sputum cultures were sent and patient was started on Vancomycin and Cefepime. ID has been consulted for this reason. Patient seen in the ICU. She is intubated and sedated.      Past Medical History:   Diagnosis Date    DDD (degenerative disc disease), lumbar     Fatigue     GERD (gastroesophageal reflux disease)     Hypothyroidism     Psychiatric disorder     PTSD per patient    Seizures ([facility] ([place] [address]       Past Surgical History:   Procedure Laterality Date    HX APPENDECTOMY      HX  SECTION      HX CHOLECYSTECTOMY      HX HYSTERECTOMY       Family History   Family history unknown: Yes      Social History     Tobacco Use    Smoking status: Every Day     Packs/day: 0.50     Types: Cigarettes    Smokeless tobacco: Never   Substance Use Topics    Alcohol use: Not Currently     Alcohol/week: 0.0 standard drinks       Current Facility-Administered Medications   Medication Dose Route Frequency Provider Last Rate Last Admin    dexmedeTOMidine in 0.9 % NaCl (PRECEDEX) 400 mcg/100 mL (4 mcg/mL) infusion soln  0.1-1.5 mcg/kg/hr IntraVENous TITRATE Yusuf Thompson MD 20.4 mL/hr at 02/10/23 0714 0.9 mcg/kg/hr at 02/10/23 0714    NOREPINephrine (LEVOPHED) 8 mg in 0.9% NS 250ml infusion  0.5-30 mcg/min IntraVENous TITRATE Kehinde Macias MD 24.4 mL/hr at 02/10/23 0905 13 mcg/min at 02/10/23 0905    ketamine (KETALAR) 10 mg/mL injection 25 mg  25 mg IntraVENous ONCE Domingo Arias MD        cefepime (MAXIPIME) 2 g in 0.9% sodium chloride (MBP/ADV) 100 mL MBP  2 g IntraVENous Q12H Domingo Arias MD        sodium chloride (NS) flush 5-40 mL  5-40 mL IntraVENous Q8H Domingo Arias MD        sodium chloride (NS) flush 5-40 mL  5-40 mL IntraVENous PRN Domingo Arias MD        acetaminophen (TYLENOL) tablet 650 mg  650 mg Oral Q6H PRN Domingo Arias MD        Or    acetaminophen (TYLENOL) suppository 650 mg  650 mg Rectal Q6H PRN Kehinde Macias MD   650 mg at 02/10/23 0801    polyethylene glycol (MIRALAX) packet 17 g  17 g Oral DAILY PRN Kehinde Macias MD        ondansetron (ZOFRAN ODT) tablet 4 mg  4 mg Oral Q8H PRN Kehinde Macias MD        Or    ondansetron (ZOFRAN) injection 4 mg  4 mg IntraVENous Q6H PRN Kehinde Macias MD        enoxaparin (LOVENOX) injection 40 mg  40 mg SubCUTAneous DAILY Domingo Arias MD   40 mg at 02/10/23 0801    0.9% sodium chloride infusion  100 mL/hr IntraVENous CONTINUOUS Kehinde Macias  mL/hr at 02/10/23 0801 100 mL/hr at 02/10/23 0801    [START ON 2/11/2023] Draw a Vancomycin level on SAT (2/11) @ 0400   Other [facility] Domingo Zhao MD        VANCOMYCIN INFORMATION NOTE 1 Each  1 Each Other Rx Dosing/Monitoring Kehinde Macias MD        chlorhexidine (PERIDEX) 0.12 % mouthwash 15 mL  15 mL Oral Q12H Viet Liao DO        famotidine (PF) (PEPCID) 20 mg in 0.9% sodium chloride 10 mL injection  20 mg IntraVENous DAILY Viet Liao,         fentaNYL (PF) 1,500 mcg/30 mL (50 mcg/mL) infusion  0-200 mcg/hr IntraVENous TITRATE Viet Liao DO        propofol (DIPRIVAN) 10 mg/mL infusion  0-50 mcg/kg/min IntraVENous TITRATE Alex Green,             Review of Systems   Unable to perform ROS: Intubated     Objective     Vital signs for last 24 hours:  Visit Vitals  BP (!) 127/92   Pulse 86   Temp (!) 101.6 °F (38.7 °C) Comment: tylenol supp   Resp 20   Ht 5' 8\" (1.727 m)   Wt 200 lb (90.7 kg)   SpO2 100%   Breastfeeding No   BMI 30.41 kg/m²       Intake/Output this shift:  Current Shift: 02/10 0701 - 02/10 1900  In: -   Out: 675 [Urine:675]  Last 3 Shifts: 02/08 1901 - 02/10 0700  In: 4166.7 [I.V.:4166.7]  Out: -     Data Review:   Recent Results (from the past 24 hour(s))   BLOOD GAS,CHEM8,LACTIC ACID POC    Collection Time: 02/10/23 12:59 AM   Result Value Ref Range    pH (POC) 7.19 (LL) 7.35 - 7.45      pCO2 (POC) 74.9 (H) 35.0 - 45.0 mmHg    pO2 (POC) 40 (LL) 75 - 100 mmHg    Sodium,  136 - 145 mmol/L    Potassium, POC 5.7 (H) 3.5 - 5.5 mmol/L    Calcium, ionized (POC) 1.18 1.12 - 1.32 mmol/L    Glucose,  (H) 65 - 100 mg/dL    Chloride,  98 - 107 MMOL/L    Creatinine, POC 1.80 (H) 0.6 - 1.3 MG/DL    eGFR (POC) 32 (L) >60 ml/min/1.73m2    Base deficit (POC) 2.4 mmol/L    HCO3 (POC) 28.3 (H) 19.0 - 28.0 mmol/L    CO2, POC 28 MMOL/L    Sample source Venous      Performed by Christian Carranza     Lactic Acid (POC) 4.01 (HH) 0.40 - 2.00 mmol/L    Respiratory Rate PENDING     FIO2, L/min PENDING     Critical value read back ALHAZMI     O2 SAT 60 %   EKG, 12 LEAD, INITIAL    Collection Time: 02/10/23  1:01 AM   Result Value Ref Range    Ventricular Rate 106 BPM    Atrial Rate 106 BPM    P-R Interval 142 ms    QRS Duration 78 ms    Q-T Interval 332 ms    QTC Calculation (Bezet) 441 ms    Calculated P Axis 56 degrees    Calculated R Axis 82 degrees    Calculated T Axis 53 degrees    Diagnosis       Sinus tachycardia  Otherwise normal ECG  When compared with ECG of 27-JUL-2022 20:47,  No significant change was found  Confirmed by Carla Bell MD, Elgin Inman ([id number]) on 2/10/2023 8:23:36 AM     CBC WITH AUTOMATED DIFF    Collection Time: 02/10/23  1:06 AM   Result Value Ref Range    WBC 10.9 3.6 - 11.0 K/uL RBC 5.00 3.80 - 5.20 M/uL    HGB 14.0 11.5 - 16.0 g/dL    HCT 46.3 35.0 - 47.0 %    MCV 92.6 80.0 - 99.0 FL    MCH 28.0 26.0 - 34.0 PG    MCHC 30.2 30.0 - 36.5 g/dL    RDW 14.2 11.5 - 14.5 %    PLATELET 073 509 - 724 K/uL    MPV 9.5 8.9 - 12.9 FL    NRBC 0.0 0.0  WBC    ABSOLUTE NRBC 0.00 0.00 - 0.01 K/uL    NEUTROPHILS 78 (H) 32 - 75 %    LYMPHOCYTES 13 12 - 49 %    MONOCYTES 8 5 - 13 %    EOSINOPHILS 0 0 - 7 %    BASOPHILS 0 0 - 1 %    IMMATURE GRANULOCYTES 1 (H) 0 - 0.5 %    ABS. NEUTROPHILS 8.4 (H) 1.8 - 8.0 K/UL    ABS. LYMPHOCYTES 1.4 0.8 - 3.5 K/UL    ABS. MONOCYTES 0.9 0.0 - 1.0 K/UL    ABS. EOSINOPHILS 0.0 0.0 - 0.4 K/UL    ABS. BASOPHILS 0.0 0.0 - 0.1 K/UL    ABS. IMM. GRANS. 0.1 (H) 0.00 - 0.04 K/UL    DF AUTOMATED     METABOLIC PANEL, COMPREHENSIVE    Collection Time: 02/10/23  1:06 AM   Result Value Ref Range    Sodium 138 136 - 145 mmol/L    Potassium 5.7 (H) 3.5 - 5.1 mmol/L    Chloride 106 97 - 108 mmol/L    CO2 26 21 - 32 mmol/L    Anion gap 6 5 - 15 mmol/L    Glucose 197 (H) 65 - 100 mg/dL    BUN 18 6 - 20 mg/dL    Creatinine 2.27 (H) 0.55 - 1.02 mg/dL    BUN/Creatinine ratio 8 (L) 12 - 20      eGFR 25 (L) >60 ml/min/1.73m2    Calcium 7.8 (L) 8.5 - 10.1 mg/dL    Bilirubin, total 0.3 0.2 - 1.0 mg/dL    AST (SGOT) 71 (H) 15 - 37 U/L    ALT (SGPT) 63 12 - 78 U/L    Alk.  phosphatase 85 45 - 117 U/L    Protein, total 6.0 (L) 6.4 - 8.2 g/dL    Albumin 3.0 (L) 3.5 - 5.0 g/dL    Globulin 3.0 2.0 - 4.0 g/dL    A-G Ratio 1.0 (L) 1.1 - 2.2     TROPONIN-HIGH SENSITIVITY    Collection Time: 02/10/23  1:06 AM   Result Value Ref Range    Troponin-High Sensitivity 219 (HH) 0 - 51 ng/L   TSH 3RD GENERATION    Collection Time: 02/10/23  1:06 AM   Result Value Ref Range    TSH 1.10 0.36 - 3.74 uIU/mL   LACTIC ACID    Collection Time: 02/10/23  1:06 AM   Result Value Ref Range    Lactic acid 5.2 (HH) 0.4 - 2.0 mmol/L   COVID-19 RAPID TEST    Collection Time: 02/10/23  1:06 AM   Result Value Ref Range COVID-19 rapid test Not Detected Not Detected     INFLUENZA A & B AG (RAPID TEST)    Collection Time: 02/10/23  1:06 AM   Result Value Ref Range    Influenza A Antigen Negative Negative      Influenza B Antigen Negative Negative     URINALYSIS W/ REFLEX CULTURE    Collection Time: 02/10/23  1:27 AM    Specimen: Urine   Result Value Ref Range    Color Yellow/Straw      Appearance Clear Clear      Specific gravity 1.020 1.003 - 1.030      pH (UA) 5.0      Protein Negative Negative mg/dL    Glucose Negative Negative mg/dL    Ketone Negative Negative mg/dL    Bilirubin Negative Negative      Blood Negative Negative      Urobilinogen 0.1 (L) 0.2 - 1.0 EU/dL    Nitrites Negative Negative      Leukocyte Esterase Negative Negative      WBC 0-4 0 - 4 /hpf    RBC 0-5 0 - 5 /hpf    Epithelial cells Few Few /lpf    Bacteria Negative Negative /hpf    UA:UC IF INDICATED Culture not indicated by UA result Culture not indicated by UA result      Mucus Trace (A) Negative /lpf   DRUG SCREEN, URINE    Collection Time: 02/10/23  1:27 AM   Result Value Ref Range    AMPHETAMINES Negative Negative      BARBITURATES Negative Negative      BENZODIAZEPINES Positive (A) Negative      COCAINE Negative Negative      METHADONE Negative Negative      OPIATES Negative Negative      PCP(PHENCYCLIDINE) Negative Negative      THC (TH-CANNABINOL) Negative Negative      Drug screen comment        This test is a screen for drugs of abuse in a medical setting only (i.e., they are unconfirmed results and as such must not be used for non-medical purposes, e.g.,employment testing, legal testing). Due to its inherent nature, false positive (FP) and false negative (FN) results may be obtained. Therefore, if necessary for medical care, recommend confirmation of positive findings by GC/MS.      TROPONIN-HIGH SENSITIVITY    Collection Time: 02/10/23  2:41 AM   Result Value Ref Range    Troponin-High Sensitivity 251 (HH) 0 - 51 ng/L   LACTIC ACID    Collection Time: 02/10/23  3:39 AM   Result Value Ref Range    Lactic acid 4.3 (HH) 0.4 - 2.0 mmol/L   TROPONIN-HIGH SENSITIVITY    Collection Time: 02/10/23  7:40 AM   Result Value Ref Range    Troponin-High Sensitivity 266 (HH) 0 - 51 ng/L   LACTIC ACID    Collection Time: 02/10/23  7:40 AM   Result Value Ref Range    Lactic acid 2.7 (HH) 0.4 - 2.0 mmol/L       CXR (2/10)          CT Chest (2/10)      Physical Exam  Vitals and nursing note reviewed. Constitutional:       Appearance: She is ill-appearing. HENT:      Head: Normocephalic and atraumatic. Right Ear: External ear normal.      Left Ear: External ear normal.      Mouth/Throat:      Comments: ET Tube  Eyes:      Comments: Eyes closed   Cardiovascular:      Rate and Rhythm: Normal rate and regular rhythm. Heart sounds: No murmur heard. Pulmonary:      Effort: Respiratory distress present. Breath sounds: Rhonchi present. No wheezing or rales. Abdominal:      General: Bowel sounds are normal. There is no distension. Palpations: Abdomen is soft. Tenderness: There is no abdominal tenderness. There is no guarding. Genitourinary:     Comments: Mohamud catheter     Musculoskeletal:      Cervical back: Neck supple. Right lower leg: No edema. Left lower leg: No edema. Comments: Right femoral triple lumen catheter   Skin:     Findings: No rash. Neurological:      Comments: intubated   Psychiatric:      Comments: intubated       ASSESSMENT/PLAN    Probable aspiration pneumonia, RLL, sputum culture pending  Sepsis with elevated CRP and procalcitonin  Acute hypoxic respiratory failure, intubated  Seizure activity with post-ictal period  5. Augmentin allergy    1. Discontinue Vancomycin for now since MRSA nasal PCR negative  2. Discontinue Cefepime for lack of anerobic coverage  3. Start IV Meropenem for added anaerobic coverage  4. Follow-up sputum culture and blood cultures  5. In am, repeat procal and CRP    Kin ESCOBAR Juan Dave MD

## 2023-02-10 NOTE — ED NOTES
ED MD made aware of patient's BP and SpO2. Per MD, start levophed with PIV and will plan to place central line. RT called to bedside.

## 2023-02-10 NOTE — CONSULTS
Pulmonology and Critical Care Consult    Subjective:   Consult Note: 2/10/2023 11:37 AM    Chief Complaint:   Chief Complaint   Patient presents with    Unresponsive        This patient has been seen and evaluated at the request of Dr. Dilcia Madison. Patient is a 59-year-old  female with a history of obesity, hypothyroidism, seizure disorder, GERD, and PTSD who presented to the hospital after being found unresponsive and was experiencing acute hypoxemic respiratory failure. The story is limited to the chart and from the nursing staff, but apparently her roommate found her unresponsive in their home, EMS found her sats to be in the 35s and was placed on nonrebreather with mild improvement into the 70s. There was no change in her mental status with Narcan and she was intubated and placed on mechanical ventilation in the emergency department. Initial ABG was 7.19/75/40 and has improved somewhat to 7.29/49/107 today on AC//16/100%/10. I have adjusted her ventilator to AC//22/70%/10, plateau pressure is currently 20. We will continue to wean down FiO2/PEEP for goal sats greater than 90% on the ventilator; repeat an ABG in 1 hour. CT chest on admission demonstrated a dense right lower lobe consolidation and bibasilar atelectasis, CT head negative, ET tube in the appropriate location. Urine drug screen positive for benzos, COVID-19/influenza screen negative, TSH 1.1, CBC unremarkable, creatinine up to 2.27 from 1.12 on admission, potassium level 5.7, UA negative, lactate 5.2 which is trended down to 2.7, troponin 219 on admission which is trended up to 266, ALT 63, AST 71. Cardiology and infectious disease have both been consulted, we will continue to trend troponin/lactate levels, TTE is currently pending. Patient is currently on Precedex 0.9 and Levophed at 14 mcg/min as well as normal saline at 100 cc/h, she is status post 3 L isotonic fluids in the emergency department.   Continue on IV cefepime/vancomycin, and expanded infectious work-up is currently pending. Repeating a stat BMP now to monitor her hyperkalemia. We will start the patient on fentanyl/propofol drips for analgesia/sedation respectively and wean off the Precedex drip.         Past Medical History:   Diagnosis Date    DDD (degenerative disc disease), lumbar     Fatigue     GERD (gastroesophageal reflux disease)     Hypothyroidism     Psychiatric disorder     PTSD per patient    Seizures ([facility] [address]      Past Surgical History:   Procedure Laterality Date    HX APPENDECTOMY      HX  SECTION      HX CHOLECYSTECTOMY      HX HYSTERECTOMY        Family History   Family history unknown: Yes     Social History     Tobacco Use    Smoking status: Every Day     Packs/day: 0.50     Types: Cigarettes    Smokeless tobacco: Never   Substance Use Topics    Alcohol use: Not Currently     Alcohol/week: 0.0 standard drinks      Current Facility-Administered Medications   Medication Dose Route Frequency Provider Last Rate Last Admin    dexmedeTOMidine in 0.9 % NaCl (PRECEDEX) 400 mcg/100 mL (4 mcg/mL) infusion soln  0.1-1.5 mcg/kg/hr IntraVENous TITRATE Hola Arnold MD 20.4 mL/hr at 02/10/23 0714 0.9 mcg/kg/hr at 02/10/23 0714    NOREPINephrine (LEVOPHED) 8 mg in 0.9% NS 250ml infusion  0.5-30 mcg/min IntraVENous TITRATE Hola Arnold MD 18.8 mL/hr at 02/10/23 1106 10 mcg/min at 02/10/23 1106    ketamine (KETALAR) 10 mg/mL injection 25 mg  25 mg IntraVENous ONCE Marisela Arias MD        cefepime (MAXIPIME) 2 g in 0.9% sodium chloride (MBP/ADV) 100 mL MBP  2 g IntraVENous Q12H Hola Arnold MD        sodium chloride (NS) flush 5-40 mL  5-40 mL IntraVENous Q8H Marisela Arias MD        sodium chloride (NS) flush 5-40 mL  5-40 mL IntraVENous PRN Hola Arnold MD        acetaminophen (TYLENOL) tablet 650 mg  650 mg Oral Q6H PRN Hola Arnold MD        Or    acetaminophen (TYLENOL) suppository 650 mg  650 mg Rectal Q6H PRN Hola Arnold MD   650 mg at 02/10/23 0801    polyethylene glycol (MIRALAX) packet 17 g  17 g Oral DAILY PRN Gavin Arias MD        ondansetron (ZOFRAN ODT) tablet 4 mg  4 mg Oral Q8H PRN Gavin Arias MD        Or    ondansetron (ZOFRAN) injection 4 mg  4 mg IntraVENous Q6H PRN Ward Corcoran MD        enoxaparin (LOVENOX) injection 40 mg  40 mg SubCUTAneous DAILY Gavin Arias MD   40 mg at 02/10/23 0801    0.9% sodium chloride infusion  100 mL/hr IntraVENous CONTINUOUS Ward Corcoran  mL/hr at 02/10/23 0801 100 mL/hr at 02/10/23 0801    [START ON 2/11/2023] Draw a Vancomycin level on SAT (2/11) @ 0400   [facility]        VANCOMYCIN INFORMATION NOTE 1 Each  1 Each Other Rx Dosing/Monitoring Ward Corcoran MD        chlorhexidine (PERIDEX) 0.12 % mouthwash 15 mL  15 mL Oral Q12H Viet Liao DO        famotidine (PF) (PEPCID) 20 mg in 0.9% sodium chloride 10 mL injection  20 mg IntraVENous DAILY Viet Liao DO        fentaNYL (PF) 1,500 mcg/30 mL (50 mcg/mL) infusion  0-200 mcg/hr IntraVENous TITRATE Viet Liao DO        propofol (DIPRIVAN) 10 mg/mL infusion  0-50 mcg/kg/min IntraVENous TITRATE Viet Liao DO        aspirin chewable tablet 81 mg  81 mg Per NG tube DAILY Sameer Pollock MD            Home Meds:  No current facility-administered medications on file prior to encounter. Current Outpatient Medications on File Prior to Encounter   Medication Sig Dispense Refill    methocarbamoL (Robaxin-750) 750 mg tablet Take 1 Tablet by mouth four (4) times daily. 20 Tablet 0    ondansetron (ZOFRAN ODT) 4 mg disintegrating tablet Take 1 Tablet by mouth every eight (8) hours as needed for Nausea or Vomiting. 15 Tablet 0    lidocaine (Lidoderm) 5 % Apply patch to the affected area for 12 hours a day and remove for 12 hours a day. 10 Each 0    diclofenac potassium (CATAFLAM) 50 mg tablet Take 1 Tablet by mouth three (3) times daily as needed for Pain.  12 Tablet 0    levothyroxine (SYNTHROID) 50 mcg tablet Take 1 Tablet by mouth every morning. (Patient not taking: Reported on 10/19/2022) 30 Tablet 0    pantoprazole (PROTONIX) 40 mg tablet Take 1 Tablet by mouth Before breakfast and dinner. (Patient not taking: Reported on 10/19/2022) 30 Tablet 0    clonazePAM (KlonoPIN) 0.5 mg tablet Take 0.5 mg by mouth three (3) times daily. ergocalciferol (ERGOCALCIFEROL) 1,250 mcg (50,000 unit) capsule Take 1 Capsule by mouth every seven (7) days. Every  (Patient not taking: Reported on 10/19/2022)      QUEtiapine (SEROquel) 300 mg tablet Take 300 mg by mouth nightly. traZODone (DESYREL) 100 mg tablet Take 100-200 mg by mouth At bedtime. gabapentin (NEURONTIN) 600 mg tablet Take 600 mg by mouth four (4) times daily. Allergies   Allergen Reactions    Augmentin [Amoxicillin-Pot Clavulanate] Nausea and Vomiting       Review of Systems:  Review of systems not obtained due to patient factors. Objective:     Blood pressure (!) 127/92, pulse 88, temperature (!) 101.6 °F (38.7 °C), resp. rate 17, height 5' 7.99\" (1.727 m), weight 90.7 kg (200 lb), SpO2 100 %, not currently breastfeeding. Temp (24hrs), Av.4 °F (38 °C), Min:99.5 °F (37.5 °C), Max:101.6 °F (38.7 °C)      Intake and Output:  Current Shift: 02/10 0701 - 02/10 1900  In: -   Out: 905 [Urine:675]  Last 3 Shifts: 1901 - 02/10 0700  In: 4166.7 [I.V.:4166.7]  Out: -     Physical Exam:   General appearance: no distress, mildly obese, sedated on the ventilator, chronically ill-appearing  Head: Normocephalic, without obvious abnormality, atraumatic  Eyes: negative  Neck: supple, symmetrical, trachea midline and no adenopathy  Lungs: Crackles at right base, otherwise clear bilaterally, ET tube in place  Heart: regular rate and rhythm, S1, S2 normal, no murmur, click, rub or gallop  Abdomen: soft, non-tender.  Bowel sounds normal. No masses,  no organomegaly  Extremities: extremities normal, atraumatic, no cyanosis or edema  Pulses: 2+ and symmetric  Skin: Skin color, texture, turgor normal. No rashes or lesions  Lymph nodes: Cervical, supraclavicular, and axillary nodes normal.  Neurologic: Grossly normal, sedated on the ventilator    Additional comments:None    Lab/Data Review: All lab results for the last 24 hours reviewed.   Recent Results (from the past 24 hour(s))   BLOOD GAS,CHEM8,LACTIC ACID POC    Collection Time: 02/10/23 12:59 AM   Result Value Ref Range    pH (POC) 7.19 (LL) 7.35 - 7.45      pCO2 (POC) 74.9 (H) 35.0 - 45.0 mmHg    pO2 (POC) 40 (LL) 75 - 100 mmHg    Sodium,  136 - 145 mmol/L    Potassium, POC 5.7 (H) 3.5 - 5.5 mmol/L    Calcium, ionized (POC) 1.18 1.12 - 1.32 mmol/L    Glucose,  (H) 65 - 100 mg/dL    Chloride,  98 - 107 MMOL/L    Creatinine, POC 1.80 (H) 0.6 - 1.3 MG/DL    eGFR (POC) 32 (L) >60 ml/min/1.73m2    Base deficit (POC) 2.4 mmol/L    HCO3 (POC) 28.3 (H) 19.0 - 28.0 mmol/L    CO2, POC 28 MMOL/L    Sample source Venous      Performed by Rani Castaneda     Lactic Acid (POC) 4.01 (HH) 0.40 - 2.00 mmol/L    Respiratory Rate PENDING     FIO2, L/min PENDING     Critical value read back ALHAZMI     O2 SAT 60 %   EKG, 12 LEAD, INITIAL    Collection Time: 02/10/23  1:01 AM   Result Value Ref Range    Ventricular Rate 106 BPM    Atrial Rate 106 BPM    P-R Interval 142 ms    QRS Duration 78 ms    Q-T Interval 332 ms    QTC Calculation (Bezet) 441 ms    Calculated P Axis 56 degrees    Calculated R Axis 82 degrees    Calculated T Axis 53 degrees    Diagnosis       Sinus tachycardia  Otherwise normal ECG  When compared with ECG of 27-JUL-2022 20:47,  No significant change was found  Confirmed by Pricila Cuevas MD, IONA ([id number]) on 2/10/2023 8:23:36 AM     CBC WITH AUTOMATED DIFF    Collection Time: 02/10/23  1:06 AM   Result Value Ref Range    WBC 10.9 3.6 - 11.0 K/uL    RBC 5.00 3.80 - 5.20 M/uL    HGB 14.0 11.5 - 16.0 g/dL    HCT 46.3 35.0 - 47.0 %    MCV 92.6 80.0 - 99.0 FL    MCH 28.0 26.0 - 34.0 PG    MCHC 30.2 30.0 - 36.5 g/dL    RDW 14.2 11.5 - 14.5 %    PLATELET 775 001 - 550 K/uL    MPV 9.5 8.9 - 12.9 FL    NRBC 0.0 0.0  WBC    ABSOLUTE NRBC 0.00 0.00 - 0.01 K/uL    NEUTROPHILS 78 (H) 32 - 75 %    LYMPHOCYTES 13 12 - 49 %    MONOCYTES 8 5 - 13 %    EOSINOPHILS 0 0 - 7 %    BASOPHILS 0 0 - 1 %    IMMATURE GRANULOCYTES 1 (H) 0 - 0.5 %    ABS. NEUTROPHILS 8.4 (H) 1.8 - 8.0 K/UL    ABS. LYMPHOCYTES 1.4 0.8 - 3.5 K/UL    ABS. MONOCYTES 0.9 0.0 - 1.0 K/UL    ABS. EOSINOPHILS 0.0 0.0 - 0.4 K/UL    ABS. BASOPHILS 0.0 0.0 - 0.1 K/UL    ABS. IMM. GRANS. 0.1 (H) 0.00 - 0.04 K/UL    DF AUTOMATED     METABOLIC PANEL, COMPREHENSIVE    Collection Time: 02/10/23  1:06 AM   Result Value Ref Range    Sodium 138 136 - 145 mmol/L    Potassium 5.7 (H) 3.5 - 5.1 mmol/L    Chloride 106 97 - 108 mmol/L    CO2 26 21 - 32 mmol/L    Anion gap 6 5 - 15 mmol/L    Glucose 197 (H) 65 - 100 mg/dL    BUN 18 6 - 20 mg/dL    Creatinine 2.27 (H) 0.55 - 1.02 mg/dL    BUN/Creatinine ratio 8 (L) 12 - 20      eGFR 25 (L) >60 ml/min/1.73m2    Calcium 7.8 (L) 8.5 - 10.1 mg/dL    Bilirubin, total 0.3 0.2 - 1.0 mg/dL    AST (SGOT) 71 (H) 15 - 37 U/L    ALT (SGPT) 63 12 - 78 U/L    Alk.  phosphatase 85 45 - 117 U/L    Protein, total 6.0 (L) 6.4 - 8.2 g/dL    Albumin 3.0 (L) 3.5 - 5.0 g/dL    Globulin 3.0 2.0 - 4.0 g/dL    A-G Ratio 1.0 (L) 1.1 - 2.2     TROPONIN-HIGH SENSITIVITY    Collection Time: 02/10/23  1:06 AM   Result Value Ref Range    Troponin-High Sensitivity 219 (HH) 0 - 51 ng/L   TSH 3RD GENERATION    Collection Time: 02/10/23  1:06 AM   Result Value Ref Range    TSH 1.10 0.36 - 3.74 uIU/mL   LACTIC ACID    Collection Time: 02/10/23  1:06 AM   Result Value Ref Range    Lactic acid 5.2 (HH) 0.4 - 2.0 mmol/L   COVID-19 RAPID TEST    Collection Time: 02/10/23  1:06 AM   Result Value Ref Range    COVID-19 rapid test Not Detected Not Detected     INFLUENZA A & B AG (RAPID TEST)    Collection Time: 02/10/23  1:06 AM   Result Value Ref Range Influenza A Antigen Negative Negative      Influenza B Antigen Negative Negative     URINALYSIS W/ REFLEX CULTURE    Collection Time: 02/10/23  1:27 AM    Specimen: Urine   Result Value Ref Range    Color Yellow/Straw      Appearance Clear Clear      Specific gravity 1.020 1.003 - 1.030      pH (UA) 5.0      Protein Negative Negative mg/dL    Glucose Negative Negative mg/dL    Ketone Negative Negative mg/dL    Bilirubin Negative Negative      Blood Negative Negative      Urobilinogen 0.1 (L) 0.2 - 1.0 EU/dL    Nitrites Negative Negative      Leukocyte Esterase Negative Negative      WBC 0-4 0 - 4 /hpf    RBC 0-5 0 - 5 /hpf    Epithelial cells Few Few /lpf    Bacteria Negative Negative /hpf    UA:UC IF INDICATED Culture not indicated by UA result Culture not indicated by UA result      Mucus Trace (A) Negative /lpf   DRUG SCREEN, URINE    Collection Time: 02/10/23  1:27 AM   Result Value Ref Range    AMPHETAMINES Negative Negative      BARBITURATES Negative Negative      BENZODIAZEPINES Positive (A) Negative      COCAINE Negative Negative      METHADONE Negative Negative      OPIATES Negative Negative      PCP(PHENCYCLIDINE) Negative Negative      THC (TH-CANNABINOL) Negative Negative      Drug screen comment        This test is a screen for drugs of abuse in a medical setting only (i.e., they are unconfirmed results and as such must not be used for non-medical purposes, e.g.,employment testing, legal testing). Due to its inherent nature, false positive (FP) and false negative (FN) results may be obtained. Therefore, if necessary for medical care, recommend confirmation of positive findings by GC/MS.      TROPONIN-HIGH SENSITIVITY    Collection Time: 02/10/23  2:41 AM   Result Value Ref Range    Troponin-High Sensitivity 251 (HH) 0 - 51 ng/L   LACTIC ACID    Collection Time: 02/10/23  3:39 AM   Result Value Ref Range    Lactic acid 4.3 (HH) 0.4 - 2.0 mmol/L   TROPONIN-HIGH SENSITIVITY    Collection Time: 02/10/23 7:40 AM   Result Value Ref Range    Troponin-High Sensitivity 266 (HH) 0 - 51 ng/L   LACTIC ACID    Collection Time: 02/10/23  7:40 AM   Result Value Ref Range    Lactic acid 2.7 (HH) 0.4 - 2.0 mmol/L   BLOOD GAS, ARTERIAL    Collection Time: 02/10/23  9:59 AM   Result Value Ref Range    pH 7.29 (L) 7.35 - 7.45      PCO2 49 (H) 35 - 45 mmHg    PO2 107 (H) 80 - 100 mmHg    O2 SATURATION 97 95 - 99 %    BICARBONATE 22 22 - 26 mmol/L    BASE DEFICIT 4.1 mmol/L    O2 METHOD VENT      FIO2 100 %    MODE ASSIST CONTROL      Tidal volume 420.0      SET RATE 16      PEEP/CPAP 5.0      Sample source Arterial      SITE Left Radial      DARRION'S TEST YES      Carboxy-Hgb 0.7 (L) 1 - 2 %    Methemoglobin 0.4 0 - 1.4 %    Oxyhemoglobin 96.2 95 - 99 %    Performed by Jamie Chi     TEMPERATURE 102.4     C REACTIVE PROTEIN, QT    Collection Time: 02/10/23 10:00 AM   Result Value Ref Range    C-Reactive protein 7.99 (H) 0.00 - 0.60 mg/dL   PROCALCITONIN    Collection Time: 02/10/23 10:00 AM   Result Value Ref Range    Procalcitonin 0.99 (H) 0 ng/mL   TROPONIN-HIGH SENSITIVITY    Collection Time: 02/10/23 10:00 AM   Result Value Ref Range    Troponin-High Sensitivity 269 (HH) 0 - 51 ng/L       Chest X-Ray:   CXR Results  (Last 48 hours)                 02/10/23 0056  XR CHEST PORT Final result    Impression:      Endotracheal tube and nasogastric tube appear to be in satisfactory position. Diminished lung volumes with moderately severe pulmonary edema pattern. Narrative:  EXAM:  XR CHEST PORT       INDICATION: ET tube placement       COMPARISON: August 27, 2022       TECHNIQUE: Portable chest AP view       FINDINGS: Endotracheal tube tip terminates just below the level the clavicular   heads. Nasogastric tube extends into the stomach. The cardiac silhouette is   within normal limits. There is moderately severe pulmonary edema. Lung volumes   are diminished. The visualized bones and upper abdomen are unremarkable. CT imaging:  CT Results  (Last 48 hours)                 02/10/23 0159  CT CHEST WO CONT Final result    Impression:  Endotracheal tube and nasogastric tube are in satisfactory position. Dense right   lower lobe consolidation may represent pneumonia or aspiration. Bilateral   dependent atelectasis. Stable incidental findings as detailed above. Narrative:  INDICATION: Unresponsive       COMPARISON: July 27, 2022       CONTRAST: None. TECHNIQUE:  5 mm axial images were obtained through the chest. Coronal and   sagittal reformats were generated. CT dose reduction was achieved through use   of a standardized protocol tailored for this examination and automatic exposure   control for dose modulation. The absence of intravenous contrast reduces the sensitivity for evaluation of   the mediastinum, tawanna, vasculature, and upper abdominal organs. FINDINGS:       CHEST WALL: No mass or axillary lymphadenopathy. THYROID: No nodule. MEDIASTINUM: No mass or lymphadenopathy. TAWANNA: No mass or lymphadenopathy. THORACIC AORTA: No aneurysm. MAIN PULMONARY ARTERY: Normal in caliber. TRACHEA/BRONCHI: Endotracheal tube in satisfactory position. ESOPHAGUS: Nasogastric tube extends into the stomach. HEART: Normal in size. Coronary artery calcium: present   PLEURA: No effusion or pneumothorax. LUNGS: Dense right lower lobe consolidation. Bilateral dependent atelectasis. 8   mm nodular density in the right lower lobe is not significantly changed. Mild   centrilobular emphysema. INCIDENTALLY IMAGED UPPER ABDOMEN: No significant abnormality in the   incidentally imaged upper abdomen. BONES: No destructive bone lesion. 02/10/23 0158  CT HEAD WO CONT Final result    Impression:  No acute intracranial process. Narrative:  EXAM: CT HEAD WO CONT       INDICATION: unresponsive       COMPARISON: 5/25/2022. CONTRAST: None.        TECHNIQUE: Unenhanced CT of the head was performed using 5 mm images. Brain and   bone windows were generated. Coronal and sagittal reformats. CT dose reduction   was achieved through use of a standardized protocol tailored for this   examination and automatic exposure control for dose modulation. FINDINGS:   The ventricles and sulci are normal in size, shape and configuration. There is   no significant white matter disease. There is no intracranial hemorrhage,   extra-axial collection, or mass effect. The basilar cisterns are open. No CT   evidence of acute infarct. The bone windows demonstrate no abnormalities. The visualized portions of the   paranasal sinuses and mastoid air cells are clear. PFTs:   PFT Results  (Last 3 results in the past 10 years)      None              Assessment:     Patient is a 22-year-old  female with a history of obesity, hypothyroidism, seizure disorder, GERD, and PTSD who presented to the hospital after being found unresponsive and was experiencing acute hypoxemic respiratory failure. Plan:     1.)  Acute hypoxemic respiratory failure  -Unclear etiology, certainly has a right lower lobe likely aspiration pneumonia. Could have had a drug overdose at home on home medications.   -UDS positive for benzos on admission  -Currently on AC//22/70%/10, wean off FiO2/PEEP for goal sats greater than 90% on the ventilator  -Repeat ABG in 1 hour  -Repeat ABG and chest x-ray in the morning  -Patient is on all the appropriate prophylactic measures  -Continue on propofol/fentanyl for sedation, wean off Precedex, RASS goal -1 to -2  -As vasopressor requirements improved, hopefully can plan for SAT/SBT sometime this weekend    2.)  Acute metabolic encephalopathy  -Suspect secondary to acute hypoxemic respiratory failure and pneumonia  -CT head negative on admission  -UDS positive for benzos  -Unclear if patient had any overdose of her home medications  -Sedation as above, slowly wean for SAT/SBT over the next 48 hours    3.)  Shock  -Suspect sepsis from right lower lobe pneumonia, lactate level slowly improving  -TTE currently pending  -Currently on Levophed at 14 mcg/min, wean for goal MAP greater than 65 mmHg  -If Levophed requirements or not improving, will add vasopressin 0.04 as well as stress dose hydrocortisone.  -Blood/sputum cultures are pending, continue on broad antibiotics with IV cefepime/vancomycin    4.)  Acute kidney injury  -Creatinine level up to 2.27 from 1.12 on admission, likely secondary to shock  -Continue to avoid nephrotoxic medications  -Status post 3 L isotonic fluids on admission, currently on normal saline at 100 cc/h  -Continue to maintain MAP greater than 65 mmHg with vasopressor support  -Repeat BMP now and in the morning    5.)  Lactic acidosis  -Lactate level 5.2 on admission, has trended down to 2.7, continue to trend every 6 hours until normalized  -Likely secondary to shock, continue to maintain MAP greater than 65 mmHg with vasopressor support  -Serum bicarb level normal today    6.)  NSTEMI  -Troponin level elevated at 269 today, TTE pending  -Cardiology input appreciated  -Repeat troponin levels every 6 hours until trending down    7.)  Hyperkalemia  -Potassium level 5.7 this morning, possibly due to acute kidney injury.   Repeat BMP pending.  -If still elevated, will need medical management      CODE STATUS: Full Code    Lines/Tubes: Right femoral CVC, PIV x3, ETT, Mohamud urinary catheter    Prophylaxis:  Stress Ulcer Protocol Active: Yes  Deep Vein Thrombosis Protocol Active: Yes  Nutrition: NPO for now given vasopressor requirements  Activity: bedrest      Disposition and Family:  Remain in the ICU    Total time spent with patient: 60 minutes      Natha Hodgkin, DO  [facility] (PAT)  2/10/2023

## 2023-02-10 NOTE — CONSULTS
Cardiology Consult    NAME: Shantal Abdi   :  1965   MRN:  [id number]     Date/Time:  2/10/2023 11:24 AM    Patient PCP: None  ________________________________________________________________________     Assessment/Plan:   Troponin leak, flat? NSTEMI,? Type II MI,. Repeat EKG, obtain echo,  aspirin. Sepsis/pneumonia    Hypotension, on norepinephrine     Seizure history, patient was foaming at the mouth    Hypothyroidism/Conejos's disease    History of tobacco use           []        High complexity decision making was performed        Subjective:   CHIEF COMPLAINT:   Unresponsive    REASON FOR CONSULT:  Troponin leak    HISTORY OF PRESENT ILLNESS:     Shantal Abdi is a 62 y.o. WHITE/NON- female who was found unresponsive: Called rescue squad for increased sleepiness. Apparently patient was foaming at the mouth. Patient did not respond to Narcan. Was intubated in the field and admitted. Found with troponin leak. Patient was hospitalized in  with atypical chest pain, had negative Cardiolite and normal echo. History of seizure disorder, muscle spasm and headaches. 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 History of tobacco use per old chart.         Past Medical History:   Diagnosis Date    DDD (degenerative disc disease), lumbar     Fatigue     GERD (gastroesophageal reflux disease)     Hypothyroidism     Psychiatric disorder     PTSD per patient    Seizures ([facility] [address]       Past Surgical History:   Procedure Laterality Date    HX APPENDECTOMY      HX  SECTION      HX CHOLECYSTECTOMY      HX HYSTERECTOMY       Allergies   Allergen Reactions    Augmentin [Amoxicillin-Pot Clavulanate] Nausea and Vomiting      Meds:  See below  Social History     Tobacco Use    Smoking status: Every Day     Packs/day: 0.50     Types: Cigarettes    Smokeless tobacco: Never   Substance Use Topics    Alcohol use: Not Currently     Alcohol/week: 0.0 standard drinks      Family History   Family history unknown: Yes       REVIEW OF SYSTEMS:     [x]         Unable to obtain  ROS due to ---   []         Total of 12 systems reviewed as follows:    Constitutional: negative fever, negative chills, negative weight loss  Eyes:   negative visual changes  ENT:   negative sore throat, tongue or lip swelling  Respiratory:  negative cough, negative dyspnea  Cards:  negative for chest pain, palpitations, lower extremity edema  GI:   negative for nausea, vomiting, diarrhea, and abdominal pain  Genitourinary: negative for frequency, dysuria  Integument:  negative for rash   Hematologic:  negative for easy bruising and gum/nose bleeding  Musculoskel: negative for myalgias,  back pain  Neurological:  negative for headaches, dizziness, vertigo, weakness  Behavl/Psych: negative for feelings of anxiety, depression     Pertinent Positives include :    Objective:      Physical Exam:    Last 24hrs VS reviewed since prior progress note. Most recent are:    Visit Vitals  BP (!) 127/92   Pulse 88   Temp (!) 101.6 °F (38.7 °C) Comment: tylenol supp   Resp 17   Ht 5' 7.99\" (1.727 m)   Wt 90.7 kg (200 lb)   SpO2 100%   Breastfeeding No   BMI 30.42 kg/m²       Intake/Output Summary (Last 24 hours) at 2/10/2023 1124  Last data filed at 2/10/2023 0801  Gross per 24 hour   Intake 4166.67 ml   Output 675 ml   Net 3491.67 ml        General Appearance: Middle aged female intubated and sedated  Ears/Nose/Mouth/Throat: Intubated and sedated. Neck: Supple. JVP within normal limits. Carotids good upstrokes, with no bruit. Chest: Lungs clear to auscultation bilaterally. Cardiovascular: Regular rate and rhythm, S1S2 normal, no murmur, rubs, gallops. Abdomen: Soft, non-tender, bowel sounds are active. No organomegaly. Extremities: No edema bilaterally. Femoral pulses +2, Distal Pulses +1. Skin: Warm and dry. Neuro: Intubated and sedated       []         Post-cath site without hematoma, bruit, tenderness, or thrill.   Distal pulses intact. Data:      Telemetry:    EKG:  []  No new EKG for review. EKG sinus tachycardia, without acute changes  CT head without acute findings  CT chest without acute findings    Prior to Admission medications    Medication Sig Start Date End Date Taking? Authorizing Provider   methocarbamoL (Robaxin-750) 750 mg tablet Take 1 Tablet by mouth four (4) times daily. 1/22/23   Paramjit Naylor MD   ondansetron (ZOFRAN ODT) 4 mg disintegrating tablet Take 1 Tablet by mouth every eight (8) hours as needed for Nausea or Vomiting. 1/22/23   Cristina Dominguez MD   lidocaine (Lidoderm) 5 % Apply patch to the affected area for 12 hours a day and remove for 12 hours a day. 11/8/22   Hattie Denny MD   diclofenac potassium (CATAFLAM) 50 mg tablet Take 1 Tablet by mouth three (3) times daily as needed for Pain. 10/19/22   Janett Mcardle, NP   levothyroxine (SYNTHROID) 50 mcg tablet Take 1 Tablet by mouth every morning. Patient not taking: Reported on 10/19/2022 9/17/22   GILDARDO Cullen   pantoprazole (PROTONIX) 40 mg tablet Take 1 Tablet by mouth Before breakfast and dinner. Patient not taking: Reported on 10/19/2022 9/17/22   GILDARDO Cullen   clonazePAM (KlonoPIN) 0.5 mg tablet Take 0.5 mg by mouth three (3) times daily. 9/6/22   Provider, Historical   ergocalciferol (ERGOCALCIFEROL) 1,250 mcg (50,000 unit) capsule Take 1 Capsule by mouth every seven (7) days. Every Sunday  Patient not taking: Reported on 10/19/2022 7/18/22   Provider, Historical   QUEtiapine (SEROquel) 300 mg tablet Take 300 mg by mouth nightly. 7/13/22   Provider, Historical   traZODone (DESYREL) 100 mg tablet Take 100-200 mg by mouth At bedtime. 2/1/22   Other, MD Kendall   gabapentin (NEURONTIN) 600 mg tablet Take 600 mg by mouth four (4) times daily.     Provider, Historical       Recent Results (from the past 24 hour(s))   BLOOD GAS,CHEM8,LACTIC ACID POC    Collection Time: 02/10/23 12:59 AM   Result Value Ref Range    pH (POC) 7.19 (LL) 7.35 - 7.45      pCO2 (POC) 74.9 (H) 35.0 - 45.0 mmHg    pO2 (POC) 40 (LL) 75 - 100 mmHg    Sodium,  136 - 145 mmol/L    Potassium, POC 5.7 (H) 3.5 - 5.5 mmol/L    Calcium, ionized (POC) 1.18 1.12 - 1.32 mmol/L    Glucose,  (H) 65 - 100 mg/dL    Chloride,  98 - 107 MMOL/L    Creatinine, POC 1.80 (H) 0.6 - 1.3 MG/DL    eGFR (POC) 32 (L) >60 ml/min/1.73m2    Base deficit (POC) 2.4 mmol/L    HCO3 (POC) 28.3 (H) 19.0 - 28.0 mmol/L    CO2, POC 28 MMOL/L    Sample source Venous      Performed by Fam Bennett     Lactic Acid (POC) 4.01 (HH) 0.40 - 2.00 mmol/L    Respiratory Rate PENDING     FIO2, L/min PENDING     Critical value read back ALHAZMI     O2 SAT 60 %   EKG, 12 LEAD, INITIAL    Collection Time: 02/10/23  1:01 AM   Result Value Ref Range    Ventricular Rate 106 BPM    Atrial Rate 106 BPM    P-R Interval 142 ms    QRS Duration 78 ms    Q-T Interval 332 ms    QTC Calculation (Bezet) 441 ms    Calculated P Axis 56 degrees    Calculated R Axis 82 degrees    Calculated T Axis 53 degrees    Diagnosis       Sinus tachycardia  Otherwise normal ECG  When compared with ECG of 27-JUL-2022 20:47,  No significant change was found  Confirmed by Ryan Styles MD, IONA ([id number]) on 2/10/2023 8:23:36 AM     CBC WITH AUTOMATED DIFF    Collection Time: 02/10/23  1:06 AM   Result Value Ref Range    WBC 10.9 3.6 - 11.0 K/uL    RBC 5.00 3.80 - 5.20 M/uL    HGB 14.0 11.5 - 16.0 g/dL    HCT 46.3 35.0 - 47.0 %    MCV 92.6 80.0 - 99.0 FL    MCH 28.0 26.0 - 34.0 PG    MCHC 30.2 30.0 - 36.5 g/dL    RDW 14.2 11.5 - 14.5 %    PLATELET 892 922 - 679 K/uL    MPV 9.5 8.9 - 12.9 FL    NRBC 0.0 0.0  WBC    ABSOLUTE NRBC 0.00 0.00 - 0.01 K/uL    NEUTROPHILS 78 (H) 32 - 75 %    LYMPHOCYTES 13 12 - 49 %    MONOCYTES 8 5 - 13 %    EOSINOPHILS 0 0 - 7 %    BASOPHILS 0 0 - 1 %    IMMATURE GRANULOCYTES 1 (H) 0 - 0.5 %    ABS. NEUTROPHILS 8.4 (H) 1.8 - 8.0 K/UL    ABS. LYMPHOCYTES 1.4 0.8 - 3.5 K/UL    ABS. MONOCYTES 0.9 0.0 - 1.0 K/UL    ABS. EOSINOPHILS 0.0 0.0 - 0.4 K/UL    ABS. BASOPHILS 0.0 0.0 - 0.1 K/UL    ABS. IMM. GRANS. 0.1 (H) 0.00 - 0.04 K/UL    DF AUTOMATED     METABOLIC PANEL, COMPREHENSIVE    Collection Time: 02/10/23  1:06 AM   Result Value Ref Range    Sodium 138 136 - 145 mmol/L    Potassium 5.7 (H) 3.5 - 5.1 mmol/L    Chloride 106 97 - 108 mmol/L    CO2 26 21 - 32 mmol/L    Anion gap 6 5 - 15 mmol/L    Glucose 197 (H) 65 - 100 mg/dL    BUN 18 6 - 20 mg/dL    Creatinine 2.27 (H) 0.55 - 1.02 mg/dL    BUN/Creatinine ratio 8 (L) 12 - 20      eGFR 25 (L) >60 ml/min/1.73m2    Calcium 7.8 (L) 8.5 - 10.1 mg/dL    Bilirubin, total 0.3 0.2 - 1.0 mg/dL    AST (SGOT) 71 (H) 15 - 37 U/L    ALT (SGPT) 63 12 - 78 U/L    Alk.  phosphatase 85 45 - 117 U/L    Protein, total 6.0 (L) 6.4 - 8.2 g/dL    Albumin 3.0 (L) 3.5 - 5.0 g/dL    Globulin 3.0 2.0 - 4.0 g/dL    A-G Ratio 1.0 (L) 1.1 - 2.2     TROPONIN-HIGH SENSITIVITY    Collection Time: 02/10/23  1:06 AM   Result Value Ref Range    Troponin-High Sensitivity 219 (HH) 0 - 51 ng/L   TSH 3RD GENERATION    Collection Time: 02/10/23  1:06 AM   Result Value Ref Range    TSH 1.10 0.36 - 3.74 uIU/mL   LACTIC ACID    Collection Time: 02/10/23  1:06 AM   Result Value Ref Range    Lactic acid 5.2 (HH) 0.4 - 2.0 mmol/L   COVID-19 RAPID TEST    Collection Time: 02/10/23  1:06 AM   Result Value Ref Range    COVID-19 rapid test Not Detected Not Detected     INFLUENZA A & B AG (RAPID TEST)    Collection Time: 02/10/23  1:06 AM   Result Value Ref Range    Influenza A Antigen Negative Negative      Influenza B Antigen Negative Negative     URINALYSIS W/ REFLEX CULTURE    Collection Time: 02/10/23  1:27 AM    Specimen: Urine   Result Value Ref Range    Color Yellow/Straw      Appearance Clear Clear      Specific gravity 1.020 1.003 - 1.030      pH (UA) 5.0      Protein Negative Negative mg/dL    Glucose Negative Negative mg/dL    Ketone Negative Negative mg/dL    Bilirubin Negative Negative      Blood Negative Negative      Urobilinogen 0.1 (L) 0.2 - 1.0 EU/dL    Nitrites Negative Negative      Leukocyte Esterase Negative Negative      WBC 0-4 0 - 4 /hpf    RBC 0-5 0 - 5 /hpf    Epithelial cells Few Few /lpf    Bacteria Negative Negative /hpf    UA:UC IF INDICATED Culture not indicated by UA result Culture not indicated by UA result      Mucus Trace (A) Negative /lpf   DRUG SCREEN, URINE    Collection Time: 02/10/23  1:27 AM   Result Value Ref Range    AMPHETAMINES Negative Negative      BARBITURATES Negative Negative      BENZODIAZEPINES Positive (A) Negative      COCAINE Negative Negative      METHADONE Negative Negative      OPIATES Negative Negative      PCP(PHENCYCLIDINE) Negative Negative      THC (TH-CANNABINOL) Negative Negative      Drug screen comment        This test is a screen for drugs of abuse in a medical setting only (i.e., they are unconfirmed results and as such must not be used for non-medical purposes, e.g.,employment testing, legal testing). Due to its inherent nature, false positive (FP) and false negative (FN) results may be obtained. Therefore, if necessary for medical care, recommend confirmation of positive findings by GC/MS.      TROPONIN-HIGH SENSITIVITY    Collection Time: 02/10/23  2:41 AM   Result Value Ref Range    Troponin-High Sensitivity 251 (HH) 0 - 51 ng/L   LACTIC ACID    Collection Time: 02/10/23  3:39 AM   Result Value Ref Range    Lactic acid 4.3 (HH) 0.4 - 2.0 mmol/L   TROPONIN-HIGH SENSITIVITY    Collection Time: 02/10/23  7:40 AM   Result Value Ref Range    Troponin-High Sensitivity 266 (HH) 0 - 51 ng/L   LACTIC ACID    Collection Time: 02/10/23  7:40 AM   Result Value Ref Range    Lactic acid 2.7 (HH) 0.4 - 2.0 mmol/L   BLOOD GAS, ARTERIAL    Collection Time: 02/10/23  9:59 AM   Result Value Ref Range    pH 7.29 (L) 7.35 - 7.45      PCO2 49 (H) 35 - 45 mmHg    PO2 107 (H) 80 - 100 mmHg    O2 SATURATION 97 95 - 99 %    BICARBONATE 22 22 - 26 mmol/L BASE DEFICIT 4.1 mmol/L    O2 METHOD VENT      FIO2 100 %    MODE ASSIST CONTROL      Tidal volume 420.0      SET RATE 16      PEEP/CPAP 5.0      Sample source Arterial      SITE Left Radial      DARRION'S TEST YES      Carboxy-Hgb 0.7 (L) 1 - 2 %    Methemoglobin 0.4 0 - 1.4 %    Oxyhemoglobin 96.2 95 - 99 %    Performed by Raul Presume     TEMPERATURE 102.4     C REACTIVE PROTEIN, QT    Collection Time: 02/10/23 10:00 AM   Result Value Ref Range    C-Reactive protein 7.99 (H) 0.00 - 0.60 mg/dL   TROPONIN-HIGH SENSITIVITY    Collection Time: 02/10/23 10:00 AM   Result Value Ref Range    Troponin-High Sensitivity 269 (HH) 0 - 51 ng/L        [place] MD

## 2023-02-10 NOTE — H&P
History and Physical    Patient: Elena Aggarwal MRN: [id number]  SSN: [id number]    YOB: 1965  Age: 62 y.o. Sex: female      Subjective:      Elena Aggarwal is a 62 y.o. female with PMH of hypothyroidism, seizure and other medical problem as below. She presented to ED after found unresponsive. Per roommate she has been sleeping since yesterday morning, and was unable to wake her up. EMS arrived and patient was hypoxic in the 30s, non rebreather placed on patient improved to 70s, narcan given with no change. Patient was then intubated in the field. Otherwise no additional history obtained as patient was intubated and sedated. In the ED, noted hypotensive and pressors was started after IVF resuscitation. Noted lactic acidosis, also elevated troponin. TSH within normal limits. Urinalysis not indicative of UTI. Chest X-ray showed severe pulmonary edema; CT chest showed right lower lobe pneumonia. Chart review: none    Past Medical History:   Diagnosis Date    DDD (degenerative disc disease), lumbar     Fatigue     GERD (gastroesophageal reflux disease)     Hypothyroidism     Psychiatric disorder     PTSD per patient    Seizures ([facility] [address]      Family History   Family history unknown: Yes     Social History     Tobacco Use    Smoking status: Every Day     Packs/day: 0.50     Types: Cigarettes    Smokeless tobacco: Never   Substance Use Topics    Alcohol use: Not Currently     Alcohol/week: 0.0 standard drinks        Objective:     Physical Exam:   General: Intubated and sedated  Eye: Omitted as patient is unable to cooperate. Throat and Neck: Omitted as patient is unable to cooperate. Lung: decreasd lung sounds on auscultation bilaterally  Heart: regular rate and rhythm,   Abdomen: soft, non-tender. Bowel sounds normal. No masses,  Extremities: No LE edema. atraumatic  Skin: mottling   Neurologic: Omitted as patient is unable to cooperate.     Psychiatric: Omitted as patient is unable to cooperate. Most recent lab work and imaging results reviewed in [address] [place] [address]. Assessment and plan:   # Community acquired pneumonia   - Blood culture and sputum culture ordered. - Screen MRSA, Legionella Ag and Mycoplasma Ag   - Start antibiotics empirically: IV Vancomycin & IV cefepime    # Sepsis, severe  - Source: pneumonia   - Antibiotics empirically: IV vancomycin and cefepime  - Fluid resuscitation  - IV pressors. - Blood culture   - Trend LA  - Procalcitonin    - Consult ID     # Acute respiratory failure with hypoxia and hypercapnia  - Secondary to pneumonia   - Complicated by respiratory acidosis. - Continue mechanical vent, wean as tolerated  - Management as above. - Consult pulmonary     # Elevated troponin  - Unable to evaluate SAMINA score as patient is intubated  - ECHO   - trend troponin   - Consult cardiology     # Hypothyroidism  - Uncertain home medications. - Tsh within normal limits     # Seizure disorder  - Uncertain home medications. # Social Determents of health: None    # Full code by default, need further clarification    # Medication reconciliation: Medication list reviewed on Epic and/or outside documentation. Not reviewed with patient. However, medication reconciliation incomplete, appreciate assistance from pharmacy or nursing staff. I spent 40 minutes critical care time attending to this patient, including direct patient care, coordination of care with nurses, reviewing charts, imaging and results.       Signed By: Dolores Carmen MD     February 10, 2023

## 2023-02-10 NOTE — PROGRESS NOTES
Comprehensive Nutrition Assessment    Type and Reason for Visit: Initial (vent)    Nutrition Recommendations/Plan:   NPO  As medically able, Initiate TF via EN access of Vital HP at 20ml/hr, advance by 10ml q4h to goal of 60ml/hr, continuous  Flush with 50ml water q4hrs        Provides  1440 kcals (105%), 126 g pro (98%), and 1504 ml fluids (100%)   Please document TF initiation, rate, flushes, prosource provision, and tolerance     Malnutrition Assessment:  Malnutrition Status:  No malnutrition (02/10/23 1222)    Context:  Acute illness     Findings of the 6 clinical characteristics of malnutrition:   Energy Intake:  No significant decrease in energy intake (NPO X1-2d)  Weight Loss:  No significant weight loss     Body Fat Loss:  No significant body fat loss,     Muscle Mass Loss:  No significant muscle mass loss,    Fluid Accumulation:  No significant fluid accumulation,        Nutrition Assessment:    Admitted for unresponsive, intubated in field. +ANASTACIO, CXR finding pulm edema and RLL pna. Work-up continues. Pressors decreasing, min sedation requirements. Discussed with RN, likely start TF tomorrow-- RD consulted in PM for TF, will initiated. Recs above. Labs: K+ 5.7, Cr 2.27, , AST 71, Alb 3.0. Meds: 0.9% Na Cl at 100ml/hr, Precedex, lovenox, levophed at 11mcg/min. Nutrition Related Findings:    NFPE with no acute findings. No hx dysphagia. No N/V/D/C, last BM pta. RD palpated non-pitting generalized edema. Wound Type: None    Current Nutrition Intake & Therapies:  Average Meal Intake: NPO  Average Supplement Intake: NPO  DIET NPO  ADULT TUBE FEEDING Orogastric; Peptide Based High Protein; Delivery Method: Continuous; Continuous Initial Rate (mL/hr): 20; Continuous Advance Tube Feeding: Yes; Advancement Volume (mL/hr): 10; Advancement Frequency: Q 4 hours; Continuous Goal Ra. ..     Anthropometric Measures:  Height: 5' 7.99\" (172.7 cm)  Ideal Body Weight (IBW): 140 lbs (64 kg)     Current Body Wt:  102.7 kg (226 lb 6.6 oz) (RD obtained), 161.7 % IBW. Bed scale  Current BMI (kg/m2): 34.4  Usual Body Weight: 90.7 kg (200 lb) (EMR hx)  % Weight Change (Calculated): 13.2  Weight Adjustment:  (EDW 98.2kg; BMI 32.9)                 BMI Category: Obese class 1 (BMI 30.0-34. 9)  Wt Readings from Last 10 Encounters:   02/10/23 90.7 kg (200 lb)   01/22/23 90.7 kg (200 lb)   01/21/23 95.3 kg (210 lb)   01/19/23 90.7 kg (200 lb)   11/08/22 90.7 kg (200 lb)   10/19/22 90.7 kg (200 lb)   09/27/22 90.7 kg (200 lb)   09/23/22 90.7 kg (200 lb)   09/16/22 99.8 kg (220 lb)   08/27/22 90.7 kg (200 lb)   Likely stated vs est wts in hx, RD obtained updated at admit. Estimated Daily Nutrient Needs:  Energy Requirements Based On: Kcal/kg  Weight Used for Energy Requirements: Current  Energy (kcal/day): 1080-1375kcals ( 11-14kcals/kg per [facility] guidelines)  Weight Used for Protein Requirements: Ideal  Protein (g/day): 128g (2.0g/kg IBW)  Method Used for Fluid Requirements: Other (comment)  Fluid (ml/day): 1500ml adult min    Nutrition Diagnosis:   Inadequate oral intake related to impaired respiratory function as evidenced by intubation    Nutrition Interventions:   Food and/or Nutrient Delivery: Continue NPO, Start tube feeding  Nutrition Education/Counseling: No recommendations at this time, Education not appropriate  Coordination of Nutrition Care: Continue to monitor while inpatient  Plan of Care discussed with: RN    Goals:     Goals: Initiate nutrition support, Tolerate nutrition support at goal rate, by next RD assessment       Nutrition Monitoring and Evaluation:   Behavioral-Environmental Outcomes: None identified  Food/Nutrient Intake Outcomes: Enteral nutrition intake/tolerance  Physical Signs/Symptoms Outcomes: Weight, Hemodynamic status, Biochemical data, Fluid status or edema    Discharge Planning:     Too soon to determine    [employer]: Ext [phone], or via [employer]

## 2023-02-10 NOTE — PROGRESS NOTES
Reason for Admission:   Severe sepsis ([facility] [address] Acute respiratory failure with hypoxia (HCC)                    RUR Score:    16% Moderate              PCP: First and Last name:   None     Name of Practice:    Are you a current patient: Yes/No:    Approximate date of last visit:    Can you participate in a virtual visit if needed:     Do you (patient/family) have any concerns for transition/discharge? Plan for utilizing home health:       Current Advanced Directive/Advance Care Plan:  Full Code      Healthcare Decision Maker:   Click here to complete [address] including selection of the Healthcare Decision Maker Relationship (ie \"Primary\")  FULL Code. Patient has an adult son (Praneeth Lam [place]). Transition of Care Plan:          Spoke w/friend who informed writer of her son Kuldeep Potter, living in [place]. Asked the friend if he's able to check her cell phone for her son's number \"no. I don't mess with her phone. \"      Will request a [employer] to locate the patient's son. Friend further stated \"she was asleep for 14 hours b/c the dogs kept her up. I didn't think anything of it and when I tried to wake her she didn't wake-that's when I called the ambulance. \"     Currently vented. Patient and friend reside together in a single level home. No established PCP. Amador Schaeffer ([address]) to  all medication. Friend reports she's independent w/all ADL's & IADL's and requires no assistance. Care Management Interventions  PCP Verified by CM: No  Mode of Transport at Discharge:  Other (see comment)  Transition of Care Consult (CM Consult): Discharge Planning  Support Systems: Child(trever)  Confirm Follow Up Transport: Other (see comment)  Discharge Location  Patient Expects to be Discharged to[de-identified] [address] [facility], [facility]

## 2023-02-10 NOTE — ED PROVIDER NOTES
[address]  EMERGENCY DEPARTMENT ENCOUNTER NOTE        Date: 2/10/2023  Patient Name: Abel Banda      History of Presenting Illness     Chief Complaint   Patient presents with    Unresponsive       History Provided By: EMS    HPI: Abel Banda, 62 y.o. female with PMH of seizure disorder, hypothyroidism, GERD, degenerative disc disease, and PTSD comes by ambulance for altered mental status. Patient lives with a roommate who states that the patient went to sleep around 8 AM.  She has been sleeping all day and due to not being aroused throughout the day he called the ambulance. EMS arrived to the scene. Patient was noted to be having foaming of the mouth. She is unresponsive with sats in the 30s. She got intubated in the field with improvement of her oxygen saturation. Prior to intubation EMS attempted trial of Narcan that was unsuccessful. Roommate is unsure at the patient use any substances. No reported recent illnesses.       PCP: None    Current Facility-Administered Medications   Medication Dose Route Frequency Provider Last Rate Last Admin    dexmedeTOMidine in 0.9 % NaCl (PRECEDEX) 400 mcg/100 mL (4 mcg/mL) infusion soln  0.1-1.5 mcg/kg/hr IntraVENous TITRATE Hola Arnold MD 20.4 mL/hr at 02/10/23 0245 0.9 mcg/kg/hr at 02/10/23 0245    NOREPINephrine (LEVOPHED) 8 mg in 0.9% NS 250ml infusion  0.5-30 mcg/min IntraVENous TITRATE Hola Arnold MD 37.5 mL/hr at 02/10/23 0215 20 mcg/min at 02/10/23 0215    ketamine (KETALAR) 10 mg/mL injection 25 mg  25 mg IntraVENous ONCE Marisela Arias MD        vancomycin (VANCOCIN) 2,000 mg in 0.9% sodium chloride 500 mL IVPB  2,000 mg IntraVENous ONCE Marisela Arias MD        cefepime (MAXIPIME) 2 g in 0.9% sodium chloride (MBP/ADV) 100 mL MBP  2 g IntraVENous Q12H Hola Arnold MD        sodium chloride (NS) flush 5-40 mL  5-40 mL IntraVENous Taina Painter MD        sodium chloride (NS) flush 5-40 mL  5-40 mL IntraVENous PRN Leonor Sandy MD        acetaminophen (TYLENOL) tablet 650 mg  650 mg Oral Q6H PRN Tawanna Arias MD        Or    acetaminophen (TYLENOL) suppository 650 mg  650 mg Rectal Q6H PRN Leonor Sandy MD        polyethylene glycol (MIRALAX) packet 17 g  17 g Oral DAILY PRN Leonor Sandy MD        ondansetron (ZOFRAN ODT) tablet 4 mg  4 mg Oral Q8H PRN Leonor Sandy MD        Or    ondansetron (ZOFRAN) injection 4 mg  4 mg IntraVENous Q6H PRN Leonor Sandy MD        enoxaparin (LOVENOX) injection 40 mg  40 mg SubCUTAneous DAILY Tawanna Arias MD        0.9% sodium chloride infusion  100 mL/hr IntraVENous CONTINUOUS Tawanna Arias MD         Current Outpatient Medications   Medication Sig Dispense Refill    methocarbamoL (Robaxin-750) 750 mg tablet Take 1 Tablet by mouth four (4) times daily. 20 Tablet 0    ondansetron (ZOFRAN ODT) 4 mg disintegrating tablet Take 1 Tablet by mouth every eight (8) hours as needed for Nausea or Vomiting. 15 Tablet 0    lidocaine (Lidoderm) 5 % Apply patch to the affected area for 12 hours a day and remove for 12 hours a day. 10 Each 0    diclofenac potassium (CATAFLAM) 50 mg tablet Take 1 Tablet by mouth three (3) times daily as needed for Pain. 12 Tablet 0    levothyroxine (SYNTHROID) 50 mcg tablet Take 1 Tablet by mouth every morning. (Patient not taking: Reported on 10/19/2022) 30 Tablet 0    pantoprazole (PROTONIX) 40 mg tablet Take 1 Tablet by mouth Before breakfast and dinner. (Patient not taking: Reported on 10/19/2022) 30 Tablet 0    clonazePAM (KlonoPIN) 0.5 mg tablet Take 0.5 mg by mouth three (3) times daily. ergocalciferol (ERGOCALCIFEROL) 1,250 mcg (50,000 unit) capsule Take 1 Capsule by mouth every seven (7) days. Every Sunday (Patient not taking: Reported on 10/19/2022)      QUEtiapine (SEROquel) 300 mg tablet Take 300 mg by mouth nightly. traZODone (DESYREL) 100 mg tablet Take 100-200 mg by mouth At bedtime.       gabapentin (NEURONTIN) 600 mg tablet Take 600 mg by mouth four (4) times daily. Past History     Past Medical History:  Past Medical History:   Diagnosis Date    DDD (degenerative disc disease), lumbar     Fatigue     GERD (gastroesophageal reflux disease)     Hypothyroidism     Psychiatric disorder     PTSD per patient    Seizures ([facility] [address]        Past Surgical History:  Past Surgical History:   Procedure Laterality Date    HX APPENDECTOMY      HX  SECTION      HX CHOLECYSTECTOMY      HX HYSTERECTOMY         Family History:  Family History   Family history unknown: Yes       Social History:  Social History     Tobacco Use    Smoking status: Every Day     Packs/day: 0.50     Types: Cigarettes    Smokeless tobacco: Never   Vaping Use    Vaping Use: Never used   Substance Use Topics    Alcohol use: Not Currently     Alcohol/week: 0.0 standard drinks    Drug use: Never       Allergies: Allergies   Allergen Reactions    Augmentin [Amoxicillin-Pot Clavulanate] Nausea and Vomiting         Review of Systems     Review of Systems    A 10 point review of system was performed and was negative except as noted above in HPI    Physical Exam     Physical Exam  Vitals and nursing note reviewed. Constitutional:       Comments: Unresponsive middle-aged obese  female   HENT:      Head: Normocephalic and atraumatic. Eyes:      Conjunctiva/sclera: Conjunctivae normal.      Pupils: Pupils are equal, round, and reactive to light. Cardiovascular:      Rate and Rhythm: Regular rhythm. Tachycardia present. Pulmonary:      Comments: Intubated, no spontaneous respiratory effort. Adequate air entry bilaterally. Abdominal:      General: There is no distension. Palpations: Abdomen is soft. Tenderness: There is no abdominal tenderness. Musculoskeletal:         General: No swelling or tenderness. Cervical back: Neck supple. No rigidity. Right lower leg: No edema. Left lower leg: No edema.    Lymphadenopathy: Cervical: No cervical adenopathy. Neurological:      Comments: Unresponsive, no spontaneous activity       Lab and Diagnostic Study Results     Labs -     Recent Results (from the past 12 hour(s))   BLOOD GAS,CHEM8,LACTIC ACID POC    Collection Time: 02/10/23 12:59 AM   Result Value Ref Range    pH (POC) 7.19 (LL) 7.35 - 7.45      pCO2 (POC) 74.9 (H) 35.0 - 45.0 mmHg    pO2 (POC) 40 (LL) 75 - 100 mmHg    Sodium,  136 - 145 mmol/L    Potassium, POC 5.7 (H) 3.5 - 5.5 mmol/L    Calcium, ionized (POC) 1.18 1.12 - 1.32 mmol/L    Glucose,  (H) 65 - 100 mg/dL    Chloride,  98 - 107 MMOL/L    Creatinine, POC 1.80 (H) 0.6 - 1.3 MG/DL    eGFR (POC) 32 (L) >60 ml/min/1.73m2    Base deficit (POC) 2.4 mmol/L    HCO3 (POC) 28.3 (H) 19.0 - 28.0 mmol/L    CO2, POC 28 MMOL/L    Sample source Venous      Performed by Cindy Campbell     Lactic Acid (POC) 4.01 (HH) 0.40 - 2.00 mmol/L    Respiratory Rate PENDING     FIO2, L/min PENDING     Critical value read back ALHAZMI     O2 SAT 60 %   CBC WITH AUTOMATED DIFF    Collection Time: 02/10/23  1:06 AM   Result Value Ref Range    WBC 10.9 3.6 - 11.0 K/uL    RBC 5.00 3.80 - 5.20 M/uL    HGB 14.0 11.5 - 16.0 g/dL    HCT 46.3 35.0 - 47.0 %    MCV 92.6 80.0 - 99.0 FL    MCH 28.0 26.0 - 34.0 PG    MCHC 30.2 30.0 - 36.5 g/dL    RDW 14.2 11.5 - 14.5 %    PLATELET 371 240 - 471 K/uL    MPV 9.5 8.9 - 12.9 FL    NRBC 0.0 0.0  WBC    ABSOLUTE NRBC 0.00 0.00 - 0.01 K/uL    NEUTROPHILS 78 (H) 32 - 75 %    LYMPHOCYTES 13 12 - 49 %    MONOCYTES 8 5 - 13 %    EOSINOPHILS 0 0 - 7 %    BASOPHILS 0 0 - 1 %    IMMATURE GRANULOCYTES 1 (H) 0 - 0.5 %    ABS. NEUTROPHILS 8.4 (H) 1.8 - 8.0 K/UL    ABS. LYMPHOCYTES 1.4 0.8 - 3.5 K/UL    ABS. MONOCYTES 0.9 0.0 - 1.0 K/UL    ABS. EOSINOPHILS 0.0 0.0 - 0.4 K/UL    ABS. BASOPHILS 0.0 0.0 - 0.1 K/UL    ABS. IMM.  GRANS. 0.1 (H) 0.00 - 0.04 K/UL    DF AUTOMATED     METABOLIC PANEL, COMPREHENSIVE    Collection Time: 02/10/23  1:06 AM   Result Value Ref Range    Sodium 138 136 - 145 mmol/L    Potassium 5.7 (H) 3.5 - 5.1 mmol/L    Chloride 106 97 - 108 mmol/L    CO2 26 21 - 32 mmol/L    Anion gap 6 5 - 15 mmol/L    Glucose 197 (H) 65 - 100 mg/dL    BUN 18 6 - 20 mg/dL    Creatinine 2.27 (H) 0.55 - 1.02 mg/dL    BUN/Creatinine ratio 8 (L) 12 - 20      eGFR 25 (L) >60 ml/min/1.73m2    Calcium 7.8 (L) 8.5 - 10.1 mg/dL    Bilirubin, total 0.3 0.2 - 1.0 mg/dL    AST (SGOT) 71 (H) 15 - 37 U/L    ALT (SGPT) 63 12 - 78 U/L    Alk.  phosphatase 85 45 - 117 U/L    Protein, total 6.0 (L) 6.4 - 8.2 g/dL    Albumin 3.0 (L) 3.5 - 5.0 g/dL    Globulin 3.0 2.0 - 4.0 g/dL    A-G Ratio 1.0 (L) 1.1 - 2.2     TROPONIN-HIGH SENSITIVITY    Collection Time: 02/10/23  1:06 AM   Result Value Ref Range    Troponin-High Sensitivity 219 (HH) 0 - 51 ng/L   TSH 3RD GENERATION    Collection Time: 02/10/23  1:06 AM   Result Value Ref Range    TSH 1.10 0.36 - 3.74 uIU/mL   LACTIC ACID    Collection Time: 02/10/23  1:06 AM   Result Value Ref Range    Lactic acid 5.2 (HH) 0.4 - 2.0 mmol/L   COVID-19 RAPID TEST    Collection Time: 02/10/23  1:06 AM   Result Value Ref Range    COVID-19 rapid test Not Detected Not Detected     INFLUENZA A & B AG (RAPID TEST)    Collection Time: 02/10/23  1:06 AM   Result Value Ref Range    Influenza A Antigen Negative Negative      Influenza B Antigen Negative Negative     BLOOD GAS, ARTERIAL    Collection Time: 02/10/23  1:11 AM   Result Value Ref Range    pH 7.22 (LL) 7.35 - 7.45      PCO2 60 (H) 35 - 45 mmHg    PO2 50 (LL) 80 - 100 mmHg    O2 SATURATION 84 (L) 95 - 99 %    BICARBONATE 24 22 - 26 mmol/L    BASE DEFICIT 4.6 mmol/L    O2 METHOD VENT      FIO2 100 %    MODE Assist Control/Volume Control      Tidal volume 450      SET RATE 14      PEEP/CPAP 5      Sample source Arterial      SITE Right Brachial      DARRION'S TEST PENDING    URINALYSIS W/ REFLEX CULTURE    Collection Time: 02/10/23  1:27 AM    Specimen: Urine   Result Value Ref Range    Color Yellow/Straw      Appearance Clear Clear      Specific gravity 1.020 1.003 - 1.030      pH (UA) 5.0      Protein Negative Negative mg/dL    Glucose Negative Negative mg/dL    Ketone Negative Negative mg/dL    Bilirubin Negative Negative      Blood Negative Negative      Urobilinogen 0.1 (L) 0.2 - 1.0 EU/dL    Nitrites Negative Negative      Leukocyte Esterase Negative Negative      WBC 0-4 0 - 4 /hpf    RBC 0-5 0 - 5 /hpf    Epithelial cells Few Few /lpf    Bacteria Negative Negative /hpf    UA:UC IF INDICATED Culture not indicated by UA result Culture not indicated by UA result      Mucus Trace (A) Negative /lpf   DRUG SCREEN, URINE    Collection Time: 02/10/23  1:27 AM   Result Value Ref Range    AMPHETAMINES Negative Negative      BARBITURATES Negative Negative      BENZODIAZEPINES Positive (A) Negative      COCAINE Negative Negative      METHADONE Negative Negative      OPIATES Negative Negative      PCP(PHENCYCLIDINE) Negative Negative      THC (TH-CANNABINOL) Negative Negative      Drug screen comment        This test is a screen for drugs of abuse in a medical setting only (i.e., they are unconfirmed results and as such must not be used for non-medical purposes, e.g.,employment testing, legal testing). Due to its inherent nature, false positive (FP) and false negative (FN) results may be obtained. Therefore, if necessary for medical care, recommend confirmation of positive findings by GC/MS. TROPONIN-HIGH SENSITIVITY    Collection Time: 02/10/23  2:41 AM   Result Value Ref Range    Troponin-High Sensitivity 251 (HH) 0 - 51 ng/L       Radiologic Studies -   [unfilled]  CT Results  (Last 48 hours)                 02/10/23 0159  CT CHEST WO CONT Final result    Impression:  Endotracheal tube and nasogastric tube are in satisfactory position. Dense right   lower lobe consolidation may represent pneumonia or aspiration. Bilateral   dependent atelectasis. Stable incidental findings as detailed above. Narrative:  INDICATION: Unresponsive       COMPARISON: July 27, 2022       CONTRAST: None. TECHNIQUE:  5 mm axial images were obtained through the chest. Coronal and   sagittal reformats were generated. CT dose reduction was achieved through use   of a standardized protocol tailored for this examination and automatic exposure   control for dose modulation. The absence of intravenous contrast reduces the sensitivity for evaluation of   the mediastinum, tawanna, vasculature, and upper abdominal organs. FINDINGS:       CHEST WALL: No mass or axillary lymphadenopathy. THYROID: No nodule. MEDIASTINUM: No mass or lymphadenopathy. TAWANNA: No mass or lymphadenopathy. THORACIC AORTA: No aneurysm. MAIN PULMONARY ARTERY: Normal in caliber. TRACHEA/BRONCHI: Endotracheal tube in satisfactory position. ESOPHAGUS: Nasogastric tube extends into the stomach. HEART: Normal in size. Coronary artery calcium: present   PLEURA: No effusion or pneumothorax. LUNGS: Dense right lower lobe consolidation. Bilateral dependent atelectasis. 8   mm nodular density in the right lower lobe is not significantly changed. Mild   centrilobular emphysema. INCIDENTALLY IMAGED UPPER ABDOMEN: No significant abnormality in the   incidentally imaged upper abdomen. BONES: No destructive bone lesion. 02/10/23 0158  CT HEAD WO CONT Final result    Impression:  No acute intracranial process. Narrative:  EXAM: CT HEAD WO CONT       INDICATION: unresponsive       COMPARISON: 5/25/2022. CONTRAST: None. TECHNIQUE: Unenhanced CT of the head was performed using 5 mm images. Brain and   bone windows were generated. Coronal and sagittal reformats. CT dose reduction   was achieved through use of a standardized protocol tailored for this   examination and automatic exposure control for dose modulation. FINDINGS:   The ventricles and sulci are normal in size, shape and configuration.  There is no significant white matter disease. There is no intracranial hemorrhage,   extra-axial collection, or mass effect. The basilar cisterns are open. No CT   evidence of acute infarct. The bone windows demonstrate no abnormalities. The visualized portions of the   paranasal sinuses and mastoid air cells are clear. CXR Results  (Last 48 hours)                 02/10/23 0056  XR CHEST PORT Final result    Impression:      Endotracheal tube and nasogastric tube appear to be in satisfactory position. Diminished lung volumes with moderately severe pulmonary edema pattern. Narrative:  EXAM:  XR CHEST PORT       INDICATION: ET tube placement       COMPARISON: August 27, 2022       TECHNIQUE: Portable chest AP view       FINDINGS: Endotracheal tube tip terminates just below the level the clavicular   heads. Nasogastric tube extends into the stomach. The cardiac silhouette is   within normal limits. There is moderately severe pulmonary edema. Lung volumes   are diminished. The visualized bones and upper abdomen are unremarkable. Medical Decision Making and ED Course   - I am the first and primary provider for this patient AND AM THE PRIMARY PROVIDER OF RECORD. - I reviewed the vital signs, available nursing notes, past medical history, past surgical history, family history and social history. - Initial assessment performed. The patients presenting problems have been discussed, and the staff are in agreement with the care plan formulated and outlined with them. I have encouraged them to ask questions as they arise throughout their visit. Vital Signs-Reviewed the patient's vital signs.     Patient Vitals for the past 24 hrs:   Temp Pulse Resp BP SpO2   02/10/23 0505 -- 90 27 -- 100 %   02/10/23 0345 -- 90 18 101/81 94 %   02/10/23 0330 99.5 °F (37.5 °C) 90 20 103/81 95 %   02/10/23 0313 -- -- -- -- 94 %   02/10/23 0305 -- 91 20 98/79 92 %   02/10/23 0300 -- 93 21 102/80 91 %   02/10/23 0245 -- 93 20 92/78 (!) 88 %   02/10/23 0231 -- 90 22 91/77 (!) 87 %   02/10/23 0223 99.5 °F (37.5 °C) 96 15 91/77 (!) 84 %   02/10/23 0215 -- 95 21 96/73 (!) 81 %   02/10/23 0210 -- 88 22 (!) 70/53 (!) 83 %   02/10/23 0207 -- 89 19 (!) 62/53 (!) 87 %   02/10/23 0202 -- 90 16 (!) 70/49 (!) 84 %   02/10/23 0157 -- -- -- (!) 70/52 --   02/10/23 0137 -- 100 14 94/74 91 %   02/10/23 0131 -- (!) 103 25 98/85 --   02/10/23 0116 -- (!) 105 17 100/65 --   02/10/23 0101 -- (!) 106 18 101/81 --   02/10/23 0053 100 °F (37.8 °C) -- -- 102/74 --   02/10/23 0035 -- (!) 109 14 -- 90 %       Records Reviewed: Prior medical records and Nursing notes      Medical Decision Making       Patient is a 55-year-old female who comes to the ED by ambulance due to altered mental status. She was found to be unresponsive and hypoxic in the field. Sats in the 30s. Intubated with 7.5 ET tube. Intubation with with ketamine and vecuronium. Upon arrival to the ED, patient is satting in the upper 80s. She is borderline tachycardic. Pupils are equal bilateral.  She does have diffuse rhonchi on examination noted to be more on the right side. This could be secondary to pneumonia versus aspiration. It is unclear why she is altered. Also, it is unclear what is the timeline. It was questionable if it was 8 AM or prior to that. Presumptively up to be a 16 hours since she was normal but could be more. Concerns for toxic metabolic versus infectious etiology specially with her lung examination. Plan is to obtain ultimately status work-up including CBC looking for leukocytosis, chemistry look for acute kidney injury and Actilite derangement, urinalysis and urine drug screen looking for infection and illicit drugs that may have contributed to this. Also, she does have history of thyroid disorder and if she is medication compliant could be secondary to myxedema coma.   Also, will get CT of the head rule out intracranial bleed and CT of the chest.  Chest x-ray obtained immediately upon arrival to the ED which showed that the ET tube is in proper placement. I did perform and interpret point-of-care ultrasound. Difficult windows but I noted that the ejection fraction appears to be grossly normal.  Did not appreciate significant pericardial effusion. She did have B-lines however which could be secondary to pneumonia or pulmonary edema.       ED Course & Reassessment     Medications ordered:  Medications   dexmedeTOMidine in 0.9 % NaCl (PRECEDEX) 400 mcg/100 mL (4 mcg/mL) infusion soln (0.9 mcg/kg/hr × 90.7 kg IntraVENous Rate Change 2/10/23 0245)   NOREPINephrine (LEVOPHED) 8 mg in 0.9% NS 250ml infusion (20 mcg/min IntraVENous Rate Change 2/10/23 0215)   ketamine (KETALAR) 10 mg/mL injection 25 mg (0 mg IntraVENous Held 2/10/23 0240)   vancomycin (VANCOCIN) 2,000 mg in 0.9% sodium chloride 500 mL IVPB ( IntraVENous Incomplete 2/10/23 0500)   cefepime (MAXIPIME) 2 g in 0.9% sodium chloride (MBP/ADV) 100 mL MBP (has no administration in time range)   sodium chloride (NS) flush 5-40 mL (has no administration in time range)   sodium chloride (NS) flush 5-40 mL (has no administration in time range)   acetaminophen (TYLENOL) tablet 650 mg (has no administration in time range)     Or   acetaminophen (TYLENOL) suppository 650 mg (has no administration in time range)   polyethylene glycol (MIRALAX) packet 17 g (has no administration in time range)   ondansetron (ZOFRAN ODT) tablet 4 mg (has no administration in time range)     Or   ondansetron (ZOFRAN) injection 4 mg (has no administration in time range)   enoxaparin (LOVENOX) injection 40 mg (has no administration in time range)   0.9% sodium chloride infusion (has no administration in time range)   fentaNYL citrate (PF) injection 100 mcg (100 mcg IntraVENous Given 2/10/23 0058)   sodium chloride 0.9 % bolus infusion 1,000 mL (0 mL IntraVENous IV Completed 2/10/23 0244)   sodium chloride 0.9 % bolus infusion 1,000 mL (0 mL IntraVENous IV Completed 2/10/23 0134)   lactated ringers bolus infusion 1,000 mL (0 mL IntraVENous IV Completed 2/10/23 0346)   fentaNYL citrate (PF) injection 100 mcg (100 mcg IntraVENous Given 2/10/23 0217)   cefepime (MAXIPIME) 2 g in 0.9% sodium chloride (MBP/ADV) 100 mL MBP (0 g IntraVENous IV Completed 2/10/23 0355)   aspirin (ASA) suppository 300 mg (300 mg Rectal Given 2/10/23 0227)   albuterol-ipratropium (DUO-NEB) 2.5 MG-0.5 MG/3 ML (3 mL Nebulization Given 2/10/23 0317)       ED Course:     ED Course as of 02/10/23 0512   Fri Feb 10, 2023   0105 EKG was done at 1:01 AM interpreted by me independently as sinus tachycardia rate of 106, LA is 142, QRS is 78, QTc is 441, normal axis, nonspecific ST elevation depression, no signs of dysrhythmia or blocks. [AA]      ED Course User Index  [AA] Oral Bryan MD       I independently reviewed interpreted the patient work-up. CBC without leukocytosis, anemia, or thrombocytopenia/thrombocytosis. Chemistry showed acute kidney injury with mild hyperglycemia. Lactate was elevated which is consistent with dehydration due to her prolonged downtime. Due to hypoxia get flu swab and COVID swab and they are both negative. TSH is unremarkable and thus thyroid disorder has been ruled out. Troponin is elevated which is likely secondary to demand ischemia. EKG did not show any ischemic changes. Her ABG was done immediately upon arrival showing respiratory failure with acidosis and hypercarbia and hypoxia. This is likely due to the prolonged downtime. Imaging of her head is negative for acute finding. Her CT of the chest showed that she has pneumonia. Patient was covered with cefepime and vancomycin. Patient continues to be hypoxic and in the setting of her pneumonia and prolonged downtime concerns for early ARDS. Discussed with RT changing the vent setting to improve her oxygenation and we were able to achieve better oxygenation. Upon arrival to the ED patient was on [facility] VC in which she was satting in the lower 80s. Following ARDSnet pathway and changing vent settings were able to achieve adequate saturation. Discussed with the hospitalist will come evaluate the patient for admission to the ICU. Patient required pressors due to hypoxia in which I placed a central line as well. Procedures and Critical Care       Performed by: Simona Hoyos MD  PROCEDURES:  Critical Care  Performed by: Aliya Ro MD  Authorized by: Aliya Ro MD     Critical care provider statement:     Critical care time (minutes):  75    Critical care time was exclusive of:  Separately billable procedures and treating other patients    Critical care was necessary to treat or prevent imminent or life-threatening deterioration of the following conditions:  Respiratory failure, sepsis, CNS failure or compromise and dehydration    Critical care was time spent personally by me on the following activities:  Development of treatment plan with patient or surrogate, discussions with primary provider, evaluation of patient's response to treatment, examination of patient, obtaining history from patient or surrogate, ordering and performing treatments and interventions, ordering and review of laboratory studies, ordering and review of radiographic studies, pulse oximetry, re-evaluation of patient's condition, review of old charts and ventilator management    I assumed direction of critical care for this patient from another provider in my specialty: no      Care discussed with: admitting provider    Central Line    Date/Time: 2/10/2023 5:09 AM  Performed by: Aliya Ro MD  Authorized by:  Aliya Ro MD     Consent:     Consent obtained:  Emergent situation  Universal protocol:     Patient identity confirmed:  Arm band  Pre-procedure details:     Indication(s): central venous access      Hand hygiene: Hand hygiene performed prior to insertion      Sterile barrier technique: All elements of maximal sterile technique followed      Skin preparation:  Chlorhexidine  Procedure details:     Location:  R femoral    Patient position:  Supine    Catheter size:  7 Fr    Landmarks identified: yes      Ultrasound guidance: yes      Sterile ultrasound techniques: Sterile gel and sterile probe covers were used      Number of attempts:  1  Post-procedure details:     Post-procedure:  Dressing applied    Procedure completion:  Tolerated       Diagnosis     Clinical Impression:   1. Altered mental status, unspecified altered mental status type    2. Acute respiratory failure with hypoxia (HCC)    3. Dehydration    4. ANASTACIO (acute kidney injury) ([facility] [address]    5. Pneumonia of right lower lobe due to infectious organism        Disposition     Disposition: Condition Critical    Admitted      Attestations: Rosemary Beck MD    Please note that this dictation was completed with [employer], the computer voice recognition software. Quite often unanticipated grammatical, syntax, homophones, and other interpretive errors are inadvertently transcribed by the computer software. Please disregard these errors. Please excuse any errors that have escaped final proofreading. Thank you.

## 2023-02-10 NOTE — PROGRESS NOTES
Vancomycin Dosing Consult  Michael Lamb is a 62 y.o. female with Severe sepsis d/t to Pneumonia. Pharmacy was consulted by Dr. Dilcia Madison to dose and monitor Vancomycin. Today is day 1. Antibiotic regimen: Vancomycin + Cefepime    Temp (24hrs), Av.7 °F (37.6 °C), Min:99.5 °F (37.5 °C), Max:100 °F (37.8 °C)    Recent Labs     02/10/23  0106   WBC 10.9     Recent Labs     02/10/23  0106   CREA 2.27*   BUN 18     No results for input(s): CRP in the last 336 hours. No results for input(s): PCT in the last 336 hours. Estimated Creatinine Clearance: 32.2 mL/min (A) (based on SCr of 2.27 mg/dL (H)). ml/min  Concomitant nephrotoxic drugs: Vasopressors    Cultures:   2/10 Blood - pending    MRSA Swab: Not ordered, patient already received first dose of vancomycin    Target range: Re-dose for random level <15 mcg/mL     Assessment/Plan:   ANASTACIO. Will pulse dose vancomycin for now. An initial vancomycin dose of 2000 mg iv x 1 dose was given this morning in the ED. Will order a level for tomorrow with AM labs and re-dose if level < 15.   Antimicrobial stop date TBD

## 2023-02-10 NOTE — PROGRESS NOTES
57F, h/o hypothyroidism and seziure with acute encephalopathy s/t pneumonia and septic shock complicated by ANASTACIO and lactic acidosis    Acute hypoxic respiratory failure   Acute encephalopathy   Septic shock   ANASTACIO   NSTEMI   Pneumonia/ aspiration

## 2023-02-11 ENCOUNTER — APPOINTMENT (OUTPATIENT)
Dept: GENERAL RADIOLOGY | Age: 58
DRG: 870 | End: 2023-02-11
Attending: INTERNAL MEDICINE
Payer: MEDICARE

## 2023-02-11 LAB
ALBUMIN SERPL-MCNC: 2.3 G/DL (ref 3.5–5)
ALBUMIN/GLOB SERPL: 0.7 (ref 1.1–2.2)
ALP SERPL-CCNC: 84 U/L (ref 45–117)
ALT SERPL-CCNC: 580 U/L (ref 12–78)
ANION GAP SERPL CALC-SCNC: 2 MMOL/L (ref 5–15)
ARTERIAL PATENCY WRIST A: YES
AST SERPL W P-5'-P-CCNC: 278 U/L (ref 15–37)
BASE EXCESS BLDA CALC-SCNC: 1.9 MMOL/L (ref 0–3)
BASOPHILS # BLD: 0 K/UL (ref 0–0.1)
BASOPHILS NFR BLD: 0 % (ref 0–1)
BDY SITE: ABNORMAL
BILIRUB DIRECT SERPL-MCNC: 0.1 MG/DL (ref 0–0.2)
BILIRUB SERPL-MCNC: 0.3 MG/DL (ref 0.2–1)
BODY TEMPERATURE: 98
BUN SERPL-MCNC: 21 MG/DL (ref 6–20)
BUN/CREAT SERPL: 18 (ref 12–20)
CA-I BLD-MCNC: 7.9 MG/DL (ref 8.5–10.1)
CHLORIDE SERPL-SCNC: 112 MMOL/L (ref 97–108)
CO2 SERPL-SCNC: 28 MMOL/L (ref 21–32)
COHGB MFR BLD: 0 % (ref 1–2)
CREAT SERPL-MCNC: 1.19 MG/DL (ref 0.55–1.02)
CRP SERPL-MCNC: 7.57 MG/DL (ref 0–0.6)
DIFFERENTIAL METHOD BLD: ABNORMAL
EOSINOPHIL # BLD: 0 K/UL (ref 0–0.4)
EOSINOPHIL NFR BLD: 0 % (ref 0–7)
ERYTHROCYTE [DISTWIDTH] IN BLOOD BY AUTOMATED COUNT: 14.5 % (ref 11.5–14.5)
FIO2 ON VENT: 60 %
GAS FLOW.O2 SETTING OXYMISER: 22
GLOBULIN SER CALC-MCNC: 3.2 G/DL (ref 2–4)
GLUCOSE BLD STRIP.AUTO-MCNC: 129 MG/DL (ref 65–100)
GLUCOSE BLD STRIP.AUTO-MCNC: 149 MG/DL (ref 65–100)
GLUCOSE BLD STRIP.AUTO-MCNC: 171 MG/DL (ref 65–100)
GLUCOSE SERPL-MCNC: 164 MG/DL (ref 65–100)
HCO3 BLDA-SCNC: 25 MMOL/L (ref 22–26)
HCT VFR BLD AUTO: 39.5 % (ref 35–47)
HGB BLD-MCNC: 12.7 G/DL (ref 11.5–16)
IMM GRANULOCYTES # BLD AUTO: 0.1 K/UL (ref 0–0.04)
IMM GRANULOCYTES NFR BLD AUTO: 1 % (ref 0–0.5)
LYMPHOCYTES # BLD: 3 K/UL (ref 0.8–3.5)
LYMPHOCYTES NFR BLD: 24 % (ref 12–49)
M PNEUMO IGM SER IA-ACNC: NONREACTIVE
MAGNESIUM SERPL-MCNC: 2 MG/DL (ref 1.6–2.4)
MCH RBC QN AUTO: 27.8 PG (ref 26–34)
MCHC RBC AUTO-ENTMCNC: 32.2 G/DL (ref 30–36.5)
MCV RBC AUTO: 86.4 FL (ref 80–99)
METHGB MFR BLD: 0.3 % (ref 0–1.4)
MONOCYTES # BLD: 1 K/UL (ref 0–1)
MONOCYTES NFR BLD: 7 % (ref 5–13)
NEUTS SEG # BLD: 8.7 K/UL (ref 1.8–8)
NEUTS SEG NFR BLD: 68 % (ref 32–75)
NRBC # BLD: 0 K/UL (ref 0–0.01)
NRBC BLD-RTO: 0 PER 100 WBC
OXYHGB MFR BLD: 97.7 % (ref 95–99)
PCO2 BLDA: 36 MMHG (ref 35–45)
PEEP RESPIRATORY: 8
PERFORMED BY, TECHID: ABNORMAL
PH BLDA: 7.47 (ref 7.35–7.45)
PHOSPHATE SERPL-MCNC: 1.5 MG/DL (ref 2.6–4.7)
PLATELET # BLD AUTO: 301 K/UL (ref 150–400)
PMV BLD AUTO: 9.9 FL (ref 8.9–12.9)
PO2 BLDA: 103 MMHG (ref 80–100)
POTASSIUM SERPL-SCNC: 3.8 MMOL/L (ref 3.5–5.1)
PROCALCITONIN SERPL-MCNC: 0.94 NG/ML
PROT SERPL-MCNC: 5.5 G/DL (ref 6.4–8.2)
RBC # BLD AUTO: 4.57 M/UL (ref 3.8–5.2)
SAO2 % BLD: 98 % (ref 95–99)
SAO2% DEVICE SAO2% SENSOR NAME: ABNORMAL
SODIUM SERPL-SCNC: 142 MMOL/L (ref 136–145)
SPECIMEN SITE: ABNORMAL
VANCOMYCIN SERPL-MCNC: 6.8 UG/ML
VENTILATION MODE VENT: ABNORMAL
VT SETTING VENT: 450
WBC # BLD AUTO: 12.8 K/UL (ref 3.6–11)

## 2023-02-11 PROCEDURE — 86140 C-REACTIVE PROTEIN: CPT

## 2023-02-11 PROCEDURE — 65610000006 HC RM INTENSIVE CARE

## 2023-02-11 PROCEDURE — 74011000250 HC RX REV CODE- 250: Performed by: INTERNAL MEDICINE

## 2023-02-11 PROCEDURE — 74011250636 HC RX REV CODE- 250/636: Performed by: INTERNAL MEDICINE

## 2023-02-11 PROCEDURE — 82803 BLOOD GASES ANY COMBINATION: CPT

## 2023-02-11 PROCEDURE — 36600 WITHDRAWAL OF ARTERIAL BLOOD: CPT

## 2023-02-11 PROCEDURE — 84145 PROCALCITONIN (PCT): CPT

## 2023-02-11 PROCEDURE — 71045 X-RAY EXAM CHEST 1 VIEW: CPT

## 2023-02-11 PROCEDURE — 82962 GLUCOSE BLOOD TEST: CPT

## 2023-02-11 PROCEDURE — 84100 ASSAY OF PHOSPHORUS: CPT

## 2023-02-11 PROCEDURE — 94003 VENT MGMT INPAT SUBQ DAY: CPT

## 2023-02-11 PROCEDURE — 85025 COMPLETE CBC W/AUTO DIFF WBC: CPT

## 2023-02-11 PROCEDURE — 74011000258 HC RX REV CODE- 258: Performed by: INTERNAL MEDICINE

## 2023-02-11 PROCEDURE — 77010033678 HC OXYGEN DAILY

## 2023-02-11 PROCEDURE — 74011250637 HC RX REV CODE- 250/637: Performed by: INTERNAL MEDICINE

## 2023-02-11 PROCEDURE — 80076 HEPATIC FUNCTION PANEL: CPT

## 2023-02-11 PROCEDURE — 80202 ASSAY OF VANCOMYCIN: CPT

## 2023-02-11 PROCEDURE — 36415 COLL VENOUS BLD VENIPUNCTURE: CPT

## 2023-02-11 PROCEDURE — 80048 BASIC METABOLIC PNL TOTAL CA: CPT

## 2023-02-11 PROCEDURE — 83735 ASSAY OF MAGNESIUM: CPT

## 2023-02-11 RX ADMIN — PROPOFOL 40 MCG/KG/MIN: 10 INJECTION, EMULSION INTRAVENOUS at 15:11

## 2023-02-11 RX ADMIN — SODIUM CHLORIDE, PRESERVATIVE FREE 10 ML: 5 INJECTION INTRAVENOUS at 05:54

## 2023-02-11 RX ADMIN — Medication 50 MCG/HR: at 06:15

## 2023-02-11 RX ADMIN — MEROPENEM 1 G: 1 INJECTION, POWDER, FOR SOLUTION INTRAVENOUS at 02:28

## 2023-02-11 RX ADMIN — ENOXAPARIN SODIUM 40 MG: 100 INJECTION SUBCUTANEOUS at 09:32

## 2023-02-11 RX ADMIN — SODIUM CHLORIDE, PRESERVATIVE FREE 20 MG: 5 INJECTION INTRAVENOUS at 09:32

## 2023-02-11 RX ADMIN — PROPOFOL 40 MCG/KG/MIN: 10 INJECTION, EMULSION INTRAVENOUS at 19:00

## 2023-02-11 RX ADMIN — SODIUM CHLORIDE, PRESERVATIVE FREE 10 ML: 5 INJECTION INTRAVENOUS at 21:10

## 2023-02-11 RX ADMIN — SODIUM CHLORIDE, PRESERVATIVE FREE 10 ML: 5 INJECTION INTRAVENOUS at 13:17

## 2023-02-11 RX ADMIN — PROPOFOL 30 MCG/KG/MIN: 10 INJECTION, EMULSION INTRAVENOUS at 11:29

## 2023-02-11 RX ADMIN — MEROPENEM 1 G: 1 INJECTION, POWDER, FOR SOLUTION INTRAVENOUS at 04:46

## 2023-02-11 RX ADMIN — ASPIRIN 81 MG CHEWABLE TABLET 81 MG: 81 TABLET CHEWABLE at 09:32

## 2023-02-11 RX ADMIN — CHLORHEXIDINE GLUCONATE 0.12% ORAL RINSE 15 ML: 1.2 LIQUID ORAL at 21:10

## 2023-02-11 RX ADMIN — PROPOFOL 30 MCG/KG/MIN: 10 INJECTION, EMULSION INTRAVENOUS at 02:26

## 2023-02-11 RX ADMIN — PROPOFOL 30 MCG/KG/MIN: 10 INJECTION, EMULSION INTRAVENOUS at 06:14

## 2023-02-11 RX ADMIN — CHLORHEXIDINE GLUCONATE 0.12% ORAL RINSE 15 ML: 1.2 LIQUID ORAL at 09:32

## 2023-02-11 RX ADMIN — Medication 4 MCG/MIN: at 19:13

## 2023-02-11 RX ADMIN — MEROPENEM 1 G: 1 INJECTION, POWDER, FOR SOLUTION INTRAVENOUS at 13:30

## 2023-02-11 RX ADMIN — PROPOFOL 50 MCG/KG/MIN: 10 INJECTION, EMULSION INTRAVENOUS at 23:43

## 2023-02-11 NOTE — PROGRESS NOTES
Pt ring removed from left ring finger. Ring silver in color with clear stones. Placed in security bag and taken to security.  Tag to reclaim with chart

## 2023-02-11 NOTE — PROGRESS NOTES
Pulmonary Progress Note      NAME: Denny Rose   :  1965  MRM:  [id number]    Date/Time: 2023  9:40 AM         Subjective:     Patient seen and examined. Discussed with the RN. Patient on the ventilator and sedated with propofol and fentanyl. Blood gases and chest x-ray reviewed. She does have purulent endotracheal tube secretions. 7.5 endotracheal tube. Started on OG tube feeds at 40 mm/h and is tolerating fairly well. She is on Levophed at 5 mics. When sedation is lightened she does still wake up but not necessarily following commands. Past Medical History reviewed and unchanged from Admission History and Physical       Objective:     Physical Exam     Vitals:      Last 24hrs VS reviewed since prior progress note. Most recent are:    Visit Vitals  /80 (BP 1 Location: Right upper arm, BP Patient Position: At rest)   Pulse 67   Temp 98.5 °F (36.9 °C)   Resp 22   Ht 5' 7.99\" (1.727 m)   Wt 105 kg (231 lb 7.7 oz)   SpO2 96%   Breastfeeding No   BMI 35.21 kg/m²     SpO2 Readings from Last 6 Encounters:   23 96%   23 100%   23 96%   23 99%   22 95%   10/19/22 97%          Intake/Output Summary (Last 24 hours) at 2023 0940  Last data filed at 2023 0700  Gross per 24 hour   Intake 1903.38 ml   Output 550 ml   Net 1353.38 ml      Physical Exam:   General appearance: no distress, mildly obese, sedated on the ventilator, chronically ill-appearing  Head: Normocephalic, without obvious abnormality, atraumatic  Eyes: negative. She has a sample 5 endotracheal tube. She hasOG tube  Neck: supple, symmetrical, trachea midline and no adenopathy  Lungs: Crackles at right base, otherwise clear bilaterally, ET tube in place  Heart: regular rate and rhythm, S1, S2 normal, no murmur, click, rub or gallop  Abdomen: soft, non-tender.  Bowel sounds normal. No masses,  no organomegaly  Extremities: extremities normal, atraumatic, no cyanosis or edema  Pulses: 2+ and symmetric  Skin: Skin color, texture, turgor normal. No rashes or lesions  Lymph nodes: Cervical, supraclavicular, and axillary nodes normal.  Neurologic: Grossly normal, sedated on the ventilator    XR CHEST PORT   Final Result   Endotracheal tube slightly low in position 2 cm above the karthik, could be   retracted 2 cm for more optimal positioning. Bibasilar airspace disease right   greater than left, without significant change. CT CHEST WO CONT   Final Result   Endotracheal tube and nasogastric tube are in satisfactory position. Dense right   lower lobe consolidation may represent pneumonia or aspiration. Bilateral   dependent atelectasis. Stable incidental findings as detailed above. CT HEAD WO CONT   Final Result   No acute intracranial process. XR CHEST PORT   Final Result      Endotracheal tube and nasogastric tube appear to be in satisfactory position. Diminished lung volumes with moderately severe pulmonary edema pattern.          XR CHEST PORT    (Results Pending)   XR CHEST PORT    (Results Pending)       Lab Data Reviewed: (see below)      Medications:  Current Facility-Administered Medications   Medication Dose Route Frequency    meropenem (MERREM) 1 g in 0.9% sodium chloride (MBP/ADV) 50 mL MBP  1 g IntraVENous Q12H    dexmedeTOMidine in 0.9 % NaCl (PRECEDEX) 400 mcg/100 mL (4 mcg/mL) infusion soln  0.1-1.5 mcg/kg/hr IntraVENous TITRATE    NOREPINephrine (LEVOPHED) 8 mg in 0.9% NS 250ml infusion  0.5-30 mcg/min IntraVENous TITRATE    sodium chloride (NS) flush 5-40 mL  5-40 mL IntraVENous Q8H    sodium chloride (NS) flush 5-40 mL  5-40 mL IntraVENous PRN    polyethylene glycol (MIRALAX) packet 17 g  17 g Oral DAILY PRN    ondansetron (ZOFRAN ODT) tablet 4 mg  4 mg Oral Q8H PRN    Or    ondansetron (ZOFRAN) injection 4 mg  4 mg IntraVENous Q6H PRN    enoxaparin (LOVENOX) injection 40 mg  40 mg SubCUTAneous DAILY    chlorhexidine (PERIDEX) 0.12 % mouthwash 15 mL  15 mL Oral Q12H famotidine (PF) (PEPCID) 20 mg in 0.9% sodium chloride 10 mL injection  20 mg IntraVENous DAILY    fentaNYL (PF) 1,500 mcg/30 mL (50 mcg/mL) infusion  0-200 mcg/hr IntraVENous TITRATE    propofol (DIPRIVAN) 10 mg/mL infusion  0-50 mcg/kg/min IntraVENous TITRATE    aspirin chewable tablet 81 mg  81 mg Per NG tube DAILY    acetaminophen (TYLENOL) solution 650 mg  650 mg Per NG tube Q4H PRN    Or    acetaminophen (TYLENOL) suppository 650 mg  650 mg Rectal Q6H PRN       ______________________________________________________________________      Lab Review:     Recent Labs     02/11/23  0350 02/10/23  0106   WBC 12.8* 10.9   HGB 12.7 14.0   HCT 39.5 46.3    334     Recent Labs     02/11/23  0350 02/10/23  1000 02/10/23  0106    136 138   K 3.8 5.5* 5.7*   * 108 106   CO2 28 26 26   * 213* 197*   BUN 21* 22* 18   CREA 1.19* 1.99* 2.27*   CA 7.9* 7.7* 7.8*   MG 2.0 2.0  --    PHOS 1.5* 2.6  --    ALB 2.3* 2.6* 3.0*   *  --  63     No components found for: [place]  Recent Labs     02/11/23  0541 02/10/23  1855 02/10/23  0959   PH 7.47* 7.39 7.29*   PCO2 36 37 49*   PO2 103* 72* 107*   HCO3 25 22 22   FIO2 60 60 100     No results for input(s): INR, INREXT, INREXT in the last 72 hours. Other pertinent lab:          Assessment & Plan:      Patient is a 59-year-old  female with a history of obesity, hypothyroidism, seizure disorder, GERD, and PTSD who presented to the hospital after being found unresponsive and was experiencing acute hypoxemic respiratory failure. Plan:      1.)  Acute hypoxemic respiratory failure  -Unclear etiology, certainly has a right lower lobe likely aspiration pneumonia. Could have had a drug overdose at home on home medications. -UDS positive for benzos on admission  -Currently on AC//50%/22 with a PEEP of 8. Vent mechanics are good. Blood gases done this morning showing 7.47/36/103 on 60% ventilator. Oxygen has been decreased.   Will decrease rate to 20 and also decrease PEEP to 6. Chest x-ray personally reviewed. She has bibasilar airspace disease right greater than left. She has a purulent secretion coming from the ET tube sample 5. Sputum will be sent for cultures if not already sent. For sedation the patient is on propofol at 35 mics and fentanyl 50 mics. Target a RASS of -1-2.    -Repeat ABG and chest x-ray in the morning  -Patient is on all the appropriate prophylactic measures     2.)  Acute metabolic encephalopathy  -Suspect secondary to acute hypoxemic respiratory failure and pneumonia  -CT head negative on admission  -UDS positive for benzos  -Unclear if patient had any overdose of her home medications  -Sedation as above, slowly wean for SAT/SBT over the next 48 hours     3.)  Shock  -Suspect sepsis from pneumonia, lactate level slowly improving  -TTE done on 2/10/2023 showing an EF of 40 to 45%. Right ventricular systolic pressure of 22.  -Currently on Levophed at 5 mcg/min, wean for goal MAP greater than 65 mmHg  -Levophed requirements have decreased in the last 24 hours.  -Blood/sputum cultures are pending, on broad antibiotics with IV cefepime/vancomycin, ID consulted. Biotics have been adjusted to meropenem. 4.)  Acute kidney injury  -Creatinine level up to 2.27 from 1.12 on admission, likely secondary to shock. Improved. Creatinine today is 1.19.   Potassium is 3.8.  -Continue to avoid nephrotoxic medications  -Status post 3 L isotonic fluids on admission, currently on normal saline at 100 cc/h  -Continue to maintain MAP greater than 65 mmHg with vasopressor support  -Repeat BMP now and in the morning     5.)  Lactic acidosis  -Lactate level 5.2 on admission, has trended down to 2.7, continue to trend every 6 hours until normalized  -Likely secondary to shock, continue to maintain MAP greater than 65 mmHg with vasopressor support  -Serum bicarb level normal today     6.)  NSTEMI  -Troponin level elevated at 269 on admission.  -Cardiology input appreciated  -Repeat troponin levels every 6 hours until trending down      CODE STATUS: Full Code     Lines/Tubes: Right femoral CVC, PIV x3, ETT, Mohamud urinary catheter     Prophylaxis:  Stress Ulcer Protocol Active: Yes  Deep Vein Thrombosis Protocol Active: Yes  Nutrition: She is started on tube feeds at 40 mL/h.   Activity: bedrest        Disposition and Family:  Remain in the ICU     Total time spent with patient: 60 minutes       Rui Vizcaino MD.  [facility] ([facility])

## 2023-02-11 NOTE — PROGRESS NOTES
Problem: General Medical Care Plan  Goal: *Vital signs within specified parameters  Outcome: Progressing Towards Goal     Problem: General Medical Care Plan  Goal: *Vital signs within specified parameters  Outcome: Progressing Towards Goal     Bedside shift change report given to Osiel Marina RN (oncoming nurse) by Tamra Boateng RN (offgoing nurse). Report included the following information SBAR, Intake/Output, MAR, and Cardiac Rhythm sinus rhythm .

## 2023-02-11 NOTE — PROGRESS NOTES
Progress Note      2/11/2023 4:19 PM  NAME: Leno Lemus   MRN:  [id number]   Admit Diagnosis: Severe sepsis ([facility] [address] [A41.9, R65.20]  Acute respiratory failure with hypoxia ([facility] [address] [J96.01]          Assessment/Plan:     Mildly elevated troponin which is flat and appears to be type II non-STEMI. Continue aspirin and Lovenox with additional antianginals for empiric antianginal coverage plan tolerated by blood pressure. Echo shows LV ejection fraction of 40-45%. Acute hypoxic respiratory failure due to community-acquired pneumonia. Patient on antibiotics with ventilatory support. Sepsis with hypotension. Patient on antibiotics with IV vasopressors for blood pressure support. []       High complexity decision making was performed in this patient at high risk for decompensation with multiple organ involvement. Subjective:     Leno Lemus patient continues to be intubated, sedated and non-communicative. Review of Systems:    Unobtainable due to patient's endotracheal intubation and sedation    Objective:      Physical Exam:    Last 24hrs VS reviewed since prior progress note. Most recent are:    Visit Vitals  /78 (BP 1 Location: Right upper arm, BP Patient Position: At rest)   Pulse 78   Temp 99.2 °F (37.3 °C)   Resp 20   Ht 5' 7.99\" (1.727 m)   Wt 105 kg (231 lb 7.7 oz)   SpO2 97%   Breastfeeding No   BMI 35.21 kg/m²       Intake/Output Summary (Last 24 hours) at 2/11/2023 1619  Last data filed at 2/11/2023 1500  Gross per 24 hour   Intake 2168 ml   Output 275 ml   Net 1893 ml        Physical Exam:  Constitutional: Well developed well-nourished patient who intubated and sedated. HEENT: Normocephalic and atraumatic. Extraocular movement intact. Pupil reactive to light bilaterally  Neck: Supple without thyromegaly  Lungs: Scattered rhonchi diffusely  Heart:  Regular rhythm, normal S1-S2.  Jugular venous distention absent. Carotid bruits absent.   PMI in fifth intercostal space on left midclavicular line. Extremities  Trace edema bilaterally  Abdomen: Soft nontender nondistended hypoactive bowel sounds  Skin: Dry and warm    []         Post-cath site without hematoma, bruit, tenderness, or thrill. Distal pulses intact. PMH/SH reviewed - no change compared to H&P    Data Review    Telemetry: normal sinus rhythm       XR CHEST PORT   Final Result   Endotracheal tube slightly low in position 2 cm above the karthik, could be   retracted 2 cm for more optimal positioning. Bibasilar airspace disease right   greater than left, without significant change. CT CHEST WO CONT   Final Result   Endotracheal tube and nasogastric tube are in satisfactory position. Dense right   lower lobe consolidation may represent pneumonia or aspiration. Bilateral   dependent atelectasis. Stable incidental findings as detailed above. CT HEAD WO CONT   Final Result   No acute intracranial process. XR CHEST PORT   Final Result      Endotracheal tube and nasogastric tube appear to be in satisfactory position. Diminished lung volumes with moderately severe pulmonary edema pattern.          XR CHEST PORT    (Results Pending)   XR CHEST PORT    (Results Pending)        Recent Results (from the past 24 hour(s))   BLOOD GAS, ARTERIAL    Collection Time: 02/10/23  6:55 PM   Result Value Ref Range    pH 7.39 7.35 - 7.45      PCO2 37 35 - 45 mmHg    PO2 72 (L) 80 - 100 mmHg    O2 SATURATION 94 (L) 95 - 99 %    BICARBONATE 22 22 - 26 mmol/L    BASE DEFICIT 2.2 mmol/L    O2 METHOD VENT      FIO2 60 %    MODE ASSIST CONTROL      Tidal volume 450.0      SET RATE 22      PEEP/CPAP 10.0      Sample source Arterial      SITE Right Radial      DARRION'S TEST YES      Carboxy-Hgb 0.7 (L) 1 - 2 %    Methemoglobin 0.4 0 - 1.4 %    Oxyhemoglobin 92.9 (L) 95 - 99 %    Performed by Ami Gowers     TEMPERATURE 100.8     GLUCOSE, POC    Collection Time: 02/11/23 12:39 AM   Result Value Ref Range    Glucose (POC) 171 (H) 65 - 100 mg/dL    Performed by Dara Whaley (PCT)    C REACTIVE PROTEIN, QT    Collection Time: 02/11/23  3:50 AM   Result Value Ref Range    C-Reactive protein 7.57 (H) 0.00 - 0.60 mg/dL   PROCALCITONIN    Collection Time: 02/11/23  3:50 AM   Result Value Ref Range    Procalcitonin 0.94 (H) 0 ng/mL   METABOLIC PANEL, BASIC    Collection Time: 02/11/23  3:50 AM   Result Value Ref Range    Sodium 142 136 - 145 mmol/L    Potassium 3.8 3.5 - 5.1 mmol/L    Chloride 112 (H) 97 - 108 mmol/L    CO2 28 21 - 32 mmol/L    Anion gap 2 (L) 5 - 15 mmol/L    Glucose 164 (H) 65 - 100 mg/dL    BUN 21 (H) 6 - 20 mg/dL    Creatinine 1.19 (H) 0.55 - 1.02 mg/dL    BUN/Creatinine ratio 18 12 - 20      eGFR 53 (L) >60 ml/min/1.73m2    Calcium 7.9 (L) 8.5 - 10.1 mg/dL   CBC WITH AUTOMATED DIFF    Collection Time: 02/11/23  3:50 AM   Result Value Ref Range    WBC 12.8 (H) 3.6 - 11.0 K/uL    RBC 4.57 3.80 - 5.20 M/uL    HGB 12.7 11.5 - 16.0 g/dL    HCT 39.5 35.0 - 47.0 %    MCV 86.4 80.0 - 99.0 FL    MCH 27.8 26.0 - 34.0 PG    MCHC 32.2 30.0 - 36.5 g/dL    RDW 14.5 11.5 - 14.5 %    PLATELET 601 680 - 679 K/uL    MPV 9.9 8.9 - 12.9 FL    NRBC 0.0 0.0  WBC    ABSOLUTE NRBC 0.00 0.00 - 0.01 K/uL    NEUTROPHILS 68 32 - 75 %    LYMPHOCYTES 24 12 - 49 %    MONOCYTES 7 5 - 13 %    EOSINOPHILS 0 0 - 7 %    BASOPHILS 0 0 - 1 %    IMMATURE GRANULOCYTES 1 (H) 0 - 0.5 %    ABS. NEUTROPHILS 8.7 (H) 1.8 - 8.0 K/UL    ABS. LYMPHOCYTES 3.0 0.8 - 3.5 K/UL    ABS. MONOCYTES 1.0 0.0 - 1.0 K/UL    ABS. EOSINOPHILS 0.0 0.0 - 0.4 K/UL    ABS. BASOPHILS 0.0 0.0 - 0.1 K/UL    ABS. IMM.  GRANS. 0.1 (H) 0.00 - 0.04 K/UL    DF AUTOMATED     VANCOMYCIN, RANDOM    Collection Time: 02/11/23  3:50 AM   Result Value Ref Range    Vancomycin, random 6.8 ug/mL   HEPATIC FUNCTION PANEL    Collection Time: 02/11/23  3:50 AM   Result Value Ref Range    Protein, total 5.5 (L) 6.4 - 8.2 g/dL    Albumin 2.3 (L) 3.5 - 5.0 g/dL    Globulin 3.2 2.0 - 4.0 g/dL    A-G Ratio 0.7 (L) 1.1 - 2.2      Bilirubin, total 0.3 0.2 - 1.0 mg/dL    Bilirubin, direct 0.1 0.0 - 0.2 mg/dL    Alk. phosphatase 84 45 - 117 U/L    AST (SGOT) 278 (H) 15 - 37 U/L    ALT (SGPT) 580 (H) 12 - 78 U/L   MAGNESIUM    Collection Time: 02/11/23  3:50 AM   Result Value Ref Range    Magnesium 2.0 1.6 - 2.4 mg/dL   PHOSPHORUS    Collection Time: 02/11/23  3:50 AM   Result Value Ref Range    Phosphorus 1.5 (L) 2.6 - 4.7 mg/dL   BLOOD GAS, ARTERIAL    Collection Time: 02/11/23  5:41 AM   Result Value Ref Range    pH 7.47 (H) 7.35 - 7.45      PCO2 36 35 - 45 mmHg    PO2 103 (H) 80 - 100 mmHg    O2 SATURATION 98 95 - 99 %    BICARBONATE 25 22 - 26 mmol/L    BASE EXCESS 1.9 0 - 3 mmol/L    O2 METHOD VENT      FIO2 60 %    MODE ASSIST CONTROL      Tidal volume 450.0      SET RATE 22      PEEP/CPAP 8.0      Sample source Arterial      SITE Left Radial      DARRION'S TEST YES      Carboxy-Hgb 0.0 (L) 1 - 2 %    Methemoglobin 0.3 0 - 1.4 %    Oxyhemoglobin 97.7 95 - 99 %    Performed by [facility]     TEMPERATURE 98.0     GLUCOSE, POC    Collection Time: 02/11/23 12:55 PM   Result Value Ref Range    Glucose (POC) 149 (H) 65 - 100 mg/dL    Performed by Daisha Damon           Echo:   02/10/23    ECHO ADULT COMPLETE 02/10/2023 2/10/2023    Interpretation Summary    Left Ventricle: Reduced left ventricular systolic function with a visually estimated EF of 40 - 45%. Not well visualized. Left ventricle size is normal. Mildly increased wall thickness. Unable to assess wall motion. Abnormal diastolic function. Mitral Valve: Mild regurgitation. Tricuspid Valve: The estimated RVSP is 22 mmHg. Left Atrium: Left atrium is mildly dilated. Signed by: Salvador Obregon MD on 2/10/2023  2:07 PM      Patient's  EKG, laboratory data and echocardiogram were individually reviewed by me.       Lab Data:    Recent Labs     02/11/23  0350 02/10/23  0106   WBC 12.8* 10.9   HGB 12.7 14.0   HCT 39.5 46.3    334     No results for input(s): INR, PTP, APTT, INREXT in the last 72 hours. Recent Labs     02/11/23  0350 02/10/23  1000 02/10/23  0106    136 138   K 3.8 5.5* 5.7*   * 108 106   CO2 28 26 26   BUN 21* 22* 18   CREA 1.19* 1.99* 2.27*   * 213* 197*   CA 7.9* 7.7* 7.8*   MG 2.0 2.0  --      No results for input(s): CPK, CKNDX, TROIQ in the last 72 hours.     No lab exists for component: CPKMB  No results found for: CHOL, CHOLX, CHLST, CHOLV, HDL, HDLP, LDL, LDLC, DLDLP, Carvalho Cynthia, CHHD, CHHDX    Recent Labs     02/11/23  0350 02/10/23  1000 02/10/23  0106   AP 84  --  85   TP 5.5*  --  6.0*   ALB 2.3* 2.6* 3.0*   GLOB 3.2  --  3.0     Recent Labs     02/11/23  0541 02/10/23  1855   PH 7.47* 7.39   PCO2 36 37   PO2 103* 72*       Medications Personally Reviewed:    Current Facility-Administered Medications   Medication Dose Route Frequency    meropenem (MERREM) 1 g in 0.9% sodium chloride (MBP/ADV) 50 mL MBP  1 g IntraVENous Q12H    dexmedeTOMidine in 0.9 % NaCl (PRECEDEX) 400 mcg/100 mL (4 mcg/mL) infusion soln  0.1-1.5 mcg/kg/hr IntraVENous TITRATE    NOREPINephrine (LEVOPHED) 8 mg in 0.9% NS 250ml infusion  0.5-30 mcg/min IntraVENous TITRATE    sodium chloride (NS) flush 5-40 mL  5-40 mL IntraVENous Q8H    sodium chloride (NS) flush 5-40 mL  5-40 mL IntraVENous PRN    polyethylene glycol (MIRALAX) packet 17 g  17 g Oral DAILY PRN    ondansetron (ZOFRAN ODT) tablet 4 mg  4 mg Oral Q8H PRN    Or    ondansetron (ZOFRAN) injection 4 mg  4 mg IntraVENous Q6H PRN    enoxaparin (LOVENOX) injection 40 mg  40 mg SubCUTAneous DAILY    chlorhexidine (PERIDEX) 0.12 % mouthwash 15 mL  15 mL Oral Q12H    famotidine (PF) (PEPCID) 20 mg in 0.9% sodium chloride 10 mL injection  20 mg IntraVENous DAILY    fentaNYL (PF) 1,500 mcg/30 mL (50 mcg/mL) infusion  0-200 mcg/hr IntraVENous TITRATE    propofol (DIPRIVAN) 10 mg/mL infusion  0-50 mcg/kg/min IntraVENous TITRATE    aspirin chewable tablet 81 mg  81 mg Per NG tube DAILY    acetaminophen (TYLENOL) solution 650 mg  650 mg Per NG tube Q4H PRN    Or    acetaminophen (TYLENOL) suppository 650 mg  650 mg Rectal Q6H PRN         Prior to Admission medications    Medication Sig Start Date End Date Taking? Authorizing Provider   methocarbamoL (Robaxin-750) 750 mg tablet Take 1 Tablet by mouth four (4) times daily. 1/22/23   Paramjit Jose MD   ondansetron (ZOFRAN ODT) 4 mg disintegrating tablet Take 1 Tablet by mouth every eight (8) hours as needed for Nausea or Vomiting. 1/22/23   Cristina Dominguez MD   lidocaine (Lidoderm) 5 % Apply patch to the affected area for 12 hours a day and remove for 12 hours a day. 11/8/22   Dino Nesbitt MD   diclofenac potassium (CATAFLAM) 50 mg tablet Take 1 Tablet by mouth three (3) times daily as needed for Pain. 10/19/22   Sulaiman Lehman NP   levothyroxine (SYNTHROID) 50 mcg tablet Take 1 Tablet by mouth every morning. Patient not taking: Reported on 10/19/2022 9/17/22   GILDARDO Alejandro   pantoprazole (PROTONIX) 40 mg tablet Take 1 Tablet by mouth Before breakfast and dinner. Patient not taking: Reported on 10/19/2022 9/17/22   GILDARDO Alejandro   clonazePAM (KlonoPIN) 0.5 mg tablet Take 0.5 mg by mouth three (3) times daily. 9/6/22   Provider, Historical   ergocalciferol (ERGOCALCIFEROL) 1,250 mcg (50,000 unit) capsule Take 1 Capsule by mouth every seven (7) days. Every Sunday  Patient not taking: Reported on 10/19/2022 7/18/22   Provider, Historical   QUEtiapine (SEROquel) 300 mg tablet Take 300 mg by mouth nightly. 7/13/22   Provider, Historical   traZODone (DESYREL) 100 mg tablet Take 100-200 mg by mouth At bedtime. 2/1/22   Other, MD Kendall   gabapentin (NEURONTIN) 600 mg tablet Take 600 mg by mouth four (4) times daily.     Provider, Cassie Solorio MD

## 2023-02-11 NOTE — PROGRESS NOTES
ETT  was secured 25 cm H20 at the lip. Pulled ETT back to 2 cmH20 per MD, now secured 23 cmH20 at the lip.

## 2023-02-11 NOTE — PROGRESS NOTES
Bedside shift change report given to Netta Grant (oncoming nurse) by Gary Love RN (offgoing nurse). Report included the following information SBAR, Recent Results, and Cardiac Rhythm sinus rhythm .

## 2023-02-11 NOTE — PROGRESS NOTES
Progress Note    Patient: Alin Lam MRN: [id number]  SSN: [id number]    YOB: 1965  Age: 62 y.o. Sex: female      Admit Date: 2/10/2023    LOS: 1 day     Subjective:     62 y.o. female with PMH of hypothyroidism, seizure and other medical problem as below. She presented to ED after found unresponsive. Per roommate she has been sleeping since yesterday morning, and was unable to wake her up. EMS arrived and patient was hypoxic in the 30s, non rebreather placed on patient improved to 70s, narcan given with no change. Patient was then intubated in the field. Otherwise no additional history obtained as patient was intubated and sedated. In the ED, noted hypotensive and pressors was started after IVF resuscitation. Noted lactic acidosis, also elevated troponin. TSH within normal limits. Urinalysis not indicative of UTI. Chest X-ray showed severe pulmonary edema; CT chest showed right lower lobe pneumonia. Review of Systems:  Intubated and sedated      Objective:     Vitals:    02/11/23 1300 02/11/23 1400 02/11/23 1500 02/11/23 1515   BP: 107/74 103/71 112/78    Pulse: 67 66 69 78   Resp: 20 20 20 20   Temp:       SpO2: 97% 97% 98% 97%   Weight:       Height:            Intake and Output:  Current Shift: 02/11 0701 - 02/11 1900  In: 315.9 [I.V.:195.9]  Out: 275 [Urine:275]  Last three shifts: 02/09 1901 - 02/11 0700  In: 6851.1 [I.V.:6631.1]  Out: 6503 [Urine:1225]         Physical Exam:   General: Intubated and sedated  Eye: Omitted as patient is unable to cooperate. Throat and Neck: Omitted as patient is unable to cooperate. Lung: decreasd lung sounds on auscultation bilaterally  Heart: regular rate and rhythm,   Abdomen: soft, non-tender. Bowel sounds normal. No masses,  Extremities: No LE edema. atraumatic  Skin: mottling   Neurologic: Omitted as patient is unable to cooperate. Psychiatric: Omitted as patient is unable to cooperate.         Lab/Data Review:  Recent Results (from the past 24 hour(s))   BLOOD GAS, ARTERIAL    Collection Time: 02/10/23  6:55 PM   Result Value Ref Range    pH 7.39 7.35 - 7.45      PCO2 37 35 - 45 mmHg    PO2 72 (L) 80 - 100 mmHg    O2 SATURATION 94 (L) 95 - 99 %    BICARBONATE 22 22 - 26 mmol/L    BASE DEFICIT 2.2 mmol/L    O2 METHOD VENT      FIO2 60 %    MODE ASSIST CONTROL      Tidal volume 450.0      SET RATE 22      PEEP/CPAP 10.0      Sample source Arterial      SITE Right Radial      DARRION'S TEST YES      Carboxy-Hgb 0.7 (L) 1 - 2 %    Methemoglobin 0.4 0 - 1.4 %    Oxyhemoglobin 92.9 (L) 95 - 99 %    Performed by Verónica Estrada     TEMPERATURE 100.8     GLUCOSE, POC    Collection Time: 02/11/23 12:39 AM   Result Value Ref Range    Glucose (POC) 171 (H) 65 - 100 mg/dL    Performed by [facility] (PCT)    C REACTIVE PROTEIN, QT    Collection Time: 02/11/23  3:50 AM   Result Value Ref Range    C-Reactive protein 7.57 (H) 0.00 - 0.60 mg/dL   PROCALCITONIN    Collection Time: 02/11/23  3:50 AM   Result Value Ref Range    Procalcitonin 0.94 (H) 0 ng/mL   METABOLIC PANEL, BASIC    Collection Time: 02/11/23  3:50 AM   Result Value Ref Range    Sodium 142 136 - 145 mmol/L    Potassium 3.8 3.5 - 5.1 mmol/L    Chloride 112 (H) 97 - 108 mmol/L    CO2 28 21 - 32 mmol/L    Anion gap 2 (L) 5 - 15 mmol/L    Glucose 164 (H) 65 - 100 mg/dL    BUN 21 (H) 6 - 20 mg/dL    Creatinine 1.19 (H) 0.55 - 1.02 mg/dL    BUN/Creatinine ratio 18 12 - 20      eGFR 53 (L) >60 ml/min/1.73m2    Calcium 7.9 (L) 8.5 - 10.1 mg/dL   CBC WITH AUTOMATED DIFF    Collection Time: 02/11/23  3:50 AM   Result Value Ref Range    WBC 12.8 (H) 3.6 - 11.0 K/uL    RBC 4.57 3.80 - 5.20 M/uL    HGB 12.7 11.5 - 16.0 g/dL    HCT 39.5 35.0 - 47.0 %    MCV 86.4 80.0 - 99.0 FL    MCH 27.8 26.0 - 34.0 PG    MCHC 32.2 30.0 - 36.5 g/dL    RDW 14.5 11.5 - 14.5 %    PLATELET 267 243 - 519 K/uL    MPV 9.9 8.9 - 12.9 FL    NRBC 0.0 0.0  WBC    ABSOLUTE NRBC 0.00 0.00 - 0.01 K/uL NEUTROPHILS 68 32 - 75 %    LYMPHOCYTES 24 12 - 49 %    MONOCYTES 7 5 - 13 %    EOSINOPHILS 0 0 - 7 %    BASOPHILS 0 0 - 1 %    IMMATURE GRANULOCYTES 1 (H) 0 - 0.5 %    ABS. NEUTROPHILS 8.7 (H) 1.8 - 8.0 K/UL    ABS. LYMPHOCYTES 3.0 0.8 - 3.5 K/UL    ABS. MONOCYTES 1.0 0.0 - 1.0 K/UL    ABS. EOSINOPHILS 0.0 0.0 - 0.4 K/UL    ABS. BASOPHILS 0.0 0.0 - 0.1 K/UL    ABS. IMM. GRANS. 0.1 (H) 0.00 - 0.04 K/UL    DF AUTOMATED     VANCOMYCIN, RANDOM    Collection Time: 02/11/23  3:50 AM   Result Value Ref Range    Vancomycin, random 6.8 ug/mL   HEPATIC FUNCTION PANEL    Collection Time: 02/11/23  3:50 AM   Result Value Ref Range    Protein, total 5.5 (L) 6.4 - 8.2 g/dL    Albumin 2.3 (L) 3.5 - 5.0 g/dL    Globulin 3.2 2.0 - 4.0 g/dL    A-G Ratio 0.7 (L) 1.1 - 2.2      Bilirubin, total 0.3 0.2 - 1.0 mg/dL    Bilirubin, direct 0.1 0.0 - 0.2 mg/dL    Alk.  phosphatase 84 45 - 117 U/L    AST (SGOT) 278 (H) 15 - 37 U/L    ALT (SGPT) 580 (H) 12 - 78 U/L   MAGNESIUM    Collection Time: 02/11/23  3:50 AM   Result Value Ref Range    Magnesium 2.0 1.6 - 2.4 mg/dL   PHOSPHORUS    Collection Time: 02/11/23  3:50 AM   Result Value Ref Range    Phosphorus 1.5 (L) 2.6 - 4.7 mg/dL   BLOOD GAS, ARTERIAL    Collection Time: 02/11/23  5:41 AM   Result Value Ref Range    pH 7.47 (H) 7.35 - 7.45      PCO2 36 35 - 45 mmHg    PO2 103 (H) 80 - 100 mmHg    O2 SATURATION 98 95 - 99 %    BICARBONATE 25 22 - 26 mmol/L    BASE EXCESS 1.9 0 - 3 mmol/L    O2 METHOD VENT      FIO2 60 %    MODE ASSIST CONTROL      Tidal volume 450.0      SET RATE 22      PEEP/CPAP 8.0      Sample source Arterial      SITE Left Radial      DARRION'S TEST YES      Carboxy-Hgb 0.0 (L) 1 - 2 %    Methemoglobin 0.3 0 - 1.4 %    Oxyhemoglobin 97.7 95 - 99 %    Performed by [facility]     TEMPERATURE 98.0     GLUCOSE, POC    Collection Time: 02/11/23 12:55 PM   Result Value Ref Range    Glucose (POC) 149 (H) 65 - 100 mg/dL    Performed by Elie Sethi          Assessment and plan: # Community acquired pneumonia   - Blood culture and sputum culture ordered. - Screen MRSA, Legionella Ag and Mycoplasma Ag   - Start antibiotics empirically: IV Vancomycin & IV cefepime     # Sepsis, severe  - Source: pneumonia   - Antibiotics empirically: IV vancomycin and cefepime  - Fluid resuscitation  - IV pressors. - Blood culture   - Trend LA  - Procalcitonin    - Consult ID      # Acute respiratory failure with hypoxia and hypercapnia  - Secondary to pneumonia   - Complicated by respiratory acidosis. - Continue mechanical vent, wean as tolerated  - Management as above. - Consult pulmonary      # Elevated troponin  - Unable to evaluate SAMINA score as patient is intubated  - ECHO   - trend troponin   - Consult cardiology      # Hypothyroidism  - Uncertain home medications. - Tsh within normal limits      # Seizure disorder  - Uncertain home medications. # Social Determents of health: None     # Full code by default, need further clarification     # Medication reconciliation: Medication list reviewed on Epic and/or outside documentation. Not reviewed with patient. However, medication reconciliation incomplete, appreciate assistance from pharmacy or nursing staff.     Signed By: Jair Reynolds MD     February 11, 2023

## 2023-02-12 ENCOUNTER — APPOINTMENT (OUTPATIENT)
Dept: GENERAL RADIOLOGY | Age: 58
DRG: 870 | End: 2023-02-12
Attending: INTERNAL MEDICINE
Payer: MEDICARE

## 2023-02-12 LAB
ANION GAP SERPL CALC-SCNC: 3 MMOL/L (ref 5–15)
ARTERIAL PATENCY WRIST A: YES
BACTERIA SPEC CULT: NORMAL
BACTERIA SPEC CULT: NORMAL
BASE EXCESS BLDA CALC-SCNC: 3.6 MMOL/L (ref 0–3)
BASOPHILS # BLD: 0 K/UL (ref 0–0.1)
BASOPHILS NFR BLD: 0 % (ref 0–1)
BDY SITE: ABNORMAL
BODY TEMPERATURE: 100.4
BUN SERPL-MCNC: 23 MG/DL (ref 6–20)
BUN/CREAT SERPL: 31 (ref 12–20)
CA-I BLD-MCNC: 7.7 MG/DL (ref 8.5–10.1)
CHLORIDE SERPL-SCNC: 109 MMOL/L (ref 97–108)
CO2 SERPL-SCNC: 30 MMOL/L (ref 21–32)
COHGB MFR BLD: 0.5 % (ref 1–2)
CREAT SERPL-MCNC: 0.74 MG/DL (ref 0.55–1.02)
CRP SERPL-MCNC: 7 MG/DL (ref 0–0.6)
DIFFERENTIAL METHOD BLD: ABNORMAL
EOSINOPHIL # BLD: 0 K/UL (ref 0–0.4)
EOSINOPHIL NFR BLD: 0 % (ref 0–7)
ERYTHROCYTE [DISTWIDTH] IN BLOOD BY AUTOMATED COUNT: 14.6 % (ref 11.5–14.5)
FIO2 ON VENT: 45 %
GAS FLOW.O2 SETTING OXYMISER: 20
GLUCOSE SERPL-MCNC: 134 MG/DL (ref 65–100)
GRAM STN SPEC: NORMAL
HCO3 BLDA-SCNC: 26 MMOL/L (ref 22–26)
HCT VFR BLD AUTO: 36.3 % (ref 35–47)
HGB BLD-MCNC: 11.6 G/DL (ref 11.5–16)
IMM GRANULOCYTES # BLD AUTO: 0 K/UL (ref 0–0.04)
IMM GRANULOCYTES NFR BLD AUTO: 0 % (ref 0–0.5)
LYMPHOCYTES # BLD: 1.8 K/UL (ref 0.8–3.5)
LYMPHOCYTES NFR BLD: 24 % (ref 12–49)
MAGNESIUM SERPL-MCNC: 2.1 MG/DL (ref 1.6–2.4)
MCH RBC QN AUTO: 27.8 PG (ref 26–34)
MCHC RBC AUTO-ENTMCNC: 32 G/DL (ref 30–36.5)
MCV RBC AUTO: 87.1 FL (ref 80–99)
METHGB MFR BLD: 0.3 % (ref 0–1.4)
MONOCYTES # BLD: 0.5 K/UL (ref 0–1)
MONOCYTES NFR BLD: 6 % (ref 5–13)
NEUTS SEG # BLD: 5.2 K/UL (ref 1.8–8)
NEUTS SEG NFR BLD: 70 % (ref 32–75)
NRBC # BLD: 0 K/UL (ref 0–0.01)
NRBC BLD-RTO: 0 PER 100 WBC
OXYHGB MFR BLD: 98.1 % (ref 95–99)
PCO2 BLDA: 35 MMHG (ref 35–45)
PEEP RESPIRATORY: 6
PERFORMED BY, TECHID: ABNORMAL
PH BLDA: 7.49 (ref 7.35–7.45)
PHOSPHATE SERPL-MCNC: 1.8 MG/DL (ref 2.6–4.7)
PLATELET # BLD AUTO: 250 K/UL (ref 150–400)
PMV BLD AUTO: 10.2 FL (ref 8.9–12.9)
PO2 BLDA: 154 MMHG (ref 80–100)
POTASSIUM SERPL-SCNC: 3.3 MMOL/L (ref 3.5–5.1)
PROCALCITONIN SERPL-MCNC: 0.57 NG/ML
RBC # BLD AUTO: 4.17 M/UL (ref 3.8–5.2)
SAO2 % BLD: 99 % (ref 95–99)
SAO2% DEVICE SAO2% SENSOR NAME: ABNORMAL
SODIUM SERPL-SCNC: 142 MMOL/L (ref 136–145)
SPECIAL REQUESTS,SREQ: NORMAL
SPECIMEN SITE: ABNORMAL
VENTILATION MODE VENT: 450
VT SETTING VENT: 450
WBC # BLD AUTO: 7.6 K/UL (ref 3.6–11)

## 2023-02-12 PROCEDURE — 80048 BASIC METABOLIC PNL TOTAL CA: CPT

## 2023-02-12 PROCEDURE — 84100 ASSAY OF PHOSPHORUS: CPT

## 2023-02-12 PROCEDURE — 74011000250 HC RX REV CODE- 250: Performed by: INTERNAL MEDICINE

## 2023-02-12 PROCEDURE — 74011250636 HC RX REV CODE- 250/636: Performed by: HOSPITALIST

## 2023-02-12 PROCEDURE — 94003 VENT MGMT INPAT SUBQ DAY: CPT

## 2023-02-12 PROCEDURE — 71045 X-RAY EXAM CHEST 1 VIEW: CPT

## 2023-02-12 PROCEDURE — 84145 PROCALCITONIN (PCT): CPT

## 2023-02-12 PROCEDURE — 36415 COLL VENOUS BLD VENIPUNCTURE: CPT

## 2023-02-12 PROCEDURE — 74011000258 HC RX REV CODE- 258: Performed by: INTERNAL MEDICINE

## 2023-02-12 PROCEDURE — 65610000006 HC RM INTENSIVE CARE

## 2023-02-12 PROCEDURE — 74011250636 HC RX REV CODE- 250/636: Performed by: INTERNAL MEDICINE

## 2023-02-12 PROCEDURE — 86140 C-REACTIVE PROTEIN: CPT

## 2023-02-12 PROCEDURE — 85025 COMPLETE CBC W/AUTO DIFF WBC: CPT

## 2023-02-12 PROCEDURE — 77010033678 HC OXYGEN DAILY

## 2023-02-12 PROCEDURE — 83735 ASSAY OF MAGNESIUM: CPT

## 2023-02-12 PROCEDURE — 82803 BLOOD GASES ANY COMBINATION: CPT

## 2023-02-12 PROCEDURE — 36600 WITHDRAWAL OF ARTERIAL BLOOD: CPT

## 2023-02-12 PROCEDURE — 74011250637 HC RX REV CODE- 250/637: Performed by: INTERNAL MEDICINE

## 2023-02-12 RX ORDER — MIDAZOLAM IN 0.9 % SOD.CHLORID 1 MG/ML
0-10 PLASTIC BAG, INJECTION (ML) INTRAVENOUS
Status: DISCONTINUED | OUTPATIENT
Start: 2023-02-12 | End: 2023-02-15

## 2023-02-12 RX ORDER — POTASSIUM CHLORIDE 7.45 MG/ML
10 INJECTION INTRAVENOUS ONCE
Status: COMPLETED | OUTPATIENT
Start: 2023-02-12 | End: 2023-02-12

## 2023-02-12 RX ADMIN — ENOXAPARIN SODIUM 40 MG: 100 INJECTION SUBCUTANEOUS at 08:02

## 2023-02-12 RX ADMIN — PROPOFOL 25 MCG/KG/MIN: 10 INJECTION, EMULSION INTRAVENOUS at 12:56

## 2023-02-12 RX ADMIN — Medication 5 MG/HR: at 08:51

## 2023-02-12 RX ADMIN — POTASSIUM CHLORIDE 10 MEQ: 7.46 INJECTION, SOLUTION INTRAVENOUS at 15:15

## 2023-02-12 RX ADMIN — SODIUM CHLORIDE, PRESERVATIVE FREE 10 ML: 5 INJECTION INTRAVENOUS at 06:23

## 2023-02-12 RX ADMIN — MEROPENEM 1 G: 1 INJECTION, POWDER, FOR SOLUTION INTRAVENOUS at 13:34

## 2023-02-12 RX ADMIN — SODIUM CHLORIDE, PRESERVATIVE FREE 10 ML: 5 INJECTION INTRAVENOUS at 23:14

## 2023-02-12 RX ADMIN — MEROPENEM 1 G: 1 INJECTION, POWDER, FOR SOLUTION INTRAVENOUS at 01:59

## 2023-02-12 RX ADMIN — SODIUM CHLORIDE, PRESERVATIVE FREE 10 ML: 5 INJECTION INTRAVENOUS at 13:37

## 2023-02-12 RX ADMIN — Medication 75 MCG/HR: at 00:27

## 2023-02-12 RX ADMIN — PROPOFOL 20 MCG/KG/MIN: 10 INJECTION, EMULSION INTRAVENOUS at 08:14

## 2023-02-12 RX ADMIN — ASPIRIN 81 MG CHEWABLE TABLET 81 MG: 81 TABLET CHEWABLE at 08:01

## 2023-02-12 RX ADMIN — Medication 50 MCG/HR: at 12:04

## 2023-02-12 RX ADMIN — CHLORHEXIDINE GLUCONATE 0.12% ORAL RINSE 15 ML: 1.2 LIQUID ORAL at 08:02

## 2023-02-12 RX ADMIN — PROPOFOL 30 MCG/KG/MIN: 10 INJECTION, EMULSION INTRAVENOUS at 17:45

## 2023-02-12 RX ADMIN — SODIUM CHLORIDE, PRESERVATIVE FREE 20 MG: 5 INJECTION INTRAVENOUS at 08:02

## 2023-02-12 RX ADMIN — Medication 4 MG/HR: at 17:45

## 2023-02-12 RX ADMIN — Medication 3 MG/HR: at 06:23

## 2023-02-12 RX ADMIN — CHLORHEXIDINE GLUCONATE 0.12% ORAL RINSE 15 ML: 1.2 LIQUID ORAL at 20:53

## 2023-02-12 RX ADMIN — Medication 2 MG/HR: at 03:20

## 2023-02-12 NOTE — PROGRESS NOTES
Pulmonary Progress Note      NAME: Laisha Garcia   :  1965  MRM:  [id number]    Date/Time: 2023  9:40 AM         Subjective:     Patient seen and examined. Discussed with the RN. Patient on the ventilator and sedated with propofol and fentanyl. However over the course of the night she was very restless. Versed is now added. She is still breathing over the ventilator. Blood gases and chest x-ray reviewed. She does have purulent endotracheal tube secretions. 7.5 endotracheal tube. Started on OG tube feeds at 50 mm/h and is tolerating fairly well. She is now off Levophed. When sedation is lightened she does still wake up but not necessarily following commands. Past Medical History reviewed and unchanged from Admission History and Physical       Objective:     Physical Exam     Vitals:      Last 24hrs VS reviewed since prior progress note. Most recent are:    Visit Vitals  /70   Pulse 82   Temp 99.6 °F (37.6 °C)   Resp 18   Ht 5' 7.99\" (1.727 m)   Wt 105 kg (231 lb 7.7 oz)   SpO2 95%   Breastfeeding No   BMI 35.21 kg/m²     SpO2 Readings from Last 6 Encounters:   23 95%   23 100%   23 96%   23 99%   22 95%   10/19/22 97%          Intake/Output Summary (Last 24 hours) at 2023 1007  Last data filed at 2023 1829  Gross per 24 hour   Intake 900.29 ml   Output 275 ml   Net 625.29 ml        Physical Exam:   General appearance: no distress, mildly obese, sedated on the ventilator, chronically ill-appearing  Head: Normocephalic, without obvious abnormality, atraumatic  Eyes: negative. She has a sample 5 endotracheal tube. She has OG tube  Neck: supple, symmetrical, trachea midline and no adenopathy  Lungs: Crackles at right base, otherwise few scattered rhonchi, ET tube in place  Heart: regular rate and rhythm, S1, S2 normal, no murmur, click, rub or gallop  Abdomen: soft, non-tender.  Bowel sounds normal. No masses,  no organomegaly  Extremities: extremities normal, atraumatic, no cyanosis or edema  Pulses: 2+ and symmetric  Skin: Skin color, texture, turgor normal. No rashes or lesions  Lymph nodes: Cervical, supraclavicular, and axillary nodes normal.  Neurologic: Grossly normal, sedated on the ventilator    XR CHEST PORT   Final Result   Endotracheal tube and slightly more satisfactory position 3.4 cm above the   karthik. Bibasilar airspace disease persists. XR CHEST PORT   Final Result   Endotracheal tube slightly low in position 2 cm above the karthik, could be   retracted 2 cm for more optimal positioning. Bibasilar airspace disease right   greater than left, without significant change. CT CHEST WO CONT   Final Result   Endotracheal tube and nasogastric tube are in satisfactory position. Dense right   lower lobe consolidation may represent pneumonia or aspiration. Bilateral   dependent atelectasis. Stable incidental findings as detailed above. CT HEAD WO CONT   Final Result   No acute intracranial process. XR CHEST PORT   Final Result      Endotracheal tube and nasogastric tube appear to be in satisfactory position. Diminished lung volumes with moderately severe pulmonary edema pattern.          XR CHEST PORT    (Results Pending)       Lab Data Reviewed: (see below)      Medications:  Current Facility-Administered Medications   Medication Dose Route Frequency    midazolam in normal saline (VERSED) 1 mg/mL infusion  0-10 mg/hr IntraVENous TITRATE    meropenem (MERREM) 1 g in 0.9% sodium chloride (MBP/ADV) 50 mL MBP  1 g IntraVENous Q12H    dexmedeTOMidine in 0.9 % NaCl (PRECEDEX) 400 mcg/100 mL (4 mcg/mL) infusion soln  0.1-1.5 mcg/kg/hr IntraVENous TITRATE    NOREPINephrine (LEVOPHED) 8 mg in 0.9% NS 250ml infusion  0.5-30 mcg/min IntraVENous TITRATE    sodium chloride (NS) flush 5-40 mL  5-40 mL IntraVENous Q8H    sodium chloride (NS) flush 5-40 mL  5-40 mL IntraVENous PRN    polyethylene glycol (MIRALAX) packet 17 g  17 g Oral DAILY PRN    ondansetron (ZOFRAN ODT) tablet 4 mg  4 mg Oral Q8H PRN    Or    ondansetron (ZOFRAN) injection 4 mg  4 mg IntraVENous Q6H PRN    enoxaparin (LOVENOX) injection 40 mg  40 mg SubCUTAneous DAILY    chlorhexidine (PERIDEX) 0.12 % mouthwash 15 mL  15 mL Oral Q12H    famotidine (PF) (PEPCID) 20 mg in 0.9% sodium chloride 10 mL injection  20 mg IntraVENous DAILY    fentaNYL (PF) 1,500 mcg/30 mL (50 mcg/mL) infusion  0-200 mcg/hr IntraVENous TITRATE    propofol (DIPRIVAN) 10 mg/mL infusion  0-50 mcg/kg/min IntraVENous TITRATE    aspirin chewable tablet 81 mg  81 mg Per NG tube DAILY    acetaminophen (TYLENOL) solution 650 mg  650 mg Per NG tube Q4H PRN    Or    acetaminophen (TYLENOL) suppository 650 mg  650 mg Rectal Q6H PRN       ______________________________________________________________________      Lab Review:     Recent Labs     02/12/23  0240 02/11/23  0350 02/10/23  0106   WBC 7.6 12.8* 10.9   HGB 11.6 12.7 14.0   HCT 36.3 39.5 46.3    301 334       Recent Labs     02/12/23  0240 02/11/23  0350 02/10/23  1000 02/10/23  0106    142 136 138   K 3.3* 3.8 5.5* 5.7*   * 112* 108 106   CO2 30 28 26 26   * 164* 213* 197*   BUN 23* 21* 22* 18   CREA 0.74 1.19* 1.99* 2.27*   CA 7.7* 7.9* 7.7* 7.8*   MG 2.1 2.0 2.0  --    PHOS 1.8* 1.5* 2.6  --    ALB  --  2.3* 2.6* 3.0*   ALT  --  580*  --  63       No components found for: [place]  Recent Labs     02/12/23  0322 02/11/23  0541 02/10/23  1855   PH 7.49* 7.47* 7.39   PCO2 35 36 37   PO2 154* 103* 72*   HCO3 26 25 22   FIO2 45 60 60       No results for input(s): INR, INREXT, INREXT, INREXT in the last 72 hours. Other pertinent lab:          Assessment & Plan:      Patient is a 27-year-old  female with a history of obesity, hypothyroidism, seizure disorder, GERD, and PTSD who presented to the hospital after being found unresponsive and was experiencing acute hypoxemic respiratory failure.      Plan:      1.)  Acute hypoxemic respiratory failure  -Unclear etiology, certainly has a right lower lobe likely aspiration pneumonia. Could have had a drug overdose at home on home medications. -UDS positive for benzos on admission  -Currently on AC//35%/18 with a PEEP of 8. Vent mechanics are good. Blood gases done this morning showing 7.49/35/154 on 45% O2 and 20 rate. Blood gases done yesterday morning showed 7.47/36/103 on 60% ventilator. Oxygen has been decreased. Will decrease rate to 14, however she is breathing over the ventilator. Chest x-ray personally reviewed. She has bibasilar airspace disease right greater than left. No significant change. She has a purulent secretion coming from the ET tube sample 5. Sputum will be sent for cultures if not already sent. For sedation the patient is on propofol at 30 mics and fentanyl 75 mics. Additionally Versed has been added currently at 5 mg/h. Target a RASS of -1-2. When sedation is lightened she wakes up but not necessarily following commands. We will do another complete  sedation vacation in the morning.  -Repeat ABG and chest x-ray in the morning  -Patient is on all the appropriate prophylactic measures     2.)  Acute metabolic encephalopathy  -Suspect secondary to acute hypoxemic respiratory failure and pneumonia  -CT head negative on admission  -UDS positive for benzos  -Unclear if patient had any overdose of her home medications  -Sedation as above, slowly wean for SAT/SBT over the next 48 hours  If she does not wake up completely she would benefit from neurology consultation. 3.)  Shock  -Suspect sepsis from pneumonia, lactate level slowly improving  -TTE done on 2/10/2023 showing an EF of 40 to 45%.   Right ventricular systolic pressure of 22.  -Currently off Levophed, yesterday she was at 5 mcg/min, weaned for goal MAP greater than 65 mmHg  -Blood/sputum cultures are pending, on broad antibiotics with IV cefepime/vancomycin, ID consulted. Biotics have been adjusted to meropenem. 4.)  Acute kidney injury  -Creatinine level up to 2.27 from 1.12 on admission, likely secondary to shock. Improved. Creatinine today is 0.74. Potassium is 3.3.  -Continue to avoid nephrotoxic medications  -Status post 3 L isotonic fluids on admission, currently free water flushes through the OG tube. -Continue to maintain MAP greater than 65 mmHg with vasopressor support  -Repeat BMP now and in the morning     5.)  Lactic acidosis  -Lactate level 5.2 on admission  -Likely secondary to shock, continue to maintain MAP greater than 65 mmHg with vasopressor support  -Serum bicarb level normal today     6.)  NSTEMI  -Troponin level elevated at 269 on admission.  -Cardiology input appreciated  -Echocardiogram as above. CODE STATUS: Full Code     Lines/Tubes: Right femoral CVC, PIV x3, ETT, Mohamud urinary catheter     Prophylaxis:  Stress Ulcer Protocol Active: Yes  Deep Vein Thrombosis Protocol Active: Yes  Nutrition: She is started on tube feeds at 50 mL/h.   Activity: bedrest        Disposition and Family:  Remain in the ICU     Total time spent with patient: 60 minutes       Melissa Brown MD.  [facility] (PAT)

## 2023-02-12 NOTE — PROGRESS NOTES
0500- pt at 50/mck/kg of propofol. Pt waking up. Bending knees, trying to move hands up. Md notified. Versed started at 2mg/hr and fentanyl increased to 100mcg/hr. Pt sedated but arousable.

## 2023-02-12 NOTE — PROGRESS NOTES
Progress Note    Patient: Wilmer Gutierrez MRN: [id number]  SSN: [id number]    YOB: 1965  Age: 62 y.o. Sex: female      Admit Date: 2/10/2023    LOS: 2 days     Subjective:     57F,  with PMH of hypothyroidism and seizures, PTSD with acute encephalopathy s/t aspiration pneumonia. HPI:   Per roommate she has been sleeping since yesterday morning, and was unable to wake her up. EMS arrived and patient was hypoxic in the 30s, non rebreather placed on patient improved to 70s, narcan given with no change. Patient was then intubated in the field. Otherwise no additional history obtained as patient was intubated and sedated. HOSPITAL COURSE:  CXR showed pulmonary edema and CT chest showed RLL pneumonia, annd this was the likely cause of septic shock. She was started on levophed and precedex. ECHO showed EF40-45%. Cardiology was consulted for MI typII. On 2/12 she was off of levophed. Review of Systems:  Intubated and sedated      Objective:     Vitals:    02/12/23 1100 02/12/23 1200 02/12/23 1229 02/12/23 1233   BP: (!) 100/58 (!) 89/60     Pulse: 81 77  76   Resp: 18 17  19   Temp: 99.6 °F (37.6 °C)  99.6 °F (37.6 °C)    SpO2: 92% (!) 88%  90%   Weight:       Height:            Intake and Output:  Current Shift: 02/12 0701 - 02/12 1900  In: 346 [I.V.:11]  Out: 400 [Urine:400]  Last three shifts: 02/10 1901 - 02/12 0700  In: 1575.3 [I.V.:785.3]  Out: 275 [Urine:275]         Physical Exam:   General: Intubated and sedated  Eye: Omitted as patient is unable to cooperate. Throat and Neck: Omitted as patient is unable to cooperate. Lung: decreasd lung sounds on auscultation bilaterally  Heart: regular rate and rhythm,   Abdomen: soft, non-tender. Bowel sounds normal. No masses,  Extremities: No LE edema. atraumatic  Skin: mottling   Neurologic: Omitted as patient is unable to cooperate. Psychiatric: Omitted as patient is unable to cooperate.         Lab/Data Review:  Recent Results (from the past 24 hour(s))   GLUCOSE, POC    Collection Time: 02/11/23  6:41 PM   Result Value Ref Range    Glucose (POC) 129 (H) 65 - 100 mg/dL    Performed by Juni Sánchez    C REACTIVE PROTEIN, QT    Collection Time: 02/12/23  2:40 AM   Result Value Ref Range    C-Reactive protein 7.00 (H) 0.00 - 0.60 mg/dL   PROCALCITONIN    Collection Time: 02/12/23  2:40 AM   Result Value Ref Range    Procalcitonin 0.57 (H) 0 ng/mL   METABOLIC PANEL, BASIC    Collection Time: 02/12/23  2:40 AM   Result Value Ref Range    Sodium 142 136 - 145 mmol/L    Potassium 3.3 (L) 3.5 - 5.1 mmol/L    Chloride 109 (H) 97 - 108 mmol/L    CO2 30 21 - 32 mmol/L    Anion gap 3 (L) 5 - 15 mmol/L    Glucose 134 (H) 65 - 100 mg/dL    BUN 23 (H) 6 - 20 mg/dL    Creatinine 0.74 0.55 - 1.02 mg/dL    BUN/Creatinine ratio 31 (H) 12 - 20      eGFR >60 >60 ml/min/1.73m2    Calcium 7.7 (L) 8.5 - 10.1 mg/dL   CBC WITH AUTOMATED DIFF    Collection Time: 02/12/23  2:40 AM   Result Value Ref Range    WBC 7.6 3.6 - 11.0 K/uL    RBC 4.17 3.80 - 5.20 M/uL    HGB 11.6 11.5 - 16.0 g/dL    HCT 36.3 35.0 - 47.0 %    MCV 87.1 80.0 - 99.0 FL    MCH 27.8 26.0 - 34.0 PG    MCHC 32.0 30.0 - 36.5 g/dL    RDW 14.6 (H) 11.5 - 14.5 %    PLATELET 768 910 - 096 K/uL    MPV 10.2 8.9 - 12.9 FL    NRBC 0.0 0.0  WBC    ABSOLUTE NRBC 0.00 0.00 - 0.01 K/uL    NEUTROPHILS 70 32 - 75 %    LYMPHOCYTES 24 12 - 49 %    MONOCYTES 6 5 - 13 %    EOSINOPHILS 0 0 - 7 %    BASOPHILS 0 0 - 1 %    IMMATURE GRANULOCYTES 0 0 - 0.5 %    ABS. NEUTROPHILS 5.2 1.8 - 8.0 K/UL    ABS. LYMPHOCYTES 1.8 0.8 - 3.5 K/UL    ABS. MONOCYTES 0.5 0.0 - 1.0 K/UL    ABS. EOSINOPHILS 0.0 0.0 - 0.4 K/UL    ABS. BASOPHILS 0.0 0.0 - 0.1 K/UL    ABS. IMM.  GRANS. 0.0 0.00 - 0.04 K/UL    DF AUTOMATED     MAGNESIUM    Collection Time: 02/12/23  2:40 AM   Result Value Ref Range    Magnesium 2.1 1.6 - 2.4 mg/dL   PHOSPHORUS    Collection Time: 02/12/23  2:40 AM   Result Value Ref Range    Phosphorus 1.8 (L) 2.6 - 4.7 mg/dL   BLOOD GAS, ARTERIAL    Collection Time: 02/12/23  3:22 AM   Result Value Ref Range    pH 7.49 (H) 7.35 - 7.45      PCO2 35 35 - 45 mmHg    PO2 154 (H) 80 - 100 mmHg    O2 SATURATION 99 95 - 99 %    BICARBONATE 26 22 - 26 mmol/L    BASE EXCESS 3.6 (H) 0 - 3 mmol/L    O2 METHOD VENT      FIO2 45 %    MODE 450      Tidal volume 450.0      SET RATE 20      PEEP/CPAP 6.0      Sample source Arterial      SITE Right Radial      DARRION'S TEST YES      Carboxy-Hgb 0.5 (L) 1 - 2 %    Methemoglobin 0.3 0 - 1.4 %    Oxyhemoglobin 98.1 95 - 99 %    Performed by Kyara Dawkins     TEMPERATURE 100.4           Assessment and plan:      (1) Acute encephalopathy : intubated. Wakes up with non-purposeful movement     (2) septic shock : off of levophed    (3) acute hypoxic respiratory failure : intubated,     (4) MI type II: ECHO ef 40-45%    (5) ANASTACIO: resolved. (6) aspiration pneumonia : meropenem     (7) seizure disorder: on versed gtt    (8) hypothyroidism: there is no medication on home med list    (9) depression with psychosis: holding clonazepam and seroquel            # Social Determents of health: None     # Full code by default, need further clarification     DISPO: ICU until intubated.        Signed By: Brenna Kapoor MD     February 12, 2023

## 2023-02-13 ENCOUNTER — APPOINTMENT (OUTPATIENT)
Dept: GENERAL RADIOLOGY | Age: 58
DRG: 870 | End: 2023-02-13
Attending: INTERNAL MEDICINE
Payer: MEDICARE

## 2023-02-13 LAB
ANION GAP SERPL CALC-SCNC: 3 MMOL/L (ref 5–15)
ARTERIAL PATENCY WRIST A: ABNORMAL
BASE EXCESS BLDA CALC-SCNC: 8.3 MMOL/L (ref 0–3)
BASOPHILS # BLD: 0 K/UL (ref 0–0.1)
BASOPHILS NFR BLD: 1 % (ref 0–1)
BDY SITE: ABNORMAL
BODY TEMPERATURE: 98.6
BUN SERPL-MCNC: 22 MG/DL (ref 6–20)
BUN/CREAT SERPL: 31 (ref 12–20)
CA-I BLD-MCNC: 7.8 MG/DL (ref 8.5–10.1)
CHLORIDE SERPL-SCNC: 107 MMOL/L (ref 97–108)
CO2 SERPL-SCNC: 32 MMOL/L (ref 21–32)
COHGB MFR BLD: 0.3 % (ref 1–2)
CREAT SERPL-MCNC: 0.72 MG/DL (ref 0.55–1.02)
CRP SERPL-MCNC: 8.74 MG/DL (ref 0–0.6)
DIFFERENTIAL METHOD BLD: ABNORMAL
EOSINOPHIL # BLD: 0.1 K/UL (ref 0–0.4)
EOSINOPHIL NFR BLD: 2 % (ref 0–7)
ERYTHROCYTE [DISTWIDTH] IN BLOOD BY AUTOMATED COUNT: 14.7 % (ref 11.5–14.5)
FIO2 ON VENT: 35 %
GAS FLOW.O2 SETTING OXYMISER: 14
GLUCOSE SERPL-MCNC: 120 MG/DL (ref 65–100)
HCO3 BLDA-SCNC: 32 MMOL/L (ref 22–26)
HCT VFR BLD AUTO: 35.7 % (ref 35–47)
HGB BLD-MCNC: 11.4 G/DL (ref 11.5–16)
IMM GRANULOCYTES # BLD AUTO: 0 K/UL (ref 0–0.04)
IMM GRANULOCYTES NFR BLD AUTO: 0 % (ref 0–0.5)
L PNEUMO1 AG UR QL IA: NEGATIVE
LYMPHOCYTES # BLD: 2.3 K/UL (ref 0.8–3.5)
LYMPHOCYTES NFR BLD: 34 % (ref 12–49)
MAGNESIUM SERPL-MCNC: 2 MG/DL (ref 1.6–2.4)
MCH RBC QN AUTO: 27.7 PG (ref 26–34)
MCHC RBC AUTO-ENTMCNC: 31.9 G/DL (ref 30–36.5)
MCV RBC AUTO: 86.9 FL (ref 80–99)
METHGB MFR BLD: 0.4 % (ref 0–1.4)
MONOCYTES # BLD: 0.4 K/UL (ref 0–1)
MONOCYTES NFR BLD: 7 % (ref 5–13)
NEUTS SEG # BLD: 3.8 K/UL (ref 1.8–8)
NEUTS SEG NFR BLD: 56 % (ref 32–75)
NRBC # BLD: 0 K/UL (ref 0–0.01)
NRBC BLD-RTO: 0 PER 100 WBC
OXYHGB MFR BLD: 93.6 % (ref 95–99)
PCO2 BLDA: 39 MMHG (ref 35–45)
PEEP RESPIRATORY: 6
PERFORMED BY, TECHID: ABNORMAL
PH BLDA: 7.53 (ref 7.35–7.45)
PHOSPHATE SERPL-MCNC: 2.5 MG/DL (ref 2.6–4.7)
PLATELET # BLD AUTO: 244 K/UL (ref 150–400)
PMV BLD AUTO: 10.1 FL (ref 8.9–12.9)
PO2 BLDA: 66 MMHG (ref 80–100)
POTASSIUM SERPL-SCNC: 3.1 MMOL/L (ref 3.5–5.1)
RBC # BLD AUTO: 4.11 M/UL (ref 3.8–5.2)
SAO2 % BLD: 94 % (ref 95–99)
SAO2% DEVICE SAO2% SENSOR NAME: ABNORMAL
SERVICE CMNT-IMP: ABNORMAL
SODIUM SERPL-SCNC: 142 MMOL/L (ref 136–145)
SPECIMEN SITE: ABNORMAL
SPECIMEN SOURCE: NORMAL
VENTILATION MODE VENT: ABNORMAL
VT SETTING VENT: 450
WBC # BLD AUTO: 6.6 K/UL (ref 3.6–11)

## 2023-02-13 PROCEDURE — 74011250636 HC RX REV CODE- 250/636: Performed by: INTERNAL MEDICINE

## 2023-02-13 PROCEDURE — 65610000006 HC RM INTENSIVE CARE

## 2023-02-13 PROCEDURE — 74011250636 HC RX REV CODE- 250/636: Performed by: HOSPITALIST

## 2023-02-13 PROCEDURE — 84100 ASSAY OF PHOSPHORUS: CPT

## 2023-02-13 PROCEDURE — 74011000258 HC RX REV CODE- 258: Performed by: INTERNAL MEDICINE

## 2023-02-13 PROCEDURE — 74011250637 HC RX REV CODE- 250/637: Performed by: HOSPITALIST

## 2023-02-13 PROCEDURE — 94003 VENT MGMT INPAT SUBQ DAY: CPT

## 2023-02-13 PROCEDURE — 83735 ASSAY OF MAGNESIUM: CPT

## 2023-02-13 PROCEDURE — 74011000250 HC RX REV CODE- 250: Performed by: HOSPITALIST

## 2023-02-13 PROCEDURE — 74011000250 HC RX REV CODE- 250: Performed by: INTERNAL MEDICINE

## 2023-02-13 PROCEDURE — 80048 BASIC METABOLIC PNL TOTAL CA: CPT

## 2023-02-13 PROCEDURE — 86140 C-REACTIVE PROTEIN: CPT

## 2023-02-13 PROCEDURE — 36415 COLL VENOUS BLD VENIPUNCTURE: CPT

## 2023-02-13 PROCEDURE — 74011250637 HC RX REV CODE- 250/637: Performed by: INTERNAL MEDICINE

## 2023-02-13 PROCEDURE — 82803 BLOOD GASES ANY COMBINATION: CPT

## 2023-02-13 PROCEDURE — 71045 X-RAY EXAM CHEST 1 VIEW: CPT

## 2023-02-13 PROCEDURE — 77010033678 HC OXYGEN DAILY

## 2023-02-13 PROCEDURE — 36600 WITHDRAWAL OF ARTERIAL BLOOD: CPT

## 2023-02-13 PROCEDURE — 99233 SBSQ HOSP IP/OBS HIGH 50: CPT | Performed by: INTERNAL MEDICINE

## 2023-02-13 PROCEDURE — 84145 PROCALCITONIN (PCT): CPT

## 2023-02-13 PROCEDURE — 85025 COMPLETE CBC W/AUTO DIFF WBC: CPT

## 2023-02-13 RX ORDER — CLONAZEPAM 0.5 MG/1
0.5 TABLET ORAL 3 TIMES DAILY
Status: DISCONTINUED | OUTPATIENT
Start: 2023-02-13 | End: 2023-02-19 | Stop reason: HOSPADM

## 2023-02-13 RX ORDER — QUETIAPINE FUMARATE 100 MG/1
300 TABLET, FILM COATED ORAL
Status: DISCONTINUED | OUTPATIENT
Start: 2023-02-13 | End: 2023-02-19 | Stop reason: HOSPADM

## 2023-02-13 RX ORDER — GABAPENTIN 300 MG/1
600 CAPSULE ORAL 4 TIMES DAILY
Status: DISCONTINUED | OUTPATIENT
Start: 2023-02-13 | End: 2023-02-19 | Stop reason: HOSPADM

## 2023-02-13 RX ORDER — BUPRENORPHINE AND NALOXONE 8; 2 MG/1; MG/1
1 FILM, SOLUBLE BUCCAL; SUBLINGUAL 2 TIMES DAILY
COMMUNITY

## 2023-02-13 RX ADMIN — SODIUM CHLORIDE, PRESERVATIVE FREE 10 ML: 5 INJECTION INTRAVENOUS at 15:08

## 2023-02-13 RX ADMIN — SODIUM CHLORIDE, PRESERVATIVE FREE 10 ML: 5 INJECTION INTRAVENOUS at 06:44

## 2023-02-13 RX ADMIN — PROPOFOL 35 MCG/KG/MIN: 10 INJECTION, EMULSION INTRAVENOUS at 11:00

## 2023-02-13 RX ADMIN — ACETAMINOPHEN 650 MG: 160 SOLUTION ORAL at 21:12

## 2023-02-13 RX ADMIN — CEFAZOLIN 2 G: 1 INJECTION, POWDER, FOR SOLUTION INTRAMUSCULAR; INTRAVENOUS at 18:18

## 2023-02-13 RX ADMIN — SODIUM CHLORIDE, PRESERVATIVE FREE 10 ML: 5 INJECTION INTRAVENOUS at 21:12

## 2023-02-13 RX ADMIN — PROPOFOL 40 MCG/KG/MIN: 10 INJECTION, EMULSION INTRAVENOUS at 06:43

## 2023-02-13 RX ADMIN — CLONAZEPAM 0.5 MG: 1 TABLET ORAL at 21:12

## 2023-02-13 RX ADMIN — SODIUM CHLORIDE, PRESERVATIVE FREE 20 MG: 5 INJECTION INTRAVENOUS at 21:11

## 2023-02-13 RX ADMIN — MEROPENEM 1 G: 1 INJECTION, POWDER, FOR SOLUTION INTRAVENOUS at 02:04

## 2023-02-13 RX ADMIN — PROPOFOL 20 MCG/KG/MIN: 10 INJECTION, EMULSION INTRAVENOUS at 14:57

## 2023-02-13 RX ADMIN — GABAPENTIN 600 MG: 300 CAPSULE ORAL at 18:18

## 2023-02-13 RX ADMIN — CHLORHEXIDINE GLUCONATE 0.12% ORAL RINSE 15 ML: 1.2 LIQUID ORAL at 08:17

## 2023-02-13 RX ADMIN — Medication 4 MG/HR: at 01:46

## 2023-02-13 RX ADMIN — Medication 50 MCG/HR: at 08:22

## 2023-02-13 RX ADMIN — Medication 3 MG/HR: at 11:55

## 2023-02-13 RX ADMIN — CLONAZEPAM 0.5 MG: 1 TABLET ORAL at 15:00

## 2023-02-13 RX ADMIN — CHLORHEXIDINE GLUCONATE 0.12% ORAL RINSE 15 ML: 1.2 LIQUID ORAL at 21:12

## 2023-02-13 RX ADMIN — CEFAZOLIN 2 G: 1 INJECTION, POWDER, FOR SOLUTION INTRAMUSCULAR; INTRAVENOUS at 11:03

## 2023-02-13 RX ADMIN — SODIUM CHLORIDE, PRESERVATIVE FREE 20 MG: 5 INJECTION INTRAVENOUS at 08:17

## 2023-02-13 RX ADMIN — ASPIRIN 81 MG CHEWABLE TABLET 81 MG: 81 TABLET CHEWABLE at 08:17

## 2023-02-13 RX ADMIN — GABAPENTIN 600 MG: 300 CAPSULE ORAL at 12:01

## 2023-02-13 RX ADMIN — ENOXAPARIN SODIUM 40 MG: 100 INJECTION SUBCUTANEOUS at 08:16

## 2023-02-13 RX ADMIN — QUETIAPINE FUMARATE 300 MG: 100 TABLET ORAL at 21:12

## 2023-02-13 RX ADMIN — GABAPENTIN 600 MG: 300 CAPSULE ORAL at 21:12

## 2023-02-13 NOTE — PROGRESS NOTES
Pulmonary Progress Note      NAME: Shweta Nunez   :  1965  MRM:  [id number]    Date/Time: 2023  9:40 AM         Subjective:     Patient seen and examined in ICU  Overnight events noted    Remains critically ill  Intubated on mechanical ventilation  On assist-control 40% FiO2 and PEEP of 6  ABGs and chest x-ray reviewed      Past Medical History reviewed and unchanged from Admission History and Physical       Objective:     Physical Exam     Vitals:      Last 24hrs VS reviewed since prior progress note. Most recent are:    Visit Vitals  BP 98/66   Pulse 79   Temp 99.3 °F (37.4 °C)   Resp 14   Ht 5' 7.99\" (1.727 m)   Wt 106.5 kg (234 lb 12.6 oz)   SpO2 92%   Breastfeeding No   BMI 35.71 kg/m²     SpO2 Readings from Last 6 Encounters:   23 92%   23 100%   23 96%   23 99%   22 95%   10/19/22 97%          Intake/Output Summary (Last 24 hours) at 2023 0946  Last data filed at 2023 0600  Gross per 24 hour   Intake 787.87 ml   Output 850 ml   Net -62.13 ml        Physical Exam:   General appearance: no distress, mildly obese, sedated on the ventilator, chronically ill-appearing  Head: Normocephalic, without obvious abnormality, atraumatic  Eyes: negative. She has a sample 5 endotracheal tube. She has OG tube  Neck: supple, symmetrical, trachea midline and no adenopathy  Lungs: Crackles at right base, otherwise few scattered rhonchi, ET tube in place  Heart: regular rate and rhythm, S1, S2 normal, no murmur, click, rub or gallop  Abdomen: soft, non-tender. Bowel sounds normal. No masses,  no organomegaly  Extremities: extremities normal, atraumatic, no cyanosis or edema  Pulses: 2+ and symmetric  Skin: Skin color, texture, turgor normal. No rashes or lesions  Lymph nodes: Cervical, supraclavicular, and axillary nodes normal.  Neurologic: Grossly normal, sedated on the ventilator    XR CHEST PORT   Final Result   Mild bibasilar atelectasis, improved.          XR CHEST PORT   Final Result   Endotracheal tube and slightly more satisfactory position 3.4 cm above the   karthik. Bibasilar airspace disease persists. XR CHEST PORT   Final Result   Endotracheal tube slightly low in position 2 cm above the karthik, could be   retracted 2 cm for more optimal positioning. Bibasilar airspace disease right   greater than left, without significant change. CT CHEST WO CONT   Final Result   Endotracheal tube and nasogastric tube are in satisfactory position. Dense right   lower lobe consolidation may represent pneumonia or aspiration. Bilateral   dependent atelectasis. Stable incidental findings as detailed above. CT HEAD WO CONT   Final Result   No acute intracranial process. XR CHEST PORT   Final Result      Endotracheal tube and nasogastric tube appear to be in satisfactory position. Diminished lung volumes with moderately severe pulmonary edema pattern.              Lab Data Reviewed: (see below)      Medications:  Current Facility-Administered Medications   Medication Dose Route Frequency    midazolam in normal saline (VERSED) 1 mg/mL infusion  0-10 mg/hr IntraVENous TITRATE    meropenem (MERREM) 1 g in 0.9% sodium chloride (MBP/ADV) 50 mL MBP  1 g IntraVENous Q12H    dexmedeTOMidine in 0.9 % NaCl (PRECEDEX) 400 mcg/100 mL (4 mcg/mL) infusion soln  0.1-1.5 mcg/kg/hr IntraVENous TITRATE    NOREPINephrine (LEVOPHED) 8 mg in 0.9% NS 250ml infusion  0.5-30 mcg/min IntraVENous TITRATE    sodium chloride (NS) flush 5-40 mL  5-40 mL IntraVENous Q8H    sodium chloride (NS) flush 5-40 mL  5-40 mL IntraVENous PRN    polyethylene glycol (MIRALAX) packet 17 g  17 g Oral DAILY PRN    ondansetron (ZOFRAN ODT) tablet 4 mg  4 mg Oral Q8H PRN    Or    ondansetron (ZOFRAN) injection 4 mg  4 mg IntraVENous Q6H PRN    enoxaparin (LOVENOX) injection 40 mg  40 mg SubCUTAneous DAILY    chlorhexidine (PERIDEX) 0.12 % mouthwash 15 mL  15 mL Oral Q12H    famotidine (PF) (PEPCID) 20 mg in 0.9% sodium chloride 10 mL injection  20 mg IntraVENous DAILY    fentaNYL (PF) 1,500 mcg/30 mL (50 mcg/mL) infusion  0-200 mcg/hr IntraVENous TITRATE    propofol (DIPRIVAN) 10 mg/mL infusion  0-50 mcg/kg/min IntraVENous TITRATE    aspirin chewable tablet 81 mg  81 mg Per NG tube DAILY    acetaminophen (TYLENOL) solution 650 mg  650 mg Per NG tube Q4H PRN    Or    acetaminophen (TYLENOL) suppository 650 mg  650 mg Rectal Q6H PRN       ______________________________________________________________________      Lab Review:     Recent Labs     02/13/23  0300 02/12/23  0240 02/11/23  0350   WBC 6.6 7.6 12.8*   HGB 11.4* 11.6 12.7   HCT 35.7 36.3 39.5    250 301       Recent Labs     02/13/23  0321 02/13/23  0300 02/12/23  0240 02/11/23  0350 02/10/23  1000   NA  --  142 142 142 136   K  --  3.1* 3.3* 3.8 5.5*   CL  --  107 109* 112* 108   CO2  --  32 30 28 26   GLU  --  120* 134* 164* 213*   BUN  --  22* 23* 21* 22*   CREA  --  0.72 0.74 1.19* 1.99*   CA  --  7.8* 7.7* 7.9* 7.7*   MG 2.0  --  2.1 2.0 2.0   PHOS 2.5*  --  1.8* 1.5* 2.6   ALB  --   --   --  2.3* 2.6*   ALT  --   --   --  580*  --        No components found for: [place]  Recent Labs     02/13/23  0431 02/12/23  0322 02/11/23  0541   PH 7.53* 7.49* 7.47*   PCO2 39 35 36   PO2 66* 154* 103*   HCO3 32* 26 25   FIO2 35 45 60       No results for input(s): INR, INREXT, INREXT, INREXT in the last 72 hours. Other pertinent lab:          Assessment & Plan:      Patient is a 80-year-old  female with a history of obesity, hypothyroidism, seizure disorder, GERD, and PTSD who presented to the hospital after being found unresponsive and was experiencing acute hypoxemic respiratory failure. Plan:      1.)  Acute hypoxemic respiratory failure  -Unclear etiology, certainly has a right lower lobe likely aspiration pneumonia. Could have had a drug overdose at home on home medications.   -UDS positive for benzos on admission    Currently on assist control  Chest x-ray shows hypoinflated lungs with basilar airspace disease suspicious for atelectasis  Will initiate weaning with SIMV/PSV and progress as tolerated     2.)  Acute metabolic encephalopathy  -Suspect secondary to acute hypoxemic respiratory failure and pneumonia  -CT head negative on admission  -UDS positive for benzos  Reduce sedation as tolerated  If she does not wake up completely she would benefit from neurology consultation. 3.)  Shock  -Suspect sepsis from pneumonia, lactate level slowly improving  -TTE done on 2/10/2023 showing an EF of 40 to 45%. Right ventricular systolic pressure of 22.  -Currently off Levophed, yesterday she was at 5 mcg/min, weaned for goal MAP greater than 65 mmHg  -Blood/sputum cultures are pending, on broad antibiotics with IV cefepime/vancomycin, ID consulted. Biotics have been adjusted to meropenem. 4.)  Acute kidney injury  -Creatinine level up to 2.27 from 1.12 on admission, likely secondary to shock. Improved. Creatinine today is 0.74. Potassium is 3.3.  -Continue to avoid nephrotoxic medications  -Status post 3 L isotonic fluids on admission, currently free water flushes through the OG tube. -Continue to maintain MAP greater than 65 mmHg with vasopressor support  -Repeat BMP now and in the morning     5.)  Lactic acidosis  -Lactate level 5.2 on admission  -Likely secondary to shock, continue to maintain MAP greater than 65 mmHg with vasopressor support  -Serum bicarb level normal today     6.)  NSTEMI  -Troponin level elevated at 269 on admission.  -Cardiology input appreciated  -Echocardiogram as above. CODE STATUS: Full Code     Lines/Tubes: Right femoral CVC, PIV x3, ETT, Mohamud urinary catheter     Prophylaxis:  Stress Ulcer Protocol Active: Yes  Deep Vein Thrombosis Protocol Active: Yes  Nutrition: She is started on tube feeds at 50 mL/h.   Activity: bedrest     Disposition and Family:  Remain in the ICU     Case discussed in detail with RN, RT, and care team  Thank you for involving me in the care of this patient  I will follow with you closely during hospitalization    Time spent more than 60 minutes under patient care with no overlap reviewing results and records, decision making, and answering questions.     Vita Hardy MD  [facility] ([facility])

## 2023-02-13 NOTE — PROGRESS NOTES
16: 25PM  Outbound calls to all listed potential family members on [address] [place] [address]. All calls unsuccessful, and will try again tomorrow. Gena Heck of the calls is to locate the patient's son Raulito Live. LI Denise          08:48AM Remains vented. Recv'd results from [address] [place] [address] to locate the patient's son.           LI Denise

## 2023-02-13 NOTE — PROGRESS NOTES
Progress Note    Patient: Ekaterina Marsh MRN: [id number]  SSN: [id number]    YOB: 1965  Age: 62 y.o. Sex: female      Admit Date: 2/10/2023    LOS: 3 days     Subjective:     57F,  with PMH of hypothyroidism and seizures, PTSD with acute encephalopathy s/t aspiration pneumonia. HPI:   Per roommate she has been sleeping since yesterday morning, and was unable to wake her up. EMS arrived and patient was hypoxic in the 30s, non rebreather placed on patient improved to 70s, narcan given with no change. Patient was then intubated in the field. Otherwise no additional history obtained as patient was intubated and sedated. HOSPITAL COURSE:  CXR showed pulmonary edema and CT chest showed RLL pneumonia, annd this was the likely cause of septic shock. She was started on levophed and precedex. ECHO showed EF40-45%. Cardiology was consulted for MI typII. On 2/12 she was off of levophed. Review of Systems:  Intubated and sedated      Objective:     Vitals:    02/13/23 1200 02/13/23 1300 02/13/23 1400 02/13/23 1500   BP: 90/61 90/65 109/70 104/65   Pulse: 68 69 71 75   Resp: 11 11 19 16   Temp:    99.6 °F (37.6 °C)   SpO2: 94% 93% 94% 95%   Weight:       Height:            Intake and Output:  Current Shift: 02/13 0701 - 02/13 1900  In: -   Out: 125 [Urine:125]  Last three shifts: 02/11 1901 - 02/13 0700  In: 983.8 [I.V.:378.8]  Out: 1025 [Urine:1025]         Physical Exam:   General: Intubated and sedated  Eye: Omitted as patient is unable to cooperate. Throat and Neck: Omitted as patient is unable to cooperate. Lung: decreasd lung sounds on auscultation bilaterally  Heart: regular rate and rhythm,   Abdomen: soft, non-tender. Bowel sounds normal. No masses,  Extremities: No LE edema. atraumatic  Skin: mottling   Neurologic: Omitted as patient is unable to cooperate. Psychiatric: Omitted as patient is unable to cooperate.         Lab/Data Review:  Recent Results (from the past 24 hour(s))   C REACTIVE PROTEIN, QT    Collection Time: 02/13/23  3:00 AM   Result Value Ref Range    C-Reactive protein 8.74 (H) 0.00 - 0.60 mg/dL   PROCALCITONIN    Collection Time: 02/13/23  3:00 AM   Result Value Ref Range    Procalcitonin 0.28 (H) 0 ng/mL   METABOLIC PANEL, BASIC    Collection Time: 02/13/23  3:00 AM   Result Value Ref Range    Sodium 142 136 - 145 mmol/L    Potassium 3.1 (L) 3.5 - 5.1 mmol/L    Chloride 107 97 - 108 mmol/L    CO2 32 21 - 32 mmol/L    Anion gap 3 (L) 5 - 15 mmol/L    Glucose 120 (H) 65 - 100 mg/dL    BUN 22 (H) 6 - 20 mg/dL    Creatinine 0.72 0.55 - 1.02 mg/dL    BUN/Creatinine ratio 31 (H) 12 - 20      eGFR >60 >60 ml/min/1.73m2    Calcium 7.8 (L) 8.5 - 10.1 mg/dL   CBC WITH AUTOMATED DIFF    Collection Time: 02/13/23  3:00 AM   Result Value Ref Range    WBC 6.6 3.6 - 11.0 K/uL    RBC 4.11 3.80 - 5.20 M/uL    HGB 11.4 (L) 11.5 - 16.0 g/dL    HCT 35.7 35.0 - 47.0 %    MCV 86.9 80.0 - 99.0 FL    MCH 27.7 26.0 - 34.0 PG    MCHC 31.9 30.0 - 36.5 g/dL    RDW 14.7 (H) 11.5 - 14.5 %    PLATELET 048 937 - 267 K/uL    MPV 10.1 8.9 - 12.9 FL    NRBC 0.0 0.0  WBC    ABSOLUTE NRBC 0.00 0.00 - 0.01 K/uL    NEUTROPHILS 56 32 - 75 %    LYMPHOCYTES 34 12 - 49 %    MONOCYTES 7 5 - 13 %    EOSINOPHILS 2 0 - 7 %    BASOPHILS 1 0 - 1 %    IMMATURE GRANULOCYTES 0 0 - 0.5 %    ABS. NEUTROPHILS 3.8 1.8 - 8.0 K/UL    ABS. LYMPHOCYTES 2.3 0.8 - 3.5 K/UL    ABS. MONOCYTES 0.4 0.0 - 1.0 K/UL    ABS. EOSINOPHILS 0.1 0.0 - 0.4 K/UL    ABS. BASOPHILS 0.0 0.0 - 0.1 K/UL    ABS. IMM.  GRANS. 0.0 0.00 - 0.04 K/UL    DF AUTOMATED     MAGNESIUM    Collection Time: 02/13/23  3:21 AM   Result Value Ref Range    Magnesium 2.0 1.6 - 2.4 mg/dL   PHOSPHORUS    Collection Time: 02/13/23  3:21 AM   Result Value Ref Range    Phosphorus 2.5 (L) 2.6 - 4.7 mg/dL   BLOOD GAS, ARTERIAL    Collection Time: 02/13/23  4:31 AM   Result Value Ref Range    pH 7.53 (H) 7.35 - 7.45      PCO2 39 35 - 45 mmHg    PO2 66 (L) 80 - 100 mmHg    O2 SATURATION 94 (L) 95 - 99 %    BICARBONATE 32 (H) 22 - 26 mmol/L    BASE EXCESS 8.3 (H) 0 - 3 mmol/L    O2 METHOD VENT      FIO2 35 %    MODE ASSIST CONTROL      Tidal volume 450.0      SET RATE 14      PEEP/CPAP 6.0      Sample source Arterial      SITE Right Radial      DARRION'S TEST NOT APPLICABLE      Carboxy-Hgb 0.3 (L) 1 - 2 %    Methemoglobin 0.4 0 - 1.4 %    Oxyhemoglobin 93.6 (L) 95 - 99 %    Performed by Felecia Loera     Critical value read back Called to ODETTE Desai on 02/13/2023 at 04:38     TEMPERATURE 98.6           Assessment and plan:      (1) Acute encephalopathy : intubated. Wakes up with non-purposeful movement     (2) septic shock : off of levophed    (3) acute hypoxic respiratory failure : intubated,     (4) MI type II: ECHO ef 40-45%    (5) ANASTACIO: resolved. (6) aspiration pneumonia : meropenem     (7) seizure disorder: on versed gtt    (8) hypothyroidism: there is no medication on home med list    (9) depression with psychosis: resume clonazepam and seroquel            # Social Determents of health: None     # Full code by default, need further clarification     DISPO: ICU until intubated.        Signed By: Jair Reynolds MD     February 13, 2023

## 2023-02-13 NOTE — PROGRESS NOTES
Bedside shift change report given to Ramo Donaldson (oncoming nurse) by Carlos Patterson RN (offgoing nurse). Report included the following information SBAR, Recent Results, and Cardiac Rhythm sinus rhythm . [Procedure Intermediate Risk] : the procedure risk is intermediate [Optimized for Surgery] : the patient is optimized for surgery [Patient Intermediate Risk] : the patient is an intermediate risk [FreeTextEntry1] : The patient is a 57-year-old ex-smoker family history of coronary artery disease, diabetes mellitus on insulin, hypertension, hyperlipidemia, renal insufficiency, s/p gastric sleeve , HFpEF, s/p AV fistula who has been running low BP.\par #1 CV- No angina, LV EF normal, stress test 9/19 normal\par #2 HFpEF- on bumex\par #2 DM- on insulin, better control, continues to improve\par #3 Htn- decrease labetalol 50 mg nightly on nondialysis days for now, if dizziness persists, may stop\par #4 Lipids- atorvastatin optimal\par #5 Renal - on dialysis, AV fistula, There are no cardiac contraindications to proceeding with renal transplant.\par \par 4/24/18 Addendum: No interval change. There are no cardiac contraindications to proceeding with hernia surgery as planned.

## 2023-02-13 NOTE — PROGRESS NOTES
Progress Note    Patient: Elena Aggarwal MRN: [id number]  SSN: [id number]    YOB: 1965  Age: 62 y.o. Sex: female      Admit Date: 2/10/2023    LOS: 3 days     Subjective:   Patient followed for sepsis with suspected aspiration pneumonia RLL. Sputum grew MSSA. Patient has been switched to Cefazolin. She has low grade temperature with normal WBC, decreasing procal and increasing CRP. Patient remains intubated in the ICU. Objective:     Vitals:    02/13/23 0900 02/13/23 1000 02/13/23 1100 02/13/23 1156   BP: (!) 90/57 (!) 92/58 99/63    Pulse: 75 70 74 68   Resp: 18 18 16 12   Temp:   99.4 °F (37.4 °C)    SpO2: 93% 93% 95% 93%   Weight:       Height:            Intake and Output:  Current Shift: 02/13 0701 - 02/13 1900  In: -   Out: 125 [Urine:125]  Last three shifts: 02/11 1901 - 02/13 0700  In: 983.8 [I.V.:378.8]  Out: 1025 [Urine:1025]    Physical Exam:   Vitals and nursing note reviewed. Constitutional:       Appearance: She is ill-appearing. HENT:      Head: Normocephalic and atraumatic. Right Ear: External ear normal.      Left Ear: External ear normal.      Mouth/Throat:      Comments: ET Tube  Eyes:      Comments: Eyes closed   Cardiovascular:      Rate and Rhythm: Normal rate and regular rhythm. Heart sounds: No murmur heard. Pulmonary:      Effort: Respiratory distress present. Breath sounds: Rhonchi present. No wheezing or rales. Abdominal:      General: Bowel sounds are normal. There is no distension. Palpations: Abdomen is soft. Tenderness: There is no abdominal tenderness. There is no guarding. Genitourinary:     Comments: Mohamud catheter     Musculoskeletal:      Cervical back: Neck supple. Right lower leg: No edema. Left lower leg: No edema. Comments: Right femoral triple lumen catheter   Skin:     Findings: No rash.    Neurological:      Comments: intubated   Psychiatric:      Comments: intubated     Lab/Data Review:     WBC 6,600    CRP 8.74 < 7.00 <7.57 <7.99  Procal 0.28 <0.57 <0.94 <0.99    Blood cultures (2/10) No growth 2 days  Sputum culture (2/10) MSSA Staphylococcus aureus FINAL    Assessment:     Active Problems:    Severe sepsis ([facility] [address] (2/10/2023)      Acute respiratory failure with hypoxia ([facility] [address] (2/10/2023)    Probable aspiration pneumonia, RLL, sputum culture growing MSSA Staphylococcus aureus, now on IV Cefazolin  Sepsis with elevated CRP and procalcitonin  Acute hypoxic respiratory failure, intubated  Seizure activity with post-ictal period  5. Augmentin allergy    Comment: It may be prudent to stay with Meropenem since aspiration pneumonia is often polymicrobial including anaerobes and Gram negative rods, which Cefazolin does not cover well. Plan:      1. Patient currently on IV Cefazolin  2. Will monitor clinical response closely; If she worsens, would restart Meropenem. 3.  Follow-up blood cultures  5.   In am, repeat procal and CRP          Signed By: Maureen Ahmadi MD     February 13, 2023

## 2023-02-14 ENCOUNTER — APPOINTMENT (OUTPATIENT)
Dept: GENERAL RADIOLOGY | Age: 58
DRG: 870 | End: 2023-02-14
Attending: INTERNAL MEDICINE
Payer: MEDICARE

## 2023-02-14 LAB
ARTERIAL PATENCY WRIST A: YES
BASE EXCESS BLDA CALC-SCNC: 7.7 MMOL/L (ref 0–3)
BASOPHILS # BLD: 0 K/UL (ref 0–0.1)
BASOPHILS NFR BLD: 1 % (ref 0–1)
BDY SITE: ABNORMAL
BODY TEMPERATURE: 99
CRP SERPL-MCNC: 5.74 MG/DL (ref 0–0.6)
DIFFERENTIAL METHOD BLD: ABNORMAL
EOSINOPHIL # BLD: 0.2 K/UL (ref 0–0.4)
EOSINOPHIL NFR BLD: 3 % (ref 0–7)
ERYTHROCYTE [DISTWIDTH] IN BLOOD BY AUTOMATED COUNT: 14.8 % (ref 11.5–14.5)
FIO2 ON VENT: 40 %
GAS FLOW.O2 SETTING OXYMISER: 8
HCO3 BLDA-SCNC: 31 MMOL/L (ref 22–26)
HCT VFR BLD AUTO: 34.6 % (ref 35–47)
HGB BLD-MCNC: 11.3 G/DL (ref 11.5–16)
IMM GRANULOCYTES # BLD AUTO: 0 K/UL (ref 0–0.04)
IMM GRANULOCYTES NFR BLD AUTO: 0 % (ref 0–0.5)
LYMPHOCYTES # BLD: 2.2 K/UL (ref 0.8–3.5)
LYMPHOCYTES NFR BLD: 37 % (ref 12–49)
MCH RBC QN AUTO: 28.1 PG (ref 26–34)
MCHC RBC AUTO-ENTMCNC: 32.7 G/DL (ref 30–36.5)
MCV RBC AUTO: 86.1 FL (ref 80–99)
MONOCYTES # BLD: 0.4 K/UL (ref 0–1)
MONOCYTES NFR BLD: 6 % (ref 5–13)
NEUTS SEG # BLD: 3.3 K/UL (ref 1.8–8)
NEUTS SEG NFR BLD: 53 % (ref 32–75)
NRBC # BLD: 0 K/UL (ref 0–0.01)
NRBC BLD-RTO: 0 PER 100 WBC
PCO2 BLDA: 38 MMHG (ref 35–45)
PEEP RESPIRATORY: 6
PERFORMED BY, TECHID: ABNORMAL
PH BLDA: 7.53 (ref 7.35–7.45)
PLATELET # BLD AUTO: 257 K/UL (ref 150–400)
PMV BLD AUTO: 9.7 FL (ref 8.9–12.9)
PO2 BLDA: 66 MMHG (ref 80–100)
PRESSURE SUPPORT SETTING VENT: 12
RBC # BLD AUTO: 4.02 M/UL (ref 3.8–5.2)
SAO2% DEVICE SAO2% SENSOR NAME: ABNORMAL
SPECIMEN SITE: ABNORMAL
VENTILATION MODE VENT: ABNORMAL
VT SETTING VENT: 450
WBC # BLD AUTO: 6.1 K/UL (ref 3.6–11)

## 2023-02-14 PROCEDURE — 74011250636 HC RX REV CODE- 250/636: Performed by: INTERNAL MEDICINE

## 2023-02-14 PROCEDURE — 74011250637 HC RX REV CODE- 250/637: Performed by: INTERNAL MEDICINE

## 2023-02-14 PROCEDURE — 36600 WITHDRAWAL OF ARTERIAL BLOOD: CPT

## 2023-02-14 PROCEDURE — 71045 X-RAY EXAM CHEST 1 VIEW: CPT

## 2023-02-14 PROCEDURE — 74011000250 HC RX REV CODE- 250: Performed by: HOSPITALIST

## 2023-02-14 PROCEDURE — 77010033678 HC OXYGEN DAILY

## 2023-02-14 PROCEDURE — 65610000006 HC RM INTENSIVE CARE

## 2023-02-14 PROCEDURE — 74011250637 HC RX REV CODE- 250/637: Performed by: HOSPITALIST

## 2023-02-14 PROCEDURE — 99233 SBSQ HOSP IP/OBS HIGH 50: CPT | Performed by: INTERNAL MEDICINE

## 2023-02-14 PROCEDURE — 94003 VENT MGMT INPAT SUBQ DAY: CPT

## 2023-02-14 PROCEDURE — 74011000250 HC RX REV CODE- 250: Performed by: INTERNAL MEDICINE

## 2023-02-14 PROCEDURE — 82803 BLOOD GASES ANY COMBINATION: CPT

## 2023-02-14 PROCEDURE — 74011250636 HC RX REV CODE- 250/636: Performed by: HOSPITALIST

## 2023-02-14 PROCEDURE — 36415 COLL VENOUS BLD VENIPUNCTURE: CPT

## 2023-02-14 PROCEDURE — 86140 C-REACTIVE PROTEIN: CPT

## 2023-02-14 PROCEDURE — 85025 COMPLETE CBC W/AUTO DIFF WBC: CPT

## 2023-02-14 RX ORDER — FUROSEMIDE 10 MG/ML
40 INJECTION INTRAMUSCULAR; INTRAVENOUS DAILY
Status: DISCONTINUED | OUTPATIENT
Start: 2023-02-14 | End: 2023-02-19

## 2023-02-14 RX ADMIN — SODIUM CHLORIDE, PRESERVATIVE FREE 10 ML: 5 INJECTION INTRAVENOUS at 16:10

## 2023-02-14 RX ADMIN — GABAPENTIN 600 MG: 300 CAPSULE ORAL at 08:26

## 2023-02-14 RX ADMIN — CEFAZOLIN 2 G: 1 INJECTION, POWDER, FOR SOLUTION INTRAMUSCULAR; INTRAVENOUS at 19:57

## 2023-02-14 RX ADMIN — CLONAZEPAM 0.5 MG: 1 TABLET ORAL at 08:26

## 2023-02-14 RX ADMIN — ENOXAPARIN SODIUM 40 MG: 100 INJECTION SUBCUTANEOUS at 08:26

## 2023-02-14 RX ADMIN — SODIUM CHLORIDE, PRESERVATIVE FREE 10 ML: 5 INJECTION INTRAVENOUS at 19:57

## 2023-02-14 RX ADMIN — SODIUM CHLORIDE, PRESERVATIVE FREE 20 MG: 5 INJECTION INTRAVENOUS at 20:01

## 2023-02-14 RX ADMIN — ACETAMINOPHEN 650 MG: 160 SOLUTION ORAL at 12:44

## 2023-02-14 RX ADMIN — CLONAZEPAM 0.5 MG: 1 TABLET ORAL at 21:25

## 2023-02-14 RX ADMIN — SODIUM CHLORIDE, PRESERVATIVE FREE 20 MG: 5 INJECTION INTRAVENOUS at 08:26

## 2023-02-14 RX ADMIN — QUETIAPINE FUMARATE 300 MG: 100 TABLET ORAL at 21:25

## 2023-02-14 RX ADMIN — CEFAZOLIN 2 G: 1 INJECTION, POWDER, FOR SOLUTION INTRAMUSCULAR; INTRAVENOUS at 02:01

## 2023-02-14 RX ADMIN — PROPOFOL 20 MCG/KG/MIN: 10 INJECTION, EMULSION INTRAVENOUS at 02:01

## 2023-02-14 RX ADMIN — ASPIRIN 81 MG CHEWABLE TABLET 81 MG: 81 TABLET CHEWABLE at 08:26

## 2023-02-14 RX ADMIN — ACETAMINOPHEN 650 MG: 160 SOLUTION ORAL at 17:00

## 2023-02-14 RX ADMIN — CHLORHEXIDINE GLUCONATE 0.12% ORAL RINSE 15 ML: 1.2 LIQUID ORAL at 08:27

## 2023-02-14 RX ADMIN — GABAPENTIN 600 MG: 300 CAPSULE ORAL at 21:25

## 2023-02-14 RX ADMIN — CEFAZOLIN 2 G: 1 INJECTION, POWDER, FOR SOLUTION INTRAMUSCULAR; INTRAVENOUS at 10:26

## 2023-02-14 RX ADMIN — CHLORHEXIDINE GLUCONATE 0.12% ORAL RINSE 15 ML: 1.2 LIQUID ORAL at 21:25

## 2023-02-14 RX ADMIN — SODIUM CHLORIDE, PRESERVATIVE FREE 10 ML: 5 INJECTION INTRAVENOUS at 05:32

## 2023-02-14 RX ADMIN — CLONAZEPAM 0.5 MG: 1 TABLET ORAL at 16:10

## 2023-02-14 RX ADMIN — GABAPENTIN 600 MG: 300 CAPSULE ORAL at 12:40

## 2023-02-14 RX ADMIN — PROPOFOL 15 MCG/KG/MIN: 10 INJECTION, EMULSION INTRAVENOUS at 10:26

## 2023-02-14 RX ADMIN — FUROSEMIDE 40 MG: 10 INJECTION, SOLUTION INTRAMUSCULAR; INTRAVENOUS at 11:20

## 2023-02-14 RX ADMIN — GABAPENTIN 600 MG: 300 CAPSULE ORAL at 17:00

## 2023-02-14 NOTE — PROGRESS NOTES
Pulmonary Progress Note      NAME: Winnie Lazaro   :  1965  MRM:  [id number]    Date/Time: 2023  9:40 AM         Subjective:     Patient seen and examined in ICU  Overnight events noted    Remains critically ill  Intubated on mechanical ventilation  On assist-control 40% FiO2 and PEEP of 6  ABGs and chest x-ray reviewed      Past Medical History reviewed and unchanged from Admission History and Physical       Objective:     Physical Exam     Vitals:      Last 24hrs VS reviewed since prior progress note. Most recent are:    Visit Vitals  /71   Pulse 73   Temp 98.9 °F (37.2 °C)   Resp 11   Ht 5' 7.99\" (1.727 m)   Wt 106.5 kg (234 lb 12.6 oz)   SpO2 95%   Breastfeeding No   BMI 35.71 kg/m²     SpO2 Readings from Last 6 Encounters:   23 95%   23 100%   23 96%   23 99%   22 95%   10/19/22 97%          Intake/Output Summary (Last 24 hours) at 2023 1104  Last data filed at 2023 0617  Gross per 24 hour   Intake 1458.91 ml   Output 950 ml   Net 508.91 ml        Physical Exam:   General appearance: no distress, mildly obese, sedated on the ventilator, chronically ill-appearing  Head: Normocephalic, without obvious abnormality, atraumatic  Eyes: negative. She has a sample 5 endotracheal tube. She has OG tube  Neck: supple, symmetrical, trachea midline and no adenopathy  Lungs: Crackles at right base, otherwise few scattered rhonchi, ET tube in place  Heart: regular rate and rhythm, S1, S2 normal, no murmur, click, rub or gallop  Abdomen: soft, non-tender. Bowel sounds normal. No masses,  no organomegaly  Extremities: extremities normal, atraumatic, no cyanosis or edema  Pulses: 2+ and symmetric  Skin: Skin color, texture, turgor normal. No rashes or lesions  Lymph nodes: Cervical, supraclavicular, and axillary nodes normal.  Neurologic: Grossly normal, sedated on the ventilator    XR CHEST PORT   Final Result   No significant change.       XR CHEST PORT   Final Result   Mild bibasilar atelectasis, improved. XR CHEST PORT   Final Result   Endotracheal tube and slightly more satisfactory position 3.4 cm above the   karthik. Bibasilar airspace disease persists. XR CHEST PORT   Final Result   Endotracheal tube slightly low in position 2 cm above the karthik, could be   retracted 2 cm for more optimal positioning. Bibasilar airspace disease right   greater than left, without significant change. CT CHEST WO CONT   Final Result   Endotracheal tube and nasogastric tube are in satisfactory position. Dense right   lower lobe consolidation may represent pneumonia or aspiration. Bilateral   dependent atelectasis. Stable incidental findings as detailed above. CT HEAD WO CONT   Final Result   No acute intracranial process. XR CHEST PORT   Final Result      Endotracheal tube and nasogastric tube appear to be in satisfactory position. Diminished lung volumes with moderately severe pulmonary edema pattern.              Lab Data Reviewed: (see below)      Medications:  Current Facility-Administered Medications   Medication Dose Route Frequency    furosemide (LASIX) injection 40 mg  40 mg IntraVENous DAILY    clonazePAM (KlonoPIN) tablet 0.5 mg  0.5 mg Per NG tube TID    QUEtiapine (SEROquel) tablet 300 mg  300 mg Per NG tube QHS    gabapentin (NEURONTIN) capsule 600 mg  600 mg Per NG tube QID    ceFAZolin (ANCEF) 2 g in sterile water (preservative free) 20 mL IV syringe  2 g IntraVENous Q8H    famotidine (PF) (PEPCID) 20 mg in 0.9% sodium chloride 10 mL injection  20 mg IntraVENous Q12H    midazolam in normal saline (VERSED) 1 mg/mL infusion  0-10 mg/hr IntraVENous TITRATE    dexmedeTOMidine in 0.9 % NaCl (PRECEDEX) 400 mcg/100 mL (4 mcg/mL) infusion soln  0.1-1.5 mcg/kg/hr IntraVENous TITRATE    NOREPINephrine (LEVOPHED) 8 mg in 0.9% NS 250ml infusion  0.5-30 mcg/min IntraVENous TITRATE    sodium chloride (NS) flush 5-40 mL  5-40 mL IntraVENous Q8H sodium chloride (NS) flush 5-40 mL  5-40 mL IntraVENous PRN    polyethylene glycol (MIRALAX) packet 17 g  17 g Oral DAILY PRN    ondansetron (ZOFRAN ODT) tablet 4 mg  4 mg Oral Q8H PRN    Or    ondansetron (ZOFRAN) injection 4 mg  4 mg IntraVENous Q6H PRN    enoxaparin (LOVENOX) injection 40 mg  40 mg SubCUTAneous DAILY    chlorhexidine (PERIDEX) 0.12 % mouthwash 15 mL  15 mL Oral Q12H    fentaNYL (PF) 1,500 mcg/30 mL (50 mcg/mL) infusion  0-200 mcg/hr IntraVENous TITRATE    propofol (DIPRIVAN) 10 mg/mL infusion  0-50 mcg/kg/min IntraVENous TITRATE    aspirin chewable tablet 81 mg  81 mg Per NG tube DAILY    acetaminophen (TYLENOL) solution 650 mg  650 mg Per NG tube Q4H PRN    Or    acetaminophen (TYLENOL) suppository 650 mg  650 mg Rectal Q6H PRN       ______________________________________________________________________      Lab Review:     Recent Labs     02/14/23  0530 02/13/23  0300 02/12/23  0240   WBC 6.1 6.6 7.6   HGB 11.3* 11.4* 11.6   HCT 34.6* 35.7 36.3    244 250       Recent Labs     02/13/23  0321 02/13/23  0300 02/12/23  0240   NA  --  142 142   K  --  3.1* 3.3*   CL  --  107 109*   CO2  --  32 30   GLU  --  120* 134*   BUN  --  22* 23*   CREA  --  0.72 0.74   CA  --  7.8* 7.7*   MG 2.0  --  2.1   PHOS 2.5*  --  1.8*       No components found for: [place]  Recent Labs     02/14/23  0339 02/13/23  0431 02/12/23  0322   PH 7.53* 7.53* 7.49*   PCO2 38 39 35   PO2 66* 66* 154*   HCO3 31* 32* 26   FIO2 40 35 45       No results for input(s): INR, INREXT, INREXT, INREXT in the last 72 hours. Other pertinent lab:          Assessment & Plan:      Patient is a 69-year-old  female with a history of obesity, hypothyroidism, seizure disorder, GERD, and PTSD who presented to the hospital after being found unresponsive and was experiencing acute hypoxemic respiratory failure.      Plan:      1.)  Acute hypoxemic respiratory failure  -Unclear etiology, certainly has a right lower lobe likely aspiration pneumonia. Could have had a drug overdose at home on home medications. -UDS positive for benzos on admission    Was on assist control  ABGs and chest x-ray reviewed  Chest x-ray showing hyperinflated lungs with volume overload  Will give Lasix  Initiate weaning with SIMV/PSV and consider extubation as tolerated     2.)  Acute metabolic encephalopathy  -Suspect secondary to acute hypoxemic respiratory failure and pneumonia  -CT head negative on admission  -UDS positive for benzos  Reduce sedation as tolerated  If she does not wake up completely she would benefit from neurology consultation. 3.)  Shock  -Suspect sepsis from pneumonia, lactate level slowly improving  -TTE done on 2/10/2023 showing an EF of 40 to 45%. Right ventricular systolic pressure of 22.  -Currently off Levophed, yesterday she was at 5 mcg/min, weaned for goal MAP greater than 65 mmHg  -Blood/sputum cultures are pending, on broad antibiotics with IV cefepime/vancomycin, ID consulted. Biotics have been adjusted to meropenem. 4.)  Acute kidney injury  Improved    5.)  Lactic acidosis  -Lactate level 5.2 on admission  -Likely secondary to shock, continue to maintain MAP greater than 65 mmHg with vasopressor support  -Serum bicarb level normal today     6.)  NSTEMI  -Troponin level elevated at 269 on admission.  -Cardiology input appreciated  -Echocardiogram as above. CODE STATUS: Full Code     Lines/Tubes: Right femoral CVC, PIV x3, ETT, Mohamud urinary catheter     Prophylaxis:  Stress Ulcer Protocol Active: Yes  Deep Vein Thrombosis Protocol Active: Yes  Nutrition: She is started on tube feeds at 50 mL/h.   Activity: bedrest     Disposition and Family:  Remain in the ICU     Case discussed in detail with RN, RT, and care team  Thank you for involving me in the care of this patient  I will follow with you closely during hospitalization    Time spent more than 60 minutes under patient care with no overlap reviewing results and records, decision making, and answering questions.     Karina Lewis MD  [facility] ([facility])

## 2023-02-14 NOTE — PROGRESS NOTES
Problem: Ventilator Management  Goal: *Adequate oxygenation and ventilation  Outcome: Progressing Towards Goal     Problem: Non-Violent Restraints  Goal: No harm/injury to patient while restraints in use  Outcome: Progressing Towards Goal  Goal: Patient's dignity will be maintained  Outcome: Progressing Towards Goal     Problem: Ventilator Management  Goal: *Patient maintains clear airway/free of aspiration  Outcome: Not Progressing Towards Goal  Goal: *Absence of infection signs and symptoms  Outcome: Not Progressing Towards Goal  Goal: *Normal spontaneous ventilation  Outcome: Not Progressing Towards Goal     Problem: Non-Violent Restraints  Goal: Removal from restraints as soon as assessed to be safe  Outcome: Not Progressing Towards Goal  Goal: Patient Interventions  Outcome: Not Progressing Towards Goal

## 2023-02-14 NOTE — PROGRESS NOTES
Progress Note    Patient: Fito Glaser MRN: [id number]  SSN: [id number]    YOB: 1965  Age: 62 y.o. Sex: female      Admit Date: 2/10/2023    LOS: 4 days     Subjective:   Patient followed for sepsis with suspected aspiration pneumonia RLL. Sputum grew MSSA. Patient has been switched to Cefazolin. She has low grade temperature with normal WBC, decreasing procal and increasing CRP. Patient remains intubated in the ICU. Objective:     Vitals:    02/14/23 0535 02/14/23 0605 02/14/23 0700 02/14/23 0848   BP: 103/66 106/73 102/71    Pulse: 75 71 67 73   Resp: 11 13 11    Temp:   98.9 °F (37.2 °C)    SpO2: 95% 93% 97% 95%   Weight:       Height:            Intake and Output:  Current Shift: No intake/output data recorded. Last three shifts: 02/12 1901 - 02/14 0700  In: 1458.9 [I.V.:488.9]  Out: 1575 [Urine:1575]    Physical Exam:   Vitals and nursing note reviewed. Constitutional:       Appearance: She is ill-appearing. HENT:      Head: Normocephalic and atraumatic. Right Ear: External ear normal.      Left Ear: External ear normal.      Mouth/Throat:      Comments: ET Tube  Eyes:      Comments: Eyes closed   Cardiovascular:      Rate and Rhythm: Normal rate and regular rhythm. Heart sounds: No murmur heard. Pulmonary:      Effort: Respiratory distress present. Breath sounds: Rhonchi present. No wheezing or rales. Abdominal:      General: Bowel sounds are normal. There is no distension. Palpations: Abdomen is soft. Tenderness: There is no abdominal tenderness. There is no guarding. Genitourinary:     Comments: Mohamud catheter     Musculoskeletal:      Cervical back: Neck supple. Right lower leg: No edema. Left lower leg: No edema. Comments: Right femoral triple lumen catheter   Skin:     Findings: No rash.    Neurological:      Comments: intubated   Psychiatric:      Comments: intubated     Lab/Data Review:     WBC 6,100    CRP 5.74 <8.74 < 7.00 <7.57 <7.99  Procal  0.57 <0.94 <0.99    Blood cultures (2/10) No growth 3 days  Sputum culture (2/10) MSSA Staphylococcus aureus FINAL    Assessment:     Active Problems:    Severe sepsis ([facility] [address] (2/10/2023)      Acute respiratory failure with hypoxia ([facility] [address] (2/10/2023)  Probable aspiration pneumonia, RLL, sputum culture growing MSSA Staphylococcus aureus, now on IV Cefazolin  Sepsis with elevated CRP and procalcitonin  Acute hypoxic respiratory failure, intubated  Seizure activity with post-ictal period  5. Augmentin allergy    Comment: Concerns about narrow spectrum of coverage  with Cefazolin for aspiration pneumonia, however, CRP still decreasing. Plan:      1. Patient currently on IV Cefazolin  2. Will monitor clinical response closely; If she worsens, would restart Meropenem. 3.  Follow-up blood cultures  5.   In am, repeat procal and CRP          Signed By: Ava Sánchez MD     February 14, 2023

## 2023-02-14 NOTE — PROGRESS NOTES
Comprehensive Nutrition Assessment    Type and Reason for Visit: Reassess (interim)    Nutrition Recommendations/Plan:   NPO  Adjust TF via OGT of Vital HP to new goal of 40ml/hr, continuous  Flush with 100ml water q4hrs  Provide 1 pkt prosource 3x/d (180kcals, 45g pro)        Provides 1140 kcals (83%), 129 g pro (101%), and 1403 ml fluids (94%)         Propofol at 20mcg/kg/min providing 288kcals/d (104%)  Please document TF initiation, rate, flushes, prosource provision, and tolerance       Malnutrition Assessment:  Malnutrition Status:  No malnutrition (02/10/23 1222)    Context:  Acute illness     Findings of the 6 clinical characteristics of malnutrition:   Energy Intake:  No significant decrease in energy intake (NPO X1-2d)  Weight Loss:  No significant weight loss     Body Fat Loss:  No significant body fat loss,     Muscle Mass Loss:  No significant muscle mass loss,    Fluid Accumulation:  No significant fluid accumulation,        Nutrition Assessment:    Admitted for unresponsive, intubated in field. +ANASTACIO, CXR finding pulm edema and RLL pna. Work-up continues. (2/11) Pressors decreasing, TF initiated. (2/14) SBTs ongoing, pressors off. Pt tolerating TF well at goal, adjustments d/t propofol. Labs: H/H 11.3/34.6, K+ 3.1, BUN 22, -149, , , Phos 2.5. Meds: cefazolin, klonopin, lovenox, pepcid, fentanyl, lasx, versed, propofol at 20mcg/kg/min. Nutrition Related Findings:    NFPE with no acute findings. No hx dysphagia. No N/V/D/C, last BM pta-- none x4d, discussed with RN. trace generalized, +2 BL UE and +1 BL LE edema. Wound Type: None    Current Nutrition Intake & Therapies:  Average Meal Intake: NPO  Average Supplement Intake: NPO  DIET NPO  ADULT TUBE FEEDING Orogastric; Peptide Based High Protein; Delivery Method: Continuous; Continuous Initial Rate (mL/hr): 40; Continuous Advance Tube Feeding: No; Water Flush Volume (mL): 100; Water Flush Frequency: Q 4 hours;  Modulars/Additives: P...    Anthropometric Measures:  Height: 5' 7.99\" (172.7 cm)  Ideal Body Weight (IBW): 140 lbs (64 kg)     Current Body Wt:  106.5 kg (234 lb 12.6 oz), 167.7 % IBW. Bed scale  Current BMI (kg/m2): 35.7  Usual Body Weight: 90.7 kg (200 lb) (EMR hx)  % Weight Change (Calculated): 13.2  Weight Adjustment:  (EDW 98.2kg; BMI 32.9)                 BMI Category: Obese class 1 (BMI 30.0-34. 9)    Estimated Daily Nutrient Needs:  Energy Requirements Based On: Kcal/kg  Weight Used for Energy Requirements: Current  Energy (kcal/day): 1080-1375kcals ( 11-14kcals/kg per [facility] guidelines)  Weight Used for Protein Requirements: Ideal  Protein (g/day): 128g (2.0g/kg IBW)  Method Used for Fluid Requirements: Other (comment)  Fluid (ml/day): 1500ml adult min    Nutrition Diagnosis:   Inadequate oral intake related to impaired respiratory function as evidenced by intubation    Nutrition Interventions:   Food and/or Nutrient Delivery: Continue NPO, Continue tube feeding, Modify tube feeding  Nutrition Education/Counseling: No recommendations at this time, Education not appropriate  Coordination of Nutrition Care: Continue to monitor while inpatient  Plan of Care discussed with: RN    Goals:  Previous Goal Met: Progressing toward goal(s)  Goals: Initiate nutrition support, Tolerate nutrition support at goal rate, by next RD assessment       Nutrition Monitoring and Evaluation:   Behavioral-Environmental Outcomes: None identified  Food/Nutrient Intake Outcomes: Enteral nutrition intake/tolerance  Physical Signs/Symptoms Outcomes: GI status, Constipation, Weight, Fluid status or edema, Biochemical data    Discharge Planning:     Too soon to determine    [employer]: Ext 1932, or via [address] [place] [address]

## 2023-02-14 NOTE — PROGRESS NOTES
Spoke to Sienna Jacinto @ [phone]. Informed the patient takes care of her uncle Carol Arguello, and lives w/Dariel. Hernan Gamino @ [phone]. Chart  has been updated w/son's contact information.          Angela Priest, LI

## 2023-02-14 NOTE — PROGRESS NOTES
Progress Note    Patient: Ruben Clay MRN: [id number]  SSN: [id number]    YOB: 1965  Age: 62 y.o. Sex: female      Admit Date: 2/10/2023    LOS: 4 days     Subjective:     57F,  with PMH of hypothyroidism and seizures, PTSD with acute encephalopathy s/t aspiration pneumonia. HPI:   Per roommate she has been sleeping since yesterday morning, and was unable to wake her up. EMS arrived and patient was hypoxic in the 30s, non rebreather placed on patient improved to 70s, narcan given with no change. Patient was then intubated in the field. Otherwise no additional history obtained as patient was intubated and sedated. HOSPITAL COURSE:  CXR showed pulmonary edema and CT chest showed RLL pneumonia, annd this was the likely cause of septic shock. She was started on levophed and precedex. ECHO showed EF40-45%. Cardiology was consulted for MI typII. On 2/12 she was off of levophed. 2/14/23:  Patient seen and examined, chart was reviewed. Case discussed with nursing staff in ICU. Patient on vent support with sedation. BP holding well without pressors. No other acute issues reported to me by staff at this time. Objective:     Vitals:    02/14/23 0535 02/14/23 0605 02/14/23 0700 02/14/23 0848   BP: 103/66 106/73 102/71    Pulse: 75 71 67 73   Resp: 11 13 11    Temp:   98.9 °F (37.2 °C)    SpO2: 95% 93% 97% 95%   Weight:       Height:            Intake and Output:  Current Shift: No intake/output data recorded. Last three shifts: 02/12 1901 - 02/14 0700  In: 1458.9 [I.V.:488.9]  Out: 1575 [Urine:1575]         Physical Exam:   General: WDWN  Throat and Neck: ET tube  Lung:  symmetric expansion on vent  Heart: regular rate and rhythm,   Abdomen: soft, non-tender.  B   Extremities: No LE edema  Skin: No rash  Neurologic: Sedated  Psychiatric: Unable to evaluate      Lab/Data Review:  Recent Results (from the past 24 hour(s))   BLOOD GAS, ARTERIAL    Collection Time: 02/14/23  3:39 AM   Result Value Ref Range    pH 7.53 (H) 7.35 - 7.45      PCO2 38 35 - 45 mmHg    PO2 66 (L) 80 - 100 mmHg    BICARBONATE 31 (H) 22 - 26 mmol/L    BASE EXCESS 7.7 (H) 0 - 3 mmol/L    O2 METHOD VENT      FIO2 40 %    MODE SIMV      Tidal volume 450.0      SET RATE 8      PRESSURE SUPPORT 12      PEEP/CPAP 6.0      Sample source Arterial      SITE Right Radial      DARRION'S TEST YES      Carboxy-Hgb PENDING %    Oxyhemoglobin PENDING %    Performed by Regla Organ     TEMPERATURE 99.0     C REACTIVE PROTEIN, QT    Collection Time: 02/14/23  5:30 AM   Result Value Ref Range    C-Reactive protein 5.74 (H) 0.00 - 0.60 mg/dL   CBC WITH AUTOMATED DIFF    Collection Time: 02/14/23  5:30 AM   Result Value Ref Range    WBC 6.1 3.6 - 11.0 K/uL    RBC 4.02 3.80 - 5.20 M/uL    HGB 11.3 (L) 11.5 - 16.0 g/dL    HCT 34.6 (L) 35.0 - 47.0 %    MCV 86.1 80.0 - 99.0 FL    MCH 28.1 26.0 - 34.0 PG    MCHC 32.7 30.0 - 36.5 g/dL    RDW 14.8 (H) 11.5 - 14.5 %    PLATELET 171 525 - 228 K/uL    MPV 9.7 8.9 - 12.9 FL    NRBC 0.0 0.0  WBC    ABSOLUTE NRBC 0.00 0.00 - 0.01 K/uL    NEUTROPHILS 53 32 - 75 %    LYMPHOCYTES 37 12 - 49 %    MONOCYTES 6 5 - 13 %    EOSINOPHILS 3 0 - 7 %    BASOPHILS 1 0 - 1 %    IMMATURE GRANULOCYTES 0 0 - 0.5 %    ABS. NEUTROPHILS 3.3 1.8 - 8.0 K/UL    ABS. LYMPHOCYTES 2.2 0.8 - 3.5 K/UL    ABS. MONOCYTES 0.4 0.0 - 1.0 K/UL    ABS. EOSINOPHILS 0.2 0.0 - 0.4 K/UL    ABS. BASOPHILS 0.0 0.0 - 0.1 K/UL    ABS. IMM.  GRANS. 0.0 0.00 - 0.04 K/UL    DF AUTOMATED           Current Facility-Administered Medications:     clonazePAM (KlonoPIN) tablet 0.5 mg, 0.5 mg, Per NG tube, TID, Paul Samuels MD, 0.5 mg at 02/14/23 0826    QUEtiapine (SEROquel) tablet 300 mg, 300 mg, Per NG tube, QHS, Paul Samuels MD, 300 mg at 02/13/23 2112    gabapentin (NEURONTIN) capsule 600 mg, 600 mg, Per NG tube, QID, Paul Samuels MD, 600 mg at 02/14/23 0826    ceFAZolin (ANCEF) 2 g in sterile water (preservative free) 20 mL IV syringe, 2 g, IntraVENous, Q8H, Ladan Cadet MD, 2 g at 02/14/23 0201    famotidine (PF) (PEPCID) 20 mg in 0.9% sodium chloride 10 mL injection, 20 mg, IntraVENous, Q12H, Livia Lagunas MD, 20 mg at 02/14/23 0826    midazolam in normal saline (VERSED) 1 mg/mL infusion, 0-10 mg/hr, IntraVENous, TITRATE, Nick Hunter MD, Last Rate: 1 mL/hr at 02/14/23 0532, 1 mg/hr at 02/14/23 0532    dexmedeTOMidine in 0.9 % NaCl (PRECEDEX) 400 mcg/100 mL (4 mcg/mL) infusion soln, 0.1-1.5 mcg/kg/hr, IntraVENous, TITRATE, Graeme Arias MD, Stopped at 02/10/23 1150    NOREPINephrine (LEVOPHED) 8 mg in 0.9% NS 250ml infusion, 0.5-30 mcg/min, IntraVENous, TITRATE, Graeme Arias MD, Stopped at 02/12/23 0342    sodium chloride (NS) flush 5-40 mL, 5-40 mL, IntraVENous, Q8H, George Arias MD, 10 mL at 02/14/23 0532    sodium chloride (NS) flush 5-40 mL, 5-40 mL, IntraVENous, PRN, Graeme Arias MD    polyethylene glycol (MIRALAX) packet 17 g, 17 g, Oral, DAILY PRN, Graeme Arias MD    ondansetron (ZOFRAN ODT) tablet 4 mg, 4 mg, Oral, Q8H PRN **OR** ondansetron (ZOFRAN) injection 4 mg, 4 mg, IntraVENous, Q6H PRN, Graeme Arias MD    enoxaparin (LOVENOX) injection 40 mg, 40 mg, SubCUTAneous, DAILY, Graeme Arias MD, 40 mg at 02/14/23 0826    chlorhexidine (PERIDEX) 0.12 % mouthwash 15 mL, 15 mL, Oral, Q12H, Viet Liao DO, 15 mL at 02/14/23 0827    fentaNYL (PF) 1,500 mcg/30 mL (50 mcg/mL) infusion, 0-200 mcg/hr, IntraVENous, TITRATE, Viet Liao DO, Last Rate: 1 mL/hr at 02/13/23 0822, 50 mcg/hr at 02/13/23 0822    propofol (DIPRIVAN) 10 mg/mL infusion, 0-50 mcg/kg/min, IntraVENous, TITRATE, Viet Liao DO, Last Rate: 10.9 mL/hr at 02/14/23 0201, 20 mcg/kg/min at 02/14/23 0201    aspirin chewable tablet 81 mg, 81 mg, Per NG tube, DAILY, Tracy Broussard MD, 81 mg at 02/14/23 9113    acetaminophen (TYLENOL) solution 650 mg, 650 mg, Per NG tube, Q4H PRN, 650 mg at 02/13/23 2112 **OR** acetaminophen (TYLENOL) suppository 650 mg, 650 mg, Rectal, Q6H PRN, Braeden Hodges MD     Echo-Interpretation Summary    Result status: Final result       Left Ventricle: Reduced left ventricular systolic function with a visually estimated EF of 40 - 45%. Not well visualized. Left ventricle size is normal. Mildly increased wall thickness. Unable to assess wall motion. Abnormal diastolic function. Mitral Valve: Mild regurgitation. Tricuspid Valve: The estimated RVSP is 22 mmHg. Left Atrium: Left atrium is mildly dilated. CTA chest-IMPRESSION  Endotracheal tube and nasogastric tube are in satisfactory position. Dense right  lower lobe consolidation may represent pneumonia or aspiration. Bilateral  dependent atelectasis. Stable incidental findings as detailed above. Assessment and plan:      Acute hypoxic respiratory failure-  Etiology unclear  Continue vent settings as per pulmonary team  Chest x-ray remains unchanged. CTA chest as above  Weaning vent as per pulmonary team    Acute encephalopathy-  intubated. UDS positive for benzos  CT head negative  Monitor off sedation     septic shock-resolved off Levophed        Right lung aspiration pneumonia-  Continue IV antibiotics per ID team  Monitor cultures    Type II MI-  Continue aspirin  Echo as above-EF 45%  Cardiology on case will defer to them  Likely would benefit from nonischemic evaluation before discharge    ANASTACIO-resolved  Seizure disorder-resume home meds    Hypothyroidism-unclear if she is on home medications  Depression with psychosis-was on clonazepam and Seroquel consider psych eval before discharge      ADFC  DVT PRx- Lovenox  NOK-        DISPO: ICU until intubated.        Signed By: Darryl Aguilar MD     February 14, 2023

## 2023-02-15 ENCOUNTER — APPOINTMENT (OUTPATIENT)
Dept: NON INVASIVE DIAGNOSTICS | Age: 58
DRG: 870 | End: 2023-02-15
Attending: INTERNAL MEDICINE
Payer: MEDICARE

## 2023-02-15 ENCOUNTER — APPOINTMENT (OUTPATIENT)
Dept: GENERAL RADIOLOGY | Age: 58
DRG: 870 | End: 2023-02-15
Attending: INTERNAL MEDICINE
Payer: MEDICARE

## 2023-02-15 LAB
ALBUMIN SERPL-MCNC: 1.9 G/DL (ref 3.5–5)
ANION GAP SERPL CALC-SCNC: 1 MMOL/L (ref 5–15)
BUN SERPL-MCNC: 25 MG/DL (ref 6–20)
BUN/CREAT SERPL: 37 (ref 12–20)
CA-I BLD-MCNC: 8.3 MG/DL (ref 8.5–10.1)
CHLORIDE SERPL-SCNC: 108 MMOL/L (ref 97–108)
CO2 SERPL-SCNC: 30 MMOL/L (ref 21–32)
CREAT SERPL-MCNC: 0.68 MG/DL (ref 0.55–1.02)
CRP SERPL-MCNC: 8.03 MG/DL (ref 0–0.6)
GLUCOSE SERPL-MCNC: 124 MG/DL (ref 65–100)
MAGNESIUM SERPL-MCNC: 2.2 MG/DL (ref 1.6–2.4)
PHOSPHATE SERPL-MCNC: 4.3 MG/DL (ref 2.6–4.7)
POTASSIUM SERPL-SCNC: 4.3 MMOL/L (ref 3.5–5.1)
PROCALCITONIN SERPL-MCNC: 0.21 NG/ML
SODIUM SERPL-SCNC: 139 MMOL/L (ref 136–145)

## 2023-02-15 PROCEDURE — 71045 X-RAY EXAM CHEST 1 VIEW: CPT

## 2023-02-15 PROCEDURE — 36415 COLL VENOUS BLD VENIPUNCTURE: CPT

## 2023-02-15 PROCEDURE — 94640 AIRWAY INHALATION TREATMENT: CPT

## 2023-02-15 PROCEDURE — 83735 ASSAY OF MAGNESIUM: CPT

## 2023-02-15 PROCEDURE — 74011000250 HC RX REV CODE- 250: Performed by: HOSPITALIST

## 2023-02-15 PROCEDURE — 74011000250 HC RX REV CODE- 250: Performed by: INTERNAL MEDICINE

## 2023-02-15 PROCEDURE — 86140 C-REACTIVE PROTEIN: CPT

## 2023-02-15 PROCEDURE — 94003 VENT MGMT INPAT SUBQ DAY: CPT

## 2023-02-15 PROCEDURE — 74011250636 HC RX REV CODE- 250/636: Performed by: INTERNAL MEDICINE

## 2023-02-15 PROCEDURE — 93971 EXTREMITY STUDY: CPT

## 2023-02-15 PROCEDURE — 99233 SBSQ HOSP IP/OBS HIGH 50: CPT | Performed by: INTERNAL MEDICINE

## 2023-02-15 PROCEDURE — 92610 EVALUATE SWALLOWING FUNCTION: CPT

## 2023-02-15 PROCEDURE — 74011250636 HC RX REV CODE- 250/636: Performed by: HOSPITALIST

## 2023-02-15 PROCEDURE — 74011250637 HC RX REV CODE- 250/637: Performed by: INTERNAL MEDICINE

## 2023-02-15 PROCEDURE — 80069 RENAL FUNCTION PANEL: CPT

## 2023-02-15 PROCEDURE — 84145 PROCALCITONIN (PCT): CPT

## 2023-02-15 PROCEDURE — 77010033678 HC OXYGEN DAILY

## 2023-02-15 PROCEDURE — 74011250637 HC RX REV CODE- 250/637: Performed by: HOSPITALIST

## 2023-02-15 PROCEDURE — 65610000006 HC RM INTENSIVE CARE

## 2023-02-15 RX ADMIN — QUETIAPINE FUMARATE 300 MG: 100 TABLET ORAL at 22:01

## 2023-02-15 RX ADMIN — CLONAZEPAM 0.5 MG: 1 TABLET ORAL at 15:31

## 2023-02-15 RX ADMIN — CLONAZEPAM 0.5 MG: 1 TABLET ORAL at 22:01

## 2023-02-15 RX ADMIN — CEFAZOLIN 2 G: 1 INJECTION, POWDER, FOR SOLUTION INTRAMUSCULAR; INTRAVENOUS at 03:00

## 2023-02-15 RX ADMIN — ACETAMINOPHEN 650 MG: 160 SOLUTION ORAL at 21:03

## 2023-02-15 RX ADMIN — GABAPENTIN 600 MG: 300 CAPSULE ORAL at 12:46

## 2023-02-15 RX ADMIN — SODIUM CHLORIDE, PRESERVATIVE FREE 20 ML: 5 INJECTION INTRAVENOUS at 03:39

## 2023-02-15 RX ADMIN — GABAPENTIN 600 MG: 300 CAPSULE ORAL at 22:01

## 2023-02-15 RX ADMIN — CLONAZEPAM 0.5 MG: 1 TABLET ORAL at 08:58

## 2023-02-15 RX ADMIN — ASPIRIN 81 MG CHEWABLE TABLET 81 MG: 81 TABLET CHEWABLE at 08:58

## 2023-02-15 RX ADMIN — ACETAMINOPHEN 650 MG: 160 SOLUTION ORAL at 17:29

## 2023-02-15 RX ADMIN — GABAPENTIN 600 MG: 300 CAPSULE ORAL at 17:27

## 2023-02-15 RX ADMIN — CEFAZOLIN 2 G: 1 INJECTION, POWDER, FOR SOLUTION INTRAMUSCULAR; INTRAVENOUS at 17:27

## 2023-02-15 RX ADMIN — SODIUM CHLORIDE, PRESERVATIVE FREE 20 MG: 5 INJECTION INTRAVENOUS at 08:58

## 2023-02-15 RX ADMIN — SODIUM CHLORIDE, PRESERVATIVE FREE 20 MG: 5 INJECTION INTRAVENOUS at 20:24

## 2023-02-15 RX ADMIN — SODIUM CHLORIDE, PRESERVATIVE FREE 10 ML: 5 INJECTION INTRAVENOUS at 22:01

## 2023-02-15 RX ADMIN — ENOXAPARIN SODIUM 40 MG: 100 INJECTION SUBCUTANEOUS at 08:58

## 2023-02-15 RX ADMIN — CEFAZOLIN 2 G: 1 INJECTION, POWDER, FOR SOLUTION INTRAMUSCULAR; INTRAVENOUS at 12:45

## 2023-02-15 RX ADMIN — CHLORHEXIDINE GLUCONATE 0.12% ORAL RINSE 15 ML: 1.2 LIQUID ORAL at 08:58

## 2023-02-15 RX ADMIN — FUROSEMIDE 40 MG: 10 INJECTION, SOLUTION INTRAMUSCULAR; INTRAVENOUS at 08:58

## 2023-02-15 RX ADMIN — SODIUM CHLORIDE, PRESERVATIVE FREE 10 ML: 5 INJECTION INTRAVENOUS at 15:32

## 2023-02-15 RX ADMIN — GABAPENTIN 600 MG: 300 CAPSULE ORAL at 08:58

## 2023-02-15 NOTE — PROGRESS NOTES
Weaning/extubation:    Patient seen this morning  Please see earlier note for those details    Felt to be stable for weaning  Sedation held  Tube feeds held  Placed on SIMV/PSV  Tolerated well with good RSBI less than 100    Placed on spontaneous ventilation with PSV 5  Tolerated well with tidal volumes around 400-450  R SBI less than 100  Awake and alert  Comfortable  Denies any distress    Extubated to nasal cannula oxygen  Reevaluated 60 minutes later and doing well  No acute distress    Spent more than 30 minutes in direct patient care with no overlap

## 2023-02-15 NOTE — PROGRESS NOTES
0400-  pt stuck multiple times for abg. ABG clotted in syringe.  Pt refusing restick after 4 attempts

## 2023-02-15 NOTE — PROGRESS NOTES
Progress Note    Patient: Johnny Anthony MRN: [id number]  SSN: [id number]    YOB: 1965  Age: 62 y.o. Sex: female      Admit Date: 2/10/2023    LOS: 5 days     Subjective:   Patient followed for sepsis with suspected aspiration pneumonia RLL. Sputum grew MSSA. Patient has been switched to Cefazolin. She has low grade temperature with normal WBC, decreasing procal but CRP increased today. Patient now extubated and is awake and responsive. Reports mildly productive cough. Explained her treatment for pneumonia. Objective:     Vitals:    02/15/23 0900 02/15/23 0930 02/15/23 0957 02/15/23 1000   BP: 136/86 123/87  135/85   Pulse: 93 99  98   Resp: 11 11  14   Temp:       SpO2: 96% 96% 97% 99%   Weight:       Height:            Intake and Output:  Current Shift: No intake/output data recorded. Last three shifts: 02/13 1901 - 02/15 0700  In: 4996.4 [I.V.:276.4]  Out: 5943 [Urine:6150]    Physical Exam:   Vitals and nursing note reviewed. Constitutional:       Appearance: She is ill-appearing. HENT: Nasal cannula     Head: Normocephalic and atraumatic. Right Ear: External ear normal.      Left Ear: External ear normal.      Mouth/Throat:    Eyes: open  Cardiovascular:      Rate and Rhythm: Normal rate and regular rhythm. Heart sounds: No murmur heard. Pulmonary:      Effort: Respiratory distress present. Breath sounds: Rhonchi present. No wheezing or rales. Abdominal:      General: Bowel sounds are normal. There is no distension. Palpations: Abdomen is soft. Tenderness: There is no abdominal tenderness. There is no guarding. Genitourinary:     Comments: Mohamud catheter     Musculoskeletal:      Right lower leg: No edema. Left lower leg: No edema. Comments: Right femoral triple lumen catheter   Skin:     Findings: No rash.    Neurological: nonfocal, no confusion  Psychiatric: normal behavior    Lab/Data Review:     WBC 6,100    CRP 8.03 <5.74 <8.74 < 7.00 <7.57 <7.99  Procal  0.21 <0.57 <0.94 <0.99    Blood cultures (2/10) No growth 4 days  Sputum culture (2/10) MSSA Staphylococcus aureus FINAL    Assessment:     Active Problems:    Severe sepsis ([facility] [address] (2/10/2023)      Acute respiratory failure with hypoxia ([facility] [address] (2/10/2023)  Probable aspiration pneumonia, RLL, sputum culture growing MSSA Staphylococcus aureus, now on IV Cefazolin, Day #3  Sepsis with elevated CRP and procalcitonin  Acute hypoxic respiratory failure, intubated  Seizure activity with post-ictal period  5. Augmentin allergy    Comment: Concerns about narrow spectrum of coverage  with Cefazolin for aspiration pneumonia, especially with increased CRP today. Plan:      1. Patient currently on IV Cefazolin  2. Will monitor clinical response closely; If she worsens, would restart Meropenem. 3.  Follow-up blood cultures  5.   In am, repeat procal and CRP          Signed By: Taryn Kirby MD     February 15, 2023

## 2023-02-15 NOTE — PROGRESS NOTES
SPEECH LANGUAGE PATHOLOGY BEDSIDE SWALLOW EVALUATION  Patient: Andrade Saavedra (87 y.o. female)  Date: 2/15/2023  Primary Diagnosis: Severe sepsis ([facility] [address] [A41.9, R65.20]  Acute respiratory failure with hypoxia (HCC) [J96.01]       Precautions: aspiration       ASSESSMENT :  Based on the objective data described below, the patient presents with minimal oral dysphagia c/b generalized weakness. Patient post extubation. She is on 3l O2 via NC, 97% O2 saturations. Oral motor c/b slow and weak. Dentition limited. Patient w/ difficulty bringing hands to mouth. Trials administered by clinician. Patient tolerated ice chips, single sips and consecutive swallows of thin via cup and straw w/ minimal delay in swallow initiation. HLE and protraction adequate to digital palpation. No clinical indicators of penetration or aspiration present. Slow mastication of solid trial. Slow oral transit. Pharyngeal response adequate. X1 cough response after the swallow. May be incidental. Patient reported no difficulty. No further overt s/sx of aspiration noted w/ repeated swallow trials. Educated patient to diet modifications and swallowing strategies. Patient will benefit from skilled intervention to address the above impairments. Patients rehabilitation potential is considered to be Good     PLAN :  Recommendations and Planned Interventions:  SBS, thin. Meds one at time in applesauce. Aspiration precautions. Compensatory swallow strategies of small bites, sips and slow rate. SLP to follow to ensure diet tolerance and possible diet upgrades. Frequency/Duration: Patient will be followed by speech-language pathology 2 times a week to address goals. Discharge Recommendations: To Be Determined     SUBJECTIVE:   Patient alert and seen at bedside.      OBJECTIVE:     CXR Results  (Last 48 hours)                 02/15/23 0252  XR CHEST PORT Final result    Impression:      Slightly increased bibasilar airspace disease. No other significant change. Narrative:  history: Respiratory failure       COMPARISON: 2023       FINDINGS:       Frontal chest radiograph submitted for review. Endotracheal tube and feeding tube are unchanged. Stable cardiomediastinal   silhouette. No pneumothorax. Diffuse interstitial opacities are again seen with   slight increased bibasilar airspace disease. Mild pleural effusions are again   noted. 23 0306  XR CHEST PORT Final result    Impression:  No significant change. Narrative:  INDICATION: resp F       EXAMINATION:  AP CHEST, PORTABLE       COMPARISON: 2023       FINDINGS: Single AP portable view of the chest demonstrates no change in   position of the lines and tubes. The cardiomediastinal silhouette is unchanged. Bibasilar atelectasis and probable pleural effusion similar to prior study. No   pneumothorax or new airspace disease.                   CT Results  (Last 48 hours)      None             Past Medical History:   Diagnosis Date    DDD (degenerative disc disease), lumbar     Fatigue     GERD (gastroesophageal reflux disease)     Hypothyroidism     Psychiatric disorder     PTSD per patient    Seizures ([facility] [address]      Past Surgical History:   Procedure Laterality Date    HX APPENDECTOMY      HX  SECTION      HX CHOLECYSTECTOMY      HX HYSTERECTOMY       Prior Level of Function/Home Situation: independent  Home Situation  Home Environment: Other (comment)  One/Two Story Residence: Other (Comment)  Living Alone: No  Support Systems: Child(trever)  Patient Expects to be Discharged to[de-identified] Home  Current DME Used/Available at Home: Other (comment)  Diet prior to admission: regular, thin  Current Diet:  NPO   Cognitive and Communication Status:  Neurologic State: Alert  Orientation Level: Oriented to person  Cognition: Follows commands           Swallowing Evaluation:   Oral Assessment:  Oral Assessment  Labial: Decreased rate;Decreased seal  Dentition: Limited  Oral Hygiene: WFL  Lingual: Decreased rate;Decreased strength  Velum: No impairment  P.O. Trials:  Patient Position: upright in bed  Vocal quality prior to P.O.: No impairment  Consistency Presented: Thin liquid; Solid; Ice chips  How Presented: Straw;Cup/sip; Successive swallows;SLP-fed/presented     Bolus Acceptance: No impairment        Propulsion: No impairment  Oral Residue: None  Initiation of Swallow: No impairment  Laryngeal Elevation: Functional  Aspiration Signs/Symptoms: Delayed cough/throat clear  Pharyngeal Phase Characteristics: Suspected pharyngeal residue          Oral Phase Severity: Mild  Pharyngeal Phase Severity : No impairment  Voice:              Vocal Quality: No impairment        After treatment:   Patient left in no apparent distress in bed, Call bell within reach, and Nursing notified    COMMUNICATION/EDUCATION:   Patient was educated regarding purpose of SLP assessment, POC, diet recs and sw safety precautions. Patient demonstrated Good understanding as evidenced by verbal understanding. The patient's plan of care including recommendations, planned interventions, and recommended diet changes were discussed with: Registered nurse. Patient/family have participated as able in goal setting and plan of care. Thank you for this referral.  Jules Albert, SLP M.S. CCC-SLP  Time Calculation: 15 mins           Problem: Dysphagia (Adult)  Goal: *Acute Goals and Plan of Care (Insert Text)  Description: Speech Therapy Swallow Goals  Initiated 2/15/2023  -Patient will tolerate SBS diet with thin liquids without clinical indicators of aspiration given minimal cues within 5-7 day(s). -Patient will tolerate PO trials without clinical indicators of aspiration given minimal cues within 5-7 day(s).           -Patient will demonstrate understanding of swallow safety precautions and aspiration precautions, diet recs with minimal cues within 5-7 day(s).     -Patient/caregiver goal: \" to go home\"          Outcome: Not Progressing Towards Goal

## 2023-02-15 NOTE — PROGRESS NOTES
Pulmonary Progress Note      NAME: Leno Lemus   :  1965  MRM:  [id number]    Date/Time: 2/15/2023  9:40 AM         Subjective:     Patient seen and examined in ICU  Overnight events noted    Remains critically ill  Intubated on mechanical ventilation  On assist-control 40% FiO2 and PEEP of 6  ABGs and chest x-ray reviewed      Past Medical History reviewed and unchanged from Admission History and Physical       Objective:     Physical Exam     Vitals:      Last 24hrs VS reviewed since prior progress note. Most recent are:    Visit Vitals  /81   Pulse 96   Temp 100.1 °F (37.8 °C)   Resp (!) 3   Ht 5' 7.99\" (1.727 m)   Wt 100.7 kg (222 lb 0.1 oz)   SpO2 94%   Breastfeeding No   BMI 33.76 kg/m²     SpO2 Readings from Last 6 Encounters:   02/15/23 94%   23 100%   23 96%   23 99%   22 95%   10/19/22 97%    O2 Flow Rate (L/min): 3 l/min     Intake/Output Summary (Last 24 hours) at 2/15/2023 1509  Last data filed at 2/15/2023 1254  Gross per 24 hour   Intake 4798.08 ml   Output 3450 ml   Net 1348.08 ml        Physical Exam:   General appearance: no distress, mildly obese, sedated on the ventilator, chronically ill-appearing  Head: Normocephalic, without obvious abnormality, atraumatic  Eyes: negative. She has a sample 5 endotracheal tube. She has OG tube  Neck: supple, symmetrical, trachea midline and no adenopathy  Lungs: Crackles at right base, otherwise few scattered rhonchi, ET tube in place  Heart: regular rate and rhythm, S1, S2 normal, no murmur, click, rub or gallop  Abdomen: soft, non-tender.  Bowel sounds normal. No masses,  no organomegaly  Extremities: extremities normal, atraumatic, no cyanosis or edema  Pulses: 2+ and symmetric  Skin: Skin color, texture, turgor normal. No rashes or lesions  Lymph nodes: Cervical, supraclavicular, and axillary nodes normal.  Neurologic: Grossly normal, sedated on the ventilator    XR CHEST PORT   Final Result      Slightly increased bibasilar airspace disease. No other significant change. XR CHEST PORT   Final Result   No significant change. XR CHEST PORT   Final Result   Mild bibasilar atelectasis, improved. XR CHEST PORT   Final Result   Endotracheal tube and slightly more satisfactory position 3.4 cm above the   karthik. Bibasilar airspace disease persists. XR CHEST PORT   Final Result   Endotracheal tube slightly low in position 2 cm above the karthik, could be   retracted 2 cm for more optimal positioning. Bibasilar airspace disease right   greater than left, without significant change. CT CHEST WO CONT   Final Result   Endotracheal tube and nasogastric tube are in satisfactory position. Dense right   lower lobe consolidation may represent pneumonia or aspiration. Bilateral   dependent atelectasis. Stable incidental findings as detailed above. CT HEAD WO CONT   Final Result   No acute intracranial process. XR CHEST PORT   Final Result      Endotracheal tube and nasogastric tube appear to be in satisfactory position. Diminished lung volumes with moderately severe pulmonary edema pattern.          DUPLEX UPPER EXT VENOUS LEFT    (Results Pending)       Lab Data Reviewed: (see below)      Medications:  Current Facility-Administered Medications   Medication Dose Route Frequency    furosemide (LASIX) injection 40 mg  40 mg IntraVENous DAILY    clonazePAM (KlonoPIN) tablet 0.5 mg  0.5 mg Per NG tube TID    QUEtiapine (SEROquel) tablet 300 mg  300 mg Per NG tube QHS    gabapentin (NEURONTIN) capsule 600 mg  600 mg Per NG tube QID    ceFAZolin (ANCEF) 2 g in sterile water (preservative free) 20 mL IV syringe  2 g IntraVENous Q8H    famotidine (PF) (PEPCID) 20 mg in 0.9% sodium chloride 10 mL injection  20 mg IntraVENous Q12H    dexmedeTOMidine in 0.9 % NaCl (PRECEDEX) 400 mcg/100 mL (4 mcg/mL) infusion soln  0.1-1.5 mcg/kg/hr IntraVENous TITRATE    NOREPINephrine (LEVOPHED) 8 mg in 0.9% NS 250ml infusion  0.5-30 mcg/min IntraVENous TITRATE    sodium chloride (NS) flush 5-40 mL  5-40 mL IntraVENous Q8H    sodium chloride (NS) flush 5-40 mL  5-40 mL IntraVENous PRN    polyethylene glycol (MIRALAX) packet 17 g  17 g Oral DAILY PRN    ondansetron (ZOFRAN ODT) tablet 4 mg  4 mg Oral Q8H PRN    Or    ondansetron (ZOFRAN) injection 4 mg  4 mg IntraVENous Q6H PRN    enoxaparin (LOVENOX) injection 40 mg  40 mg SubCUTAneous DAILY    aspirin chewable tablet 81 mg  81 mg Per NG tube DAILY    acetaminophen (TYLENOL) solution 650 mg  650 mg Per NG tube Q4H PRN    Or    acetaminophen (TYLENOL) suppository 650 mg  650 mg Rectal Q6H PRN       ______________________________________________________________________      Lab Review:     Recent Labs     02/14/23  0530 02/13/23  0300   WBC 6.1 6.6   HGB 11.3* 11.4*   HCT 34.6* 35.7    244       Recent Labs     02/15/23  0338 02/13/23  0321 02/13/23  0300     --  142   K 4.3  --  3.1*     --  107   CO2 30  --  32   *  --  120*   BUN 25*  --  22*   CREA 0.68  --  0.72   CA 8.3*  --  7.8*   MG 2.2 2.0  --    PHOS 4.3 2.5*  --    ALB 1.9*  --   --        No components found for: [place]  Recent Labs     02/14/23  0339 02/13/23  0431   PH 7.53* 7.53*   PCO2 38 39   PO2 66* 66*   HCO3 31* 32*   FIO2 40 35       No results for input(s): INR, INREXT, INREXT, INREXT in the last 72 hours. Other pertinent lab:          Assessment & Plan:      Patient is a 59-year-old  female with a history of obesity, hypothyroidism, seizure disorder, GERD, and PTSD who presented to the hospital after being found unresponsive and was experiencing acute hypoxemic respiratory failure. Plan:      1.)  Acute hypoxemic respiratory failure  -Unclear etiology, certainly has a right lower lobe likely aspiration pneumonia. Could have had a drug overdose at home on home medications.   -UDS positive for benzos on admission    Was on assist control  ABGs and chest x-ray reviewed  Chest x-ray showing hyperinflated lungs with volume overload  Will give Lasix  Initiate weaning with SIMV/PSV and consider extubation as tolerated     2.)  Acute metabolic encephalopathy  -Suspect secondary to acute hypoxemic respiratory failure and pneumonia  -CT head negative on admission  -UDS positive for benzos  Reduce sedation as tolerated  If she does not wake up completely she would benefit from neurology consultation. 3.)  Shock  -Suspect sepsis from pneumonia, lactate level slowly improving  -TTE done on 2/10/2023 showing an EF of 40 to 45%. Right ventricular systolic pressure of 22.  -Currently off Levophed, yesterday she was at 5 mcg/min, weaned for goal MAP greater than 65 mmHg  -Blood/sputum cultures are pending, on broad antibiotics with IV cefepime/vancomycin, ID consulted. Biotics have been adjusted to meropenem. 4.)  Acute kidney injury  Improved    5.)  Lactic acidosis  -Lactate level 5.2 on admission  -Likely secondary to shock, continue to maintain MAP greater than 65 mmHg with vasopressor support  -Serum bicarb level normal today     6.)  NSTEMI  -Troponin level elevated at 269 on admission.  -Cardiology input appreciated  -Echocardiogram as above. CODE STATUS: Full Code     Lines/Tubes: Right femoral CVC, PIV x3, ETT, Mohamud urinary catheter     Prophylaxis:  Stress Ulcer Protocol Active: Yes  Deep Vein Thrombosis Protocol Active: Yes  Nutrition: She is started on tube feeds at 50 mL/h. Activity: bedrest     Disposition and Family:  Remain in the ICU     Case discussed in detail with RN, RT, and care team  Thank you for involving me in the care of this patient  I will follow with you closely during hospitalization    Time spent more than 60 minutes under patient care with no overlap reviewing results and records, decision making, and answering questions.     Ralph Lopez MD  [facility] ([facility])

## 2023-02-15 NOTE — PROGRESS NOTES
Progress Note    Patient: Johnny Anthony MRN: [id number]  SSN: [id number]    YOB: 1965  Age: 62 y.o. Sex: female      Admit Date: 2/10/2023    LOS: 5 days     Subjective:     57F,  with PMH of hypothyroidism and seizures, PTSD with acute encephalopathy s/t aspiration pneumonia. HPI:   Per roommate she has been sleeping since yesterday morning, and was unable to wake her up. EMS arrived and patient was hypoxic in the 30s, non rebreather placed on patient improved to 70s, narcan given with no change. Patient was then intubated in the field. Otherwise no additional history obtained as patient was intubated and sedated. HOSPITAL COURSE:  CXR showed pulmonary edema and CT chest showed RLL pneumonia, annd this was the likely cause of septic shock. She was started on levophed and precedex. ECHO showed EF40-45%. Cardiology was consulted for MI typII. On 2/12 she was off of levophed. 2/14/23:  Patient seen and examined, chart was reviewed. Case discussed with nursing staff in ICU. Patient on vent support with sedation. BP holding well without pressors. No other acute issues reported to me by staff at this time. 2/15/23:  Patient seen and examined, chart was reviewed. Patient seen with nursing in the room. Patient currently off sedation alert on vent support. SBT in process. Low-grade intermittent fevers per chart. Objective:     Vitals:    02/15/23 0900 02/15/23 0930 02/15/23 0957 02/15/23 1000   BP: 136/86 123/87  135/85   Pulse: 93 99  98   Resp: 11 11  14   Temp:       SpO2: 96% 96% 97% 99%   Weight:       Height:            Intake and Output:  Current Shift: No intake/output data recorded. Last three shifts: 02/13 1901 - 02/15 0700  In: 4996.4 [I.V.:276.4]  Out: 6664 [Urine:6150]         Physical Exam:   General: WDWN  Throat and Neck: ET tube  Lung:  symmetric expansion on vent  Heart: RRR  Abdomen: soft, non-tender.  B   Extremities: No LE edema  Skin: No rash  Neurologic: alert  Psychiatric: Unable to evaluate      Lab/Data Review:  Recent Results (from the past 24 hour(s))   RENAL FUNCTION PANEL    Collection Time: 02/15/23  3:38 AM   Result Value Ref Range    Sodium 139 136 - 145 mmol/L    Potassium 4.3 3.5 - 5.1 mmol/L    Chloride 108 97 - 108 mmol/L    CO2 30 21 - 32 mmol/L    Anion gap 1 (L) 5 - 15 mmol/L    Glucose 124 (H) 65 - 100 mg/dL    BUN 25 (H) 6 - 20 mg/dL    Creatinine 0.68 0.55 - 1.02 mg/dL    BUN/Creatinine ratio 37 (H) 12 - 20      eGFR >60 >60 ml/min/1.73m2    Calcium 8.3 (L) 8.5 - 10.1 mg/dL    Phosphorus 4.3 2.6 - 4.7 mg/dL    Albumin 1.9 (L) 3.5 - 5.0 g/dL   MAGNESIUM    Collection Time: 02/15/23  3:38 AM   Result Value Ref Range    Magnesium 2.2 1.6 - 2.4 mg/dL   C REACTIVE PROTEIN, QT    Collection Time: 02/15/23  3:38 AM   Result Value Ref Range    C-Reactive protein 8.03 (H) 0.00 - 0.60 mg/dL   PROCALCITONIN    Collection Time: 02/15/23  5:30 AM   Result Value Ref Range    Procalcitonin 0.21 (H) 0 ng/mL         Current Facility-Administered Medications:     furosemide (LASIX) injection 40 mg, 40 mg, IntraVENous, DAILY, Kanu Liriano MD, 40 mg at 02/15/23 0858    clonazePAM (KlonoPIN) tablet 0.5 mg, 0.5 mg, Per NG tube, TID, Pascale Murguia MD, 0.5 mg at 02/15/23 0858    QUEtiapine (SEROquel) tablet 300 mg, 300 mg, Per NG tube, QHS, Pascale Murguia MD, 300 mg at 02/14/23 2125    gabapentin (NEURONTIN) capsule 600 mg, 600 mg, Per NG tube, QID, Pascale Murguia MD, 600 mg at 02/15/23 0858    ceFAZolin (ANCEF) 2 g in sterile water (preservative free) 20 mL IV syringe, 2 g, IntraVENous, Q8H, Rick Cadet MD, 2 g at 02/15/23 0300    famotidine (PF) (PEPCID) 20 mg in 0.9% sodium chloride 10 mL injection, 20 mg, IntraVENous, Q12H, Wendy Cadet MD, 20 mg at 02/15/23 0858    dexmedeTOMidine in 0.9 % NaCl (PRECEDEX) 400 mcg/100 mL (4 mcg/mL) infusion soln, 0.1-1.5 mcg/kg/hr, IntraVENous, TITRATE, Jorje Arias MD, Stopped at 02/10/23 1150    NOREPINephrine (LEVOPHED) 8 mg in 0.9% NS 250ml infusion, 0.5-30 mcg/min, IntraVENous, TITRATE, Humberto Arias MD, Stopped at 02/12/23 0342    sodium chloride (NS) flush 5-40 mL, 5-40 mL, IntraVENous, Q8H, George Arias MD, 10 mL at 02/14/23 1610    sodium chloride (NS) flush 5-40 mL, 5-40 mL, IntraVENous, PRN, Humberto Arias MD, 20 mL at 02/15/23 0339    polyethylene glycol (MIRALAX) packet 17 g, 17 g, Oral, DAILY PRN, Humberto Arias MD    ondansetron (ZOFRAN ODT) tablet 4 mg, 4 mg, Oral, Q8H PRN **OR** ondansetron (ZOFRAN) injection 4 mg, 4 mg, IntraVENous, Q6H PRN, George Arias MD    enoxaparin (LOVENOX) injection 40 mg, 40 mg, SubCUTAneous, DAILY, Humberto Arias MD, 40 mg at 02/15/23 7200    aspirin chewable tablet 81 mg, 81 mg, Per NG tube, DAILY, Sameer Bowens MD, 81 mg at 02/15/23 0135    acetaminophen (TYLENOL) solution 650 mg, 650 mg, Per NG tube, Q4H PRN, 650 mg at 02/14/23 1700 **OR** acetaminophen (TYLENOL) suppository 650 mg, 650 mg, Rectal, Q6H PRN, Judge Jvoon MD     Echo-Interpretation Summary    Result status: Final result       Left Ventricle: Reduced left ventricular systolic function with a visually estimated EF of 40 - 45%. Not well visualized. Left ventricle size is normal. Mildly increased wall thickness. Unable to assess wall motion. Abnormal diastolic function. Mitral Valve: Mild regurgitation. Tricuspid Valve: The estimated RVSP is 22 mmHg. Left Atrium: Left atrium is mildly dilated. CTA chest-IMPRESSION  Endotracheal tube and nasogastric tube are in satisfactory position. Dense right  lower lobe consolidation may represent pneumonia or aspiration. Bilateral  dependent atelectasis. Stable incidental findings as detailed above. Assessment and plan:      Acute hypoxic respiratory failure-  Etiology unclear suspected aspiration  Continue vent settings as per pulmonary team  Chest x-ray remains unchanged.   CTA chest as above  Weaning vent as per pulmonary team    Acute encephalopathy-  intubated.    UDS positive for benzos  CT head negative  More alert off sedation    septic shock-resolved off Levophed    Right lung aspiration pneumonia-  Continue IV antibiotics per ID team on cefazolin  Augmentin allergy per chart  Sputum culture showed MSSA    Type II MI-  Continue aspirin  Echo as above-EF 45%  Cardiology on case will defer to them  Likely would benefit from nonischemic evaluation before discharge    ANASTACIO-resolved  Seizure disorder-resume home meds    Hypothyroidism-unclear if she is on home medications  Depression with psychosis-was on clonazepam and Seroquel consider psych eval before discharge    ADFC  DVT PRx- Lovenox  NOK-      DISPO: cont ICU  care for  now      Signed By: Vi Cole MD     February 15, 2023

## 2023-02-16 ENCOUNTER — APPOINTMENT (OUTPATIENT)
Dept: GENERAL RADIOLOGY | Age: 58
DRG: 870 | End: 2023-02-16
Attending: INTERNAL MEDICINE
Payer: MEDICARE

## 2023-02-16 LAB
CRP SERPL-MCNC: 10 MG/DL (ref 0–0.6)
PROCALCITONIN SERPL-MCNC: 0.26 NG/ML

## 2023-02-16 PROCEDURE — 74011000250 HC RX REV CODE- 250: Performed by: HOSPITALIST

## 2023-02-16 PROCEDURE — 71045 X-RAY EXAM CHEST 1 VIEW: CPT

## 2023-02-16 PROCEDURE — 74011250636 HC RX REV CODE- 250/636: Performed by: INTERNAL MEDICINE

## 2023-02-16 PROCEDURE — 99233 SBSQ HOSP IP/OBS HIGH 50: CPT | Performed by: INTERNAL MEDICINE

## 2023-02-16 PROCEDURE — 74011000258 HC RX REV CODE- 258: Performed by: INTERNAL MEDICINE

## 2023-02-16 PROCEDURE — 86140 C-REACTIVE PROTEIN: CPT

## 2023-02-16 PROCEDURE — 74011000250 HC RX REV CODE- 250: Performed by: INTERNAL MEDICINE

## 2023-02-16 PROCEDURE — 65270000029 HC RM PRIVATE

## 2023-02-16 PROCEDURE — 36415 COLL VENOUS BLD VENIPUNCTURE: CPT

## 2023-02-16 PROCEDURE — 74011250637 HC RX REV CODE- 250/637: Performed by: INTERNAL MEDICINE

## 2023-02-16 PROCEDURE — 74011250636 HC RX REV CODE- 250/636: Performed by: HOSPITALIST

## 2023-02-16 PROCEDURE — 84145 PROCALCITONIN (PCT): CPT

## 2023-02-16 PROCEDURE — 74011250637 HC RX REV CODE- 250/637: Performed by: HOSPITALIST

## 2023-02-16 RX ORDER — IPRATROPIUM BROMIDE AND ALBUTEROL SULFATE 2.5; .5 MG/3ML; MG/3ML
3 SOLUTION RESPIRATORY (INHALATION)
Status: DISCONTINUED | OUTPATIENT
Start: 2023-02-16 | End: 2023-02-19 | Stop reason: HOSPADM

## 2023-02-16 RX ORDER — DOXYCYCLINE 100 MG/1
100 CAPSULE ORAL EVERY 12 HOURS
Status: DISCONTINUED | OUTPATIENT
Start: 2023-02-16 | End: 2023-02-19 | Stop reason: HOSPADM

## 2023-02-16 RX ADMIN — SODIUM CHLORIDE, PRESERVATIVE FREE 20 MG: 5 INJECTION INTRAVENOUS at 08:44

## 2023-02-16 RX ADMIN — ASPIRIN 81 MG CHEWABLE TABLET 81 MG: 81 TABLET CHEWABLE at 08:44

## 2023-02-16 RX ADMIN — GABAPENTIN 600 MG: 300 CAPSULE ORAL at 17:12

## 2023-02-16 RX ADMIN — GABAPENTIN 600 MG: 300 CAPSULE ORAL at 08:44

## 2023-02-16 RX ADMIN — MEROPENEM 1 G: 1 INJECTION, POWDER, FOR SOLUTION INTRAVENOUS at 21:09

## 2023-02-16 RX ADMIN — SODIUM CHLORIDE, PRESERVATIVE FREE 1 G: 5 INJECTION INTRAVENOUS at 14:14

## 2023-02-16 RX ADMIN — GABAPENTIN 600 MG: 300 CAPSULE ORAL at 14:14

## 2023-02-16 RX ADMIN — CLONAZEPAM 0.5 MG: 1 TABLET ORAL at 21:08

## 2023-02-16 RX ADMIN — ENOXAPARIN SODIUM 40 MG: 100 INJECTION SUBCUTANEOUS at 08:44

## 2023-02-16 RX ADMIN — SODIUM CHLORIDE, PRESERVATIVE FREE 20 MG: 5 INJECTION INTRAVENOUS at 21:09

## 2023-02-16 RX ADMIN — FUROSEMIDE 40 MG: 10 INJECTION, SOLUTION INTRAMUSCULAR; INTRAVENOUS at 08:44

## 2023-02-16 RX ADMIN — QUETIAPINE FUMARATE 300 MG: 100 TABLET ORAL at 21:08

## 2023-02-16 RX ADMIN — DOXYCYCLINE HYCLATE 100 MG: 100 CAPSULE ORAL at 10:07

## 2023-02-16 RX ADMIN — CLONAZEPAM 0.5 MG: 1 TABLET ORAL at 17:12

## 2023-02-16 RX ADMIN — GABAPENTIN 600 MG: 300 CAPSULE ORAL at 21:08

## 2023-02-16 RX ADMIN — CEFAZOLIN 2 G: 1 INJECTION, POWDER, FOR SOLUTION INTRAMUSCULAR; INTRAVENOUS at 02:41

## 2023-02-16 RX ADMIN — DOXYCYCLINE HYCLATE 100 MG: 100 CAPSULE ORAL at 21:00

## 2023-02-16 RX ADMIN — CLONAZEPAM 0.5 MG: 1 TABLET ORAL at 08:44

## 2023-02-16 RX ADMIN — SODIUM CHLORIDE, PRESERVATIVE FREE 10 ML: 5 INJECTION INTRAVENOUS at 21:09

## 2023-02-16 RX ADMIN — CEFAZOLIN 2 G: 1 INJECTION, POWDER, FOR SOLUTION INTRAMUSCULAR; INTRAVENOUS at 10:07

## 2023-02-16 RX ADMIN — SODIUM CHLORIDE, PRESERVATIVE FREE 10 ML: 5 INJECTION INTRAVENOUS at 14:19

## 2023-02-16 NOTE — PROGRESS NOTES
[facility] Hospitalist Progress Note  Dominik Pretty MD  Date:2/16/2023       Room:[facility]  Patient Name:Haydee Serrato     YOB: 1965     Age:57 y.o.    60F,  with PMH of hypothyroidism and seizures, PTSD with acute encephalopathy s/t aspiration pneumonia. 2/10/23 admission and hospital course  Per roommate she has been sleeping since yesterday morning, and was unable to wake her up. EMS arrived and patient was hypoxic in the 30s, non rebreather placed on patient improved to 70s, narcan given with no change. Patient was then intubated in the field. Otherwise no additional history obtained as patient was intubated and sedated. CXR showed pulmonary edema and CT chest showed RLL pneumonia, annd this was the likely cause of septic shock. She was started on levophed and precedex. ECHO showed EF40-45%. Cardiology was consulted for MI typII. On 2/12 she was off of levophed. 2/10/23 echocardiogram    Left Ventricle: Reduced left ventricular systolic function with a visually estimated EF of 40 - 45%. Not well visualized. Left ventricle size is normal. Mildly increased wall thickness. Unable to assess wall motion. Abnormal diastolic function. Mitral Valve: Mild regurgitation. Tricuspid Valve: The estimated RVSP is 22 mmHg. Left Atrium: Left atrium is mildly dilated. 2/14/23:  Patient seen and examined, chart was reviewed. Case discussed with nursing staff in ICU. Patient on vent support with sedation. BP holding well without pressors. No other acute issues reported to me by staff at this time. 2/15/23:  Patient seen and examined, chart was reviewed. Patient seen with nursing in the room. Patient currently off sedation alert on vent support. SBT in process. Low-grade intermittent fevers per chart. 2/16/23 liberated from ventilator yesterday  No new complaints, tolerating medications well  Transferring out of ICU today    Subjective    Subjective:  Symptoms:  Stable.      Review of Systems   All other systems reviewed and are negative. Objective         Vitals Last 24 Hours:  TEMPERATURE:  Temp  Av °F (37.8 °C)  Min: 99.2 °F (37.3 °C)  Max: 100.8 °F (38.2 °C)  RESPIRATIONS RANGE: Resp  Av.3  Min: 3  Max: 23  PULSE OXIMETRY RANGE: SpO2  Av.4 %  Min: 88 %  Max: 99 %  PULSE RANGE: Pulse  Av.6  Min: 74  Max: 99  BLOOD PRESSURE RANGE: Systolic (26WQK), NYY:578 , Min:88 , IZ   ; Diastolic (97GMK), QKO:89, Min:57, Max:118    I/O (24Hr): Intake/Output Summary (Last 24 hours) at 2023 0828  Last data filed at 2023 0600  Gross per 24 hour   Intake 800 ml   Output 2725 ml   Net -1925 ml     Objective:  General Appearance:  Comfortable. Vital signs: (most recent): Blood pressure 107/71, pulse 75, temperature 99.6 °F (37.6 °C), resp. rate 15, height 5' 7.99\" (1.727 m), weight 99 kg (218 lb 4.1 oz), SpO2 94 %, not currently breastfeeding. HEENT: Normal HEENT exam.    Lungs:  Normal effort. Heart: Normal rate. Regular rhythm. Abdomen: Abdomen is soft. Bowel sounds are normal.   There is no abdominal tenderness. Extremities: Normal range of motion. Pulses: Distal pulses are intact. Neurological: Patient is alert and oriented to person, place and time. Pupils:  Pupils are equal, round, and reactive to light. Skin:  Warm and dry. Labs/Imaging/Diagnostics    Labs:  CBC:  Recent Labs     23  0530   WBC 6.1   RBC 4.02   HGB 11.3*   HCT 34.6*   MCV 86.1   RDW 14.8*        CHEMISTRIES:  Recent Labs     02/15/23  0338      K 4.3      CO2 30   BUN 25*   CA 8.3*   PHOS 4.3   MG 2.2   PT/INR:No results for input(s): INR, INREXT in the last 72 hours. No lab exists for component: PROTIME  APTT:No results for input(s): APTT in the last 72 hours. LIVER PROFILE:No results for input(s): AST, ALT in the last 72 hours.     No lab exists for component: RIGOBERTO DOUGLAS, [facility]  Lab Results   Component Value Date/Time    ALT (SGPT) 580 (H) 02/11/2023 03:50 AM    AST (SGOT) 278 (H) 02/11/2023 03:50 AM    Alk. phosphatase 84 02/11/2023 03:50 AM    Bilirubin, direct 0.1 02/11/2023 03:50 AM    Bilirubin, total 0.3 02/11/2023 03:50 AM       Imaging Last 24 Hours:  DUPLEX UPPER EXT VENOUS LEFT    Result Date: 2/15/2023  · No evidence of deep vein thrombosis in the left upper extremity. Left upper extremity veins were visualized in the transverse and longitudinal views. The vessels showed normal color filling and compressibility. Doppler interrogation showed phasic and spontaneous flow. · No evidence of acute DVT in the left upper extremity. Assessment//Plan   Active Problems:    Severe sepsis ([address] (2/10/2023)      Acute respiratory failure with hypoxia (HCC) (2/10/2023)    CT Results  (Last 48 hours)      None            Assessment & Plan  57F,  with PMH of hypothyroidism and seizures, PTSD with acute encephalopathy s/t aspiration pneumonia. 2/10/23 admission and hospital course  Per roommate she has been sleeping since yesterday morning, and was unable to wake her up. EMS arrived and patient was hypoxic in the 30s, non rebreather placed on patient improved to 70s, narcan given with no change. Patient was then intubated in the field. Otherwise no additional history obtained as patient was intubated and sedated. CXR showed pulmonary edema and CT chest showed RLL pneumonia, annd this was the likely cause of septic shock. She was started on levophed and precedex. ECHO showed EF40-45%. Cardiology was consulted for MI typII. On 2/12 she was off of levophed. 2/10/23 echocardiogram    Left Ventricle: Reduced left ventricular systolic function with a visually estimated EF of 40 - 45%. Not well visualized. Left ventricle size is normal. Mildly increased wall thickness. Unable to assess wall motion. Abnormal diastolic function. Mitral Valve: Mild regurgitation. Tricuspid Valve: The estimated RVSP is 22 mmHg.     Left Atrium: Left atrium is mildly dilated. 2/14/23:  Patient seen and examined, chart was reviewed. Case discussed with nursing staff in ICU. Patient on vent support with sedation. BP holding well without pressors. No other acute issues reported to me by staff at this time. 2/15/23:  Patient seen and examined, chart was reviewed. Patient seen with nursing in the room. Patient currently off sedation alert on vent support. SBT in process. Low-grade intermittent fevers per chart. 2/16/23 liberated from ventilator yesterday  No new complaints, tolerating medications well  Transferring out of ICU today    MICROBIOLOGY    2/10/23 Blood  Negative  2/10/23 Respiratory Staphylococcus aureus    ASSESSMENT AND PLAN    1) Acute hypoxic respiratory failure in the setting of aspiration syndrome, respiratory cultures as above with MSSA. Liberated from ventilator 2/15/23. Supportive care with respiratory support as needed   Bronchodilators   Cefazolin and doxycycline    2) Cardiovascular issues including tyoe II NSTMI, heart failure with moderately reduced ejection fraction.      Telemetry monitoring   Echocardiogram as above   Aspirin   Furosemide    3) DVT prophylaxis with enoxaparin        Electronically signed by Khadra Jauregui MD on 2/16/2023 at 8:28 AM

## 2023-02-16 NOTE — PROGRESS NOTES
Extubated yesterday and currently on 3L NC. Home O2 TBD. Pending PT/OT eval when medically appropriate.          LI Bauer

## 2023-02-16 NOTE — PROGRESS NOTES
Meropenem Extended-Infusion Dosing/Monitoring  Current regimen:  new start    Recent Labs     02/15/23  0338   CREA 0.68   BUN 25*       Estimated CrCl:  >100 mL/min    Plan: Change to meropenem 1g IV over 3 minutes followed by 1g IV over 180 minutes every 8 hours per [facility] P&T Committee Protocol with respect to extended-infusion ?-lactam antibiotics. Pharmacy will continue to monitor patient daily and will make dosage adjustments based upon changing renal function.

## 2023-02-16 NOTE — PROGRESS NOTES
Patients case reviewed during interdisciplinary team meeting in ICU/CCU. Rev.  Harry Roa [address], [address]

## 2023-02-16 NOTE — PROGRESS NOTES
Pulmonary Progress Note      NAME: Shanda Araujo   :  1965  MRM:  [id number]    Date/Time: 2023  9:40 AM         Subjective:     Patient seen and examined in ICU  Overnight events noted    Lying in bed comfortably  Awake and alert  Extubated 2/15/2023  On 3 L O2 cannula oxygen  No acute distress      Past Medical History reviewed and unchanged from Admission History and Physical       Objective:     Physical Exam     Vitals:      Last 24hrs VS reviewed since prior progress note. Most recent are:    Visit Vitals  /71   Pulse 75   Temp 99.6 °F (37.6 °C)   Resp 15   Ht 5' 7.99\" (1.727 m)   Wt 99 kg (218 lb 4.1 oz)   SpO2 94%   Breastfeeding No   BMI 33.19 kg/m²     SpO2 Readings from Last 6 Encounters:   23 94%   23 100%   23 96%   23 99%   22 95%   10/19/22 97%    O2 Flow Rate (L/min): 3 l/min     Intake/Output Summary (Last 24 hours) at 2023 9815  Last data filed at 2023 0850  Gross per 24 hour   Intake 1040 ml   Output 1525 ml   Net -485 ml        Physical Exam:   General appearance: no distress, mildly obese  Head: Normocephalic, without obvious abnormality, atraumatic  Eyes: negative. Neck: supple, symmetrical, trachea midline and no adenopathy  Lungs: Crackles at right base, otherwise few scattered rhonchi  Heart: regular rate and rhythm, S1, S2 normal, no murmur, click, rub or gallop  Abdomen: soft, non-tender. Bowel sounds normal. No masses,  no organomegaly  Extremities: extremities normal, atraumatic, no cyanosis or edema  Pulses: 2+ and symmetric  Skin: Skin color, texture, turgor normal. No rashes or lesions  Lymph nodes: Cervical, supraclavicular, and axillary nodes normal.  Neurologic: Awake and alert, nonfocal    XR CHEST PORT   Final Result   Improved bibasilar atelectasis. No acute findings      DUPLEX UPPER EXT VENOUS LEFT   Final Result      XR CHEST PORT   Final Result      Slightly increased bibasilar airspace disease.  No other significant change. XR CHEST PORT   Final Result   No significant change. XR CHEST PORT   Final Result   Mild bibasilar atelectasis, improved. XR CHEST PORT   Final Result   Endotracheal tube and slightly more satisfactory position 3.4 cm above the   karthik. Bibasilar airspace disease persists. XR CHEST PORT   Final Result   Endotracheal tube slightly low in position 2 cm above the karthik, could be   retracted 2 cm for more optimal positioning. Bibasilar airspace disease right   greater than left, without significant change. CT CHEST WO CONT   Final Result   Endotracheal tube and nasogastric tube are in satisfactory position. Dense right   lower lobe consolidation may represent pneumonia or aspiration. Bilateral   dependent atelectasis. Stable incidental findings as detailed above. CT HEAD WO CONT   Final Result   No acute intracranial process. XR CHEST PORT   Final Result      Endotracheal tube and nasogastric tube appear to be in satisfactory position. Diminished lung volumes with moderately severe pulmonary edema pattern.              Lab Data Reviewed: (see below)      Medications:  Current Facility-Administered Medications   Medication Dose Route Frequency    albuterol-ipratropium (DUO-NEB) 2.5 MG-0.5 MG/3 ML  3 mL Nebulization Q4H PRN    doxycycline (VIBRAMYCIN) capsule 100 mg  100 mg Oral Q12H    furosemide (LASIX) injection 40 mg  40 mg IntraVENous DAILY    clonazePAM (KlonoPIN) tablet 0.5 mg  0.5 mg Per NG tube TID    QUEtiapine (SEROquel) tablet 300 mg  300 mg Per NG tube QHS    gabapentin (NEURONTIN) capsule 600 mg  600 mg Per NG tube QID    ceFAZolin (ANCEF) 2 g in sterile water (preservative free) 20 mL IV syringe  2 g IntraVENous Q8H    famotidine (PF) (PEPCID) 20 mg in 0.9% sodium chloride 10 mL injection  20 mg IntraVENous Q12H    dexmedeTOMidine in 0.9 % NaCl (PRECEDEX) 400 mcg/100 mL (4 mcg/mL) infusion soln  0.1-1.5 mcg/kg/hr IntraVENous TITRATE NOREPINephrine (LEVOPHED) 8 mg in 0.9% NS 250ml infusion  0.5-30 mcg/min IntraVENous TITRATE    sodium chloride (NS) flush 5-40 mL  5-40 mL IntraVENous Q8H    sodium chloride (NS) flush 5-40 mL  5-40 mL IntraVENous PRN    polyethylene glycol (MIRALAX) packet 17 g  17 g Oral DAILY PRN    ondansetron (ZOFRAN ODT) tablet 4 mg  4 mg Oral Q8H PRN    Or    ondansetron (ZOFRAN) injection 4 mg  4 mg IntraVENous Q6H PRN    enoxaparin (LOVENOX) injection 40 mg  40 mg SubCUTAneous DAILY    aspirin chewable tablet 81 mg  81 mg Per NG tube DAILY    acetaminophen (TYLENOL) solution 650 mg  650 mg Per NG tube Q4H PRN    Or    acetaminophen (TYLENOL) suppository 650 mg  650 mg Rectal Q6H PRN       ______________________________________________________________________      Lab Review:     Recent Labs     02/14/23  0530   WBC 6.1   HGB 11.3*   HCT 34.6*          Recent Labs     02/15/23  0338      K 4.3      CO2 30   *   BUN 25*   CREA 0.68   CA 8.3*   MG 2.2   PHOS 4.3   ALB 1.9*       No components found for: [place]  Recent Labs     02/14/23  0339   PH 7.53*   PCO2 38   PO2 66*   HCO3 31*   FIO2 40       No results for input(s): INR, INREXT, INREXT, INREXT in the last 72 hours. Other pertinent lab:          Assessment & Plan:      Patient is a 51-year-old  female with a history of obesity, hypothyroidism, seizure disorder, GERD, and PTSD who presented to the hospital after being found unresponsive and was experiencing acute hypoxemic respiratory failure. Plan:      1.)  Acute hypoxemic respiratory failure  -Unclear etiology, certainly has a right lower lobe likely aspiration pneumonia. Could have had a drug overdose at home on home medications.   -UDS positive for benzos on admission    Extubated 2/15/2023  Tolerated well  Now on 3 L nasal cannula oxygen  No acute distress     2.)  Acute metabolic encephalopathy  -Suspect secondary to acute hypoxemic respiratory failure and pneumonia  -CT head negative on admission  -UDS positive for benzos  Reduce sedation as tolerated  If she does not wake up completely she would benefit from neurology consultation. 3.)  Shock  -Suspect sepsis from pneumonia, lactate level slowly improving  -TTE done on 2/10/2023 showing an EF of 40 to 45%. Right ventricular systolic pressure of 22.  -Currently off Levophed, yesterday she was at 5 mcg/min, weaned for goal MAP greater than 65 mmHg  -Blood/sputum cultures are pending, on broad antibiotics with IV cefepime/vancomycin, ID consulted. Biotics have been adjusted to meropenem. 4.)  Acute kidney injury  Improved    5.)  Lactic acidosis  -Lactate level 5.2 on admission  -Likely secondary to shock, continue to maintain MAP greater than 65 mmHg with vasopressor support  -Serum bicarb level normal today     6.)  NSTEMI  -Troponin level elevated at 269 on admission.  -Cardiology input appreciated  -Echocardiogram as above. CODE STATUS: Full Code     Prophylaxis:  Stress Ulcer Protocol Active: Yes  Deep Vein Thrombosis Protocol Active: Yes  Nutrition: She is started on tube feeds at 50 mL/h. Activity: bedrest    Stable for transfer out of ICU     Case discussed in detail with RN, RT, and care team  Thank you for involving me in the care of this patient  I will follow with you closely during hospitalization    Time spent more than 30 minutes under patient care with no overlap reviewing results and records, decision making, and answering questions.     Vita Hardy MD  [facility] ([facility])

## 2023-02-16 NOTE — PROGRESS NOTES
Progress Note    Patient: Felipe Gallegos MRN: [id number]  SSN: [id number]    YOB: 1965  Age: 62 y.o. Sex: female      Admit Date: 2/10/2023    LOS: 6 days     Subjective:   Patient followed for sepsis with suspected aspiration pneumonia RLL. Sputum grew MSSA. Patient was switched to Cefazolin. She still has low grade temperatures with normal WBC but procal and CRP are increasing. CXR is improving. Patient remains in the ICU, resting comfortably with mild cough. Objective:     Vitals:    02/16/23 0800 02/16/23 0900 02/16/23 1000 02/16/23 1100   BP: 113/73 112/71 (!) 141/116 108/72   Pulse: 84 78 89 77   Resp: 14 17 14 15   Temp:    100 °F (37.8 °C)   SpO2: 92% 92% 94% 94%   Weight:       Height:            Intake and Output:  Current Shift: 02/16 0701 - 02/16 1900  In: 240 [P.O.:240]  Out: -   Last three shifts: 02/14 1901 - 02/16 0700  In: 1400 [P.O.:800]  Out: 4225 [Urine:4225]    Physical Exam:   Vitals and nursing note reviewed. Constitutional:       Appearance: She is ill-appearing. HENT: Nasal cannula     Head: Normocephalic and atraumatic. Right Ear: External ear normal.      Left Ear: External ear normal.      Mouth/Throat:    Eyes: open  Cardiovascular:      Rate and Rhythm: Normal rate and regular rhythm. Heart sounds: No murmur heard. Pulmonary:      Effort: Respiratory distress present. Breath sounds: Rhonchi present. No wheezing or rales. Abdominal:      General: Bowel sounds are normal. There is no distension. Palpations: Abdomen is soft. Tenderness: There is no abdominal tenderness. There is no guarding. Genitourinary:     Comments: Mohamud catheter     Musculoskeletal:      Right lower leg: No edema. Left lower leg: No edema. Comments: Right femoral triple lumen catheter   Skin:     Findings: No rash.    Neurological: nonfocal, no confusion  Psychiatric: normal behavior    Lab/Data Review:     WBC 6,100    CRP 10.00 <8.03 <5.74 <8.74 < 7.00 <7.57 <7.99  Procal  0.26 <0.21 <0.57 <0.94 <0.99    Blood cultures (2/10) No growth 5 days  Sputum culture (2/10) MSSA Staphylococcus aureus FINAL    CXR (2/16) Improved bibasilar atelectasis. No acute findings          Assessment:     Active Problems:    Severe sepsis ([address] (2/10/2023)      Acute respiratory failure with hypoxia ([facility]) (2/10/2023)  Probable aspiration pneumonia, RLL, sputum culture growing MSSA Staphylococcus aureus, now on IV Cefazolin, Day #4  Sepsis with elevated CRP and procalcitonin, worsening  Acute hypoxic respiratory failure, extubated  Seizure activity with post-ictal period  5. Augmentin allergy    Comment: WBC remains normal but CRP has been steadily increasing and procal is increasing. I have been concerned about the  narrow spectrum of coverage  with Cefazolin for aspiration pneumonia. Plan:      1. Discontinue IV Cefazolin  2. Start IV Meropenem  3. In am, repeat CRP and procal  4.   Follow-up blood cultures          Signed By: Marvin Forte MD     February 16, 2023

## 2023-02-17 LAB
ANION GAP SERPL CALC-SCNC: 3 MMOL/L (ref 5–15)
BUN SERPL-MCNC: 30 MG/DL (ref 6–20)
BUN/CREAT SERPL: 40 (ref 12–20)
CA-I BLD-MCNC: 9 MG/DL (ref 8.5–10.1)
CHLORIDE SERPL-SCNC: 104 MMOL/L (ref 97–108)
CO2 SERPL-SCNC: 31 MMOL/L (ref 21–32)
CREAT SERPL-MCNC: 0.75 MG/DL (ref 0.55–1.02)
CRP SERPL-MCNC: 7.26 MG/DL (ref 0–0.6)
ERYTHROCYTE [DISTWIDTH] IN BLOOD BY AUTOMATED COUNT: 14.5 % (ref 11.5–14.5)
GLUCOSE SERPL-MCNC: 100 MG/DL (ref 65–100)
HCT VFR BLD AUTO: 38 % (ref 35–47)
HGB BLD-MCNC: 12.3 G/DL (ref 11.5–16)
MCH RBC QN AUTO: 27.6 PG (ref 26–34)
MCHC RBC AUTO-ENTMCNC: 32.4 G/DL (ref 30–36.5)
MCV RBC AUTO: 85.4 FL (ref 80–99)
NRBC # BLD: 0 K/UL (ref 0–0.01)
NRBC BLD-RTO: 0 PER 100 WBC
PLATELET # BLD AUTO: 372 K/UL (ref 150–400)
PMV BLD AUTO: 10 FL (ref 8.9–12.9)
POTASSIUM SERPL-SCNC: 3.3 MMOL/L (ref 3.5–5.1)
PROCALCITONIN SERPL-MCNC: 0.08 NG/ML
RBC # BLD AUTO: 4.45 M/UL (ref 3.8–5.2)
SODIUM SERPL-SCNC: 138 MMOL/L (ref 136–145)
WBC # BLD AUTO: 6 K/UL (ref 3.6–11)

## 2023-02-17 PROCEDURE — 74011000258 HC RX REV CODE- 258: Performed by: INTERNAL MEDICINE

## 2023-02-17 PROCEDURE — 86140 C-REACTIVE PROTEIN: CPT

## 2023-02-17 PROCEDURE — 97530 THERAPEUTIC ACTIVITIES: CPT

## 2023-02-17 PROCEDURE — 99232 SBSQ HOSP IP/OBS MODERATE 35: CPT | Performed by: INTERNAL MEDICINE

## 2023-02-17 PROCEDURE — 74011000250 HC RX REV CODE- 250: Performed by: INTERNAL MEDICINE

## 2023-02-17 PROCEDURE — 36415 COLL VENOUS BLD VENIPUNCTURE: CPT

## 2023-02-17 PROCEDURE — 65270000029 HC RM PRIVATE

## 2023-02-17 PROCEDURE — 74011250636 HC RX REV CODE- 250/636: Performed by: HOSPITALIST

## 2023-02-17 PROCEDURE — 74011000250 HC RX REV CODE- 250: Performed by: HOSPITALIST

## 2023-02-17 PROCEDURE — 74011250637 HC RX REV CODE- 250/637: Performed by: INTERNAL MEDICINE

## 2023-02-17 PROCEDURE — 84145 PROCALCITONIN (PCT): CPT

## 2023-02-17 PROCEDURE — 74011250636 HC RX REV CODE- 250/636: Performed by: INTERNAL MEDICINE

## 2023-02-17 PROCEDURE — 77010033678 HC OXYGEN DAILY

## 2023-02-17 PROCEDURE — 74011250637 HC RX REV CODE- 250/637: Performed by: HOSPITALIST

## 2023-02-17 PROCEDURE — 97162 PT EVAL MOD COMPLEX 30 MIN: CPT

## 2023-02-17 PROCEDURE — 97165 OT EVAL LOW COMPLEX 30 MIN: CPT

## 2023-02-17 PROCEDURE — 85027 COMPLETE CBC AUTOMATED: CPT

## 2023-02-17 PROCEDURE — 80048 BASIC METABOLIC PNL TOTAL CA: CPT

## 2023-02-17 RX ADMIN — CLONAZEPAM 0.5 MG: 1 TABLET ORAL at 09:45

## 2023-02-17 RX ADMIN — SODIUM CHLORIDE, PRESERVATIVE FREE 10 ML: 5 INJECTION INTRAVENOUS at 14:44

## 2023-02-17 RX ADMIN — CLONAZEPAM 0.5 MG: 1 TABLET ORAL at 17:21

## 2023-02-17 RX ADMIN — SODIUM CHLORIDE, PRESERVATIVE FREE 10 ML: 5 INJECTION INTRAVENOUS at 22:25

## 2023-02-17 RX ADMIN — DOXYCYCLINE HYCLATE 100 MG: 100 CAPSULE ORAL at 22:14

## 2023-02-17 RX ADMIN — CLONAZEPAM 0.5 MG: 1 TABLET ORAL at 22:13

## 2023-02-17 RX ADMIN — MEROPENEM 1 G: 1 INJECTION, POWDER, FOR SOLUTION INTRAVENOUS at 22:20

## 2023-02-17 RX ADMIN — DOXYCYCLINE HYCLATE 100 MG: 100 CAPSULE ORAL at 09:45

## 2023-02-17 RX ADMIN — ENOXAPARIN SODIUM 40 MG: 100 INJECTION SUBCUTANEOUS at 09:45

## 2023-02-17 RX ADMIN — GABAPENTIN 600 MG: 300 CAPSULE ORAL at 14:42

## 2023-02-17 RX ADMIN — FUROSEMIDE 40 MG: 10 INJECTION, SOLUTION INTRAMUSCULAR; INTRAVENOUS at 09:45

## 2023-02-17 RX ADMIN — ACETAMINOPHEN 650 MG: 160 SOLUTION ORAL at 22:13

## 2023-02-17 RX ADMIN — MEROPENEM 1 G: 1 INJECTION, POWDER, FOR SOLUTION INTRAVENOUS at 14:42

## 2023-02-17 RX ADMIN — QUETIAPINE FUMARATE 300 MG: 100 TABLET ORAL at 22:13

## 2023-02-17 RX ADMIN — GABAPENTIN 600 MG: 300 CAPSULE ORAL at 17:21

## 2023-02-17 RX ADMIN — GABAPENTIN 600 MG: 300 CAPSULE ORAL at 09:45

## 2023-02-17 RX ADMIN — SODIUM CHLORIDE, PRESERVATIVE FREE 20 MG: 5 INJECTION INTRAVENOUS at 22:14

## 2023-02-17 RX ADMIN — SODIUM CHLORIDE, PRESERVATIVE FREE 10 ML: 5 INJECTION INTRAVENOUS at 05:55

## 2023-02-17 RX ADMIN — MEROPENEM 1 G: 1 INJECTION, POWDER, FOR SOLUTION INTRAVENOUS at 05:55

## 2023-02-17 RX ADMIN — SODIUM CHLORIDE, PRESERVATIVE FREE 20 MG: 5 INJECTION INTRAVENOUS at 09:45

## 2023-02-17 RX ADMIN — ASPIRIN 81 MG CHEWABLE TABLET 81 MG: 81 TABLET CHEWABLE at 09:45

## 2023-02-17 RX ADMIN — SODIUM CHLORIDE, PRESERVATIVE FREE 10 ML: 5 INJECTION INTRAVENOUS at 22:14

## 2023-02-17 RX ADMIN — GABAPENTIN 600 MG: 300 CAPSULE ORAL at 22:25

## 2023-02-17 NOTE — PROGRESS NOTES
Spiritual Care Assessment/Progress Note  [facility]      NAME: Stacy Banda      MRN: [id number]  AGE: 62 y.o. SEX: female  Buddhist Affiliation: Other   Language: English     2/17/2023     Total Time (in minutes): 16     Spiritual Assessment begun in [facility] through conversation with:         [x]Patient        [] Family    [] Friend(s)        Reason for Consult: Initial/Spiritual assessment, patient floor     Spiritual beliefs: (Please include comment if needed)     [] Identifies with a rakel tradition:         [] Supported by a rakel community:            [x] Claims no spiritual orientation:           [] Seeking spiritual identity:                [] Adheres to an individual form of spirituality:           [] Not able to assess:                           Identified resources for coping:      [] Prayer                               [] Music                  [] Guided Imagery     [x] Family/friends                 [] Pet visits     [] Devotional reading                         [] Unknown     [] Other:                                               Interventions offered during this visit: (See comments for more details)    Patient Interventions: Affirmation of emotions/emotional suffering, Initial/Spiritual assessment, patient floor           Plan of Care:     [] Support spiritual and/or cultural needs    [] Support AMD and/or advance care planning process      [] Support grieving process   [] Coordinate Rites and/or Rituals    [] Coordination with community clergy   [] No spiritual needs identified at this time   [] Detailed Plan of Care below (See Comments)  [] Make referral to Music Therapy  [] Make referral to Pet Therapy     [] Make referral to Addiction services  [] Make referral to [facility]  [] Make referral to Spiritual Care Partner  [] No future visits requested        [x] Contact Spiritual Care for further referrals     Comments: I visited Ms. Serrato for a spiritual assessment. Ms. Erma Kenny shared her desire to go home. She also shared the circumstances surrounding her recent health challenge. Ms. Erma Kenny is supported by her partner and her son. She shared her son will assist her with her basic daily responsibilities when she gets home which alleviates her stress of having to try to do everything by herself. Ms. Erma Kenny primary goal is to walk again. Ms. Erma Kenny afshin by taking one day at a time. She does not identify with any rakel affiliation. I provided spiritual care and offered words of hope and encouragement. Ms. Erma Kenny was appreciate for the visit. I informed Ms. Serrato of the availability for Spiritual Care is she desires a visit with a . Rev.  Ginger Albrecht, [address]

## 2023-02-17 NOTE — PROGRESS NOTES
OCCUPATIONAL THERAPY EVALUATION  Patient: Glenora Hodgkins (64 y.o. female)  Date: 2/17/2023  Primary Diagnosis: Severe sepsis ([address] [A41.9, R65.20]  Acute respiratory failure with hypoxia (HCC) [J96.01]       Precautions: fall risk, Ax2     In place during session: Peripheral IV, Nasal Cannula 2L, External Catheter, and EKG/telemetry     ASSESSMENT  Pt is a 62 y.o. female presenting to [facility] with c/o being found unresponsive by her roommate, admitted 2/10/23 and currently being treated for community acquired pneumonia, sever sepsis, acute respiratory failure with hypoxia and hypercapnia, elevated troponin, hypothyroidism, and seizure disorder. See below for home and PLOF information. Pt received semi-supine in bed upon arrival, AXO x4, and agreeable to OT evaluation. Based on current observations, pt presents with deficits in generalized strength/AROM, bed mobility, static/dynamic sitting balance, static/dynamic standing balance (see PT note for gait details), functional activity tolerance, cognition/confusion, and decreased safety awareness currently impacting overall performance of ADLs and functional transfers/mobility (see below for objective details and assist levels). Overall, pt tolerates session fair with pt completing bed mobility, sit<>stand transfers, ambulating to and from bathroom, and completing toileting routine with min-mod Ax2 and additional time overall. Socks donned while seated EOB with total A with pt demo'ing difficulty reaching down and threading toes through socks. Pt req'd VC to push from/reach back for bed during sit<>stand transfers. Cuing req'd to stay inside the RW during ambulation. VC req'd for pt to feel toilet behind knees and use grab bar during toilet transfer. Rubi care completed with CGA while seated on toilet. Hand washing simulated using hand  with set up A while supine in bed.  Pt would benefit from continued skilled OT services to address current impairments and improve IND and safety with self cares and functional transfers/mobility. Current OT d/c recommendation [facility]  once medically appropriate. Other factors to consider for discharge: family/social support, DME, time since onset, severity of deficits, functional baseline     Patient will benefit from skilled therapy intervention to address the above noted impairments. PLAN :  Recommendations and Planned Interventions: self care training, functional mobility training, therapeutic exercise, balance training, therapeutic activities, cognitive retraining, endurance activities, patient education, and home safety training    Recommend with staff: Encourage HEP in prep for ADLs/mobility and Amb to bathroom for toileting with gt belt and AD    Recommend next session: Toileting and Standing grooming    Frequency/Duration: Patient will be followed by occupational therapy:  3-5x/week to address goals. Recommendation for discharge: (in order for the patient to meet his/her long term goals)  [address]     This discharge recommendation:  Has been made in collaboration with the attending provider and/or case management    IF patient discharges home will need the following DME: AE: long handled bathing, AE: long handled dressing, shower chair, and walker: rolling       SUBJECTIVE:   Patient stated Is this normal for things to be this hard?     OBJECTIVE DATA SUMMARY:   HISTORY:   Past Medical History:   Diagnosis Date    DDD (degenerative disc disease), lumbar     Fatigue     GERD (gastroesophageal reflux disease)     Hypothyroidism     Psychiatric disorder     PTSD per patient    Seizures ([facility] [address]      Past Surgical History:   Procedure Laterality Date    HX APPENDECTOMY      HX  SECTION      HX CHOLECYSTECTOMY      HX HYSTERECTOMY         Per pt:   Home Situation  Home Environment: Private residence  # Steps to Enter: 4  Rails to Enter: Yes  [employer] : Bilateral  One/Two Story Residence: One story  Living Alone: No  Support Systems: Spouse/Significant Other  Patient Expects to be Discharged to[de-identified] Home  Current DME Used/Available at Home: None    Hand dominance: Right    EXAMINATION OF PERFORMANCE DEFICITS:  Cognitive/Behavioral Status:  Neurologic State: Alert  Orientation Level: Oriented X4  Cognition: Follows commands    Hearing: Auditory  Auditory Impairment: None    Range of Motion:  AROM: Generally decreased, functional    Strength:  Strength: Generally decreased, functional    Coordination:  Fine Motor Skills-Upper: Left Intact; Right Intact    Gross Motor Skills-Upper: Left Intact; Right Intact    Tone & Sensation:  Tone: Normal    Balance:  Sitting: Intact; With support  Standing: Pull to stand; With support    Functional Mobility and Transfers for ADLs:  Bed Mobility:  Rolling: Contact guard assistance  Supine to Sit: Minimum assistance  Sit to Supine: Minimum assistance  Scooting: Minimum assistance    Transfers:  Sit to Stand: Moderate assistance;Assist x1;Assist x2  Stand to Sit: Moderate assistance;Assist x1;Assist x2  Bathroom Mobility: Moderate assistance (Ax2)  Toilet Transfer : Moderate assistance      ADL Intervention and task modifications:  Grooming  Position Performed: Other (comment) (Semi supine)  Washing Hands: Set-up (Simulated with hand )    Lower Body Dressing Assistance  Socks: Total assistance (dependent)  Position Performed: Seated edge of bed    Toileting  Bladder Hygiene: Contact guard assistance    Therapeutic Exercise:  Pt would benefit from UE HEP in prep for ADLs and functional mobility/transfers. Functional Measure:    Teresita Ramírez AM-PACKENZIE \"6 Clicks\"                                                       Daily Activity Inpatient Short Form  How much help from another person does the patient currently need. .. Total; A Lot A Little None   1. Putting on and taking off regular lower body clothing?  [x]  1 []  2 []  3 []  4   2. Bathing (including washing, rinsing, drying)? [x]  1 []  2 []  3 []  4   3. Toileting, which includes using toilet, bedpan or urinal? [] 1 [x]  2 []  3 []  4   4. Putting on and taking off regular upper body clothing? []  1 []  2 [x]  3 []  4   5. Taking care of personal grooming such as brushing teeth? []  1 []  2 [x]  3 []  4   6. Eating meals? []  1 []  2 []  3 [x]  4   © , Trustees of [facility], under license to [employer]. All rights reserved     Score:      Interpretation of Tool:  Represents clinically-significant functional categories (i.e. Activities of daily living). Percentage of Impairment CH    0%   CI    1-19% CJ    20-39% CK    40-59% CL    60-79% CM    80-99% CN     100%   [facility]  Score 6-24 24 23 20-22 15-19 10-14 7-9 6     Occupational Therapy Evaluation Charge Determination   History Examination Decision-Making   LOW Complexity : Brief history review  MEDIUM Complexity : 3-5 performance deficits relating to physical, cognitive , or psychosocial skils that result in activity limitations and / or participation restrictions MEDIUM Complexity : Patient may present with comorbidities that affect occupational performnce. Miniml to moderate modification of tasks or assistance (eg, physical or verbal ) with assesment(s) is necessary to enable patient to complete evaluation       Based on the above components, the patient evaluation is determined to be of the following complexity level: LOW     Pain Ratin/10    Activity Tolerance:   Fair and requires rest breaks    After treatment patient left in no apparent distress:    Supine in bed, Call bell within reach, Bed / chair alarm activated, and Side rails x 3, bed locked and in lowest position    COMMUNICATION/EDUCATION:   The patients plan of care was discussed with: Physical therapist and Registered nurse. Patient/family agree to work toward stated goals and plan of care.     This patients plan of care is appropriate for delegation to [facility]. PT/OT sessions occurred together for increased safety of pt and clinician. Thank you for this referral.  Phuc Jon OT  Time Calculation: 26 mins    Problem: Self Care Deficits Care Plan (Adult)  Goal: *Acute Goals and Plan of Care (Insert Text)  Description:   FUNCTIONAL STATUS PRIOR TO ADMISSION: Patient was independent and active without use of DME. Patient was independent for basic and instrumental ADLs. HOME SUPPORT: The patient lived with a friend but did not require assist.    Occupational Therapy Goals  Initiated 2/17/2023  Patient Goal: Get stronger, more IND, and go home. 1.  Patient will perform lower body dressing with independence within 7 day(s). 2.  Patient will perform grooming with independence within 7 day(s). 3.  Patient will perform bathing with independence within 7 day(s). 4.  Patient will perform toilet transfers with independence within 7 day(s). 5.  Patient will perform all aspects of toileting with independence within 7 day(s). 6.  Patient will participate in upper extremity therapeutic exercise/activities with independence within 7 day(s).     Outcome: Not Met

## 2023-02-17 NOTE — PROGRESS NOTES
[facility] Hospitalist Progress Note  Dominik Clemons MD  Date:2/17/2023       Room:[facility]  Patient Name:Haydee Serrato     YOB: 1965     Age:57 y.o.    60F,  with PMH of hypothyroidism and seizures, PTSD with acute encephalopathy s/t aspiration pneumonia. 2/10/23 admission and hospital course  Per roommate she has been sleeping since yesterday morning, and was unable to wake her up. EMS arrived and patient was hypoxic in the 30s, non rebreather placed on patient improved to 70s, narcan given with no change. Patient was then intubated in the field. Otherwise no additional history obtained as patient was intubated and sedated. CXR showed pulmonary edema and CT chest showed RLL pneumonia, annd this was the likely cause of septic shock. She was started on levophed and precedex. ECHO showed EF40-45%. Cardiology was consulted for MI typII. On 2/12 she was off of levophed. 2/10/23 echocardiogram    Left Ventricle: Reduced left ventricular systolic function with a visually estimated EF of 40 - 45%. Not well visualized. Left ventricle size is normal. Mildly increased wall thickness. Unable to assess wall motion. Abnormal diastolic function. Mitral Valve: Mild regurgitation. Tricuspid Valve: The estimated RVSP is 22 mmHg. Left Atrium: Left atrium is mildly dilated. 2/14/23:  Patient seen and examined, chart was reviewed. Case discussed with nursing staff in ICU. Patient on vent support with sedation. BP holding well without pressors. No other acute issues reported to me by staff at this time. 2/15/23:  Patient seen and examined, chart was reviewed. Patient seen with nursing in the room. Patient currently off sedation alert on vent support. SBT in process. Low-grade intermittent fevers per chart.     2/16/23 liberated from ventilator yesterday  No new complaints, tolerating medications well  Transferring out of ICU today    2/17/23 no new complaints, tolerating medications well  Oxygen requirements improved, now on [facility]    Subjective    Subjective:  Symptoms:  Stable. Review of Systems   All other systems reviewed and are negative. Objective         Vitals Last 24 Hours:  TEMPERATURE:  Temp  Av.3 °F (37.4 °C)  Min: 98.5 °F (36.9 °C)  Max: 100 °F (37.8 °C)  RESPIRATIONS RANGE: Resp  Av.7  Min: 14  Max: 19  PULSE OXIMETRY RANGE: SpO2  Av.8 %  Min: 90 %  Max: 94 %  PULSE RANGE: Pulse  Av.8  Min: 75  Max: 89  BLOOD PRESSURE RANGE: Systolic (54AMI), KQS:384 , Min:108 , SVP:309   ; Diastolic (74JTL), FFB:18, Min:69, Max:116    I/O (24Hr): Intake/Output Summary (Last 24 hours) at 2023 0900  Last data filed at 2023 1418  Gross per 24 hour   Intake --   Output 230 ml   Net -230 ml       Objective:  General Appearance:  Comfortable. Vital signs: (most recent): Blood pressure 124/73, pulse 75, temperature 98.9 °F (37.2 °C), resp. rate 16, height 5' 7.99\" (1.727 m), weight 99 kg (218 lb 4.1 oz), SpO2 93 %, not currently breastfeeding. HEENT: Normal HEENT exam.    Lungs:  Normal effort. Heart: Normal rate. Regular rhythm. Abdomen: Abdomen is soft. Bowel sounds are normal.   There is no abdominal tenderness. Extremities: Normal range of motion. Pulses: Distal pulses are intact. Neurological: Patient is alert and oriented to person, place and time. Pupils:  Pupils are equal, round, and reactive to light. Skin:  Warm and dry. Labs/Imaging/Diagnostics    Labs:  CBC:  No results for input(s): WBC, RBC, HGB, HCT, MCV, RDW, PLT, HGBEXT, HCTEXT, PLTEXT, HGBEXT, HCTEXT, PLTEXT in the last 72 hours. CHEMISTRIES:  Recent Labs     02/15/23  0338      K 4.3      CO2 30   BUN 25*   CA 8.3*   PHOS 4.3   MG 2.2     PT/INR:No results for input(s): INR, INREXT, INREXT in the last 72 hours. No lab exists for component: PROTIME  APTT:No results for input(s): APTT in the last 72 hours.   LIVER PROFILE:No results for input(s): AST, ALT in the last 72 hours. No lab exists for component: BILIDIR, BILITOT, ALKPHOS  Lab Results   Component Value Date/Time    ALT (SGPT) 580 (H) 02/11/2023 03:50 AM    AST (SGOT) 278 (H) 02/11/2023 03:50 AM    Alk. phosphatase 84 02/11/2023 03:50 AM    Bilirubin, direct 0.1 02/11/2023 03:50 AM    Bilirubin, total 0.3 02/11/2023 03:50 AM       Imaging Last 24 Hours:  No results found. Assessment//Plan   Active Problems:    Severe sepsis ([address] (2/10/2023)      Acute respiratory failure with hypoxia (HCC) (2/10/2023)  CT Results  (Last 48 hours)      None            Assessment & Plan  57F,  with PMH of hypothyroidism and seizures, PTSD with acute encephalopathy s/t aspiration pneumonia. 2/10/23 admission and hospital course  Per roommate she has been sleeping since yesterday morning, and was unable to wake her up. EMS arrived and patient was hypoxic in the 30s, non rebreather placed on patient improved to 70s, narcan given with no change. Patient was then intubated in the field. Otherwise no additional history obtained as patient was intubated and sedated. CXR showed pulmonary edema and CT chest showed RLL pneumonia, annd this was the likely cause of septic shock. She was started on levophed and precedex. ECHO showed EF40-45%. Cardiology was consulted for MI typII. On 2/12 she was off of levophed. 2/10/23 echocardiogram    Left Ventricle: Reduced left ventricular systolic function with a visually estimated EF of 40 - 45%. Not well visualized. Left ventricle size is normal. Mildly increased wall thickness. Unable to assess wall motion. Abnormal diastolic function. Mitral Valve: Mild regurgitation. Tricuspid Valve: The estimated RVSP is 22 mmHg. Left Atrium: Left atrium is mildly dilated. 2/14/23:  Patient seen and examined, chart was reviewed. Case discussed with nursing staff in ICU. Patient on vent support with sedation. BP holding well without pressors.   No other acute issues reported to me by staff at this time. 2/15/23:  Patient seen and examined, chart was reviewed. Patient seen with nursing in the room. Patient currently off sedation alert on vent support. SBT in process. Low-grade intermittent fevers per chart. 2/16/23 liberated from ventilator yesterday  No new complaints, tolerating medications well  Transferring out of ICU today    2/17/23 no new complaints, tolerating medications well  Oxygen requirements improved, now on [facility]    MICROBIOLOGY    2/10/23 Blood  Negative  2/10/23 Respiratory Staphylococcus aureus    ASSESSMENT AND PLAN    1) Acute hypoxic respiratory failure in the setting of aspiration syndrome, respiratory cultures as above with MSSA. Liberated from ventilator 2/15/23. Supportive care with respiratory support as needed   Bronchodilators   Meropenem and doxycycline    2) Cardiovascular issues including tyoe II NSTMI, heart failure with moderately reduced ejection fraction.      Telemetry monitoring   Echocardiogram as above   Aspirin   Furosemide    3) DVT prophylaxis with enoxaparin        Electronically signed by Juan Riley MD on 2/17/2023 at 8:28 AM

## 2023-02-17 NOTE — PROGRESS NOTES
PHYSICAL THERAPY EVALUATION  Patient: Wilmer Gutierrez (86 y.o. female)  Date: 2/17/2023  Primary Diagnosis: Severe sepsis ([facility] [address] [A41.9, R65.20]  Acute respiratory failure with hypoxia ([facility] [address] [J96.01]       Precautions: Fall    In place during session: Peripheral IV    ASSESSMENT  Pt is a 62 y.o. female admitted on 2/10/2023 for hypoxia; pt currently being treated for intubation,PLL PNA,septic shoke . Pt semi supine upon PT arrival, agreeable to evaluation. Pt A&O x 4. Based on the objective data described, the patient presents with generalized weakness, impaired functional mobility, impaired amb, impaired balance, and decreased endurance to activities. Patient is on 2L o2 (See below for objective details and assist levels). Overall pt tolerated session fair today with therapy. Pt will benefit from continued skilled PT to address above deficits and return to PLOF. Current PT DC recommendation [facility] once medically appropriate. Current Level of Function Impacting Discharge (mobility/balance): patient required min to [facility] for standing and gait mobility. Patient was indep PLOF    Other factors to consider for discharge: SNF      PLAN :  Recommendations and Planned Interventions: bed mobility training, transfer training, gait training, therapeutic exercises, patient and family training/education, and therapeutic activities      Recommend for staff: Out of bed to chair for meals and Amb to bathroom for toileting with gt belt and AD    Frequency/Duration: Patient will be followed by physical therapy:  3-5x/week to address goals.     Recommendation for discharge: (in order for the patient to meet his/her long term goals)  Andre Yao    This discharge recommendation:  Has been made in collaboration with the attending provider and/or case management    IF patient discharges home will need the following DME: bedside commode and rolling walker         SUBJECTIVE:   Patient stated I am better.     OBJECTIVE DATA SUMMARY:   HISTORY:    Past Medical History:   Diagnosis Date    DDD (degenerative disc disease), lumbar     Fatigue     GERD (gastroesophageal reflux disease)     Hypothyroidism     Psychiatric disorder     PTSD per patient    Seizures ([address]      Past Surgical History:   Procedure Laterality Date    HX APPENDECTOMY      HX  SECTION      HX CHOLECYSTECTOMY      HX HYSTERECTOMY         Home Situation  Home Environment: Private residence  # Steps to Enter: 4  Rails to Enter: Yes  Hand Rails : Bilateral  One/Two Story Residence: One story  Living Alone: No  Support Systems: Spouse/Significant Other  Patient Expects to be Discharged to[de-identified] Home  Current DME Used/Available at Home: None    EXAMINATION/PRESENTATION/DECISION MAKING:   Critical Behavior:  Neurologic State: Alert  Orientation Level: Oriented X4  Cognition: Follows commands     Hearing: Auditory  Auditory Impairment: None  Skin:  intact  Edema:   Range Of Motion:  AROM: Generally decreased, functional                       Strength:    Strength: Generally decreased, functional                    Tone & Sensation:   Tone: Normal                                 Functional Mobility:  Bed Mobility:  Rolling: Contact guard assistance  Supine to Sit: Minimum assistance  Sit to Supine: Minimum assistance  Scooting: Minimum assistance  Transfers:  Sit to Stand: Moderate assistance;Assist x1;Assist x2  Stand to Sit: Moderate assistance;Assist x1;Assist x2                       Balance:   Sitting: Intact; With support  Standing: Pull to stand; With support  Ambulation/Gait Training:  Distance (ft): 25 Feet (ft)  Assistive Device: Gait belt;Walker, rolling  Ambulation - Level of Assistance:  Moderate assistance;Assist x1;Assist x2     Gait Description (WDL): Exceptions to [facility]                                        Functional Measure:  [facility] Mobility Inpatient Short Form  How much difficulty does the patient currently have. .. Unable A Lot A Little None   1. Turning over in bed (including adjusting bedclothes, sheets and blankets)? [] 1   [] 2   [x] 3   [] 4   2. Sitting down on and standing up from a chair with arms ( e.g., wheelchair, bedside commode, etc.)   [] 1   [x] 2   [] 3   [] 4   3. Moving from lying on back to sitting on the side of the bed? [] 1   [] 2   [x] 3   [] 4          How much help from another person does the patient currently need. .. Total A Lot A Little None   4. Moving to and from a bed to a chair (including a wheelchair)? [] 1   [x] 2   [] 3   [] 4   5. Need to walk in hospital room? [] 1   [x] 2   [] 3   [] 4   6. Climbing 3-5 steps with a railing? [] 1   [x] 2   [] 3   [] 4   © , Trustees of [address] [place] [address], under license to [employer]. All rights reserved     Score:  Initial:  Most Recent: X (Date: 2023 )   Interpretation of Tool:  Represents activities that are increasingly more difficult (i.e. Bed mobility, Transfers, Gait).   Score 24 23 22-20 19-15 14-10 9-7 6   Modifier CH CI CJ CK CL CM CN         Physical Therapy Evaluation Charge Determination   History Examination Presentation Decision-Making   LOW Complexity : Zero comorbidities / personal factors that will impact the outcome / POC LOW Complexity : 1-2 Standardized tests and measures addressing body structure, function, activity limitation and / or participation in recreation  LOW Complexity : Stable, uncomplicated  Other outcome measures [facility] 6          Based on the above components, the patient evaluation is determined to be of the following complexity level: MEDIUM    Pain Ratin/10     Activity Tolerance:   Fair    After treatment patient left in no apparent distress:   Bed locked and in lowest position Supine in bed, Heels elevated for pressure relief, Call bell within reach, Bed / chair alarm activated, and Side rails x 3     GOALS:  Problem: Mobility Impaired (Adult and Pediatric)  Goal: *Acute Goals and Plan of Care (Insert Text)  Description: FUNCTIONAL STATUS PRIOR TO ADMISSION: Patient was independent and active without use of DME.    HOME SUPPORT PRIOR TO ADMISSION: The patient lived with SO but did not require assist.    Patient stated goal: get better,Go home  Patient will move from supine to sit and sit to supine , scoot up and down, and roll side to side in bed with independence within 7 day(s). Patient will transfer from bed to chair and chair to bed with independence using the least restrictive device within 7 day(s). Patient will improve static standing balance to supervision within 1 week(s). Patient will ambulate 50 feet with modified independence with least restrictive device within 1 weeks. Outcome: Progressing Towards Goal       COMMUNICATION/EDUCATION:   The patients plan of care was discussed with: Occupational therapist, Registered nurse, and Case management. Fall prevention education was provided and the patient/caregiver indicated understanding., Patient/family have participated as able in goal setting and plan of care. , and Patient/family agree to work toward stated goals and plan of care.          Thank you for this referral.  Bertha Arnold, PT   Time Calculation: 26 mins

## 2023-02-17 NOTE — PROGRESS NOTES
Progress Note    Patient: Elias Parr MRN: [id number]  SSN: [id number]    YOB: 1965  Age: 62 y.o. Sex: female      Admit Date: 2/10/2023    LOS: 7 days     Subjective:   Patient followed for sepsis with suspected aspiration pneumonia RLL. Sputum grew MSSA. She is now back on Meropenem. CRP is decreasing today. She is also on Doxycycline. She was transferred out of the ICU. Patient resting comfortably with no new complaints. No cough today. Objective:     Vitals:    02/16/23 2057 02/16/23 2101 02/16/23 2235 02/17/23 0852   BP: 125/80  130/69 124/73   Pulse: 80  79 75   Resp: 18  18 16   Temp: 99.4 °F (37.4 °C)  99 °F (37.2 °C) 98.9 °F (37.2 °C)   SpO2: 93%  90% 93%   Weight:       Height:  5' 7.99\" (1.727 m)          Intake and Output:  Current Shift: No intake/output data recorded. Last three shifts: 02/15 1901 - 02/17 0700  In: 1040 [P.O.:1040]  Out: 830 [Urine:830]    Physical Exam:   Vitals and nursing note reviewed. Constitutional:       Appearance: She is ill-appearing. HENT: Nasal cannula 2 L/min   Eyes: open  Cardiovascular:      Rate and Rhythm: Normal rate and regular rhythm. Heart sounds: No murmur heard. Pulmonary:      Effort: Respiratory distress present. Breath sounds: Rhonchi present. No wheezing or rales. Abdominal:      General: Bowel sounds are normal. There is no distension. Palpations: Abdomen is soft. Tenderness: There is no abdominal tenderness. There is no guarding. Genitourinary:     Comments: Mohamud catheter     Musculoskeletal:      Right lower leg: No edema. Left lower leg: No edema. Comments: Right femoral triple lumen catheter   Skin:     Findings: No rash.    Neurological: nonfocal, no confusion  Psychiatric: normal behavior    Lab/Data Review:     WBC 6,000    CRP 7.26 <10.00 <8.03 <5.74 <8.74 < 7.00 <7.57 <7.99  Procal 0.08 < 0.26 <0.21 <0.57 <0.94 <0.99    Blood cultures (2/10) No growth 5 days  Sputum culture (2/10) MSSA Staphylococcus aureus FINAL    CXR (2/16) Improved bibasilar atelectasis. No acute findings          Assessment:     Active Problems:    Severe sepsis ([address] (2/10/2023)      Acute respiratory failure with hypoxia ([facility]) (2/10/2023)  Probable aspiration pneumonia, RLL, sputum culture growing MSSA Staphylococcus aureus, now on IV Meropenem  Sepsis with elevated CRP and procalcitonin, resolving  Acute hypoxic respiratory failure, extubated  Seizure activity with post-ictal period  5. Augmentin allergy    Comment:  CRP and procal decreasing after switching back to Meropenem. Plan:      1. Continue IV Meropenem; if discharged, oral Doxycycline may be reasonable. 2.  In am repeat CRP and procal  4.   Follow-up blood cultures          Signed By: Maureen Ahmadi MD     February 17, 2023

## 2023-02-17 NOTE — PROGRESS NOTES
8534: Chart reviewed. Per notes, patient on IV ABX (Merrem) and 3L O2 viva NC.    PT/OT evals pending discharge recs.     CM will continue to follow patient and recs of medical team.

## 2023-02-17 NOTE — PROGRESS NOTES
Pulmonary Progress Note      NAME: Ekaterina Marsh   :  1965  MRM:  [id number]    Date/Time: 2023  9:40 AM         Subjective:     Patient seen and examined   Overnight events noted    Transferred out of ICU overnight  Lying in bed comfortably  Awake and alert  Extubated 2/15/2023  On 2 L O2 cannula oxygen  No acute distress      Past Medical History reviewed and unchanged from Admission History and Physical       Objective:     Physical Exam     Vitals:      Last 24hrs VS reviewed since prior progress note. Most recent are:    Visit Vitals  /73 (BP 1 Location: Left upper arm, BP Patient Position: Semi fowlers)   Pulse 75   Temp 98.9 °F (37.2 °C)   Resp 16   Ht 5' 7.99\" (1.727 m)   Wt 99 kg (218 lb 4.1 oz)   SpO2 93%   Breastfeeding No   BMI 33.19 kg/m²     SpO2 Readings from Last 6 Encounters:   23 93%   23 100%   23 96%   23 99%   22 95%   10/19/22 97%    O2 Flow Rate (L/min): 2 l/min     Intake/Output Summary (Last 24 hours) at 2023 1227  Last data filed at 2023 1418  Gross per 24 hour   Intake --   Output 230 ml   Net -230 ml        Physical Exam:   General appearance: no distress, mildly obese  Head: Normocephalic, without obvious abnormality, atraumatic  Eyes: negative. Neck: supple, symmetrical, trachea midline and no adenopathy  Lungs: Crackles at right base, otherwise few scattered rhonchi  Heart: regular rate and rhythm, S1, S2 normal, no murmur, click, rub or gallop  Abdomen: soft, non-tender. Bowel sounds normal. No masses,  no organomegaly  Extremities: extremities normal, atraumatic, no cyanosis or edema  Pulses: 2+ and symmetric  Skin: Skin color, texture, turgor normal. No rashes or lesions  Lymph nodes: Cervical, supraclavicular, and axillary nodes normal.  Neurologic: Awake and alert, nonfocal    XR CHEST PORT   Final Result   Improved bibasilar atelectasis.  No acute findings      DUPLEX UPPER EXT VENOUS LEFT   Final Result      XR CHEST PORT   Final Result      Slightly increased bibasilar airspace disease. No other significant change. XR CHEST PORT   Final Result   No significant change. XR CHEST PORT   Final Result   Mild bibasilar atelectasis, improved. XR CHEST PORT   Final Result   Endotracheal tube and slightly more satisfactory position 3.4 cm above the   karthik. Bibasilar airspace disease persists. XR CHEST PORT   Final Result   Endotracheal tube slightly low in position 2 cm above the karthik, could be   retracted 2 cm for more optimal positioning. Bibasilar airspace disease right   greater than left, without significant change. CT CHEST WO CONT   Final Result   Endotracheal tube and nasogastric tube are in satisfactory position. Dense right   lower lobe consolidation may represent pneumonia or aspiration. Bilateral   dependent atelectasis. Stable incidental findings as detailed above. CT HEAD WO CONT   Final Result   No acute intracranial process. XR CHEST PORT   Final Result      Endotracheal tube and nasogastric tube appear to be in satisfactory position. Diminished lung volumes with moderately severe pulmonary edema pattern.              Lab Data Reviewed: (see below)      Medications:  Current Facility-Administered Medications   Medication Dose Route Frequency    albuterol-ipratropium (DUO-NEB) 2.5 MG-0.5 MG/3 ML  3 mL Nebulization Q4H PRN    doxycycline (VIBRAMYCIN) capsule 100 mg  100 mg Oral Q12H    meropenem (MERREM) 1 g in 0.9% sodium chloride (MBP/ADV) 50 mL MBP  1 g IntraVENous Q8H    furosemide (LASIX) injection 40 mg  40 mg IntraVENous DAILY    clonazePAM (KlonoPIN) tablet 0.5 mg  0.5 mg Per NG tube TID    QUEtiapine (SEROquel) tablet 300 mg  300 mg Per NG tube QHS    gabapentin (NEURONTIN) capsule 600 mg  600 mg Per NG tube QID    famotidine (PF) (PEPCID) 20 mg in 0.9% sodium chloride 10 mL injection  20 mg IntraVENous Q12H    dexmedeTOMidine in 0.9 % NaCl (PRECEDEX) 400 mcg/100 mL (4 mcg/mL) infusion soln  0.1-1.5 mcg/kg/hr IntraVENous TITRATE    NOREPINephrine (LEVOPHED) 8 mg in 0.9% NS 250ml infusion  0.5-30 mcg/min IntraVENous TITRATE    sodium chloride (NS) flush 5-40 mL  5-40 mL IntraVENous Q8H    sodium chloride (NS) flush 5-40 mL  5-40 mL IntraVENous PRN    polyethylene glycol (MIRALAX) packet 17 g  17 g Oral DAILY PRN    ondansetron (ZOFRAN ODT) tablet 4 mg  4 mg Oral Q8H PRN    Or    ondansetron (ZOFRAN) injection 4 mg  4 mg IntraVENous Q6H PRN    enoxaparin (LOVENOX) injection 40 mg  40 mg SubCUTAneous DAILY    aspirin chewable tablet 81 mg  81 mg Per NG tube DAILY    acetaminophen (TYLENOL) solution 650 mg  650 mg Per NG tube Q4H PRN    Or    acetaminophen (TYLENOL) suppository 650 mg  650 mg Rectal Q6H PRN       ______________________________________________________________________      Lab Review:     Recent Labs     02/17/23  0755   WBC 6.0   HGB 12.3   HCT 38.0          Recent Labs     02/17/23  0755 02/15/23  0338    139   K 3.3* 4.3    108   CO2 31 30    124*   BUN 30* 25*   CREA 0.75 0.68   CA 9.0 8.3*   MG  --  2.2   PHOS  --  4.3   ALB  --  1.9*       No components found for: GLPOC  No results for input(s): PH, PCO2, PO2, HCO3, FIO2 in the last 72 hours. No results for input(s): INR, INREXT, INREXT, INREXT in the last 72 hours. Other pertinent lab:          Assessment & Plan:      Patient is a 51-year-old  female with a history of obesity, hypothyroidism, seizure disorder, GERD, and PTSD who presented to the hospital after being found unresponsive and was experiencing acute hypoxemic respiratory failure. Plan:      1.)  Acute hypoxemic respiratory failure  -Unclear etiology, certainly has a right lower lobe likely aspiration pneumonia. Could have had a drug overdose at home on home medications.   -UDS positive for benzos on admission    Extubated 2/15/2023  Tolerated well  Now on 2 L nasal cannula oxygen  No acute distress     2.)  Acute metabolic encephalopathy  -Suspect secondary to acute hypoxemic respiratory failure and pneumonia  -CT head negative on admission  -UDS positive for benzos  Reduce sedation as tolerated  If she does not wake up completely she would benefit from neurology consultation. 3.)  Shock  -Suspect sepsis from pneumonia, lactate level slowly improving  -TTE done on 2/10/2023 showing an EF of 40 to 45%. Right ventricular systolic pressure of 22.  -Currently off Levophed, yesterday she was at 5 mcg/min, weaned for goal MAP greater than 65 mmHg  -Blood/sputum cultures are pending, on broad antibiotics with IV cefepime/vancomycin, ID consulted. Biotics have been adjusted to meropenem. 4.)  Acute kidney injury  Improved    5.)  Lactic acidosis  -Lactate level 5.2 on admission  -Likely secondary to shock, continue to maintain MAP greater than 65 mmHg with vasopressor support  -Serum bicarb level normal today     6.)  NSTEMI  -Troponin level elevated at 269 on admission.  -Cardiology input appreciated  -Echocardiogram as above. CODE STATUS: Full Code     Prophylaxis:  Stress Ulcer Protocol Active: Yes  Deep Vein Thrombosis Protocol Active: Yes  Nutrition: She is started on tube feeds at 50 mL/h. Activity: bedrest    PT OT evaluation  Out of bed to chair with support     Case discussed in detail with RN, RT, and care team  Thank you for involving me in the care of this patient  I will follow with you closely during hospitalization    Time spent more than 30 minutes under patient care with no overlap reviewing results and records, decision making, and answering questions.     Trinh Tabor MD  [facility] ([facility])

## 2023-02-17 NOTE — PROGRESS NOTES
Comprehensive Nutrition Assessment    Type and Reason for Visit: Reassess (early goal)    Nutrition Recommendations/Plan:   Continue current PO diet of SBS/Thins per SLP  Encourage PO intakes, document % consumed in I/O      Malnutrition Assessment:  Malnutrition Status:  No malnutrition (02/10/23 1222)    Context:  Acute illness     Findings of the 6 clinical characteristics of malnutrition:   Energy Intake:  No significant decrease in energy intake (NPO X1-2d)  Weight Loss:  No significant weight loss     Body Fat Loss:  No significant body fat loss,     Muscle Mass Loss:  No significant muscle mass loss,    Fluid Accumulation:  No significant fluid accumulation,        Nutrition Assessment:    Admitted for unresponsive, intubated in field. +ANASTACIO, CXR finding pulm edema and RLL pna. Work-up continues. (2/11) Pressors decreasing, TF initiated. (2/14) SBTs ongoing, pressors off. Pt tolerating TF well at goal, adjustments d/t propofol. (2/15) Extubated, SLP adv to SBS/Thins. (2/16) Pt reports no nutritional issues or wt loss pta, denies special diet, allergies, or intolerances. Continue to monitor. No new labs to assess. Meds: klonopin,vibramycin, lovenox, pepcid, lasix, meropenem, seroquel. Nutrition Related Findings:    NFPE with no acute findings. Limited dentition, SLP cleared for SBS/Thins. No N/V/D/C, last BM 2/14. Non-pitting generalized, +2 BL UE and +1 BL LE edema. Wound Type: None    Current Nutrition Intake & Therapies:  Average Meal Intake: NPO  Average Supplement Intake: None ordered  ADULT DIET Dysphagia - Soft & Bite Sized    Anthropometric Measures:  Height: 5' 7.99\" (172.7 cm)  Ideal Body Weight (IBW): 140 lbs (64 kg)     Current Body Wt:  98.6 kg (217 lb 6 oz), 155.3 % IBW. Bed scale  Current BMI (kg/m2): 33.1  Usual Body Weight: 90.7 kg (200 lb) (per pt)  % Weight Change (Calculated): 8.7  Weight Adjustment:  (EDW 90.9kg)                 BMI Category: Obese class 1 (BMI 30.0-34. 9)    Estimated Daily Nutrient Needs:  Energy Requirements Based On: Kcal/kg  Weight Used for Energy Requirements: Usual  Energy (kcal/day): 2273kcals (25kcals/kg)  Weight Used for Protein Requirements: Usual  Protein (g/day): 110g (1.2g/kg)  Method Used for Fluid Requirements: 1 ml/kcal  Fluid (ml/day): 2273ml    Nutrition Diagnosis:   Overweight/obesity related to excessive energy intake as evidenced by BMI    Nutrition Interventions:   Food and/or Nutrient Delivery: Continue current diet  Nutrition Education/Counseling: No recommendations at this time  Coordination of Nutrition Care: No recommendation at this time  Plan of Care discussed with: pt    Goals:  Previous Goal Met: Goal(s) achieved (initiate and tolerate NST at goal)  Goals: PO intake 50% or greater, within 7 days       Nutrition Monitoring and Evaluation:   Behavioral-Environmental Outcomes: None identified  Food/Nutrient Intake Outcomes: Diet advancement/tolerance, Food and nutrient intake  Physical Signs/Symptoms Outcomes: Meal time behavior, Weight, Chewing or swallowing    Discharge Planning:    Continue current diet    Estrella Leblanc  Contact: Ext [phone], or via [employer]

## 2023-02-18 LAB
ANION GAP SERPL CALC-SCNC: 5 MMOL/L (ref 5–15)
BACTERIA SPEC CULT: NORMAL
BUN SERPL-MCNC: 29 MG/DL (ref 6–20)
BUN/CREAT SERPL: 34 (ref 12–20)
CA-I BLD-MCNC: 9.2 MG/DL (ref 8.5–10.1)
CHLORIDE SERPL-SCNC: 100 MMOL/L (ref 97–108)
CO2 SERPL-SCNC: 31 MMOL/L (ref 21–32)
CREAT SERPL-MCNC: 0.85 MG/DL (ref 0.55–1.02)
CRP SERPL-MCNC: 6.88 MG/DL (ref 0–0.6)
ERYTHROCYTE [DISTWIDTH] IN BLOOD BY AUTOMATED COUNT: 14.3 % (ref 11.5–14.5)
GLUCOSE SERPL-MCNC: 111 MG/DL (ref 65–100)
HCT VFR BLD AUTO: 40.2 % (ref 35–47)
HGB BLD-MCNC: 13.4 G/DL (ref 11.5–16)
MCH RBC QN AUTO: 27.8 PG (ref 26–34)
MCHC RBC AUTO-ENTMCNC: 33.3 G/DL (ref 30–36.5)
MCV RBC AUTO: 83.4 FL (ref 80–99)
NRBC # BLD: 0 K/UL (ref 0–0.01)
NRBC BLD-RTO: 0 PER 100 WBC
PLATELET # BLD AUTO: 412 K/UL (ref 150–400)
PMV BLD AUTO: 9.8 FL (ref 8.9–12.9)
POTASSIUM SERPL-SCNC: 3.4 MMOL/L (ref 3.5–5.1)
PROCALCITONIN SERPL-MCNC: 0.05 NG/ML
RBC # BLD AUTO: 4.82 M/UL (ref 3.8–5.2)
SODIUM SERPL-SCNC: 136 MMOL/L (ref 136–145)
SPECIAL REQUESTS,SREQ: NORMAL
WBC # BLD AUTO: 6.3 K/UL (ref 3.6–11)

## 2023-02-18 PROCEDURE — 86140 C-REACTIVE PROTEIN: CPT

## 2023-02-18 PROCEDURE — 74011250636 HC RX REV CODE- 250/636: Performed by: INTERNAL MEDICINE

## 2023-02-18 PROCEDURE — 74011000250 HC RX REV CODE- 250: Performed by: HOSPITALIST

## 2023-02-18 PROCEDURE — 74011250637 HC RX REV CODE- 250/637: Performed by: INTERNAL MEDICINE

## 2023-02-18 PROCEDURE — 80048 BASIC METABOLIC PNL TOTAL CA: CPT

## 2023-02-18 PROCEDURE — 74011250636 HC RX REV CODE- 250/636: Performed by: HOSPITALIST

## 2023-02-18 PROCEDURE — 92526 ORAL FUNCTION THERAPY: CPT

## 2023-02-18 PROCEDURE — 74011000258 HC RX REV CODE- 258: Performed by: INTERNAL MEDICINE

## 2023-02-18 PROCEDURE — 97530 THERAPEUTIC ACTIVITIES: CPT

## 2023-02-18 PROCEDURE — 84145 PROCALCITONIN (PCT): CPT

## 2023-02-18 PROCEDURE — 99232 SBSQ HOSP IP/OBS MODERATE 35: CPT | Performed by: INTERNAL MEDICINE

## 2023-02-18 PROCEDURE — 36415 COLL VENOUS BLD VENIPUNCTURE: CPT

## 2023-02-18 PROCEDURE — 65270000029 HC RM PRIVATE

## 2023-02-18 PROCEDURE — 85027 COMPLETE CBC AUTOMATED: CPT

## 2023-02-18 PROCEDURE — 74011250637 HC RX REV CODE- 250/637: Performed by: HOSPITALIST

## 2023-02-18 PROCEDURE — 74011000250 HC RX REV CODE- 250: Performed by: INTERNAL MEDICINE

## 2023-02-18 RX ORDER — POTASSIUM CHLORIDE 1.5 G/1.77G
40 POWDER, FOR SOLUTION ORAL
Status: COMPLETED | OUTPATIENT
Start: 2023-02-18 | End: 2023-02-18

## 2023-02-18 RX ADMIN — DOXYCYCLINE HYCLATE 100 MG: 100 CAPSULE ORAL at 10:39

## 2023-02-18 RX ADMIN — MEROPENEM 1 G: 1 INJECTION, POWDER, FOR SOLUTION INTRAVENOUS at 20:39

## 2023-02-18 RX ADMIN — GABAPENTIN 600 MG: 300 CAPSULE ORAL at 17:40

## 2023-02-18 RX ADMIN — SODIUM CHLORIDE, PRESERVATIVE FREE 10 ML: 5 INJECTION INTRAVENOUS at 06:34

## 2023-02-18 RX ADMIN — DOXYCYCLINE HYCLATE 100 MG: 100 CAPSULE ORAL at 20:39

## 2023-02-18 RX ADMIN — GABAPENTIN 600 MG: 300 CAPSULE ORAL at 20:40

## 2023-02-18 RX ADMIN — ENOXAPARIN SODIUM 40 MG: 100 INJECTION SUBCUTANEOUS at 13:44

## 2023-02-18 RX ADMIN — MEROPENEM 1 G: 1 INJECTION, POWDER, FOR SOLUTION INTRAVENOUS at 13:44

## 2023-02-18 RX ADMIN — SODIUM CHLORIDE, PRESERVATIVE FREE 20 MG: 5 INJECTION INTRAVENOUS at 20:41

## 2023-02-18 RX ADMIN — MEROPENEM 1 G: 1 INJECTION, POWDER, FOR SOLUTION INTRAVENOUS at 06:34

## 2023-02-18 RX ADMIN — POTASSIUM CHLORIDE 40 MEQ: 1.5 POWDER, FOR SOLUTION ORAL at 13:45

## 2023-02-18 RX ADMIN — CLONAZEPAM 0.5 MG: 1 TABLET ORAL at 20:42

## 2023-02-18 RX ADMIN — GABAPENTIN 600 MG: 300 CAPSULE ORAL at 13:45

## 2023-02-18 RX ADMIN — ASPIRIN 81 MG CHEWABLE TABLET 81 MG: 81 TABLET CHEWABLE at 10:38

## 2023-02-18 RX ADMIN — QUETIAPINE FUMARATE 300 MG: 100 TABLET ORAL at 20:39

## 2023-02-18 RX ADMIN — SODIUM CHLORIDE, PRESERVATIVE FREE 10 ML: 5 INJECTION INTRAVENOUS at 13:45

## 2023-02-18 RX ADMIN — SODIUM CHLORIDE, PRESERVATIVE FREE 20 MG: 5 INJECTION INTRAVENOUS at 10:39

## 2023-02-18 RX ADMIN — FUROSEMIDE 40 MG: 10 INJECTION, SOLUTION INTRAMUSCULAR; INTRAVENOUS at 10:39

## 2023-02-18 RX ADMIN — CLONAZEPAM 0.5 MG: 1 TABLET ORAL at 17:40

## 2023-02-18 RX ADMIN — SODIUM CHLORIDE, PRESERVATIVE FREE 10 ML: 5 INJECTION INTRAVENOUS at 20:39

## 2023-02-18 RX ADMIN — CLONAZEPAM 0.5 MG: 1 TABLET ORAL at 10:39

## 2023-02-18 RX ADMIN — GABAPENTIN 600 MG: 300 CAPSULE ORAL at 10:39

## 2023-02-18 NOTE — PROGRESS NOTES
SPEECH LANGUAGE PATHOLOGY DYSPHAGIA TREATMENT  Patient: Denny Rose (61 y.o. female)  Date: 2/18/2023  Diagnosis: Severe sepsis ([facility] [A41.9, R65.20]  Acute respiratory failure with hypoxia ([facility] [address] [J96.01] <principal problem not specified>      Precautions: aspiration      ASSESSMENT:  Per RN, patient tolerating SBS/thin liquids without s/sx aspiration/penetration. Patient resting in bed, oriented x4, repositioned HOB upright. Patient denies difficulty tolerating current diet. Self-administered trials of thin liquids via straw, declined trials of diet upgrade s/t feeling satiated after lunch. Oropharyngeal swallow function appears WFL with trials completed. No clinical indicators of aspiration/penetration observed. PLAN:  Recommendations and Planned Interventions:  Continue Soft & Bite-Sized diet, thin liquids. STRICT aspiration precautions. Slow rate of intake, small bites/sips, remain upright for 30 minutes after meals. ST to follow up for po trials of diet upgrade and to ensure tolerance of current diet. Patient continues to benefit from skilled intervention to address the above impairments. Continue treatment per established plan of care. Discharge Recommendations: To Be Determined     SUBJECTIVE:   Patient agreeable to po trials of thin liquids. Patient reports \"I want to go home. \"     OBJECTIVE:     CXR Results  (Last 48 hours)      None          CT Results  (Last 48 hours)      None           Cognitive and Communication Status:  Neurologic State: Alert, Eyes open spontaneously  Orientation Level: Oriented X4  Cognition: Appropriate for age attention/concentration, Follows commands                                                    Pain:  Pain Scale 1: Visual  Pain Intensity 1: 0       After treatment:   Patient left in no apparent distress in bed, Call bell within reach, and Nursing notified    COMMUNICATION/EDUCATION:   Patient was educated regarding her deficit(s) of dysphagia, swallow safety precautions, diet recs and POC. She demonstrated Good understanding as evidenced by engagement, acknowledged understanding. The patient's plan of care including recommendations, planned interventions, and recommended diet changes were discussed with: Registered nurse. Nahun Bernal M.S., CCC-SLP             Problem: Dysphagia (Adult)  Goal: *Acute Goals and Plan of Care (Insert Text)  Description: Speech Therapy Swallow Goals  Initiated 2/15/2023  -Patient will tolerate SBS diet with thin liquids without clinical indicators of aspiration given minimal cues within 5-7 day(s). -Patient will tolerate PO trials without clinical indicators of aspiration given minimal cues within 5-7 day(s). -Patient will demonstrate understanding of swallow safety precautions and aspiration precautions, diet recs with minimal cues within 5-7 day(s).     -Patient/caregiver goal: \" to go home\"          Outcome: Progressing Towards Goal

## 2023-02-18 NOTE — PROGRESS NOTES
Progress Note    Patient: Michael Lamb MRN: [id number]  SSN: [id number]    YOB: 1965  Age: 62 y.o. Sex: female      Admit Date: 2/10/2023    LOS: 8 days     Subjective:   Patient followed for sepsis with suspected aspiration pneumonia RLL. Sputum grew MSSA. She is now back on Meropenem. CRP is decreasing today. She is also on Doxycycline. Patient is now off O2 and sitting up in bed. No cough or SOB. Objective:     Vitals:    02/17/23 1943 02/18/23 0000 02/18/23 0853 02/18/23 1144   BP: 117/69 100/61 (!) 145/76 122/78   Pulse: 83 67 73 83   Resp: 18 16 20 16   Temp: 98.6 °F (37 °C) 97.8 °F (36.6 °C) 98.4 °F (36.9 °C) 98.4 °F (36.9 °C)   SpO2: 95% 99% 95% (!) 89%   Weight:       Height:            Intake and Output:  Current Shift: 02/18 0701 - 02/18 1900  In: -   Out: 200 [Urine:200]  Last three shifts: 02/16 1901 - 02/18 0700  In: -   Out: 400 [Urine:400]    Physical Exam:   Vitals and nursing note reviewed. Constitutional:       Appearance: She is ill-appearing. HENT: Room Air SpO2 99%   Eyes: open  Cardiovascular:      Rate and Rhythm: Normal rate and regular rhythm. Heart sounds: No murmur heard. Pulmonary:      Effort: Respiratory distress present. Breath sounds: Rhonchi present. No wheezing or rales. Genitourinary:     Comments: Mohamud catheter  removed  Musculoskeletal:      Right lower leg: No edema. Left lower leg: No edema. Comments: Right femoral triple lumen catheter  removed  Skin:     Findings: No rash. Neurological: nonfocal, no confusion  Psychiatric: normal behavior    Lab/Data Review:     WBC 6,300    CRP 6.86 <7.26 <10.00 <8.03 <5.74 <8.74 < 7.00 <7.57 <7.99  Procal 0.05 <.08 < 0.26 <0.21 <0.57 <0.94 <0.99    Blood cultures (2/10) No growth FINAL  Sputum culture (2/10) MSSA Staphylococcus aureus FINAL    CXR (2/16) Improved bibasilar atelectasis.  No acute findings          Assessment:     Active Problems:    Severe sepsis ([address] (2/10/2023)      Acute respiratory failure with hypoxia ([address] (2/10/2023)  Probable aspiration pneumonia, RLL, sputum culture growing MSSA Staphylococcus aureus, now on IV Meropenem and Doxycycline  Sepsis with elevated CRP and procalcitonin, resolving  Acute hypoxic respiratory failure, extubated  Seizure activity with post-ictal period  5. Augmentin allergy    Comment:  CRP and procal slowly decreasing. Plan:      1. Continue IV Meropenem; if discharged, oral Doxycycline 100 mg po BID for 10 days may be reasonable. 2.  In am repeat CRP and procal  3.  Cleared for discharge from ID standpoint          Signed By: Mica Hair MD     February 18, 2023

## 2023-02-18 NOTE — PROGRESS NOTES
Problem: Self Care Deficits Care Plan (Adult)  Goal: *Acute Goals and Plan of Care (Insert Text)  Description:   FUNCTIONAL STATUS PRIOR TO ADMISSION: Patient was independent and active without use of DME. Patient was independent for basic and instrumental ADLs. HOME SUPPORT: The patient lived with a friend but did not require assist.    Occupational Therapy Goals  Initiated 2/17/2023  Patient Goal: Get stronger, more IND, and go home. 1.  Patient will perform lower body dressing with independence within 7 day(s). 2.  Patient will perform grooming with independence within 7 day(s). 3.  Patient will perform bathing with independence within 7 day(s). 4.  Patient will perform toilet transfers with independence within 7 day(s). 5.  Patient will perform all aspects of toileting with independence within 7 day(s). 6.  Patient will participate in upper extremity therapeutic exercise/activities with independence within 7 day(s). Outcome: Progressing Towards Goal     Problem: Patient Education: Go to Patient Education Activity  Goal: Patient/Family Education  Outcome: Progressing Towards Goal    OCCUPATIONAL THERAPY TREATMENT  Patient: Erika Ellis (30 y.o. female)  Date: 2/18/2023  Diagnosis: Severe sepsis ([facility] [address] [A41.9, R65.20]  Acute respiratory failure with hypoxia ([facility] [address] [J96.01] <principal problem not specified>      Precautions: In place during session: Central Venous Line, Nasal Cannula 2L, and External Catheter  Chart, occupational therapy assessment, plan of care, and goals were reviewed. ASSESSMENT  Pt continues with skilled OT services and is progressing towards goals. Pt received semi-supine in bed upon arrival, AXO x4 and agreeable to OT tx at this time.      Overall, pt continues to present with deficits in generalized strength/AROM, coordination, bed mobility, static/dynamic sitting and standing balance and functional activity tolerance during performance of ADLs/mobility (see below for objective details and assist levels). Patient received supine in bed agreeable to getting up with therapy services. Pt requiring less assistance this date with transfers and mobility only requiring SBA-CGA. Pt O2 seated EOB 95% on 2L. Pt ambulating into bathroom standing at sink approximately 10mins CGA-min A due to posterior lean while standing. Pt having difficulty with container management using RUE during grooming routine. Pt declining having to use the bathroom. Pt ambulated back into room towards far side of the bed CGA. Once seated EOB, O2 reading 96% on 2L. Pt denied any SOB or dizziness throughout standing and ambulation. Once seated EOB, pt changed gown and socks and then agreeable to completing alternating UB and LB exercises. Fine motor exercises also demonstrated with return demonstration provided by patient. Pt left semi supine in bed with call bell within reach and all needs met. Will continue to progress. Recommend d/c to [address]  once medically appropriate. Other factors to consider for discharge: family support         PLAN :  Patient continues to benefit from skilled intervention to address the above impairments. Continue treatment per established plan of care. to address goals. Recommend with staff: Out of bed to chair for meals, Encourage HEP in prep for ADLs/mobility, Amb to bathroom for toileting with gt belt and AD, and Use of bed/chair alarm for safety    Recommend next session: Toileting    Recommendation for discharge: (in order for the patient to meet his/her long term goals)  [address]     This discharge recommendation:  Has been made in collaboration with the attending provider and/or case management    IF patient discharges home will need the following DME: TBD       SUBJECTIVE:   Patient stated I feel a lot stronger today.     OBJECTIVE DATA SUMMARY:   Cognitive/Behavioral Status:  Neurologic State: Alert  Orientation Level: Oriented X4  Cognition: Follows commands    Functional Mobility and Transfers for ADLs:  Bed Mobility:  Rolling: Stand-by assistance  Supine to Sit: Stand-by assistance  Sit to Supine: Stand-by assistance  Scooting: Contact guard assistance    Transfers:  Sit to Stand: Contact guard assistance;Assist x1  Functional Transfers  Bathroom Mobility: Contact guard assistance     Balance:  Sitting: Intact; Without support  Standing: Impaired; With support    ADL Intervention:       Grooming  Position Performed: Standing  Washing Face: Contact guard assistance  Washing Hands: Contact guard assistance  Brushing Teeth: Contact guard assistance;Training to use affected extremity as a fine motor assistance  Brushing/Combing Hair: Minimum assistance      Upper Body [address]: Minimum  assistance    Lower Body Dressing Assistance  Socks: Contact guard assistance  Position Performed: Seated edge of bed      Therapeutic Exercises:   Exercise Sets Reps AROM AAROM PROM Self PROM Comments   Shoulder flex/ext   [x] [] [] []    Elbow flex/ext   [x] [] [] []    Wrist flex/ext   [x] [] [] []    Digit flex/ext   [] [] [] []        Pain:  0/10    Activity Tolerance:   Fair and requires rest breaks    After treatment patient left in no apparent distress:   Supine in bed, Heels elevated for pressure relief, and Bed / chair alarm activated, bed locked and in lowest position    COMMUNICATION/COLLABORATION:   The patients plan of care was discussed with: Physical therapy assistant and Physician. Co treatment provided with PTA this date for increased clinician and patient safety due to pt previously requiring Ax2.     Cody Wilson  Time Calculation: 43 mins

## 2023-02-18 NOTE — PROGRESS NOTES
Problem: Mobility Impaired (Adult and Pediatric)  Goal: *Acute Goals and Plan of Care (Insert Text)  Description: FUNCTIONAL STATUS PRIOR TO ADMISSION: Patient was independent and active without use of DME.    HOME SUPPORT PRIOR TO ADMISSION: The patient lived with SO but did not require assist.    Patient stated goal: get better,Go home  Patient will move from supine to sit and sit to supine , scoot up and down, and roll side to side in bed with independence within 7 day(s). Patient will transfer from bed to chair and chair to bed with independence using the least restrictive device within 7 day(s). Patient will improve static standing balance to supervision within 1 week(s). Patient will ambulate 50 feet with modified independence with least restrictive device within 1 weeks. Outcome: Progressing Towards Goal   PHYSICAL THERAPY TREATMENT  Patient: Silvio Baca (77 y.o. female)  Date: 2/18/2023  Diagnosis: Severe sepsis ([facility] [address] [A41.9, R65.20]  Acute respiratory failure with hypoxia ([facility] [address] [J96.01] <principal problem not specified>      Precautions: In place during session: Central Venous Line  Chart, physical therapy assessment, plan of care and goals were reviewed. ASSESSMENT  Patient continues with skilled PT services and is progressing towards goals. Pt semi-supine upon PT arrival, agreeable to session. Co-treat with OT due to level of assist required for OOB mobility for safety of patient/clinicians. (See below for objective details and assist levels). Overall pt tolerated session good today with gait training performed around room with RW, CGA, patient also stood at sink for ADLs with OT and completed seated LE and UE therex. Anastacia to maintain balance while standing at sink. Supplemental O2 on 2L O2 throughout session and measured at 93% upon initial transfer to sitting and then 96% post gait training. Bed mobility performed SBA.  Overall patient tolerated today's session well and likely will only require assist x 1 in future sessions as she is much improved. Will continue to benefit from skilled PT services, and will continue to progress as tolerated. Current Level of Function Impacting Discharge (mobility/balance): decr strength, decr standing balance     Other factors to consider for discharge: supplemental O2, AD for gait, good family support          PLAN :  Patient continues to benefit from skilled intervention to address the above impairments. Continue treatment per established plan of care to address goals. Recommend with staff: Out of bed to chair for meals, Encourage HEP in prep for ADLs/mobility, Amb to bathroom for toileting with gt belt and AD, Use of bed/chair alarm for safety, and Amb in hallway    Recommendation for discharge: (in order for the patient to meet his/her long term goals)  [address] [facility]     This discharge recommendation:  Has been made in collaboration with the attending provider and/or case management    IF patient discharges home will need the following DME: rolling walker and to be determined (TBD)       SUBJECTIVE:   Patient stated I feel stronger today.     OBJECTIVE DATA SUMMARY:   Critical Behavior:  Neurologic State: Alert  Orientation Level: Oriented X4  Cognition: Follows commands     Functional Mobility Training:  Bed Mobility:  Rolling: Stand-by assistance  Supine to Sit: Stand-by assistance  Sit to Supine: Stand-by assistance  Scooting: Contact guard assistance        Transfers:  Sit to Stand: Contact guard assistance;Assist x1  Stand to Sit: Contact guard assistance    Balance:  Sitting: Intact; Without support  Standing: Impaired; With support    Ambulation/Gait Training:  Distance (ft): 30 Feet (ft)  Assistive Device: Gait belt;Walker, rolling  Ambulation - Level of Assistance: Contact guard assistance;Assist x1;Additional time  No LOB or buckling noted during gait training, no dizziness reported     Therapeutic Exercises: EXERCISE   Sets   Reps   Active Active Assist   Passive Self ROM   Comments   Ankle Pumps   [] [] [] []    Quad Sets/Glut Sets   [] [] [] []    Hamstring Sets   [] [] [] []    Short Arc Quads   [] [] [] []    Heel Slides   [] [] [] []    Straight Leg Raises   [] [] [] []    Hip abd/add   [] [] [] []    Long Arc Quads 1 20 [x] [] [] [] sitting   Marching 1 10 [x] [] [] [] sitting      [] [] [] []       Pain Rating:  No pain     Activity Tolerance:   Good, desaturates with exertion and requires oxygen, requires rest breaks, and observed SOB with activity    After treatment patient left in no apparent distress:   Bed locked and returned to lowest position, Supine in bed, Call bell within reach, and Bed / chair alarm activated    COMMUNICATION/COLLABORATION:   The patients plan of care was discussed with: Physical therapist, Occupational therapy assistant, and Certified nursing assistant/patient care technician.     Dylan , PT   Time Calculation: 43 mins

## 2023-02-18 NOTE — PROGRESS NOTES
[facility] Hospitalist Progress Note  Dominik Dorman MD  Date:2/18/2023       Room:[facility]  Patient Name:Haydee Serrato     YOB: 1965     Age:57 y.o.    60F,  with PMH of hypothyroidism and seizures, PTSD with acute encephalopathy s/t aspiration pneumonia. 2/10/23 admission and hospital course  Per roommate she has been sleeping since yesterday morning, and was unable to wake her up. EMS arrived and patient was hypoxic in the 30s, non rebreather placed on patient improved to 70s, narcan given with no change. Patient was then intubated in the field. Otherwise no additional history obtained as patient was intubated and sedated. CXR showed pulmonary edema and CT chest showed RLL pneumonia, annd this was the likely cause of septic shock. She was started on levophed and precedex. ECHO showed EF40-45%. Cardiology was consulted for MI typII. On 2/12 she was off of levophed. 2/10/23 echocardiogram    Left Ventricle: Reduced left ventricular systolic function with a visually estimated EF of 40 - 45%. Not well visualized. Left ventricle size is normal. Mildly increased wall thickness. Unable to assess wall motion. Abnormal diastolic function. Mitral Valve: Mild regurgitation. Tricuspid Valve: The estimated RVSP is 22 mmHg. Left Atrium: Left atrium is mildly dilated. 2/14/23:  Patient seen and examined, chart was reviewed. Case discussed with nursing staff in ICU. Patient on vent support with sedation. BP holding well without pressors. No other acute issues reported to me by staff at this time. 2/15/23:  Patient seen and examined, chart was reviewed. Patient seen with nursing in the room. Patient currently off sedation alert on vent support. SBT in process. Low-grade intermittent fevers per chart.     2/16/23 liberated from ventilator yesterday  No new complaints, tolerating medications well  Transferring out of ICU today    2/17/23 no new complaints, tolerating medications well  Oxygen requirements improved, now on [facility]    23 no new complaints, remains on [facility]  Physical therapy as tolerated    Subjective    Subjective:  Symptoms:  Stable. Review of Systems   All other systems reviewed and are negative. Objective         Vitals Last 24 Hours:  TEMPERATURE:  Temp  Av.4 °F (36.9 °C)  Min: 97.8 °F (36.6 °C)  Max: 98.8 °F (37.1 °C)  RESPIRATIONS RANGE: Resp  Av.5  Min: 16  Max: 20  PULSE OXIMETRY RANGE: SpO2  Av.3 %  Min: 92 %  Max: 99 %  PULSE RANGE: Pulse  Av  Min: 67  Max: 83  BLOOD PRESSURE RANGE: Systolic (20MYE), IEY:533 , Min:100 , SHS:374   ; Diastolic (06HRW), POM:96, Min:61, Max:76    I/O (24Hr): Intake/Output Summary (Last 24 hours) at 2023 0904  Last data filed at 2023 0855  Gross per 24 hour   Intake --   Output 600 ml   Net -600 ml       Objective:  General Appearance:  Comfortable. Vital signs: (most recent): Blood pressure (!) 145/76, pulse 73, temperature 98.4 °F (36.9 °C), resp. rate 20, height 5' 7.99\" (1.727 m), weight 99 kg (218 lb 4.1 oz), SpO2 95 %, not currently breastfeeding. HEENT: Normal HEENT exam.    Lungs:  Normal effort. Heart: Normal rate. Regular rhythm. Abdomen: Abdomen is soft. Bowel sounds are normal.   There is no abdominal tenderness. Extremities: Normal range of motion. Pulses: Distal pulses are intact. Neurological: Patient is alert and oriented to person, place and time. Pupils:  Pupils are equal, round, and reactive to light. Skin:  Warm and dry. Labs/Imaging/Diagnostics    Labs:  CBC:  Recent Labs     23  0755   WBC 6.0   RBC 4.45   HGB 12.3   HCT 38.0   MCV 85.4   RDW 14.5          CHEMISTRIES:  Recent Labs     23  0755      K 3.3*      CO2 31   BUN 30*   CA 9.0     PT/INR:No results for input(s): INR, INREXT, INREXT in the last 72 hours. No lab exists for component: PROTIME  APTT:No results for input(s): APTT in the last 72 hours.   LIVER PROFILE:No results for input(s): AST, ALT in the last 72 hours. No lab exists for component: BILIDIR, BILITOT, ALKPHOS  Lab Results   Component Value Date/Time    ALT (SGPT) 580 (H) 02/11/2023 03:50 AM    AST (SGOT) 278 (H) 02/11/2023 03:50 AM    Alk. phosphatase 84 02/11/2023 03:50 AM    Bilirubin, direct 0.1 02/11/2023 03:50 AM    Bilirubin, total 0.3 02/11/2023 03:50 AM       Imaging Last 24 Hours:  No results found. Assessment//Plan   Active Problems:    Severe sepsis ([address] (2/10/2023)      Acute respiratory failure with hypoxia ([facility]) (2/10/2023)  CT Results  (Last 48 hours)      None            Assessment & Plan  57F,  with PMH of hypothyroidism and seizures, PTSD with acute encephalopathy s/t aspiration pneumonia. 2/10/23 admission and hospital course  Per roommate she has been sleeping since yesterday morning, and was unable to wake her up. EMS arrived and patient was hypoxic in the 30s, non rebreather placed on patient improved to 70s, narcan given with no change. Patient was then intubated in the field. Otherwise no additional history obtained as patient was intubated and sedated. CXR showed pulmonary edema and CT chest showed RLL pneumonia, annd this was the likely cause of septic shock. She was started on levophed and precedex. ECHO showed EF40-45%. Cardiology was consulted for MI typII. On 2/12 she was off of levophed. 2/10/23 echocardiogram    Left Ventricle: Reduced left ventricular systolic function with a visually estimated EF of 40 - 45%. Not well visualized. Left ventricle size is normal. Mildly increased wall thickness. Unable to assess wall motion. Abnormal diastolic function. Mitral Valve: Mild regurgitation. Tricuspid Valve: The estimated RVSP is 22 mmHg. Left Atrium: Left atrium is mildly dilated. 2/14/23:  Patient seen and examined, chart was reviewed. Case discussed with nursing staff in ICU. Patient on vent support with sedation.   BP holding well without pressors. No other acute issues reported to me by staff at this time. 2/15/23:  Patient seen and examined, chart was reviewed. Patient seen with nursing in the room. Patient currently off sedation alert on vent support. SBT in process. Low-grade intermittent fevers per chart. 2/16/23 liberated from ventilator yesterday  No new complaints, tolerating medications well  Transferring out of ICU today    2/17/23 no new complaints, tolerating medications well  Oxygen requirements improved, now on [facility]    2/18/23 no new complaints, remains on [facility]  Physical therapy as tolerated    MICROBIOLOGY    2/10/23 Blood  Negative  2/10/23 Respiratory Staphylococcus aureus    ASSESSMENT AND PLAN    1) Acute hypoxic respiratory failure in the setting of aspiration syndrome, respiratory cultures as above with MSSA. Liberated from ventilator 2/15/23. Supportive care with respiratory support as needed   Bronchodilators   Meropenem and doxycycline    2) Cardiovascular issues including tyoe II NSTMI, heart failure with moderately reduced ejection fraction.      Telemetry monitoring   Echocardiogram as above   Aspirin   Furosemide    3) DVT prophylaxis with enoxaparin        Electronically signed by Khadra Jauregui MD on 2/18/2023 at 8:28 AM

## 2023-02-19 VITALS
HEIGHT: 68 IN | TEMPERATURE: 98.5 F | OXYGEN SATURATION: 94 % | HEART RATE: 84 BPM | WEIGHT: 218.26 LBS | DIASTOLIC BLOOD PRESSURE: 70 MMHG | BODY MASS INDEX: 33.08 KG/M2 | RESPIRATION RATE: 20 BRPM | SYSTOLIC BLOOD PRESSURE: 106 MMHG

## 2023-02-19 LAB
ANION GAP SERPL CALC-SCNC: 4 MMOL/L (ref 5–15)
BUN SERPL-MCNC: 29 MG/DL (ref 6–20)
BUN/CREAT SERPL: 37 (ref 12–20)
CA-I BLD-MCNC: 8.9 MG/DL (ref 8.5–10.1)
CHLORIDE SERPL-SCNC: 103 MMOL/L (ref 97–108)
CO2 SERPL-SCNC: 29 MMOL/L (ref 21–32)
CREAT SERPL-MCNC: 0.78 MG/DL (ref 0.55–1.02)
CRP SERPL-MCNC: 6.58 MG/DL (ref 0–0.6)
ERYTHROCYTE [DISTWIDTH] IN BLOOD BY AUTOMATED COUNT: 14 % (ref 11.5–14.5)
GLUCOSE SERPL-MCNC: 112 MG/DL (ref 65–100)
HCT VFR BLD AUTO: 40.1 % (ref 35–47)
HGB BLD-MCNC: 13.1 G/DL (ref 11.5–16)
MCH RBC QN AUTO: 27.6 PG (ref 26–34)
MCHC RBC AUTO-ENTMCNC: 32.7 G/DL (ref 30–36.5)
MCV RBC AUTO: 84.4 FL (ref 80–99)
NRBC # BLD: 0 K/UL (ref 0–0.01)
NRBC BLD-RTO: 0 PER 100 WBC
PLATELET # BLD AUTO: 424 K/UL (ref 150–400)
PMV BLD AUTO: 9.6 FL (ref 8.9–12.9)
POTASSIUM SERPL-SCNC: 3.6 MMOL/L (ref 3.5–5.1)
PROCALCITONIN SERPL-MCNC: <0.05 NG/ML
RBC # BLD AUTO: 4.75 M/UL (ref 3.8–5.2)
SODIUM SERPL-SCNC: 136 MMOL/L (ref 136–145)
WBC # BLD AUTO: 6.5 K/UL (ref 3.6–11)

## 2023-02-19 PROCEDURE — 74011250636 HC RX REV CODE- 250/636: Performed by: INTERNAL MEDICINE

## 2023-02-19 PROCEDURE — 74011250637 HC RX REV CODE- 250/637: Performed by: INTERNAL MEDICINE

## 2023-02-19 PROCEDURE — 97530 THERAPEUTIC ACTIVITIES: CPT

## 2023-02-19 PROCEDURE — 36415 COLL VENOUS BLD VENIPUNCTURE: CPT

## 2023-02-19 PROCEDURE — 86140 C-REACTIVE PROTEIN: CPT

## 2023-02-19 PROCEDURE — 85027 COMPLETE CBC AUTOMATED: CPT

## 2023-02-19 PROCEDURE — 99232 SBSQ HOSP IP/OBS MODERATE 35: CPT | Performed by: INTERNAL MEDICINE

## 2023-02-19 PROCEDURE — 74011250637 HC RX REV CODE- 250/637: Performed by: HOSPITALIST

## 2023-02-19 PROCEDURE — 74011250636 HC RX REV CODE- 250/636: Performed by: HOSPITALIST

## 2023-02-19 PROCEDURE — 84145 PROCALCITONIN (PCT): CPT

## 2023-02-19 PROCEDURE — 80048 BASIC METABOLIC PNL TOTAL CA: CPT

## 2023-02-19 PROCEDURE — 74011000258 HC RX REV CODE- 258: Performed by: INTERNAL MEDICINE

## 2023-02-19 PROCEDURE — 74011000250 HC RX REV CODE- 250: Performed by: HOSPITALIST

## 2023-02-19 PROCEDURE — 74011000250 HC RX REV CODE- 250: Performed by: INTERNAL MEDICINE

## 2023-02-19 RX ORDER — DOXYCYCLINE 100 MG/1
100 CAPSULE ORAL EVERY 12 HOURS
Qty: 10 CAPSULE | Refills: 0 | Status: SHIPPED | OUTPATIENT
Start: 2023-02-19

## 2023-02-19 RX ORDER — GUAIFENESIN 100 MG/5ML
81 LIQUID (ML) ORAL DAILY
Qty: 30 TABLET | Refills: 0 | Status: SHIPPED
Start: 2023-02-20 | End: 2023-02-19 | Stop reason: SDUPTHER

## 2023-02-19 RX ORDER — GUAIFENESIN 100 MG/5ML
81 LIQUID (ML) ORAL DAILY
Qty: 30 TABLET | Refills: 0 | Status: SHIPPED
Start: 2023-02-20

## 2023-02-19 RX ORDER — FUROSEMIDE 40 MG/1
TABLET ORAL
Qty: 30 TABLET | Refills: 0 | Status: SHIPPED | OUTPATIENT
Start: 2023-02-19

## 2023-02-19 RX ORDER — FUROSEMIDE 40 MG/1
40 TABLET ORAL DAILY
Status: DISCONTINUED | OUTPATIENT
Start: 2023-02-19 | End: 2023-02-19 | Stop reason: HOSPADM

## 2023-02-19 RX ADMIN — SODIUM CHLORIDE, PRESERVATIVE FREE 10 ML: 5 INJECTION INTRAVENOUS at 04:13

## 2023-02-19 RX ADMIN — CLONAZEPAM 0.5 MG: 1 TABLET ORAL at 08:37

## 2023-02-19 RX ADMIN — MEROPENEM 1 G: 1 INJECTION, POWDER, FOR SOLUTION INTRAVENOUS at 04:12

## 2023-02-19 RX ADMIN — DOXYCYCLINE HYCLATE 100 MG: 100 CAPSULE ORAL at 08:37

## 2023-02-19 RX ADMIN — GABAPENTIN 600 MG: 300 CAPSULE ORAL at 08:37

## 2023-02-19 RX ADMIN — ASPIRIN 81 MG CHEWABLE TABLET 81 MG: 81 TABLET CHEWABLE at 08:37

## 2023-02-19 RX ADMIN — ENOXAPARIN SODIUM 40 MG: 100 INJECTION SUBCUTANEOUS at 08:37

## 2023-02-19 RX ADMIN — FUROSEMIDE 40 MG: 10 INJECTION, SOLUTION INTRAMUSCULAR; INTRAVENOUS at 08:37

## 2023-02-19 RX ADMIN — SODIUM CHLORIDE, PRESERVATIVE FREE 20 MG: 5 INJECTION INTRAVENOUS at 08:37

## 2023-02-19 NOTE — PROGRESS NOTES
Progress Note    Patient: Silvio Baca MRN: [id number]  SSN: [id number]    YOB: 1965  Age: 62 y.o. Sex: female      Admit Date: 2/10/2023    LOS: 9 days     Subjective:   Patient followed for sepsis with suspected aspiration pneumonia RLL. Sputum grew MSSA. She has darcy on Meropenem and Doxycycline. Procal is now normal with decreasing CRP. It appears that patient has been discharged. Objective:     Vitals:    02/18/23 1144 02/18/23 1423 02/18/23 1946 02/19/23 0837   BP: 122/78 121/79 106/67 106/70   Pulse: 83 81 80 84   Resp: 16 16 18 20   Temp: 98.4 °F (36.9 °C) 98.7 °F (37.1 °C) 99.4 °F (37.4 °C) 98.5 °F (36.9 °C)   SpO2: (!) 89% 93% 92% 94%   Weight:       Height:            Intake and Output:  Current Shift: No intake/output data recorded. Last three shifts: 02/17 1901 - 02/19 0700  In: -   Out: 900 [Urine:900]    Physical Exam:   Vitals and nursing note reviewed. Constitutional:       Appearance: She is ill-appearing. HENT: Room Air SpO2 99%   Eyes: open  Cardiovascular:      Rate and Rhythm: Normal rate and regular rhythm. Heart sounds: No murmur heard. Pulmonary:      Effort: Respiratory distress present. Breath sounds: Rhonchi present. No wheezing or rales. Genitourinary:     Comments: Mohamud catheter  removed  Musculoskeletal:      Right lower leg: No edema. Left lower leg: No edema. Comments: Right femoral triple lumen catheter  removed  Skin:     Findings: No rash. Neurological: nonfocal, no confusion  Psychiatric: normal behavior    Lab/Data Review:     WBC 6,500    CRP 6.58 <6.86 <7.26 <10.00 <8.03 <5.74 <8.74 < 7.00 <7.57 <7.99  Procal <0/05 <0.05 <.08 < 0.26 <0.21 <0.57 <0.94 <0.99    Blood cultures (2/10) No growth FINAL  Sputum culture (2/10) MSSA Staphylococcus aureus FINAL    CXR (2/16) Improved bibasilar atelectasis.  No acute findings          Assessment:     Active Problems:    Severe sepsis ([address] (2/10/2023)      Acute respiratory failure with hypoxia ([address] (2/10/2023)  Probable aspiration pneumonia, RLL, sputum culture growing MSSA Staphylococcus aureus, now on IV Meropenem and Doxycycline  Sepsis with elevated CRP and procalcitonin, resolving  Acute hypoxic respiratory failure, extubated  Seizure activity with post-ictal period  5. Augmentin allergy    Comment:  Procal now normal and CRP decreasing. Plan:      1. Agree with discharge on oral Doxycycline 100 mg po BID for 10 days    2.  Cleared for discharge from ID standpoint          Signed By: Jordan Osorio MD     February 19, 2023

## 2023-02-19 NOTE — PROGRESS NOTES
Medicare pt has received, reviewed, and signed 2nd IM letter informing them of their right to appeal the discharge. Signed copy filed in Med Rec Dept. No other intervention required for this Home Self Care discharge. Please call [phone] if status changes and assist is needed.

## 2023-02-19 NOTE — DISCHARGE SUMMARY
Physician Discharge Summary     Patient ID:    Glenora Hodgkins  [id number]  62 y.o.  1965    Admit date: 2/10/2023    Discharge date : 2/19/2023      Final Diagnoses:   Severe sepsis ([facility] [address] [A41.9, R65.20]  Acute respiratory failure with hypoxia ([facility] [address] [J96.01]  Septic shock  Aspiration pneumonia, culture showing MSSA  Acute systolic heart failure, EF 40%  Acute non-STEMI type II  History of seizure disorder  Metabolic encephalopathy  Acute kidney injury      Reason for Hospitalization:  57F,  with PMH of hypothyroidism and seizures, PTSD with acute encephalopathy s/t aspiration pneumonia. 2/10/23 admission and hospital course  Per roommate she has been sleeping since yesterday morning, and was unable to wake her up. EMS arrived and patient was hypoxic in the 30s, non rebreather placed on patient improved to 70s, narcan given with no change. Patient was then intubated in the field. Otherwise no additional history obtained as patient was intubated and sedated. CXR showed pulmonary edema and CT chest showed RLL pneumonia, annd this was the likely cause of septic shock. Hospital Course:   Was admitted to the ICU. She was started on levophed and precedex. ECHO showed EF40-45%. Cardiology was consulted for MI typII. On 2/12 she was off of levophed. 2/14/23:  Patient seen and examined, chart was reviewed. Case discussed with nursing staff in ICU. Patient on vent support with sedation. BP holding well without pressors. No other acute issues reported to me by staff at this time. 2/15/23:  Patient currently off sedation alert on vent support. SBT in process. Low-grade intermittent fevers per chart.      2/16/23 liberated from ventilator yesterday  No new complaints, tolerating medications well  Transferring out of ICU today     2/17/23 no new complaints, tolerating medications well  Oxygen requirements improved, now on [facility]     2/18/23 no new complaints, remains on [facility]  Physical therapy as tolerated    Weaned to room air. PT and OT initially recommended IRF but patient is declining and just wants to go home    She was felt stable for discharge on 2/19          Discharge Medications:   Current Discharge Medication List        START taking these medications    Details   furosemide (LASIX) 40 mg tablet 1 tablet daily  Qty: 30 Tablet, Refills: 0  Start date: 2/19/2023      doxycycline (VIBRAMYCIN) 100 mg capsule Take 1 Capsule by mouth every twelve (12) hours. Qty: 10 Capsule, Refills: 0  Start date: 2/19/2023      aspirin 81 mg chewable tablet 1 Tablet by Per NG tube route daily. Qty: 30 Tablet, Refills: 0  Start date: 2/20/2023           CONTINUE these medications which have NOT CHANGED    Details   buprenorphine-naloxone (SUBOXONE) 8-2 mg film sublingaul film 1 Film by SubLINGual route two (2) times a day. ondansetron (ZOFRAN ODT) 4 mg disintegrating tablet Take 1 Tablet by mouth every eight (8) hours as needed for Nausea or Vomiting. Qty: 15 Tablet, Refills: 0      lidocaine (Lidoderm) 5 % Apply patch to the affected area for 12 hours a day and remove for 12 hours a day. Qty: 10 Each, Refills: 0      clonazePAM (KlonoPIN) 0.5 mg tablet Take 0.5 mg by mouth three (3) times daily. QUEtiapine (SEROquel) 300 mg tablet Take 300 mg by mouth nightly. traZODone (DESYREL) 100 mg tablet Take 100-200 mg by mouth At bedtime. gabapentin (NEURONTIN) 600 mg tablet Take 600 mg by mouth four (4) times daily. Associated Diagnoses: Idiopathic hypotension; Hypothyroidism due to acquired atrophy of thyroid; Anemia due to vitamin B12 deficiency; Dizziness; Arthralgia           STOP taking these medications       diclofenac potassium (CATAFLAM) 50 mg tablet Comments:   Reason for Stopping:         ergocalciferol (ERGOCALCIFEROL) 1,250 mcg (50,000 unit) capsule Comments:   Reason for Stopping: Follow up Care:    1. None in 1-2 weeks.   Please call to set up an appointment shortly after discharge. Diet:  Cardiac Diet    Disposition:  Home. Advanced Directive:   FULL    DNR      Discharge Exam:  General:  Alert, cooperative, no distress, appears stated age. Lungs:   Clear to auscultation bilaterally. Chest wall:  No tenderness or deformity. Heart:  Regular rate and rhythm, S1, S2 normal, no murmur, click, rub or gallop. Abdomen:   Soft, non-tender. Bowel sounds normal. No masses,  No organomegaly. Extremities: Extremities normal, atraumatic, no cyanosis or edema. Pulses: 2+ and symmetric all extremities. Skin: Skin color, texture, turgor normal. No rashes or lesions   Neurologic: CNII-XII intact. No gross sensory or motor deficits        CONSULTATIONS: Pulmonary/Intensive care ID and cardiology    Significant Diagnostic Studies:   2/10/2023: BUN 18 mg/dL (Ref range: 6 - 20 mg/dL); BUN 22 mg/dL (H; Ref range: 6 - 20 mg/dL); Calcium 7.8 mg/dL (L; Ref range: 8.5 - 10.1 mg/dL); Calcium 7.7 mg/dL (L; Ref range: 8.5 - 10.1 mg/dL); CO2 26 mmol/L (Ref range: 21 - 32 mmol/L); CO2 26 mmol/L (Ref range: 21 - 32 mmol/L); Creatinine 2.27 mg/dL (H; Ref range: 0.55 - 1.02 mg/dL); Creatinine 1.99 mg/dL (H; Ref range: 0.55 - 1.02 mg/dL); Glucose 197 mg/dL (H; Ref range: 65 - 100 mg/dL); Glucose 213 mg/dL (H; Ref range: 65 - 100 mg/dL); HCT 46.3 % (Ref range: 35.0 - 47.0 %); HGB 14.0 g/dL (Ref range: 11.5 - 16.0 g/dL); Potassium 5.7 mmol/L (H; Ref range: 3.5 - 5.1 mmol/L); Potassium 5.5 mmol/L (H; Ref range: 3.5 - 5.1 mmol/L); Sodium 138 mmol/L (Ref range: 136 - 145 mmol/L); Sodium 136 mmol/L (Ref range: 136 - 145 mmol/L)  2/11/2023: BUN 21 mg/dL (H; Ref range: 6 - 20 mg/dL); Calcium 7.9 mg/dL (L; Ref range: 8.5 - 10.1 mg/dL); CO2 28 mmol/L (Ref range: 21 - 32 mmol/L); Creatinine 1.19 mg/dL (H; Ref range: 0.55 - 1.02 mg/dL); Glucose 164 mg/dL (H; Ref range: 65 - 100 mg/dL); HCT 39.5 % (Ref range: 35.0 - 47.0 %);  HGB 12.7 g/dL (Ref range: 11.5 - 16.0 g/dL); Potassium 3.8 mmol/L (Ref range: 3.5 - 5.1 mmol/L); Sodium 142 mmol/L (Ref range: 136 - 145 mmol/L)  Recent Labs     02/19/23  0815 02/18/23  1125   WBC 6.5 6.3   HGB 13.1 13.4   HCT 40.1 40.2   * 412*     Recent Labs     02/19/23  0815 02/18/23  1125 02/17/23  0755    136 138   K 3.6 3.4* 3.3*    100 104   CO2 29 31 31   BUN 29* 29* 30*   CREA 0.78 0.85 0.75   * 111* 100   CA 8.9 9.2 9.0     No results for input(s): ALT, AP, TBIL, TBILI, TP, ALB, GLOB, GGT, AML, LPSE in the last 72 hours. No lab exists for component: SGOT, GPT, AMYP, HLPSE  No results for input(s): INR, PTP, APTT, INREXT, INREXT in the last 72 hours. No results for input(s): FE, TIBC, PSAT, FERR in the last 72 hours. No results for input(s): PH, PCO2, PO2 in the last 72 hours. No results for input(s): CPK, CKMB in the last 72 hours.     No lab exists for component: TROPONINI  Lab Results   Component Value Date/Time    Glucose (POC) 129 (H) 02/11/2023 06:41 PM    Glucose (POC) 149 (H) 02/11/2023 12:55 PM    Glucose (POC) 171 (H) 02/11/2023 12:39 AM    Glucose (POC) 120 (H) 12/30/2021 04:56 PM    Glucose (POC) 114 12/30/2021 04:35 PM    Glucose,  (H) 02/10/2023 12:59 AM       Discharge time spent 35 minutes    Signed:  Dorie Esquivel MD  2/19/2023  10:25 AM

## 2023-02-19 NOTE — PROGRESS NOTES
Problem: Falls - Risk of  Goal: *Absence of Falls  Description: Document Geena Mend Fall Risk and appropriate interventions in the flowsheet.   Outcome: Resolved/Met  Note: Fall Risk Interventions:                 Elimination Interventions: Stay With Me (per policy), Toileting schedule/hourly rounds, Call light in reach

## 2023-02-19 NOTE — PROGRESS NOTES
Hospitalist Progress Note               Daily Progress Note: 2/19/2023      Subjective:   Hospital course to date:       60F,  with PMH of hypothyroidism and seizures, PTSD with acute encephalopathy s/t aspiration pneumonia. 2/10/23 admission and hospital course  Per roommate she has been sleeping since yesterday morning, and was unable to wake her up. EMS arrived and patient was hypoxic in the 30s, non rebreather placed on patient improved to 70s, narcan given with no change. Patient was then intubated in the field. Otherwise no additional history obtained as patient was intubated and sedated. CXR showed pulmonary edema and CT chest showed RLL pneumonia, annd this was the likely cause of septic shock. She was started on levophed and precedex. ECHO showed EF40-45%. Cardiology was consulted for MI typII. On 2/12 she was off of levophed. 2/10/23 echocardiogram    Left Ventricle: Reduced left ventricular systolic function with a visually estimated EF of 40 - 45%. Not well visualized. Left ventricle size is normal. Mildly increased wall thickness. Unable to assess wall motion. Abnormal diastolic function. Mitral Valve: Mild regurgitation. Tricuspid Valve: The estimated RVSP is 22 mmHg. Left Atrium: Left atrium is mildly dilated. 2/14/23:  Patient seen and examined, chart was reviewed. Case discussed with nursing staff in ICU. Patient on vent support with sedation. BP holding well without pressors. No other acute issues reported to me by staff at this time. 2/15/23:  Patient currently off sedation alert on vent support. SBT in process. Low-grade intermittent fevers per chart.      2/16/23 liberated from ventilator yesterday  No new complaints, tolerating medications well  Transferring out of ICU today     2/17/23 no new complaints, tolerating medications well  Oxygen requirements improved, now on [facility]     2/18/23 no new complaints, remains on [facility]  Physical therapy as tolerated    -------  Patient is seen today for follow-up.     Problem List:  Problem List as of 2/19/2023 Date Reviewed: 9/16/2022            Codes Class Noted - Resolved    Severe sepsis [facility]) ICD-10-CM: A41.9, R65.20  ICD-9-CM: 038.9, 995.92  2/10/2023 - Present        Acute respiratory failure with hypoxia [facility]) ICD-10-CM: J96.01  ICD-9-CM: 518.81  2/10/2023 - Present        Intractable vomiting ICD-10-CM: R11.10  ICD-9-CM: 536.2  9/16/2022 - Present        Chest pain ICD-10-CM: R07.9  ICD-9-CM: 786.50  7/28/2022 - Present        Seizure ([address] ICD-10-CM: R56.9  ICD-9-CM: 780.39  5/25/2022 - Present        Acute kidney injury [facility]) ICD-10-CM: N17.9  ICD-9-CM: 584.9  3/27/2022 - Present        Hypokalemia ICD-10-CM: E87.6  ICD-9-CM: 276.8  8/9/2021 - Present        Hypothyroidism due to acquired atrophy of thyroid ICD-10-CM: E03.4  ICD-9-CM: 244.8, 246.8  9/30/2015 - Present           Medications reviewed  Current Facility-Administered Medications   Medication Dose Route Frequency    albuterol-ipratropium (DUO-NEB) 2.5 MG-0.5 MG/3 ML  3 mL Nebulization Q4H PRN    doxycycline (VIBRAMYCIN) capsule 100 mg  100 mg Oral Q12H    meropenem (MERREM) 1 g in 0.9% sodium chloride (MBP/ADV) 50 mL MBP  1 g IntraVENous Q8H    furosemide (LASIX) injection 40 mg  40 mg IntraVENous DAILY    clonazePAM (KlonoPIN) tablet 0.5 mg  0.5 mg Per NG tube TID    QUEtiapine (SEROquel) tablet 300 mg  300 mg Per NG tube QHS    gabapentin (NEURONTIN) capsule 600 mg  600 mg Per NG tube QID    famotidine (PF) (PEPCID) 20 mg in 0.9% sodium chloride 10 mL injection  20 mg IntraVENous Q12H    sodium chloride (NS) flush 5-40 mL  5-40 mL IntraVENous Q8H    sodium chloride (NS) flush 5-40 mL  5-40 mL IntraVENous PRN    polyethylene glycol (MIRALAX) packet 17 g  17 g Oral DAILY PRN    ondansetron (ZOFRAN ODT) tablet 4 mg  4 mg Oral Q8H PRN    Or    ondansetron (ZOFRAN) injection 4 mg  4 mg IntraVENous Q6H PRN    enoxaparin (LOVENOX) injection 40 mg  40 mg SubCUTAneous DAILY    aspirin chewable tablet 81 mg  81 mg Per NG tube DAILY    acetaminophen (TYLENOL) solution 650 mg  650 mg Per NG tube Q4H PRN    Or    acetaminophen (TYLENOL) suppository 650 mg  650 mg Rectal Q6H PRN       Review of Systems:   A comprehensive review of systems was negative except for that written in the HPI. Objective:   Physical Exam:     Visit Vitals  /70 (BP 1 Location: Left upper arm, BP Patient Position: At rest)   Pulse 84   Temp 98.5 °F (36.9 °C)   Resp 20   Ht 5' 7.99\" (1.727 m)   Wt 99 kg (218 lb 4.1 oz)   SpO2 94%   Breastfeeding No   BMI 33.19 kg/m²    O2 Flow Rate (L/min): 2 l/min O2 Device: None (Room air)    Temp (24hrs), Av.8 °F (37.1 °C), Min:98.4 °F (36.9 °C), Max:99.4 °F (37.4 °C)    No intake/output data recorded.  1901 -  0700  In: -   Out: 900 [Urine:900]    General:   Awake and alert   Lungs:   Clear to auscultation bilaterally. Chest wall:  No tenderness or deformity. Heart:  Regular rate and rhythm, S1, S2 normal, no murmur, click, rub or gallop. Abdomen:   Soft, non-tender. Bowel sounds normal. No masses,  No organomegaly. Extremities: Extremities normal, atraumatic, no cyanosis or edema. Pulses: 2+ and symmetric all extremities. Skin: Skin color, texture, turgor normal. No rashes or lesions   Neurologic: CNII-XII intact. No gross focal deficits         Data Review:       Recent Days:  Recent Labs     23  0815 23  1125 23  0755   WBC 6.5 6.3 6.0   HGB 13.1 13.4 12.3   HCT 40.1 40.2 38.0   * 412* 372     Recent Labs     23  0815 23  1125 23  0755    136 138   K 3.6 3.4* 3.3*    100 104   CO2 29 31 31   * 111* 100   BUN 29* 29* 30*   CREA 0.78 0.85 0.75   CA 8.9 9.2 9.0     No results for input(s): PH, PCO2, PO2, HCO3, FIO2 in the last 72 hours.     24 Hour Results:  Recent Results (from the past 24 hour(s))   C REACTIVE PROTEIN, QT    Collection Time: 23 11:25 AM Result Value Ref Range    C-Reactive protein 6.88 (H) 0.00 - 0.60 mg/dL   PROCALCITONIN    Collection Time: 02/18/23 11:25 AM   Result Value Ref Range    Procalcitonin 0.05 (H) 0 ng/mL   CBC W/O DIFF    Collection Time: 02/18/23 11:25 AM   Result Value Ref Range    WBC 6.3 3.6 - 11.0 K/uL    RBC 4.82 3.80 - 5.20 M/uL    HGB 13.4 11.5 - 16.0 g/dL    HCT 40.2 35.0 - 47.0 %    MCV 83.4 80.0 - 99.0 FL    MCH 27.8 26.0 - 34.0 PG    MCHC 33.3 30.0 - 36.5 g/dL    RDW 14.3 11.5 - 14.5 %    PLATELET 327 (H) 506 - 400 K/uL    MPV 9.8 8.9 - 12.9 FL    NRBC 0.0 0.0  WBC    ABSOLUTE NRBC 0.00 0.00 - 4.09 K/uL   METABOLIC PANEL, BASIC    Collection Time: 02/18/23 11:25 AM   Result Value Ref Range    Sodium 136 136 - 145 mmol/L    Potassium 3.4 (L) 3.5 - 5.1 mmol/L    Chloride 100 97 - 108 mmol/L    CO2 31 21 - 32 mmol/L    Anion gap 5 5 - 15 mmol/L    Glucose 111 (H) 65 - 100 mg/dL    BUN 29 (H) 6 - 20 mg/dL    Creatinine 0.85 0.55 - 1.02 mg/dL    BUN/Creatinine ratio 34 (H) 12 - 20      eGFR >60 >60 ml/min/1.73m2    Calcium 9.2 8.5 - 10.1 mg/dL   C REACTIVE PROTEIN, QT    Collection Time: 02/19/23  8:15 AM   Result Value Ref Range    C-Reactive protein 6.58 (H) 0.00 - 0.60 mg/dL   CBC W/O DIFF    Collection Time: 02/19/23  8:15 AM   Result Value Ref Range    WBC 6.5 3.6 - 11.0 K/uL    RBC 4.75 3.80 - 5.20 M/uL    HGB 13.1 11.5 - 16.0 g/dL    HCT 40.1 35.0 - 47.0 %    MCV 84.4 80.0 - 99.0 FL    MCH 27.6 26.0 - 34.0 PG    MCHC 32.7 30.0 - 36.5 g/dL    RDW 14.0 11.5 - 14.5 %    PLATELET 468 (H) 829 - 400 K/uL    MPV 9.6 8.9 - 12.9 FL    NRBC 0.0 0.0  WBC    ABSOLUTE NRBC 0.00 0.00 - 0.77 K/uL   METABOLIC PANEL, BASIC    Collection Time: 02/19/23  8:15 AM   Result Value Ref Range    Sodium 136 136 - 145 mmol/L    Potassium 3.6 3.5 - 5.1 mmol/L    Chloride 103 97 - 108 mmol/L    CO2 29 21 - 32 mmol/L    Anion gap 4 (L) 5 - 15 mmol/L    Glucose 112 (H) 65 - 100 mg/dL    BUN 29 (H) 6 - 20 mg/dL    Creatinine 0.78 0.55 - 1.02 mg/dL    BUN/Creatinine ratio 37 (H) 12 - 20      eGFR >60 >60 ml/min/1.73m2    Calcium 8.9 8.5 - 10.1 mg/dL       XR CHEST PORT   Final Result   Improved bibasilar atelectasis. No acute findings      DUPLEX UPPER EXT VENOUS LEFT   Final Result      XR CHEST PORT   Final Result      Slightly increased bibasilar airspace disease. No other significant change. XR CHEST PORT   Final Result   No significant change. XR CHEST PORT   Final Result   Mild bibasilar atelectasis, improved. XR CHEST PORT   Final Result   Endotracheal tube and slightly more satisfactory position 3.4 cm above the   karthik. Bibasilar airspace disease persists. XR CHEST PORT   Final Result   Endotracheal tube slightly low in position 2 cm above the karhtik, could be   retracted 2 cm for more optimal positioning. Bibasilar airspace disease right   greater than left, without significant change. CT CHEST WO CONT   Final Result   Endotracheal tube and nasogastric tube are in satisfactory position. Dense right   lower lobe consolidation may represent pneumonia or aspiration. Bilateral   dependent atelectasis. Stable incidental findings as detailed above. CT HEAD WO CONT   Final Result   No acute intracranial process. XR CHEST PORT   Final Result      Endotracheal tube and nasogastric tube appear to be in satisfactory position. Diminished lung volumes with moderately severe pulmonary edema pattern. Assessment:  Acute hypoxic respiratory failure, requiring mechanical ventilation   -Extubated 2/15    Aspiration pneumonia right lower lobe. Sputum culture grew MSSA   -On cefazolin initially, now meropenem since 2/16    Severe sepsis with septic shock. Required pressors initially    Metabolic encephalopathy due to hypoxia and pneumonia. Improved    Acute kidney injury on admission, creatinine 2.2.   Improved    Acute systolic heart failure, EF 40%    Type II non-STEMI    History of seizures    Questionable history of hypothyroidism per computerized record although patient not on thyroid replacement and had normal TSH on admission. Doubt this diagnosis is accurate    Discussion/MDM: Patient with multiple medical comorbidities, each with high likelihood for morbidity and mortality if left untreated. I have reviewed patient's presenting subjective and objective findings, as well as all laboratory studies, imaging studies, and vital signs to date as well as treatment rendered and patient's response to those treatments. In addition, prior medical, surgical and relevant social and family histories were reviewed. Plan:  Continue IV meropenem per ID recommendations  Transition to oral doxycycline on discharge  Transition furosemide to p.o.       PT and OT recommending IRF    Care Plan discussed with: Patient/Family    Code status: Full code    Social determinants of health: None    Disposition: IRF when accepted    Total time spent with patient: 30 minutes.     Brendon Luna MD

## 2023-02-19 NOTE — PROGRESS NOTES
Problem: Self Care Deficits Care Plan (Adult)  Goal: *Acute Goals and Plan of Care (Insert Text)  Description:   FUNCTIONAL STATUS PRIOR TO ADMISSION: Patient was independent and active without use of DME. Patient was independent for basic and instrumental ADLs. HOME SUPPORT: The patient lived with a friend but did not require assist.    Occupational Therapy Goals  Initiated 2/17/2023  Patient Goal: Get stronger, more IND, and go home. 1.  Patient will perform lower body dressing with independence within 7 day(s). 2.  Patient will perform grooming with independence within 7 day(s). 3.  Patient will perform bathing with independence within 7 day(s). 4.  Patient will perform toilet transfers with independence within 7 day(s). 5.  Patient will perform all aspects of toileting with independence within 7 day(s). 6.  Patient will participate in upper extremity therapeutic exercise/activities with independence within 7 day(s). Outcome: Progressing Towards Goal     Problem: Patient Education: Go to Patient Education Activity  Goal: Patient/Family Education  Outcome: Progressing Towards Goal    OCCUPATIONAL THERAPY TREATMENT  Patient: Abel Banda (1 y.o. female)  Date: 2/19/2023  Diagnosis: Severe sepsis ([facility] [address] [A41.9, R65.20]  Acute respiratory failure with hypoxia ([facility] [address] [J96.01] <principal problem not specified>      Precautions: In place during session: Peripheral IV  Chart, occupational therapy assessment, plan of care, and goals were reviewed. ASSESSMENT  Pt continues with skilled OT services and is progressing towards goals. Pt received semi-supine in bed upon arrival, AXO x4 and agreeable to OT tx at this time. Overall, pt continues to present with deficits in generalized strength/AROM, coordination, bed mobility, static/dynamic sitting and standing balance and functional activity tolerance during performance of ADLs/mobility (see below for objective details and assist levels).  Patient received supine in bed off O2 this date and agreeable to working with therapy services. Patient completed bed mobility with SBA and once seated EOB, checked oxygen levels reading 96%. Pt declined having to use the bathroom but able to complete STS and ambulate into bathroom using RW CGA and washed up at sink SBA. Pt demonstrating improved balance this date. Pt ambulated back to EOB and completed UB there ex as well as fine motor exercises and then requested to go back to bed. Will continue to progress. Recommend d/c to [facility] [employer]  once medically appropriate. Other factors to consider for discharge: PLOF         PLAN :  Patient continues to benefit from skilled intervention to address the above impairments. Continue treatment per established plan of care. to address goals. Recommend with staff: Out of bed to chair for meals, Encourage HEP in prep for ADLs/mobility, and Amb to bathroom for toileting with gt belt and AD    Recommend next session: Toileting    Recommendation for discharge: (in order for the patient to meet his/her long term goals)  [facility] [employer]     This discharge recommendation:  Has been made in collaboration with the attending provider and/or case management    IF patient discharges home will need the following DME: patient owns DME required for discharge       SUBJECTIVE:   Patient stated I have a walker at home I can use.     OBJECTIVE DATA SUMMARY:   Cognitive/Behavioral Status:  Neurologic State: Alert  Orientation Level: Oriented X4  Cognition: Follows commands; Appropriate safety awareness      Functional Mobility and Transfers for ADLs:  Bed Mobility:  Rolling: Modified independent  Supine to Sit: Modified independent  Sit to Supine: Modified independent    Transfers:  Sit to Stand: Stand-by assistance  Functional Transfers  Bathroom Mobility: Contact guard assistance       Balance:  Sitting: Intact; Without support  Standing: Intact; With support  Standing - Static: Good; Unsupported  Standing - Dynamic : Fair    ADL Intervention:     Grooming  Washing Face: Stand-by assistance  Washing Hands: Stand-by assistance  Brushing/Combing Hair: Stand-by assistance      Therapeutic Exercises:   Exercise Sets Reps AROM AAROM PROM Self PROM Comments   Shoulder flex/ext 2 10 [x] [] [] []    Elbow flex/ext 2 10 [x] [] [] []    Wrist flex/ext 2 10 [x] [] [] []    Finger flex/ext 2 10 [x] [] [] []        Pain:  0/10    Activity Tolerance:   Good and tolerates ADLs without rest breaks    After treatment patient left in no apparent distress:   Supine in bed, Call bell within reach, and Bed / chair alarm activated, bed locked and in lowest position    COMMUNICATION/COLLABORATION:   The patients plan of care was discussed with: Physical therapy assistant.      Robles Mahmood  Time Calculation: 24 mins

## 2023-02-28 ENCOUNTER — APPOINTMENT (OUTPATIENT)
Dept: GENERAL RADIOLOGY | Age: 58
End: 2023-02-28
Attending: STUDENT IN AN ORGANIZED HEALTH CARE EDUCATION/TRAINING PROGRAM
Payer: MEDICARE

## 2023-02-28 ENCOUNTER — APPOINTMENT (OUTPATIENT)
Dept: CT IMAGING | Age: 58
End: 2023-02-28
Attending: STUDENT IN AN ORGANIZED HEALTH CARE EDUCATION/TRAINING PROGRAM
Payer: MEDICARE

## 2023-02-28 ENCOUNTER — HOSPITAL ENCOUNTER (EMERGENCY)
Age: 58
Discharge: HOME | End: 2023-02-28
Attending: STUDENT IN AN ORGANIZED HEALTH CARE EDUCATION/TRAINING PROGRAM
Payer: MEDICARE

## 2023-02-28 VITALS
HEART RATE: 78 BPM | WEIGHT: 200 LBS | RESPIRATION RATE: 18 BRPM | HEIGHT: 68 IN | BODY MASS INDEX: 30.31 KG/M2 | SYSTOLIC BLOOD PRESSURE: 115 MMHG | OXYGEN SATURATION: 99 % | DIASTOLIC BLOOD PRESSURE: 80 MMHG | TEMPERATURE: 98.6 F

## 2023-02-28 DIAGNOSIS — R06.02 SOB (SHORTNESS OF BREATH): Primary | ICD-10-CM

## 2023-02-28 LAB
ALBUMIN SERPL-MCNC: 3 G/DL (ref 3.5–5)
ALBUMIN/GLOB SERPL: 0.9 (ref 1.1–2.2)
ALP SERPL-CCNC: 98 U/L (ref 45–117)
ALT SERPL-CCNC: 18 U/L (ref 12–78)
ANION GAP SERPL CALC-SCNC: 2 MMOL/L (ref 5–15)
AST SERPL W P-5'-P-CCNC: 34 U/L (ref 15–37)
ATRIAL RATE: 100 BPM
BASOPHILS # BLD: 0.1 K/UL (ref 0–0.1)
BASOPHILS NFR BLD: 1 % (ref 0–1)
BILIRUB SERPL-MCNC: 0.4 MG/DL (ref 0.2–1)
BNP SERPL-MCNC: 79 PG/ML
BUN SERPL-MCNC: 12 MG/DL (ref 6–20)
BUN/CREAT SERPL: 16 (ref 12–20)
CA-I BLD-MCNC: 8.9 MG/DL (ref 8.5–10.1)
CALCULATED P AXIS, ECG09: 40 DEGREES
CALCULATED R AXIS, ECG10: 40 DEGREES
CALCULATED T AXIS, ECG11: 27 DEGREES
CHLORIDE SERPL-SCNC: 103 MMOL/L (ref 97–108)
CO2 SERPL-SCNC: 32 MMOL/L (ref 21–32)
CREAT SERPL-MCNC: 0.77 MG/DL (ref 0.55–1.02)
DIAGNOSIS, 93000: NORMAL
DIFFERENTIAL METHOD BLD: ABNORMAL
EOSINOPHIL # BLD: 0.2 K/UL (ref 0–0.4)
EOSINOPHIL NFR BLD: 3 % (ref 0–7)
ERYTHROCYTE [DISTWIDTH] IN BLOOD BY AUTOMATED COUNT: 14.2 % (ref 11.5–14.5)
GLOBULIN SER CALC-MCNC: 3.5 G/DL (ref 2–4)
GLUCOSE SERPL-MCNC: 92 MG/DL (ref 65–100)
HCT VFR BLD AUTO: 40.4 % (ref 35–47)
HGB BLD-MCNC: 13.1 G/DL (ref 11.5–16)
IMM GRANULOCYTES # BLD AUTO: 0 K/UL (ref 0–0.04)
IMM GRANULOCYTES NFR BLD AUTO: 0 % (ref 0–0.5)
LYMPHOCYTES # BLD: 3.2 K/UL (ref 0.8–3.5)
LYMPHOCYTES NFR BLD: 48 % (ref 12–49)
MCH RBC QN AUTO: 27.5 PG (ref 26–34)
MCHC RBC AUTO-ENTMCNC: 32.4 G/DL (ref 30–36.5)
MCV RBC AUTO: 84.9 FL (ref 80–99)
MONOCYTES # BLD: 0.4 K/UL (ref 0–1)
MONOCYTES NFR BLD: 7 % (ref 5–13)
NEUTS SEG # BLD: 2.7 K/UL (ref 1.8–8)
NEUTS SEG NFR BLD: 41 % (ref 32–75)
NRBC # BLD: 0 K/UL (ref 0–0.01)
NRBC BLD-RTO: 0 PER 100 WBC
P-R INTERVAL, ECG05: 132 MS
PLATELET # BLD AUTO: 479 K/UL (ref 150–400)
PMV BLD AUTO: 9.8 FL (ref 8.9–12.9)
POTASSIUM SERPL-SCNC: 4.4 MMOL/L (ref 3.5–5.1)
PROT SERPL-MCNC: 6.5 G/DL (ref 6.4–8.2)
Q-T INTERVAL, ECG07: 334 MS
QRS DURATION, ECG06: 76 MS
QTC CALCULATION (BEZET), ECG08: 430 MS
RBC # BLD AUTO: 4.76 M/UL (ref 3.8–5.2)
SODIUM SERPL-SCNC: 137 MMOL/L (ref 136–145)
TROPONIN I SERPL HS-MCNC: 14 NG/L (ref 0–51)
VENTRICULAR RATE, ECG03: 100 BPM
WBC # BLD AUTO: 6.6 K/UL (ref 3.6–11)

## 2023-02-28 PROCEDURE — 80053 COMPREHEN METABOLIC PANEL: CPT

## 2023-02-28 PROCEDURE — 93005 ELECTROCARDIOGRAM TRACING: CPT

## 2023-02-28 PROCEDURE — 99285 EMERGENCY DEPT VISIT HI MDM: CPT

## 2023-02-28 PROCEDURE — 83880 ASSAY OF NATRIURETIC PEPTIDE: CPT

## 2023-02-28 PROCEDURE — 85025 COMPLETE CBC W/AUTO DIFF WBC: CPT

## 2023-02-28 PROCEDURE — 71045 X-RAY EXAM CHEST 1 VIEW: CPT

## 2023-02-28 PROCEDURE — 70450 CT HEAD/BRAIN W/O DYE: CPT

## 2023-02-28 PROCEDURE — 84484 ASSAY OF TROPONIN QUANT: CPT

## 2023-02-28 PROCEDURE — 36415 COLL VENOUS BLD VENIPUNCTURE: CPT

## 2023-02-28 PROCEDURE — 74011250637 HC RX REV CODE- 250/637: Performed by: STUDENT IN AN ORGANIZED HEALTH CARE EDUCATION/TRAINING PROGRAM

## 2023-02-28 RX ORDER — ACETAMINOPHEN 325 MG/1
650 TABLET ORAL
Status: COMPLETED | OUTPATIENT
Start: 2023-02-28 | End: 2023-02-28

## 2023-02-28 RX ADMIN — ACETAMINOPHEN 650 MG: 325 TABLET ORAL at 15:00

## 2023-02-28 NOTE — ED PROVIDER NOTES
[facility] EMERGENCY DEPT  EMERGENCY DEPARTMENT HISTORY AND PHYSICAL EXAM      Date: 2023  Patient Name: Sarah Ryan  MRN: [id number]  [place]: 1965  Date of evaluation: 2023  Provider: Jersey Reyna MD   Note Started: 3:24 PM 23    HISTORY OF PRESENT ILLNESS     Chief Complaint   Patient presents with    Shortness of Breath    Extremity Weakness       History Provided By: Patient    HPI: Sarah Ryan, 62 y.o. female to emergency department for generalized weakness, shortness of breath. Patient states she was admitted several weeks ago for pneumonia had to be intubated for respiratory distress. Patient was discharged 1 week ago. Patient states that that time she has had generalized weakness dizziness complains of some shortness of breath today. Additionally patient complaining of pain to her right hand, weakness to her right hand over the last week, states she felt a bump on her head which she says was not there when she was last hospitalized. PAST MEDICAL HISTORY   Past Medical History:  Past Medical History:   Diagnosis Date    DDD (degenerative disc disease), lumbar     Fatigue     GERD (gastroesophageal reflux disease)     Hypothyroidism     Psychiatric disorder     PTSD per patient    Seizures ([address]        Past Surgical History:  Past Surgical History:   Procedure Laterality Date    HX APPENDECTOMY      HX  SECTION      HX CHOLECYSTECTOMY      HX HYSTERECTOMY         Family History:  Family History   Family history unknown: Yes       Social History:  Social History     Tobacco Use    Smoking status: Every Day     Packs/day: 0.50     Types: Cigarettes    Smokeless tobacco: Never   Vaping Use    Vaping Use: Never used   Substance Use Topics    Alcohol use: Not Currently     Alcohol/week: 0.0 standard drinks    Drug use: Never       Allergies:   Allergies   Allergen Reactions    Augmentin [Amoxicillin-Pot Clavulanate] Nausea and Vomiting       PCP: None    Current Meds: Previous Medications    ASPIRIN 81 MG CHEWABLE TABLET    Take 1 Tablet by mouth daily. BUPRENORPHINE-NALOXONE (SUBOXONE) 8-2 MG FILM SUBLINGAUL FILM    1 Film by SubLINGual route two (2) times a day. CLONAZEPAM (KLONOPIN) 0.5 MG TABLET    Take 0.5 mg by mouth three (3) times daily. DOXYCYCLINE (VIBRAMYCIN) 100 MG CAPSULE    Take 1 Capsule by mouth every twelve (12) hours. FUROSEMIDE (LASIX) 40 MG TABLET    1 tablet daily    GABAPENTIN (NEURONTIN) 600 MG TABLET    Take 600 mg by mouth four (4) times daily. LIDOCAINE (LIDODERM) 5 %    Apply patch to the affected area for 12 hours a day and remove for 12 hours a day. ONDANSETRON (ZOFRAN ODT) 4 MG DISINTEGRATING TABLET    Take 1 Tablet by mouth every eight (8) hours as needed for Nausea or Vomiting. QUETIAPINE (SEROQUEL) 300 MG TABLET    Take 300 mg by mouth nightly. TRAZODONE (DESYREL) 100 MG TABLET    Take 100-200 mg by mouth At bedtime. REVIEW OF SYSTEMS   Review of Systems   Constitutional:  Negative for activity change, chills, fatigue and fever. HENT:  Negative for congestion and trouble swallowing. Eyes:  Negative for photophobia and visual disturbance. Respiratory:  Positive for chest tightness and shortness of breath. Negative for cough. Cardiovascular:  Negative for chest pain and palpitations. Gastrointestinal:  Negative for abdominal distention, abdominal pain, diarrhea, nausea and vomiting. Genitourinary:  Negative for dysuria, flank pain and frequency. Musculoskeletal:  Negative for arthralgias, back pain and myalgias. Skin:  Negative for color change, pallor and rash. Neurological:  Positive for weakness. Negative for dizziness, tremors and headaches. Psychiatric/Behavioral:  Negative for confusion. Positives and Pertinent negatives as per HPI.     PHYSICAL EXAM     ED Triage Vitals [02/28/23 1300]   ED Encounter Vitals Group      /78      Pulse (Heart Rate) 85      Resp Rate 20      Temp 98.5 °F (36.9 °C)      Temp src       O2 Sat (%) 100 %      Weight 200 lb      Height 5' 8\"      Physical Exam  Vitals and nursing note reviewed. Constitutional:       General: She is not in acute distress. Appearance: Normal appearance. She is normal weight. She is not ill-appearing. HENT:      Head: Normocephalic and atraumatic. Nose: Nose normal.      Mouth/Throat:      Mouth: Mucous membranes are moist.   Eyes:      Extraocular Movements: Extraocular movements intact. Pupils: Pupils are equal, round, and reactive to light. Cardiovascular:      Rate and Rhythm: Normal rate and regular rhythm. Pulses: Normal pulses. Pulmonary:      Effort: Pulmonary effort is normal.      Breath sounds: Normal breath sounds. Abdominal:      General: Abdomen is flat. Bowel sounds are normal.      Palpations: Abdomen is soft. Tenderness: There is no abdominal tenderness. There is no guarding. Musculoskeletal:         General: No tenderness. Normal range of motion. Cervical back: Normal range of motion and neck supple. No muscular tenderness. Right lower leg: No edema. Left lower leg: No edema. Skin:     General: Skin is warm and dry. Neurological:      General: No focal deficit present. Mental Status: She is alert and oriented to person, place, and time. Sensory: No sensory deficit. Motor: No weakness.        SCREENINGS               No data recorded        LAB, EKG AND DIAGNOSTIC RESULTS   Labs:  Recent Results (from the past 12 hour(s))   CBC WITH AUTOMATED DIFF    Collection Time: 02/28/23  2:29 PM   Result Value Ref Range    WBC 6.6 3.6 - 11.0 K/uL    RBC 4.76 3.80 - 5.20 M/uL    HGB 13.1 11.5 - 16.0 g/dL    HCT 40.4 35.0 - 47.0 %    MCV 84.9 80.0 - 99.0 FL    MCH 27.5 26.0 - 34.0 PG    MCHC 32.4 30.0 - 36.5 g/dL    RDW 14.2 11.5 - 14.5 %    PLATELET 025 (H) 936 - 400 K/uL    MPV 9.8 8.9 - 12.9 FL    NRBC 0.0 0.0  WBC    ABSOLUTE NRBC 0.00 0.00 - 0.01 K/uL NEUTROPHILS 41 32 - 75 %    LYMPHOCYTES 48 12 - 49 %    MONOCYTES 7 5 - 13 %    EOSINOPHILS 3 0 - 7 %    BASOPHILS 1 0 - 1 %    IMMATURE GRANULOCYTES 0 0 - 0.5 %    ABS. NEUTROPHILS 2.7 1.8 - 8.0 K/UL    ABS. LYMPHOCYTES 3.2 0.8 - 3.5 K/UL    ABS. MONOCYTES 0.4 0.0 - 1.0 K/UL    ABS. EOSINOPHILS 0.2 0.0 - 0.4 K/UL    ABS. BASOPHILS 0.1 0.0 - 0.1 K/UL    ABS. IMM. GRANS. 0.0 0.00 - 0.04 K/UL    DF AUTOMATED     METABOLIC PANEL, COMPREHENSIVE    Collection Time: 02/28/23  2:29 PM   Result Value Ref Range    Sodium 137 136 - 145 mmol/L    Potassium 4.4 3.5 - 5.1 mmol/L    Chloride 103 97 - 108 mmol/L    CO2 32 21 - 32 mmol/L    Anion gap 2 (L) 5 - 15 mmol/L    Glucose 92 65 - 100 mg/dL    BUN 12 6 - 20 mg/dL    Creatinine 0.77 0.55 - 1.02 mg/dL    BUN/Creatinine ratio 16 12 - 20      eGFR >60 >60 ml/min/1.73m2    Calcium 8.9 8.5 - 10.1 mg/dL    Bilirubin, total 0.4 0.2 - 1.0 mg/dL    AST (SGOT) 34 15 - 37 U/L    ALT (SGPT) 18 12 - 78 U/L    Alk. phosphatase 98 45 - 117 U/L    Protein, total 6.5 6.4 - 8.2 g/dL    Albumin 3.0 (L) 3.5 - 5.0 g/dL    Globulin 3.5 2.0 - 4.0 g/dL    A-G Ratio 0.9 (L) 1.1 - 2.2     TROPONIN-HIGH SENSITIVITY    Collection Time: 02/28/23  2:29 PM   Result Value Ref Range    Troponin-High Sensitivity 14 0 - 51 ng/L   NT-PRO BNP    Collection Time: 02/28/23  2:29 PM   Result Value Ref Range    NT pro-BNP 79 <125 pg/mL           Radiologic Studies:  Non-plain film images such as CT, Ultrasound and MRI are read by the radiologist. Plain radiographic images are visualized and preliminarily interpreted by the ED Provider with the below findings:        Interpretation per the Radiologist below, if available at the time of this note:  CT HEAD WO CONT    Result Date: 2/28/2023  EXAM:  CT HEAD WO CONT INDICATION:   headache, swelling to scalp, unsure if she suffered head trauma, r/o ich COMPARISON: CT head 2/10/2023. TECHNIQUE: Unenhanced CT of the head was performed using 5 mm images.  Brain and bone windows were generated. CT dose reduction was achieved through use of a standardized protocol tailored for this examination and automatic exposure control for dose modulation. FINDINGS: The ventricles are normal in size and position. Basilar cisterns are patent. No midline shift. There is no evidence of acute infarct, hemorrhage, or extraaxial fluid collection. The paranasal sinuses, mastoid air cells, and middle ears are clear. The orbital contents are within normal limits. There are no significant osseous or extracranial soft tissue lesions. 1. No evidence of acute intracranial abnormality. XR CHEST PORT    Result Date: 2/28/2023  INDICATION: Shortness of breath. Portable AP view of the chest. Direct comparison made to prior chest x-ray dated February 16, 2023. Cardiomediastinal silhouette is stable. There has been interval improvement near total resolution of right basilar atelectasis seen on prior chest x-ray dated February 16, 2023. There is persistent mild left basilar atelectasis. No pleural fluid is visualized. There is no pneumothorax. Persistent, mild left basilar atelectasis. PROCEDURES   Unless otherwise noted below, none.   Performed by: Jessica Sebastian MD   Procedures      CRITICAL CARE TIME       ED COURSE and DIFFERENTIAL DIAGNOSIS/MDM   Vitals:    Vitals:    02/28/23 1300   BP: 111/78   Pulse: 85   Resp: 20   Temp: 98.5 °F (36.9 °C)   SpO2: 100%   Weight: 90.7 kg (200 lb)   Height: 5' 8\" (1.727 m)        Patient was given the following medications:  Medications   acetaminophen (TYLENOL) tablet 650 mg (650 mg Oral Given 2/28/23 1500)       EKG: Normal sinus rhythm 100, no ST elevation, normal axis      Records Reviewed (source and summary of external notes): None    CC/HPI Summary, DDx, ED Course, and Reassessment: 59-year-old female, no significant past medical history presents to the emergency department for evaluation of generalized weakness shortness of breath, was evaluated for pneumonia 1 week ago, had to be admitted and intubated for respiratory failure. Physical exam shows well-appearing female no distress, stable vitals afebrile, saturations 100% on room air, nonfocal neurological exam, no lower extremity swelling. Differential diagnosis includes anemia, dehydration, or recurrent pneumonia, pulmonary edema, PE, ACS    We will obtain basic lab work, chest x-ray, head CT, continue to monitor. ED Course as of 02/28/23 1552 Tue Feb 28, 2023   1550 Patient's head CT shows no acute intracranial injury, no CVA, chest x-ray resulted persistent bilateral plexuses no acute infiltrates or effusions. Patient's metabolic panel shows no gross electrolyte normality CBC shows no leukocytosis [address], proBNP within normal limits. Patient's vitals have remained stable, at this time feel that patient is stable for discharge do not suspect recurrence of pneumonia, no signs symptoms of PE or cardiac failure. We will discharge patient home, patient states she was assigned a primary care physician upon discharge has not called them yet, encourage patient to call primary care physician for follow-up. [PZ]      ED Course User Index  [PZ] John North MD       Disposition Considerations (Tests not done, Shared Decision Making, Pt Expectation of Test or Treatment.):      FINAL IMPRESSION     1. SOB (shortness of breath)          DISPOSITION/PLAN   Discharged    Discharge Note: The patient is stable for discharge home. The signs, symptoms, diagnosis, and discharge instructions have been discussed, understanding conveyed, and agreed upon. The patient is to follow up as recommended or return to ER should their symptoms worsen.       PATIENT REFERRED TO:  Follow-up Information       Follow up With Specialties Details Why Contact Info    Rebecca Soliman MD Family Medicine In 1 week As needed for follow up [address]  [phone]                DISCHARGE MEDICATIONS:  Current Discharge Medication List            DISCONTINUED MEDICATIONS:  Current Discharge Medication List          I am the Primary Clinician of Record: Tory Huggins MD (electronically signed)    (Please note that parts of this dictation were completed with voice recognition software. Quite often unanticipated grammatical, syntax, homophones, and other interpretive errors are inadvertently transcribed by the computer software. Please disregards these errors.  Please excuse any errors that have escaped final proofreading.)

## 2023-02-28 NOTE — ED TRIAGE NOTES
Pt arrives to ED by self. Pt reports she was discharged 1 week ago for PNA. Pt reports she is still feeling SOB and also has weakness in her RUE since discharge 1 week ago as well.

## 2023-03-03 ENCOUNTER — HOSPITAL ENCOUNTER (EMERGENCY)
Age: 58
Discharge: HOME | End: 2023-03-03
Attending: STUDENT IN AN ORGANIZED HEALTH CARE EDUCATION/TRAINING PROGRAM
Payer: MEDICARE

## 2023-03-03 ENCOUNTER — APPOINTMENT (OUTPATIENT)
Dept: GENERAL RADIOLOGY | Age: 58
End: 2023-03-03
Attending: STUDENT IN AN ORGANIZED HEALTH CARE EDUCATION/TRAINING PROGRAM
Payer: MEDICARE

## 2023-03-03 VITALS
OXYGEN SATURATION: 98 % | RESPIRATION RATE: 20 BRPM | DIASTOLIC BLOOD PRESSURE: 77 MMHG | SYSTOLIC BLOOD PRESSURE: 110 MMHG | HEART RATE: 88 BPM | TEMPERATURE: 98.1 F

## 2023-03-03 DIAGNOSIS — I95.1 ORTHOSTATIC SYNCOPE: Primary | ICD-10-CM

## 2023-03-03 LAB
ALBUMIN SERPL-MCNC: 3.2 G/DL (ref 3.5–5)
ALBUMIN/GLOB SERPL: 0.8 (ref 1.1–2.2)
ALP SERPL-CCNC: 96 U/L (ref 45–117)
ALT SERPL-CCNC: 19 U/L (ref 12–78)
ANION GAP SERPL CALC-SCNC: 5 MMOL/L (ref 5–15)
AST SERPL W P-5'-P-CCNC: 23 U/L (ref 15–37)
ATRIAL RATE: 95 BPM
BASOPHILS # BLD: 0.1 K/UL (ref 0–0.1)
BASOPHILS NFR BLD: 1 % (ref 0–1)
BILIRUB SERPL-MCNC: 0.3 MG/DL (ref 0.2–1)
BUN SERPL-MCNC: 11 MG/DL (ref 6–20)
BUN/CREAT SERPL: 15 (ref 12–20)
CA-I BLD-MCNC: 9.4 MG/DL (ref 8.5–10.1)
CALCULATED P AXIS, ECG09: 37 DEGREES
CALCULATED R AXIS, ECG10: 37 DEGREES
CALCULATED T AXIS, ECG11: -19 DEGREES
CHLORIDE SERPL-SCNC: 106 MMOL/L (ref 97–108)
CO2 SERPL-SCNC: 25 MMOL/L (ref 21–32)
CREAT SERPL-MCNC: 0.71 MG/DL (ref 0.55–1.02)
DIAGNOSIS, 93000: NORMAL
DIFFERENTIAL METHOD BLD: ABNORMAL
EOSINOPHIL # BLD: 0.2 K/UL (ref 0–0.4)
EOSINOPHIL NFR BLD: 3 % (ref 0–7)
ERYTHROCYTE [DISTWIDTH] IN BLOOD BY AUTOMATED COUNT: 14.5 % (ref 11.5–14.5)
GLOBULIN SER CALC-MCNC: 3.8 G/DL (ref 2–4)
GLUCOSE SERPL-MCNC: 109 MG/DL (ref 65–100)
HCT VFR BLD AUTO: 43.4 % (ref 35–47)
HGB BLD-MCNC: 14.4 G/DL (ref 11.5–16)
IMM GRANULOCYTES # BLD AUTO: 0 K/UL (ref 0–0.04)
IMM GRANULOCYTES NFR BLD AUTO: 0 % (ref 0–0.5)
LYMPHOCYTES # BLD: 3.4 K/UL (ref 0.8–3.5)
LYMPHOCYTES NFR BLD: 55 % (ref 12–49)
MCH RBC QN AUTO: 27.8 PG (ref 26–34)
MCHC RBC AUTO-ENTMCNC: 33.2 G/DL (ref 30–36.5)
MCV RBC AUTO: 83.8 FL (ref 80–99)
MONOCYTES # BLD: 0.4 K/UL (ref 0–1)
MONOCYTES NFR BLD: 6 % (ref 5–13)
NEUTS SEG # BLD: 2.1 K/UL (ref 1.8–8)
NEUTS SEG NFR BLD: 35 % (ref 32–75)
NRBC # BLD: 0 K/UL (ref 0–0.01)
NRBC BLD-RTO: 0 PER 100 WBC
P-R INTERVAL, ECG05: 128 MS
PLATELET # BLD AUTO: 501 K/UL (ref 150–400)
PMV BLD AUTO: 9.6 FL (ref 8.9–12.9)
POTASSIUM SERPL-SCNC: 4.7 MMOL/L (ref 3.5–5.1)
PROT SERPL-MCNC: 7 G/DL (ref 6.4–8.2)
Q-T INTERVAL, ECG07: 352 MS
QRS DURATION, ECG06: 76 MS
QTC CALCULATION (BEZET), ECG08: 442 MS
RBC # BLD AUTO: 5.18 M/UL (ref 3.8–5.2)
SODIUM SERPL-SCNC: 136 MMOL/L (ref 136–145)
TROPONIN I SERPL HS-MCNC: 12 NG/L (ref 0–51)
VENTRICULAR RATE, ECG03: 95 BPM
WBC # BLD AUTO: 6.1 K/UL (ref 3.6–11)

## 2023-03-03 PROCEDURE — 96374 THER/PROPH/DIAG INJ IV PUSH: CPT

## 2023-03-03 PROCEDURE — 99285 EMERGENCY DEPT VISIT HI MDM: CPT

## 2023-03-03 PROCEDURE — 71045 X-RAY EXAM CHEST 1 VIEW: CPT

## 2023-03-03 PROCEDURE — 74011250636 HC RX REV CODE- 250/636: Performed by: STUDENT IN AN ORGANIZED HEALTH CARE EDUCATION/TRAINING PROGRAM

## 2023-03-03 PROCEDURE — 84484 ASSAY OF TROPONIN QUANT: CPT

## 2023-03-03 PROCEDURE — 96375 TX/PRO/DX INJ NEW DRUG ADDON: CPT

## 2023-03-03 PROCEDURE — 74011250637 HC RX REV CODE- 250/637: Performed by: STUDENT IN AN ORGANIZED HEALTH CARE EDUCATION/TRAINING PROGRAM

## 2023-03-03 PROCEDURE — 93005 ELECTROCARDIOGRAM TRACING: CPT

## 2023-03-03 PROCEDURE — 85025 COMPLETE CBC W/AUTO DIFF WBC: CPT

## 2023-03-03 PROCEDURE — 36415 COLL VENOUS BLD VENIPUNCTURE: CPT

## 2023-03-03 PROCEDURE — 80053 COMPREHEN METABOLIC PANEL: CPT

## 2023-03-03 RX ORDER — ONDANSETRON 2 MG/ML
4 INJECTION INTRAMUSCULAR; INTRAVENOUS
Status: COMPLETED | OUTPATIENT
Start: 2023-03-03 | End: 2023-03-03

## 2023-03-03 RX ORDER — KETOROLAC TROMETHAMINE 30 MG/ML
15 INJECTION, SOLUTION INTRAMUSCULAR; INTRAVENOUS
Status: COMPLETED | OUTPATIENT
Start: 2023-03-03 | End: 2023-03-03

## 2023-03-03 RX ORDER — GABAPENTIN 300 MG/1
600 CAPSULE ORAL
Status: COMPLETED | OUTPATIENT
Start: 2023-03-03 | End: 2023-03-03

## 2023-03-03 RX ADMIN — ONDANSETRON 4 MG: 2 INJECTION INTRAMUSCULAR; INTRAVENOUS at 17:18

## 2023-03-03 RX ADMIN — SODIUM CHLORIDE 1000 ML: 9 INJECTION, SOLUTION INTRAVENOUS at 18:14

## 2023-03-03 RX ADMIN — GABAPENTIN 600 MG: 300 CAPSULE ORAL at 18:21

## 2023-03-03 RX ADMIN — SODIUM CHLORIDE 1000 ML: 9 INJECTION, SOLUTION INTRAVENOUS at 17:06

## 2023-03-03 RX ADMIN — KETOROLAC TROMETHAMINE 15 MG: 30 INJECTION, SOLUTION INTRAMUSCULAR; INTRAVENOUS at 17:18

## 2023-03-03 NOTE — ED TRIAGE NOTES
Pt presents for syncope at home x2, states she stood up and passed out and hit her head on the couch. C./o HA and dizziness

## 2023-03-03 NOTE — ED PROVIDER NOTES
[facility] EMERGENCY DEPT  EMERGENCY DEPARTMENT HISTORY AND PHYSICAL EXAM      Date: 3/3/2023  Patient Name: Brenda Gilbert  MRN: [id number]  YOB: 1965  Date of evaluation: 3/3/2023  Provider: Samantha Meyer MD   Note Started: 4:32 PM 3/3/23    HISTORY OF PRESENT ILLNESS     Chief Complaint   Patient presents with    Syncope    Dizziness    Headache       History Provided By: Patient    HPI: Brenda Gilbert, 62 y.o. female with past history as reported below, recent and admission for pneumonia requiring intubation. The patient states since she has been discharged she has not been feeling well has had some shortness of breath. Today she comes into the ED for syncope x2. She states first episode of syncope occurred about an hour or 2 prior to arrival states that she did not and felt dizzy and then passed out. She notes that she woke up briefly after. She notes that she tried to get up and fell a second time and hit the back of her head, she denies loss of consciousness without fall. She notes her roommate saw her and brought her to the emergency department. She states over the last week or so she has been having episodes where she gets up too quickly she feels as if she may pass out. She says dizziness/lightheadedness lasts for only about a few minutes to a couple seconds. She denies any chest pain or shortness of breath, denies any fever or chills. Denies any new leg swelling.     PAST MEDICAL HISTORY   Past Medical History:  Past Medical History:   Diagnosis Date    DDD (degenerative disc disease), lumbar     Fatigue     GERD (gastroesophageal reflux disease)     Hypothyroidism     Psychiatric disorder     PTSD per patient    Seizures ([facility] [address]        Past Surgical History:  Past Surgical History:   Procedure Laterality Date    HX APPENDECTOMY      HX  SECTION      HX CHOLECYSTECTOMY      HX HYSTERECTOMY         Family History:  Family History   Family history unknown: Yes       Social History:  Social History     Tobacco Use    Smoking status: Every Day     Packs/day: 0.50     Types: Cigarettes    Smokeless tobacco: Never   Vaping Use    Vaping Use: Never used   Substance Use Topics    Alcohol use: Not Currently     Alcohol/week: 0.0 standard drinks    Drug use: Never       Allergies: Allergies   Allergen Reactions    Augmentin [Amoxicillin-Pot Clavulanate] Nausea and Vomiting       PCP: None    Current Meds:   Discharge Medication List as of 3/3/2023  7:08 PM        CONTINUE these medications which have NOT CHANGED    Details   furosemide (LASIX) 40 mg tablet 1 tablet daily, Normal, Disp-30 Tablet, R-0      doxycycline (VIBRAMYCIN) 100 mg capsule Take 1 Capsule by mouth every twelve (12) hours. , Normal, Disp-10 Capsule, R-0      aspirin 81 mg chewable tablet Take 1 Tablet by mouth daily. , No Print, Disp-30 Tablet, R-0      buprenorphine-naloxone (SUBOXONE) 8-2 mg film sublingaul film 1 Film by SubLINGual route two (2) times a day., Historical Med      ondansetron (ZOFRAN ODT) 4 mg disintegrating tablet Take 1 Tablet by mouth every eight (8) hours as needed for Nausea or Vomiting., Normal, Disp-15 Tablet, R-0      lidocaine (Lidoderm) 5 % Apply patch to the affected area for 12 hours a day and remove for 12 hours a day., Normal, Disp-10 Each, R-0      clonazePAM (KlonoPIN) 0.5 mg tablet Take 0.5 mg by mouth three (3) times daily. , Historical Med      QUEtiapine (SEROquel) 300 mg tablet Take 300 mg by mouth nightly., Historical Med      traZODone (DESYREL) 100 mg tablet Take 100-200 mg by mouth At bedtime. , Historical Med      gabapentin (NEURONTIN) 600 mg tablet Take 600 mg by mouth four (4) times daily. , Historical Med             REVIEW OF SYSTEMS   Review of Systems  Positives and Pertinent negatives as per HPI.     PHYSICAL EXAM     ED Triage Vitals [03/03/23 1553]   ED Encounter Vitals Group      /79      Pulse (Heart Rate) 94      Resp Rate 18      Temp 98.1 °F (36.7 °C)      Temp src O2 Sat (%) 97 %      Weight       Height       Physical Exam  Vitals and nursing note reviewed. Constitutional:       General: She is not in acute distress. Appearance: Normal appearance. Comments: When patient arises from standing she has slight dizziness which resolves within a few seconds. HENT:      Head:      Comments: Small hematoma on the occiput without any bleeding or sign. There is no wound to the area. Right Ear: Tympanic membrane normal.      Left Ear: Tympanic membrane normal.   Eyes:      Extraocular Movements: Extraocular movements intact. Pupils: Pupils are equal, round, and reactive to light. Cardiovascular:      Rate and Rhythm: Normal rate. Pulmonary:      Effort: Pulmonary effort is normal.      Breath sounds: Normal breath sounds. Abdominal:      General: Abdomen is flat. There is no distension. Tenderness: There is no abdominal tenderness. There is no guarding or rebound. Musculoskeletal:         General: No deformity or signs of injury. Cervical back: Normal range of motion and neck supple. No rigidity or tenderness. Right lower leg: No edema. Left lower leg: No edema. Skin:     General: Skin is warm. Neurological:      Mental Status: She is alert and oriented to person, place, and time. ED COURSE and DIFFERENTIAL DIAGNOSIS/MDM   Records Reviewed (source and summary of external notes): Prior medical records    Vitals:    Vitals:    03/03/23 1553 03/03/23 1928   BP: 115/79 110/77   Pulse: 94 88   Resp: 18 20   Temp: 98.1 °F (36.7 °C)    SpO2: 97% 98%       CC/HPI Summary, DDx, ED Course, and Reassessment: 70-year-old female presenting to the ED for syncope. Differential diagnosis includes vasovagal, orthostatic syncope, less likely cardiogenic syncope, less likely obstructive syncope, her symptoms are most consistent with orthostatic syncope. She does not have any suggestion of pneumonia or an infectious process.   Will obtain labs as well as x-ray and provide fluids for symptomatic control. EKG: Initial EKG interpreted by me. Shows normal sinus rhythm at a rate of 95 bpm, WV interval 128, QRS 76, QTc 442, there is normal axis there is baseline wander, there is no acute ischemic changes. There is no evidence of WPW, ARVC, HCM, there is normal conduction there are no acute ischemic changes. On reassessment lab work is [employer], her hemoglobin is normal and her CMP is unremarkable her troponin is 12. Her chest x-ray is also unremarkable. Her symptoms are unchanged after 1 L of fluids. We will attempt another liter and then reassess. On repeat reassessment the patient has now received 2 L of fluids. Upon standing she has improvement in her symptoms and is able to walk throughout the ED without any trouble. Repeat vital signs are unremarkable, she is stable for discharge and is comfortable going home and following up with a PCP. Patient was given the following medications:  Medications   sodium chloride 0.9 % bolus infusion 1,000 mL (0 mL IntraVENous IV Completed 3/3/23 1813)   ondansetron (ZOFRAN) injection 4 mg (4 mg IntraVENous Given 3/3/23 1718)   ketorolac (TORADOL) injection 15 mg (15 mg IntraVENous Given 3/3/23 1718)   sodium chloride 0.9 % bolus infusion 1,000 mL (0 mL IntraVENous IV Completed 3/3/23 1914)   gabapentin (NEURONTIN) capsule 600 mg (600 mg Oral Given 3/3/23 1821)       CONSULTS: (Who and What was discussed)  None     Chronic Conditions: as above  Social Determinants affecting Dx or Tx:   Counseling:      Disposition Considerations (Tests not done, Shared Decision Making, Pt Expectation of Test or Treatment.): Understanding was insured that at this time there is no evidence for a more malignant underlying process, but that early in the process of an illness, an emergency department workup can be falsely reassuring.      Routine discharge counseling was given including the fact that any worsening, changing or persistent symptoms should prompt an immediate call or follow up with their primary physician or the emergency department. The importance of appropriate follow up was also discussed. More extensive discharge instructions were given in the patient's discharge paperwork. After completion of evaluation and discussion of results and diagnoses, all the questions were answered. If required, all follow up appointments and treatments were discussed and explained. Understanding was insured prior to discharge. SCREENINGS         From the [facility] CT Head Injury/Trauma Rule:       Major Criteria:  GCS < 15 at 2 hours post-injury  Suspected open or depressed skull fracture  Any sign of basilar skull fracture? (Hemotympanum, Racoon Eyes, Hous Sign, CSF nazario-/rhinorrhea)  ? 2 episodes of vomiting  Age ? 65      Minor Criteria:  Retrograde Amnesia to the Event ? 30 minutes  \"Dangerous\" Mechanism? (Pedestrian struck by motor vehicle, Occupant ejected from motor vehicle, or Fall from > 3 feet or > 5 stairs. Given  absence  of above criteria, INSERT  does not  require CT Head.         No data recorded          LAB, EKG AND DIAGNOSTIC RESULTS   Labs:  Recent Results (from the past 12 hour(s))   EKG, 12 LEAD, INITIAL    Collection Time: 03/03/23  4:00 PM   Result Value Ref Range    Ventricular Rate 95 BPM    Atrial Rate 95 BPM    P-R Interval 128 ms    QRS Duration 76 ms    Q-T Interval 352 ms    QTC Calculation (Bezet) 442 ms    Calculated P Axis 37 degrees    Calculated R Axis 37 degrees    Calculated T Axis -19 degrees    Diagnosis       Normal sinus rhythm  Nonspecific T wave abnormality  Abnormal ECG    Confirmed by Haider Allison MD, Rito Steen ([id number]) on 3/3/2023 4:36:45 PM     CBC WITH AUTOMATED DIFF    Collection Time: 03/03/23  4:39 PM   Result Value Ref Range    WBC 6.1 3.6 - 11.0 K/uL    RBC 5.18 3.80 - 5.20 M/uL    HGB 14.4 11.5 - 16.0 g/dL    HCT 43.4 35.0 - 47.0 %    MCV 83.8 80.0 - 99.0 FL    MCH 27.8 26.0 - 34.0 PG    MCHC 33.2 30.0 - 36.5 g/dL    RDW 14.5 11.5 - 14.5 %    PLATELET 134 (H) 093 - 400 K/uL    MPV 9.6 8.9 - 12.9 FL    NRBC 0.0 0.0  WBC    ABSOLUTE NRBC 0.00 0.00 - 0.01 K/uL    NEUTROPHILS 35 32 - 75 %    LYMPHOCYTES 55 (H) 12 - 49 %    MONOCYTES 6 5 - 13 %    EOSINOPHILS 3 0 - 7 %    BASOPHILS 1 0 - 1 %    IMMATURE GRANULOCYTES 0 0 - 0.5 %    ABS. NEUTROPHILS 2.1 1.8 - 8.0 K/UL    ABS. LYMPHOCYTES 3.4 0.8 - 3.5 K/UL    ABS. MONOCYTES 0.4 0.0 - 1.0 K/UL    ABS. EOSINOPHILS 0.2 0.0 - 0.4 K/UL    ABS. BASOPHILS 0.1 0.0 - 0.1 K/UL    ABS. IMM. GRANS. 0.0 0.00 - 0.04 K/UL    DF AUTOMATED     METABOLIC PANEL, COMPREHENSIVE    Collection Time: 03/03/23  4:39 PM   Result Value Ref Range    Sodium 136 136 - 145 mmol/L    Potassium 4.7 3.5 - 5.1 mmol/L    Chloride 106 97 - 108 mmol/L    CO2 25 21 - 32 mmol/L    Anion gap 5 5 - 15 mmol/L    Glucose 109 (H) 65 - 100 mg/dL    BUN 11 6 - 20 mg/dL    Creatinine 0.71 0.55 - 1.02 mg/dL    BUN/Creatinine ratio 15 12 - 20      eGFR >60 >60 ml/min/1.73m2    Calcium 9.4 8.5 - 10.1 mg/dL    Bilirubin, total 0.3 0.2 - 1.0 mg/dL    AST (SGOT) 23 15 - 37 U/L    ALT (SGPT) 19 12 - 78 U/L    Alk. phosphatase 96 45 - 117 U/L    Protein, total 7.0 6.4 - 8.2 g/dL    Albumin 3.2 (L) 3.5 - 5.0 g/dL    Globulin 3.8 2.0 - 4.0 g/dL    A-G Ratio 0.8 (L) 1.1 - 2.2     TROPONIN-HIGH SENSITIVITY    Collection Time: 03/03/23  4:39 PM   Result Value Ref Range    Troponin-High Sensitivity 12 0 - 51 ng/L       Radiologic Studies:  Non-plain film images such as CT, Ultrasound and MRI are read by the radiologist. Plain radiographic images are visualized and preliminarily interpreted by the ED Provider with the below findings:    Interpretation per the Radiologist below, if available at the time of this note:  XR CHEST PORT    Result Date: 3/3/2023  EXAM: Portable CXR. 1610 hours.  COMPARISON: 2/28/2023  INDICATION: chest pain FINDINGS: The lungs show no acute finding or change including minimal left base atelectasis. Heart is normal in size. There is no pulmonary edema. There is no evident pneumothorax or pleural effusion. No Acute Disease. PROCEDURES   Unless otherwise noted below, none. Performed by: Agustina Wylie MD   Procedures      CRITICAL CARE TIME       FINAL IMPRESSION     1. Orthostatic syncope          DISPOSITION/PLAN   Discharged    Discharge Note: The patient is stable for discharge home. The signs, symptoms, diagnosis, and discharge instructions have been discussed, understanding conveyed, and agreed upon. The patient is to follow up as recommended or return to ER should their symptoms worsen. PATIENT REFERRED TO:  Follow-up Information       Follow up With Specialties Details Why Contact Info    [facility]  Call in 1 day  [address] [id number]  [address]:  Discharge Medication List as of 3/3/2023  7:08 PM            DISCONTINUED MEDICATIONS:  Discharge Medication List as of 3/3/2023  7:08 PM          I am the Primary Clinician of Record: Agustina Wylie MD (electronically signed)    (Please note that parts of this dictation were completed with voice recognition software. Quite often unanticipated grammatical, syntax, homophones, and other interpretive errors are inadvertently transcribed by the computer software. Please disregards these errors.  Please excuse any errors that have escaped final proofreading.)

## 2023-03-04 NOTE — DISCHARGE INSTRUCTIONS
Thank you! Thank you for allowing me to care for you in the emergency department. I sincerely hope that you are satisfied with your visit today. It is my goal to provide you with excellent care. Below you will find a list of your labs and imaging from your visit today if applicable. Should you have any questions regarding these results please do not hesitate to call the emergency department. Please review Prized for a more detailed result list since the below list may not be comprehensive. Instructions on how to sign up to Prized should be provided in this packet. Labs -     Recent Results (from the past 12 hour(s))   EKG, 12 LEAD, INITIAL    Collection Time: 03/03/23  4:00 PM   Result Value Ref Range    Ventricular Rate 95 BPM    Atrial Rate 95 BPM    P-R Interval 128 ms    QRS Duration 76 ms    Q-T Interval 352 ms    QTC Calculation (Bezet) 442 ms    Calculated P Axis 37 degrees    Calculated R Axis 37 degrees    Calculated T Axis -19 degrees    Diagnosis       Normal sinus rhythm  Nonspecific T wave abnormality  Abnormal ECG    Confirmed by Dougie Castaneda MD, Coreen Daley ([id number]) on 3/3/2023 4:36:45 PM     CBC WITH AUTOMATED DIFF    Collection Time: 03/03/23  4:39 PM   Result Value Ref Range    WBC 6.1 3.6 - 11.0 K/uL    RBC 5.18 3.80 - 5.20 M/uL    HGB 14.4 11.5 - 16.0 g/dL    HCT 43.4 35.0 - 47.0 %    MCV 83.8 80.0 - 99.0 FL    MCH 27.8 26.0 - 34.0 PG    MCHC 33.2 30.0 - 36.5 g/dL    RDW 14.5 11.5 - 14.5 %    PLATELET 260 (H) 878 - 400 K/uL    MPV 9.6 8.9 - 12.9 FL    NRBC 0.0 0.0  WBC    ABSOLUTE NRBC 0.00 0.00 - 0.01 K/uL    NEUTROPHILS 35 32 - 75 %    LYMPHOCYTES 55 (H) 12 - 49 %    MONOCYTES 6 5 - 13 %    EOSINOPHILS 3 0 - 7 %    BASOPHILS 1 0 - 1 %    IMMATURE GRANULOCYTES 0 0 - 0.5 %    ABS. NEUTROPHILS 2.1 1.8 - 8.0 K/UL    ABS. LYMPHOCYTES 3.4 0.8 - 3.5 K/UL    ABS. MONOCYTES 0.4 0.0 - 1.0 K/UL    ABS. EOSINOPHILS 0.2 0.0 - 0.4 K/UL    ABS. BASOPHILS 0.1 0.0 - 0.1 K/UL    ABS. IMM.  GRANS. 0.0 0.00 - 0.04 K/UL    DF AUTOMATED     METABOLIC PANEL, COMPREHENSIVE    Collection Time: 03/03/23  4:39 PM   Result Value Ref Range    Sodium 136 136 - 145 mmol/L    Potassium 4.7 3.5 - 5.1 mmol/L    Chloride 106 97 - 108 mmol/L    CO2 25 21 - 32 mmol/L    Anion gap 5 5 - 15 mmol/L    Glucose 109 (H) 65 - 100 mg/dL    BUN 11 6 - 20 mg/dL    Creatinine 0.71 0.55 - 1.02 mg/dL    BUN/Creatinine ratio 15 12 - 20      eGFR >60 >60 ml/min/1.73m2    Calcium 9.4 8.5 - 10.1 mg/dL    Bilirubin, total 0.3 0.2 - 1.0 mg/dL    AST (SGOT) 23 15 - 37 U/L    ALT (SGPT) 19 12 - 78 U/L    Alk. phosphatase 96 45 - 117 U/L    Protein, total 7.0 6.4 - 8.2 g/dL    Albumin 3.2 (L) 3.5 - 5.0 g/dL    Globulin 3.8 2.0 - 4.0 g/dL    A-G Ratio 0.8 (L) 1.1 - 2.2     TROPONIN-HIGH SENSITIVITY    Collection Time: 03/03/23  4:39 PM   Result Value Ref Range    Troponin-High Sensitivity 12 0 - 51 ng/L       Radiologic Studies -   XR CHEST PORT   Final Result   No Acute Disease. CT Results  (Last 48 hours)      None          CXR Results  (Last 48 hours)                 03/03/23 1613  XR CHEST PORT Final result    Impression:  No Acute Disease. Narrative:  EXAM: Portable CXR. 1610 hours. COMPARISON: 2/28/2023         INDICATION: chest pain       FINDINGS:   The lungs show no acute finding or change including minimal left base   atelectasis. Heart is normal in size. There is no pulmonary edema. There is no   evident pneumothorax or pleural effusion. If you feel that you have not received excellent quality care or timely care, please ask to speak to the nurse manager. Please choose us in the future for your continued health care needs. ------------------------------------------------------------------------------------------------------------  The exam and treatment you received in the Emergency Department were for an urgent problem and are not intended as complete care.  It is very important that you follow-up with a doctor, nurse practitioner, or physician assistant in a timely manner to:  (1) confirm your diagnosis and review all imaging and lab results,  (2) re-evaluation of changes in your illness and treatment, and  (3) for ongoing care. If your symptoms become worse or you do not improve as expected and you are unable to reach your usual health care provider, you should return to the Emergency Department. We are available 24 hours a day. Please take your discharge instructions with you when you go to your follow-up appointment. If a prescription has been provided, please have it filled as soon as possible to prevent a delay in treatment. Read the entire medication instruction sheet provided to you by the pharmacy. If you have any questions or reservations about taking the medication due to side effects or interactions with other medications, please call your primary care physician or contact the ER to speak with the charge nurse. Make an appointment with your family doctor or the physician you were referred to for follow-up of this visit as instructed on your discharge paperwork, as this is a mandatory follow-up. Return to the ER if you are unable to be seen or if you are unable to be seen in a timely manner. If you have any problem arranging the follow-up visit, contact the Emergency Department immediately.

## 2023-03-20 ENCOUNTER — HOSPITAL ENCOUNTER (EMERGENCY)
Age: 58
Discharge: HOME | End: 2023-03-20
Attending: EMERGENCY MEDICINE
Payer: MEDICARE

## 2023-03-20 ENCOUNTER — APPOINTMENT (OUTPATIENT)
Dept: CT IMAGING | Age: 58
End: 2023-03-20
Payer: MEDICARE

## 2023-03-20 VITALS
SYSTOLIC BLOOD PRESSURE: 121 MMHG | HEART RATE: 86 BPM | WEIGHT: 200 LBS | OXYGEN SATURATION: 98 % | RESPIRATION RATE: 16 BRPM | BODY MASS INDEX: 30.31 KG/M2 | HEIGHT: 68 IN | TEMPERATURE: 99.2 F | DIASTOLIC BLOOD PRESSURE: 87 MMHG

## 2023-03-20 DIAGNOSIS — R11.0 NAUSEA WITHOUT VOMITING: ICD-10-CM

## 2023-03-20 DIAGNOSIS — L65.9 PATCHY LOSS OF HAIR: ICD-10-CM

## 2023-03-20 DIAGNOSIS — R23.9 SKIN CHANGE: Primary | ICD-10-CM

## 2023-03-20 PROCEDURE — 70450 CT HEAD/BRAIN W/O DYE: CPT

## 2023-03-20 PROCEDURE — 74011250636 HC RX REV CODE- 250/636

## 2023-03-20 PROCEDURE — 99284 EMERGENCY DEPT VISIT MOD MDM: CPT

## 2023-03-20 PROCEDURE — 74011250637 HC RX REV CODE- 250/637

## 2023-03-20 RX ORDER — NAPROXEN 500 MG/1
500 TABLET ORAL
Status: COMPLETED | OUTPATIENT
Start: 2023-03-20 | End: 2023-03-20

## 2023-03-20 RX ORDER — HYDROCODONE BITARTRATE AND ACETAMINOPHEN 5; 325 MG/1; MG/1
1 TABLET ORAL
Status: COMPLETED | OUTPATIENT
Start: 2023-03-20 | End: 2023-03-20

## 2023-03-20 RX ORDER — ONDANSETRON 4 MG/1
4 TABLET, ORALLY DISINTEGRATING ORAL
Status: COMPLETED | OUTPATIENT
Start: 2023-03-20 | End: 2023-03-20

## 2023-03-20 RX ADMIN — ONDANSETRON 4 MG: 4 TABLET, ORALLY DISINTEGRATING ORAL at 18:12

## 2023-03-20 RX ADMIN — HYDROCODONE BITARTRATE AND ACETAMINOPHEN 1 TABLET: 5; 325 TABLET ORAL at 18:55

## 2023-03-20 RX ADMIN — NAPROXEN 500 MG: 500 TABLET ORAL at 18:11

## 2023-03-20 NOTE — ED NOTES
Pt aware that they cannot drive at this time after taking medication. Pt states ride waiting for patient in the waiting room.

## 2023-03-20 NOTE — ED PROVIDER NOTES
[facility] EMERGENCY DEPT  EMERGENCY DEPARTMENT HISTORY AND PHYSICAL EXAM      Date: 3/20/2023  Patient Name: Sanjuana Sanz  MRN: [id number]  [place]: 1965  Date of evaluation: 3/20/2023  Provider: Cadence Lott NP   Note Started: 4:50 PM 3/20/23    HISTORY OF PRESENT ILLNESS     Chief Complaint   Patient presents with    Head Pain       History Provided By: Patient    HPI: Sanjuana Sanz is a 62 y.o. female with a history of GERD, hypothyroidism, PTSD, seizure, and pneumonia presents with scalp pain and nausea. Patient states she was discharged a month ago after pneumonia diagnosis at which time she noticed a pain in her left posterior scalp that has been constant, throbbing, nonradiating since that time. She has taken Tylenol without relief. She endorses associated nausea as well. She denies fever, vomiting, diarrhea, bleeding, abdominal pain, back pain, neck pain, dysphagia, visual disturbance, headache, chest pain, or difficulty breathing. No alleviating or exacerbating factors. PAST MEDICAL HISTORY   Past Medical History:  Past Medical History:   Diagnosis Date    DDD (degenerative disc disease), lumbar     Fatigue     GERD (gastroesophageal reflux disease)     Hypothyroidism     Psychiatric disorder     PTSD per patient    Seizures ([facility] [address]        Past Surgical History:  Past Surgical History:   Procedure Laterality Date    HX APPENDECTOMY      HX  SECTION      HX CHOLECYSTECTOMY      HX HYSTERECTOMY         Family History:  Family History   Family history unknown: Yes       Social History:  Social History     Tobacco Use    Smoking status: Every Day     Packs/day: 0.50     Types: Cigarettes    Smokeless tobacco: Never   Vaping Use    Vaping Use: Never used   Substance Use Topics    Alcohol use: Not Currently     Alcohol/week: 0.0 standard drinks    Drug use: Never       Allergies:   Allergies   Allergen Reactions    Augmentin [Amoxicillin-Pot Clavulanate] Nausea and Vomiting       PCP: None    Current Meds:   Previous Medications    ASPIRIN 81 MG CHEWABLE TABLET    Take 1 Tablet by mouth daily. BUPRENORPHINE-NALOXONE (SUBOXONE) 8-2 MG FILM SUBLINGAUL FILM    1 Film by SubLINGual route two (2) times a day. CLONAZEPAM (KLONOPIN) 0.5 MG TABLET    Take 0.5 mg by mouth three (3) times daily. DOXYCYCLINE (VIBRAMYCIN) 100 MG CAPSULE    Take 1 Capsule by mouth every twelve (12) hours. FUROSEMIDE (LASIX) 40 MG TABLET    1 tablet daily    GABAPENTIN (NEURONTIN) 600 MG TABLET    Take 600 mg by mouth four (4) times daily. LIDOCAINE (LIDODERM) 5 %    Apply patch to the affected area for 12 hours a day and remove for 12 hours a day. ONDANSETRON (ZOFRAN ODT) 4 MG DISINTEGRATING TABLET    Take 1 Tablet by mouth every eight (8) hours as needed for Nausea or Vomiting. QUETIAPINE (SEROQUEL) 300 MG TABLET    Take 300 mg by mouth nightly. TRAZODONE (DESYREL) 100 MG TABLET    Take 100-200 mg by mouth At bedtime. PHYSICAL EXAM     ED Triage Vitals [03/20/23 1635]   ED Encounter Vitals Group      /87      Pulse (Heart Rate) 86      Resp Rate 16      Temp 99.2 °F (37.3 °C)      Temp src       O2 Sat (%) 98 %      Weight 200 lb      Height 5' 8\"      Physical Exam  Vitals and nursing note reviewed. Constitutional:       General: She is not in acute distress. Appearance: She is not ill-appearing. HENT:      Head: Normocephalic. No masses. Hair is abnormal.        Comments: Approximate 2 x 3 inch area of hair loss with central skin darkening. No induration, erythema, fluctuance, crepitus, ecchymosis, skin streaking. Nose: Nose normal. No congestion or rhinorrhea. Mouth/Throat:      Mouth: Mucous membranes are moist.      Pharynx: Oropharynx is clear. No posterior oropharyngeal erythema. Eyes:      Extraocular Movements: Extraocular movements intact. Pupils: Pupils are equal, round, and reactive to light.    Cardiovascular:      Rate and Rhythm: Normal rate and regular rhythm. Pulses: Normal pulses. Heart sounds: Normal heart sounds. No murmur heard. Pulmonary:      Effort: Pulmonary effort is normal. No respiratory distress. Breath sounds: Normal breath sounds. Abdominal:      General: Bowel sounds are normal.      Palpations: Abdomen is soft. Tenderness: There is no abdominal tenderness. There is no guarding. Musculoskeletal:         General: Normal range of motion. Cervical back: Normal range of motion and neck supple. No tenderness. Lymphadenopathy:      Cervical: No cervical adenopathy. Skin:     General: Skin is warm and dry. Neurological:      General: No focal deficit present. Mental Status: She is alert and oriented to person, place, and time. Cranial Nerves: No cranial nerve deficit. Sensory: No sensory deficit. Motor: No weakness. Psychiatric:         Mood and Affect: Mood normal.       SCREENINGS        No data recorded      LAB, EKG AND DIAGNOSTIC RESULTS   Labs:  No results found for this or any previous visit (from the past 12 hour(s)). Radiologic Studies:  Non-plain film images such as CT, Ultrasound and MRI are read by the radiologist. Plain radiographic images are visualized and preliminarily interpreted by the ED Physician with the following findings: Not applicable    Interpretation per the Radiologist below, if available at the time of this note:  CT HEAD WO CONT    Result Date: 3/20/2023  CLINICAL HISTORY: nausea, scalp abnormality INDICATION: nausea, scalp abnormality COMPARISON: 2/20/2023. CT dose reduction was achieved through use of a standardized protocol tailored for this examination and automatic exposure control for dose modulation. TECHNIQUE: Serial axial images with a collimation of 5 mm were obtained from the skull base through the vertex  FINDINGS: The sulci and ventricles are within normal limits for patient age.  There is no evidence of an acute infarction, hemorrhage, or mass-effect. There is no evidence of midline shift or hydrocephalus. Posterior fossa structures are unremarkable. No extra-axial collections are seen. Mastoid air cells are well pneumatized and clear. There is no evidence of depressed skull fractures of soft tissue swelling. No acute intracranial process. There is no acute fracture or dislocation identified. PROCEDURES   Unless otherwise noted below, none. Procedures      CRITICAL CARE TIME   None    ED COURSE and DIFFERENTIAL DIAGNOSIS/MDM   CC/HPI Summary, DDx, ED Course, and Reassessment:     Records Reviewed (source and summary of external notes): Prior medical records, labs, and Nursing notes    Vitals:    Vitals:    03/20/23 1635   BP: 121/87   Pulse: 86   Resp: 16   Temp: 99.2 °F (37.3 °C)   SpO2: 98%   Weight: 90.7 kg (200 lb)   Height: 5' 8\" (1.727 m)        ED Course as of 03/21/23 2218   Mon Mar 20, 2023   252 59-year-old female presents with left posterior scalp pain, hair loss, and nausea. Ongoing for 1 month. Differentials include alopecia, intracranial abnormality, viral infection, fungal infection, atopic dermatitis. [XX]   1655 CT result - No acute intracranial process. There is no acute fracture or dislocation identified. [KW]   1915 Importance of follow-up with dermatology discussed. Tylenol and ibuprofen recommended for symptom management. Ondansetron prescription provided with education. [KW]   1920 The patient has been re-evaluated and is ready for discharge. Results reviewed. Diagnosis and care plan counseling provided. Callie Philippe agrees to follow-up as recommended, or return to the ED if her symptoms worsen. Warning signs and return precautions discussed. She is in agreement with plan of care, verbalized understanding, and questions answered.  [KW]      ED Course User Index  [KW] Le Sweeney NP       Disposition Considerations (Tests not done, Shared Decision Making, Pt Expectation of Test or Treatment.):      Patient was given the following medications:  Medications   HYDROcodone-acetaminophen (NORCO) 5-325 mg per tablet 1 Tablet (has no administration in time range)   ondansetron (ZOFRAN ODT) tablet 4 mg (4 mg Oral Given 3/20/23 1812)   naproxen (NAPROSYN) tablet 500 mg (500 mg Oral Given 3/20/23 1811)       CONSULTS: (Who and What was discussed)  None   Patient discussed with attending, Dr. Ward Montero. Social Determinants affecting Dx or Tx: None    FINAL IMPRESSION     1. Skin change    2. Patchy loss of hair    3. Nausea without vomiting          DISPOSITION/PLAN   Discharged    Discharge Note: The patient is stable for discharge home. The signs, symptoms, diagnosis, and discharge instructions have been discussed, understanding conveyed, and agreed upon. The patient is to follow up as recommended or return to ER should their symptoms worsen. PATIENT REFERRED TO:  Follow-up Information       Follow up With Specialties Details Why [facility] EMERGENCY DEPT Emergency Medicine  If symptoms worsen [address] [facility] [address] [phone]    Dermatology [place] [address]  Call in 1 day  [address]  [phone]              DISCHARGE MEDICATIONS:  Current Discharge Medication List            DISCONTINUED MEDICATIONS:  Current Discharge Medication List          I am the Primary Clinician of Record: Umm Saldivar NP (electronically signed)    (Please note that parts of this dictation were completed with voice recognition software. Quite often unanticipated grammatical, syntax, homophones, and other interpretive errors are inadvertently transcribed by the computer software. Please disregards these errors.  Please excuse any errors that have escaped final proofreading.)

## 2023-03-21 RX ORDER — ONDANSETRON 4 MG/1
4 TABLET, ORALLY DISINTEGRATING ORAL
Qty: 15 TABLET | Refills: 0 | Status: SHIPPED | OUTPATIENT
Start: 2023-03-21

## 2023-03-24 ENCOUNTER — APPOINTMENT (OUTPATIENT)
Dept: GENERAL RADIOLOGY | Age: 58
End: 2023-03-24
Attending: EMERGENCY MEDICINE
Payer: MEDICARE

## 2023-03-24 ENCOUNTER — HOSPITAL ENCOUNTER (EMERGENCY)
Age: 58
Discharge: HOME | End: 2023-03-24
Attending: EMERGENCY MEDICINE | Admitting: EMERGENCY MEDICINE
Payer: MEDICARE

## 2023-03-24 ENCOUNTER — APPOINTMENT (OUTPATIENT)
Dept: CT IMAGING | Age: 58
End: 2023-03-24
Attending: EMERGENCY MEDICINE
Payer: MEDICARE

## 2023-03-24 VITALS
HEART RATE: 102 BPM | DIASTOLIC BLOOD PRESSURE: 80 MMHG | SYSTOLIC BLOOD PRESSURE: 116 MMHG | WEIGHT: 200 LBS | RESPIRATION RATE: 18 BRPM | TEMPERATURE: 98.9 F | HEIGHT: 68 IN | OXYGEN SATURATION: 97 % | BODY MASS INDEX: 30.31 KG/M2

## 2023-03-24 DIAGNOSIS — R51.9 ACUTE NONINTRACTABLE HEADACHE, UNSPECIFIED HEADACHE TYPE: Primary | ICD-10-CM

## 2023-03-24 DIAGNOSIS — R91.1 PULMONARY NODULE: ICD-10-CM

## 2023-03-24 LAB
ALBUMIN SERPL-MCNC: 3.7 G/DL (ref 3.5–5)
ALBUMIN/GLOB SERPL: 0.9 (ref 1.1–2.2)
ALP SERPL-CCNC: 79 U/L (ref 45–117)
ALT SERPL-CCNC: 16 U/L (ref 12–78)
ANION GAP SERPL CALC-SCNC: 5 MMOL/L (ref 5–15)
APPEARANCE UR: ABNORMAL
AST SERPL W P-5'-P-CCNC: 10 U/L (ref 15–37)
BACTERIA URNS QL MICRO: NEGATIVE /HPF
BASOPHILS # BLD: 0.1 K/UL (ref 0–0.1)
BASOPHILS NFR BLD: 1 % (ref 0–1)
BILIRUB SERPL-MCNC: 0.6 MG/DL (ref 0.2–1)
BILIRUB UR QL: NEGATIVE
BUN SERPL-MCNC: 22 MG/DL (ref 6–20)
BUN/CREAT SERPL: 24 (ref 12–20)
CA-I BLD-MCNC: 9.1 MG/DL (ref 8.5–10.1)
CHLORIDE SERPL-SCNC: 109 MMOL/L (ref 97–108)
CO2 SERPL-SCNC: 25 MMOL/L (ref 21–32)
COLOR UR: ABNORMAL
CREAT SERPL-MCNC: 0.91 MG/DL (ref 0.55–1.02)
DIFFERENTIAL METHOD BLD: ABNORMAL
EOSINOPHIL # BLD: 0.1 K/UL (ref 0–0.4)
EOSINOPHIL NFR BLD: 1 % (ref 0–7)
EPITH CASTS URNS QL MICRO: ABNORMAL /LPF
ERYTHROCYTE [DISTWIDTH] IN BLOOD BY AUTOMATED COUNT: 15.7 % (ref 11.5–14.5)
GLOBULIN SER CALC-MCNC: 3.9 G/DL (ref 2–4)
GLUCOSE SERPL-MCNC: 93 MG/DL (ref 65–100)
GLUCOSE UR STRIP.AUTO-MCNC: NEGATIVE MG/DL
HCT VFR BLD AUTO: 48.4 % (ref 35–47)
HGB BLD-MCNC: 16.1 G/DL (ref 11.5–16)
HGB UR QL STRIP: NEGATIVE
IMM GRANULOCYTES # BLD AUTO: 0 K/UL (ref 0–0.04)
IMM GRANULOCYTES NFR BLD AUTO: 0 % (ref 0–0.5)
KETONES UR QL STRIP.AUTO: 5 MG/DL
LEUKOCYTE ESTERASE UR QL STRIP.AUTO: NEGATIVE
LIPASE SERPL-CCNC: 109 U/L (ref 73–393)
LYMPHOCYTES # BLD: 3.3 K/UL (ref 0.8–3.5)
LYMPHOCYTES NFR BLD: 38 % (ref 12–49)
MAGNESIUM SERPL-MCNC: 2.2 MG/DL (ref 1.6–2.4)
MCH RBC QN AUTO: 27.7 PG (ref 26–34)
MCHC RBC AUTO-ENTMCNC: 33.3 G/DL (ref 30–36.5)
MCV RBC AUTO: 83.2 FL (ref 80–99)
MONOCYTES # BLD: 0.4 K/UL (ref 0–1)
MONOCYTES NFR BLD: 4 % (ref 5–13)
MUCOUS THREADS URNS QL MICRO: ABNORMAL /LPF
NEUTS SEG # BLD: 4.9 K/UL (ref 1.8–8)
NEUTS SEG NFR BLD: 56 % (ref 32–75)
NITRITE UR QL STRIP.AUTO: NEGATIVE
NRBC # BLD: 0 K/UL (ref 0–0.01)
NRBC BLD-RTO: 0 PER 100 WBC
PH UR STRIP: 5 (ref 5–8)
PLATELET # BLD AUTO: 432 K/UL (ref 150–400)
PMV BLD AUTO: 10 FL (ref 8.9–12.9)
POTASSIUM SERPL-SCNC: 3.5 MMOL/L (ref 3.5–5.1)
PROT SERPL-MCNC: 7.6 G/DL (ref 6.4–8.2)
PROT UR STRIP-MCNC: 30 MG/DL
RBC # BLD AUTO: 5.82 M/UL (ref 3.8–5.2)
RBC #/AREA URNS HPF: ABNORMAL /HPF (ref 0–5)
SODIUM SERPL-SCNC: 139 MMOL/L (ref 136–145)
SP GR UR REFRACTOMETRY: >1.03 (ref 1–1.03)
UA: UC IF INDICATED,UAUC: ABNORMAL
UROBILINOGEN UR QL STRIP.AUTO: 2 EU/DL (ref 0.1–1)
WBC # BLD AUTO: 8.8 K/UL (ref 3.6–11)
WBC URNS QL MICRO: ABNORMAL /HPF (ref 0–4)

## 2023-03-24 PROCEDURE — 99285 EMERGENCY DEPT VISIT HI MDM: CPT

## 2023-03-24 PROCEDURE — 74011250636 HC RX REV CODE- 250/636: Performed by: EMERGENCY MEDICINE

## 2023-03-24 PROCEDURE — 80053 COMPREHEN METABOLIC PANEL: CPT

## 2023-03-24 PROCEDURE — 81001 URINALYSIS AUTO W/SCOPE: CPT

## 2023-03-24 PROCEDURE — 85025 COMPLETE CBC W/AUTO DIFF WBC: CPT

## 2023-03-24 PROCEDURE — 96374 THER/PROPH/DIAG INJ IV PUSH: CPT

## 2023-03-24 PROCEDURE — 83690 ASSAY OF LIPASE: CPT

## 2023-03-24 PROCEDURE — 36415 COLL VENOUS BLD VENIPUNCTURE: CPT

## 2023-03-24 PROCEDURE — 74011250637 HC RX REV CODE- 250/637: Performed by: EMERGENCY MEDICINE

## 2023-03-24 PROCEDURE — 70496 CT ANGIOGRAPHY HEAD: CPT

## 2023-03-24 PROCEDURE — 83735 ASSAY OF MAGNESIUM: CPT

## 2023-03-24 PROCEDURE — 96375 TX/PRO/DX INJ NEW DRUG ADDON: CPT

## 2023-03-24 PROCEDURE — 96376 TX/PRO/DX INJ SAME DRUG ADON: CPT

## 2023-03-24 PROCEDURE — 71045 X-RAY EXAM CHEST 1 VIEW: CPT

## 2023-03-24 PROCEDURE — 74011000636 HC RX REV CODE- 636: Performed by: EMERGENCY MEDICINE

## 2023-03-24 RX ORDER — OXYCODONE AND ACETAMINOPHEN 5; 325 MG/1; MG/1
1 TABLET ORAL
Qty: 12 TABLET | Refills: 0 | Status: SHIPPED | OUTPATIENT
Start: 2023-03-24 | End: 2023-03-27

## 2023-03-24 RX ORDER — LORAZEPAM 2 MG/ML
2 INJECTION INTRAMUSCULAR
Status: COMPLETED | OUTPATIENT
Start: 2023-03-24 | End: 2023-03-24

## 2023-03-24 RX ORDER — ONDANSETRON 4 MG/1
4 TABLET, ORALLY DISINTEGRATING ORAL
Status: COMPLETED | OUTPATIENT
Start: 2023-03-24 | End: 2023-03-24

## 2023-03-24 RX ORDER — ONDANSETRON 2 MG/ML
4 INJECTION INTRAMUSCULAR; INTRAVENOUS
Status: COMPLETED | OUTPATIENT
Start: 2023-03-24 | End: 2023-03-24

## 2023-03-24 RX ORDER — ONDANSETRON 4 MG/1
4 TABLET, ORALLY DISINTEGRATING ORAL
Qty: 12 TABLET | Refills: 0 | Status: SHIPPED | OUTPATIENT
Start: 2023-03-24 | End: 2023-03-28

## 2023-03-24 RX ORDER — MORPHINE SULFATE 4 MG/ML
4 INJECTION INTRAVENOUS ONCE
Status: COMPLETED | OUTPATIENT
Start: 2023-03-24 | End: 2023-03-24

## 2023-03-24 RX ORDER — OXYCODONE AND ACETAMINOPHEN 5; 325 MG/1; MG/1
1 TABLET ORAL
Status: COMPLETED | OUTPATIENT
Start: 2023-03-24 | End: 2023-03-24

## 2023-03-24 RX ORDER — MORPHINE SULFATE 2 MG/ML
2 INJECTION, SOLUTION INTRAMUSCULAR; INTRAVENOUS ONCE
Status: COMPLETED | OUTPATIENT
Start: 2023-03-24 | End: 2023-03-24

## 2023-03-24 RX ADMIN — ONDANSETRON 4 MG: 4 TABLET, ORALLY DISINTEGRATING ORAL at 20:12

## 2023-03-24 RX ADMIN — MORPHINE SULFATE 2 MG: 2 INJECTION, SOLUTION INTRAMUSCULAR; INTRAVENOUS at 15:50

## 2023-03-24 RX ADMIN — MORPHINE SULFATE 4 MG: 4 INJECTION, SOLUTION INTRAMUSCULAR; INTRAVENOUS at 17:24

## 2023-03-24 RX ADMIN — ONDANSETRON 4 MG: 2 INJECTION INTRAMUSCULAR; INTRAVENOUS at 15:49

## 2023-03-24 RX ADMIN — IOPAMIDOL 100 ML: 755 INJECTION, SOLUTION INTRAVENOUS at 19:02

## 2023-03-24 RX ADMIN — OXYCODONE AND ACETAMINOPHEN 1 TABLET: 5; 325 TABLET ORAL at 20:13

## 2023-03-24 RX ADMIN — LORAZEPAM 2 MG: 2 INJECTION INTRAMUSCULAR; INTRAVENOUS at 15:49

## 2023-03-24 RX ADMIN — SODIUM CHLORIDE 1000 ML: 9 INJECTION, SOLUTION INTRAVENOUS at 15:50

## 2023-03-24 NOTE — ED PROVIDER NOTES
[facility] EMERGENCY DEPT  EMERGENCY DEPARTMENT HISTORY AND PHYSICAL EXAM      Date: 3/24/2023  Patient Name: Nellie Cooper  MRN: [id number]  [place] 1965  Date of evaluation: 3/24/2023  Provider: Earl Thurman DO   Note Started: 1:32 PM 3/24/23    History of Presenting Illness     Chief Complaint   Patient presents with    Headache     History Provided By: Patient    HPI: Nellie Cooper is a 62 y.o. female with history of degenerative disc disease, fatigue, GERD, hypothyroidism, PTSD, and seizures who presents with headache, nausea, decreased appetite, vomiting, and headache. Patient states that last week she was discharged from the hospital after being intubated with pneumonia. States she has a posterior headache that is constant, moderate. There is an area of fullness in that region as well. States she is feeling very overwhelmed. She not eating and drinking normally. This is not the worst headache of her life. Is not thunderclap. Past History     Past Medical History:  Past Medical History:   Diagnosis Date    DDD (degenerative disc disease), lumbar     Fatigue     GERD (gastroesophageal reflux disease)     Hypothyroidism     Psychiatric disorder     PTSD per patient    Seizures ([address]        Past Surgical History:  Past Surgical History:   Procedure Laterality Date    HX APPENDECTOMY      HX  SECTION      HX CHOLECYSTECTOMY      HX HYSTERECTOMY         Family History:  Family History   Family history unknown: Yes       Social History:  Social History     Tobacco Use    Smoking status: Every Day     Packs/day: 0.50     Types: Cigarettes    Smokeless tobacco: Never   Vaping Use    Vaping Use: Never used   Substance Use Topics    Alcohol use: Not Currently     Alcohol/week: 0.0 standard drinks    Drug use: Never       Allergies:   Allergies   Allergen Reactions    Augmentin [Amoxicillin-Pot Clavulanate] Nausea and Vomiting       PCP: None    Current Meds:   Discharge Medication List as of 3/24/2023  8:14 PM        CONTINUE these medications which have NOT CHANGED    Details   !! ondansetron (ZOFRAN ODT) 4 mg disintegrating tablet Take 1 Tablet by mouth every eight (8) hours as needed for Nausea or Vomiting., Normal, Disp-15 Tablet, R-0      furosemide (LASIX) 40 mg tablet 1 tablet daily, Normal, Disp-30 Tablet, R-0      doxycycline (VIBRAMYCIN) 100 mg capsule Take 1 Capsule by mouth every twelve (12) hours. , Normal, Disp-10 Capsule, R-0      aspirin 81 mg chewable tablet Take 1 Tablet by mouth daily. , No Print, Disp-30 Tablet, R-0      buprenorphine-naloxone (SUBOXONE) 8-2 mg film sublingaul film 1 Film by SubLINGual route two (2) times a day., Historical Med      lidocaine (Lidoderm) 5 % Apply patch to the affected area for 12 hours a day and remove for 12 hours a day., Normal, Disp-10 Each, R-0      clonazePAM (KlonoPIN) 0.5 mg tablet Take 0.5 mg by mouth three (3) times daily. , Historical Med      QUEtiapine (SEROquel) 300 mg tablet Take 300 mg by mouth nightly., Historical Med      traZODone (DESYREL) 100 mg tablet Take 100-200 mg by mouth At bedtime. , Historical Med      gabapentin (NEURONTIN) 600 mg tablet Take 600 mg by mouth four (4) times daily. , Historical Med       !! - Potential duplicate medications found. Please discuss with provider. Physical Exam   Vitals  ED Triage Vitals [03/24/23 1250]   ED Encounter Vitals Group      /80      Pulse (Heart Rate) (!) 102      Resp Rate 18      Temp 98.9 °F (37.2 °C)      Temp src       O2 Sat (%) 97 %      Weight 200 lb      Height 5' 8\"      Exam  Constitutional: No acute distress. Well-nourished. Skin: No rash. ENT: No rhinorrhea. No cough. Head is normocephalic and atraumatic. Eye: No proptosis or conjunctival injections. Respiratory: No apparent respiratory distress. Lungs are clear. Gastrointestinal: Nondistended. Soft nontender. Musculoskeletal: No obvious bony deformities. Psychiatric: Cooperative.   Anxious and tearful. Neurological: Cranial nerves intact. No dysmetria. Normal strength. SCREENINGS               No data recorded        Lab and Diagnostic Study Results   Labs -   No results found for this or any previous visit (from the past 12 hour(s)). EKG: Initial EKG interpreted by me. Interpretation: N/A    Radiologic Studies:  Non-plain film images such as CT, Ultrasound and MRI are read by the radiologist. Plain radiographic images are visualized and preliminarily interpreted by the ED Provider with the below findings:  Chest x-ray shows no filtrate. Interpretation per the radiologist below, if available at the time of this note:  XR CHEST SNGL V    Result Date: 3/24/2023  INDICATION: recent pneumonia in ICU intubated, anxiety, sob Portable AP view of the chest. Direct comparison made to prior chest x-ray dated March 2023. Cardiomediastinal silhouette is stable. Lungs are clear bilaterally. Pleural spaces are normal and there is no pneumothorax. Osseous structures are intact. No acute cardiopulmonary disease. CTA HEAD NECK W WO CONT    Result Date: 3/24/2023  CLINICAL HISTORY: Headache EXAMINATION:  CT ANGIOGRAPHY HEAD AND NECK COMPARISON: CT head 3/20/2023, CTA head 5/25/2022 TECHNIQUE: Unenhanced axial head CT was performed. Following the uneventful administration of iodinated contrast material, axial CT angiography of the head and neck was performed. Delayed axial images through the head were also obtained. Coronal and sagittal reconstructions were obtained. Manual postprocessing of images was performed. 3-D  Sagittal maximal intensity projection images were obtained. 3-D Coronal maximal intensity projections were obtained. CT dose reduction was achieved through use of a standardized protocol tailored for this examination and automatic exposure control for dose modulation. FINDINGS: Head CT: The ventricles are midline without hydrocephalus. There is no acute intra or extra-axial hemorrhage. Gray-white matter differentiation is preserved. The basal cisterns are clear. The paranasal sinuses are clear. CTA NECK: Great vessels: Normal arch anatomy with the origins patent. Right subclavian artery: Patent Left subclavian artery: Patent Right common carotid artery: Patent Left common carotid artery: Patent Cervical right internal carotid artery: Patent with mild atherosclerosis causing no significant stenosis by NASCET criteria. Cervical left internal carotid artery: Patent with mild atherosclerosis causing no significant stenosis by NASCET criteria. Right vertebral artery: Patent Left vertebral artery: Patent CTA HEAD: Right cavernous internal carotid artery: Mild atherosclerosis Left cavernous internal carotid artery: Mild atherosclerosis Anterior cerebral arteries: Patent Anterior communicating artery: Patent Right middle cerebral artery: Patent Left middle cerebral artery: Patent Posterior communicating arteries: Left is patent. Right is hypoplastic Posterior cerebral arteries: Patent Basilar artery: Patent Distal vertebral arteries: Patent Mild to moderate emphysema with a few lung nodules measuring up to 6 mm (image 1) Moderate cervical spondylosis Measurements use NASCET criteria. 1. No acute large vessel arterial occlusion. Mild atherosclerosis. No evidence for intracranial aneurysm. 2. Mild to moderate emphysema with a few lung nodules measuring up to 6 mm. 3. No evidence for acute intracranial abnormality. Guidelines by the Fleischner society (Radiology 2017 special report) suggest that patients with low risk for lung cancer and solid nodule(s) less than or equal to 6 mm in diameter require no follow-up. In patients with a higher risk, such as smokers, follow-up noncontrast chest CT should be considered at 12 months. Patients with a known malignancy are at increased risk for metastasis and should receive a three month follow-up.         MDM (EMERGENCY DEPARTMENT COURSE and DIFFERENTIAL DIAGNOSIS)   Vitals:    Vitals:    03/24/23 1250 03/24/23 1253   BP: 116/80    Pulse: (!) 102    Resp: 18    Temp: 98.9 °F (37.2 °C)    SpO2: 97% 97%   Weight: 90.7 kg (200 lb)    Height: 5' 8\" (1.727 m)         Patient was given the following medications:  Medications   LORazepam (ATIVAN) injection 2 mg (2 mg IntraVENous Given 3/24/23 1549)   ondansetron (ZOFRAN) injection 4 mg (4 mg IntraVENous Given 3/24/23 1549)   sodium chloride 0.9 % bolus infusion 1,000 mL (0 mL IntraVENous IV Completed 3/24/23 1941)   morphine injection 2 mg (2 mg IntraVENous Given 3/24/23 1550)   morphine injection 4 mg (4 mg IntraVENous Given 3/24/23 1724)   iopamidoL (ISOVUE-370) 370 mg iodine /mL (76 %) injection 100 mL (100 mL IntraVENous Given 3/24/23 1902)   oxyCODONE-acetaminophen (PERCOCET) 5-325 mg per tablet 1 Tablet (1 Tablet Oral Given 3/24/23 2013)   ondansetron (ZOFRAN ODT) tablet 4 mg (4 mg Oral Given 3/24/23 2012)       CONSULTS: (who and what was discussed)  None   N/a    Social Determinants affecting Dx or Tx: None  Counseling: none    Records Reviewed (source and summary of external notes): Nursing notes. Prior medical records, Previous Radiology studies, and Previous Laboratory studies    CC/HPI Summary: Presents with decreased appetite, nausea, vomiting, headache. Recently intubated in the ICU with pneumonia. DDx: Pneumonia, anxiety, concussion, intracranial hemorrhage, migraine headache, tension headache  ED Course and Reassessment:   Interpretation of studies:  Urinalysis, CMP, and CBC were reviewed. I do not see signs of UTI. Electrolytes are normal.  No acute kidney injury. I also reviewed the CT scan and x-ray imaging interpretations which showed no acute pathology. I did offer the patient symptoms with ICU admission. I want to make sure she did not have any signs of intracranial hemorrhage.   I did consider lumbar puncture as next up however patient is very well-appearing and this does not appear to be intracranial hemorrhage or subarachnoid hemorrhage. Do not think that the benefits outweigh the risk. Patient was given multiple medicines for pain in the ER as well as IV fluids and nausea medicine. She looks better than when she arrived. Feel safe discharging home. I did give her strict return precautions. Patient does have a pulmonary nodule. I did inform her to follow-up with primary care doctor regarding this. Disposition/Plan     Disposition: Discharged    DISCHARGE PLAN:  1. Current Discharge Medication List        CONTINUE these medications which have NOT CHANGED    Details   ondansetron (ZOFRAN ODT) 4 mg disintegrating tablet Take 1 Tablet by mouth every eight (8) hours as needed for Nausea or Vomiting. Qty: 15 Tablet, Refills: 0      furosemide (LASIX) 40 mg tablet 1 tablet daily  Qty: 30 Tablet, Refills: 0      doxycycline (VIBRAMYCIN) 100 mg capsule Take 1 Capsule by mouth every twelve (12) hours. Qty: 10 Capsule, Refills: 0      aspirin 81 mg chewable tablet Take 1 Tablet by mouth daily. Qty: 30 Tablet, Refills: 0      buprenorphine-naloxone (SUBOXONE) 8-2 mg film sublingaul film 1 Film by SubLINGual route two (2) times a day. lidocaine (Lidoderm) 5 % Apply patch to the affected area for 12 hours a day and remove for 12 hours a day. Qty: 10 Each, Refills: 0      clonazePAM (KlonoPIN) 0.5 mg tablet Take 0.5 mg by mouth three (3) times daily. QUEtiapine (SEROquel) 300 mg tablet Take 300 mg by mouth nightly. traZODone (DESYREL) 100 mg tablet Take 100-200 mg by mouth At bedtime. gabapentin (NEURONTIN) 600 mg tablet Take 600 mg by mouth four (4) times daily. Associated Diagnoses: Idiopathic hypotension; Hypothyroidism due to acquired atrophy of thyroid; Anemia due to vitamin B12 deficiency; Dizziness; Arthralgia           2.    Follow-up Information       Follow up With Specialties Details Why [address] [facility] EMERGENCY DEPT Emergency Medicine Go today As soon as possible if symptoms worsen [address] [address]  [phone]    Primary care doctor  Schedule an appointment as soon as possible for a visit in 3 days Discuss pulmonary nodule           3. Return to ED if worse   4. Discharge Medication List as of 3/24/2023  8:14 PM        START taking these medications    Details   oxyCODONE-acetaminophen (Percocet) 5-325 mg per tablet Take 1 Tablet by mouth every six (6) hours as needed for Pain for up to 3 days. Max Daily Amount: 4 Tablets., Normal, Disp-12 Tablet, R-0      !! ondansetron (ZOFRAN ODT) 4 mg disintegrating tablet Take 1 Tablet by mouth every eight (8) hours as needed for Nausea or Vomiting for up to 4 days. , Normal, Disp-12 Tablet, R-0       !! - Potential duplicate medications found. Please discuss with provider. CONTINUE these medications which have NOT CHANGED    Details   !! ondansetron (ZOFRAN ODT) 4 mg disintegrating tablet Take 1 Tablet by mouth every eight (8) hours as needed for Nausea or Vomiting., Normal, Disp-15 Tablet, R-0      furosemide (LASIX) 40 mg tablet 1 tablet daily, Normal, Disp-30 Tablet, R-0      doxycycline (VIBRAMYCIN) 100 mg capsule Take 1 Capsule by mouth every twelve (12) hours. , Normal, Disp-10 Capsule, R-0      aspirin 81 mg chewable tablet Take 1 Tablet by mouth daily. , No Print, Disp-30 Tablet, R-0      buprenorphine-naloxone (SUBOXONE) 8-2 mg film sublingaul film 1 Film by SubLINGual route two (2) times a day., Historical Med      lidocaine (Lidoderm) 5 % Apply patch to the affected area for 12 hours a day and remove for 12 hours a day., Normal, Disp-10 Each, R-0      clonazePAM (KlonoPIN) 0.5 mg tablet Take 0.5 mg by mouth three (3) times daily. , Historical Med      QUEtiapine (SEROquel) 300 mg tablet Take 300 mg by mouth nightly., Historical Med      traZODone (DESYREL) 100 mg tablet Take 100-200 mg by mouth At bedtime. , Historical Med      gabapentin (NEURONTIN) 600 mg tablet Take 600 mg by mouth four (4) times daily. , Historical Med       !! - Potential duplicate medications found. Please discuss with provider. PATIENT REFERRED TO:  Follow-up Information       Follow up With Specialties Details Why [address] EMERGENCY DEPT Emergency Medicine Go today As soon as possible if symptoms worsen [address] [id number] [phone]    Primary care doctor  Schedule an appointment as soon as possible for a visit in 3 days Discuss pulmonary nodule              DISCONTINUED MEDICATIONS:  Discharge Medication List as of 3/24/2023  8:14 PM        Clinical Impression   Clinical Impression:   1. Acute nonintractable headache, unspecified headache type    2. Pulmonary nodule           Maximilian Ordonez DO    I am the Primary Clinician of Record. Maximilian Ordonez DO (electronically signed)    (Please note that parts of this dictation were completed with voice recognition software. Quite often unanticipated grammatical, syntax, homophones, and other interpretive errors are inadvertently transcribed by the computer software. Please disregards these errors.  Please excuse any errors that have escaped final proofreading.)

## 2023-03-25 NOTE — DISCHARGE INSTRUCTIONS
Thank you! Thank you for allowing me to care for you in the emergency department. It is my goal to provide you with excellent care. If you have not received excellent quality care, please ask to speak to the nurse manager. Please fill out the survey that will come to you by mail or email since we listen to your feedback! Below you will find a list of your tests from today's visit. Should you have any questions, please do not hesitate to call the emergency department. Labs  Recent Results (from the past 12 hour(s))   CBC WITH AUTOMATED DIFF    Collection Time: 03/24/23  3:04 PM   Result Value Ref Range    WBC 8.8 3.6 - 11.0 K/uL    RBC 5.82 (H) 3.80 - 5.20 M/uL    HGB 16.1 (H) 11.5 - 16.0 g/dL    HCT 48.4 (H) 35.0 - 47.0 %    MCV 83.2 80.0 - 99.0 FL    MCH 27.7 26.0 - 34.0 PG    MCHC 33.3 30.0 - 36.5 g/dL    RDW 15.7 (H) 11.5 - 14.5 %    PLATELET 729 (H) 767 - 400 K/uL    MPV 10.0 8.9 - 12.9 FL    NRBC 0.0 0.0  WBC    ABSOLUTE NRBC 0.00 0.00 - 0.01 K/uL    NEUTROPHILS 56 32 - 75 %    LYMPHOCYTES 38 12 - 49 %    MONOCYTES 4 (L) 5 - 13 %    EOSINOPHILS 1 0 - 7 %    BASOPHILS 1 0 - 1 %    IMMATURE GRANULOCYTES 0 0 - 0.5 %    ABS. NEUTROPHILS 4.9 1.8 - 8.0 K/UL    ABS. LYMPHOCYTES 3.3 0.8 - 3.5 K/UL    ABS. MONOCYTES 0.4 0.0 - 1.0 K/UL    ABS. EOSINOPHILS 0.1 0.0 - 0.4 K/UL    ABS. BASOPHILS 0.1 0.0 - 0.1 K/UL    ABS. IMM.  GRANS. 0.0 0.00 - 0.04 K/UL    DF AUTOMATED     LIPASE    Collection Time: 03/24/23  4:54 PM   Result Value Ref Range    Lipase 109 73 - 393 U/L   MAGNESIUM    Collection Time: 03/24/23  4:54 PM   Result Value Ref Range    Magnesium 2.2 1.6 - 2.4 mg/dL   METABOLIC PANEL, COMPREHENSIVE    Collection Time: 03/24/23  4:54 PM   Result Value Ref Range    Sodium 139 136 - 145 mmol/L    Potassium 3.5 3.5 - 5.1 mmol/L    Chloride 109 (H) 97 - 108 mmol/L    CO2 25 21 - 32 mmol/L    Anion gap 5 5 - 15 mmol/L    Glucose 93 65 - 100 mg/dL    BUN 22 (H) 6 - 20 mg/dL    Creatinine 0.91 0.55 - 1.02 mg/dL    BUN/Creatinine ratio 24 (H) 12 - 20      eGFR >60 >60 ml/min/1.73m2    Calcium 9.1 8.5 - 10.1 mg/dL    Bilirubin, total 0.6 0.2 - 1.0 mg/dL    AST (SGOT) 10 (L) 15 - 37 U/L    ALT (SGPT) 16 12 - 78 U/L    Alk. phosphatase 79 45 - 117 U/L    Protein, total 7.6 6.4 - 8.2 g/dL    Albumin 3.7 3.5 - 5.0 g/dL    Globulin 3.9 2.0 - 4.0 g/dL    A-G Ratio 0.9 (L) 1.1 - 2.2     URINALYSIS W/ REFLEX CULTURE    Collection Time: 03/24/23  5:10 PM    Specimen: Urine   Result Value Ref Range    Color Caitlin      Appearance Turbid (A) Clear      Specific gravity >1.030 (H) 1.003 - 1.030    pH (UA) 5.0 5.0 - 8.0      Protein 30 (A) Negative mg/dL    Glucose Negative Negative mg/dL    Ketone 5 (A) Negative mg/dL    Bilirubin Negative Negative      Blood Negative Negative      Urobilinogen 2.0 (H) 0.1 - 1.0 EU/dL    Nitrites Negative Negative      Leukocyte Esterase Negative Negative      WBC 0-4 0 - 4 /hpf    RBC 0-5 0 - 5 /hpf    Epithelial cells Many (A) Few /lpf    Bacteria Negative Negative /hpf    UA:UC IF INDICATED Culture not indicated by UA result Culture not indicated by UA result      Mucus 4+ (A) Negative /lpf       Radiologic Studies  CTA HEAD NECK W WO CONT   Final Result      1. No acute large vessel arterial occlusion. Mild atherosclerosis. No evidence   for intracranial aneurysm. 2. Mild to moderate emphysema with a few lung nodules measuring up to 6 mm. 3. No evidence for acute intracranial abnormality. Guidelines by the Fleischner society (Radiology 2017 special report) suggest   that patients with low risk for lung cancer and solid nodule(s) less than or   equal to 6 mm in diameter require no follow-up. In patients with a higher risk,   such as smokers, follow-up noncontrast chest CT should be considered at 12   months. Patients with a known malignancy are at increased risk for metastasis   and should receive a three month follow-up.                             XR CHEST SNGL V   Final Result   No acute cardiopulmonary disease. CT Results  (Last 48 hours)                 03/24/23 1910  CTA HEAD NECK W WO CONT Final result    Impression:      1. No acute large vessel arterial occlusion. Mild atherosclerosis. No evidence   for intracranial aneurysm. 2. Mild to moderate emphysema with a few lung nodules measuring up to 6 mm. 3. No evidence for acute intracranial abnormality. Guidelines by the Fleischner society (Radiology 2017 special report) suggest   that patients with low risk for lung cancer and solid nodule(s) less than or   equal to 6 mm in diameter require no follow-up. In patients with a higher risk,   such as smokers, follow-up noncontrast chest CT should be considered at 12   months. Patients with a known malignancy are at increased risk for metastasis   and should receive a three month follow-up. Narrative:  CLINICAL HISTORY: Headache       EXAMINATION:  CT ANGIOGRAPHY HEAD AND NECK        COMPARISON: CT head 3/20/2023, CTA head 5/25/2022       TECHNIQUE: Unenhanced axial head CT was performed. Following the uneventful   administration of iodinated contrast material, axial CT angiography of the head   and neck was performed. Delayed axial images through the head were also   obtained. Coronal and sagittal reconstructions were obtained. Manual   postprocessing of images was performed. 3-D  Sagittal maximal intensity   projection images were obtained. 3-D Coronal maximal intensity projections were   obtained. CT dose reduction was achieved through use of a standardized protocol   tailored for this examination and automatic exposure control for dose   modulation. FINDINGS:       Head CT:       The ventricles are midline without hydrocephalus. There is no acute intra or   extra-axial hemorrhage. Gray-white matter differentiation is preserved. The   basal cisterns are clear. The paranasal sinuses are clear.        CTA NECK:       Tejal Hari vessels: Normal arch anatomy with the origins patent. Right subclavian artery: Patent       Left subclavian artery: Patent       Right common carotid artery: Patent       Left common carotid artery: Patent       Cervical right internal carotid artery: Patent with mild atherosclerosis causing   no significant stenosis by NASCET criteria. Cervical left internal carotid artery: Patent with mild atherosclerosis causing   no significant stenosis by NASCET criteria. Right vertebral artery: Patent       Left vertebral artery: Patent       CTA HEAD:       Right cavernous internal carotid artery: Mild atherosclerosis       Left cavernous internal carotid artery: Mild atherosclerosis       Anterior cerebral arteries: Patent       Anterior communicating artery: Patent       Right middle cerebral artery: Patent       Left middle cerebral artery: Patent       Posterior communicating arteries: Left is patent. Right is hypoplastic       Posterior cerebral arteries: Patent       Basilar artery: Patent       Distal vertebral arteries: Patent       Mild to moderate emphysema with a few lung nodules measuring up to 6 mm (image   1)       Moderate cervical spondylosis       Measurements use NASCET criteria. CXR Results  (Last 48 hours)                 03/24/23 1426  XR CHEST SNGL V Final result    Impression:  No acute cardiopulmonary disease. Narrative:  INDICATION: recent pneumonia in ICU intubated, anxiety, sob       Portable AP view of the chest.       Direct comparison made to prior chest x-ray dated March 2023. Cardiomediastinal silhouette is stable. Lungs are clear bilaterally. Pleural   spaces are normal and there is no pneumothorax.  Osseous structures are intact.                 ------------------------------------------------------------------------------------------------------------  The exam and treatment you received in the Emergency Department were for an urgent problem and are not intended as complete care. It is important that you follow-up with a doctor, nurse practitioner, or physician assistant to:  (1) confirm your diagnosis,  (2) re-evaluation of changes in your illness and treatment, and  (3) for ongoing care. Please take your discharge instructions with you when you go to your follow-up appointment. If you have any problem arranging a follow-up appointment, contact the Emergency Department. If your symptoms become worse or you do not improve as expected and you are unable to reach your health care provider, please return to the Emergency Department. We are available 24 hours a day. If a prescription has been provided, please have it filled as soon as possible to prevent a delay in treatment. If you have any questions or reservations about taking the medication due to side effects or interactions with other medications, please call your primary care provider or contact the ER.

## 2023-03-28 ENCOUNTER — APPOINTMENT (OUTPATIENT)
Dept: CT IMAGING | Age: 58
End: 2023-03-28
Attending: STUDENT IN AN ORGANIZED HEALTH CARE EDUCATION/TRAINING PROGRAM
Payer: MEDICARE

## 2023-03-28 ENCOUNTER — HOSPITAL ENCOUNTER (INPATIENT)
Age: 58
LOS: 2 days | Discharge: HOME | End: 2023-03-30
Attending: STUDENT IN AN ORGANIZED HEALTH CARE EDUCATION/TRAINING PROGRAM | Admitting: INTERNAL MEDICINE
Payer: MEDICARE

## 2023-03-28 DIAGNOSIS — I63.9 CEREBROVASCULAR ACCIDENT (CVA), UNSPECIFIED MECHANISM (HCC): Primary | ICD-10-CM

## 2023-03-28 DIAGNOSIS — R51.9 ACUTE INTRACTABLE HEADACHE, UNSPECIFIED HEADACHE TYPE: ICD-10-CM

## 2023-03-28 LAB
ABO + RH BLD: NORMAL
ALBUMIN SERPL-MCNC: 3.7 G/DL (ref 3.5–5)
ALBUMIN/GLOB SERPL: 1.3 (ref 1.1–2.2)
ALP SERPL-CCNC: 69 U/L (ref 45–117)
ALT SERPL-CCNC: 12 U/L (ref 12–78)
ANION GAP SERPL CALC-SCNC: 7 MMOL/L (ref 5–15)
AST SERPL W P-5'-P-CCNC: 7 U/L (ref 15–37)
ATRIAL RATE: 75 BPM
BASOPHILS # BLD: 0.1 K/UL (ref 0–0.1)
BASOPHILS NFR BLD: 1 % (ref 0–1)
BILIRUB SERPL-MCNC: 0.4 MG/DL (ref 0.2–1)
BLOOD GROUP ANTIBODIES SERPL: NEGATIVE
BUN SERPL-MCNC: 13 MG/DL (ref 6–20)
BUN/CREAT SERPL: 13 (ref 12–20)
CA-I BLD-MCNC: 8.9 MG/DL (ref 8.5–10.1)
CALCULATED P AXIS, ECG09: 51 DEGREES
CALCULATED R AXIS, ECG10: 65 DEGREES
CALCULATED T AXIS, ECG11: 52 DEGREES
CHLORIDE SERPL-SCNC: 109 MMOL/L (ref 97–108)
CO2 SERPL-SCNC: 27 MMOL/L (ref 21–32)
CREAT SERPL-MCNC: 1.02 MG/DL (ref 0.55–1.02)
DIAGNOSIS, 93000: NORMAL
DIFFERENTIAL METHOD BLD: ABNORMAL
EOSINOPHIL # BLD: 0.1 K/UL (ref 0–0.4)
EOSINOPHIL NFR BLD: 2 % (ref 0–7)
ERYTHROCYTE [DISTWIDTH] IN BLOOD BY AUTOMATED COUNT: 15.2 % (ref 11.5–14.5)
GLOBULIN SER CALC-MCNC: 2.9 G/DL (ref 2–4)
GLUCOSE SERPL-MCNC: 109 MG/DL (ref 65–100)
HCT VFR BLD AUTO: 44.3 % (ref 35–47)
HGB BLD-MCNC: 14.7 G/DL (ref 11.5–16)
IMM GRANULOCYTES # BLD AUTO: 0 K/UL (ref 0–0.04)
IMM GRANULOCYTES NFR BLD AUTO: 0 % (ref 0–0.5)
INR PPP: 1.2 (ref 0.9–1.1)
LYMPHOCYTES # BLD: 2.9 K/UL (ref 0.8–3.5)
LYMPHOCYTES NFR BLD: 48 % (ref 12–49)
MCH RBC QN AUTO: 27.6 PG (ref 26–34)
MCHC RBC AUTO-ENTMCNC: 33.2 G/DL (ref 30–36.5)
MCV RBC AUTO: 83.1 FL (ref 80–99)
MONOCYTES # BLD: 0.3 K/UL (ref 0–1)
MONOCYTES NFR BLD: 5 % (ref 5–13)
NEUTS SEG # BLD: 2.6 K/UL (ref 1.8–8)
NEUTS SEG NFR BLD: 44 % (ref 32–75)
NRBC # BLD: 0 K/UL (ref 0–0.01)
NRBC BLD-RTO: 0 PER 100 WBC
P-R INTERVAL, ECG05: 140 MS
PLATELET # BLD AUTO: 340 K/UL (ref 150–400)
PMV BLD AUTO: 10 FL (ref 8.9–12.9)
POTASSIUM SERPL-SCNC: 3.6 MMOL/L (ref 3.5–5.1)
PROT SERPL-MCNC: 6.6 G/DL (ref 6.4–8.2)
PROTHROMBIN TIME: 15.3 SEC (ref 11.9–14.6)
Q-T INTERVAL, ECG07: 438 MS
QRS DURATION, ECG06: 76 MS
QTC CALCULATION (BEZET), ECG08: 489 MS
RBC # BLD AUTO: 5.33 M/UL (ref 3.8–5.2)
SODIUM SERPL-SCNC: 143 MMOL/L (ref 136–145)
SPECIMEN EXP DATE BLD: NORMAL
VENTRICULAR RATE, ECG03: 75 BPM
WBC # BLD AUTO: 6 K/UL (ref 3.6–11)

## 2023-03-28 PROCEDURE — 96375 TX/PRO/DX INJ NEW DRUG ADDON: CPT

## 2023-03-28 PROCEDURE — 96374 THER/PROPH/DIAG INJ IV PUSH: CPT

## 2023-03-28 PROCEDURE — 86900 BLOOD TYPING SEROLOGIC ABO: CPT

## 2023-03-28 PROCEDURE — 70496 CT ANGIOGRAPHY HEAD: CPT

## 2023-03-28 PROCEDURE — 74011250637 HC RX REV CODE- 250/637

## 2023-03-28 PROCEDURE — 74011250636 HC RX REV CODE- 250/636: Performed by: INTERNAL MEDICINE

## 2023-03-28 PROCEDURE — 74011250637 HC RX REV CODE- 250/637: Performed by: INTERNAL MEDICINE

## 2023-03-28 PROCEDURE — 99285 EMERGENCY DEPT VISIT HI MDM: CPT

## 2023-03-28 PROCEDURE — 85025 COMPLETE CBC W/AUTO DIFF WBC: CPT

## 2023-03-28 PROCEDURE — 4A03X5D MEASUREMENT OF ARTERIAL FLOW, INTRACRANIAL, EXTERNAL APPROACH: ICD-10-PCS | Performed by: STUDENT IN AN ORGANIZED HEALTH CARE EDUCATION/TRAINING PROGRAM

## 2023-03-28 PROCEDURE — 74011250636 HC RX REV CODE- 250/636: Performed by: STUDENT IN AN ORGANIZED HEALTH CARE EDUCATION/TRAINING PROGRAM

## 2023-03-28 PROCEDURE — 80053 COMPREHEN METABOLIC PANEL: CPT

## 2023-03-28 PROCEDURE — 85610 PROTHROMBIN TIME: CPT

## 2023-03-28 PROCEDURE — 36415 COLL VENOUS BLD VENIPUNCTURE: CPT

## 2023-03-28 PROCEDURE — 65270000029 HC RM PRIVATE

## 2023-03-28 PROCEDURE — 93005 ELECTROCARDIOGRAM TRACING: CPT

## 2023-03-28 PROCEDURE — 74011000636 HC RX REV CODE- 636: Performed by: STUDENT IN AN ORGANIZED HEALTH CARE EDUCATION/TRAINING PROGRAM

## 2023-03-28 PROCEDURE — 74011000250 HC RX REV CODE- 250: Performed by: INTERNAL MEDICINE

## 2023-03-28 PROCEDURE — 70450 CT HEAD/BRAIN W/O DYE: CPT

## 2023-03-28 RX ORDER — GUAIFENESIN 100 MG/5ML
81 LIQUID (ML) ORAL DAILY
Status: DISCONTINUED | OUTPATIENT
Start: 2023-03-29 | End: 2023-03-30 | Stop reason: HOSPADM

## 2023-03-28 RX ORDER — OXYCODONE AND ACETAMINOPHEN 5; 325 MG/1; MG/1
1 TABLET ORAL ONCE
Status: COMPLETED | OUTPATIENT
Start: 2023-03-28 | End: 2023-03-28

## 2023-03-28 RX ORDER — MAGNESIUM SULFATE HEPTAHYDRATE 40 MG/ML
2 INJECTION, SOLUTION INTRAVENOUS
Status: COMPLETED | OUTPATIENT
Start: 2023-03-28 | End: 2023-03-28

## 2023-03-28 RX ORDER — POLYETHYLENE GLYCOL 3350 17 G/17G
17 POWDER, FOR SOLUTION ORAL DAILY PRN
Status: DISCONTINUED | OUTPATIENT
Start: 2023-03-28 | End: 2023-03-30 | Stop reason: HOSPADM

## 2023-03-28 RX ORDER — CLONAZEPAM 0.5 MG/1
0.5 TABLET ORAL 3 TIMES DAILY
Status: DISCONTINUED | OUTPATIENT
Start: 2023-03-28 | End: 2023-03-30 | Stop reason: HOSPADM

## 2023-03-28 RX ORDER — QUETIAPINE FUMARATE 300 MG/1
300 TABLET, FILM COATED ORAL
Status: DISCONTINUED | OUTPATIENT
Start: 2023-03-28 | End: 2023-03-30 | Stop reason: HOSPADM

## 2023-03-28 RX ORDER — ONDANSETRON 2 MG/ML
4 INJECTION INTRAMUSCULAR; INTRAVENOUS
Status: DISCONTINUED | OUTPATIENT
Start: 2023-03-28 | End: 2023-03-30 | Stop reason: HOSPADM

## 2023-03-28 RX ORDER — SODIUM CHLORIDE 0.9 % (FLUSH) 0.9 %
5-40 SYRINGE (ML) INJECTION EVERY 8 HOURS
Status: DISCONTINUED | OUTPATIENT
Start: 2023-03-28 | End: 2023-03-30 | Stop reason: HOSPADM

## 2023-03-28 RX ORDER — FUROSEMIDE 40 MG/1
40 TABLET ORAL DAILY
Status: DISCONTINUED | OUTPATIENT
Start: 2023-03-29 | End: 2023-03-28

## 2023-03-28 RX ORDER — ONDANSETRON 4 MG/1
4 TABLET, ORALLY DISINTEGRATING ORAL
Status: DISCONTINUED | OUTPATIENT
Start: 2023-03-28 | End: 2023-03-30 | Stop reason: HOSPADM

## 2023-03-28 RX ORDER — PROMETHAZINE HYDROCHLORIDE 25 MG/1
12.5 TABLET ORAL
Status: DISCONTINUED | OUTPATIENT
Start: 2023-03-28 | End: 2023-03-30 | Stop reason: HOSPADM

## 2023-03-28 RX ORDER — SODIUM CHLORIDE 9 MG/ML
75 INJECTION, SOLUTION INTRAVENOUS CONTINUOUS
Status: DISCONTINUED | OUTPATIENT
Start: 2023-03-28 | End: 2023-03-30

## 2023-03-28 RX ORDER — ASPIRIN 325 MG
TABLET ORAL
Status: COMPLETED
Start: 2023-03-28 | End: 2023-03-28

## 2023-03-28 RX ORDER — ENOXAPARIN SODIUM 100 MG/ML
40 INJECTION SUBCUTANEOUS DAILY
Status: DISCONTINUED | OUTPATIENT
Start: 2023-03-29 | End: 2023-03-30 | Stop reason: HOSPADM

## 2023-03-28 RX ORDER — OXYCODONE AND ACETAMINOPHEN 5; 325 MG/1; MG/1
1 TABLET ORAL
Status: DISCONTINUED | OUTPATIENT
Start: 2023-03-28 | End: 2023-03-28

## 2023-03-28 RX ORDER — ASPIRIN 325 MG
325 TABLET ORAL
Status: COMPLETED | OUTPATIENT
Start: 2023-03-28 | End: 2023-03-28

## 2023-03-28 RX ORDER — DIPHENHYDRAMINE HYDROCHLORIDE 50 MG/ML
50 INJECTION, SOLUTION INTRAMUSCULAR; INTRAVENOUS
Status: COMPLETED | OUTPATIENT
Start: 2023-03-28 | End: 2023-03-28

## 2023-03-28 RX ORDER — PROCHLORPERAZINE EDISYLATE 5 MG/ML
10 INJECTION INTRAMUSCULAR; INTRAVENOUS ONCE
Status: COMPLETED | OUTPATIENT
Start: 2023-03-28 | End: 2023-03-28

## 2023-03-28 RX ORDER — ACETAMINOPHEN 325 MG/1
650 TABLET ORAL
Status: DISCONTINUED | OUTPATIENT
Start: 2023-03-28 | End: 2023-03-30 | Stop reason: HOSPADM

## 2023-03-28 RX ORDER — SODIUM CHLORIDE 0.9 % (FLUSH) 0.9 %
5-40 SYRINGE (ML) INJECTION AS NEEDED
Status: DISCONTINUED | OUTPATIENT
Start: 2023-03-28 | End: 2023-03-30 | Stop reason: HOSPADM

## 2023-03-28 RX ORDER — ACETAMINOPHEN 650 MG/1
650 SUPPOSITORY RECTAL
Status: DISCONTINUED | OUTPATIENT
Start: 2023-03-28 | End: 2023-03-30 | Stop reason: HOSPADM

## 2023-03-28 RX ADMIN — OXYCODONE AND ACETAMINOPHEN 1 TABLET: 5; 325 TABLET ORAL at 16:11

## 2023-03-28 RX ADMIN — MAGNESIUM SULFATE HEPTAHYDRATE 2 G: 40 INJECTION, SOLUTION INTRAVENOUS at 16:10

## 2023-03-28 RX ADMIN — PROCHLORPERAZINE EDISYLATE 10 MG: 5 INJECTION INTRAMUSCULAR; INTRAVENOUS at 13:52

## 2023-03-28 RX ADMIN — QUETIAPINE 300 MG: 300 TABLET ORAL at 21:36

## 2023-03-28 RX ADMIN — SODIUM CHLORIDE 75 ML/HR: 9 INJECTION, SOLUTION INTRAVENOUS at 16:10

## 2023-03-28 RX ADMIN — OXYCODONE AND ACETAMINOPHEN 1 TABLET: 5; 325 TABLET ORAL at 21:35

## 2023-03-28 RX ADMIN — CLONAZEPAM 0.5 MG: 0.5 TABLET ORAL at 16:10

## 2023-03-28 RX ADMIN — ASPIRIN 325 MG: 325 TABLET ORAL at 13:51

## 2023-03-28 RX ADMIN — ACETAMINOPHEN 650 MG: 325 TABLET ORAL at 19:43

## 2023-03-28 RX ADMIN — DIPHENHYDRAMINE HYDROCHLORIDE 50 MG: 50 INJECTION INTRAMUSCULAR; INTRAVENOUS at 13:51

## 2023-03-28 RX ADMIN — SODIUM CHLORIDE, PRESERVATIVE FREE 10 ML: 5 INJECTION INTRAVENOUS at 22:31

## 2023-03-28 RX ADMIN — Medication 325 MG: at 13:51

## 2023-03-28 RX ADMIN — IOPAMIDOL 100 ML: 755 INJECTION, SOLUTION INTRAVENOUS at 12:52

## 2023-03-28 RX ADMIN — ONDANSETRON 4 MG: 4 TABLET, ORALLY DISINTEGRATING ORAL at 16:10

## 2023-03-28 RX ADMIN — CLONAZEPAM 0.5 MG: 0.5 TABLET ORAL at 22:31

## 2023-03-28 NOTE — H&P
History & Physical    Primary Care Provider: None  Source of Information: Patient/family     History of Presenting Illness:   Jailene Robbins is a 62 y.o. female who presents with headache numbness to right leg and arm. The symptoms started since early morning. She has had headache on and off since 1 month. Apparently the emergency room doctor asked teleneurology to see the patient and they have recommended admission for MRI and CVA work-up. There is also recommendation for MRI spine. She has no history of any injury. Prior history of GERD, hypothyroidism, PTSD, seizure and recent diagnosis of pneumonia  Numerous prior ED visits and hospitalization on 2/10/2023 for severe sepsis and for acute kidney failure on 2022     Review of Systems:  Pertinent items are noted in the History of Present Illness. Past Medical History:   Diagnosis Date    DDD (degenerative disc disease), lumbar     Fatigue     GERD (gastroesophageal reflux disease)     Hypothyroidism     Psychiatric disorder     PTSD per patient    Seizures ([facility] [address]         Past Surgical History:   Procedure Laterality Date    HX APPENDECTOMY      HX  SECTION      HX CHOLECYSTECTOMY      HX HYSTERECTOMY         Prior to Admission medications    Medication Sig Start Date End Date Taking? Authorizing Provider   oxyCODONE-acetaminophen (Percocet) 5-325 mg per tablet Take 1 Tablet by mouth every six (6) hours as needed for Pain for up to 3 days. Max Daily Amount: 4 Tablets. 3/24/23 3/27/23  Ulisses KLEIN, DO   ondansetron (ZOFRAN ODT) 4 mg disintegrating tablet Take 1 Tablet by mouth every eight (8) hours as needed for Nausea or Vomiting for up to 4 days.  3/24/23 3/28/23  Ulisses KLEIN, DO   ondansetron (ZOFRAN ODT) 4 mg disintegrating tablet Take 1 Tablet by mouth every eight (8) hours as needed for Nausea or Vomiting. 3/21/23   Antonio Trejo NP   furosemide (LASIX) 40 mg tablet 1 tablet daily 23   Nelson Sanderson MD doxycycline (VIBRAMYCIN) 100 mg capsule Take 1 Capsule by mouth every twelve (12) hours. 2/19/23   Ly Morelos MD   aspirin 81 mg chewable tablet Take 1 Tablet by mouth daily. 2/20/23   Cayden Cruz MD   buprenorphine-naloxone (SUBOXONE) 8-2 mg film sublingaul film 1 Film by SubLINGual route two (2) times a day. Provider, Historical   lidocaine (Lidoderm) 5 % Apply patch to the affected area for 12 hours a day and remove for 12 hours a day. 11/8/22   Greg Raines MD   clonazePAM (KlonoPIN) 0.5 mg tablet Take 0.5 mg by mouth three (3) times daily. 9/6/22   Provider, Historical   QUEtiapine (SEROquel) 300 mg tablet Take 300 mg by mouth nightly. 7/13/22   Provider, Historical   traZODone (DESYREL) 100 mg tablet Take 100-200 mg by mouth At bedtime. 2/1/22   Other, MD Kendall   gabapentin (NEURONTIN) 600 mg tablet Take 600 mg by mouth four (4) times daily. Provider, Historical       Allergies   Allergen Reactions    Augmentin [Amoxicillin-Pot Clavulanate] Nausea and Vomiting        Family History   Family history unknown: Yes        Social History     Socioeconomic History    Marital status: SINGLE   Tobacco Use    Smoking status: Every Day     Packs/day: 0.50     Types: Cigarettes    Smokeless tobacco: Never   Vaping Use    Vaping Use: Never used   Substance and Sexual Activity    Alcohol use: Not Currently     Alcohol/week: 0.0 standard drinks    Drug use: Never    Sexual activity: Not Currently            CODE STATUS:  DNR    Full    Other      Objective:     Physical Exam:     Visit Vitals  /85   Pulse 73   Temp 98.5 °F (36.9 °C)   Resp 16   Ht 5' 8\" (1.727 m)   Wt 83.9 kg (185 lb)   SpO2 96%   BMI 28.13 kg/m²      O2 Device: None (Room air)    Physical Exam  Vitals and nursing note reviewed. Constitutional:       Appearance: Normal appearance. She is normal weight. She is ill-appearing. HENT:      Head: Normocephalic and atraumatic.       Nose: Nose normal.      Mouth/Throat: Mouth: Mucous membranes are moist.      Pharynx: Oropharynx is clear. Eyes:      Conjunctiva/sclera: Conjunctivae normal.   Cardiovascular:      Rate and Rhythm: Regular rhythm. Tachycardia present. Pulses: Normal pulses. Heart sounds: Normal heart sounds. Pulmonary:      Effort: Pulmonary effort is normal.      Breath sounds: Normal breath sounds. Abdominal:      General: Abdomen is flat. Palpations: Abdomen is soft. Skin:     General: Skin is warm and dry. Coloration: Skin is not pale. Findings: No erythema. Neurological:      Mental Status: She is alert and oriented to person, place, and time. Motor: Weakness present. Comments: Strength reduced to 2 out of 5/3 out of 5 in right upper extremity and right lower extremity   Psychiatric:      Comments: Appears withdrawn        24 Hour Results:    Recent Results (from the past 24 hour(s))   CBC WITH AUTOMATED DIFF    Collection Time: 03/28/23 12:34 PM   Result Value Ref Range    WBC 6.0 3.6 - 11.0 K/uL    RBC 5.33 (H) 3.80 - 5.20 M/uL    HGB 14.7 11.5 - 16.0 g/dL    HCT 44.3 35.0 - 47.0 %    MCV 83.1 80.0 - 99.0 FL    MCH 27.6 26.0 - 34.0 PG    MCHC 33.2 30.0 - 36.5 g/dL    RDW 15.2 (H) 11.5 - 14.5 %    PLATELET 635 702 - 705 K/uL    MPV 10.0 8.9 - 12.9 FL    NRBC 0.0 0.0  WBC    ABSOLUTE NRBC 0.00 0.00 - 0.01 K/uL    NEUTROPHILS 44 32 - 75 %    LYMPHOCYTES 48 12 - 49 %    MONOCYTES 5 5 - 13 %    EOSINOPHILS 2 0 - 7 %    BASOPHILS 1 0 - 1 %    IMMATURE GRANULOCYTES 0 0 - 0.5 %    ABS. NEUTROPHILS 2.6 1.8 - 8.0 K/UL    ABS. LYMPHOCYTES 2.9 0.8 - 3.5 K/UL    ABS. MONOCYTES 0.3 0.0 - 1.0 K/UL    ABS. EOSINOPHILS 0.1 0.0 - 0.4 K/UL    ABS. BASOPHILS 0.1 0.0 - 0.1 K/UL    ABS. IMM.  GRANS. 0.0 0.00 - 0.04 K/UL    DF AUTOMATED     METABOLIC PANEL, COMPREHENSIVE    Collection Time: 03/28/23 12:34 PM   Result Value Ref Range    Sodium 143 136 - 145 mmol/L    Potassium 3.6 3.5 - 5.1 mmol/L    Chloride 109 (H) 97 - 108 mmol/L    CO2 27 21 - 32 mmol/L    Anion gap 7 5 - 15 mmol/L    Glucose 109 (H) 65 - 100 mg/dL    BUN 13 6 - 20 mg/dL    Creatinine 1.02 0.55 - 1.02 mg/dL    BUN/Creatinine ratio 13 12 - 20      eGFR >60 >60 ml/min/1.73m2    Calcium 8.9 8.5 - 10.1 mg/dL    Bilirubin, total 0.4 0.2 - 1.0 mg/dL    AST (SGOT) 7 (L) 15 - 37 U/L    ALT (SGPT) 12 12 - 78 U/L    Alk. phosphatase 69 45 - 117 U/L    Protein, total 6.6 6.4 - 8.2 g/dL    Albumin 3.7 3.5 - 5.0 g/dL    Globulin 2.9 2.0 - 4.0 g/dL    A-G Ratio 1.3 1.1 - 2.2     PROTHROMBIN TIME + INR    Collection Time: 03/28/23 12:34 PM   Result Value Ref Range    Prothrombin time 15.3 (H) 11.9 - 14.6 sec    INR 1.2 (H) 0.9 - 1.1     TYPE & SCREEN    Collection Time: 03/28/23 12:34 PM   Result Value Ref Range    Crossmatch Expiration 03/31/2023,2359     ABO/Rh(D) O Positive     Antibody screen Negative          Imaging:   CTA CODE NEURO HEAD AND NECK W CONT   Final Result   CTA Head:   1. No evidence of significant stenosis or aneurysm. CTA Neck:   1. No evidence of significant stenosis. CT CODE NEURO HEAD WO CONTRAST   Final Result   No acute abnormality. Assessment:       ?  Stroke  Possible conversion disorder  History of PTSD  History of seizures  Prior history of CHF      Discussion/Medical Decision Making: Patient with numerous medical comorbidities, each with increased risk for mortality and morbidity if left untreated. Patient requires medications with high risk of toxicity and need for intensive monitoring. I have reviewed patient's presenting subjective and objective findings, as well as all laboratory studies, imaging studies, and vital signs to date as well as treatment rendered and patient's response to those treatments. In addition, prior medical, surgical and relevant social and family histories were reviewed. Plan:     Neurology work-up as per [facility].   We will wait to see formal documentation before ordering MRI of the lumbosacral spine. Psychiatry consult for possible conversion disorder  A.m. labs  Appears to be euvolemic and not clinically in fluid overload  PT OT      CODE STATUS: full    Social determinants of health: None known      Home medications were reviewed. she was on Neurontin 600 mg 4 times a day which was held by me.     Signed By: Bob Reich MD     March 28, 2023

## 2023-03-28 NOTE — PROGRESS NOTES
Reason for Admission:  Stroke                  RUR Score:   23%        PCP: First and Last name:  per pt no PCP. Name of Practice:   Are you a current patient: Yes/No:   Approximate date of last visit:    Can you do a virtual visit with your PCP: Yes/call. Resources/supports as identified by patient/family:   Family                Top Challenges facing patient (as identified by patient/family and CM): Finances/Medication cost?     No               Transportation? No              Support system or lack thereof? No                     Living arrangements? No              Self-care/ADLs/Cognition? Not @ this time. Current Advanced Directive/Advance Care Plan:  Full Code      Healthcare Decision Maker:       Primary Decision Maker: Marianne Terrence - [phone]    Payor Source Payor: [place] / Plan: [place] MED[place]ARE PART A & B / Product Type: Medicare /                             Plan for utilizing home health:   None @ this time/uses no DME/awaiting therapy evals. Transition of Care Plan:   D/C Plan is home with friend Sienna Kaur) & friend to transport. Send Rxs to [employer] [facility] upon discharge.

## 2023-03-28 NOTE — ED PROVIDER NOTES
[id number]  EMERGENCY DEPARTMENT ENCOUNTER NOTE        Date: 3/28/2023  Patient Name: Latanya Hare      History of Presenting Illness     Chief Complaint   Patient presents with    Numbness       History Provided By: Patient    HPI: Latanya Hare, 62 y.o. female with PMH of GERD, hypothyroidism, secondary disorder, seizures, and degenerative disc disease who comes to the ED with chief complaint of right-sided headache. This is associated with numbness and weakness on the right side. She reports that started approximately 3 PM yesterday. Symptoms have been the same without any aggravating leaving factors. She is reporting that she is unable to use the right side properly since that time. She denies any prior history of stroke or similar episodes. I reviewed the patient's recent visits. In the month of March she had 3 visits for headache. However, she did not have any numbness or weakness and these are new findings. She denies any fever, chills, or neck pain.       PCP: None    Current Facility-Administered Medications   Medication Dose Route Frequency Provider Last Rate Last Admin    magnesium sulfate 2 g/50 ml IVPB (premix or compounded)  2 g IntraVENous NOW Cristina Dominguez MD        [START ON 3/29/2023] aspirin chewable tablet 81 mg  81 mg Oral DAILY Marlee Omalley MD        .PHARMACY TO SUBSTITUTE PER PROTOCOL (Reordered from: buprenorphine-naloxone (SUBOXONE) 8-2 mg film sublingaul film)    Per Protocol Sabrina Omalley MD        clonazePAM (KlonoPIN) tablet 0.5 mg  0.5 mg Oral TID Sabrina Omalley MD        ondansetron (ZOFRAN ODT) tablet 4 mg  4 mg Oral Q8H PRN Sabrina Omalley MD        QUEtiapine (SEROquel) tablet 300 mg  300 mg Oral QHS Sabrina Omalley MD        [START ON 3/29/2023] furosemide (LASIX) tablet 40 mg  40 mg Oral DAILY Sabrina Omalley MD        oxyCODONE-acetaminophen (PERCOCET) 5-325 mg per tablet 1 Tablet  1 Tablet Oral Q6H PRN Karoline Holland MD        0.9% sodium chloride infusion  75 mL/hr IntraVENous CONTINUOUS Ayse Omalley MD        sodium chloride (NS) flush 5-40 mL  5-40 mL IntraVENous Q8H Ayse Omalley MD        sodium chloride (NS) flush 5-40 mL  5-40 mL IntraVENous PRN Ayse Omalley MD        acetaminophen (TYLENOL) tablet 650 mg  650 mg Oral Q6H PRN Ayse Omalley MD        Or    acetaminophen (TYLENOL) suppository 650 mg  650 mg Rectal Q6H PRN Ayse Omalley MD        polyethylene glycol (MIRALAX) packet 17 g  17 g Oral DAILY PRN Ayse Omalley MD        promethazine (PHENERGAN) tablet 12.5 mg  12.5 mg Oral Q6H PRN Ayse Omalley MD        Or    ondansetron (ZOFRAN) injection 4 mg  4 mg IntraVENous Q6H PRN Ayse Omalley MD        [START ON 3/29/2023] enoxaparin (LOVENOX) injection 30 mg  30 mg SubCUTAneous DAILY Ayse Omalley MD         Current Outpatient Medications   Medication Sig Dispense Refill    ondansetron (ZOFRAN ODT) 4 mg disintegrating tablet Take 1 Tablet by mouth every eight (8) hours as needed for Nausea or Vomiting for up to 4 days. 12 Tablet 0    ondansetron (ZOFRAN ODT) 4 mg disintegrating tablet Take 1 Tablet by mouth every eight (8) hours as needed for Nausea or Vomiting. 15 Tablet 0    furosemide (LASIX) 40 mg tablet 1 tablet daily 30 Tablet 0    doxycycline (VIBRAMYCIN) 100 mg capsule Take 1 Capsule by mouth every twelve (12) hours. 10 Capsule 0    aspirin 81 mg chewable tablet Take 1 Tablet by mouth daily. 30 Tablet 0    buprenorphine-naloxone (SUBOXONE) 8-2 mg film sublingaul film 1 Film by SubLINGual route two (2) times a day. lidocaine (Lidoderm) 5 % Apply patch to the affected area for 12 hours a day and remove for 12 hours a day. 10 Each 0    clonazePAM (KlonoPIN) 0.5 mg tablet Take 0.5 mg by mouth three (3) times daily. QUEtiapine (SEROquel) 300 mg tablet Take 300 mg by mouth nightly. traZODone (DESYREL) 100 mg tablet Take 100-200 mg by mouth At bedtime. gabapentin (NEURONTIN) 600 mg tablet Take 600 mg by mouth four (4) times daily. Past History     Past Medical History:  Past Medical History:   Diagnosis Date    DDD (degenerative disc disease), lumbar     Fatigue     GERD (gastroesophageal reflux disease)     Hypothyroidism     Psychiatric disorder     PTSD per patient    Seizures ([facility] [address]        Past Surgical History:  Past Surgical History:   Procedure Laterality Date    HX APPENDECTOMY      HX  SECTION      HX CHOLECYSTECTOMY      HX HYSTERECTOMY         Family History:  Family History   Family history unknown: Yes       Social History:  Social History     Tobacco Use    Smoking status: Every Day     Packs/day: 0.50     Types: Cigarettes    Smokeless tobacco: Never   Vaping Use    Vaping Use: Never used   Substance Use Topics    Alcohol use: Not Currently     Alcohol/week: 0.0 standard drinks    Drug use: Never       Allergies: Allergies   Allergen Reactions    Augmentin [Amoxicillin-Pot Clavulanate] Nausea and Vomiting         Review of Systems     Review of Systems    A 10 point review of system was performed and was negative except as noted above in HPI    Physical Exam     Physical Exam  Vitals and nursing note reviewed. Constitutional:       General: She is in acute distress. Appearance: She is not ill-appearing or toxic-appearing. HENT:      Head: Normocephalic and atraumatic. Eyes:      Extraocular Movements: Extraocular movements intact. Conjunctiva/sclera: Conjunctivae normal.   Cardiovascular:      Rate and Rhythm: Normal rate and regular rhythm. Pulmonary:      Effort: Pulmonary effort is normal.      Breath sounds: Normal breath sounds. Abdominal:      Palpations: Abdomen is soft. Tenderness: There is no abdominal tenderness. Neurological:      Mental Status: She is alert.       Comments: Alert and oriented x3  Cranial nerves-year-old mild facial drooping  Sensation is impaired on the right side in both upper and lower extremity  Motor is decreased on the right side both upper and lower extremity  Pronator drift noted on the right upper and lower extremities       Lab and Diagnostic Study Results     Labs -     Recent Results (from the past 12 hour(s))   CBC WITH AUTOMATED DIFF    Collection Time: 03/28/23 12:34 PM   Result Value Ref Range    WBC 6.0 3.6 - 11.0 K/uL    RBC 5.33 (H) 3.80 - 5.20 M/uL    HGB 14.7 11.5 - 16.0 g/dL    HCT 44.3 35.0 - 47.0 %    MCV 83.1 80.0 - 99.0 FL    MCH 27.6 26.0 - 34.0 PG    MCHC 33.2 30.0 - 36.5 g/dL    RDW 15.2 (H) 11.5 - 14.5 %    PLATELET 343 028 - 649 K/uL    MPV 10.0 8.9 - 12.9 FL    NRBC 0.0 0.0  WBC    ABSOLUTE NRBC 0.00 0.00 - 0.01 K/uL    NEUTROPHILS 44 32 - 75 %    LYMPHOCYTES 48 12 - 49 %    MONOCYTES 5 5 - 13 %    EOSINOPHILS 2 0 - 7 %    BASOPHILS 1 0 - 1 %    IMMATURE GRANULOCYTES 0 0 - 0.5 %    ABS. NEUTROPHILS 2.6 1.8 - 8.0 K/UL    ABS. LYMPHOCYTES 2.9 0.8 - 3.5 K/UL    ABS. MONOCYTES 0.3 0.0 - 1.0 K/UL    ABS. EOSINOPHILS 0.1 0.0 - 0.4 K/UL    ABS. BASOPHILS 0.1 0.0 - 0.1 K/UL    ABS. IMM. GRANS. 0.0 0.00 - 0.04 K/UL    DF AUTOMATED     METABOLIC PANEL, COMPREHENSIVE    Collection Time: 03/28/23 12:34 PM   Result Value Ref Range    Sodium 143 136 - 145 mmol/L    Potassium 3.6 3.5 - 5.1 mmol/L    Chloride 109 (H) 97 - 108 mmol/L    CO2 27 21 - 32 mmol/L    Anion gap 7 5 - 15 mmol/L    Glucose 109 (H) 65 - 100 mg/dL    BUN 13 6 - 20 mg/dL    Creatinine 1.02 0.55 - 1.02 mg/dL    BUN/Creatinine ratio 13 12 - 20      eGFR >60 >60 ml/min/1.73m2    Calcium 8.9 8.5 - 10.1 mg/dL    Bilirubin, total 0.4 0.2 - 1.0 mg/dL    AST (SGOT) 7 (L) 15 - 37 U/L    ALT (SGPT) 12 12 - 78 U/L    Alk.  phosphatase 69 45 - 117 U/L    Protein, total 6.6 6.4 - 8.2 g/dL    Albumin 3.7 3.5 - 5.0 g/dL    Globulin 2.9 2.0 - 4.0 g/dL    A-G Ratio 1.3 1.1 - 2.2     PROTHROMBIN TIME + INR    Collection Time: 03/28/23 12:34 PM   Result Value Ref Range    Prothrombin time 15.3 (H) 11.9 - 14.6 sec INR 1.2 (H) 0.9 - 1.1     TYPE & SCREEN    Collection Time: 03/28/23 12:34 PM   Result Value Ref Range    Crossmatch Expiration 03/31/2023,2359     ABO/Rh(D) Orlene Pavy Positive     Antibody screen Negative        Radiologic Studies -   [unfilled]  CT Results  (Last 48 hours)                 03/28/23 1251  CTA CODE NEURO HEAD AND NECK W CONT Final result    Impression:  CTA Head:   1. No evidence of significant stenosis or aneurysm. CTA Neck:   1. No evidence of significant stenosis. Narrative:  EXAM:  CTA CODE NEURO HEAD AND NECK W CONT       INDICATION:   level 2 stroke       COMPARISON:  CTA head neck 3/24/2023. CONTRAST:  100 mL of Isovue-370. TECHNIQUE:  Unenhanced  images were obtained to localize the volume for   acquisition. Multislice helical axial CT angiography was performed from the   aortic arch to the top of the head during uneventful rapid bolus intravenous   contrast administration. Coronal and sagittal reformations and 3D post   processing was performed. CT dose reduction was achieved through use of a   standardized protocol tailored for this examination and automatic exposure   control for dose modulation. This study was analyzed by the [address]  algorithm. FINDINGS:       CTA Head:   There is no evidence of large vessel occlusion or flow-limiting stenosis of the   intracranial internal carotid, anterior cerebral, and middle cerebral arteries. Mild stenosis of the bilateral carotid siphons without significant stenosis. The   anterior communicating artery is patent, with fenestration at the right A1-A2   junction. There is no evidence of large vessel occlusion or flow-limiting stenosis of the   intracranial vertebral arteries, basilar artery, or posterior cerebral arteries. Fetal origin of the left posterior cerebral artery. There is no evidence of aneurysm or vascular malformation.  The dural venous   sinuses and deep cerebral venous system are patent. No evidence of abnormal   enhancement on delayed phase images. CTA NECK:   NASCET method was utilized for calculating stenosis. The aortic arch is unremarkable. The common carotid arteries demonstrate no   significant stenosis. Mild calcification of the proximal right internal carotid   artery without significant stenosis. Mild calcification of the left carotid   bifurcation without significant stenosis. There is a codominant vertebrobasilar arterial system. The cervical vertebral   arteries are normal in course, size and contour without significant stenosis. Visualized soft tissues of the neck are unremarkable. Dependent atelectasis in   the bilateral upper and lower lobes. Unchanged mild emphysema. No acute fracture   or aggressive osseous lesion. Multilevel degenerative disc disease in the   cervical spine most advanced at C5-C6 and C6-C7.           03/28/23 1239  CT CODE NEURO HEAD WO CONTRAST Final result    Impression:  No acute abnormality. Narrative:  EXAM: CT CODE NEURO HEAD WO CONTRAST       INDICATION: stroke level 2       COMPARISON: CT head 3/20/2023. CONTRAST: None. TECHNIQUE: Unenhanced CT of the head was performed using 5 mm images. Brain and   bone windows were generated. Coronal and sagittal reformats. CT dose reduction   was achieved through use of a standardized protocol tailored for this   examination and automatic exposure control for dose modulation. FINDINGS:   Mild generalized volume loss. There is no significant white matter disease. There is no intracranial hemorrhage, extra-axial collection, or mass effect. The   basilar cisterns are open. No CT evidence of acute infarct. The bone windows demonstrate no abnormalities. The visualized portions of the   paranasal sinuses and mastoid air cells are clear.                  CXR Results  (Last 48 hours)      None            Medical Decision Making and ED Course - I am the first and primary provider for this patient AND AM THE PRIMARY PROVIDER OF RECORD. - I reviewed the vital signs, available nursing notes, past medical history, past surgical history, family history and social history. - Initial assessment performed. The patients presenting problems have been discussed, and the staff are in agreement with the care plan formulated and outlined with them. I have encouraged them to ask questions as they arise throughout their visit. Vital Signs-Reviewed the patient's vital signs. Patient Vitals for the past 24 hrs:   Temp Pulse Resp BP SpO2   03/28/23 1431 -- 73 16 -- 96 %   03/28/23 1420 -- 68 8 122/85 95 %   03/28/23 1405 -- 76 27 131/85 98 %   03/28/23 1350 -- 75 16 116/74 94 %   03/28/23 1329 -- 77 12 107/85 95 %   03/28/23 1319 -- 78 23 114/80 97 %   03/28/23 1300 -- 64 9 123/77 98 %   03/28/23 1236 -- -- -- -- 98 %   03/28/23 1220 98.5 °F (36.9 °C) 76 16 (!) 112/59 98 %       Records Reviewed: Prior medical records and Nursing notes      Medical Decision Making       Dawna Ventura, 62 y.o. female with PMH of GERD, hypothyroidism, secondary disorder, seizures, and degenerative disc disease who comes to the ED with chief complaint of right-sided headache. This is associated with numbness and weakness on the right side. Concerns at this point is for CVA given the onset of symptoms. Being more than 4 hours, she is not a tPA candidate. Will get CTA to rule out LVO. We will also get CT of the head to rule out bleeding. This could be also secondary to complex migraine. Thus, we will give a trial of migraine cocktail. Patient does not have signs of meningeal irritation. She is afebrile and alert and oriented. No findings consistent with meningitis or acute intracranial infection. We will get images as well as blood work and will get teleneurology involved in assessment of this patient.       ED Course & Reassessment     Medications ordered:  Medications   magnesium sulfate 2 g/50 ml IVPB (premix or compounded) (has no administration in time range)   aspirin chewable tablet 81 mg (has no administration in time range)   . PHARMACY TO SUBSTITUTE PER PROTOCOL (Reordered from: buprenorphine-naloxone (SUBOXONE) 8-2 mg film sublingaul film) (has no administration in time range)   clonazePAM (KlonoPIN) tablet 0.5 mg (has no administration in time range)   ondansetron (ZOFRAN ODT) tablet 4 mg (has no administration in time range)   QUEtiapine (SEROquel) tablet 300 mg (has no administration in time range)   furosemide (LASIX) tablet 40 mg (has no administration in time range)   oxyCODONE-acetaminophen (PERCOCET) 5-325 mg per tablet 1 Tablet (has no administration in time range)   0.9% sodium chloride infusion (has no administration in time range)   sodium chloride (NS) flush 5-40 mL (has no administration in time range)   sodium chloride (NS) flush 5-40 mL (has no administration in time range)   acetaminophen (TYLENOL) tablet 650 mg (has no administration in time range)     Or   acetaminophen (TYLENOL) suppository 650 mg (has no administration in time range)   polyethylene glycol (MIRALAX) packet 17 g (has no administration in time range)   promethazine (PHENERGAN) tablet 12.5 mg (has no administration in time range)     Or   ondansetron (ZOFRAN) injection 4 mg (has no administration in time range)   enoxaparin (LOVENOX) injection 30 mg (has no administration in time range)   iopamidoL (ISOVUE-370) 370 mg iodine /mL (76 %) injection 100 mL (100 mL IntraVENous Given 3/28/23 1252)   prochlorperazine (COMPAZINE) injection 10 mg (10 mg IntraVENous Given 3/28/23 1352)   diphenhydrAMINE (BENADRYL) injection 50 mg (50 mg IntraVENous Given 3/28/23 1351)   aspirin tablet 325 mg (325 mg Oral Given 3/28/23 1351)       ED Course:     90 minutes reviewing chart the patient EKG.   EKG was done at 12:25 PM entered by me as NSR rate of 75, RI is 140, QRS is 76, QTc is 489, normal axis, no specific ST elevation or depression meeting criteria, no signs of dysrhythmia or blocks. Independently reviewed interpreted patient work-up. CBC is unremarkable for leukocytosis, anemia, or platelet count dysfunction. Chemistry within normal.  Imaging were interpreted by the radiologist as negative for acute intracranial bleeding or LVO. Discussed with [facility] neurology. Recommended hospitalization for MRI of the head and cervical spine. Per [facility] neurology, patient exam has improved and she does not have any weakness on the right and did not appreciate any facial drooping. Thus, presentation could be secondary to a TIA versus complex migraine. Patient will be admitted to the hospital for further work-up. I discussed the patient with the hospitalist will come evaluate the patient for admission. Procedures and Critical Care       Performed by: Radhika Elliott MD  PROCEDURES:  Critical Care  Performed by: Ira Hairston MD  Authorized by:  Ira Hairston MD     Critical care provider statement:     Critical care time (minutes):  30    Critical care time was exclusive of:  Separately billable procedures and treating other patients    Critical care was necessary to treat or prevent imminent or life-threatening deterioration of the following conditions:  CNS failure or compromise    Critical care was time spent personally by me on the following activities:  Development of treatment plan with patient or surrogate, discussions with consultants, evaluation of patient's response to treatment, obtaining history from patient or surrogate, ordering and performing treatments and interventions, ordering and review of laboratory studies, ordering and review of radiographic studies, pulse oximetry, re-evaluation of patient's condition and review of old charts    I assumed direction of critical care for this patient from another provider in my specialty: no      Care discussed with: admitting provider           Diagnosis     Clinical Impression:   1. Cerebrovascular accident (CVA), unspecified mechanism ([address]    2. Acute intractable headache, unspecified headache type        Disposition     Disposition: Condition improved     Admitted      Attestations: Leeann Mendoza MD    Please note that this dictation was completed with [employer], the computer voice recognition software. Quite often unanticipated grammatical, syntax, homophones, and other interpretive errors are inadvertently transcribed by the computer software. Please disregard these errors. Please excuse any errors that have escaped final proofreading. Thank you.

## 2023-03-28 NOTE — PROGRESS NOTES
Admission Medication Reconciliation:    Information obtained from:  Patient  RxQuery data available¹:  YES    Comments/Recommendations: Updated PTA meds/reviewed patient's allergies. 1)  Reviewed medications with patient who was a good historian, reviewed RxQuery    2)  Medication changes (since last review): Added  - None    Adjusted  - None    Removed  - Aspirin  - Suboxone  - Doxycycline  - Lasix  - Lidocaine patch  - Percocet  - Trazodone    3)  Discussed relevant changes with Dr. Rachel Fisher pharmacy benefit data reflects medications filled and processed through the patient's insurance, however   this data does NOT capture whether the medication was picked up or is currently being taken by the patient. Prior to Admission Medications   Prescriptions Last Dose Informant Taking? QUEtiapine (SEROquel) 300 mg tablet  Self No   Sig: Take 300 mg by mouth nightly. clonazePAM (KlonoPIN) 0.5 mg tablet  Self No   Sig: Take 0.5 mg by mouth three (3) times daily. gabapentin (NEURONTIN) 600 mg tablet  Self No   Sig: Take 600 mg by mouth four (4) times daily. ondansetron (ZOFRAN ODT) 4 mg disintegrating tablet  Self No   Sig: Take 1 Tablet by mouth every eight (8) hours as needed for Nausea or Vomiting.       Facility-Administered Medications: None

## 2023-03-28 NOTE — ED TRIAGE NOTES
C/o numbness to right leg and arm since 1500 yesterday.  Woke up this morning with HA        Hx PTSD and Sz

## 2023-03-28 NOTE — ACP (ADVANCE CARE PLANNING)
Advance Care Planning   Healthcare Decision Maker:       Primary Decision Maker: Dmitry Servin son - [phone]

## 2023-03-29 LAB
ANION GAP SERPL CALC-SCNC: 4 MMOL/L (ref 5–15)
BUN SERPL-MCNC: 12 MG/DL (ref 6–20)
BUN/CREAT SERPL: 15 (ref 12–20)
CA-I BLD-MCNC: 8.2 MG/DL (ref 8.5–10.1)
CHLORIDE SERPL-SCNC: 111 MMOL/L (ref 97–108)
CO2 SERPL-SCNC: 25 MMOL/L (ref 21–32)
CREAT SERPL-MCNC: 0.82 MG/DL (ref 0.55–1.02)
ERYTHROCYTE [DISTWIDTH] IN BLOOD BY AUTOMATED COUNT: 15.5 % (ref 11.5–14.5)
GLUCOSE SERPL-MCNC: 94 MG/DL (ref 65–100)
HCT VFR BLD AUTO: 40.5 % (ref 35–47)
HGB BLD-MCNC: 13.4 G/DL (ref 11.5–16)
MCH RBC QN AUTO: 27.6 PG (ref 26–34)
MCHC RBC AUTO-ENTMCNC: 33.1 G/DL (ref 30–36.5)
MCV RBC AUTO: 83.3 FL (ref 80–99)
NRBC # BLD: 0 K/UL (ref 0–0.01)
NRBC BLD-RTO: 0 PER 100 WBC
PLATELET # BLD AUTO: 282 K/UL (ref 150–400)
PMV BLD AUTO: 10.5 FL (ref 8.9–12.9)
POTASSIUM SERPL-SCNC: 3.1 MMOL/L (ref 3.5–5.1)
RBC # BLD AUTO: 4.86 M/UL (ref 3.8–5.2)
SODIUM SERPL-SCNC: 140 MMOL/L (ref 136–145)
WBC # BLD AUTO: 5.3 K/UL (ref 3.6–11)

## 2023-03-29 PROCEDURE — 85027 COMPLETE CBC AUTOMATED: CPT

## 2023-03-29 PROCEDURE — 65270000029 HC RM PRIVATE

## 2023-03-29 PROCEDURE — 74011250636 HC RX REV CODE- 250/636: Performed by: INTERNAL MEDICINE

## 2023-03-29 PROCEDURE — 74011000250 HC RX REV CODE- 250: Performed by: INTERNAL MEDICINE

## 2023-03-29 PROCEDURE — 97161 PT EVAL LOW COMPLEX 20 MIN: CPT

## 2023-03-29 PROCEDURE — 80048 BASIC METABOLIC PNL TOTAL CA: CPT

## 2023-03-29 PROCEDURE — 74011250637 HC RX REV CODE- 250/637: Performed by: INTERNAL MEDICINE

## 2023-03-29 PROCEDURE — 97530 THERAPEUTIC ACTIVITIES: CPT

## 2023-03-29 PROCEDURE — 36415 COLL VENOUS BLD VENIPUNCTURE: CPT

## 2023-03-29 PROCEDURE — 92610 EVALUATE SWALLOWING FUNCTION: CPT

## 2023-03-29 RX ORDER — GABAPENTIN 300 MG/1
300 CAPSULE ORAL 3 TIMES DAILY
Status: DISCONTINUED | OUTPATIENT
Start: 2023-03-29 | End: 2023-03-30 | Stop reason: HOSPADM

## 2023-03-29 RX ORDER — OXYCODONE AND ACETAMINOPHEN 5; 325 MG/1; MG/1
1 TABLET ORAL ONCE
Status: COMPLETED | OUTPATIENT
Start: 2023-03-29 | End: 2023-03-29

## 2023-03-29 RX ORDER — OXYCODONE HYDROCHLORIDE 5 MG/1
5 TABLET ORAL ONCE
Status: COMPLETED | OUTPATIENT
Start: 2023-03-29 | End: 2023-03-29

## 2023-03-29 RX ORDER — PAROXETINE HYDROCHLORIDE 20 MG/1
20 TABLET, FILM COATED ORAL DAILY
Status: DISCONTINUED | OUTPATIENT
Start: 2023-03-30 | End: 2023-03-30 | Stop reason: HOSPADM

## 2023-03-29 RX ADMIN — CLONAZEPAM 0.5 MG: 0.5 TABLET ORAL at 21:23

## 2023-03-29 RX ADMIN — OXYCODONE AND ACETAMINOPHEN 1 TABLET: 5; 325 TABLET ORAL at 10:43

## 2023-03-29 RX ADMIN — CLONAZEPAM 0.5 MG: 0.5 TABLET ORAL at 08:29

## 2023-03-29 RX ADMIN — ACETAMINOPHEN 650 MG: 325 TABLET ORAL at 14:06

## 2023-03-29 RX ADMIN — ASPIRIN 81 MG 81 MG: 81 TABLET ORAL at 08:29

## 2023-03-29 RX ADMIN — ACETAMINOPHEN 650 MG: 325 TABLET ORAL at 08:29

## 2023-03-29 RX ADMIN — SODIUM CHLORIDE, PRESERVATIVE FREE 10 ML: 5 INJECTION INTRAVENOUS at 14:07

## 2023-03-29 RX ADMIN — GABAPENTIN 300 MG: 300 CAPSULE ORAL at 16:00

## 2023-03-29 RX ADMIN — OXYCODONE HYDROCHLORIDE 5 MG: 5 TABLET ORAL at 19:38

## 2023-03-29 RX ADMIN — ENOXAPARIN SODIUM 40 MG: 100 INJECTION SUBCUTANEOUS at 08:29

## 2023-03-29 RX ADMIN — SODIUM CHLORIDE, PRESERVATIVE FREE 10 ML: 5 INJECTION INTRAVENOUS at 05:28

## 2023-03-29 RX ADMIN — GABAPENTIN 300 MG: 300 CAPSULE ORAL at 21:23

## 2023-03-29 RX ADMIN — CLONAZEPAM 0.5 MG: 0.5 TABLET ORAL at 15:59

## 2023-03-29 RX ADMIN — SODIUM CHLORIDE 75 ML/HR: 9 INJECTION, SOLUTION INTRAVENOUS at 05:26

## 2023-03-29 RX ADMIN — QUETIAPINE 300 MG: 300 TABLET ORAL at 21:23

## 2023-03-29 RX ADMIN — SODIUM CHLORIDE, PRESERVATIVE FREE 10 ML: 5 INJECTION INTRAVENOUS at 21:23

## 2023-03-29 RX ADMIN — ONDANSETRON 4 MG: 4 TABLET, ORALLY DISINTEGRATING ORAL at 17:25

## 2023-03-29 RX ADMIN — SODIUM CHLORIDE 75 ML/HR: 9 INJECTION, SOLUTION INTRAVENOUS at 19:37

## 2023-03-29 NOTE — PROGRESS NOTES
SPEECH LANGUAGE PATHOLOGY BEDSIDE SWALLOW EVALUATIONS  Patient: Jay Chadwick  (85 y.o. )  Date: 3/29/2023  Primary Diagnosis: CVA   Precautions:      ASSESSMENT :  Patient is A&Ox4 and follows basic commands. No facial droop or dysarthria. Informally, speech language is wfl. Oropharyngeal swallow is wfl. Oral phase c/b slow but adequate mastication s/t dentition. Pharyngeal phase c/b timely swallow and HLE Is wfl upon palpation. NO overt s/s of pen/asp observed. PLAN :  Recommendations and Planned Interventions:  Rec regular/thin diet w/ general asp/GERD precautions. No further ST intervention at this time. Please re-consult as medically indicated. SUBJECTIVE:   Patient denied dysphagia and speech language deficits. She does report hx of prior pna. OBJECTIVE:   Patient admitted w/ L sided weakness and paraesthesia. Past Medical History:   Diagnosis Date    DDD (degenerative disc disease), lumbar     Fatigue     GERD (gastroesophageal reflux disease)     Hypothyroidism     Psychiatric disorder     PTSD per patient    Seizures ([facility] [address]        Diet prior to admission: Regular  Current Diet:  DIET ADULT     Cognitive and Communication Status:  Neurologic State: Alert  Orientation Level: Oriented X4  Cognition: Appropriate decision making, Follows commands  Perception: Appears intact  Perseveration: No perseveration noted  Safety/Judgement: Awareness of environment  Swallowing Evaluation:   Oral Assessment:  Oral Assessment  Labial: No impairment  Dentition: Limited  Oral Hygiene: wfl  Lingual: No impairment  Velum: No impairment  Mandible: No impairment  P.O. Trials:  Patient Position: upright in bed  Vocal quality prior to P.O.: Hoarse  Consistency Presented: Thin liquid; Solid;Puree  How Presented: Self-fed/presented;Cup/sip;Straw;Successive swallows     Bolus Acceptance: No impairment  Bolus Formation/Control: Impaired  Type of Impairment: Mastication  Propulsion: Delayed (# of seconds)  Oral Residue: Less than 10% of bolus  Initiation of Swallow: No impairment  Laryngeal Elevation: Functional  Aspiration Signs/Symptoms: None  Pharyngeal Phase Characteristics: No impairment, issues, or problems              Oral Phase Severity: Minimal  Pharyngeal Phase Severity : No impairment  Voice:  Vocal Quality: Hoarse             Pain:  Pain Scale 1: Numeric (0 - 10)  Pain Intensity 1: 7  Pain Location 1: Head      After treatment:   Patient left in no apparent distress in bed, Call bell within reach, and Nursing notified    COMMUNICATION/EDUCATION:   Patient was educated regarding s/s aspiration, aspiration precautions, BEFAST, stroke booklet education and diet recs. She demonstrated Good understanding as evidenced by engagement and appropriate questions. The patient's plan of care including recommendations, planned interventions, and recommended diet changes were discussed with: Registered nurse.        Thank you for this referral.  Leyda Ojeda M.S., M.Ed., CCC-SLP  Time Calculation: 18 mins

## 2023-03-29 NOTE — PROGRESS NOTES
Hospitalist Progress Note               Daily Progress Note: 3/29/2023      Subjective:   Hospital course to date:  Admit date 3/28/2023:Haydee Mcdonnell is a 62 y.o. female who presents with headache numbness to right leg and arm. The symptoms started since early morning. She has had headache on and off since 1 month. Apparently the emergency room doctor asked teleneurology to see the patient and they have recommended admission for MRI and CVA work-up. There is also recommendation for MRI spine. She has no history of any injury. Prior history of GERD, hypothyroidism, PTSD, seizure and recent diagnosis of pneumonia  Numerous prior ED visits and hospitalization on 2/10/2023 for severe sepsis and for acute kidney failure on 9/16/2022      ---------------------------------------------------------  3/29/2023-still with headache . asking for something stronger than Tylenol. Oxycodone given with some relief. Stated that her numbness is improved to some extent. Was able to participate with physical therapy. Eating well. SLP eval was done.   Problem List:  Problem List as of 3/29/2023 Date Reviewed: 9/16/2022            Codes Class Noted - Resolved    Stroke [facility]) ICD-10-CM: I63.9  ICD-9-CM: 434.91  3/28/2023 - Present        Severe sepsis ([facility] ICD-10-CM: A41.9, R65.20  ICD-9-CM: 038.9, 995.92  2/10/2023 - Present        Acute respiratory failure with hypoxia ([facility] ICD-10-CM: J96.01  ICD-9-CM: 518.81  2/10/2023 - Present        Intractable vomiting ICD-10-CM: R11.10  ICD-9-CM: 536.2  9/16/2022 - Present        Chest pain ICD-10-CM: R07.9  ICD-9-CM: 786.50  7/28/2022 - Present        Seizure ([facility] ICD-10-CM: R56.9  ICD-9-CM: 780.39  5/25/2022 - Present        Acute kidney injury ([facility] ICD-10-CM: N17.9  ICD-9-CM: 584.9  3/27/2022 - Present        Hypokalemia ICD-10-CM: E87.6  ICD-9-CM: 276.8  8/9/2021 - Present        Hypothyroidism due to acquired atrophy of thyroid ICD-10-CM: E03.4  ICD-9-CM: 244.8, 246.8 2015 - Present           Medications reviewed  Current Facility-Administered Medications   Medication Dose Route Frequency    [START ON 3/30/2023] PARoxetine (PAXIL) tablet 20 mg  20 mg Oral DAILY    gabapentin (NEURONTIN) capsule 300 mg  300 mg Oral TID    aspirin chewable tablet 81 mg  81 mg Oral DAILY    clonazePAM (KlonoPIN) tablet 0.5 mg  0.5 mg Oral TID    ondansetron (ZOFRAN ODT) tablet 4 mg  4 mg Oral Q8H PRN    QUEtiapine (SEROquel) tablet 300 mg  300 mg Oral QHS    0.9% sodium chloride infusion  75 mL/hr IntraVENous CONTINUOUS    sodium chloride (NS) flush 5-40 mL  5-40 mL IntraVENous Q8H    sodium chloride (NS) flush 5-40 mL  5-40 mL IntraVENous PRN    acetaminophen (TYLENOL) tablet 650 mg  650 mg Oral Q6H PRN    Or    acetaminophen (TYLENOL) suppository 650 mg  650 mg Rectal Q6H PRN    polyethylene glycol (MIRALAX) packet 17 g  17 g Oral DAILY PRN    promethazine (PHENERGAN) tablet 12.5 mg  12.5 mg Oral Q6H PRN    Or    ondansetron (ZOFRAN) injection 4 mg  4 mg IntraVENous Q6H PRN    enoxaparin (LOVENOX) injection 40 mg  40 mg SubCUTAneous DAILY       Review of Systems:   Pertinent items are noted in HPI. Objective:   Physical Exam:     Visit Vitals  /69 (BP 1 Location: Left upper arm)   Pulse 62   Temp 98.4 °F (36.9 °C)   Resp 18   Ht 5' 8\" (1.727 m)   Wt 83.9 kg (185 lb)   SpO2 96%   Breastfeeding No   BMI 28.13 kg/m²      O2 Device: None (Room air)    Temp (24hrs), Av.1 °F (36.7 °C), Min:97.7 °F (36.5 °C), Max:98.8 °F (37.1 °C)    No intake/output data recorded.  1901 -  0700  In: 1140 [P.O.:240; I.V.:900]  Out: 600 [Urine:600]    Physical Exam  Vitals and nursing note reviewed. Constitutional:       Appearance: Normal appearance. She is normal weight. HENT:      Head: Normocephalic and atraumatic. Nose: Nose normal.      Mouth/Throat:      Mouth: Mucous membranes are moist.      Pharynx: Oropharynx is clear.    Eyes:      Conjunctiva/sclera: Conjunctivae normal. Cardiovascular:      Rate and Rhythm: Regular rhythm. Tachycardia present. Pulses: Normal pulses. Heart sounds: Normal heart sounds. Pulmonary:      Effort: Pulmonary effort is normal.      Breath sounds: Normal breath sounds. Abdominal:      General: Abdomen is flat. Palpations: Abdomen is soft. Skin:     General: Skin is warm and dry. Coloration: Skin is not pale. Findings: No erythema. Neurological:      General: No focal deficit present. Mental Status: She is alert and oriented to person, place, and time. Psychiatric:         Mood and Affect: Mood normal.         Behavior: Behavior normal.        Intake/Output Summary (Last 24 hours) at 3/29/2023 1839  Last data filed at 3/29/2023 0600  Gross per 24 hour   Intake 1140 ml   Output 600 ml   Net 540 ml          Data Review:       Recent Days:  Recent Labs     03/29/23  0825 03/28/23  1234   WBC 5.3 6.0   HGB 13.4 14.7   HCT 40.5 44.3    340     Recent Labs     03/29/23  0825 03/28/23  1234    143   K 3.1* 3.6   * 109*   CO2 25 27   GLU 94 109*   BUN 12 13   CREA 0.82 1.02   CA 8.2* 8.9   ALB  --  3.7   TBILI  --  0.4   ALT  --  12   INR  --  1.2*     No results for input(s): PH, PCO2, PO2, HCO3, FIO2 in the last 72 hours.     24 Hour Results:  Recent Results (from the past 24 hour(s))   METABOLIC PANEL, BASIC    Collection Time: 03/29/23  8:25 AM   Result Value Ref Range    Sodium 140 136 - 145 mmol/L    Potassium 3.1 (L) 3.5 - 5.1 mmol/L    Chloride 111 (H) 97 - 108 mmol/L    CO2 25 21 - 32 mmol/L    Anion gap 4 (L) 5 - 15 mmol/L    Glucose 94 65 - 100 mg/dL    BUN 12 6 - 20 mg/dL    Creatinine 0.82 0.55 - 1.02 mg/dL    BUN/Creatinine ratio 15 12 - 20      eGFR >60 >60 ml/min/1.73m2    Calcium 8.2 (L) 8.5 - 10.1 mg/dL   CBC W/O DIFF    Collection Time: 03/29/23  8:25 AM   Result Value Ref Range    WBC 5.3 3.6 - 11.0 K/uL    RBC 4.86 3.80 - 5.20 M/uL    HGB 13.4 11.5 - 16.0 g/dL    HCT 40.5 35.0 - 47.0 % MCV 83.3 80.0 - 99.0 FL    MCH 27.6 26.0 - 34.0 PG    MCHC 33.1 30.0 - 36.5 g/dL    RDW 15.5 (H) 11.5 - 14.5 %    PLATELET 402 892 - 282 K/uL    MPV 10.5 8.9 - 12.9 FL    NRBC 0.0 0.0  WBC    ABSOLUTE NRBC 0.00 0.00 - 0.01 K/uL       CTA CODE NEURO HEAD AND NECK W CONT   Final Result   CTA Head:   1. No evidence of significant stenosis or aneurysm. CTA Neck:   1. No evidence of significant stenosis. CT CODE NEURO HEAD WO CONTRAST   Final Result   No acute abnormality. Assessment:    Headaches  CVA-ruled out  Conversion disorder, most likely  History of PTSD  History of seizures  Prior history of CHF    Discussion/MDM: Patient with multiple medical comorbidities, each with high likelihood for morbidity and mortality if left untreated. Patient being treated with medication that requires intensive monitoring due to increased risk for toxicity. I have reviewed patient's presenting subjective and objective findings, as well as all laboratory studies, imaging studies, and vital signs to date as well as treatment rendered and patient's response to those treatments. In addition, prior medical, surgical and relevant social and family histories were reviewed. Plan:  Appreciate PT OT input  Medicate for pain  SOC follow-up and document to chart. Psych input        MDM  Reviewed: previous chart, nursing note and vitals  Reviewed previous: labs and CT scan  Interpretation: labs  Total time providing critical care: 30-74 minutes. This excludes time spent performing separately reportable procedures and services. Consults:      Care Plan discussed with: Patient/Family, Nurse, and     Code status: Full    Social determinants of health: Multiple readmissions with similar symptomatology    Disposition: Home in 24    Total time spent with patient: 35 minutes.     Manuel Mendieta MD
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
## 2023-03-29 NOTE — PROGRESS NOTES
CM reviewed chart. Patient's current plan is to discharge home/self. PT/OT eval still pending at this time. CM will continue to follow for their recs.

## 2023-03-29 NOTE — PROGRESS NOTES
PHYSICAL THERAPY EVALUATION  Patient: Aleida Zamora (82 y.o. female)  Date: 3/29/2023  Primary Diagnosis: Stroke [facility]) [I63.9]       Precautions: falls    In place during session: EKG/telemetry   ASSESSMENT  Pt is a 62 y.o. female admitted on 3/28/2023 for headache associated with numbness of right LE and UE; pt currently being treated for CVA workup, possible conversion disorder, h/o PTSD, h/o seizures. Head CT negative for acute events. Head CTA negative for acute events and stenosis. Pt semi-supine in bed upon PT arrival, agreeable to evaluation. Pt A&O x 4. Based on the objective data described, the patient presents with generalized weakness, impaired functional mobility, impaired amb, impaired balance, and decreased activity tolerance. (See below for objective details and assist levels). Overall pt tolerated session fair today with c/o 7-8/10 headache throughout. Pt required mod I for bed mobility and transfers. Pt amb in room ~75 feet with no AD, gt belt, and SBA to CGA; demonstrates NBOS with short, shuffling, step to gt pattern, no LOB or knee buckling noted. Pt will benefit from continued skilled PT to address above deficits and return to PLOF. Current PT DC recommendation Outpatient Therapy  once medically appropriate discussed with pt due to reported right sided weakness and \"sluggishness\" following previous admission and intubation.     Current Level of Function Impacting Discharge (mobility/balance): SBA to CGA    Other factors to consider for discharge: severity of deficits, acute medical state      PLAN :  Recommendations and Planned Interventions: bed mobility training, transfer training, gait training, therapeutic exercises, patient and family training/education, and therapeutic activities      Recommend for staff: Out of bed to chair for meals and Amb to bathroom for toileting with gt belt and AD    Frequency/Duration: Patient will be followed by physical therapy:  2-3x/week to address goals. Recommendation for discharge: (in order for the patient to meet his/her long term goals)  Home with [address] [facility]    This discharge recommendation:  Has been made in collaboration with the attending provider and/or case management    IF patient discharges home will need the following DME: none         SUBJECTIVE:   Patient stated i've felt off since my last admission.     OBJECTIVE DATA SUMMARY:   HISTORY:    Past Medical History:   Diagnosis Date    DDD (degenerative disc disease), lumbar     Fatigue     GERD (gastroesophageal reflux disease)     Hypothyroidism     Psychiatric disorder     PTSD per patient    Seizures ([address]      Past Surgical History:   Procedure Laterality Date    HX APPENDECTOMY      HX  SECTION      HX CHOLECYSTECTOMY      HX HYSTERECTOMY         Home Situation  Home Environment: Trailer/mobile home  # Steps to Enter: 0  One/Two Story Residence: One story  Living Alone: No  Support Systems: Friend/Neighbor  Patient Expects to be Discharged to[de-identified] Home  Current DME Used/Available at Home: None  Tub or Shower Type: Tub/Shower combination    EXAMINATION/PRESENTATION/DECISION MAKING:   Critical Behavior:  Neurologic State: Alert  Orientation Level: Oriented X4  Cognition: Appropriate decision making, Appropriate safety awareness, Appropriate for age attention/concentration  Safety/Judgement: Awareness of environment  Hearing: Auditory  Auditory Impairment: None  Skin:  intact where visible  Edema: none noted   Range Of Motion:  AROM: Within functional limits           PROM: Within functional limits           Strength:    Strength: Generally decreased, functional       Functional Mobility:  Bed Mobility:  Rolling: Modified independent  Supine to Sit: Modified independent  Sit to Supine: Modified independent  Scooting: Modified independent  Transfers:  Sit to Stand: Stand-by assistance  Stand to Sit: Stand-by assistance                       Balance:   Sitting: Intact; Without support  Standing: Intact; Without support  Ambulation/Gait Training:  Distance (ft): 75 Feet (ft)  Assistive Device: Gait belt  Ambulation - Level of Assistance: Stand-by assistance;Contact guard assistance     Gait Description (WDL): Exceptions to WDL           Base of Support: Narrowed     Speed/Liliana: Slow;Shuffled    Therapeutic Exercises:   Pt would benefit from LE HEP to improve overall LE AROM/strength and can be further educated in next treatment session. Functional Measure:  Meg[employer]ann [employer] AM-PAC 6 Clicks         Basic Mobility Inpatient Short Form  How much difficulty does the patient currently have. .. Unable A Lot A Little None   1. Turning over in bed (including adjusting bedclothes, sheets and blankets)? [] 1   [] 2   [] 3   [x] 4   2. Sitting down on and standing up from a chair with arms ( e.g., wheelchair, bedside commode, etc.)   [] 1   [] 2   [] 3   [x] 4   3. Moving from lying on back to sitting on the side of the bed? [] 1   [] 2   [] 3   [x] 4          How much help from another person does the patient currently need. .. Total A Lot A Little None   4. Moving to and from a bed to a chair (including a wheelchair)? [] 1   [] 2   [x] 3   [] 4   5. Need to walk in hospital room? [] 1   [] 2   [x] 3   [] 4   6. Climbing 3-5 steps with a railing? [] 1   [] 2   [x] 3   [] 4   © 2007, Trustees of [employer], under license to [employer]. All rights reserved     Score:  Initial: 21/24 Most Recent: X (Date: 3/29/2023 )   Interpretation of Tool:  Represents activities that are increasingly more difficult (i.e. Bed mobility, Transfers, Gait).   Score 24 23 22-20 19-15 14-10 9-7 6   Modifier CH CI CJ CK CL CM CN         Physical Therapy Evaluation Charge Determination   History Examination Presentation Decision-Making   HIGH Complexity :3+ comorbidities / personal factors will impact the outcome/ POC  HIGH Complexity : 4+ Standardized tests and measures addressing body structure, function, activity limitation and / or participation in recreation  LOW Complexity : Stable, uncomplicated  Other outcome measures ampac 6  mod      Based on the above components, the patient evaluation is determined to be of the following complexity level: LOW     Pain Ratin-8/10 reported    Activity Tolerance:   Fair and requires rest breaks    After treatment patient left in no apparent distress:   Bed locked and in lowest position Supine in bed, Call bell within reach, and Side rails x 3 and nsg updated. GOALS:    Problem: Mobility Impaired (Adult and Pediatric)  Goal: *Acute Goals and Plan of Care (Insert Text)  Description: FUNCTIONAL STATUS PRIOR TO ADMISSION: Patient was independent and active without use of DME. Patient was independent for basic and instrumental ADLs. HOME SUPPORT PRIOR TO ADMISSION: The patient lived with a friend but did not require assist.    Physical Therapy Goals  Initiated 3/29/2023  Pt stated goal: to go home  Pt will be I with LE HEP in 7 days. Pt will perform bed mobility with I in 7 days. Pt will perform transfers with I in 7 days. Pt will amb  feet with LRAD safely with I in 7 days. Outcome: Not Met       COMMUNICATION/EDUCATION:   The patients plan of care was discussed with: Occupational therapist, Registered nurse, and Case management. Fall prevention education was provided and the patient/caregiver indicated understanding., Patient/family have participated as able in goal setting and plan of care. , and Patient/family agree to work toward stated goals and plan of care.      Thank you for this referral.  Elias Childs, PT, DPT   Time Calculation: 18 mins

## 2023-03-30 VITALS
SYSTOLIC BLOOD PRESSURE: 118 MMHG | HEART RATE: 69 BPM | OXYGEN SATURATION: 94 % | DIASTOLIC BLOOD PRESSURE: 74 MMHG | TEMPERATURE: 98.2 F | BODY MASS INDEX: 28.04 KG/M2 | RESPIRATION RATE: 17 BRPM | WEIGHT: 185 LBS | HEIGHT: 68 IN

## 2023-03-30 PROCEDURE — 74011250637 HC RX REV CODE- 250/637: Performed by: PSYCHIATRY & NEUROLOGY

## 2023-03-30 PROCEDURE — 74011250637 HC RX REV CODE- 250/637: Performed by: INTERNAL MEDICINE

## 2023-03-30 PROCEDURE — 74011250636 HC RX REV CODE- 250/636: Performed by: INTERNAL MEDICINE

## 2023-03-30 PROCEDURE — 74011000250 HC RX REV CODE- 250: Performed by: INTERNAL MEDICINE

## 2023-03-30 RX ORDER — GUAIFENESIN 100 MG/5ML
81 LIQUID (ML) ORAL DAILY
Qty: 30 TABLET | Refills: 0 | Status: SHIPPED | OUTPATIENT
Start: 2023-03-30

## 2023-03-30 RX ORDER — PANTOPRAZOLE SODIUM 40 MG/1
40 TABLET, DELAYED RELEASE ORAL
Status: DISCONTINUED | OUTPATIENT
Start: 2023-03-30 | End: 2023-03-30 | Stop reason: HOSPADM

## 2023-03-30 RX ORDER — KETOROLAC TROMETHAMINE 30 MG/ML
15 INJECTION, SOLUTION INTRAMUSCULAR; INTRAVENOUS
Status: COMPLETED | OUTPATIENT
Start: 2023-03-30 | End: 2023-03-30

## 2023-03-30 RX ORDER — PAROXETINE HYDROCHLORIDE 20 MG/1
20 TABLET, FILM COATED ORAL DAILY
Qty: 30 TABLET | Refills: 0 | Status: SHIPPED | OUTPATIENT
Start: 2023-03-31

## 2023-03-30 RX ADMIN — ASPIRIN 81 MG 81 MG: 81 TABLET ORAL at 08:36

## 2023-03-30 RX ADMIN — SODIUM CHLORIDE, PRESERVATIVE FREE 10 ML: 5 INJECTION INTRAVENOUS at 06:39

## 2023-03-30 RX ADMIN — PANTOPRAZOLE SODIUM 40 MG: 40 TABLET, DELAYED RELEASE ORAL at 11:43

## 2023-03-30 RX ADMIN — KETOROLAC TROMETHAMINE 15 MG: 30 INJECTION, SOLUTION INTRAMUSCULAR; INTRAVENOUS at 11:43

## 2023-03-30 RX ADMIN — PAROXETINE 20 MG: 20 TABLET, FILM COATED ORAL at 08:36

## 2023-03-30 RX ADMIN — GABAPENTIN 300 MG: 300 CAPSULE ORAL at 08:36

## 2023-03-30 RX ADMIN — CLONAZEPAM 0.5 MG: 0.5 TABLET ORAL at 08:36

## 2023-03-30 RX ADMIN — ENOXAPARIN SODIUM 40 MG: 100 INJECTION SUBCUTANEOUS at 08:36

## 2023-03-30 RX ADMIN — ACETAMINOPHEN 650 MG: 325 TABLET ORAL at 08:36

## 2023-03-30 RX ADMIN — SODIUM CHLORIDE 75 ML/HR: 9 INJECTION, SOLUTION INTRAVENOUS at 08:36

## 2023-03-30 NOTE — DISCHARGE SUMMARY
Hospitalist Discharge Summary     Patient ID:    Solis Stein  [id number]  62 y.o.  1965    Admit date: 3/28/2023    Discharge date : 3/30/2023      Final Diagnoses & Acute Mgmt:    # Admitted for Possible CVA. CTA Head & Neck negative. # Possible Conversion vs Secondary Effect from dose reduction of Gabapentin by her Psychiatrist (according to patient)    Evaluated by Psychiatry here and treatment/mgmt while in hospital; per Dr. Saúl Ortega:    DIAGNOSES:  Major depressive disorder, says depression 8/0-10 scale. Anxiety 9/0-10, anxiety disorder. Posttraumatic stress disorder. DISPOSITION:  Continue clonazepam 0.5 mg three times a day, Seroquel 300 mg at night, trazodone 100 mg at night. Try Paxil 20 mg daily. The patient is not suicidal, not homicidal, not psychotic    Reason for Hospitalization & Hospital Course:   Solis Stein is a 62 y.o. female who presents with headache numbness to right leg and arm. The symptoms started since early morning. She has had headache on and off since 1 month. Monitored in the hospital with symptom resolution and non-focal exam. Some residual headache. Discharge Medications    Current Discharge Medication List        START taking these medications    Details   PARoxetine (PAXIL) 20 mg tablet Take 1 Tablet by mouth daily. Qty: 30 Tablet, Refills: 0  Start date: 3/31/2023           CONTINUE these medications which have CHANGED    Details   aspirin 81 mg chewable tablet Take 1 Tablet by mouth daily. Qty: 30 Tablet, Refills: 0  Start date: 3/30/2023           CONTINUE these medications which have NOT CHANGED    Details   ondansetron (ZOFRAN ODT) 4 mg disintegrating tablet Take 1 Tablet by mouth every eight (8) hours as needed for Nausea or Vomiting. Qty: 15 Tablet, Refills: 0      clonazePAM (KlonoPIN) 0.5 mg tablet Take 0.5 mg by mouth three (3) times daily. QUEtiapine (SEROquel) 300 mg tablet Take 300 mg by mouth nightly. gabapentin (NEURONTIN) 600 mg tablet Take 600 mg by mouth four (4) times daily. Associated Diagnoses: Idiopathic hypotension; Hypothyroidism due to acquired atrophy of thyroid; Anemia due to vitamin B12 deficiency; Dizziness; Arthralgia               Follow up Care    1. PCP Referral:  2. Psychiatrist     Patient Follow Up Instructions: Activity: Activity as tolerated  Diet:  Low fat, Low cholesterol    Condition at Discharge:  Stable    Disposition  Home or Self Care    Code Status:  Full Code    Discharge Exam:  Patient seen and examined by me on discharge day. Constitutional: NAD, Awake, conversant, comfortable    Neck: Supple     Cardiovascular: S1S2, no murmur; Tele: sinus    Respiratory:  CTA ant bilat; vesicular breath sounds, no overt wheeze     GI: Soft, NT; no guarding/rigidity; + bowel sounds    : voiding, clear yellow urine    Musculoskeletal: Moving all extremities spontaneously; Neurological:  AxOx3; No new focal weakness; No overt tremors; good bilat ; symmetric power    Psychiatric: Flat affect    Skin: No new rash or significant discoloration     Vascular: Palpable pulses; No edema      CONSULTATIONS: Neurology and Psychiatry    Significant Diagnostic Studies:   No results found for this or any previous visit (from the past 24 hour(s)). CTA CODE NEURO HEAD AND NECK W CONT   Final Result   CTA Head:   1. No evidence of significant stenosis or aneurysm. CTA Neck:   1. No evidence of significant stenosis. CT CODE NEURO HEAD WO CONTRAST   Final Result   No acute abnormality.                 Total DC Time and Coordination of Care: 30 mins    Signed:  Rachel Brasher MD  3/30/2023  11:16 AM

## 2023-03-30 NOTE — PROGRESS NOTES
Discharge instructions completed and reviewed with pt. Pt verbalizes understanding and denies any further questions. IV and cardiac monitor removed. All pt belongings packed and sent with pt. Pt discharged home/self via personal vehicle. Pt stable upon discharge.

## 2023-03-30 NOTE — PROGRESS NOTES
CM noted discharge order. Patient has no CM needs at discharge. Patient declined HH. Primary RN made aware. Discharge plan of care/case management plan validated with provider discharge order.

## 2023-05-23 RX ORDER — CLONAZEPAM 0.5 MG/1
0.5 TABLET ORAL 3 TIMES DAILY
COMMUNITY
Start: 2022-09-06

## 2023-05-23 RX ORDER — QUETIAPINE FUMARATE 300 MG/1
300 TABLET, FILM COATED ORAL NIGHTLY
COMMUNITY
Start: 2022-07-13

## 2023-05-23 RX ORDER — GABAPENTIN 600 MG/1
600 TABLET ORAL 4 TIMES DAILY
COMMUNITY

## 2023-05-23 RX ORDER — ONDANSETRON 4 MG/1
4 TABLET, ORALLY DISINTEGRATING ORAL EVERY 8 HOURS PRN
COMMUNITY
Start: 2023-03-21

## 2023-05-23 RX ORDER — ASPIRIN 81 MG/1
81 TABLET, CHEWABLE ORAL DAILY
COMMUNITY
Start: 2023-03-30

## 2023-05-23 RX ORDER — PAROXETINE HYDROCHLORIDE 20 MG/1
20 TABLET, FILM COATED ORAL DAILY
COMMUNITY
Start: 2023-03-31

## 2023-05-25 ENCOUNTER — APPOINTMENT (OUTPATIENT)
Facility: HOSPITAL | Age: 58
End: 2023-05-25
Payer: MEDICARE

## 2023-05-25 ENCOUNTER — HOSPITAL ENCOUNTER (INPATIENT)
Facility: HOSPITAL | Age: 58
LOS: 2 days | Discharge: HOME | End: 2023-05-27
Attending: EMERGENCY MEDICINE | Admitting: HOSPITALIST
Payer: MEDICARE

## 2023-05-25 DIAGNOSIS — R19.7 DIARRHEA OF PRESUMED INFECTIOUS ORIGIN: Primary | ICD-10-CM

## 2023-05-25 LAB
ALBUMIN SERPL-MCNC: 3.8 G/DL (ref 3.5–5)
ALBUMIN/GLOB SERPL: 1.1 (ref 1.1–2.2)
ALP SERPL-CCNC: 79 U/L (ref 45–117)
ALT SERPL-CCNC: 16 U/L (ref 12–78)
ANION GAP SERPL CALC-SCNC: 4 MMOL/L (ref 5–15)
APPEARANCE UR: CLEAR
AST SERPL W P-5'-P-CCNC: 11 U/L (ref 15–37)
AST SERPL W P-5'-P-CCNC: ABNORMAL U/L (ref 15–37)
BACTERIA URNS QL MICRO: NEGATIVE /HPF
BILIRUB SERPL-MCNC: 0.5 MG/DL (ref 0.2–1)
BILIRUB UR QL: NEGATIVE
BUN SERPL-MCNC: 17 MG/DL (ref 6–20)
BUN/CREAT SERPL: 18 (ref 12–20)
CA-I BLD-MCNC: 9.3 MG/DL (ref 8.5–10.1)
CHLORIDE SERPL-SCNC: 109 MMOL/L (ref 97–108)
CO2 SERPL-SCNC: 24 MMOL/L (ref 21–32)
COLOR UR: ABNORMAL
CREAT SERPL-MCNC: 0.97 MG/DL (ref 0.55–1.02)
EKG ATRIAL RATE: 111 BPM
EKG DIAGNOSIS: NORMAL
EKG P AXIS: 66 DEGREES
EKG P-R INTERVAL: 136 MS
EKG Q-T INTERVAL: 312 MS
EKG QRS DURATION: 72 MS
EKG QTC CALCULATION (BAZETT): 424 MS
EKG R AXIS: 75 DEGREES
EKG T AXIS: 1 DEGREES
EKG VENTRICULAR RATE: 111 BPM
EPITH CASTS URNS QL MICRO: ABNORMAL /LPF
ERYTHROCYTE [DISTWIDTH] IN BLOOD BY AUTOMATED COUNT: 14.8 % (ref 11.5–14.5)
GLOBULIN SER CALC-MCNC: 3.5 G/DL (ref 2–4)
GLUCOSE SERPL-MCNC: 106 MG/DL (ref 65–100)
GLUCOSE UR STRIP.AUTO-MCNC: NEGATIVE MG/DL
HCT VFR BLD AUTO: 43.2 % (ref 35–47)
HGB BLD-MCNC: 14.3 G/DL (ref 11.5–16)
HGB UR QL STRIP: NEGATIVE
KETONES UR QL STRIP.AUTO: NEGATIVE MG/DL
LEUKOCYTE ESTERASE UR QL STRIP.AUTO: NEGATIVE
LIPASE SERPL-CCNC: 115 U/L (ref 73–393)
MAGNESIUM SERPL-MCNC: 1.8 MG/DL (ref 1.6–2.4)
MAGNESIUM SERPL-MCNC: NORMAL MG/DL (ref 1.6–2.4)
MCH RBC QN AUTO: 29.1 PG (ref 26–34)
MCHC RBC AUTO-ENTMCNC: 33.1 G/DL (ref 30–36.5)
MCV RBC AUTO: 87.8 FL (ref 80–99)
MUCOUS THREADS URNS QL MICRO: ABNORMAL /LPF
NITRITE UR QL STRIP.AUTO: NEGATIVE
NRBC # BLD: 0 K/UL (ref 0–0.01)
NRBC BLD-RTO: 0 PER 100 WBC
PH UR STRIP: 5 (ref 5–8)
PLATELET # BLD AUTO: 327 K/UL (ref 150–400)
PMV BLD AUTO: 9.9 FL (ref 8.9–12.9)
POTASSIUM SERPL-SCNC: 3.6 MMOL/L (ref 3.5–5.1)
POTASSIUM SERPL-SCNC: 4.3 MMOL/L (ref 3.5–5.1)
PROT SERPL-MCNC: 7.3 G/DL (ref 6.4–8.2)
PROT UR STRIP-MCNC: NEGATIVE MG/DL
RBC # BLD AUTO: 4.92 M/UL (ref 3.8–5.2)
RBC #/AREA URNS HPF: ABNORMAL /HPF (ref 0–5)
SODIUM SERPL-SCNC: 137 MMOL/L (ref 136–145)
SP GR UR REFRACTOMETRY: >1.03 (ref 1–1.03)
URINE CULTURE IF INDICATED: ABNORMAL
UROBILINOGEN UR QL STRIP.AUTO: 0.1 EU/DL (ref 0.1–1)
WBC # BLD AUTO: 7.4 K/UL (ref 3.6–11)
WBC URNS QL MICRO: ABNORMAL /HPF (ref 0–4)

## 2023-05-25 PROCEDURE — 99285 EMERGENCY DEPT VISIT HI MDM: CPT

## 2023-05-25 PROCEDURE — 84132 ASSAY OF SERUM POTASSIUM: CPT

## 2023-05-25 PROCEDURE — 2580000003 HC RX 258: Performed by: EMERGENCY MEDICINE

## 2023-05-25 PROCEDURE — 74177 CT ABD & PELVIS W/CONTRAST: CPT

## 2023-05-25 PROCEDURE — 6360000002 HC RX W HCPCS: Performed by: HOSPITALIST

## 2023-05-25 PROCEDURE — 93005 ELECTROCARDIOGRAM TRACING: CPT | Performed by: EMERGENCY MEDICINE

## 2023-05-25 PROCEDURE — 6360000004 HC RX CONTRAST MEDICATION: Performed by: EMERGENCY MEDICINE

## 2023-05-25 PROCEDURE — 84450 TRANSFERASE (AST) (SGOT): CPT

## 2023-05-25 PROCEDURE — 81001 URINALYSIS AUTO W/SCOPE: CPT

## 2023-05-25 PROCEDURE — 96374 THER/PROPH/DIAG INJ IV PUSH: CPT

## 2023-05-25 PROCEDURE — 2580000003 HC RX 258: Performed by: HOSPITALIST

## 2023-05-25 PROCEDURE — 6360000002 HC RX W HCPCS: Performed by: EMERGENCY MEDICINE

## 2023-05-25 PROCEDURE — 6370000000 HC RX 637 (ALT 250 FOR IP): Performed by: HOSPITALIST

## 2023-05-25 PROCEDURE — 1100000000 HC RM PRIVATE

## 2023-05-25 PROCEDURE — 85027 COMPLETE CBC AUTOMATED: CPT

## 2023-05-25 PROCEDURE — 83735 ASSAY OF MAGNESIUM: CPT

## 2023-05-25 PROCEDURE — 96375 TX/PRO/DX INJ NEW DRUG ADDON: CPT

## 2023-05-25 PROCEDURE — 6370000000 HC RX 637 (ALT 250 FOR IP): Performed by: EMERGENCY MEDICINE

## 2023-05-25 PROCEDURE — 80053 COMPREHEN METABOLIC PANEL: CPT

## 2023-05-25 PROCEDURE — 36415 COLL VENOUS BLD VENIPUNCTURE: CPT

## 2023-05-25 PROCEDURE — 83690 ASSAY OF LIPASE: CPT

## 2023-05-25 PROCEDURE — 96361 HYDRATE IV INFUSION ADD-ON: CPT

## 2023-05-25 RX ORDER — GABAPENTIN 300 MG/1
600 CAPSULE ORAL 4 TIMES DAILY
Status: DISCONTINUED | OUTPATIENT
Start: 2023-05-25 | End: 2023-05-27 | Stop reason: HOSPADM

## 2023-05-25 RX ORDER — NICOTINE 21 MG/24HR
1 PATCH, TRANSDERMAL 24 HOURS TRANSDERMAL DAILY
Status: DISCONTINUED | OUTPATIENT
Start: 2023-05-25 | End: 2023-05-27 | Stop reason: HOSPADM

## 2023-05-25 RX ORDER — ACETAMINOPHEN 325 MG/1
650 TABLET ORAL EVERY 6 HOURS PRN
Status: DISCONTINUED | OUTPATIENT
Start: 2023-05-25 | End: 2023-05-27 | Stop reason: HOSPADM

## 2023-05-25 RX ORDER — MORPHINE SULFATE 4 MG/ML
4 INJECTION, SOLUTION INTRAMUSCULAR; INTRAVENOUS
Status: COMPLETED | OUTPATIENT
Start: 2023-05-25 | End: 2023-05-25

## 2023-05-25 RX ORDER — POLYETHYLENE GLYCOL 3350 17 G/17G
17 POWDER, FOR SOLUTION ORAL DAILY PRN
Status: DISCONTINUED | OUTPATIENT
Start: 2023-05-25 | End: 2023-05-27 | Stop reason: HOSPADM

## 2023-05-25 RX ORDER — SODIUM CHLORIDE 0.9 % (FLUSH) 0.9 %
5-40 SYRINGE (ML) INJECTION PRN
Status: DISCONTINUED | OUTPATIENT
Start: 2023-05-25 | End: 2023-05-27 | Stop reason: HOSPADM

## 2023-05-25 RX ORDER — MORPHINE SULFATE 4 MG/ML
4 INJECTION INTRAVENOUS ONCE
OUTPATIENT
Start: 2023-05-25

## 2023-05-25 RX ORDER — 0.9 % SODIUM CHLORIDE 0.9 %
1000 INTRAVENOUS SOLUTION INTRAVENOUS ONCE
Status: COMPLETED | OUTPATIENT
Start: 2023-05-25 | End: 2023-05-25

## 2023-05-25 RX ORDER — ONDANSETRON 4 MG/1
4 TABLET, ORALLY DISINTEGRATING ORAL EVERY 8 HOURS PRN
Status: DISCONTINUED | OUTPATIENT
Start: 2023-05-25 | End: 2023-05-27 | Stop reason: HOSPADM

## 2023-05-25 RX ORDER — DICYCLOMINE HCL 20 MG
20 TABLET ORAL
Status: COMPLETED | OUTPATIENT
Start: 2023-05-25 | End: 2023-05-25

## 2023-05-25 RX ORDER — ONDANSETRON 2 MG/ML
4 INJECTION INTRAMUSCULAR; INTRAVENOUS EVERY 6 HOURS PRN
Status: DISCONTINUED | OUTPATIENT
Start: 2023-05-25 | End: 2023-05-27 | Stop reason: HOSPADM

## 2023-05-25 RX ORDER — ENOXAPARIN SODIUM 100 MG/ML
40 INJECTION SUBCUTANEOUS DAILY
Status: DISCONTINUED | OUTPATIENT
Start: 2023-05-25 | End: 2023-05-27 | Stop reason: HOSPADM

## 2023-05-25 RX ORDER — QUETIAPINE FUMARATE 300 MG/1
300 TABLET, FILM COATED ORAL NIGHTLY
Status: DISCONTINUED | OUTPATIENT
Start: 2023-05-25 | End: 2023-05-27 | Stop reason: HOSPADM

## 2023-05-25 RX ORDER — SODIUM CHLORIDE 0.9 % (FLUSH) 0.9 %
5-40 SYRINGE (ML) INJECTION EVERY 12 HOURS SCHEDULED
Status: DISCONTINUED | OUTPATIENT
Start: 2023-05-25 | End: 2023-05-27 | Stop reason: HOSPADM

## 2023-05-25 RX ORDER — SODIUM CHLORIDE AND POTASSIUM CHLORIDE 150; 450 MG/100ML; MG/100ML
INJECTION, SOLUTION INTRAVENOUS CONTINUOUS
Status: DISCONTINUED | OUTPATIENT
Start: 2023-05-25 | End: 2023-05-27 | Stop reason: HOSPADM

## 2023-05-25 RX ORDER — ASPIRIN 81 MG/1
81 TABLET, CHEWABLE ORAL DAILY
Status: DISCONTINUED | OUTPATIENT
Start: 2023-05-25 | End: 2023-05-27 | Stop reason: HOSPADM

## 2023-05-25 RX ORDER — CLONAZEPAM 0.5 MG/1
0.5 TABLET ORAL 3 TIMES DAILY
Status: DISCONTINUED | OUTPATIENT
Start: 2023-05-25 | End: 2023-05-27 | Stop reason: HOSPADM

## 2023-05-25 RX ORDER — ACETAMINOPHEN 650 MG/1
650 SUPPOSITORY RECTAL EVERY 6 HOURS PRN
Status: DISCONTINUED | OUTPATIENT
Start: 2023-05-25 | End: 2023-05-27 | Stop reason: HOSPADM

## 2023-05-25 RX ORDER — SODIUM CHLORIDE 9 MG/ML
INJECTION, SOLUTION INTRAVENOUS PRN
Status: DISCONTINUED | OUTPATIENT
Start: 2023-05-25 | End: 2023-05-27 | Stop reason: HOSPADM

## 2023-05-25 RX ORDER — PAROXETINE HYDROCHLORIDE 20 MG/1
10 TABLET, FILM COATED ORAL DAILY
Status: DISCONTINUED | OUTPATIENT
Start: 2023-05-26 | End: 2023-05-27 | Stop reason: HOSPADM

## 2023-05-25 RX ORDER — MORPHINE SULFATE 2 MG/ML
2 INJECTION, SOLUTION INTRAMUSCULAR; INTRAVENOUS EVERY 4 HOURS PRN
Status: DISCONTINUED | OUTPATIENT
Start: 2023-05-25 | End: 2023-05-26

## 2023-05-25 RX ORDER — ONDANSETRON 2 MG/ML
4 INJECTION INTRAMUSCULAR; INTRAVENOUS ONCE
Status: COMPLETED | OUTPATIENT
Start: 2023-05-25 | End: 2023-05-25

## 2023-05-25 RX ADMIN — QUETIAPINE 300 MG: 300 TABLET ORAL at 20:39

## 2023-05-25 RX ADMIN — VANCOMYCIN HYDROCHLORIDE 125 MG: 250 POWDER, FOR SOLUTION ORAL at 19:01

## 2023-05-25 RX ADMIN — ASPIRIN 81 MG 81 MG: 81 TABLET ORAL at 17:33

## 2023-05-25 RX ADMIN — GABAPENTIN 600 MG: 300 CAPSULE ORAL at 20:39

## 2023-05-25 RX ADMIN — ONDANSETRON 4 MG: 2 INJECTION INTRAMUSCULAR; INTRAVENOUS at 13:52

## 2023-05-25 RX ADMIN — MORPHINE SULFATE 2 MG: 2 INJECTION, SOLUTION INTRAMUSCULAR; INTRAVENOUS at 20:39

## 2023-05-25 RX ADMIN — SODIUM CHLORIDE 1000 ML: 9 INJECTION, SOLUTION INTRAVENOUS at 20:45

## 2023-05-25 RX ADMIN — GABAPENTIN 600 MG: 300 CAPSULE ORAL at 17:32

## 2023-05-25 RX ADMIN — IOPAMIDOL 100 ML: 755 INJECTION, SOLUTION INTRAVENOUS at 14:42

## 2023-05-25 RX ADMIN — CLONAZEPAM 0.5 MG: 0.5 TABLET ORAL at 17:33

## 2023-05-25 RX ADMIN — POTASSIUM CHLORIDE AND SODIUM CHLORIDE: 450; 150 INJECTION, SOLUTION INTRAVENOUS at 18:04

## 2023-05-25 RX ADMIN — ENOXAPARIN SODIUM 40 MG: 100 INJECTION SUBCUTANEOUS at 17:32

## 2023-05-25 RX ADMIN — MORPHINE SULFATE 4 MG: 4 INJECTION, SOLUTION INTRAMUSCULAR; INTRAVENOUS at 16:20

## 2023-05-25 RX ADMIN — DICYCLOMINE HYDROCHLORIDE 20 MG: 20 TABLET ORAL at 14:56

## 2023-05-25 RX ADMIN — SODIUM CHLORIDE, PRESERVATIVE FREE 10 ML: 5 INJECTION INTRAVENOUS at 20:44

## 2023-05-25 RX ADMIN — MORPHINE SULFATE 4 MG: 4 INJECTION, SOLUTION INTRAMUSCULAR; INTRAVENOUS at 13:52

## 2023-05-25 RX ADMIN — CLONAZEPAM 0.5 MG: 0.5 TABLET ORAL at 20:39

## 2023-05-25 RX ADMIN — SODIUM CHLORIDE 1000 ML: 9 INJECTION, SOLUTION INTRAVENOUS at 13:01

## 2023-05-25 ASSESSMENT — LIFESTYLE VARIABLES
HOW OFTEN DO YOU HAVE A DRINK CONTAINING ALCOHOL: NEVER
HOW MANY STANDARD DRINKS CONTAINING ALCOHOL DO YOU HAVE ON A TYPICAL DAY: PATIENT DOES NOT DRINK

## 2023-05-25 ASSESSMENT — PAIN DESCRIPTION - LOCATION
LOCATION: ABDOMEN

## 2023-05-25 ASSESSMENT — PAIN SCALES - GENERAL
PAINLEVEL_OUTOF10: 9
PAINLEVEL_OUTOF10: 10
PAINLEVEL_OUTOF10: 9
PAINLEVEL_OUTOF10: 8

## 2023-05-25 ASSESSMENT — PAIN DESCRIPTION - DESCRIPTORS: DESCRIPTORS: ACHING;SHARP

## 2023-05-25 ASSESSMENT — PAIN DESCRIPTION - ORIENTATION: ORIENTATION: RIGHT;LEFT

## 2023-05-25 ASSESSMENT — PAIN - FUNCTIONAL ASSESSMENT: PAIN_FUNCTIONAL_ASSESSMENT: 0-10

## 2023-05-25 NOTE — ED PROVIDER NOTES
[facility] EMERGENCY DEPT  EMERGENCY DEPARTMENT HISTORY AND PHYSICAL EXAM      Date: 2023  Patient Name: Viki Dunlap  MRN: [id number]  [place] 1965  Date of evaluation: 2023  Provider: Fred Spencer MD   Note Started: 12:46 PM EDT 23    HISTORY OF PRESENT ILLNESS     Chief Complaint   Patient presents with    Diarrhea       History Provided By: Patient    HPI: Viki Dunlap is a 62 y.o. female with a PMH of GERD, hypothyroidism, seizure presents for 12 days of nonbloody diarrhea. Decreased PO. No vomiting. Has a hx of C diff that felt like this. No recent abx. PAST MEDICAL HISTORY   Past Medical History:  Past Medical History:   Diagnosis Date    DDD (degenerative disc disease), lumbar     Fatigue     GERD (gastroesophageal reflux disease)     Hypothyroidism     Psychiatric disorder     PTSD per patient    Seizures ([facility] [address]        Past Surgical History:  Past Surgical History:   Procedure Laterality Date    APPENDECTOMY       SECTION      CHOLECYSTECTOMY      HYSTERECTOMY (CERVIX STATUS UNKNOWN)         Family History:  History reviewed. No pertinent family history. Social History:  Social History     Tobacco Use    Smoking status: Every Day     Packs/day: 0.50     Types: Cigarettes    Smokeless tobacco: Never   Substance Use Topics    Alcohol use: Not Currently     Alcohol/week: 0.0 standard drinks    Drug use: Never       Allergies: Allergies   Allergen Reactions    Amoxicillin-Pot Clavulanate Nausea And Vomiting       PCP: No primary care provider on file. Current Meds:   Current Facility-Administered Medications   Medication Dose Route Frequency Provider Last Rate Last Admin    0.9 % sodium chloride bolus  1,000 mL IntraVENous Once Fred Spencer MD         Current Outpatient Medications   Medication Sig Dispense Refill    aspirin 81 MG chewable tablet Take 1 tablet by mouth daily      clonazePAM (KLONOPIN) 0.5 MG tablet Take 1 tablet by mouth 3 times daily.  Max Daily

## 2023-05-25 NOTE — ED TRIAGE NOTES
Patient stating for the past 14 days has been having watery diarrhea with ABD pain and foul odor. Patient stating that she has had C. Diff before. No recent antibiotic treatment or been around anyone with C. Diff

## 2023-05-25 NOTE — ED NOTES
Assumed care of pt from [facility].      Malachi Almanza RN  05/25/23 7625

## 2023-05-26 PROBLEM — R11.10 INTRACTABLE VOMITING: Status: ACTIVE | Noted: 2022-09-16

## 2023-05-26 PROBLEM — R56.9 SEIZURE (HCC): Status: ACTIVE | Noted: 2022-05-25

## 2023-05-26 LAB
ANION GAP SERPL CALC-SCNC: 8 MMOL/L (ref 5–15)
BASOPHILS # BLD: 0.1 K/UL (ref 0–0.1)
BASOPHILS NFR BLD: 1 % (ref 0–1)
BUN SERPL-MCNC: 10 MG/DL (ref 6–20)
BUN/CREAT SERPL: 11 (ref 12–20)
CA-I BLD-MCNC: 7.9 MG/DL (ref 8.5–10.1)
CHLORIDE SERPL-SCNC: 115 MMOL/L (ref 97–108)
CO2 SERPL-SCNC: 17 MMOL/L (ref 21–32)
CREAT SERPL-MCNC: 0.94 MG/DL (ref 0.55–1.02)
CRP SERPL-MCNC: <0.29 MG/DL (ref 0–0.6)
DIFFERENTIAL METHOD BLD: ABNORMAL
EOSINOPHIL # BLD: 0.1 K/UL (ref 0–0.4)
EOSINOPHIL NFR BLD: 2 % (ref 0–7)
ERYTHROCYTE [DISTWIDTH] IN BLOOD BY AUTOMATED COUNT: 14.9 % (ref 11.5–14.5)
GLUCOSE SERPL-MCNC: 118 MG/DL (ref 65–100)
HCT VFR BLD AUTO: 40.7 % (ref 35–47)
HGB BLD-MCNC: 12.8 G/DL (ref 11.5–16)
IMM GRANULOCYTES # BLD AUTO: 0 K/UL (ref 0–0.04)
IMM GRANULOCYTES NFR BLD AUTO: 0 % (ref 0–0.5)
LACTATE SERPL-SCNC: 2.8 MMOL/L (ref 0.4–2)
LYMPHOCYTES # BLD: 3.3 K/UL (ref 0.8–3.5)
LYMPHOCYTES NFR BLD: 52 % (ref 12–49)
MCH RBC QN AUTO: 28.6 PG (ref 26–34)
MCHC RBC AUTO-ENTMCNC: 31.4 G/DL (ref 30–36.5)
MCV RBC AUTO: 90.8 FL (ref 80–99)
MONOCYTES # BLD: 0.3 K/UL (ref 0–1)
MONOCYTES NFR BLD: 5 % (ref 5–13)
NEUTS SEG # BLD: 2.6 K/UL (ref 1.8–8)
NEUTS SEG NFR BLD: 40 % (ref 32–75)
NRBC # BLD: 0 K/UL (ref 0–0.01)
NRBC BLD-RTO: 0 PER 100 WBC
PLATELET # BLD AUTO: 270 K/UL (ref 150–400)
PMV BLD AUTO: 10.1 FL (ref 8.9–12.9)
POTASSIUM SERPL-SCNC: 4 MMOL/L (ref 3.5–5.1)
RBC # BLD AUTO: 4.48 M/UL (ref 3.8–5.2)
SODIUM SERPL-SCNC: 140 MMOL/L (ref 136–145)
WBC # BLD AUTO: 6.4 K/UL (ref 3.6–11)

## 2023-05-26 PROCEDURE — 6360000002 HC RX W HCPCS: Performed by: NURSE PRACTITIONER

## 2023-05-26 PROCEDURE — 80048 BASIC METABOLIC PNL TOTAL CA: CPT

## 2023-05-26 PROCEDURE — 2580000003 HC RX 258: Performed by: HOSPITALIST

## 2023-05-26 PROCEDURE — 6370000000 HC RX 637 (ALT 250 FOR IP): Performed by: HOSPITALIST

## 2023-05-26 PROCEDURE — 83605 ASSAY OF LACTIC ACID: CPT

## 2023-05-26 PROCEDURE — 86140 C-REACTIVE PROTEIN: CPT

## 2023-05-26 PROCEDURE — 6370000000 HC RX 637 (ALT 250 FOR IP): Performed by: NURSE PRACTITIONER

## 2023-05-26 PROCEDURE — 85025 COMPLETE CBC W/AUTO DIFF WBC: CPT

## 2023-05-26 PROCEDURE — 6360000002 HC RX W HCPCS: Performed by: HOSPITALIST

## 2023-05-26 PROCEDURE — 36415 COLL VENOUS BLD VENIPUNCTURE: CPT

## 2023-05-26 PROCEDURE — 1100000000 HC RM PRIVATE

## 2023-05-26 RX ORDER — 0.9 % SODIUM CHLORIDE 0.9 %
500 INTRAVENOUS SOLUTION INTRAVENOUS ONCE
Status: COMPLETED | OUTPATIENT
Start: 2023-05-26 | End: 2023-05-26

## 2023-05-26 RX ORDER — DICYCLOMINE HCL 20 MG
20 TABLET ORAL
Status: DISCONTINUED | OUTPATIENT
Start: 2023-05-26 | End: 2023-05-27 | Stop reason: HOSPADM

## 2023-05-26 RX ORDER — MORPHINE SULFATE 2 MG/ML
2 INJECTION, SOLUTION INTRAMUSCULAR; INTRAVENOUS EVERY 4 HOURS PRN
Status: DISCONTINUED | OUTPATIENT
Start: 2023-05-26 | End: 2023-05-27

## 2023-05-26 RX ORDER — KETOROLAC TROMETHAMINE 15 MG/ML
15 INJECTION, SOLUTION INTRAMUSCULAR; INTRAVENOUS EVERY 6 HOURS PRN
Status: DISCONTINUED | OUTPATIENT
Start: 2023-05-26 | End: 2023-05-27 | Stop reason: HOSPADM

## 2023-05-26 RX ADMIN — VANCOMYCIN HYDROCHLORIDE 125 MG: 250 POWDER, FOR SOLUTION ORAL at 00:40

## 2023-05-26 RX ADMIN — GABAPENTIN 600 MG: 300 CAPSULE ORAL at 12:57

## 2023-05-26 RX ADMIN — VANCOMYCIN HYDROCHLORIDE 125 MG: 250 POWDER, FOR SOLUTION ORAL at 17:56

## 2023-05-26 RX ADMIN — POTASSIUM CHLORIDE AND SODIUM CHLORIDE: 450; 150 INJECTION, SOLUTION INTRAVENOUS at 10:29

## 2023-05-26 RX ADMIN — ENOXAPARIN SODIUM 40 MG: 100 INJECTION SUBCUTANEOUS at 09:11

## 2023-05-26 RX ADMIN — QUETIAPINE 300 MG: 300 TABLET ORAL at 20:34

## 2023-05-26 RX ADMIN — CLONAZEPAM 0.5 MG: 0.5 TABLET ORAL at 09:11

## 2023-05-26 RX ADMIN — DICYCLOMINE HYDROCHLORIDE 20 MG: 20 TABLET ORAL at 12:58

## 2023-05-26 RX ADMIN — GABAPENTIN 600 MG: 300 CAPSULE ORAL at 16:56

## 2023-05-26 RX ADMIN — DICYCLOMINE HYDROCHLORIDE 20 MG: 20 TABLET ORAL at 20:36

## 2023-05-26 RX ADMIN — MORPHINE SULFATE 2 MG: 2 INJECTION, SOLUTION INTRAMUSCULAR; INTRAVENOUS at 12:58

## 2023-05-26 RX ADMIN — VANCOMYCIN HYDROCHLORIDE 125 MG: 250 POWDER, FOR SOLUTION ORAL at 23:40

## 2023-05-26 RX ADMIN — MORPHINE SULFATE 2 MG: 2 INJECTION, SOLUTION INTRAMUSCULAR; INTRAVENOUS at 16:56

## 2023-05-26 RX ADMIN — KETOROLAC TROMETHAMINE 15 MG: 15 INJECTION, SOLUTION INTRAMUSCULAR; INTRAVENOUS at 09:12

## 2023-05-26 RX ADMIN — ASPIRIN 81 MG 81 MG: 81 TABLET ORAL at 09:11

## 2023-05-26 RX ADMIN — PAROXETINE 10 MG: 20 TABLET, FILM COATED ORAL at 09:11

## 2023-05-26 RX ADMIN — DICYCLOMINE HYDROCHLORIDE 20 MG: 20 TABLET ORAL at 16:56

## 2023-05-26 RX ADMIN — SODIUM CHLORIDE, PRESERVATIVE FREE 10 ML: 5 INJECTION INTRAVENOUS at 20:36

## 2023-05-26 RX ADMIN — CLONAZEPAM 0.5 MG: 0.5 TABLET ORAL at 20:36

## 2023-05-26 RX ADMIN — POTASSIUM CHLORIDE AND SODIUM CHLORIDE: 450; 150 INJECTION, SOLUTION INTRAVENOUS at 23:10

## 2023-05-26 RX ADMIN — SODIUM CHLORIDE, PRESERVATIVE FREE 10 ML: 5 INJECTION INTRAVENOUS at 09:11

## 2023-05-26 RX ADMIN — SODIUM CHLORIDE 500 ML: 9 INJECTION, SOLUTION INTRAVENOUS at 06:10

## 2023-05-26 RX ADMIN — VANCOMYCIN HYDROCHLORIDE 125 MG: 250 POWDER, FOR SOLUTION ORAL at 05:41

## 2023-05-26 RX ADMIN — GABAPENTIN 600 MG: 300 CAPSULE ORAL at 09:11

## 2023-05-26 RX ADMIN — GABAPENTIN 600 MG: 300 CAPSULE ORAL at 20:34

## 2023-05-26 RX ADMIN — VANCOMYCIN HYDROCHLORIDE 125 MG: 250 POWDER, FOR SOLUTION ORAL at 12:57

## 2023-05-26 RX ADMIN — CLONAZEPAM 0.5 MG: 0.5 TABLET ORAL at 12:57

## 2023-05-26 ASSESSMENT — PAIN DESCRIPTION - DESCRIPTORS
DESCRIPTORS: SHARP

## 2023-05-26 ASSESSMENT — PAIN SCALES - GENERAL
PAINLEVEL_OUTOF10: 7
PAINLEVEL_OUTOF10: 8
PAINLEVEL_OUTOF10: 10
PAINLEVEL_OUTOF10: 8
PAINLEVEL_OUTOF10: 10
PAINLEVEL_OUTOF10: 7
PAINLEVEL_OUTOF10: 8

## 2023-05-26 ASSESSMENT — ENCOUNTER SYMPTOMS
SHORTNESS OF BREATH: 0
ABDOMINAL PAIN: 1
BACK PAIN: 1
VOMITING: 1
NAUSEA: 1
ABDOMINAL DISTENTION: 1

## 2023-05-26 ASSESSMENT — PAIN DESCRIPTION - ORIENTATION
ORIENTATION: RIGHT;LEFT

## 2023-05-26 ASSESSMENT — PAIN SCALES - WONG BAKER
WONGBAKER_NUMERICALRESPONSE: 0
WONGBAKER_NUMERICALRESPONSE: 0

## 2023-05-26 ASSESSMENT — PAIN DESCRIPTION - LOCATION
LOCATION: ABDOMEN

## 2023-05-26 ASSESSMENT — PAIN DESCRIPTION - FREQUENCY
FREQUENCY: CONTINUOUS

## 2023-05-26 ASSESSMENT — PAIN - FUNCTIONAL ASSESSMENT
PAIN_FUNCTIONAL_ASSESSMENT: ACTIVITIES ARE NOT PREVENTED

## 2023-05-26 NOTE — CONSULTS
Gastroenterology Consult     Referring Physician: No primary care provider on file. Consult Date: 2023     Subjective:     Chief Complaint: diarrhea    History of Present Illness: Reyna Levi is a 62 y.o. female who is seen in consultation for diarrhea. Patient was admitted to the hospital with diarrhea that has been present for the past 2 weeks. She states that she had black bowel movements for 7 days followed by green stool with a foul order for the next 7 days. She admits to diffuse stomach cramps for the past two weeks as well. She states that she was having 4-5 bowel movements per day. She admits to nausea but denies vomiting, constipation, fever or blood in the stool. She states that her diarrhea does not stop when she does not eat. She states that her last colonoscopy was 6-7 years ago and hemorrhoids and polyps were noted. She has not been able to pass a bowel movement in the past few days and as such a stool sample has not yet been collected. A narrative of his history is as follows     Past Medical History:   Diagnosis Date    DDD (degenerative disc disease), lumbar     Fatigue     GERD (gastroesophageal reflux disease)     Hypothyroidism     Psychiatric disorder     PTSD per patient    Seizures ([facility] [address]      Past Surgical History:   Procedure Laterality Date    APPENDECTOMY       SECTION      CHOLECYSTECTOMY      HYSTERECTOMY (CERVIX STATUS UNKNOWN)        History reviewed. No pertinent family history.   Social History     Tobacco Use    Smoking status: Every Day     Packs/day: 0.50     Types: Cigarettes    Smokeless tobacco: Never   Substance Use Topics    Alcohol use: Not Currently     Alcohol/week: 0.0 standard drinks      Allergies   Allergen Reactions    Amoxicillin-Pot Clavulanate Nausea And Vomiting     Current Facility-Administered Medications   Medication Dose Route Frequency    dicyclomine (BENTYL) tablet 20 mg  20 mg Oral 4x Daily AC & HS    ketorolac (TORADOL) injection
PM   Result Value Ref Range    Lipase 115 73 - 393 U/L   Magnesium    Collection Time: 05/25/23 12:48 PM   Result Value Ref Range    Magnesium Hemolyzed, recollect requested 1.6 - 2.4 mg/dL   CBC    Collection Time: 05/25/23  2:12 PM   Result Value Ref Range    WBC 7.4 3.6 - 11.0 K/uL    RBC 4.92 3.80 - 5.20 M/uL    Hemoglobin 14.3 11.5 - 16.0 g/dL    Hematocrit 43.2 35.0 - 47.0 %    MCV 87.8 80.0 - 99.0 FL    MCH 29.1 26.0 - 34.0 PG    MCHC 33.1 30.0 - 36.5 g/dL    RDW 14.8 (H) 11.5 - 14.5 %    Platelets 917 419 - 570 K/uL    MPV 9.9 8.9 - 12.9 FL    Nucleated RBCs 0.0 0.0  WBC    nRBC 0.00 0.00 - 0.01 K/uL   Potassium    Collection Time: 05/25/23  2:12 PM   Result Value Ref Range    Potassium 3.6 3.5 - 5.1 mmol/L   Magnesium    Collection Time: 05/25/23  2:12 PM   Result Value Ref Range    Magnesium 1.8 1.6 - 2.4 mg/dL   AST    Collection Time: 05/25/23  2:12 PM   Result Value Ref Range    AST 11 (L) 15 - 37 U/L   Urinalysis with Reflex to Culture    Collection Time: 05/25/23  5:38 PM    Specimen: Urine   Result Value Ref Range    Color, UA Yellow/Straw      Appearance Clear Clear      Specific Gravity, UA >1.030 (H) 1.003 - 1.030    pH, Urine 5.0 5.0 - 8.0      Protein, UA Negative Negative mg/dL    Glucose, UA Negative Negative mg/dL    Ketones, Urine Negative Negative mg/dL    Bilirubin Urine Negative Negative      Blood, Urine Negative Negative      Urobilinogen, Urine 0.1 0.1 - 1.0 EU/dL    Nitrite, Urine Negative Negative      Leukocyte Esterase, Urine Negative Negative      RBC, UA 0-5 0 - 5 /hpf    BACTERIA, URINE Negative Negative /hpf    Urine Culture if Indicated Culture not indicated by UA result Culture not indicated by UA result      WBC, UA 0-4 0 - 4 /hpf    Epithelial Cells UA Many (A) Few /lpf    Mucus, UA Trace /lpf        CT ABDOMEN PELVIS W IV CONTRAST Additional Contrast? None   Final Result   1. Air-fluid levels in the large bowel consistent with the clinical history of   diarrhea.

## 2023-05-27 VITALS
HEIGHT: 68 IN | DIASTOLIC BLOOD PRESSURE: 82 MMHG | TEMPERATURE: 97.9 F | SYSTOLIC BLOOD PRESSURE: 123 MMHG | RESPIRATION RATE: 17 BRPM | BODY MASS INDEX: 27.28 KG/M2 | HEART RATE: 66 BPM | WEIGHT: 180 LBS | OXYGEN SATURATION: 97 %

## 2023-05-27 PROBLEM — R19.7 DIARRHEA: Status: RESOLVED | Noted: 2023-05-25 | Resolved: 2023-05-27

## 2023-05-27 PROBLEM — R10.9 ABDOMINAL PAIN: Status: ACTIVE | Noted: 2023-05-27

## 2023-05-27 PROBLEM — R11.10 INTRACTABLE VOMITING: Status: RESOLVED | Noted: 2022-09-16 | Resolved: 2023-05-27

## 2023-05-27 LAB
ANION GAP SERPL CALC-SCNC: 1 MMOL/L (ref 5–15)
BASOPHILS # BLD: 0.1 K/UL (ref 0–0.1)
BASOPHILS NFR BLD: 1 % (ref 0–1)
BUN SERPL-MCNC: 11 MG/DL (ref 6–20)
BUN/CREAT SERPL: 13 (ref 12–20)
CA-I BLD-MCNC: 8.8 MG/DL (ref 8.5–10.1)
CHLORIDE SERPL-SCNC: 114 MMOL/L (ref 97–108)
CO2 SERPL-SCNC: 25 MMOL/L (ref 21–32)
CREAT SERPL-MCNC: 0.87 MG/DL (ref 0.55–1.02)
DIFFERENTIAL METHOD BLD: ABNORMAL
EOSINOPHIL # BLD: 0.1 K/UL (ref 0–0.4)
EOSINOPHIL NFR BLD: 2 % (ref 0–7)
ERYTHROCYTE [DISTWIDTH] IN BLOOD BY AUTOMATED COUNT: 14.8 % (ref 11.5–14.5)
GLUCOSE SERPL-MCNC: 97 MG/DL (ref 65–100)
HCT VFR BLD AUTO: 41.5 % (ref 35–47)
HGB BLD-MCNC: 13.6 G/DL (ref 11.5–16)
IMM GRANULOCYTES # BLD AUTO: 0 K/UL (ref 0–0.04)
IMM GRANULOCYTES NFR BLD AUTO: 0 % (ref 0–0.5)
LYMPHOCYTES # BLD: 2.6 K/UL (ref 0.8–3.5)
LYMPHOCYTES NFR BLD: 45 % (ref 12–49)
MCH RBC QN AUTO: 28.7 PG (ref 26–34)
MCHC RBC AUTO-ENTMCNC: 32.8 G/DL (ref 30–36.5)
MCV RBC AUTO: 87.6 FL (ref 80–99)
MONOCYTES # BLD: 0.3 K/UL (ref 0–1)
MONOCYTES NFR BLD: 5 % (ref 5–13)
NEUTS SEG # BLD: 2.8 K/UL (ref 1.8–8)
NEUTS SEG NFR BLD: 47 % (ref 32–75)
NRBC # BLD: 0 K/UL (ref 0–0.01)
NRBC BLD-RTO: 0 PER 100 WBC
PLATELET # BLD AUTO: 271 K/UL (ref 150–400)
PMV BLD AUTO: 9.9 FL (ref 8.9–12.9)
POTASSIUM SERPL-SCNC: 4 MMOL/L (ref 3.5–5.1)
RBC # BLD AUTO: 4.74 M/UL (ref 3.8–5.2)
SODIUM SERPL-SCNC: 140 MMOL/L (ref 136–145)
WBC # BLD AUTO: 5.7 K/UL (ref 3.6–11)

## 2023-05-27 PROCEDURE — 85025 COMPLETE CBC W/AUTO DIFF WBC: CPT

## 2023-05-27 PROCEDURE — 36415 COLL VENOUS BLD VENIPUNCTURE: CPT

## 2023-05-27 PROCEDURE — 6360000002 HC RX W HCPCS: Performed by: NURSE PRACTITIONER

## 2023-05-27 PROCEDURE — 6370000000 HC RX 637 (ALT 250 FOR IP): Performed by: HOSPITALIST

## 2023-05-27 PROCEDURE — 6360000002 HC RX W HCPCS: Performed by: HOSPITALIST

## 2023-05-27 PROCEDURE — 6370000000 HC RX 637 (ALT 250 FOR IP): Performed by: NURSE PRACTITIONER

## 2023-05-27 PROCEDURE — 80048 BASIC METABOLIC PNL TOTAL CA: CPT

## 2023-05-27 RX ORDER — DICYCLOMINE HCL 20 MG
20 TABLET ORAL
Qty: 120 TABLET | Refills: 0 | Status: SHIPPED | OUTPATIENT
Start: 2023-05-27

## 2023-05-27 RX ORDER — METRONIDAZOLE 500 MG/1
500 TABLET ORAL EVERY 8 HOURS SCHEDULED
Qty: 30 TABLET | Refills: 0 | Status: SHIPPED | OUTPATIENT
Start: 2023-05-27 | End: 2023-06-06

## 2023-05-27 RX ORDER — METRONIDAZOLE 500 MG/1
500 TABLET ORAL EVERY 8 HOURS SCHEDULED
Status: DISCONTINUED | OUTPATIENT
Start: 2023-05-27 | End: 2023-05-27 | Stop reason: HOSPADM

## 2023-05-27 RX ORDER — NICOTINE 21 MG/24HR
1 PATCH, TRANSDERMAL 24 HOURS TRANSDERMAL DAILY
Qty: 30 PATCH | Refills: 3 | Status: SHIPPED | OUTPATIENT
Start: 2023-05-28

## 2023-05-27 RX ADMIN — VANCOMYCIN HYDROCHLORIDE 125 MG: 250 POWDER, FOR SOLUTION ORAL at 06:01

## 2023-05-27 RX ADMIN — ASPIRIN 81 MG 81 MG: 81 TABLET ORAL at 08:27

## 2023-05-27 RX ADMIN — PAROXETINE 10 MG: 20 TABLET, FILM COATED ORAL at 08:26

## 2023-05-27 RX ADMIN — POTASSIUM CHLORIDE AND SODIUM CHLORIDE: 450; 150 INJECTION, SOLUTION INTRAVENOUS at 08:23

## 2023-05-27 RX ADMIN — KETOROLAC TROMETHAMINE 15 MG: 15 INJECTION, SOLUTION INTRAMUSCULAR; INTRAVENOUS at 06:18

## 2023-05-27 RX ADMIN — ENOXAPARIN SODIUM 40 MG: 100 INJECTION SUBCUTANEOUS at 08:27

## 2023-05-27 RX ADMIN — VANCOMYCIN HYDROCHLORIDE 125 MG: 250 POWDER, FOR SOLUTION ORAL at 11:43

## 2023-05-27 RX ADMIN — GABAPENTIN 600 MG: 300 CAPSULE ORAL at 08:26

## 2023-05-27 RX ADMIN — DICYCLOMINE HYDROCHLORIDE 20 MG: 20 TABLET ORAL at 08:26

## 2023-05-27 RX ADMIN — DICYCLOMINE HYDROCHLORIDE 20 MG: 20 TABLET ORAL at 11:42

## 2023-05-27 RX ADMIN — CLONAZEPAM 0.5 MG: 0.5 TABLET ORAL at 08:27

## 2023-05-27 ASSESSMENT — PAIN SCALES - GENERAL: PAINLEVEL_OUTOF10: 3

## 2023-05-27 ASSESSMENT — PAIN SCALES - WONG BAKER: WONGBAKER_NUMERICALRESPONSE: 4

## 2023-05-27 NOTE — PROGRESS NOTES
Contacted MD regarding Morphine. When obtaining pt VS pt was completely out of it and was not able to hold up arm to obtain BP. Pt received full dose of night time meds on (5/26/23) at 2030, which included Francine 600mg Bentyl 20mg Seroquel 300mg and Clonazepam 0.5mg. Pt also had Toradol at 0912 and Morphine 1656 and pt was still unable to follow commands. MD contacted about not feeling comfortable to give another dose of morphine at this sonam. MD agreed that morphine should be held at this time. When pt was informed of MD decision to hold morphine, pt stated \"The other nurses didn't have a problem giving me the morphine as long as my BP is 100. \" Pt was again informed that due to behavior earlier nurse was uncomfortable with giving morphine. Pt then stated, \"you're a worthless bitch, it's just morphine. \"     9035- new bag of fluids hung for pt, via another nurse. Reported that pt did not arouse and was sleeping. Pt did not ask for any pain medication. [id number]- went into pt room and gave 0000 med, pt resting comfortably and has not asked for anymore pain medication at this time.
Dr. Mira Mckeon paged in reference to pts. BP. Hi pt BP is 88/55 this morning. She received one dose of morphine last night and the bolus last night as instructed. She is currently still running 0.45 NA with 20 k at 75ml/hr. BP at 200 AM was 94/58. How should I proceed?     Order received: 500 cc normal saline fluid bolus over an hour
Primary NP notified of BP = 89/59 in RUE and 95/60 in LUE. NP stated that it is ok to give patient PRN Toradol. Primary NP also states that PRN Morphine is to only be given if SBP is greater than 100.
Provider Ana Gould notified of pts. BP history throughout the night. Medications will be reviewed and updated.
Pt. Complaining of ABD pain being severe. Dr. Toby Delcid notified of pat. Request for pain meds and currently BP being 98/65. Orders received for Morphine 2mg q4 prn and 1 liter NS bolus over 2 hours.
Urine Sample sent to lab, C. Diff not collected, pt has not had BM.  Will continue to attempt to collect
(H) 1.003 - 1.030    pH, Urine 5.0 5.0 - 8.0      Protein, UA Negative Negative mg/dL    Glucose, UA Negative Negative mg/dL    Ketones, Urine Negative Negative mg/dL    Bilirubin Urine Negative Negative      Blood, Urine Negative Negative      Urobilinogen, Urine 0.1 0.1 - 1.0 EU/dL    Nitrite, Urine Negative Negative      Leukocyte Esterase, Urine Negative Negative      RBC, UA 0-5 0 - 5 /hpf    BACTERIA, URINE Negative Negative /hpf    Urine Culture if Indicated Culture not indicated by UA result Culture not indicated by UA result      WBC, UA 0-4 0 - 4 /hpf    Epithelial Cells UA Many (A) Few /lpf    Mucus, UA Trace /lpf       CT ABDOMEN PELVIS W IV CONTRAST Additional Contrast? None   Final Result   1. Air-fluid levels in the large bowel consistent with the clinical history of   diarrhea. 2. Incidental findings as above                 Intake and Output:  Current Shift: No intake/output data recorded. Last three shifts: No intake/output data recorded. Lab/Data Review:  Recent Labs     05/25/23  1412   WBC 7.4   HGB 14.3   HCT 43.2        Recent Labs     05/25/23  1248 05/25/23  1412     --    K 4.3 3.6   *  --    CO2 24  --    GLUCOSE 106*  --    BUN 17  --    CREATININE 0.97  --    CALCIUM 9.3  --    MG Hemolyzed, recollect requested 1.8   LABALBU 3.8  --    BILITOT 0.5  --    AST Hemolyzed, recollect requested 11*   ALT 16  --      No results for input(s): PHART, VJL5GOK, PO2ART, NTP3WMU, BEART, DJH9OHIY, HGBART, DG6DFVZRE, FIO2A, B1SJEZUK, OXYHEM, CARBOXHGBART, METHGBART, A8CUXQANH, PHCORART, TEMP in the last 72 hours.     Invalid input(s): GCR6UMUEE  Recent Results (from the past 24 hour(s))   EKG 12 Lead    Collection Time: 05/25/23 12:23 PM   Result Value Ref Range    Ventricular Rate 111 BPM    Atrial Rate 111 BPM    P-R Interval 136 ms    QRS Duration 72 ms    Q-T Interval 312 ms    QTc Calculation (Bazett) 424 ms    P Axis 66 degrees    R Axis 75 degrees    T Axis 1 degrees

## 2023-05-27 NOTE — CARE COORDINATION
Patient discharging home today, self care. Transition of Care Plan:    RUR: 15  Prior Level of Functioning: ind  Disposition: home  If SNF or IPR: Date FOC offered:   Date FOC received:   Accepting facility:   Date authorization started with reference number:   Date authorization received and expires: Follow up appointments:   DME needed:   Transportation at discharge:   IM/IMM Medicare/ letter given: 1st within 48hrs  Is patient a [place] and connected with [facility]? If yes, was [employer] transfer form completed and VA notified? Caregiver Contact:   Discharge Caregiver contacted prior to discharge? Care Conference needed?    Barriers to discharge:
Patient will return home selfcare when medically stable.
Follow Up Transport Family     CM met with pt & D/C Plan is home with family & a friend to transport. Send Rxs to [facility] upon discharge.

## 2023-05-27 NOTE — DISCHARGE SUMMARY
Hospitalist Discharge Summary     Patient ID:    Malcolm Jimenez  [id number]  62 y.o.  1965    Admit date: 5/25/2023    Discharge date : 5/27/2023      Final Diagnoses:   Principal Problem (Resolved):    Diarrhea  Active Problems:    Hypothyroidism due to acquired atrophy of thyroid    Seizure [facility])    Psychiatric disorder    Abdominal pain  Resolved Problems:    Intractable vomiting      Reason for Hospitalization/Hospital Course:   Ms. Elizabeth Verma is a 62 y.o. F with a PMH significant for COPD, conversion disorder, chronic pain,? Seizure disorder, smoker, and noncompliant with medical management. She presents to the emergency room with complaints that she has been having Diarrhea for 2 weeks. Patient claims she has 2-3 bowel movements a day. It has become more foul-smelling and greenish color. She also had nausea but no vomiting. She has decreased p.o. intake. She had C. difficile 7 years ago which had similar symptoms at the time. Labs were unremarkable in the emergency room. CT abdomen showed persistent diarrhea. Stool for C. difficile ordered and pending. It was felt by ED physician patient is to be admitted for further care. Otherwise patient denies any headache, blurred vision, rectal bleed, cough, shortness of breath, fever or chills. 5/26 patient continues to have ongoing abdominal pain despite IV narcotics given. We will start on Bentyl p.o. and Toradol IV. Lactic acid is elevated started on IV fluids. WBC count normal, CRP normal.     5/27 patient continues complain of diffuse abdominal cramping. She has had no further diarrhea since admission therefore C. difficile culture unobtainable. She continues to request narcotics though she is lethargic with low blood pressure. Per GI recommendations continue supportive care with IV fluids and vancomycin for likely gastroenteritis.   Follow-up outpatient with GI for

## 2023-10-10 NOTE — H&P
Admission History and Physical      NAME:  Katarina Ireland   :   1965   MRN:  [id number]     PCP:  No primary care provider on file. Date/Time:  2023           Subjective:     CHIEF COMPLAINT:  Diarrhea    HISTORY OF PRESENT ILLNESS:     Ms. Shannon Davis is a 62 y.o. F who had been having   Diarrhea for 2 weeks. Patient claims she has 2-3 bowel movements a day. It has become more foul-smelling and greenish color. She also had nausea but no vomiting. She has decreased p.o. intake. She had C. difficile 7 years ago which had similar symptoms at the time. Labs were unremarkable in the emergency room. CT abdomen showed persistent diarrhea. Stool for C. difficile ordered and pending. It was felt by ED physician patient is to be admitted for further care. Otherwise patient denies any headache, blurred vision, rectal bleed, cough, shortness of breath, fever or chills. Past Medical History:   Diagnosis Date    DDD (degenerative disc disease), lumbar     Fatigue     GERD (gastroesophageal reflux disease)     Hypothyroidism     Psychiatric disorder     PTSD per patient    Seizures ([facility] [address]         Past Surgical History:   Procedure Laterality Date    APPENDECTOMY       SECTION      CHOLECYSTECTOMY      HYSTERECTOMY (CERVIX STATUS UNKNOWN)         Social History     Tobacco Use    Smoking status: Every Day     Packs/day: 0.50     Types: Cigarettes    Smokeless tobacco: Never   Substance Use Topics    Alcohol use: Not Currently     Alcohol/week: 0.0 standard drinks        History reviewed. No pertinent family history. Allergies   Allergen Reactions    Amoxicillin-Pot Clavulanate Nausea And Vomiting        Prior to Admission medications    Medication Sig Start Date End Date Taking? Authorizing Provider   aspirin 81 MG chewable tablet Take 1 tablet by mouth daily 3/30/23   Ar Automatic Reconciliation   clonazePAM (KLONOPIN) 0.5 MG tablet Take 1 tablet by mouth 3 times daily.  Max Daily Subjective   Minda Ramachandran is a 24 y.o. female who complains of vaginal discharge/itch.    HPI:  Symptoms reported: vaginal discharge  Onset: 3 days ago  Course: constant  Progression: worsened    Helpful treatments: none  Unhelpful treatments tried: none    Objective   Physical Exam:   General Appearance: alert and oriented, in no acute distress  Abdomen: soft, non-tender; bowel sounds normal; no masses, no organomegaly  Pelvic Exam: external genitalia normal, uterus normal size, shape, and consistency, no cervical motion tenderness, cervix normal in appearance, no adnexal masses or tenderness, rectovaginal septum normal, and positive findings: vaginal discharge: yellow and thin  Urine dipstick: not done.    Assessment/Plan   24 year old here for vaginal discharge  Gram stain sent  Gc/chlamydia/trich sent  Metronidazole e-scribed, will treat further if needed pending results  Encouraged boric acid suppositories and condoms for prevention as needed  Follow up as needed

## 2023-10-26 ENCOUNTER — APPOINTMENT (OUTPATIENT)
Facility: HOSPITAL | Age: 58
DRG: 198 | End: 2023-10-26
Payer: MEDICARE

## 2023-10-26 ENCOUNTER — HOSPITAL ENCOUNTER (INPATIENT)
Facility: HOSPITAL | Age: 58
LOS: 2 days | Discharge: HOME | DRG: 198 | End: 2023-10-28
Attending: INTERNAL MEDICINE | Admitting: INTERNAL MEDICINE
Payer: MEDICARE

## 2023-10-26 DIAGNOSIS — J02.9 SORE THROAT: ICD-10-CM

## 2023-10-26 DIAGNOSIS — J18.9 PNEUMONIA OF BOTH LUNGS DUE TO INFECTIOUS ORGANISM, UNSPECIFIED PART OF LUNG: Primary | ICD-10-CM

## 2023-10-26 DIAGNOSIS — R79.89 ELEVATED SERUM CREATININE: ICD-10-CM

## 2023-10-26 DIAGNOSIS — H92.03 OTALGIA OF BOTH EARS: ICD-10-CM

## 2023-10-26 PROBLEM — J84.9 BILATERAL INTERSTITIAL PNEUMONIA (HCC): Status: ACTIVE | Noted: 2023-10-26

## 2023-10-26 LAB
ALBUMIN SERPL-MCNC: 3.6 G/DL (ref 3.5–5)
ALBUMIN/GLOB SERPL: 1.1 (ref 1.1–2.2)
ALP SERPL-CCNC: 90 U/L (ref 45–117)
ALT SERPL-CCNC: 24 U/L (ref 12–78)
ANION GAP SERPL CALC-SCNC: 6 MMOL/L (ref 5–15)
APPEARANCE UR: ABNORMAL
AST SERPL W P-5'-P-CCNC: ABNORMAL U/L (ref 15–37)
BACTERIA URNS QL MICRO: NEGATIVE /HPF
BASOPHILS # BLD: 0.1 K/UL (ref 0–0.1)
BASOPHILS NFR BLD: 1 % (ref 0–1)
BILIRUB SERPL-MCNC: 0.3 MG/DL (ref 0.2–1)
BILIRUB UR QL: NEGATIVE
BUN SERPL-MCNC: 17 MG/DL (ref 6–20)
BUN/CREAT SERPL: 16 (ref 12–20)
CA-I BLD-MCNC: 8.5 MG/DL (ref 8.5–10.1)
CHLORIDE SERPL-SCNC: 106 MMOL/L (ref 97–108)
CO2 SERPL-SCNC: 27 MMOL/L (ref 21–32)
COLOR UR: ABNORMAL
CREAT SERPL-MCNC: 1.06 MG/DL (ref 0.55–1.02)
DIFFERENTIAL METHOD BLD: ABNORMAL
EOSINOPHIL # BLD: 0.1 K/UL (ref 0–0.4)
EOSINOPHIL NFR BLD: 1 % (ref 0–7)
EPITH CASTS URNS QL MICRO: ABNORMAL /LPF
ERYTHROCYTE [DISTWIDTH] IN BLOOD BY AUTOMATED COUNT: 13.2 % (ref 11.5–14.5)
FLUAV AG NPH QL IA: NEGATIVE
FLUBV AG NOSE QL IA: NEGATIVE
GLOBULIN SER CALC-MCNC: 3.3 G/DL (ref 2–4)
GLUCOSE SERPL-MCNC: 98 MG/DL (ref 65–100)
GLUCOSE UR STRIP.AUTO-MCNC: NEGATIVE MG/DL
HCT VFR BLD AUTO: 43.8 % (ref 35–47)
HGB BLD-MCNC: 14.6 G/DL (ref 11.5–16)
HGB UR QL STRIP: ABNORMAL
IMM GRANULOCYTES # BLD AUTO: 0 K/UL (ref 0–0.04)
IMM GRANULOCYTES NFR BLD AUTO: 0 % (ref 0–0.5)
KETONES UR QL STRIP.AUTO: NEGATIVE MG/DL
LEUKOCYTE ESTERASE UR QL STRIP.AUTO: NEGATIVE
LIPASE SERPL-CCNC: 34 U/L (ref 13–75)
LYMPHOCYTES # BLD: 3.9 K/UL (ref 0.8–3.5)
LYMPHOCYTES NFR BLD: 51 % (ref 12–49)
MCH RBC QN AUTO: 30.3 PG (ref 26–34)
MCHC RBC AUTO-ENTMCNC: 33.3 G/DL (ref 30–36.5)
MCV RBC AUTO: 90.9 FL (ref 80–99)
MONOCYTES # BLD: 0.3 K/UL (ref 0–1)
MONOCYTES NFR BLD: 4 % (ref 5–13)
MUCOUS THREADS URNS QL MICRO: ABNORMAL /LPF
NEUTS SEG # BLD: 3.4 K/UL (ref 1.8–8)
NEUTS SEG NFR BLD: 43 % (ref 32–75)
NITRITE UR QL STRIP.AUTO: POSITIVE
NRBC # BLD: 0 K/UL (ref 0–0.01)
NRBC BLD-RTO: 0 PER 100 WBC
PH UR STRIP: 5 (ref 5–8)
PLATELET # BLD AUTO: 300 K/UL (ref 150–400)
PMV BLD AUTO: 9.3 FL (ref 8.9–12.9)
POTASSIUM SERPL-SCNC: ABNORMAL MMOL/L (ref 3.5–5.1)
POTASSIUM SERPL-SCNC: NORMAL MMOL/L (ref 3.5–5.1)
POTASSIUM SERPL-SCNC: NORMAL MMOL/L (ref 3.5–5.1)
PROT SERPL-MCNC: 6.9 G/DL (ref 6.4–8.2)
PROT UR STRIP-MCNC: NEGATIVE MG/DL
RBC # BLD AUTO: 4.82 M/UL (ref 3.8–5.2)
RBC #/AREA URNS HPF: ABNORMAL /HPF (ref 0–5)
SARS-COV-2 RDRP RESP QL NAA+PROBE: NOT DETECTED
SODIUM SERPL-SCNC: 139 MMOL/L (ref 136–145)
SP GR UR REFRACTOMETRY: 1.02 (ref 1–1.03)
TROPONIN I SERPL HS-MCNC: 10 NG/L (ref 0–51)
TROPONIN I SERPL HS-MCNC: 11 NG/L (ref 0–51)
TSH SERPL DL<=0.05 MIU/L-ACNC: 2.91 UIU/ML (ref 0.36–3.74)
URINE CULTURE IF INDICATED: ABNORMAL
UROBILINOGEN UR QL STRIP.AUTO: 0.1 EU/DL (ref 0.1–1)
WBC # BLD AUTO: 7.8 K/UL (ref 3.6–11)
WBC URNS QL MICRO: ABNORMAL /HPF (ref 0–4)

## 2023-10-26 PROCEDURE — 87186 SC STD MICRODIL/AGAR DIL: CPT

## 2023-10-26 PROCEDURE — 2580000003 HC RX 258: Performed by: STUDENT IN AN ORGANIZED HEALTH CARE EDUCATION/TRAINING PROGRAM

## 2023-10-26 PROCEDURE — 6360000002 HC RX W HCPCS: Performed by: STUDENT IN AN ORGANIZED HEALTH CARE EDUCATION/TRAINING PROGRAM

## 2023-10-26 PROCEDURE — 87635 SARS-COV-2 COVID-19 AMP PRB: CPT

## 2023-10-26 PROCEDURE — 99285 EMERGENCY DEPT VISIT HI MDM: CPT

## 2023-10-26 PROCEDURE — 87077 CULTURE AEROBIC IDENTIFY: CPT

## 2023-10-26 PROCEDURE — 84484 ASSAY OF TROPONIN QUANT: CPT

## 2023-10-26 PROCEDURE — 83690 ASSAY OF LIPASE: CPT

## 2023-10-26 PROCEDURE — 96374 THER/PROPH/DIAG INJ IV PUSH: CPT

## 2023-10-26 PROCEDURE — 85025 COMPLETE CBC W/AUTO DIFF WBC: CPT

## 2023-10-26 PROCEDURE — 84132 ASSAY OF SERUM POTASSIUM: CPT

## 2023-10-26 PROCEDURE — 6360000002 HC RX W HCPCS: Performed by: NURSE PRACTITIONER

## 2023-10-26 PROCEDURE — 80053 COMPREHEN METABOLIC PANEL: CPT

## 2023-10-26 PROCEDURE — 71045 X-RAY EXAM CHEST 1 VIEW: CPT

## 2023-10-26 PROCEDURE — 1100000000 HC RM PRIVATE

## 2023-10-26 PROCEDURE — 81001 URINALYSIS AUTO W/SCOPE: CPT

## 2023-10-26 PROCEDURE — 80048 BASIC METABOLIC PNL TOTAL CA: CPT

## 2023-10-26 PROCEDURE — 6370000000 HC RX 637 (ALT 250 FOR IP): Performed by: NURSE PRACTITIONER

## 2023-10-26 PROCEDURE — 2580000003 HC RX 258: Performed by: NURSE PRACTITIONER

## 2023-10-26 PROCEDURE — 93005 ELECTROCARDIOGRAM TRACING: CPT | Performed by: INTERNAL MEDICINE

## 2023-10-26 PROCEDURE — 87086 URINE CULTURE/COLONY COUNT: CPT

## 2023-10-26 PROCEDURE — 96375 TX/PRO/DX INJ NEW DRUG ADDON: CPT

## 2023-10-26 PROCEDURE — 6370000000 HC RX 637 (ALT 250 FOR IP): Performed by: STUDENT IN AN ORGANIZED HEALTH CARE EDUCATION/TRAINING PROGRAM

## 2023-10-26 PROCEDURE — 71250 CT THORAX DX C-: CPT

## 2023-10-26 PROCEDURE — 87040 BLOOD CULTURE FOR BACTERIA: CPT

## 2023-10-26 PROCEDURE — 84443 ASSAY THYROID STIM HORMONE: CPT

## 2023-10-26 PROCEDURE — 87804 INFLUENZA ASSAY W/OPTIC: CPT

## 2023-10-26 RX ORDER — PAROXETINE HYDROCHLORIDE 20 MG/1
20 TABLET, FILM COATED ORAL DAILY
Status: DISCONTINUED | OUTPATIENT
Start: 2023-10-26 | End: 2023-10-28 | Stop reason: HOSPADM

## 2023-10-26 RX ORDER — NICOTINE 21 MG/24HR
1 PATCH, TRANSDERMAL 24 HOURS TRANSDERMAL DAILY
Status: DISCONTINUED | OUTPATIENT
Start: 2023-10-26 | End: 2023-10-28 | Stop reason: HOSPADM

## 2023-10-26 RX ORDER — 0.9 % SODIUM CHLORIDE 0.9 %
1000 INTRAVENOUS SOLUTION INTRAVENOUS ONCE
Status: COMPLETED | OUTPATIENT
Start: 2023-10-26 | End: 2023-10-26

## 2023-10-26 RX ORDER — AZITHROMYCIN 500 MG/1
500 TABLET, FILM COATED ORAL EVERY 24 HOURS
Status: DISCONTINUED | OUTPATIENT
Start: 2023-10-26 | End: 2023-10-26

## 2023-10-26 RX ORDER — GUAIFENESIN 600 MG/1
600 TABLET, EXTENDED RELEASE ORAL 2 TIMES DAILY
Status: DISCONTINUED | OUTPATIENT
Start: 2023-10-26 | End: 2023-10-28 | Stop reason: HOSPADM

## 2023-10-26 RX ORDER — ONDANSETRON 2 MG/ML
4 INJECTION INTRAMUSCULAR; INTRAVENOUS ONCE
Status: COMPLETED | OUTPATIENT
Start: 2023-10-26 | End: 2023-10-26

## 2023-10-26 RX ORDER — ONDANSETRON 4 MG/1
4 TABLET, ORALLY DISINTEGRATING ORAL EVERY 8 HOURS PRN
Status: DISCONTINUED | OUTPATIENT
Start: 2023-10-26 | End: 2023-10-28 | Stop reason: HOSPADM

## 2023-10-26 RX ORDER — AZITHROMYCIN 500 MG/1
500 TABLET, FILM COATED ORAL EVERY 24 HOURS
Status: DISCONTINUED | OUTPATIENT
Start: 2023-10-27 | End: 2023-10-26

## 2023-10-26 RX ORDER — ENOXAPARIN SODIUM 100 MG/ML
40 INJECTION SUBCUTANEOUS DAILY
Status: DISCONTINUED | OUTPATIENT
Start: 2023-10-26 | End: 2023-10-28 | Stop reason: HOSPADM

## 2023-10-26 RX ORDER — QUETIAPINE FUMARATE 100 MG/1
300 TABLET, FILM COATED ORAL NIGHTLY
Status: DISCONTINUED | OUTPATIENT
Start: 2023-10-26 | End: 2023-10-28 | Stop reason: HOSPADM

## 2023-10-26 RX ORDER — AZITHROMYCIN 500 MG/1
500 TABLET, FILM COATED ORAL EVERY 24 HOURS
Status: DISCONTINUED | OUTPATIENT
Start: 2023-10-27 | End: 2023-10-27

## 2023-10-26 RX ORDER — LIDOCAINE HYDROCHLORIDE 20 MG/ML
15 SOLUTION OROPHARYNGEAL
Status: COMPLETED | OUTPATIENT
Start: 2023-10-26 | End: 2023-10-26

## 2023-10-26 RX ORDER — DEXAMETHASONE SODIUM PHOSPHATE 10 MG/ML
10 INJECTION, SOLUTION INTRAMUSCULAR; INTRAVENOUS ONCE
Status: COMPLETED | OUTPATIENT
Start: 2023-10-26 | End: 2023-10-26

## 2023-10-26 RX ORDER — SODIUM CHLORIDE 0.9 % (FLUSH) 0.9 %
5-40 SYRINGE (ML) INJECTION PRN
Status: DISCONTINUED | OUTPATIENT
Start: 2023-10-26 | End: 2023-10-28 | Stop reason: HOSPADM

## 2023-10-26 RX ORDER — SODIUM CHLORIDE 0.9 % (FLUSH) 0.9 %
5-40 SYRINGE (ML) INJECTION EVERY 12 HOURS SCHEDULED
Status: DISCONTINUED | OUTPATIENT
Start: 2023-10-26 | End: 2023-10-28 | Stop reason: HOSPADM

## 2023-10-26 RX ORDER — ACETAMINOPHEN 650 MG/1
650 SUPPOSITORY RECTAL EVERY 6 HOURS PRN
Status: DISCONTINUED | OUTPATIENT
Start: 2023-10-26 | End: 2023-10-28 | Stop reason: HOSPADM

## 2023-10-26 RX ORDER — ONDANSETRON 2 MG/ML
4 INJECTION INTRAMUSCULAR; INTRAVENOUS EVERY 6 HOURS PRN
Status: DISCONTINUED | OUTPATIENT
Start: 2023-10-26 | End: 2023-10-28 | Stop reason: HOSPADM

## 2023-10-26 RX ORDER — SODIUM CHLORIDE 9 MG/ML
INJECTION, SOLUTION INTRAVENOUS PRN
Status: DISCONTINUED | OUTPATIENT
Start: 2023-10-26 | End: 2023-10-28 | Stop reason: HOSPADM

## 2023-10-26 RX ORDER — CLONAZEPAM 0.5 MG/1
0.5 TABLET ORAL 3 TIMES DAILY
Status: DISCONTINUED | OUTPATIENT
Start: 2023-10-26 | End: 2023-10-28 | Stop reason: HOSPADM

## 2023-10-26 RX ORDER — CLONAZEPAM 0.5 MG/1
0.5 TABLET ORAL
Status: COMPLETED | OUTPATIENT
Start: 2023-10-26 | End: 2023-10-26

## 2023-10-26 RX ORDER — POLYETHYLENE GLYCOL 3350 17 G/17G
17 POWDER, FOR SOLUTION ORAL DAILY PRN
Status: DISCONTINUED | OUTPATIENT
Start: 2023-10-26 | End: 2023-10-28 | Stop reason: HOSPADM

## 2023-10-26 RX ORDER — BENZONATATE 100 MG/1
100 CAPSULE ORAL 3 TIMES DAILY PRN
Status: DISCONTINUED | OUTPATIENT
Start: 2023-10-26 | End: 2023-10-28 | Stop reason: HOSPADM

## 2023-10-26 RX ORDER — GABAPENTIN 300 MG/1
600 CAPSULE ORAL
Status: COMPLETED | OUTPATIENT
Start: 2023-10-26 | End: 2023-10-26

## 2023-10-26 RX ORDER — IPRATROPIUM BROMIDE AND ALBUTEROL SULFATE 2.5; .5 MG/3ML; MG/3ML
1 SOLUTION RESPIRATORY (INHALATION) EVERY 4 HOURS PRN
Status: DISCONTINUED | OUTPATIENT
Start: 2023-10-26 | End: 2023-10-28 | Stop reason: HOSPADM

## 2023-10-26 RX ORDER — ACETAMINOPHEN 325 MG/1
650 TABLET ORAL EVERY 6 HOURS PRN
Status: DISCONTINUED | OUTPATIENT
Start: 2023-10-26 | End: 2023-10-28 | Stop reason: HOSPADM

## 2023-10-26 RX ORDER — KETOROLAC TROMETHAMINE 15 MG/ML
15 INJECTION, SOLUTION INTRAMUSCULAR; INTRAVENOUS ONCE
Status: COMPLETED | OUTPATIENT
Start: 2023-10-26 | End: 2023-10-26

## 2023-10-26 RX ORDER — GABAPENTIN 300 MG/1
600 CAPSULE ORAL 3 TIMES DAILY
Status: DISCONTINUED | OUTPATIENT
Start: 2023-10-26 | End: 2023-10-28 | Stop reason: HOSPADM

## 2023-10-26 RX ADMIN — PAROXETINE HYDROCHLORIDE 20 MG: 20 TABLET, FILM COATED ORAL at 17:02

## 2023-10-26 RX ADMIN — ENOXAPARIN SODIUM 40 MG: 100 INJECTION SUBCUTANEOUS at 17:01

## 2023-10-26 RX ADMIN — GUAIFENESIN 600 MG: 600 TABLET, EXTENDED RELEASE ORAL at 20:50

## 2023-10-26 RX ADMIN — CEFTRIAXONE SODIUM 2000 MG: 2 INJECTION, POWDER, FOR SOLUTION INTRAMUSCULAR; INTRAVENOUS at 17:01

## 2023-10-26 RX ADMIN — GABAPENTIN 600 MG: 300 CAPSULE ORAL at 17:01

## 2023-10-26 RX ADMIN — ONDANSETRON 4 MG: 2 INJECTION INTRAMUSCULAR; INTRAVENOUS at 10:39

## 2023-10-26 RX ADMIN — GABAPENTIN 600 MG: 300 CAPSULE ORAL at 12:44

## 2023-10-26 RX ADMIN — KETOROLAC TROMETHAMINE 15 MG: 15 INJECTION, SOLUTION INTRAMUSCULAR; INTRAVENOUS at 10:41

## 2023-10-26 RX ADMIN — CLONAZEPAM 0.5 MG: 0.5 TABLET ORAL at 12:43

## 2023-10-26 RX ADMIN — CLONAZEPAM 0.5 MG: 0.5 TABLET ORAL at 17:02

## 2023-10-26 RX ADMIN — GABAPENTIN 600 MG: 300 CAPSULE ORAL at 20:50

## 2023-10-26 RX ADMIN — AZITHROMYCIN 500 MG: 500 TABLET, FILM COATED ORAL at 17:02

## 2023-10-26 RX ADMIN — BENZONATATE 100 MG: 100 CAPSULE ORAL at 17:02

## 2023-10-26 RX ADMIN — Medication 15 ML: at 12:25

## 2023-10-26 RX ADMIN — DEXAMETHASONE SODIUM PHOSPHATE 10 MG: 10 INJECTION, SOLUTION INTRAMUSCULAR; INTRAVENOUS at 12:27

## 2023-10-26 RX ADMIN — CLONAZEPAM 0.5 MG: 0.5 TABLET ORAL at 20:50

## 2023-10-26 RX ADMIN — SODIUM CHLORIDE 1000 ML: 9 INJECTION, SOLUTION INTRAVENOUS at 12:26

## 2023-10-26 RX ADMIN — SODIUM CHLORIDE, PRESERVATIVE FREE 10 ML: 5 INJECTION INTRAVENOUS at 20:51

## 2023-10-26 RX ADMIN — QUETIAPINE FUMARATE 300 MG: 100 TABLET ORAL at 20:50

## 2023-10-26 ASSESSMENT — LIFESTYLE VARIABLES
HOW MANY STANDARD DRINKS CONTAINING ALCOHOL DO YOU HAVE ON A TYPICAL DAY: PATIENT DOES NOT DRINK
HOW OFTEN DO YOU HAVE A DRINK CONTAINING ALCOHOL: NEVER

## 2023-10-26 ASSESSMENT — PAIN SCALES - GENERAL
PAINLEVEL_OUTOF10: 7
PAINLEVEL_OUTOF10: 0
PAINLEVEL_OUTOF10: 6
PAINLEVEL_OUTOF10: 7

## 2023-10-26 ASSESSMENT — HEART SCORE: ECG: 0

## 2023-10-26 ASSESSMENT — ENCOUNTER SYMPTOMS
VOMITING: 0
CONSTIPATION: 0
SHORTNESS OF BREATH: 1
NAUSEA: 1
DIARRHEA: 0
COUGH: 1
BACK PAIN: 0

## 2023-10-26 ASSESSMENT — PAIN - FUNCTIONAL ASSESSMENT: PAIN_FUNCTIONAL_ASSESSMENT: 0-10

## 2023-10-26 ASSESSMENT — PAIN DESCRIPTION - LOCATION: LOCATION: GENERALIZED;CHEST

## 2023-10-26 NOTE — PROGRESS NOTES
4 Eyes Skin Assessment     NAME:  Lianne Randhawa  YOB: 1965  MEDICAL RECORD NUMBER:  [id number]    The patient is being assessed for  Admission    I agree that at least one RN has performed a thorough Head to Toe Skin Assessment on the patient. ALL assessment sites listed below have been assessed. Areas assessed by both nurses:    Head, Face, Ears, Shoulders, Back, Chest, Arms, Elbows, Hands, Sacrum. Buttock, Coccyx, Ischium, Legs. Feet and Heels, and Under Medical Devices         Does the Patient have a Wound?  No noted wound(s)       John Prevention initiated by RN: Yes  Wound Care Orders initiated by RN: No    Pressure Injury (Stage 3,4, Unstageable, DTI, NWPT, and Complex wounds) if present, place Wound referral order by RN under : No    New Ostomies, if present place, Ostomy referral order under : No     Nurse 1 eSignature: Electronically signed by Manan Martinez RN on 10/26/23 at 6:11 PM EDT    **SHARE this note so that the co-signing nurse can place an eSignature**    Nurse 2 eSignature: {Esignature:[id number]}

## 2023-10-26 NOTE — H&P
Hospitalist Admission Note    NAME: Darby James   :  1965   MRN:  [id number]     Date/Time:  10/26/2023 3:01 PM    Patient PCP: No primary care provider on file.    ______________________________________________________________________  Given the patient's current clinical presentation, I have a high level of concern for decompensation if discharged from the emergency department. Complex decision making was performed, which includes reviewing the patient's available past medical records, laboratory results, and x-ray films. My assessment of this patient's clinical condition and my plan of care is as follows. Assessment / Plan:    Community acquired pneumonia  CT of the chest with bilateral lower lobe groundglass densities concerning for multifocal pneumonia  COVID and Influenza negative  IV Rocephin and ceftriaxone  Patient tolerating room air without hypoxia  Supplemental oxygen as needed  Sputum culture  Duonebs as needed     PTSD/Anxiety   Continue home medications: Klonopin, Seroquel, Paxil    History of seizures  Continue home medication: Gabapentin      Medical Decision Making:   I personally reviewed labs: WBC 7.8  I personally reviewed imaging:CT of the chest   Toxic drug monitoring: Monitor for adverse reaction to antibiotic  Discussed case with: ED provider. After discussion I am in agreement that acuity of patient's medical condition necessitates hospital stay. Code Status: Full code  DVT Prophylaxis: Lovenox  GI Prophylaxis:None      Subjective:   CHIEF COMPLAINT: Shortness of breath    HISTORY OF PRESENT ILLNESS:     Gal Davalos is a 62 y.o.  female with PMHx significant for PTSD, anxiety, hypothyroidism, history of hospitalization for pneumonia 2023 who presented to the emergency department for gradually worsening 4 days of generalized weakness, fatigue, nasal congestion, ear pain, cough, shortness of breath. No fevers or chills. No known sick contacts.  Noted chest

## 2023-10-26 NOTE — CARE COORDINATION
10/26/23 1403   Service Assessment   Patient Orientation Alert and Oriented   Cognition Alert   History Provided By Patient   Primary Caregiver Self   Accompanied By/Relationship Pt alone during interview. Support Systems Children;Friends/Neighbors  (Son)   [address] is: Legal Next of Kin   PCP Verified by CM Yes  (Per pt no PCP.)   Last Visit to PCP   (No PCP)   Prior Functional Level Independent in ADLs/IADLs   Current Functional Level Independent in ADLs/IADLs   Can patient return to prior living arrangement Yes   Ability to make needs known: Good   Family able to assist with home care needs: Yes   Would you like for me to discuss the discharge plan with any other family members/significant others, and if so, who? Yes  (Son Lisa Dudley)   Financial Resources [employer] None   Social/Functional History   Lives With Other (comment)  ([address])   Type of [address] One level   Home Access Level entry   Bathroom Shower/Tub Tub/Shower unit   [address] None   Receives Help From Family   ADL Assistance Independent   Homemaking Assistance Independent   Homemaking Responsibilities Yes   Ambulation Assistance Independent   Transfer Assistance Independent   Active  Yes   Occupation On disability   Discharge Planning   Type of Residence Trailer/Mobile [address] Prior To Admission None   Potential Assistance Needed N/A   DME Ordered? No   Potential Assistance Purchasing Medications No   Type of Home Care Services None   Patient expects to be discharged to: Trailer/mobile home   One/Two Story Residence One story   History of falls? 0   Services At/After Discharge   Transition of Care Consult (CM Consult) Discharge [facility] None   [place] Resource Information Provided?  No   Mode of Transport at Discharge Self  (Drove here.)   Confirm Follow Up Transport Self     CM met with pt & D/C Plan is home with friend & pt plans to drive self home ( drove here). Send Rxs to [facility] upon discharge. Uses no DME.

## 2023-10-26 NOTE — ED PROVIDER NOTES
[facility] EMERGENCY DEPT  EMERGENCY DEPARTMENT HISTORY AND PHYSICAL EXAM      Date: 10/26/2023  Patient Name: Roberta Hernandes  MRN: [id number]  [address] 1965  Date of evaluation: 10/26/2023  Provider: LÓPEZ Parikh NP   Note Started: 10:03 AM EDT 10/26/23    HISTORY OF PRESENT ILLNESS     Chief Complaint   Patient presents with    Chest Pain    Otalgia    Fatigue       History Provided By: Patient    HPI: Roberta Hernandes is a 62 y.o. female with medical history significant for hypothyroidism, seizures, fatigue, pneumonia and other history reviewed as listed below presents complaining of 4-day history of bilateral ear pain, generalized weakness and fatigue, chest pain with coughing that is reproducible on palpation. Nonproductive cough. Also with nausea but no vomiting, no abdominal pain, urinary symptoms, constipation or diarrhea. Unknown sick contacts. Has not taken any medications for her symptoms. Was concerned due to previous admission for pneumonia where she was ventilated earlier this year prompting her visit to the ER. PAST MEDICAL HISTORY   Past Medical History:  Past Medical History:   Diagnosis Date    DDD (degenerative disc disease), lumbar     Fatigue     GERD (gastroesophageal reflux disease)     Hypothyroidism     Psychiatric disorder     PTSD per patient    Seizures ([address])        Past Surgical History:  Past Surgical History:   Procedure Laterality Date    APPENDECTOMY       SECTION      CHOLECYSTECTOMY      HYSTERECTOMY (CERVIX STATUS UNKNOWN)         Family History:  No family history on file. Social History:  Social History     Tobacco Use    Smoking status: Every Day     Packs/day: .5     Types: Cigarettes    Smokeless tobacco: Never   Substance Use Topics    Alcohol use: Not Currently     Alcohol/week: 0.0 standard drinks of alcohol    Drug use: Never       Allergies:   Allergies   Allergen Reactions    Amoxicillin-Pot Clavulanate Nausea And Vomiting       PCP: No proofreading.)     Ehsan Client, APRN - NP  10/26/23 7037

## 2023-10-27 LAB
ALBUMIN SERPL-MCNC: 2.8 G/DL (ref 3.5–5)
ALBUMIN/GLOB SERPL: 0.8 (ref 1.1–2.2)
ALP SERPL-CCNC: 68 U/L (ref 45–117)
ALT SERPL-CCNC: 20 U/L (ref 12–78)
ANION GAP SERPL CALC-SCNC: 4 MMOL/L (ref 5–15)
AST SERPL W P-5'-P-CCNC: 9 U/L (ref 15–37)
BASOPHILS # BLD: 0 K/UL (ref 0–0.1)
BASOPHILS NFR BLD: 0 % (ref 0–1)
BILIRUB SERPL-MCNC: 0.2 MG/DL (ref 0.2–1)
BUN SERPL-MCNC: 17 MG/DL (ref 6–20)
BUN/CREAT SERPL: 16 (ref 12–20)
CA-I BLD-MCNC: 8.7 MG/DL (ref 8.5–10.1)
CHLORIDE SERPL-SCNC: 107 MMOL/L (ref 97–108)
CO2 SERPL-SCNC: 26 MMOL/L (ref 21–32)
CREAT SERPL-MCNC: 1.04 MG/DL (ref 0.55–1.02)
DIFFERENTIAL METHOD BLD: ABNORMAL
EKG ATRIAL RATE: 74 BPM
EKG DIAGNOSIS: NORMAL
EKG P AXIS: 16 DEGREES
EKG P-R INTERVAL: 138 MS
EKG Q-T INTERVAL: 390 MS
EKG QRS DURATION: 76 MS
EKG QTC CALCULATION (BAZETT): 432 MS
EKG R AXIS: 38 DEGREES
EKG T AXIS: 39 DEGREES
EKG VENTRICULAR RATE: 74 BPM
EOSINOPHIL # BLD: 0 K/UL (ref 0–0.4)
EOSINOPHIL NFR BLD: 0 % (ref 0–7)
ERYTHROCYTE [DISTWIDTH] IN BLOOD BY AUTOMATED COUNT: 12.9 % (ref 11.5–14.5)
GLOBULIN SER CALC-MCNC: 3.6 G/DL (ref 2–4)
GLUCOSE SERPL-MCNC: 176 MG/DL (ref 65–100)
HCT VFR BLD AUTO: 41.9 % (ref 35–47)
HGB BLD-MCNC: 14.1 G/DL (ref 11.5–16)
IMM GRANULOCYTES # BLD AUTO: 0 K/UL (ref 0–0.04)
IMM GRANULOCYTES NFR BLD AUTO: 0 % (ref 0–0.5)
LACTATE SERPL-SCNC: 1.7 MMOL/L (ref 0.4–2)
LYMPHOCYTES # BLD: 1.5 K/UL (ref 0.8–3.5)
LYMPHOCYTES NFR BLD: 14 % (ref 12–49)
MCH RBC QN AUTO: 30 PG (ref 26–34)
MCHC RBC AUTO-ENTMCNC: 33.7 G/DL (ref 30–36.5)
MCV RBC AUTO: 89.1 FL (ref 80–99)
MONOCYTES # BLD: 0.2 K/UL (ref 0–1)
MONOCYTES NFR BLD: 2 % (ref 5–13)
NEUTS SEG # BLD: 8.5 K/UL (ref 1.8–8)
NEUTS SEG NFR BLD: 84 % (ref 32–75)
NRBC # BLD: 0 K/UL (ref 0–0.01)
NRBC BLD-RTO: 0 PER 100 WBC
PLATELET # BLD AUTO: 310 K/UL (ref 150–400)
PMV BLD AUTO: 9.3 FL (ref 8.9–12.9)
POTASSIUM SERPL-SCNC: 4.4 MMOL/L (ref 3.5–5.1)
PROT SERPL-MCNC: 6.4 G/DL (ref 6.4–8.2)
RBC # BLD AUTO: 4.7 M/UL (ref 3.8–5.2)
SODIUM SERPL-SCNC: 137 MMOL/L (ref 136–145)
WBC # BLD AUTO: 10.2 K/UL (ref 3.6–11)

## 2023-10-27 PROCEDURE — 2580000003 HC RX 258: Performed by: STUDENT IN AN ORGANIZED HEALTH CARE EDUCATION/TRAINING PROGRAM

## 2023-10-27 PROCEDURE — 6370000000 HC RX 637 (ALT 250 FOR IP): Performed by: STUDENT IN AN ORGANIZED HEALTH CARE EDUCATION/TRAINING PROGRAM

## 2023-10-27 PROCEDURE — 1100000000 HC RM PRIVATE

## 2023-10-27 PROCEDURE — 83605 ASSAY OF LACTIC ACID: CPT

## 2023-10-27 PROCEDURE — 85025 COMPLETE CBC W/AUTO DIFF WBC: CPT

## 2023-10-27 PROCEDURE — 80053 COMPREHEN METABOLIC PANEL: CPT

## 2023-10-27 PROCEDURE — 6360000002 HC RX W HCPCS: Performed by: STUDENT IN AN ORGANIZED HEALTH CARE EDUCATION/TRAINING PROGRAM

## 2023-10-27 PROCEDURE — 36415 COLL VENOUS BLD VENIPUNCTURE: CPT

## 2023-10-27 RX ORDER — PREDNISONE 5 MG/1
20 TABLET ORAL DAILY
Status: DISCONTINUED | OUTPATIENT
Start: 2023-10-27 | End: 2023-10-28 | Stop reason: HOSPADM

## 2023-10-27 RX ADMIN — CLONAZEPAM 0.5 MG: 0.5 TABLET ORAL at 13:39

## 2023-10-27 RX ADMIN — AZITHROMYCIN 500 MG: 500 TABLET, FILM COATED ORAL at 16:30

## 2023-10-27 RX ADMIN — GABAPENTIN 600 MG: 300 CAPSULE ORAL at 13:39

## 2023-10-27 RX ADMIN — SODIUM CHLORIDE, PRESERVATIVE FREE 10 ML: 5 INJECTION INTRAVENOUS at 09:35

## 2023-10-27 RX ADMIN — CLONAZEPAM 0.5 MG: 0.5 TABLET ORAL at 09:34

## 2023-10-27 RX ADMIN — PAROXETINE HYDROCHLORIDE 20 MG: 20 TABLET, FILM COATED ORAL at 10:08

## 2023-10-27 RX ADMIN — GABAPENTIN 600 MG: 300 CAPSULE ORAL at 09:34

## 2023-10-27 RX ADMIN — PREDNISONE 20 MG: 5 TABLET ORAL at 21:20

## 2023-10-27 RX ADMIN — SODIUM CHLORIDE, PRESERVATIVE FREE 10 ML: 5 INJECTION INTRAVENOUS at 21:14

## 2023-10-27 RX ADMIN — GABAPENTIN 600 MG: 300 CAPSULE ORAL at 21:14

## 2023-10-27 RX ADMIN — QUETIAPINE FUMARATE 300 MG: 100 TABLET ORAL at 21:14

## 2023-10-27 RX ADMIN — ENOXAPARIN SODIUM 40 MG: 100 INJECTION SUBCUTANEOUS at 09:33

## 2023-10-27 RX ADMIN — BENZONATATE 100 MG: 100 CAPSULE ORAL at 10:08

## 2023-10-27 RX ADMIN — ACETAMINOPHEN 650 MG: 325 TABLET ORAL at 16:30

## 2023-10-27 RX ADMIN — CLONAZEPAM 0.5 MG: 0.5 TABLET ORAL at 21:14

## 2023-10-27 RX ADMIN — CEFTRIAXONE SODIUM 1000 MG: 1 INJECTION, POWDER, FOR SOLUTION INTRAMUSCULAR; INTRAVENOUS at 16:30

## 2023-10-27 RX ADMIN — GUAIFENESIN 600 MG: 600 TABLET, EXTENDED RELEASE ORAL at 21:14

## 2023-10-27 RX ADMIN — GUAIFENESIN 600 MG: 600 TABLET, EXTENDED RELEASE ORAL at 09:34

## 2023-10-27 ASSESSMENT — PAIN SCALES - GENERAL
PAINLEVEL_OUTOF10: 0
PAINLEVEL_OUTOF10: 2
PAINLEVEL_OUTOF10: 0
PAINLEVEL_OUTOF10: 4

## 2023-10-27 ASSESSMENT — ENCOUNTER SYMPTOMS
COUGH: 1
SHORTNESS OF BREATH: 0
ABDOMINAL PAIN: 0
BACK PAIN: 0
VOMITING: 0
NAUSEA: 0
DIARRHEA: 0

## 2023-10-27 ASSESSMENT — PAIN DESCRIPTION - LOCATION
LOCATION: EAR;HEAD
LOCATION: HEAD

## 2023-10-27 NOTE — CARE COORDINATION
CM reviewed clinical chart. Current disposition is home/self. No Egress test information available. Patient has a Mod RUR score at 13%.

## 2023-10-28 VITALS
OXYGEN SATURATION: 97 % | WEIGHT: 200 LBS | BODY MASS INDEX: 30.31 KG/M2 | TEMPERATURE: 98.2 F | DIASTOLIC BLOOD PRESSURE: 74 MMHG | HEART RATE: 51 BPM | RESPIRATION RATE: 12 BRPM | SYSTOLIC BLOOD PRESSURE: 139 MMHG | HEIGHT: 68 IN

## 2023-10-28 PROBLEM — G40.909 SEIZURE DISORDER (HCC): Status: ACTIVE | Noted: 2022-05-25

## 2023-10-28 LAB
ALBUMIN SERPL-MCNC: 3.1 G/DL (ref 3.5–5)
ALBUMIN/GLOB SERPL: 1 (ref 1.1–2.2)
ALP SERPL-CCNC: 64 U/L (ref 45–117)
ALT SERPL-CCNC: 18 U/L (ref 12–78)
ANION GAP SERPL CALC-SCNC: 4 MMOL/L (ref 5–15)
AST SERPL W P-5'-P-CCNC: 5 U/L (ref 15–37)
BASOPHILS # BLD: 0 K/UL (ref 0–0.1)
BASOPHILS NFR BLD: 0 % (ref 0–1)
BILIRUB SERPL-MCNC: 0.2 MG/DL (ref 0.2–1)
BUN SERPL-MCNC: 19 MG/DL (ref 6–20)
BUN/CREAT SERPL: 21 (ref 12–20)
CA-I BLD-MCNC: 8.4 MG/DL (ref 8.5–10.1)
CHLORIDE SERPL-SCNC: 111 MMOL/L (ref 97–108)
CO2 SERPL-SCNC: 27 MMOL/L (ref 21–32)
CREAT SERPL-MCNC: 0.92 MG/DL (ref 0.55–1.02)
DIFFERENTIAL METHOD BLD: ABNORMAL
EOSINOPHIL # BLD: 0 K/UL (ref 0–0.4)
EOSINOPHIL NFR BLD: 0 % (ref 0–7)
ERYTHROCYTE [DISTWIDTH] IN BLOOD BY AUTOMATED COUNT: 13 % (ref 11.5–14.5)
GLOBULIN SER CALC-MCNC: 3.1 G/DL (ref 2–4)
GLUCOSE SERPL-MCNC: 116 MG/DL (ref 65–100)
HCT VFR BLD AUTO: 40.6 % (ref 35–47)
HGB BLD-MCNC: 13.7 G/DL (ref 11.5–16)
IMM GRANULOCYTES # BLD AUTO: 0 K/UL (ref 0–0.04)
IMM GRANULOCYTES NFR BLD AUTO: 0 % (ref 0–0.5)
LYMPHOCYTES # BLD: 2 K/UL (ref 0.8–3.5)
LYMPHOCYTES NFR BLD: 23 % (ref 12–49)
MCH RBC QN AUTO: 30 PG (ref 26–34)
MCHC RBC AUTO-ENTMCNC: 33.7 G/DL (ref 30–36.5)
MCV RBC AUTO: 89 FL (ref 80–99)
MONOCYTES # BLD: 0.3 K/UL (ref 0–1)
MONOCYTES NFR BLD: 3 % (ref 5–13)
NEUTS SEG # BLD: 6.6 K/UL (ref 1.8–8)
NEUTS SEG NFR BLD: 74 % (ref 32–75)
NRBC # BLD: 0 K/UL (ref 0–0.01)
NRBC BLD-RTO: 0 PER 100 WBC
PLATELET # BLD AUTO: 284 K/UL (ref 150–400)
PMV BLD AUTO: 9.8 FL (ref 8.9–12.9)
POTASSIUM SERPL-SCNC: 4.2 MMOL/L (ref 3.5–5.1)
PROCALCITONIN SERPL-MCNC: <0.05 NG/ML
PROT SERPL-MCNC: 6.2 G/DL (ref 6.4–8.2)
RBC # BLD AUTO: 4.56 M/UL (ref 3.8–5.2)
SODIUM SERPL-SCNC: 142 MMOL/L (ref 136–145)
WBC # BLD AUTO: 9 K/UL (ref 3.6–11)

## 2023-10-28 PROCEDURE — 6360000002 HC RX W HCPCS: Performed by: STUDENT IN AN ORGANIZED HEALTH CARE EDUCATION/TRAINING PROGRAM

## 2023-10-28 PROCEDURE — 6370000000 HC RX 637 (ALT 250 FOR IP): Performed by: STUDENT IN AN ORGANIZED HEALTH CARE EDUCATION/TRAINING PROGRAM

## 2023-10-28 PROCEDURE — 84145 PROCALCITONIN (PCT): CPT

## 2023-10-28 PROCEDURE — 85025 COMPLETE CBC W/AUTO DIFF WBC: CPT

## 2023-10-28 PROCEDURE — 80053 COMPREHEN METABOLIC PANEL: CPT

## 2023-10-28 PROCEDURE — 36415 COLL VENOUS BLD VENIPUNCTURE: CPT

## 2023-10-28 RX ORDER — PREDNISONE 20 MG/1
20 TABLET ORAL DAILY
Qty: 5 TABLET | Refills: 0 | Status: SHIPPED | OUTPATIENT
Start: 2023-10-29 | End: 2023-11-03

## 2023-10-28 RX ORDER — LEVOFLOXACIN 750 MG/1
750 TABLET ORAL DAILY
Qty: 7 TABLET | Refills: 0 | Status: SHIPPED | OUTPATIENT
Start: 2023-10-28 | End: 2023-11-04

## 2023-10-28 RX ORDER — BENZONATATE 100 MG/1
100 CAPSULE ORAL 3 TIMES DAILY PRN
Qty: 15 CAPSULE | Refills: 0 | Status: SHIPPED | OUTPATIENT
Start: 2023-10-28

## 2023-10-28 RX ORDER — GUAIFENESIN 600 MG/1
600 TABLET, EXTENDED RELEASE ORAL 2 TIMES DAILY
Qty: 10 TABLET | Refills: 0 | Status: SHIPPED | OUTPATIENT
Start: 2023-10-28

## 2023-10-28 RX ADMIN — ENOXAPARIN SODIUM 40 MG: 100 INJECTION SUBCUTANEOUS at 10:02

## 2023-10-28 RX ADMIN — PAROXETINE HYDROCHLORIDE 20 MG: 20 TABLET, FILM COATED ORAL at 10:02

## 2023-10-28 RX ADMIN — CLONAZEPAM 0.5 MG: 0.5 TABLET ORAL at 10:02

## 2023-10-28 RX ADMIN — GABAPENTIN 600 MG: 300 CAPSULE ORAL at 10:01

## 2023-10-28 RX ADMIN — GUAIFENESIN 600 MG: 600 TABLET, EXTENDED RELEASE ORAL at 10:02

## 2023-10-28 RX ADMIN — PREDNISONE 20 MG: 5 TABLET ORAL at 10:02

## 2023-10-28 ASSESSMENT — PAIN SCALES - GENERAL: PAINLEVEL_OUTOF10: 0

## 2023-10-28 NOTE — PROGRESS NOTES
Pt discharge teaching and disposition complete; PIV removed; All questions answered;  Pt verbalized understanding

## 2023-10-29 LAB
BACTERIA SPEC CULT: ABNORMAL
BACTERIA SPEC CULT: NORMAL
BACTERIA SPEC CULT: NORMAL
COLONY COUNT, CNT: ABNORMAL
Lab: ABNORMAL
Lab: NORMAL
Lab: NORMAL

## 2023-11-01 LAB
BACTERIA SPEC CULT: NORMAL
BACTERIA SPEC CULT: NORMAL
Lab: NORMAL
Lab: NORMAL

## 2023-11-06 ENCOUNTER — HOSPITAL ENCOUNTER (EMERGENCY)
Facility: HOSPITAL | Age: 58
Discharge: HOME | End: 2023-11-06
Attending: STUDENT IN AN ORGANIZED HEALTH CARE EDUCATION/TRAINING PROGRAM
Payer: MEDICARE

## 2023-11-06 ENCOUNTER — APPOINTMENT (OUTPATIENT)
Facility: HOSPITAL | Age: 58
End: 2023-11-06
Payer: MEDICARE

## 2023-11-06 VITALS
BODY MASS INDEX: 30.31 KG/M2 | DIASTOLIC BLOOD PRESSURE: 84 MMHG | RESPIRATION RATE: 20 BRPM | HEART RATE: 87 BPM | HEIGHT: 68 IN | OXYGEN SATURATION: 99 % | SYSTOLIC BLOOD PRESSURE: 115 MMHG | WEIGHT: 200 LBS | TEMPERATURE: 98.2 F

## 2023-11-06 DIAGNOSIS — R91.1 RIGHT UPPER LOBE PULMONARY NODULE: ICD-10-CM

## 2023-11-06 DIAGNOSIS — R06.02 SHORTNESS OF BREATH: Primary | ICD-10-CM

## 2023-11-06 LAB
ALBUMIN SERPL-MCNC: 3.7 G/DL (ref 3.5–5)
ALBUMIN/GLOB SERPL: 1.2 (ref 1.1–2.2)
ALP SERPL-CCNC: 74 U/L (ref 45–117)
ALT SERPL-CCNC: 23 U/L (ref 12–78)
ANION GAP SERPL CALC-SCNC: 4 MMOL/L (ref 5–15)
AST SERPL W P-5'-P-CCNC: 12 U/L (ref 15–37)
BASOPHILS # BLD: 0.1 K/UL (ref 0–0.1)
BASOPHILS NFR BLD: 1 % (ref 0–1)
BILIRUB SERPL-MCNC: 0.4 MG/DL (ref 0.2–1)
BUN SERPL-MCNC: 21 MG/DL (ref 6–20)
BUN/CREAT SERPL: 18 (ref 12–20)
CA-I BLD-MCNC: 9.2 MG/DL (ref 8.5–10.1)
CHLORIDE SERPL-SCNC: 103 MMOL/L (ref 97–108)
CO2 SERPL-SCNC: 31 MMOL/L (ref 21–32)
CREAT SERPL-MCNC: 1.18 MG/DL (ref 0.55–1.02)
DIFFERENTIAL METHOD BLD: ABNORMAL
EOSINOPHIL # BLD: 0.2 K/UL (ref 0–0.4)
EOSINOPHIL NFR BLD: 1 % (ref 0–7)
ERYTHROCYTE [DISTWIDTH] IN BLOOD BY AUTOMATED COUNT: 13.6 % (ref 11.5–14.5)
GLOBULIN SER CALC-MCNC: 3 G/DL (ref 2–4)
GLUCOSE SERPL-MCNC: 96 MG/DL (ref 65–100)
HCT VFR BLD AUTO: 45.7 % (ref 35–47)
HGB BLD-MCNC: 15.1 G/DL (ref 11.5–16)
IMM GRANULOCYTES # BLD AUTO: 0.1 K/UL (ref 0–0.04)
IMM GRANULOCYTES NFR BLD AUTO: 1 % (ref 0–0.5)
LYMPHOCYTES # BLD: 5.1 K/UL (ref 0.8–3.5)
LYMPHOCYTES NFR BLD: 40 % (ref 12–49)
MCH RBC QN AUTO: 30.3 PG (ref 26–34)
MCHC RBC AUTO-ENTMCNC: 33 G/DL (ref 30–36.5)
MCV RBC AUTO: 91.8 FL (ref 80–99)
MONOCYTES # BLD: 0.7 K/UL (ref 0–1)
MONOCYTES NFR BLD: 5 % (ref 5–13)
NEUTS SEG # BLD: 6.7 K/UL (ref 1.8–8)
NEUTS SEG NFR BLD: 52 % (ref 32–75)
NRBC # BLD: 0 K/UL (ref 0–0.01)
NRBC BLD-RTO: 0 PER 100 WBC
PLATELET # BLD AUTO: 348 K/UL (ref 150–400)
PMV BLD AUTO: 9 FL (ref 8.9–12.9)
POTASSIUM SERPL-SCNC: 4.3 MMOL/L (ref 3.5–5.1)
PROT SERPL-MCNC: 6.7 G/DL (ref 6.4–8.2)
RBC # BLD AUTO: 4.98 M/UL (ref 3.8–5.2)
SODIUM SERPL-SCNC: 138 MMOL/L (ref 136–145)
TROPONIN I SERPL HS-MCNC: 8 NG/L (ref 0–51)
WBC # BLD AUTO: 12.8 K/UL (ref 3.6–11)

## 2023-11-06 PROCEDURE — 99285 EMERGENCY DEPT VISIT HI MDM: CPT

## 2023-11-06 PROCEDURE — 93005 ELECTROCARDIOGRAM TRACING: CPT | Performed by: STUDENT IN AN ORGANIZED HEALTH CARE EDUCATION/TRAINING PROGRAM

## 2023-11-06 PROCEDURE — 80053 COMPREHEN METABOLIC PANEL: CPT

## 2023-11-06 PROCEDURE — 71045 X-RAY EXAM CHEST 1 VIEW: CPT

## 2023-11-06 PROCEDURE — 36415 COLL VENOUS BLD VENIPUNCTURE: CPT

## 2023-11-06 PROCEDURE — 85025 COMPLETE CBC W/AUTO DIFF WBC: CPT

## 2023-11-06 PROCEDURE — 71275 CT ANGIOGRAPHY CHEST: CPT

## 2023-11-06 PROCEDURE — 84484 ASSAY OF TROPONIN QUANT: CPT

## 2023-11-06 PROCEDURE — 6360000004 HC RX CONTRAST MEDICATION: Performed by: STUDENT IN AN ORGANIZED HEALTH CARE EDUCATION/TRAINING PROGRAM

## 2023-11-06 RX ORDER — DOXYCYCLINE HYCLATE 100 MG
100 TABLET ORAL 2 TIMES DAILY
Qty: 14 TABLET | Refills: 0 | Status: SHIPPED | OUTPATIENT
Start: 2023-11-06 | End: 2023-11-13

## 2023-11-06 RX ADMIN — IOPAMIDOL 100 ML: 755 INJECTION, SOLUTION INTRAVENOUS at 17:18

## 2023-11-06 ASSESSMENT — PAIN SCALES - GENERAL
PAINLEVEL_OUTOF10: 5
PAINLEVEL_OUTOF10: 2

## 2023-11-06 ASSESSMENT — PAIN - FUNCTIONAL ASSESSMENT: PAIN_FUNCTIONAL_ASSESSMENT: 0-10

## 2023-11-06 ASSESSMENT — PAIN DESCRIPTION - LOCATION: LOCATION: EAR

## 2023-11-06 NOTE — ED PROVIDER NOTES
DISCONTINUED MEDICATIONS:  Discharge Medication List as of 11/6/2023  7:06 PM          I am the Primary Clinician of Record. Marvene Krabbe, MD (electronically signed)    (Please note that parts of this dictation were completed with voice recognition software. Quite often unanticipated grammatical, syntax, homophones, and other interpretive errors are inadvertently transcribed by the computer software. Please disregards these errors.  Please excuse any errors that have escaped final proofreading.)     Marvene Krabbe, MD  11/07/23 [id number]

## 2023-11-06 NOTE — ED TRIAGE NOTES
Pt states she was recently DC from this facility on the 28th of October and has had SOB since she was DC. States she was admitted for pneumonia. Pt complaining of SOB, cough, sore throat and fever. States she completed all antibiotics and steroids that she was sent home with. Aroldo LEWIS.

## 2023-11-06 NOTE — DISCHARGE INSTRUCTIONS
Thank you! Thank you for allowing me to care for you in the emergency department. It is my goal to provide you with excellent care. If you have not received excellent quality care, please ask to speak to the nurse manager. Please fill out the survey that will come to you by mail or email since we listen to your feedback! Below you will find a list of your tests from today's visit. Should you have any questions, please do not hesitate to call the emergency department. Labs  Recent Results (from the past 12 hour(s))   CBC with Auto Differential    Collection Time: 11/06/23  4:58 PM   Result Value Ref Range    WBC 12.8 (H) 3.6 - 11.0 K/uL    RBC 4.98 3.80 - 5.20 M/uL    Hemoglobin 15.1 11.5 - 16.0 g/dL    Hematocrit 45.7 35.0 - 47.0 %    MCV 91.8 80.0 - 99.0 FL    MCH 30.3 26.0 - 34.0 PG    MCHC 33.0 30.0 - 36.5 g/dL    RDW 13.6 11.5 - 14.5 %    Platelets 664 708 - 284 K/uL    MPV 9.0 8.9 - 12.9 FL    Nucleated RBCs 0.0 0.0  WBC    nRBC 0.00 0.00 - 0.01 K/uL    Neutrophils % 52 32 - 75 %    Lymphocytes % 40 12 - 49 %    Monocytes % 5 5 - 13 %    Eosinophils % 1 0 - 7 %    Basophils % 1 0 - 1 %    Immature Granulocytes 1 (H) 0 - 0.5 %    Neutrophils Absolute 6.7 1.8 - 8.0 K/UL    Lymphocytes Absolute 5.1 (H) 0.8 - 3.5 K/UL    Monocytes Absolute 0.7 0.0 - 1.0 K/UL    Eosinophils Absolute 0.2 0.0 - 0.4 K/UL    Basophils Absolute 0.1 0.0 - 0.1 K/UL    Absolute Immature Granulocyte 0.1 (H) 0.00 - 0.04 K/UL    Differential Type AUTOMATED         Radiologic Studies  XR CHEST PORTABLE   Final Result      No acute process on portable chest.         CTA CHEST W WO CONTRAST    (Results Pending)     ------------------------------------------------------------------------------------------------------------  The exam and treatment you received in the Emergency Department were for an urgent problem and are not intended as complete care.  It is important that you follow-up with a doctor, nurse practitioner, or

## 2023-11-07 LAB
EKG ATRIAL RATE: 104 BPM
EKG DIAGNOSIS: NORMAL
EKG P AXIS: 31 DEGREES
EKG P-R INTERVAL: 132 MS
EKG Q-T INTERVAL: 326 MS
EKG QRS DURATION: 74 MS
EKG QTC CALCULATION (BAZETT): 428 MS
EKG R AXIS: 13 DEGREES
EKG T AXIS: 40 DEGREES
EKG VENTRICULAR RATE: 104 BPM

## 2023-11-10 ENCOUNTER — HOSPITAL ENCOUNTER (EMERGENCY)
Facility: HOSPITAL | Age: 58
Discharge: HOME | End: 2023-11-10
Attending: STUDENT IN AN ORGANIZED HEALTH CARE EDUCATION/TRAINING PROGRAM
Payer: MEDICARE

## 2023-11-10 VITALS
BODY MASS INDEX: 30.31 KG/M2 | OXYGEN SATURATION: 98 % | WEIGHT: 200 LBS | TEMPERATURE: 98.2 F | RESPIRATION RATE: 18 BRPM | SYSTOLIC BLOOD PRESSURE: 117 MMHG | HEART RATE: 88 BPM | DIASTOLIC BLOOD PRESSURE: 78 MMHG | HEIGHT: 68 IN

## 2023-11-10 DIAGNOSIS — R30.0 DYSURIA: Primary | ICD-10-CM

## 2023-11-10 LAB
ALBUMIN SERPL-MCNC: 3.4 G/DL (ref 3.5–5)
ALBUMIN/GLOB SERPL: 0.9 (ref 1.1–2.2)
ALP SERPL-CCNC: 81 U/L (ref 45–117)
ALT SERPL-CCNC: 19 U/L (ref 12–78)
ANION GAP SERPL CALC-SCNC: 6 MMOL/L (ref 5–15)
APPEARANCE UR: ABNORMAL
AST SERPL W P-5'-P-CCNC: 10 U/L (ref 15–37)
BACTERIA URNS QL MICRO: ABNORMAL /HPF
BASOPHILS # BLD: 0.1 K/UL (ref 0–0.1)
BASOPHILS NFR BLD: 1 % (ref 0–1)
BILIRUB SERPL-MCNC: 0.2 MG/DL (ref 0.2–1)
BILIRUB UR QL: NEGATIVE
BUN SERPL-MCNC: 13 MG/DL (ref 6–20)
BUN/CREAT SERPL: 13 (ref 12–20)
CA-I BLD-MCNC: 8.4 MG/DL (ref 8.5–10.1)
CHLORIDE SERPL-SCNC: 107 MMOL/L (ref 97–108)
CO2 SERPL-SCNC: 25 MMOL/L (ref 21–32)
COLOR UR: ABNORMAL
CREAT SERPL-MCNC: 1 MG/DL (ref 0.55–1.02)
DIFFERENTIAL METHOD BLD: ABNORMAL
EOSINOPHIL # BLD: 0.1 K/UL (ref 0–0.4)
EOSINOPHIL NFR BLD: 1 % (ref 0–7)
EPITH CASTS URNS QL MICRO: ABNORMAL /LPF
ERYTHROCYTE [DISTWIDTH] IN BLOOD BY AUTOMATED COUNT: 13.4 % (ref 11.5–14.5)
GLOBULIN SER CALC-MCNC: 3.6 G/DL (ref 2–4)
GLUCOSE SERPL-MCNC: 89 MG/DL (ref 65–100)
GLUCOSE UR STRIP.AUTO-MCNC: NEGATIVE MG/DL
HCT VFR BLD AUTO: 45.8 % (ref 35–47)
HGB BLD-MCNC: 15 G/DL (ref 11.5–16)
HGB UR QL STRIP: NEGATIVE
IMM GRANULOCYTES # BLD AUTO: 0 K/UL (ref 0–0.04)
IMM GRANULOCYTES NFR BLD AUTO: 0 % (ref 0–0.5)
KETONES UR QL STRIP.AUTO: NEGATIVE MG/DL
LEUKOCYTE ESTERASE UR QL STRIP.AUTO: NEGATIVE
LYMPHOCYTES # BLD: 4.8 K/UL (ref 0.8–3.5)
LYMPHOCYTES NFR BLD: 49 % (ref 12–49)
MCH RBC QN AUTO: 30.2 PG (ref 26–34)
MCHC RBC AUTO-ENTMCNC: 32.8 G/DL (ref 30–36.5)
MCV RBC AUTO: 92.3 FL (ref 80–99)
MONOCYTES # BLD: 0.5 K/UL (ref 0–1)
MONOCYTES NFR BLD: 5 % (ref 5–13)
MUCOUS THREADS URNS QL MICRO: ABNORMAL /LPF
NEUTS SEG # BLD: 4.3 K/UL (ref 1.8–8)
NEUTS SEG NFR BLD: 44 % (ref 32–75)
NITRITE UR QL STRIP.AUTO: NEGATIVE
NRBC # BLD: 0 K/UL (ref 0–0.01)
NRBC BLD-RTO: 0 PER 100 WBC
PH UR STRIP: 5 (ref 5–8)
PLATELET # BLD AUTO: 305 K/UL (ref 150–400)
PMV BLD AUTO: 8.8 FL (ref 8.9–12.9)
POTASSIUM SERPL-SCNC: 3.6 MMOL/L (ref 3.5–5.1)
PROT SERPL-MCNC: 7 G/DL (ref 6.4–8.2)
PROT UR STRIP-MCNC: 30 MG/DL
RBC # BLD AUTO: 4.96 M/UL (ref 3.8–5.2)
RBC #/AREA URNS HPF: ABNORMAL /HPF (ref 0–5)
SODIUM SERPL-SCNC: 138 MMOL/L (ref 136–145)
SP GR UR REFRACTOMETRY: 1.02 (ref 1–1.03)
UROBILINOGEN UR QL STRIP.AUTO: 0.1 EU/DL (ref 0.1–1)
WBC # BLD AUTO: 9.7 K/UL (ref 3.6–11)
WBC URNS QL MICRO: ABNORMAL /HPF (ref 0–4)

## 2023-11-10 PROCEDURE — 81001 URINALYSIS AUTO W/SCOPE: CPT

## 2023-11-10 PROCEDURE — 96374 THER/PROPH/DIAG INJ IV PUSH: CPT

## 2023-11-10 PROCEDURE — 99284 EMERGENCY DEPT VISIT MOD MDM: CPT

## 2023-11-10 PROCEDURE — 85025 COMPLETE CBC W/AUTO DIFF WBC: CPT

## 2023-11-10 PROCEDURE — 6360000002 HC RX W HCPCS: Performed by: STUDENT IN AN ORGANIZED HEALTH CARE EDUCATION/TRAINING PROGRAM

## 2023-11-10 PROCEDURE — 80053 COMPREHEN METABOLIC PANEL: CPT

## 2023-11-10 RX ORDER — ONDANSETRON 2 MG/ML
4 INJECTION INTRAMUSCULAR; INTRAVENOUS ONCE
Status: COMPLETED | OUTPATIENT
Start: 2023-11-10 | End: 2023-11-10

## 2023-11-10 RX ORDER — OXYBUTYNIN CHLORIDE 5 MG/1
5 TABLET, EXTENDED RELEASE ORAL DAILY
Qty: 30 TABLET | Refills: 3 | Status: SHIPPED | OUTPATIENT
Start: 2023-11-10

## 2023-11-10 RX ADMIN — ONDANSETRON 4 MG: 2 INJECTION INTRAMUSCULAR; INTRAVENOUS at 13:13

## 2023-11-10 ASSESSMENT — PAIN - FUNCTIONAL ASSESSMENT
PAIN_FUNCTIONAL_ASSESSMENT: NONE - DENIES PAIN
PAIN_FUNCTIONAL_ASSESSMENT: 0-10

## 2023-11-10 ASSESSMENT — PAIN SCALES - GENERAL: PAINLEVEL_OUTOF10: 6

## 2023-11-10 NOTE — ED TRIAGE NOTES
Pt arrives to ED by self. Pt reports she was recently discharged for UTI and PNA. Pt reports she is having trouble voiding now due to burning and is experiencing lower back pain with vomiting now too.

## 2023-11-10 NOTE — DISCHARGE INSTRUCTIONS
Absolute 4.8 (H) 0.8 - 3.5 K/UL    Monocytes Absolute 0.5 0.0 - 1.0 K/UL    Eosinophils Absolute 0.1 0.0 - 0.4 K/UL    Basophils Absolute 0.1 0.0 - 0.1 K/UL    Absolute Immature Granulocyte 0.0 0.00 - 0.04 K/UL    Differential Type AUTOMATED     Comprehensive Metabolic Panel    Collection Time: 11/10/23 12:53 PM   Result Value Ref Range    Sodium 138 136 - 145 mmol/L    Potassium 3.6 3.5 - 5.1 mmol/L    Chloride 107 97 - 108 mmol/L    CO2 25 21 - 32 mmol/L    Anion Gap 6 5 - 15 mmol/L    Glucose 89 65 - 100 mg/dL    BUN 13 6 - 20 mg/dL    Creatinine 1.00 0.55 - 1.02 mg/dL    Bun/Cre Ratio 13 12 - 20      Est, Glom Filt Rate >60 >60 ml/min/1.73m2    Calcium 8.4 (L) 8.5 - 10.1 mg/dL    Total Bilirubin 0.2 0.2 - 1.0 mg/dL    AST 10 (L) 15 - 37 U/L    ALT 19 12 - 78 U/L    Alk Phosphatase 81 45 - 117 U/L    Total Protein 7.0 6.4 - 8.2 g/dL    Albumin 3.4 (L) 3.5 - 5.0 g/dL    Globulin 3.6 2.0 - 4.0 g/dL    Albumin/Globulin Ratio 0.9 (L) 1.1 - 2.2         Radiologic Studies  No orders to display     ------------------------------------------------------------------------------------------------------------  The exam and treatment you received in the Emergency Department were for an urgent problem and are not intended as complete care. It is important that you follow-up with a doctor, nurse practitioner, or physician assistant to:  (1) confirm your diagnosis,  (2) re-evaluation of changes in your illness and treatment, and  (3) for ongoing care. Please take your discharge instructions with you when you go to your follow-up appointment. If you have any problem arranging a follow-up appointment, contact the Emergency Department. If your symptoms become worse or you do not improve as expected and you are unable to reach your health care provider, please return to the Emergency Department. We are available 24 hours a day.      If a prescription has been provided, please have it filled as soon as possible to prevent a delay in treatment. If you have any questions or reservations about taking the medication due to side effects or interactions with other medications, please call your primary care provider or contact the ER.

## 2023-11-10 NOTE — ED PROVIDER NOTES
[facility] EMERGENCY DEPT  EMERGENCY DEPARTMENT HISTORY AND PHYSICAL EXAM      Date: 11/10/2023  Patient Name: Roberta Hernandes  MRN: [id number]  [address] 1965  Date of evaluation: 11/10/2023  Provider: Miles Hanson MD   Note Started: 1:32 PM EST 11/10/23    HISTORY OF PRESENT ILLNESS     Chief Complaint   Patient presents with    Nausea & Vomiting    Urinary Frequency       History Provided By: Patient    HPI: Roberta Hernandes is a 62 y.o. female with recent hospitalization of pneumonia, also diagnosed with UTI, on p.o. antibiotics, presents with urinary complaints. Patient reports 2 days of burning dysuria and right flank discomfort. She has some nausea, but no vomiting. No fevers. No abdominal pain. PAST MEDICAL HISTORY   Past Medical History:  Past Medical History:   Diagnosis Date    DDD (degenerative disc disease), lumbar     Fatigue     GERD (gastroesophageal reflux disease)     Hypothyroidism     Psychiatric disorder     PTSD per patient    Seizures ([address])        Past Surgical History:  Past Surgical History:   Procedure Laterality Date    APPENDECTOMY       SECTION      CHOLECYSTECTOMY      HYSTERECTOMY (CERVIX STATUS UNKNOWN)         Family History:  No family history on file. Social History:  Social History     Tobacco Use    Smoking status: Every Day     Packs/day: .5     Types: Cigarettes    Smokeless tobacco: Never   Substance Use Topics    Alcohol use: Not Currently     Alcohol/week: 0.0 standard drinks of alcohol    Drug use: Never       Allergies: Allergies   Allergen Reactions    Amoxicillin-Pot Clavulanate Nausea And Vomiting       PCP: No primary care provider on file. Current Meds:   No current facility-administered medications for this encounter.      Current Outpatient Medications   Medication Sig Dispense Refill    oxybutynin (DITROPAN XL) 5 MG extended release tablet Take 1 tablet by mouth daily 30 tablet 3    doxycycline hyclate (VIBRA-TABS) 100 MG tablet Take 1

## 2023-11-10 NOTE — ED NOTES
Pt d/c with instructions given. Pt verbalized understanding. Pt d/c to waiting room and is ambulatory without assistance and into awaiting car.          Tor Rivera RN  11/10/23 2689

## 2023-12-28 ENCOUNTER — HOSPITAL ENCOUNTER (EMERGENCY)
Facility: HOSPITAL | Age: 58
Discharge: HOME | End: 2023-12-28
Payer: MEDICARE

## 2023-12-28 VITALS
TEMPERATURE: 98.3 F | OXYGEN SATURATION: 100 % | WEIGHT: 200 LBS | BODY MASS INDEX: 31.39 KG/M2 | RESPIRATION RATE: 19 BRPM | HEIGHT: 67 IN | DIASTOLIC BLOOD PRESSURE: 86 MMHG | SYSTOLIC BLOOD PRESSURE: 132 MMHG | HEART RATE: 79 BPM

## 2023-12-28 DIAGNOSIS — J06.9 VIRAL URI WITH COUGH: Primary | ICD-10-CM

## 2023-12-28 PROCEDURE — 99283 EMERGENCY DEPT VISIT LOW MDM: CPT

## 2023-12-28 PROCEDURE — 93005 ELECTROCARDIOGRAM TRACING: CPT

## 2023-12-28 RX ORDER — PREDNISONE 20 MG/1
40 TABLET ORAL DAILY
Qty: 10 TABLET | Refills: 0 | Status: SHIPPED | OUTPATIENT
Start: 2023-12-28 | End: 2024-01-02

## 2023-12-28 RX ORDER — GUAIFENESIN 600 MG/1
600 TABLET, EXTENDED RELEASE ORAL 2 TIMES DAILY
Qty: 20 TABLET | Refills: 0 | Status: SHIPPED | OUTPATIENT
Start: 2023-12-28

## 2023-12-28 RX ORDER — ALBUTEROL SULFATE 90 UG/1
2 AEROSOL, METERED RESPIRATORY (INHALATION) 4 TIMES DAILY PRN
Qty: 18 G | Refills: 0 | Status: SHIPPED | OUTPATIENT
Start: 2023-12-28

## 2023-12-28 RX ORDER — DEXTROMETHORPHAN HYDROBROMIDE AND PROMETHAZINE HYDROCHLORIDE 15; 6.25 MG/5ML; MG/5ML
5 SYRUP ORAL 4 TIMES DAILY PRN
Qty: 118 ML | Refills: 0 | Status: SHIPPED | OUTPATIENT
Start: 2023-12-28 | End: 2024-01-04

## 2023-12-28 ASSESSMENT — PAIN - FUNCTIONAL ASSESSMENT: PAIN_FUNCTIONAL_ASSESSMENT: NONE - DENIES PAIN

## 2023-12-29 LAB
EKG ATRIAL RATE: 73 BPM
EKG DIAGNOSIS: NORMAL
EKG P AXIS: 22 DEGREES
EKG P-R INTERVAL: 134 MS
EKG Q-T INTERVAL: 372 MS
EKG QRS DURATION: 78 MS
EKG QTC CALCULATION (BAZETT): 409 MS
EKG R AXIS: 42 DEGREES
EKG T AXIS: 44 DEGREES
EKG VENTRICULAR RATE: 73 BPM

## 2024-01-01 NOTE — ED PROVIDER NOTES
software. Please disregards these errors. Please excuse any errors that have escaped final proofreading.)     Mica Peng APRN - NP  01/01/24 0887

## 2024-01-18 ENCOUNTER — HOSPITAL ENCOUNTER (EMERGENCY)
Facility: HOSPITAL | Age: 59
Discharge: HOME | End: 2024-01-18
Payer: MEDICARE

## 2024-01-18 VITALS
WEIGHT: 200 LBS | HEIGHT: 68 IN | DIASTOLIC BLOOD PRESSURE: 70 MMHG | SYSTOLIC BLOOD PRESSURE: 128 MMHG | HEART RATE: 100 BPM | BODY MASS INDEX: 30.31 KG/M2 | OXYGEN SATURATION: 94 % | TEMPERATURE: 100.8 F | RESPIRATION RATE: 15 BRPM

## 2024-01-18 DIAGNOSIS — U07.1 COVID-19: Primary | ICD-10-CM

## 2024-01-18 LAB
FLUAV AG NPH QL IA: NEGATIVE
FLUBV AG NOSE QL IA: NEGATIVE
SARS-COV-2 RDRP RESP QL NAA+PROBE: DETECTED

## 2024-01-18 PROCEDURE — 6370000000 HC RX 637 (ALT 250 FOR IP)

## 2024-01-18 PROCEDURE — 99283 EMERGENCY DEPT VISIT LOW MDM: CPT

## 2024-01-18 PROCEDURE — 87804 INFLUENZA ASSAY W/OPTIC: CPT

## 2024-01-18 PROCEDURE — 87635 SARS-COV-2 COVID-19 AMP PRB: CPT

## 2024-01-18 RX ORDER — ACETAMINOPHEN 500 MG
1000 TABLET ORAL 3 TIMES DAILY PRN
Qty: 30 TABLET | Refills: 0 | Status: SHIPPED | OUTPATIENT
Start: 2024-01-18 | End: 2024-01-28

## 2024-01-18 RX ORDER — IBUPROFEN 200 MG
600 TABLET ORAL
Status: COMPLETED | OUTPATIENT
Start: 2024-01-18 | End: 2024-01-18

## 2024-01-18 RX ORDER — PREDNISONE 20 MG/1
40 TABLET ORAL DAILY
Qty: 10 TABLET | Refills: 0 | Status: SHIPPED | OUTPATIENT
Start: 2024-01-18 | End: 2024-01-23

## 2024-01-18 RX ORDER — ONDANSETRON 4 MG/1
4 TABLET, ORALLY DISINTEGRATING ORAL ONCE
Status: COMPLETED | OUTPATIENT
Start: 2024-01-18 | End: 2024-01-18

## 2024-01-18 RX ORDER — ONDANSETRON 4 MG/1
4 TABLET, ORALLY DISINTEGRATING ORAL 3 TIMES DAILY PRN
Qty: 21 TABLET | Refills: 0 | Status: SHIPPED | OUTPATIENT
Start: 2024-01-18

## 2024-01-18 RX ADMIN — IBUPROFEN 600 MG: 200 TABLET, FILM COATED ORAL at 13:07

## 2024-01-18 RX ADMIN — ONDANSETRON 4 MG: 4 TABLET, ORALLY DISINTEGRATING ORAL at 13:06

## 2024-01-18 ASSESSMENT — PAIN SCALES - GENERAL
PAINLEVEL_OUTOF10: 8
PAINLEVEL_OUTOF10: 8

## 2024-01-18 ASSESSMENT — PAIN - FUNCTIONAL ASSESSMENT: PAIN_FUNCTIONAL_ASSESSMENT: 0-10

## 2024-01-18 NOTE — ED TRIAGE NOTES
C/o fever despite taking medications and weakness x 3 days    Last took tylenol approx 1 hour ago.

## 2024-01-18 NOTE — ED PROVIDER NOTES
(PAXLOVID) 20 x 150 MG & 10 x 100MG TBPK Take 3 tablets (two 150 mg nirmatrelvir and one 100 mg ritonavir tablets) by mouth every 12 hours for 5 days. 30 tablet 0    predniSONE (DELTASONE) 20 MG tablet Take 2 tablets by mouth daily for 5 days 10 tablet 0    acetaminophen (TYLENOL) 500 MG tablet Take 2 tablets by mouth 3 times daily as needed for Pain 30 tablet 0    ondansetron (ZOFRAN-ODT) 4 MG disintegrating tablet Take 1 tablet by mouth 3 times daily as needed for Nausea or Vomiting 21 tablet 0    guaiFENesin (MUCINEX) 600 MG extended release tablet Take 1 tablet by mouth 2 times daily 20 tablet 0    albuterol sulfate HFA (VENTOLIN HFA) 108 (90 Base) MCG/ACT inhaler Inhale 2 puffs into the lungs 4 times daily as needed for Wheezing 18 g 0    oxybutynin (DITROPAN XL) 5 MG extended release tablet Take 1 tablet by mouth daily 30 tablet 3    benzonatate (TESSALON) 100 MG capsule Take 1 capsule by mouth 3 times daily as needed for Cough 15 capsule 0    guaiFENesin (MUCINEX) 600 MG extended release tablet Take 1 tablet by mouth 2 times daily 10 tablet 0    clonazePAM (KLONOPIN) 0.5 MG tablet Take 1 tablet by mouth 3 times daily.      gabapentin (NEURONTIN) 600 MG tablet Take 1 tablet by mouth 4 times daily.      PARoxetine (PAXIL) 20 MG tablet Take 1 tablet by mouth daily      QUEtiapine (SEROQUEL) 300 MG tablet Take 1 tablet by mouth nightly         Social Determinants of Health:   Social Determinants of Health     Tobacco Use: High Risk (1/18/2024)    Patient History     Smoking Tobacco Use: Every Day     Smokeless Tobacco Use: Never     Passive Exposure: Not on file   Alcohol Use: Not At Risk (1/18/2024)    AUDIT-C     Frequency of Alcohol Consumption: Never     Average Number of Drinks: Patient does not drink     Frequency of Binge Drinking: Never   Financial Resource Strain: Not on file   Food Insecurity: Not on file   Transportation Needs: Not on file   Physical Activity: Not on file   Stress: Not on file   Social

## 2024-02-27 ENCOUNTER — HOSPITAL ENCOUNTER (OUTPATIENT)
Facility: HOSPITAL | Age: 59
Discharge: HOME | End: 2024-02-29
Payer: MEDICARE

## 2024-02-27 ENCOUNTER — TRANSCRIBE ORDERS (OUTPATIENT)
Facility: HOSPITAL | Age: 59
End: 2024-02-27

## 2024-02-27 DIAGNOSIS — R00.0 TACHYCARDIA: Primary | ICD-10-CM

## 2024-02-27 DIAGNOSIS — R00.0 TACHYCARDIA: ICD-10-CM

## 2024-02-27 LAB
EKG ATRIAL RATE: 99 BPM
EKG DIAGNOSIS: NORMAL
EKG P AXIS: 31 DEGREES
EKG P-R INTERVAL: 132 MS
EKG Q-T INTERVAL: 336 MS
EKG QRS DURATION: 76 MS
EKG QTC CALCULATION (BAZETT): 431 MS
EKG R AXIS: 58 DEGREES
EKG T AXIS: 42 DEGREES
EKG VENTRICULAR RATE: 99 BPM

## 2024-02-27 PROCEDURE — 93005 ELECTROCARDIOGRAM TRACING: CPT

## 2024-03-30 ENCOUNTER — APPOINTMENT (OUTPATIENT)
Facility: HOSPITAL | Age: 59
End: 2024-03-30
Payer: MEDICARE

## 2024-03-30 ENCOUNTER — HOSPITAL ENCOUNTER (EMERGENCY)
Facility: HOSPITAL | Age: 59
Discharge: HOME | End: 2024-03-30
Payer: MEDICARE

## 2024-03-30 VITALS
SYSTOLIC BLOOD PRESSURE: 124 MMHG | HEIGHT: 68 IN | DIASTOLIC BLOOD PRESSURE: 82 MMHG | HEART RATE: 85 BPM | RESPIRATION RATE: 19 BRPM | OXYGEN SATURATION: 94 % | BODY MASS INDEX: 30.31 KG/M2 | TEMPERATURE: 98.1 F | WEIGHT: 200 LBS

## 2024-03-30 DIAGNOSIS — K52.9 COLITIS: Primary | ICD-10-CM

## 2024-03-30 LAB
ABO + RH BLD: NORMAL
ALBUMIN SERPL-MCNC: 3.3 G/DL (ref 3.5–5)
ALBUMIN/GLOB SERPL: 1.1 (ref 1.1–2.2)
ALP SERPL-CCNC: 68 U/L (ref 45–117)
ALT SERPL-CCNC: 13 U/L (ref 12–78)
ANION GAP SERPL CALC-SCNC: 4 MMOL/L (ref 5–15)
AST SERPL W P-5'-P-CCNC: 10 U/L (ref 15–37)
BASOPHILS # BLD: 0 K/UL (ref 0–0.1)
BASOPHILS NFR BLD: 1 % (ref 0–1)
BILIRUB SERPL-MCNC: 0.2 MG/DL (ref 0.2–1)
BLOOD GROUP ANTIBODIES SERPL: NEGATIVE
BUN SERPL-MCNC: 12 MG/DL (ref 6–20)
BUN/CREAT SERPL: 14 (ref 12–20)
CA-I BLD-MCNC: 9.1 MG/DL (ref 8.5–10.1)
CHLORIDE SERPL-SCNC: 109 MMOL/L (ref 97–108)
CO2 SERPL-SCNC: 29 MMOL/L (ref 21–32)
COLLECT DATE STL: NORMAL
CREAT SERPL-MCNC: 0.84 MG/DL (ref 0.55–1.02)
DIFFERENTIAL METHOD BLD: NORMAL
EOSINOPHIL # BLD: 0.1 K/UL (ref 0–0.4)
EOSINOPHIL NFR BLD: 1 % (ref 0–7)
ERYTHROCYTE [DISTWIDTH] IN BLOOD BY AUTOMATED COUNT: 13.7 % (ref 11.5–14.5)
GLOBULIN SER CALC-MCNC: 2.9 G/DL (ref 2–4)
GLUCOSE SERPL-MCNC: 111 MG/DL (ref 65–100)
HCT VFR BLD AUTO: 43.1 % (ref 35–47)
HEMOCCULT SP1 STL QL: NEGATIVE
HGB BLD-MCNC: 14.6 G/DL (ref 11.5–16)
IMM GRANULOCYTES # BLD AUTO: 0 K/UL (ref 0–0.04)
IMM GRANULOCYTES NFR BLD AUTO: 0 % (ref 0–0.5)
INR PPP: 0.9 (ref 0.9–1.1)
LYMPHOCYTES # BLD: 2.7 K/UL (ref 0.8–3.5)
LYMPHOCYTES NFR BLD: 35 % (ref 12–49)
MCH RBC QN AUTO: 30.2 PG (ref 26–34)
MCHC RBC AUTO-ENTMCNC: 33.9 G/DL (ref 30–36.5)
MCV RBC AUTO: 89.2 FL (ref 80–99)
MONOCYTES # BLD: 0.4 K/UL (ref 0–1)
MONOCYTES NFR BLD: 6 % (ref 5–13)
NEUTS SEG # BLD: 4.4 K/UL (ref 1.8–8)
NEUTS SEG NFR BLD: 57 % (ref 32–75)
NRBC # BLD: 0 K/UL (ref 0–0.01)
NRBC BLD-RTO: 0 PER 100 WBC
PLATELET # BLD AUTO: 319 K/UL (ref 150–400)
PMV BLD AUTO: 9.5 FL (ref 8.9–12.9)
POTASSIUM SERPL-SCNC: 3.9 MMOL/L (ref 3.5–5.1)
PROT SERPL-MCNC: 6.2 G/DL (ref 6.4–8.2)
PROTHROMBIN TIME: 13.1 SEC (ref 11.9–14.6)
RBC # BLD AUTO: 4.83 M/UL (ref 3.8–5.2)
SODIUM SERPL-SCNC: 142 MMOL/L (ref 136–145)
SPECIMEN EXP DATE BLD: NORMAL
WBC # BLD AUTO: 7.6 K/UL (ref 3.6–11)

## 2024-03-30 PROCEDURE — 96375 TX/PRO/DX INJ NEW DRUG ADDON: CPT

## 2024-03-30 PROCEDURE — 85610 PROTHROMBIN TIME: CPT

## 2024-03-30 PROCEDURE — 86900 BLOOD TYPING SEROLOGIC ABO: CPT

## 2024-03-30 PROCEDURE — 82272 OCCULT BLD FECES 1-3 TESTS: CPT

## 2024-03-30 PROCEDURE — 99285 EMERGENCY DEPT VISIT HI MDM: CPT

## 2024-03-30 PROCEDURE — 86901 BLOOD TYPING SEROLOGIC RH(D): CPT

## 2024-03-30 PROCEDURE — 6360000002 HC RX W HCPCS: Performed by: NURSE PRACTITIONER

## 2024-03-30 PROCEDURE — 6360000004 HC RX CONTRAST MEDICATION: Performed by: NURSE PRACTITIONER

## 2024-03-30 PROCEDURE — 85025 COMPLETE CBC W/AUTO DIFF WBC: CPT

## 2024-03-30 PROCEDURE — 36415 COLL VENOUS BLD VENIPUNCTURE: CPT

## 2024-03-30 PROCEDURE — 94760 N-INVAS EAR/PLS OXIMETRY 1: CPT

## 2024-03-30 PROCEDURE — 74178 CT ABD&PLV WO CNTR FLWD CNTR: CPT

## 2024-03-30 PROCEDURE — 96374 THER/PROPH/DIAG INJ IV PUSH: CPT

## 2024-03-30 PROCEDURE — 2580000003 HC RX 258: Performed by: NURSE PRACTITIONER

## 2024-03-30 PROCEDURE — 80053 COMPREHEN METABOLIC PANEL: CPT

## 2024-03-30 PROCEDURE — 96361 HYDRATE IV INFUSION ADD-ON: CPT

## 2024-03-30 PROCEDURE — 86850 RBC ANTIBODY SCREEN: CPT

## 2024-03-30 RX ORDER — MORPHINE SULFATE 4 MG/ML
4 INJECTION, SOLUTION INTRAMUSCULAR; INTRAVENOUS
Status: COMPLETED | OUTPATIENT
Start: 2024-03-30 | End: 2024-03-30

## 2024-03-30 RX ORDER — 0.9 % SODIUM CHLORIDE 0.9 %
1000 INTRAVENOUS SOLUTION INTRAVENOUS
Status: COMPLETED | OUTPATIENT
Start: 2024-03-30 | End: 2024-03-30

## 2024-03-30 RX ORDER — ONDANSETRON 4 MG/1
4 TABLET, ORALLY DISINTEGRATING ORAL 3 TIMES DAILY PRN
Qty: 21 TABLET | Refills: 0 | Status: SHIPPED | OUTPATIENT
Start: 2024-03-30

## 2024-03-30 RX ORDER — ONDANSETRON 2 MG/ML
4 INJECTION INTRAMUSCULAR; INTRAVENOUS ONCE
Status: COMPLETED | OUTPATIENT
Start: 2024-03-30 | End: 2024-03-30

## 2024-03-30 RX ORDER — AMOXICILLIN AND CLAVULANATE POTASSIUM 875; 125 MG/1; MG/1
1 TABLET, FILM COATED ORAL 2 TIMES DAILY
Qty: 14 TABLET | Refills: 0 | Status: SHIPPED | OUTPATIENT
Start: 2024-03-30 | End: 2024-04-06

## 2024-03-30 RX ORDER — KETOROLAC TROMETHAMINE 30 MG/ML
30 INJECTION, SOLUTION INTRAMUSCULAR; INTRAVENOUS
Status: COMPLETED | OUTPATIENT
Start: 2024-03-30 | End: 2024-03-30

## 2024-03-30 RX ORDER — PROCHLORPERAZINE EDISYLATE 5 MG/ML
10 INJECTION INTRAMUSCULAR; INTRAVENOUS ONCE
Status: COMPLETED | OUTPATIENT
Start: 2024-03-30 | End: 2024-03-30

## 2024-03-30 RX ORDER — DICYCLOMINE HCL 20 MG
20 TABLET ORAL 4 TIMES DAILY
Qty: 30 TABLET | Refills: 0 | Status: SHIPPED | OUTPATIENT
Start: 2024-03-30

## 2024-03-30 RX ADMIN — SODIUM CHLORIDE 1000 ML: 9 INJECTION, SOLUTION INTRAVENOUS at 12:20

## 2024-03-30 RX ADMIN — ONDANSETRON 4 MG: 2 INJECTION INTRAMUSCULAR; INTRAVENOUS at 12:12

## 2024-03-30 RX ADMIN — KETOROLAC TROMETHAMINE 30 MG: 30 INJECTION, SOLUTION INTRAMUSCULAR at 15:48

## 2024-03-30 RX ADMIN — IOPAMIDOL 100 ML: 755 INJECTION, SOLUTION INTRAVENOUS at 13:34

## 2024-03-30 RX ADMIN — PROCHLORPERAZINE EDISYLATE 10 MG: 5 INJECTION INTRAMUSCULAR; INTRAVENOUS at 15:48

## 2024-03-30 RX ADMIN — MORPHINE SULFATE 4 MG: 4 INJECTION, SOLUTION INTRAMUSCULAR; INTRAVENOUS at 12:12

## 2024-03-30 ASSESSMENT — PAIN SCALES - GENERAL
PAINLEVEL_OUTOF10: 9
PAINLEVEL_OUTOF10: 4
PAINLEVEL_OUTOF10: 6

## 2024-03-30 ASSESSMENT — PAIN DESCRIPTION - LOCATION
LOCATION: ABDOMEN

## 2024-03-30 ASSESSMENT — PAIN DESCRIPTION - DESCRIPTORS: DESCRIPTORS: ACHING

## 2024-03-30 ASSESSMENT — PAIN - FUNCTIONAL ASSESSMENT: PAIN_FUNCTIONAL_ASSESSMENT: 0-10

## 2024-03-30 ASSESSMENT — PAIN DESCRIPTION - PAIN TYPE: TYPE: ACUTE PAIN

## 2024-03-30 NOTE — ED TRIAGE NOTES
[facility] 15 pt stated that she his having increasing left sided ABD pain along with dark tarry stool; pt stated that these suymtpoms started yesterday morning

## 2024-03-30 NOTE — ED PROVIDER NOTES
images are visualized and preliminarily interpreted by the ED Physician with the following findings: {Wet Read interpretation:95095}    Interpretation per the Radiologist below, if available at the time of this note:  CT ABDOMEN PELVIS W WO CONTRAST Additional Contrast? None    (Results Pending)        ED COURSE and DIFFERENTIAL DIAGNOSIS/MDM   3:26 PM Differential and Considerations: ***    Records Reviewed (source and summary of external notes): Prior medical records and Nursing notes    Vitals:    Vitals:    03/30/24 1106   BP: 129/79   Pulse: 93   Resp: 16   Temp: 98.6 °F (37 °C)   TempSrc: Oral   SpO2: 96%   Weight: 90.7 kg (200 lb)   Height: 1.727 m (5' 8\")        ED COURSE       SEPSIS Reassessment: {Sepsis reassessment smartlist:21220}    Clinical Management Tools:  {CMT List:69219::\"Not Applicable\"}    Patient was given the following medications:  Medications   morphine (PF) injection 4 mg (4 mg IntraVENous Given 3/30/24 1212)   ondansetron (ZOFRAN) injection 4 mg (4 mg IntraVENous Given 3/30/24 1212)   sodium chloride 0.9 % bolus 1,000 mL (1,000 mLs IntraVENous New Bag 3/30/24 1220)   iopamidol (ISOVUE-370) 76 % injection 100 mL (100 mLs IntraVENous Given 3/30/24 1334)       CONSULTS: See ED Course/MDM for further details.  None     Social Determinants affecting Diagnosis/Treatment: {Social Determinants:67368}    Smoking Cessation: {smoking cessation smartlist:29974}    PROCEDURES   Unless otherwise noted above, none.  Procedures      CRITICAL CARE TIME   {critical care smartlist:41723}    ED IMPRESSION   No diagnosis found.      DISPOSITION/PLAN   DISPOSITION      {ED Dispositions:13149}     PATIENT REFERRED TO:  No follow-up provider specified.      DISCHARGE MEDICATIONS:     Medication List        ASK your doctor about these medications      acetaminophen 500 MG tablet  Commonly known as: TYLENOL  Take 2 tablets by mouth 3 times daily as needed for Pain     albuterol sulfate  (90 Base) MCG/ACT        CONSULTS: See ED Course/MDM for further details.  None     Social Determinants affecting Diagnosis/Treatment: None    Smoking Cessation: Not Applicable    PROCEDURES   Unless otherwise noted above, none.  Procedures      CRITICAL CARE TIME   Patient does not meet Critical Care Time, 0 minutes    ED IMPRESSION     1. Colitis          DISPOSITION/PLAN   DISPOSITION Decision To Discharge 03/30/2024 04:24:32 PM    Discharge Note: The patient is stable for discharge home. The signs, symptoms, diagnosis, and discharge instructions have been discussed, understanding conveyed, and agreed upon. The patient is to follow up as recommended or return to ER should their symptoms worsen.      PATIENT REFERRED TO:  Francesca Ramires MD  [address] [place], [place] [id number] [phone]              DISCHARGE MEDICATIONS:     Medication List        START taking these medications      amoxicillin-clavulanate 875-125 MG per tablet  Commonly known as: AUGMENTIN  Take 1 tablet by mouth 2 times daily for 7 days     dicyclomine 20 MG tablet  Commonly known as: BENTYL  Take 1 tablet by mouth 4 times daily            CHANGE how you take these medications      * ondansetron 4 MG disintegrating tablet  Commonly known as: ZOFRAN-ODT  Take 1 tablet by mouth 3 times daily as needed for Nausea or Vomiting  What changed: Another medication with the same name was added. Make sure you understand how and when to take each.     * ondansetron 4 MG disintegrating tablet  Commonly known as: ZOFRAN-ODT  Take 1 tablet by mouth 3 times daily as needed for Nausea or Vomiting  What changed: You were already taking a medication with the same name, and this prescription was added. Make sure you understand how and when to take each.           * This list has 2 medication(s) that are the same as other medications prescribed for you. Read the directions carefully, and ask your doctor or other care provider to review them with you.                ASK

## 2024-04-09 ENCOUNTER — APPOINTMENT (OUTPATIENT)
Facility: HOSPITAL | Age: 59
End: 2024-04-09
Payer: MEDICARE

## 2024-04-09 ENCOUNTER — HOSPITAL ENCOUNTER (EMERGENCY)
Facility: HOSPITAL | Age: 59
Discharge: HOME | End: 2024-04-09
Payer: MEDICARE

## 2024-04-09 VITALS
OXYGEN SATURATION: 98 % | RESPIRATION RATE: 18 BRPM | TEMPERATURE: 98.1 F | DIASTOLIC BLOOD PRESSURE: 74 MMHG | SYSTOLIC BLOOD PRESSURE: 114 MMHG | HEART RATE: 80 BPM

## 2024-04-09 DIAGNOSIS — R30.0 DYSURIA: ICD-10-CM

## 2024-04-09 DIAGNOSIS — R10.84 GENERALIZED ABDOMINAL PAIN: Primary | ICD-10-CM

## 2024-04-09 LAB
ALBUMIN SERPL-MCNC: 3.6 G/DL (ref 3.5–5)
ALBUMIN/GLOB SERPL: 0.9 (ref 1.1–2.2)
ALP SERPL-CCNC: 78 U/L (ref 45–117)
ALT SERPL-CCNC: 21 U/L (ref 12–78)
ANION GAP SERPL CALC-SCNC: 1 MMOL/L (ref 5–15)
APPEARANCE UR: CLEAR
AST SERPL W P-5'-P-CCNC: 21 U/L (ref 15–37)
BACTERIA URNS QL MICRO: NEGATIVE /HPF
BASOPHILS # BLD: 0.1 K/UL (ref 0–0.1)
BASOPHILS NFR BLD: 1 % (ref 0–1)
BILIRUB SERPL-MCNC: 0.4 MG/DL (ref 0.2–1)
BILIRUB UR QL: NEGATIVE
BUN SERPL-MCNC: 20 MG/DL (ref 6–20)
BUN/CREAT SERPL: 19 (ref 12–20)
CA-I BLD-MCNC: 9.2 MG/DL (ref 8.5–10.1)
CHLORIDE SERPL-SCNC: 106 MMOL/L (ref 97–108)
CLUE CELLS VAG QL WET PREP: NORMAL
CO2 SERPL-SCNC: 29 MMOL/L (ref 21–32)
COLOR UR: ABNORMAL
CREAT SERPL-MCNC: 1.07 MG/DL (ref 0.55–1.02)
DIFFERENTIAL METHOD BLD: ABNORMAL
EOSINOPHIL # BLD: 0.1 K/UL (ref 0–0.4)
EOSINOPHIL NFR BLD: 1 % (ref 0–7)
EPITH CASTS URNS QL MICRO: ABNORMAL /LPF
ERYTHROCYTE [DISTWIDTH] IN BLOOD BY AUTOMATED COUNT: 13.4 % (ref 11.5–14.5)
GLOBULIN SER CALC-MCNC: 4.2 G/DL (ref 2–4)
GLUCOSE SERPL-MCNC: 93 MG/DL (ref 65–100)
GLUCOSE UR STRIP.AUTO-MCNC: NEGATIVE MG/DL
HCT VFR BLD AUTO: 43.7 % (ref 35–47)
HGB BLD-MCNC: 14.2 G/DL (ref 11.5–16)
HGB UR QL STRIP: NEGATIVE
IMM GRANULOCYTES # BLD AUTO: 0 K/UL (ref 0–0.04)
IMM GRANULOCYTES NFR BLD AUTO: 0 % (ref 0–0.5)
KETONES UR QL STRIP.AUTO: NEGATIVE MG/DL
LEUKOCYTE ESTERASE UR QL STRIP.AUTO: ABNORMAL
LIPASE SERPL-CCNC: 26 U/L (ref 13–75)
LYMPHOCYTES # BLD: 3.6 K/UL (ref 0.8–3.5)
LYMPHOCYTES NFR BLD: 44 % (ref 12–49)
MCH RBC QN AUTO: 29.5 PG (ref 26–34)
MCHC RBC AUTO-ENTMCNC: 32.5 G/DL (ref 30–36.5)
MCV RBC AUTO: 90.7 FL (ref 80–99)
MONOCYTES # BLD: 0.4 K/UL (ref 0–1)
MONOCYTES NFR BLD: 5 % (ref 5–13)
MUCOUS THREADS URNS QL MICRO: ABNORMAL /LPF
NEUTS SEG # BLD: 4.1 K/UL (ref 1.8–8)
NEUTS SEG NFR BLD: 49 % (ref 32–75)
NITRITE UR QL STRIP.AUTO: NEGATIVE
NRBC # BLD: 0 K/UL (ref 0–0.01)
NRBC BLD-RTO: 0 PER 100 WBC
PH UR STRIP: 5 (ref 5–8)
PLATELET # BLD AUTO: 453 K/UL (ref 150–400)
PMV BLD AUTO: 8.8 FL (ref 8.9–12.9)
POTASSIUM SERPL-SCNC: 4.3 MMOL/L (ref 3.5–5.1)
PROT SERPL-MCNC: 7.8 G/DL (ref 6.4–8.2)
PROT UR STRIP-MCNC: NEGATIVE MG/DL
RBC # BLD AUTO: 4.82 M/UL (ref 3.8–5.2)
RBC #/AREA URNS HPF: ABNORMAL /HPF (ref 0–5)
SODIUM SERPL-SCNC: 136 MMOL/L (ref 136–145)
SP GR UR REFRACTOMETRY: 1.03 (ref 1–1.03)
T VAGINALIS VAG QL WET PREP: NORMAL
URINE CULTURE IF INDICATED: ABNORMAL
UROBILINOGEN UR QL STRIP.AUTO: 0.1 EU/DL (ref 0.1–1)
WBC # BLD AUTO: 8.3 K/UL (ref 3.6–11)
WBC URNS QL MICRO: ABNORMAL /HPF (ref 0–4)
YEAST: NORMAL

## 2024-04-09 PROCEDURE — 96361 HYDRATE IV INFUSION ADD-ON: CPT

## 2024-04-09 PROCEDURE — 85025 COMPLETE CBC W/AUTO DIFF WBC: CPT

## 2024-04-09 PROCEDURE — 81001 URINALYSIS AUTO W/SCOPE: CPT

## 2024-04-09 PROCEDURE — 80053 COMPREHEN METABOLIC PANEL: CPT

## 2024-04-09 PROCEDURE — 2580000003 HC RX 258

## 2024-04-09 PROCEDURE — 96374 THER/PROPH/DIAG INJ IV PUSH: CPT

## 2024-04-09 PROCEDURE — 36415 COLL VENOUS BLD VENIPUNCTURE: CPT

## 2024-04-09 PROCEDURE — 74177 CT ABD & PELVIS W/CONTRAST: CPT

## 2024-04-09 PROCEDURE — 99285 EMERGENCY DEPT VISIT HI MDM: CPT

## 2024-04-09 PROCEDURE — 87210 SMEAR WET MOUNT SALINE/INK: CPT

## 2024-04-09 PROCEDURE — 83690 ASSAY OF LIPASE: CPT

## 2024-04-09 PROCEDURE — 96375 TX/PRO/DX INJ NEW DRUG ADDON: CPT

## 2024-04-09 PROCEDURE — 6360000002 HC RX W HCPCS

## 2024-04-09 PROCEDURE — 6360000004 HC RX CONTRAST MEDICATION

## 2024-04-09 RX ORDER — 0.9 % SODIUM CHLORIDE 0.9 %
1000 INTRAVENOUS SOLUTION INTRAVENOUS ONCE
Status: COMPLETED | OUTPATIENT
Start: 2024-04-09 | End: 2024-04-09

## 2024-04-09 RX ORDER — ONDANSETRON 2 MG/ML
4 INJECTION INTRAMUSCULAR; INTRAVENOUS ONCE
Status: COMPLETED | OUTPATIENT
Start: 2024-04-09 | End: 2024-04-09

## 2024-04-09 RX ORDER — MORPHINE SULFATE 4 MG/ML
4 INJECTION, SOLUTION INTRAMUSCULAR; INTRAVENOUS
Status: COMPLETED | OUTPATIENT
Start: 2024-04-09 | End: 2024-04-09

## 2024-04-09 RX ORDER — KETOROLAC TROMETHAMINE 30 MG/ML
30 INJECTION, SOLUTION INTRAMUSCULAR; INTRAVENOUS ONCE
Status: COMPLETED | OUTPATIENT
Start: 2024-04-09 | End: 2024-04-09

## 2024-04-09 RX ORDER — FLUCONAZOLE 150 MG/1
150 TABLET ORAL ONCE
Qty: 1 TABLET | Refills: 0 | Status: SHIPPED | OUTPATIENT
Start: 2024-04-09 | End: 2024-04-09

## 2024-04-09 RX ADMIN — MORPHINE SULFATE 4 MG: 4 INJECTION, SOLUTION INTRAMUSCULAR; INTRAVENOUS at 16:52

## 2024-04-09 RX ADMIN — IOPAMIDOL 100 ML: 755 INJECTION, SOLUTION INTRAVENOUS at 16:20

## 2024-04-09 RX ADMIN — SODIUM CHLORIDE 1000 ML: 9 INJECTION, SOLUTION INTRAVENOUS at 16:52

## 2024-04-09 RX ADMIN — ONDANSETRON 4 MG: 2 INJECTION INTRAMUSCULAR; INTRAVENOUS at 15:07

## 2024-04-09 RX ADMIN — KETOROLAC TROMETHAMINE 30 MG: 30 INJECTION, SOLUTION INTRAMUSCULAR; INTRAVENOUS at 14:53

## 2024-04-09 ASSESSMENT — PAIN SCALES - GENERAL
PAINLEVEL_OUTOF10: 7
PAINLEVEL_OUTOF10: 4

## 2024-04-09 ASSESSMENT — PAIN DESCRIPTION - DESCRIPTORS: DESCRIPTORS: BURNING

## 2024-04-09 ASSESSMENT — PAIN DESCRIPTION - LOCATION: LOCATION: ABDOMEN

## 2024-04-09 ASSESSMENT — PAIN DESCRIPTION - ORIENTATION: ORIENTATION: MID;RIGHT;LEFT

## 2024-04-09 NOTE — DISCHARGE INSTRUCTIONS
ALT 21 12 - 78 U/L    Alk Phosphatase 78 45 - 117 U/L    Total Protein 7.8 6.4 - 8.2 g/dL    Albumin 3.6 3.5 - 5.0 g/dL    Globulin 4.2 (H) 2.0 - 4.0 g/dL    Albumin/Globulin Ratio 0.9 (L) 1.1 - 2.2     Lipase    Collection Time: 04/09/24  2:45 PM   Result Value Ref Range    Lipase 26 13 - 75 U/L   Urinalysis with Reflex to Culture    Collection Time: 04/09/24  3:45 PM    Specimen: Urine   Result Value Ref Range    Color, UA Yellow/Straw      Appearance Clear Clear      Specific Gravity, UA 1.027 1.003 - 1.030      pH, Urine 5.0 5.0 - 8.0      Protein, UA Negative Negative mg/dL    Glucose, UA Negative Negative mg/dL    Ketones, Urine Negative Negative mg/dL    Bilirubin Urine Negative Negative      Blood, Urine Negative Negative      Urobilinogen, Urine 0.1 0.1 - 1.0 EU/dL    Nitrite, Urine Negative Negative      Leukocyte Esterase, Urine Small (A) Negative      WBC, UA 5-10 0 - 4 /hpf    RBC, UA 5-10 0 - 5 /hpf    Epithelial Cells UA Many (A) Few /lpf    BACTERIA, URINE Negative Negative /hpf    Urine Culture if Indicated Culture not indicated by UA result Culture not indicated by UA result      Mucus, UA Trace (A) Negative /lpf       Radiologic Studies  CT ABDOMEN PELVIS W IV CONTRAST Additional Contrast? None   Final Result      1. Near-resolution of distal colonic wall thickening consistent with colitis   since prior study.   2. Significant improvement in pericolonic fat stranding in the pelvis.   3. Near-resolution of previously described small bowel distention and   thickening.   4. Persistent but significantly improved bibasilar atelectasis and lingular   infiltrate.   5. Other incidental and postoperative changes described above.        [unfilled]  [unfilled]  ------------------------------------------------------------------------------------------------------------  The exam and treatment you received in the Emergency Department were for an urgent problem and are not intended as complete care. It is important

## 2024-04-09 NOTE — ED PROVIDER NOTES
[facility] EMERGENCY DEPT  EMERGENCY DEPARTMENT HISTORY AND PHYSICAL EXAM      Date: 2024  Patient Name: Hui Rodriguez  MRN: [id number]  YOB: 1965  Date of evaluation: 2024  Provider: Conchita Gacria PA-C   Note Started: 3:02 PM EDT 24    HISTORY OF PRESENT ILLNESS     Chief Complaint   Patient presents with    Dysuria    Abdominal Pain       History Provided By: Patient    HPI: Hui Rodriguez is a 58 y.o. female with past medical history listed below, presents for generalized abdominal pain, dysuria.  Patient states that about roughly 2 weeks ago she was seen and diagnosed with colitis, placed on Augmentin.  She has been taking this but continues to have some abdominal pain.  She has not tried taking any medication at home outside of the antibiotic to help with symptoms. Denies any fevers, chills,  shortness of breath, difficulty breathing, nausea/vomiting, constipation/diarrhea rash, night sweats, or chest pain.    PAST MEDICAL HISTORY   Past Medical History:  Past Medical History:   Diagnosis Date    DDD (degenerative disc disease), lumbar     Fatigue     GERD (gastroesophageal reflux disease)     Hypothyroidism     Psychiatric disorder     PTSD per patient    Seizures (HCC)        Past Surgical History:  Past Surgical History:   Procedure Laterality Date    APPENDECTOMY       SECTION      CHOLECYSTECTOMY      HYSTERECTOMY (CERVIX STATUS UNKNOWN)         Family History:  History reviewed. No pertinent family history.    Social History:  Social History     Tobacco Use    Smoking status: Every Day     Current packs/day: 0.50     Types: Cigarettes    Smokeless tobacco: Never   Substance Use Topics    Alcohol use: Not Currently     Alcohol/week: 0.0 standard drinks of alcohol    Drug use: Never       Allergies:  No Known Allergies    PCP: No primary care provider on file.    Current Meds:   No current facility-administered medications for this encounter.     Current Outpatient 
[facility]

## 2024-04-09 NOTE — ED TRIAGE NOTES
Patient here with c/o abd pain, took Augmentin despite having listed this as an allergy of N/V. Took Augmentin off of allergy list at patient's request. Here stating that she is having pain \"shooting up from my vagina,it's throbbing, and my stomach feels like it's burning. Patient here on the 30th and dx of colitis, having same symptoms then.

## 2024-07-03 ENCOUNTER — HOSPITAL ENCOUNTER (EMERGENCY)
Facility: HOSPITAL | Age: 59
Discharge: HOME | End: 2024-07-03
Attending: EMERGENCY MEDICINE
Payer: MEDICARE

## 2024-07-03 VITALS
HEIGHT: 68 IN | TEMPERATURE: 98 F | WEIGHT: 205 LBS | SYSTOLIC BLOOD PRESSURE: 116 MMHG | RESPIRATION RATE: 16 BRPM | HEART RATE: 80 BPM | OXYGEN SATURATION: 97 % | DIASTOLIC BLOOD PRESSURE: 88 MMHG | BODY MASS INDEX: 31.07 KG/M2

## 2024-07-03 DIAGNOSIS — T30.4 CHEMICAL BURN: ICD-10-CM

## 2024-07-03 DIAGNOSIS — L30.9 DERMATITIS: Primary | ICD-10-CM

## 2024-07-03 PROCEDURE — 99283 EMERGENCY DEPT VISIT LOW MDM: CPT

## 2024-07-03 PROCEDURE — 6370000000 HC RX 637 (ALT 250 FOR IP): Performed by: EMERGENCY MEDICINE

## 2024-07-03 RX ORDER — BENZOCAINE/MENTHOL 6 MG-10 MG
LOZENGE MUCOUS MEMBRANE
Qty: 30 G | Refills: 1 | Status: SHIPPED | OUTPATIENT
Start: 2024-07-03

## 2024-07-03 RX ORDER — IBUPROFEN 600 MG/1
600 TABLET ORAL
Status: COMPLETED | OUTPATIENT
Start: 2024-07-03 | End: 2024-07-03

## 2024-07-03 RX ORDER — PREDNISONE 20 MG/1
60 TABLET ORAL DAILY
Status: DISCONTINUED | OUTPATIENT
Start: 2024-07-03 | End: 2024-07-03 | Stop reason: HOSPADM

## 2024-07-03 RX ORDER — PREDNISONE 20 MG/1
60 TABLET ORAL DAILY
Qty: 15 TABLET | Refills: 0 | Status: SHIPPED | OUTPATIENT
Start: 2024-07-03 | End: 2024-07-08

## 2024-07-03 RX ADMIN — IBUPROFEN 600 MG: 600 TABLET, FILM COATED ORAL at 13:29

## 2024-07-03 RX ADMIN — PREDNISONE 60 MG: 20 TABLET ORAL at 13:29

## 2024-07-03 ASSESSMENT — PAIN SCALES - GENERAL
PAINLEVEL_OUTOF10: 3
PAINLEVEL_OUTOF10: 0
PAINLEVEL_OUTOF10: 0

## 2024-07-03 ASSESSMENT — PAIN - FUNCTIONAL ASSESSMENT
PAIN_FUNCTIONAL_ASSESSMENT: NONE - DENIES PAIN
PAIN_FUNCTIONAL_ASSESSMENT: 0-10

## 2024-07-03 NOTE — ED PROVIDER NOTES
[facility] EMERGENCY DEPT  EMERGENCY DEPARTMENT HISTORY AND PHYSICAL EXAM      Date: 7/3/2024  Patient Name: Hui Rodriguez  MRN: [id number]  Birthdate 1965  Date of evaluation: 7/3/2024  Provider: Mercedez Zuniga MD   Note Started: 1:16 PM EDT 7/3/24    HISTORY OF PRESENT ILLNESS     Chief Complaint   Patient presents with    Chemical Exposure       History Provided By: Patient    HPI: Hui Rodriguez is a 59 y.o. female patient used the chemical agents 7 to kill bugs when it actually spilled on her left arm left leg.  This happened yesterday.  She is taken for water only showers hoping to flush it off but now having the burning and some skin coming off on the left thigh.  Feels a tingly burning sensation.    PAST MEDICAL HISTORY   Past Medical History:  Past Medical History:   Diagnosis Date    DDD (degenerative disc disease), lumbar     Fatigue     GERD (gastroesophageal reflux disease)     Hypothyroidism     Psychiatric disorder     PTSD per patient    Seizures (HCC)        Past Surgical History:  Past Surgical History:   Procedure Laterality Date    APPENDECTOMY       SECTION      CHOLECYSTECTOMY      HYSTERECTOMY (CERVIX STATUS UNKNOWN)         Family History:  No family history on file.    Social History:  Social History     Tobacco Use    Smoking status: Every Day     Current packs/day: 0.50     Types: Cigarettes    Smokeless tobacco: Never   Substance Use Topics    Alcohol use: Not Currently     Alcohol/week: 0.0 standard drinks of alcohol    Drug use: Never       Allergies:  No Known Allergies    PCP: No primary care provider on file.    Current Meds:   Current Facility-Administered Medications   Medication Dose Route Frequency Provider Last Rate Last Admin    predniSONE (DELTASONE) tablet 60 mg  60 mg Oral Daily Mercedez Zuniga MD        ibuprofen (ADVIL;MOTRIN) tablet 600 mg  600 mg Oral NOW Mercedez Zuniga MD         Current Outpatient Medications   Medication Sig Dispense Refill

## 2024-07-03 NOTE — ED NOTES
Pt provided with private room for washing of affected skin(with soap).  Pt is cleaned and dry. Verbalizes relief from chemical exposure. Pt is d/c per provider

## 2024-07-03 NOTE — ED TRIAGE NOTES
At [facility] yesterday \"sevin\" per pt it is a type of chemical to kill bugs. Pt states chemical came in contact with entire left side of body and face. On triage assessment no obvious signs of burns, some slight redness to left  leg . Pt states \"I feel like I'm on fire\"  scratches  to left leg and thigh due to \"itching\"

## 2025-04-19 ENCOUNTER — HOSPITAL ENCOUNTER (EMERGENCY)
Facility: HOSPITAL | Age: 60
Discharge: HOME | End: 2025-04-19
Payer: MEDICARE

## 2025-04-19 VITALS
HEIGHT: 68 IN | BODY MASS INDEX: 28.79 KG/M2 | WEIGHT: 190 LBS | RESPIRATION RATE: 18 BRPM | HEART RATE: 98 BPM | TEMPERATURE: 98.3 F | DIASTOLIC BLOOD PRESSURE: 75 MMHG | OXYGEN SATURATION: 99 % | SYSTOLIC BLOOD PRESSURE: 135 MMHG

## 2025-04-19 DIAGNOSIS — B37.2 SKIN YEAST INFECTION: Primary | ICD-10-CM

## 2025-04-19 PROCEDURE — 6370000000 HC RX 637 (ALT 250 FOR IP): Performed by: NURSE PRACTITIONER

## 2025-04-19 PROCEDURE — 99283 EMERGENCY DEPT VISIT LOW MDM: CPT

## 2025-04-19 RX ORDER — CLOTRIMAZOLE AND BETAMETHASONE DIPROPIONATE 10; .64 MG/G; MG/G
CREAM TOPICAL
Qty: 45 G | Refills: 0 | Status: SHIPPED | OUTPATIENT
Start: 2025-04-19

## 2025-04-19 RX ORDER — FLUCONAZOLE 100 MG/1
150 TABLET ORAL DAILY
Status: DISCONTINUED | OUTPATIENT
Start: 2025-04-19 | End: 2025-04-20 | Stop reason: HOSPADM

## 2025-04-19 RX ORDER — CLOTRIMAZOLE AND BETAMETHASONE DIPROPIONATE 10; .64 MG/G; MG/G
CREAM TOPICAL 2 TIMES DAILY
Status: DISCONTINUED | OUTPATIENT
Start: 2025-04-19 | End: 2025-04-20 | Stop reason: HOSPADM

## 2025-04-19 RX ORDER — FLUCONAZOLE 100 MG/1
150 TABLET ORAL DAILY
Status: DISCONTINUED | OUTPATIENT
Start: 2025-04-20 | End: 2025-04-19

## 2025-04-19 RX ADMIN — FLUCONAZOLE 150 MG: 100 TABLET ORAL at 22:01

## 2025-04-19 RX ADMIN — CLOTRIMAZOLE AND BETAMETHASONE DIPROPIONATE: 10; .5 CREAM TOPICAL at 22:02

## 2025-04-19 ASSESSMENT — LIFESTYLE VARIABLES
HOW OFTEN DO YOU HAVE A DRINK CONTAINING ALCOHOL: PATIENT DECLINED
HOW MANY STANDARD DRINKS CONTAINING ALCOHOL DO YOU HAVE ON A TYPICAL DAY: PATIENT DECLINED

## 2025-04-19 ASSESSMENT — PAIN - FUNCTIONAL ASSESSMENT: PAIN_FUNCTIONAL_ASSESSMENT: 0-10

## 2025-04-19 ASSESSMENT — PAIN SCALES - GENERAL: PAINLEVEL_OUTOF10: 5

## 2025-04-20 NOTE — DISCHARGE INSTRUCTIONS
MD    Independent [facility]  Zackery Duff MD: [address], [place] [address]. [phone]  Kermit Dewey MD: [address] [facility]. [phone]

## 2025-04-20 NOTE — ED PROVIDER NOTES
[facility] EMERGENCY DEPT  EMERGENCY DEPARTMENT HISTORY AND PHYSICAL EXAM      Date of evaluation: 2025  Patient Name: Hui Rodriguez  Birthdate 1965  MRN: [id number]  ED Provider: LÓPEZ Gallardo CNP   Note Started: 9:28 PM EDT 25    HISTORY OF PRESENT ILLNESS     Chief Complaint   Patient presents with    Rash       History Provided By: Patient, only     HPI: Hui Rodriguez is a 59 y.o. female presents the emergency department with chief complaint of rash under bilateral breast.  She states that over the past few days she noted that she had gotten sweaty and underneath the left breast started to get red.  Has now spread and also started underneath the right breast.  States that the area feels raw and burns.  She states that she wiped of the area to try to dry it and noted that it had malodorous white-colored drainage.  She states that the area is very itchy and painful at the same time.  She denies any pain in the breast, denies any swelling or discharge from the nipple.  Denies any headaches, dizziness, lightheadedness, visual changes, chest pain, palpitations, shortness of breath, abdominal pain, nausea, vomiting, diarrhea, constipation.    PAST MEDICAL HISTORY   Past Medical History:  Past Medical History:   Diagnosis Date    DDD (degenerative disc disease), lumbar     Fatigue     GERD (gastroesophageal reflux disease)     Hypothyroidism     Psychiatric disorder     PTSD per patient    Seizures (HCC)        Past Surgical History:  Past Surgical History:   Procedure Laterality Date    APPENDECTOMY       SECTION      CHOLECYSTECTOMY      HYSTERECTOMY (CERVIX STATUS UNKNOWN)         Family History:  History reviewed. No pertinent family history.    Social History:  Social History     Tobacco Use    Smoking status: Every Day     Current packs/day: 0.50     Types: Cigarettes    Smokeless tobacco: Never   Substance Use Topics    Alcohol use: Not Currently     Alcohol/week: 0.0 standard drinks

## 2025-04-20 NOTE — ED TRIAGE NOTES
Pt reports itchy, burning rash under both breasts for a few days. States she tried dry spray at home with no relief.